# Patient Record
Sex: FEMALE | Race: WHITE | NOT HISPANIC OR LATINO | Employment: OTHER | URBAN - METROPOLITAN AREA
[De-identification: names, ages, dates, MRNs, and addresses within clinical notes are randomized per-mention and may not be internally consistent; named-entity substitution may affect disease eponyms.]

---

## 2017-01-04 ENCOUNTER — TELEPHONE (OUTPATIENT)
Dept: NEUROLOGY | Facility: CLINIC | Age: 52
End: 2017-01-04

## 2017-01-04 DIAGNOSIS — F41.9 ANXIETY: ICD-10-CM

## 2017-01-04 DIAGNOSIS — G35 MULTIPLE SCLEROSIS: Primary | ICD-10-CM

## 2017-01-04 NOTE — TELEPHONE ENCOUNTER
----- Message from May Berry LCSW sent at 1/4/2017  2:16 PM CST -----  Regarding: Counseling  Hien wanted counseling (months ago) and I've kept in touch and told her I'd call as soon as I can see patients.  Can you please put in an order?  Thanks!

## 2017-01-05 ENCOUNTER — CLINICAL SUPPORT (OUTPATIENT)
Dept: SPEECH THERAPY | Facility: HOSPITAL | Age: 52
End: 2017-01-05
Attending: PSYCHIATRY & NEUROLOGY
Payer: COMMERCIAL

## 2017-01-05 DIAGNOSIS — M25.512 LEFT SHOULDER PAIN, UNSPECIFIED CHRONICITY: ICD-10-CM

## 2017-01-05 DIAGNOSIS — M54.2 CERVICAL PAIN: ICD-10-CM

## 2017-01-05 PROCEDURE — 97110 THERAPEUTIC EXERCISES: CPT

## 2017-01-05 PROCEDURE — 97140 MANUAL THERAPY 1/> REGIONS: CPT

## 2017-01-05 NOTE — PROGRESS NOTES
TIME RECORD    Date:  01/06/2017    Start Time:  804  Stop Time:  900  PROCEDURES:    TIMED  Procedure Min.   Manual therapy 20   Therex 30   Supervised TE 6             UNTIMED  Procedure Min.             Total Timed Minutes:  50  Total Timed Units:  3  Total Untimed Units:  0  Charges Billed/# of units:  3 (MTx1, TEx2)    Progress/Current Status    Subjective:     Patient ID: Hien Jeter is a 51 y.o. female.  Diagnosis:   1. Cervical pain     2. Left shoulder pain, unspecified chronicity       SHe reported that she have have overuse pain and soreness from all the holiday activities but she feels good otherwise.    Objective:      Session initiated with supervised TE on nustep UEs only x 6'.  Manual therapy x 20 minutes consisting of STM/MFR to L UT and L supbscap area with static and dynamic cupping to both,  L shoulder STM/MFR scapular framing in R SL .   Pt performed TE per log and UE assessment 1:1 with PT  x 30  Minutes.     Date 1/6/2017 12/28/16 12/21/2016 12/19/2016 12/16/2016 12/12/2016 12/5/16 11/30/16 11/28/16 11/21/16 11/17/16 11/08/16   Visit 13 12 11 10 9 8 7 6 5 4 3    MHP --   -- -- -- -- -- -- -- 10 10   MT 15' 20' 20' 15' 20' 20' 15' 15' 15' 10' note See note   UT Str. -- - HEP HEP 3 x 30'' B -- -- -- -- -- -- --   LV Str. -- -  -- -- -- -- -- -- -- -- --   Pec Str. 3 x 30'' B -  -- -- -- -- -- -- -- -- painful   ER rot -- -  -- -- -- 2x15 3x10 3x10 NT 2x12 SL 2 x 10   SL abd -- -  -- -- -- 2x15 3x10 3x10 NT 2x12    Serratus punches 3x10 L 2 # 3x10 L 3x10 L 2 x 12 L 2 x 12 L 2 x 12 L 2x12 L 2x12 L 2x10 L      Y's 1# B prone  scap 2x15 1# B prone  scap 2x15 1# B prone   scap 2x15 Prone   1# scaption 2x15 Prone   1# scaption 2x12 Prone   1# scaption 2x12 Prone   scaption 2x10 Over BPB  2x12 Over BPB  2x12 Over BPB  2x10  Over BPB  2x10  2 x 10 B   W's 1# B prone  scap 2x15 1# B prone  Ext  2x15 1# B prone   Ext 2x15 Prone ext   2x15 1# Prone ext   2x12 1# Prone ext   2x10 1# Prone ext  L  2x10 Over BPB  2x12 Over BPB  2x12   Over BPB  2x10  Over BPB  2x10 2 x 10 B   T's 1# B prone  scap 2x15 1# B prone 2x15 1# 2x15  Over BTB Over BPB  2x15 1# Over BPB  2x12 1# Over BPB  2x12 1# Prone horiz abd L 2x10 Over BPB  2x12 Over BPB  2x12 Over BPB  2x10  Over BPB  2x10 2 x 10 B   Rows -- --  -- -- Prone  L 2x10 Prone  L 2x10 Over BPB  2x12 Over BPB  2x12 Over BPB  2x10  Over BPB  2x10 2 x 10 B   Horizontal Abd RTB 2x12 B RTB 2x12 B RTB 2x12 B RTB 2 x 10 B YTB 2 x 10 B -- --   painful - painful   Scaption 1# 2x15 1# 2x15 1# 2x15 2 x 15 1# 2 x 12 1# 2 x 12 1# 2x10 2x10 2x10 -- - --   Wall Slides -- 2x10 B 2x10 B X 15 B X 15 B X 15 B 2x10 2x10 2x10 NT 2x10 B --   No money 2x10 B 2x10             Ball on wall next 2x10 ea at 90*  Flex/ext  abd/add             ER RTB 2x10   (lawnmower) RTB 2x15  L RTB 2x12  RTB 2x10   (lawnmower) RTB 2x10   (lawnmower) RTB 2 x 10  (lawnmower) YTB 2x10 --       IR GTB 3x10  L GTB 3x10  L GTB 2x10 GTB 2x10  RTB 2x10  RTB 2x10 YTB 2x10 --       Lats Next  GTB 3x10 GTB 2x10 GTB 2x10  RTB 2x10  RTB YTB 2x10 ^next YTB 2x10 YTB 2x10 --     Rows next GTB 3x10 GTB 2x10 GTB 2x10  RTB 2x10  RTB 2x10  YTB 2x10  ^next YTB 2x10 YTB 2x10 --     Initials FS CS  2/6 DH 1/6 FS FS FS CS 3/6 CS  2/6 CS FS CS 1/6 FS       Upper Extremity Strength    KIRSTINE MARCOS   Shoulder Flexion: 5/5 5/5   Shoulder Abduction: 5/5 4+/5   Shoulder Extension: 5/5 4/5   Shoulder External  Rotation: 5/5 4/5   Shoulder Internal  Rotation: 5/5 4/5   Elbow Flexion:    5/5 5/5   Elbow Extension: 5/5 5/5   Wrist Flexion: 5/5 5/5   Wrist Extension: 5/5 5/5   : 5/5 5/5      Lower Extremity Strength    RLE LLE   Hip Flexion: 5/5 5/5   Hip Extension:  5/5 5/5   Hip Abduction: 5/5 5/5   Hip Adduction: 5/5 5/5   Knee Extension: 5/5 5/5   Knee Flexion: 5/5 5/5   Ankle Dorsiflexion: 5/5 5/5   Ankle Plantarflexion: 5/5 5/5         Assessment:     Increased reps as noted without difficulty.    Patient Education/Response:     Patient  educated to continue HEP.      Plans and Goals:   See updated POC

## 2017-01-06 NOTE — PLAN OF CARE
TIME RECORD    Date: 1/5/17    Start Time:  0805  Stop Time:  0900    PHYSICAL THERAPY UPDATED PLAN OF TREATMENT    Patient name: Hien Jeter  Onset Date:  2009 then fall 3 months ago  SOC Date:  10/28/16  Primary Diagnosis:    1. Cervical pain     2. Left shoulder pain, unspecified chronicity       Treatment Diagnosis:  L shoulder inpingement syndrome and MS and L shoulder pain and R ankle pain  Certification Period:  1/5/17 to 3/5/17  Precautions:  none  Visits from SOC:  13  Functional Level Prior to SOC:  L shoulder pain with decreased L UE ROM and L UE weakness.    Updated Assessment:  Pt is progressing in strength and reporting less pain and greater function.  SHe would benefit from more therapy to achieve LTG for UE function.  Pt stated that she does not feel that she is 100% all the time and the minimal pain is still bothering her and she stated that it is limiting her ability to lift objects and do some high level ADLs and house work.    Upper Extremity Strength     RUE LUE   Shoulder Flexion: 5/5 5/5   Shoulder Abduction: 5/5 4+/5   Shoulder Extension: 5/5 4/5   Shoulder External  Rotation: 5/5 4/5   Shoulder Internal  Rotation: 5/5 4/5   Elbow Flexion:     5/5 5/5   Elbow Extension: 5/5 5/5   Wrist Flexion: 5/5 5/5   Wrist Extension: 5/5 5/5   : 5/5 5/5       Lower Extremity Strength     RLE LLE   Hip Flexion: 5/5 5/5   Hip Extension:  5/5 5/5   Hip Abduction: 5/5 5/5   Hip Adduction: 5/5 5/5   Knee Extension: 5/5 5/5   Knee Flexion: 5/5 5/5   Ankle Dorsiflexion: 5/5 5/5   Ankle Plantarflexion: 5/5 5/5      Pt scored the following on the DASH assessment: 19/120    Short Term Goals (4 Weeks):   1. Pt will be indep with initial modified HEP with no increase in pain after exercise. MET  2. Pt will demonstrate improved L UEstrength of left lats, mid/ low traps, and rhomboids to 4/5 to allow pt to progress to resistive gym equipment for maintenance. MET       Long Term Goals (8 Weeks):   11. Independent  with updated HEP.   2. Increase L shoulder AROM to functional range with minimal pain. 3/10  3. Increase L shoulder strength to 4+/5 in most major ms groups in L shauna. MET  4. Patient will be able to achieve less than or equal to 24/120 on the DASH placing patient in 1-20 impaired, limited, or restricted category demonstrating overall improved functional ability with upper extremity. MET  5. Pt will report improved confidence with daily mobility by rating self at >95% on [place] [facility] Short Form.no longer applicable    6. Patient will be able to achieve greater than or equal to 65/80 on the LEFS placing patient in 10-20% impaired, limited, or restricted category demonstrating overall improved functional ability with lower extremity. MET  Long Term Goal Status:   continue per initial plan of care.  Reasons for Recertification of Therapy:    exercise prescription    Certification Period: 17 to 3/5/17  Recommended Treatment Plan: 2 times per week for 6-8 weeks: Gait Training, Locomotor Training, Manual Therapy, Moist Heat/ Ice, Neuromuscular Re-ed, Patient Education, Self Care, Therapeutic Activites and Therapeutic Exercise  Other Recommendations: none        Therapist's Name: Beulah Stout     Date: 2017    I CERTIFY THE NEED FOR THESE SERVICES FURNISHED UNDER THIS PLAN OF TREATMENT AND WHILE UNDER MY CARE    Physician's comments: ________________________________________________________________________________________________________________________________________________      Physician's Name: ___________________________________

## 2017-01-06 NOTE — TELEPHONE ENCOUNTER
Pt phoned today, just prior to me calling her, to inquire about counseling services.  I explained that I had just started scheduling patients.  Scheduled her for Tuesday 1/10 at 8:30am.  Spent some time discussing various stressors including work demands and caregiving for multiple family members.  We will discuss more, next week.

## 2017-01-10 ENCOUNTER — INITIAL CONSULT (OUTPATIENT)
Dept: NEUROLOGY | Facility: CLINIC | Age: 52
End: 2017-01-10
Payer: COMMERCIAL

## 2017-01-10 DIAGNOSIS — F43.23 ADJUSTMENT DISORDER WITH MIXED ANXIETY AND DEPRESSED MOOD: Primary | ICD-10-CM

## 2017-01-10 PROCEDURE — 90791 PSYCH DIAGNOSTIC EVALUATION: CPT | Mod: S$GLB,,, | Performed by: SOCIAL WORKER

## 2017-01-10 NOTE — PROGRESS NOTES
Psychiatry Initial Visit (PhD/LCSW)  Diagnostic Interview - CPT 68529    Date: 1/10/2017    Site: [facility]    Referral source: Page Salas MD, MPH    Clinical status of patient: Outpatient    Hien Jeter, a 51 y.o. female, for initial evaluation visit.  Met with patient.    Chief complaint/reason for encounter: anxiety, depression and interpersonal    History of present illness: Ms. Hien Jeter self-referred to counseling to address her anxiety related to family stressors.  She was previously evaluated by Rody Irby LCSW in December of 2015 for symptoms of anxiety including sleep disturbance, difficulty concentrating, fatigue, guilt, excessive worry, feelings of losing control and psychomotor agitation that had been present for approximately one year.  Much of her anxiety, at that time, related to concerns for her aging parents and in-laws and difficulties she was experiencing at work.  Ms. Jeter attended one follow-up session with Ms. Irby and was noted to have decreased symptoms after making the decision to take a sabbatical and utilize her short-term disability benefit.      A summary of Ms. Jeter's personal/family history is documented in Ms. Irby's note from 12/29/15 and there have not been significant changes other than the recent decline in health experienced by several family members including an aunt and uncle, her mother and her mother-in-law.  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  She shared that the teaching environment has changed significantly over the years and she finds it difficult, with her Multiple Sclerosis, to manage a classroom in the way she believes is expected of her, presently.  As an example, she shared that there is an expectation that students will be engaged in more group work/projects and she finds it too difficult to keep track of each group's progress while also  "attending to behavioral disruptions.      As mentioned, Ms. Jeter has several family members that are currently experiencing health issues.  Ms. Jeter has been engaged in caregiving for these family members, most notably spending several days each week caring for her mother-in-law, who was recently hospitalized.  She is challenged by dynamics within her 's family and finds herself in a mediating/policing role, at times.  She describes these experiences as "depressing" and stressful.  She reports crying, feeling worried and "overwhelmed" by some of these experiences.      Pain: Not assessed.      Symptoms:   · Mood: worthlessness/guilt, tearfulness and occasional depressed mood   · Anxiety: excessive anxiety/worry  · Substance abuse: denied  · Cognitive functioning: Occasional loss of focus  · Health behaviors: noncontributory    Psychiatric history: psychotropic management by PCP and has participated in counseling/psychotherapy on an outpatient basis in the past    Medical history: Multiple Sclerosis, reports healing from left shoulder injury    Family history of psychiatric illness: Possible history of anxiety.    Social history (marriage, employment, etc.): See previous history from 12/29/15 evaluation by Rody Irby LCSW.  Ms. Jeter remains , without children.  She has two dogs.  Lives in a neighborhood with several in-laws nearby.  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  She has close and open relationships with her own family and is close with her 's family, too.  She reports a close relationship with her sister-in-law Lynette.    Substance use:   Alcohol: social   Drugs: none   Tobacco: none   Caffeine: Not assessed    Current medications and drug reactions (include OTC, herbal): see medication list     Strengths and liabilities: Strength: Patient accepts guidance/feedback, Strength: Patient is " expressive/articulate., Strength: Patient is intelligent., Strength: Patient is motivated for change., Strength: Patient is physically healthy., Strength: Patient has positive support network., Strength: Patient has reasonable judgment., Strength: Patient is stable., Strength: Relationship with sister-in-law Lynette, support network through [employer] at [facility]    Current Evaluation:     Mental Status Exam:  General Appearance:  age appropriate, neatly groomed   Speech: normal tone, normal rate, normal pitch, normal volume      Level of Cooperation: cooperative      Thought Processes: normal and logical   Mood: steady, reports occasional depressed mood      Thought Content: normal, no suicidality, no homicidality, delusions, or paranoia   Affect: congruent and appropriate   Orientation: Oriented x3   Memory: Intact   Attention Span & Concentration: intact   Fund of General Knowledge: intact and appropriate to age and level of education   Abstract Reasoning: Intact   Judgment & Insight: good     Language  intact     Diagnostic Impression - Plan:       ICD-10-CM ICD-9-CM   1. Adjustment disorder with mixed anxiety and depressed mood F43.23 309.28       Plan:individual psychotherapy    Return to Clinic: 1 week, she will call with availability after consulting with family    Length of Service (minutes): 60

## 2017-01-12 ENCOUNTER — OFFICE VISIT (OUTPATIENT)
Dept: ORTHOPEDICS | Facility: CLINIC | Age: 52
End: 2017-01-12
Payer: COMMERCIAL

## 2017-01-12 ENCOUNTER — HOSPITAL ENCOUNTER (OUTPATIENT)
Dept: RADIOLOGY | Facility: HOSPITAL | Age: 52
Discharge: HOME | End: 2017-01-12
Attending: ORTHOPAEDIC SURGERY
Payer: COMMERCIAL

## 2017-01-12 VITALS — HEIGHT: 62 IN | BODY MASS INDEX: 20.61 KG/M2 | WEIGHT: 112 LBS

## 2017-01-12 DIAGNOSIS — M25.511 RIGHT SHOULDER PAIN, UNSPECIFIED CHRONICITY: Primary | ICD-10-CM

## 2017-01-12 DIAGNOSIS — M75.42 IMPINGEMENT SYNDROME OF LEFT SHOULDER: ICD-10-CM

## 2017-01-12 DIAGNOSIS — M25.511 RIGHT SHOULDER PAIN, UNSPECIFIED CHRONICITY: ICD-10-CM

## 2017-01-12 PROCEDURE — 1159F MED LIST DOCD IN RCRD: CPT | Mod: S$GLB,,, | Performed by: ORTHOPAEDIC SURGERY

## 2017-01-12 PROCEDURE — 73030 X-RAY EXAM OF SHOULDER: CPT | Mod: 26,RT,, | Performed by: RADIOLOGY

## 2017-01-12 PROCEDURE — 99999 PR PBB SHADOW E&M-EST. PATIENT-LVL II: CPT | Mod: PBBFAC,,, | Performed by: ORTHOPAEDIC SURGERY

## 2017-01-12 PROCEDURE — 73030 X-RAY EXAM OF SHOULDER: CPT | Mod: TC,RT

## 2017-01-12 PROCEDURE — 99213 OFFICE O/P EST LOW 20 MIN: CPT | Mod: S$GLB,,, | Performed by: ORTHOPAEDIC SURGERY

## 2017-01-12 NOTE — PROGRESS NOTES
INITIAL VISIT HISTORY:  Ms. Jeter in followup of left shoulder symptoms.  She is   doing much better with the left shoulder, occasionally having pain in the right   shoulder, but it really seems to be improving on both sides.  She is currently   in therapy.    PHYSICAL EXAMINATION:  Left shoulder demonstrates full range of motion.    Negative impingement sign.  Right shoulder demonstrates full range of motion,   just a slight pop with abduction and internal rotation of the shoulder.  No   instability.    X-RAYS:  AP and lateral of right shoulder demonstrate no bony abnormalities.    IMPRESSION:  Left shoulder impingement, improving.    PLAN:  I have recommended she finish up therapy, then switch to a home exercise   program.  She uses the anti-inflammatory cream, which seems to help with her   symptoms, so I have recommended she continue with that, increase activities as   tolerated.  Follow up slim HAMMOND  dd: 01/12/2017 14:06:36 (CST)  td: 01/13/2017 02:00:02 (CST)  Doc ID   #[id number]  Job ID #[id number]    CC:

## 2017-01-13 ENCOUNTER — TELEPHONE (OUTPATIENT)
Dept: NEUROLOGY | Facility: CLINIC | Age: 52
End: 2017-01-13

## 2017-01-13 NOTE — TELEPHONE ENCOUNTER
Phoned pt to check in as she was supposed to call to schedule her next therapy appointment, by now.  She shared that her mother-in-law was hospitalized and sent to the ICU.  I provided support and agreed to check in with her next week.

## 2017-01-18 ENCOUNTER — CLINICAL SUPPORT (OUTPATIENT)
Dept: SPEECH THERAPY | Facility: HOSPITAL | Age: 52
End: 2017-01-18
Attending: PSYCHIATRY & NEUROLOGY
Payer: COMMERCIAL

## 2017-01-18 DIAGNOSIS — R53.1 DECREASED STRENGTH: ICD-10-CM

## 2017-01-18 DIAGNOSIS — M25.512 LEFT SHOULDER PAIN, UNSPECIFIED CHRONICITY: ICD-10-CM

## 2017-01-18 DIAGNOSIS — M62.838 MUSCLE SPASM: ICD-10-CM

## 2017-01-18 DIAGNOSIS — M75.42 IMPINGEMENT SYNDROME OF LEFT SHOULDER: ICD-10-CM

## 2017-01-18 DIAGNOSIS — G35 MS (MULTIPLE SCLEROSIS): ICD-10-CM

## 2017-01-18 PROCEDURE — 97140 MANUAL THERAPY 1/> REGIONS: CPT

## 2017-01-18 PROCEDURE — 97110 THERAPEUTIC EXERCISES: CPT

## 2017-01-18 NOTE — PROGRESS NOTES
TIME RECORD    Date:  01/18/2017    Start Time:  0805  Stop Time:  0857    PROCEDURES:    TIMED  Procedure Min.   MT 19   TE 32                 UNTIMED  Procedure Min.             Total Timed Minutes:  51  Total Timed Units:  3  Total Untimed Units:  0  Charges Billed/# of units:  3 (1MT, 2TE)      Progress/Current Status    Subjective:     Patient ID: Hien Jeter is a 51 y.o. female.  Diagnosis:   1. Impingement syndrome of left shoulder     2. Decreased strength     3. Left shoulder pain, unspecified chronicity     4. MS (multiple sclerosis)     5. Muscle spasm       Pain: 2 /10  Pt stated that pain is better but she feels sensitive on L side after sleeping on that side.      Objective:      Session initiated with supervised TE on pulleys UEs only x 6'.  Manual therapy x 19 minutes consisting of STM/MFR to L UT and L supbscap area with static and dynamic cupping to both,  L shoulder STM/MFR scapular framing in R SL .   Pt performed TE per log and UE assessment 1:1 with PT  x  30 Minutes.     Date 1/18/2017 1/6/2017 12/28/16 12/21/2016 12/19/2016 12/16/2016 12/12/2016 12/5/16   Visit 14 13 12 11 10 9 8 7   MHP -- --   -- -- -- --   MT 19' 15' 20' 20' 15' 20' 20' 15'   UT Str. -- -- - HEP HEP 3 x 30'' B -- --   LV Str. -- -- -  -- -- -- --   Middle trunk str X 30''          Lat trunk lean str X 30''          Pec Str. 3 x 30'' 3 x 30'' B -  -- -- -- --   ER rot  -- -  -- -- -- 2x15   SL abd  -- -  -- -- -- 2x15   Serratus punches 3x10 L 2 # 3x10 L 2 # 3x10 L 3x10 L 2 x 12 L 2 x 12 L 2 x 12 L 2x12 L   Y's 1# B prone  scap 2x15 1# B prone  scap 2x15 1# B prone  scap 2x15 1# B prone   scap 2x15 Prone   1# scaption 2x15 Prone   1# scaption 2x12 Prone   1# scaption 2x12 Prone   scaption 2x10   W's 1# B prone  scap 2x15 1# B prone  scap 2x15 1# B prone  Ext  2x15 1# B prone   Ext 2x15 Prone ext   2x15 1# Prone ext   2x12 1# Prone ext   2x10 1# Prone ext L  2x10   T's 1# B prone  scap 2x15 1# B prone  scap  2x15 1# B prone 2x15 1# 2x15  Over BTB Over BPB  2x15 1# Over BPB  2x12 1# Over BPB  2x12 1# Prone horiz abd L 2x10   Rows -- -- --  -- -- Prone  L 2x10 Prone  L 2x10   Horizontal Abd RTB 2x15 B RTB 2x12 B RTB 2x12 B RTB 2x12 B RTB 2 x 10 B YTB 2 x 10 B -- --   Scaption 1.5# 2x10 1# 2x15 1# 2x15 1# 2x15 2 x 15 1# 2 x 12 1# 2 x 12 1# 2x10   Wall Slides -- -- 2x10 B 2x10 B X 15 B X 15 B X 15 B 2x10   No money 2x10 B 2x10 B 2x10        Ball on wall next next 2x10 ea at 90*  Flex/ext  abd/add        ER RTB 2x10   (lawnmower) RTB 2x10   (lawnmower) RTB 2x15  L RTB 2x12  RTB 2x10   (lawnmower) RTB 2x10   (lawnmower) RTB 2 x 10  (lawnmower) YTB 2x10   IR GTB 3x10  L GTB 3x10  L GTB 3x10  L GTB 2x10 GTB 2x10  RTB 2x10  RTB 2x10 YTB 2x10   Lats GTB 3x10 Next  GTB 3x10 GTB 2x10 GTB 2x10  RTB 2x10  RTB YTB 2x10 ^next   Rows GTB 3x10 next GTB 3x10 GTB 2x10 GTB 2x10  RTB 2x10  RTB 2x10  YTB 2x10  ^next   Initials FS FS CS  2/6 DH 1/6 FS FS FS CS 3/6       Assessment:     Pt still tolerated all TE well with no increase in pain.  The new stretches to help with rib pain and stiffness.    Patient Education/Response:     CONT HEP    Plans and Goals:     Short Term Goals (4 Weeks):   1. Pt will be indep with initial modified HEP with no increase in pain after exercise. MET  2. Pt will demonstrate improved L UEstrength of left lats, mid/ low traps, and rhomboids to 4/5 to allow pt to progress to resistive gym equipment for maintenance. MET       Long Term Goals (8 Weeks):   11. Independent with updated HEP.   2. Increase L shoulder AROM to functional range with minimal pain. 3/10  3. Increase L shoulder strength to 4+/5 in most major ms groups in L shauna. MET  4. Patient will be able to achieve less than or equal to 24/120 on the DASH placing patient in 1-20 impaired, limited, or restricted category demonstrating overall improved functional ability with upper extremity. MET  5. Pt will report improved confidence with daily mobility  by rating self at >95% on [place] [facility] Short Form.no longer applicable    6. Patient will be able to achieve greater than or equal to 65/80 on the LEFS placing patient in 10-20% impaired, limited, or restricted category demonstrating overall improved functional ability with lower extremity. MET  Long Term Goal Status:  continue per initial plan of care.  Reasons for Recertification of Therapy:  exercise prescription

## 2017-01-23 ENCOUNTER — TELEPHONE (OUTPATIENT)
Dept: NEUROLOGY | Facility: CLINIC | Age: 52
End: 2017-01-23

## 2017-01-23 NOTE — TELEPHONE ENCOUNTER
Followed up with pt.  Mother-in-law is now in acute hospital unit vs ICU.  She will call tomorrow to schedule a follow up appointment.

## 2017-01-26 ENCOUNTER — CLINICAL SUPPORT (OUTPATIENT)
Dept: SPEECH THERAPY | Facility: HOSPITAL | Age: 52
End: 2017-01-26
Attending: PSYCHIATRY & NEUROLOGY
Payer: COMMERCIAL

## 2017-01-26 DIAGNOSIS — M75.42 IMPINGEMENT SYNDROME OF LEFT SHOULDER: ICD-10-CM

## 2017-01-26 DIAGNOSIS — M62.838 MUSCLE SPASM: ICD-10-CM

## 2017-01-26 DIAGNOSIS — G35 MS (MULTIPLE SCLEROSIS): ICD-10-CM

## 2017-01-26 DIAGNOSIS — M25.512 LEFT SHOULDER PAIN, UNSPECIFIED CHRONICITY: ICD-10-CM

## 2017-01-26 DIAGNOSIS — R53.1 DECREASED STRENGTH: ICD-10-CM

## 2017-01-26 PROCEDURE — 97110 THERAPEUTIC EXERCISES: CPT

## 2017-01-26 PROCEDURE — 97140 MANUAL THERAPY 1/> REGIONS: CPT

## 2017-01-26 NOTE — PROGRESS NOTES
"TIME RECORD    Date:  01/26/2017    Start Time:  8:05  Stop Time:  9:03    PROCEDURES:    TIMED  Procedure Min.   UBE    MT 15   TE 40             UNTIMED  Procedure Min.             Total Timed Minutes:  55  Total Timed Units:  4  Total Untimed Units:  0  Charges Billed/# of units:  3TE, 1 MT      Progress/Current Status    Subjective:     Patient ID: Hien Jeter is a 51 y.o. female.  Diagnosis:   1. Impingement syndrome of left shoulder     2. Decreased strength     3. Left shoulder pain, unspecified chronicity     4. MS (multiple sclerosis)     5. Muscle spasm       Pain: pt reports soreness in Left UT and shoulder today but "barable". Pt agreeable to PT session.     Objective:     Session initiated with UBE for ROM to B UEs only x 6'.  Manual therapy x 15 minutes consisting of STM/MFR to L UT and L supbscap musculature ,  L shoulder STM/MFR scapular framing in R SL .   Pt performed TE per log 1:1 with PTA  x  40 Minutes.     Date 1/26/17 1/18/2017 1/6/2017 12/28/16 12/21/2016 12/19/2016 12/16/2016 12/12/2016 12/5/16   Visit 15 14 13 12 11 10 9 8 7   MHP  -- --   -- -- -- --   MT 15 19' 15' 20' 20' 15' 20' 20' 15'   UT Str. - -- -- - HEP HEP 3 x 30'' B -- --   LV Str. - -- -- -  -- -- -- --   Middle trunk str NT X 30''          Lat trunk lean str NT X 30''          Pec Str. 30"x3 3 x 30'' 3 x 30'' B -  -- -- -- --   ER rot   -- -  -- -- -- 2x15   SL abd   -- -  -- -- -- 2x15   Serratus punches 3x10 L 2 # 3x10 L 2 # 3x10 L 2 # 3x10 L 3x10 L 2 x 12 L 2 x 12 L 2 x 12 L 2x12 L   Y's 1# B prone  scap 2x15 1# B prone  scap 2x15 1# B prone  scap 2x15 1# B prone  scap 2x15 1# B prone   scap 2x15 Prone   1# scaption 2x15 Prone   1# scaption 2x12 Prone   1# scaption 2x12 Prone   scaption 2x10   W's 1# B prone  scap 2x15 1# B prone  scap 2x15 1# B prone  scap 2x15 1# B prone  Ext  2x15 1# B prone   Ext 2x15 Prone ext   2x15 1# Prone ext   2x12 1# Prone ext   2x10 1# Prone ext L  2x10   T's 1# B prone  scap " "2x15 1# B prone  scap 2x15 1# B prone  scap 2x15 1# B prone 2x15 1# 2x15  Over BTB Over BPB  2x15 1# Over BPB  2x12 1# Over BPB  2x12 1# Prone horiz abd L 2x10   Rows - -- -- --  -- -- Prone  L 2x10 Prone  L 2x10   Horizontal Abd RTB 2x15 B RTB 2x15 B RTB 2x12 B RTB 2x12 B RTB 2x12 B RTB 2 x 10 B YTB 2 x 10 B -- --   Scaption  1.5# 2x10 1# 2x15 1# 2x15 1# 2x15 2 x 15 1# 2 x 12 1# 2 x 12 1# 2x10   Wall Slides - -- -- 2x10 B 2x10 B X 15 B X 15 B X 15 B 2x10   No money 2x10 B w/ RTB 2x10 B 2x10 B 2x10        Ball on wall 2x10 ea at 90*  Flex/ext  abd/add next next 2x10 ea at 90*  Flex/ext  abd/add        ER RTB 2x15  (lawnmower) RTB 2x10   (lawnmower) RTB 2x10   (lawnmower) RTB 2x15  L RTB 2x12  RTB 2x10   (lawnmower) RTB 2x10   (lawnmower) RTB 2 x 10  (lawnmower) YTB 2x10   IR GTB 3x10 GTB 3x10  L GTB 3x10  L GTB 3x10  L GTB 2x10 GTB 2x10  RTB 2x10  RTB 2x10 YTB 2x10   Lats GTB 3x10 GTB 3x10 Next  GTB 3x10 GTB 2x10 GTB 2x10  RTB 2x10  RTB YTB 2x10 ^next   Rows GTB 3x10 GTB 3x10 next GTB 3x10 GTB 2x10 GTB 2x10  RTB 2x10  RTB 2x10  YTB 2x10  ^next   Initials JA 1/6 FS FS CS  2/6 DH 1/6 FS FS FS CS 3/6       Assessment:     Pt able to complete today's session stating that manual therapy to her left upper trapezius and shoulder felt "releiving". Pt responded well to PT session today.     Patient Education/Response:   Cont HEP    Plans and Goals:   Cont to advance PT as per POC.   Short Term Goals (4 Weeks):   1. Pt will be indep with initial modified HEP with no increase in pain after exercise. MET  2. Pt will demonstrate improved L UEstrength of left lats, mid/ low traps, and rhomboids to 4/5 to allow pt to progress to resistive gym equipment for maintenance. MET       Long Term Goals (8 Weeks):   11. Independent with updated HEP.   2. Increase L shoulder AROM to functional range with minimal pain. 3/10  3. Increase L shoulder strength to 4+/5 in most major ms groups in L jenler. MET  4. Patient will be able to achieve " less than or equal to 24/120 on the DASH placing patient in 1-20 impaired, limited, or restricted category demonstrating overall improved functional ability with upper extremity. MET  5. Pt will report improved confidence with daily mobility by rating self at >95% on [place] [facility] Short Form.no longer applicable    6. Patient will be able to achieve greater than or equal to 65/80 on the LEFS placing patient in 10-20% impaired, limited, or restricted category demonstrating overall improved functional ability with lower extremity. MET  Long Term Goal Status:  continue per initial plan of care.  Reasons for Recertification of Therapy:  exercise prescription

## 2017-01-30 ENCOUNTER — TELEPHONE (OUTPATIENT)
Dept: SPEECH THERAPY | Facility: HOSPITAL | Age: 52
End: 2017-01-30

## 2017-02-02 DIAGNOSIS — Z30.9 ENCOUNTER FOR CONTRACEPTIVE MANAGEMENT: ICD-10-CM

## 2017-02-03 ENCOUNTER — CLINICAL SUPPORT (OUTPATIENT)
Dept: SPEECH THERAPY | Facility: HOSPITAL | Age: 52
End: 2017-02-03
Attending: PSYCHIATRY & NEUROLOGY
Payer: COMMERCIAL

## 2017-02-03 DIAGNOSIS — M25.512 LEFT SHOULDER PAIN, UNSPECIFIED CHRONICITY: ICD-10-CM

## 2017-02-03 DIAGNOSIS — M54.2 CERVICAL PAIN: ICD-10-CM

## 2017-02-03 RX ORDER — NORGESTIMATE AND ETHINYL ESTRADIOL 7DAYSX3 28
KIT ORAL
Qty: 84 TABLET | Refills: 0 | Status: SHIPPED | OUTPATIENT
Start: 2017-02-03 | End: 2017-05-02

## 2017-02-03 NOTE — PROGRESS NOTES
TIME RECORD    Date:  02/08/2017    Start Time:  0805  Stop Time:  0830    PROCEDURES:    TIMED  Procedure Min.   supevised TE 6   education 19                 UNTIMED  Procedure Min.             Total Timed Minutes:  0  Total Timed Units:  0  Total Untimed Units:  0  Charges Billed/# of units:  0      Progress/Current Status    Subjective:     Patient ID: Hien Jeter is a 51 y.o. female.  Diagnosis:   1. Cervical pain     2. Left shoulder pain, unspecified chronicity       Pain: 1 /10  Pt stated that she feels like she has gotten a lot better since the start of therapy.    Objective:     Pt performed UBE at 30' resistance x 6 minutes.  PT performed L UE assessment grossly and was heavily educated on continuing HEP as tolerated to continue strengthening and to use heat on ribs with pain and seek massage therapist to help with scapular and trunk trigger points.      Assessment:     Pt is ready for d/c today due to goals met.      Patient Education/Response:     CONT HEP    Plans and Goals:     D/c pt due to goals met  Short Term Goals (4 Weeks):   1. Pt will be indep with initial modified HEP with no increase in pain after exercise. MET  2. Pt will demonstrate improved L UEstrength of left lats, mid/ low traps, and rhomboids to 4/5 to allow pt to progress to resistive gym equipment for maintenance. MET       Long Term Goals (8 Weeks):   11. Independent with updated HEP. MET  2. Increase L shoulder AROM to functional range with minimal pain. 1/10 MET  3. Increase L shoulder strength to 4+/5 in most major ms groups in L shoudler. MET  4. Patient will be able to achieve less than or equal to 24/120 on the DASH placing patient in 1-20 impaired, limited, or restricted category demonstrating overall improved functional ability with upper extremity. MET  5. Pt will report improved confidence with daily mobility by rating self at >95% on [facility] Short Form.no longer applicable    6. Patient will be able  to achieve greater than or equal to 65/80 on the LEFS placing patient in 10-20% impaired, limited, or restricted category demonstrating overall improved functional ability with lower extremity. MET  Long Term Goal Status:  continue per initial plan of care.    REHAB SERVICES OUTPATIENT DISCHARGE SUMMARY  Physical Therapy      Name:  Hien Jeter  Date:  2/3/2017  Date of Evaluation:  10/28/16  Physician:  Maritza  Total # Of Visits:  15  Cancelled:  1  No Shows:  1  Diagnosis:    1. Cervical pain     2. Left shoulder pain, unspecified chronicity         Physical/Functional Status:  At time of discharge, patient was able to Upper Extremity Strength     RUE LUE   Shoulder Flexion: 5/5 5/5   Shoulder Abduction: 5/5 4+/5   Shoulder Extension: 5/5 4/5   Shoulder External  Rotation: 5/5 4/5   Shoulder Internal  Rotation: 5/5 4/5   Elbow Flexion:     5/5 5/5   Elbow Extension: 5/5 5/5   Wrist Flexion: 5/5 5/5   Wrist Extension: 5/5 5/5   : 5/5 5/5       Lower Extremity Strength     RLE LLE   Hip Flexion: 5/5 5/5   Hip Extension:  5/5 5/5   Hip Abduction: 5/5 5/5   Hip Adduction: 5/5 5/5   Knee Extension: 5/5 5/5   Knee Flexion: 5/5 5/5   Ankle Dorsiflexion: 5/5 5/5   Ankle Plantarflexion: 5/5 5/5       Pt scored the following on the DASH assessment: 19/120    The patient is to be discharged from our Therapy service for the following reason(s):  Patient has met all of his/her goals    Short Term Goals (4 Weeks):   1. Pt will be indep with initial modified HEP with no increase in pain after exercise. MET  2. Pt will demonstrate improved L UEstrength of left lats, mid/ low traps, and rhomboids to 4/5 to allow pt to progress to resistive gym equipment for maintenance. MET       Long Term Goals (8 Weeks):   11. Independent with updated HEP. MET  2. Increase L shoulder AROM to functional range with minimal pain. 1/10 MET  3. Increase L shoulder strength to 4+/5 in most major ms groups in L shoudler. MET  4. Patient will be  able to achieve less than or equal to 24/120 on the DASH placing patient in 1-20 impaired, limited, or restricted category demonstrating overall improved functional ability with upper extremity. MET  5. Pt will report improved confidence with daily mobility by rating self at >95% on [facility] Short Form.no longer applicable    6. Patient will be able to achieve greater than or equal to 65/80 on the LEFS placing patient in 10-20% impaired, limited, or restricted category demonstrating overall improved functional ability with lower extremity. MET  Degree of Goal Achievement:  Patient has met all goals    Patient Education:  CONT HEP    Discharge Plan:  Home Program and community fitness.

## 2017-02-06 ENCOUNTER — TELEPHONE (OUTPATIENT)
Dept: NEUROLOGY | Facility: CLINIC | Age: 52
End: 2017-02-06

## 2017-02-06 ENCOUNTER — INITIAL CONSULT (OUTPATIENT)
Dept: NEUROLOGY | Facility: CLINIC | Age: 52
End: 2017-02-06
Payer: COMMERCIAL

## 2017-02-06 DIAGNOSIS — F41.1 GENERALIZED ANXIETY DISORDER: ICD-10-CM

## 2017-02-06 DIAGNOSIS — R41.9 COGNITIVE COMPLAINTS: ICD-10-CM

## 2017-02-06 DIAGNOSIS — G35 MS (MULTIPLE SCLEROSIS): ICD-10-CM

## 2017-02-06 PROCEDURE — 90791 PSYCH DIAGNOSTIC EVALUATION: CPT | Mod: S$GLB,,, | Performed by: CLINICAL NEUROPSYCHOLOGIST

## 2017-02-06 PROCEDURE — 96118 PR NEUROPSYCH TESTING BY PSYCH/PHYS: CPT | Mod: S$GLB,,, | Performed by: CLINICAL NEUROPSYCHOLOGIST

## 2017-02-06 PROCEDURE — 96119 PR NEUROPSYCH TESTING BY TECHNICIAN: CPT | Mod: 59,S$GLB,, | Performed by: CLINICAL NEUROPSYCHOLOGIST

## 2017-02-06 PROCEDURE — 99499 UNLISTED E&M SERVICE: CPT | Mod: S$GLB,,, | Performed by: CLINICAL NEUROPSYCHOLOGIST

## 2017-02-06 NOTE — PROGRESS NOTES
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 recall:    Look at the person as they are speaking to you.    Paraphrase as they are speaking   Write down important pieces of information    Ask them to repeat if you zone out.    Have them simplify and reduce information that you need to attend to during conversation.    Have visual cues to remind you if you need to do something later.    Processing Speed-based strategies to improve memory: Using multiple modalities (e.g., listening, writing notes, asking questions, recording, follow-up meetings with bosses) to learn new information also can be helpful and is likely to allow additional time for processing, thus improving memory for the material.     Memory Strategies: Absorbing Information   Simplify - Use easier words and shorter sentences. Break down things into steps.   Restate - Put information into your own words.  Does it make sense? This allows you and others to test for understanding.   Link - If possible, associate new information with something you already know.   Organize - Group items into meaningful categories.  You can organize by time, location, color, shape, size, function, and even age.  Be creative.    Break it up - " Dont try to take in too much at one time. Concentrate for a few minutes, then move on to something else. You may learn more in short sessions rather than one long one.    Memory Strategies: Storing Information   Rehearse - Immediately after seeing/hearing something, try to recall it.  Wait a few minutes, then check again.  Gradually lengthen the intervals between rehearsals.   Repetition of learned material is critical to ensure storage of information to be learned.     Practice good memory hygiene:   Engage in regular exercise, which increases alertness and arousal, which can improve attention and focus.    Get a good nights sleep, as sleep is necessary for memory consolidation. Caffeine intake in the afternoon and evening, as well as stuffing oneself at supper, can decrease the quality of restful sleep throughout the night. Additionally, bedtime and wake-up times should be consistent every night and morning so the body becomes used to a single sleep/awake routine.   Eat healthy foods and balanced meals. It is notable that research indicates certain nutrients may aid in brain function, such as B vitamins (especially B6, B12, and folic acid), antioxidants (such as vitamins C and E, and beta carotene), and Omega-3 fatty acids. Talk with your physician or nutritionist.

## 2017-02-07 ENCOUNTER — TELEPHONE (OUTPATIENT)
Dept: NEUROLOGY | Facility: CLINIC | Age: 52
End: 2017-02-07

## 2017-02-07 DIAGNOSIS — G35 MULTIPLE SCLEROSIS: Primary | ICD-10-CM

## 2017-02-08 ENCOUNTER — TELEPHONE (OUTPATIENT)
Dept: NEUROLOGY | Facility: CLINIC | Age: 52
End: 2017-02-08

## 2017-02-15 ENCOUNTER — LAB VISIT (OUTPATIENT)
Dept: LAB | Facility: HOSPITAL | Age: 52
End: 2017-02-15
Attending: PSYCHIATRY & NEUROLOGY
Payer: COMMERCIAL

## 2017-02-15 ENCOUNTER — OFFICE VISIT (OUTPATIENT)
Dept: NEUROLOGY | Facility: CLINIC | Age: 52
End: 2017-02-15
Payer: COMMERCIAL

## 2017-02-15 ENCOUNTER — DOCUMENTATION ONLY (OUTPATIENT)
Dept: NEUROLOGY | Facility: CLINIC | Age: 52
End: 2017-02-15

## 2017-02-15 VITALS
HEIGHT: 62 IN | BODY MASS INDEX: 21.65 KG/M2 | WEIGHT: 117.63 LBS | HEART RATE: 85 BPM | SYSTOLIC BLOOD PRESSURE: 108 MMHG | DIASTOLIC BLOOD PRESSURE: 76 MMHG

## 2017-02-15 DIAGNOSIS — G35 MS (MULTIPLE SCLEROSIS): Primary | ICD-10-CM

## 2017-02-15 DIAGNOSIS — E55.9 VITAMIN D INSUFFICIENCY: ICD-10-CM

## 2017-02-15 DIAGNOSIS — G35 MS (MULTIPLE SCLEROSIS): ICD-10-CM

## 2017-02-15 DIAGNOSIS — G35 MULTIPLE SCLEROSIS: ICD-10-CM

## 2017-02-15 DIAGNOSIS — Z71.89 COUNSELING REGARDING GOALS OF CARE: ICD-10-CM

## 2017-02-15 DIAGNOSIS — Z79.899 ENCOUNTER FOR LONG-TERM (CURRENT) USE OF HIGH-RISK MEDICATION: ICD-10-CM

## 2017-02-15 DIAGNOSIS — M62.838 MUSCLE SPASM: ICD-10-CM

## 2017-02-15 DIAGNOSIS — F41.1 GENERALIZED ANXIETY DISORDER: Primary | ICD-10-CM

## 2017-02-15 LAB
25(OH)D3+25(OH)D2 SERPL-MCNC: 72 NG/ML
BASOPHILS # BLD AUTO: 0.05 K/UL
BASOPHILS NFR BLD: 0.6 %
DIFFERENTIAL METHOD: ABNORMAL
EOSINOPHIL # BLD AUTO: 0.2 K/UL
EOSINOPHIL NFR BLD: 1.8 %
ERYTHROCYTE [DISTWIDTH] IN BLOOD BY AUTOMATED COUNT: 13.1 %
HCT VFR BLD AUTO: 43 %
HGB BLD-MCNC: 14.9 G/DL
LYMPHOCYTES # BLD AUTO: 1.2 K/UL
LYMPHOCYTES NFR BLD: 13.7 %
MCH RBC QN AUTO: 33.3 PG
MCHC RBC AUTO-ENTMCNC: 34.7 %
MCV RBC AUTO: 96 FL
MONOCYTES # BLD AUTO: 0.7 K/UL
MONOCYTES NFR BLD: 7.6 %
NEUTROPHILS # BLD AUTO: 6.7 K/UL
NEUTROPHILS NFR BLD: 76.1 %
PLATELET # BLD AUTO: 316 K/UL
PMV BLD AUTO: 9.9 FL
RBC # BLD AUTO: 4.47 M/UL
WBC # BLD AUTO: 8.81 K/UL

## 2017-02-15 PROCEDURE — 99999 PR PBB SHADOW E&M-EST. PATIENT-LVL III: CPT | Mod: PBBFAC,,, | Performed by: PHYSICIAN ASSISTANT

## 2017-02-15 PROCEDURE — 36415 COLL VENOUS BLD VENIPUNCTURE: CPT

## 2017-02-15 PROCEDURE — 82306 VITAMIN D 25 HYDROXY: CPT

## 2017-02-15 PROCEDURE — 86359 T CELLS TOTAL COUNT: CPT

## 2017-02-15 PROCEDURE — 99215 OFFICE O/P EST HI 40 MIN: CPT | Mod: S$GLB,,, | Performed by: PHYSICIAN ASSISTANT

## 2017-02-15 PROCEDURE — 85025 COMPLETE CBC W/AUTO DIFF WBC: CPT

## 2017-02-15 PROCEDURE — 90834 PSYTX W PT 45 MINUTES: CPT | Mod: S$GLB,,, | Performed by: SOCIAL WORKER

## 2017-02-15 PROCEDURE — 86360 T CELL ABSOLUTE COUNT/RATIO: CPT

## 2017-02-15 NOTE — MR AVS SNAPSHOT
[facility]  [address]  [facility] [address]  Phone: [phone]                  Hien Jeter   2/15/2017 7:45 AM   Office Visit    Description:  Female : 1965   Provider:  Shelbi Wisdom PA-C   Department:  [facility]           Reason for Visit     Neurologic Problem           Diagnoses this Visit        Comments    MS (multiple sclerosis)    -  Primary     Encounter for long-term (current) use of high-risk medication         Counseling regarding goals of care         Vitamin D insufficiency                To Do List           Future Appointments        Provider Department Dept Phone    2/15/2017 9:00 AM LAB, SAME DAY [facility] [phone]    2/15/2017 10:30 AM May Berry [facility] [phone]    3/27/2017 12:00 PM [facility] [phone]    3/27/2017 12:45 PM [facility] [phone]    3/27/2017 1:30 PM [facility] [phone]      Goals (5 Years of Data)     None      Follow-Up and Disposition     Return in about 6 months (around 8/15/2017).      [facility]     [facility] Nurse Care Line - 24/ Assistance  Registered nurses in the [facility] provide clinical advisement, health education, appointment booking, and other advisory services.  Call for this free service at [phone].             Medications           Message regarding Medications     Verify the changes and/or additions to your medication regime listed below are the same as discussed with your clinician today.  If any of these changes or additions are incorrect, please notify your healthcare provider.             Verify that the below list of medications is an accurate representation of the medications you are currently taking.  If none reported, the list may be blank. If incorrect, please contact your healthcare  "provider. Carry this list with you in case of emergency.           Current Medications     acetaminophen (TYLENOL) 325 MG tablet Take 1 tablet by mouth as needed.     baclofen (LIORESAL) 10 MG tablet Take 1/2 tablet 2-3 times a day    cholecalciferol, vitamin D3, 5,000 unit capsule Take 2,000 Units by mouth once daily.     diazepam (VALIUM) 5 MG tablet Take 1 tablet (5 mg total) by mouth every 12 (twelve) hours as needed for Anxiety.    dimethyl fumarate (TECFIDERA) 240 mg CpDR Take 240 mg by mouth 2 (two) times daily.    flaxseed oil 1,000 mg Cap Take 1 capsule by mouth once daily.     flurbiprofen (ANSAID) 100 MG tablet Take 1 tablet (100 mg total) by mouth 2 (two) times daily as needed (headache).    magnesium oxide (MAG-OX) 400 mg tablet TAKE 1 TABLET BY MOUTH TWICE DAILY    multivitamin-Ca-iron-minerals (ONE-A-DAY WOMENS FORMULA) 27-0.4 mg Tab Take 1 tablet by mouth once daily.     nortriptyline (PAMELOR) 10 MG capsule Take 2 capsules (20 mg total) by mouth every evening.    riboflavin, vitamin B2, (VITAMIN B-2) 100 mg Tab tablet TAKE 2 TABLETS BY MOUTH ONCE DAILY.    topiramate 25 mg capsule Take 25 mg by mouth 2 (two) times daily.     TRI-SPRINTEC, 28, 0.18/0.215/0.25 mg-35 mcg (28) tablet TAKE 1 TABLET BY MOUTH ONCE DAILY.    zolmitriptan (ZOMIG) 5 mg [place] SPRAY 1 SPRAY IN NOSTRIL ONCE AS NEEDED. Max 2 doses in 24 hrs           Clinical Reference Information           Your Vitals Were     BP Pulse Height Weight Last Period BMI    108/76 85 5' 2" (1.575 m) 53.3 kg (117 lb 9.6 oz) 02/25/2016 21.51 kg/m2      Blood Pressure          Most Recent Value    BP  108/76      Allergies as of 2/15/2017     Codeine    Diclofenac    Hydrocodone-acetaminophen    Ibuprofen    Tramadol      Immunizations Administered on Date of Encounter - 2/15/2017     None      Orders Placed During Today's Visit     Future Labs/Procedures Expected by Expires    MRI Brain W WO Contrast  3/6/2017 2/15/2018    MRI Cervical Spine W WO Cont  " 3/6/2017 2/15/2018    MRI Thoracic Spine W WO Cont  3/6/2017 2/15/2018    Vitamin D  As directed 2/15/2018      Language Assistance Services     ATTENTION: Language assistance services are available, free of charge. Please call [phone].      ATENCIÓN: Si habla ashli, tiene a cortez disposición servicios gratuitos de asistencia lingüística. Llame al [phone].     CHÚ Ý: N?u b?n nói Ti?ng Vi?t, có các d?ch v? h? tr? ngôn ng? mi?n phí dành cho b?n. G?i s? [phone].         [facility] complies with applicable Federal civil rights laws and does not discriminate on the basis of race, color, national origin, age, disability, or sex.

## 2017-02-15 NOTE — PROGRESS NOTES
Neuropsychology Feedback Note    Date of Session: 02/15/2017  Session Content: Results and recommendations were discussed at length with the patient. We reviewed implications of test findings. We discussed cognitive strategies and treatment for Generalized Anxiety Disorder (MT). We also discussed no current contraindications for her return to work. No further neuropsychology feedback needed.

## 2017-02-15 NOTE — PROGRESS NOTES
Individual Psychotherapy (PhD/LCSW)    2/15/2017    Site:  [facility]         Therapeutic Intervention: Met with patient.  Outpatient - Behavior modifying psychotherapy 45 min - CPT code 96184 and Outpatient - Supportive psychotherapy 45 min - CPT Code 31962    Chief complaint/reason for encounter: anxiety     Interval history and content of current session:   This was our first follow up since pt's initial assessment on 1/10 as her mother-in-law was hospitalized and in ICU for some time.  Pt also underwent neuropsych testing and is encouraged by the results and receptive to the recommendations.  Regarding family, pt was able to utilize recommendations made during our first session to address family tension related to her sick mother-in-law.  She reports that the tension has decreased, significantly, and family members are working well to provide care.  Regarding neuropsych testing, pt shared her results during session.  Dr. Love raised concerns about pt's anxiety and this was further assessed during session.  Pt completed the MT-7 and PROMIS Emotional Distress, Anxiety, Short Form in session and scores indicate moderate anxiety. Specifically, she reported not being able to control/stop worrying, worrying too much, having trouble relaxing and becoming easily annoyed or irritated over half the days in a week.  She also reports feeling anxious, feeling so restless that it's hard to sit still and feeling afraid as if something awful might happen several days each week.  She finds it hard to focus on anything other than her anxiety sometimes and also reports difficulty falling asleep because of worrying.  During session we explored the difference between worrying and problem solving, discussed the practice of planning worry time and practiced relaxation exercises (3 examples provided, and demonstrated).  HOMEWORK: Pt will practice relaxation exercises 3-5x/day and will begin scheduled worry time 2x/day.  Pt is  also open to psychiatric services for assessment and medication management.  I will discuss this with the MS team and look for providers with availability.      Treatment plan:  · Target symptoms: anxiety   · Why chosen therapy is appropriate versus another modality: relevant to diagnosis, patient responds to this modality, evidence based practice  · Outcome monitoring methods: self-report, checklist/rating scale  · Therapeutic intervention type: behavior modifying psychotherapy, supportive psychotherapy    Risk parameters:  Patient reports no suicidal ideation  Patient reports no homicidal ideation  Patient reports no self-injurious behavior  Patient reports no violent behavior    Verbal deficits: None    Patient's response to intervention:  The patient's response to intervention is accepting, motivated.    Progress toward goals and other mental status changes:  The patient's progress toward goals is good.    Diagnosis:   No diagnosis found.    Plan:  individual psychotherapy and consult psychiatrist for medication evaluation    Return to clinic: 1 week, pt will consult with family then call to schedule    Length of Service (minutes): 45

## 2017-02-15 NOTE — PROGRESS NOTES
"Subjective:       Patient ID: Hien Jeter is a 51 y.o. female who presents today for a routine clinic visit for MS.    MS HPI:  · DMT: Tecfidera  · Side effects from DMT? No  · Taking vitamin D3 as recommended? Yes -  Dose: 2,000 IU daily  "Tecfidera is good"  Her mother in law has been in the hospital for quite some time and this has been particularly stressful for patient over the last couple of months      SOCIAL HISTORY  Social History   Substance Use Topics    Smoking status: Never Smoker    Smokeless tobacco: Never Used    Alcohol use Yes      Comment: socially/ not weekly     Living arrangements - the patient lives with her .  Employment Disability    MS ROS:  · Fatigue: Yes - stressful events with mother in law recently  · Sleep Disturbance: Yes - difficult time with mother in law in hospital  · Bladder Dysfunction: No  · Bowel Dysfunction: No  · Spasticity: Yes - Baclofen --taking PRN  · Visual Symptoms: Yes - wears glasses--recently saw Dr. Madie Nieto for evaluation  · Cognitive: Yes - has recently had NP testing done and meeting with Dr. Love today to review  · Mood Disorder: Yes - has initiated counseling with MS   · Gait Disturbance: No  · Falls: No  · Hand Dysfunction: Yes - completed therapy for L shoulder recently  · Sexual Dysfunction: Not Assessed  · Skin Breakdown: No  · Tremors: No  · Dysphagia:  No  · Dysarthria:  No  · Heat sensitivity:  Yes   · Any un-met adaptive needs? No  · Copay Assist?  Yes   · Clinical Trial candidate? No        Objective:        1. 25 foot timed walk:previously 4.0s  Timed 25 Foot Walk: 10/26/2016 2/15/2017   Did patient wear an AFO? No No   Was assistive device used? No No   Time for 25 Foot Walk (seconds) 4 4.2   Time for 25 Foot Walk (seconds) 4.3 -     Neurologic Exam     Mental Status   Oriented to person, place, and time.   Follows 3 step commands.   Speech: speech is normal   Level of consciousness: alert  Normal comprehension. "     Cranial Nerves     CN II   Visual acuity: normal with correction (20/20 OD and OS correctedwith Snellen hand held chart at 6 ft)    CN III, IV, VI   Pupils are equal, round, and reactive to light.  Extraocular motions are normal.     CN V   Facial sensation intact.     CN VII   Facial expression full, symmetric.     CN VIII   Hearing: intact (finger rub)    CN IX, X   Palate: symmetric    CN XI   CN XI normal.     CN XII   Tongue deviation: none    Motor Exam   Muscle bulk: normal  Overall muscle tone: normal    Strength   Right deltoid: 5/5  Left deltoid: 5/5  Right triceps: 5/5  Left triceps: 5/5  Right wrist extension: 5/5  Left wrist extension: 5/5  Right interossei: 5/5  Left interossei: 5/5  Right iliopsoas: 5/5  Left iliopsoas: 5/5  Right hamstrin/5  Left hamstrin/5  Right anterior tibial: 5/5  Left anterior tibial: 5/5  Right peroneal: 5/5  Left peroneal: 5/5    Sensory Exam   Light touch normal.   Right arm vibration: decreased from fingers  Left arm vibration: decreased from fingers  Right leg vibration: decreased from toes  Left leg vibration: decreased from toes    Gait, Coordination, and Reflexes     Gait  Gait: normal    Coordination   Finger to nose coordination: normal  Tandem walking coordination: normal    Tremor   Resting tremor: absent  Action tremor: absent    Reflexes   Right brachioradialis: 2+  Left brachioradialis: 2+  Right biceps: 2+  Left biceps: 2+  Right triceps: 2+  Left triceps: 2+  Right patellar: 2+  Left patellar: 2+  Right achilles: 2+  Left achilles: 2+  Right plantar: normal  Left plantar: normal  Right ankle clonus: absent  Left ankle clonus: absent  Right pendular knee jerk: absent  Left pendular knee jerk: absent       Normal Heel/toe walk  Normal RSM         Imaging:     Ordered today: Brain, C and T spine    Labs:   Labs today: CBC, CD8, Vit D  Lab Results   Component Value Date    NWGVJEZX36KF 63 2016    BHBKEQGF36IM 75 2015    IFOPQXXM19UO >96 (A)  06/13/2014     No results found for: JCVINDEX, JCVANTIBODY  Lab Results   Component Value Date    FJ3LPYII 68.5 08/09/2016    ABSOLUTECD3 1048 08/09/2016    QS9GGDLO 11.7 08/09/2016    ABSOLUTECD8 179 (L) 08/09/2016    XN5YIWTC 57.3 (H) 08/09/2016    ABSOLUTECD4 877 08/09/2016    LABCD48 4.89 (H) 08/09/2016       Diagnosis/Assessment/Plan:    1. Multiple Sclerosis  · Assessment: Patient timed walk continues to be stable on Tecfidera  · Imaging: Will plan for annual imaging--Brain, C and T spine ordered today for March 2017  · Disease Modifying Therapies: Continue Tecfidera and high dose Vit D3.  Will assess safety labs today along with Vit D3 level. Patient is aware of risk of lymphopenia and need for labs    2. MS Symptom Assessment / Management  · Spasticity: continue Baclofen PRN  · Visual Symptoms: monitored regularly by Dr. Madie Nieto  · Cognitive: NP testing completed with Dr. Love(see Media)  · Mood Disorder: continue counseling with May Berry LCSW    Over 50% of this 40 minute visit was spent in direct face to face counseling of the patient regarding her current symptoms and management of same.  Follow up in 6 months  Patient agreed to POC today.    Attending, Dr. Salas, was available during today's encounter. Any change to plan along with cosign to appear in the EMR.     Shelbi Wisdom PA-C  [facility]        MS (multiple sclerosis)  -     Vitamin D; Future    Encounter for long-term (current) use of high-risk medication    Counseling regarding goals of care    Vitamin D insufficiency  -     Vitamin D; Future

## 2017-02-16 DIAGNOSIS — G35 MULTIPLE SCLEROSIS: ICD-10-CM

## 2017-02-16 LAB
ABSOLUTE CD3: 783 CELLS/UL (ref 700–2100)
ABSOLUTE CD8: 122 CELLS/UL (ref 200–900)
CD3%: 67.9 % (ref 55–83)
CD3+CD4+ CELLS # BLD: 665 CELLS/UL (ref 300–1400)
CD3+CD4+ CELLS NFR BLD: 57.7 % (ref 28–57)
CD4/CD8 RATIO: 5.47 (ref 0.9–3.6)
CD8 % SUPPRESSOR T CELL: 10.5 % (ref 10–39)

## 2017-02-17 RX ORDER — DIMETHYL FUMARATE 240 MG/1
240 CAPSULE ORAL 2 TIMES DAILY
Qty: 180 CAPSULE | Refills: 1 | Status: SHIPPED | OUTPATIENT
Start: 2017-02-17 | End: 2017-08-07 | Stop reason: SDUPTHER

## 2017-02-24 ENCOUNTER — TELEPHONE (OUTPATIENT)
Dept: NEUROLOGY | Facility: CLINIC | Age: 52
End: 2017-02-24

## 2017-02-24 NOTE — TELEPHONE ENCOUNTER
Phoned pt with updates on availability for new patient visit in [facility] and with Pattie Thorpe MD at [facility].  Pt will consider both options and then call to schedule an appointment.

## 2017-03-01 ENCOUNTER — TELEPHONE (OUTPATIENT)
Dept: NEUROLOGY | Facility: CLINIC | Age: 52
End: 2017-03-01

## 2017-03-01 DIAGNOSIS — F41.9 ANXIETY: Primary | ICD-10-CM

## 2017-03-01 NOTE — TELEPHONE ENCOUNTER
----- Message from DEB Jose, CNS sent at 2/27/2017  5:44 PM CST -----  Regarding: RE: Psychiatry referral  Shelbi, I saw this message on Monday evening, so just figured it made more sense for you to place referral on Wednesday when you return.  Just let me know that you see this. Thanks.     Elsi   ----- Message -----     From: May Berry LCSW     Sent: 2/23/2017  11:07 AM       To: DEB Jose, CNS, #  Subject: Psychiatry referral                              Cedric Calzada,  If you have time on Friday, could you place a referral to outpatient psychiatry for this pt, who is followed by Shelbi and Dr. AGUILAR?  She underwent NP testing and was recommended for psych eval for anxiety.  Thank you!

## 2017-03-01 NOTE — TELEPHONE ENCOUNTER
Pt late for session.  Phoned and she had just left her mother-in-laws.  Reports it was a challenging day and she ran late.  Rescheduled for Friday at 2pm.

## 2017-03-02 ENCOUNTER — DOCUMENTATION ONLY (OUTPATIENT)
Dept: NEUROLOGY | Facility: CLINIC | Age: 52
End: 2017-03-02

## 2017-03-02 NOTE — PROGRESS NOTES
Received signed Release of Medical Information letter in reference to Accident that occurred 03/05/2015 from [employer]  Placed in Shelbi Wisdom's folder for review

## 2017-03-03 ENCOUNTER — OFFICE VISIT (OUTPATIENT)
Dept: NEUROLOGY | Facility: CLINIC | Age: 52
End: 2017-03-03
Payer: COMMERCIAL

## 2017-03-03 DIAGNOSIS — F41.1 GENERALIZED ANXIETY DISORDER: Primary | ICD-10-CM

## 2017-03-03 PROCEDURE — 90834 PSYTX W PT 45 MINUTES: CPT | Mod: S$GLB,,, | Performed by: SOCIAL WORKER

## 2017-03-03 NOTE — PROGRESS NOTES
Individual Psychotherapy (PhD/LCSW)    3/3/2017    Site:  [facility]         Therapeutic Intervention: Met with patient.  Outpatient - Insight oriented psychotherapy 45 min - CPT code 26218 and Outpatient - Supportive psychotherapy 45 min - CPT Code 08531    Chief complaint/reason for encounter: anxiety, work stress     Interval history and content of current session:   Pt has been utilizing her scheduled worry time and breathing exercises.  She noted on one occasion that she became very anxious while writing worries in her journal and attempted to use the breathing exercise, but she'd already become too anxious.  She learned that utilizing the exercise is helpful when applied before she becomes too worried.  She will continue to practice.  Today, most of the session focused on anxiety about returning to work.  She has explored options, including taking short-term disability leave.  We explored the advantages and disadvantages of returning to her job, finding new work, retiring, taking leave, etc.  She will continue to think about her options and we will discuss more, next week.  Finally, I notified pt that the referral for psychiatric evaluation has been placed and encouraged her to call to schedule an appointment.    Treatment plan:  · Target symptoms: anxiety , work stress  · Why chosen therapy is appropriate versus another modality: relevant to diagnosis, patient responds to this modality   · Outcome monitoring methods: self-report, checklist/rating scale  · Therapeutic intervention type: insight oriented psychotherapy, supportive psychotherapy, Cognitive therapy    Risk parameters:  Patient reports no suicidal ideation  Patient reports no homicidal ideation  Patient reports no self-injurious behavior  Patient reports no violent behavior    Verbal deficits: None    Patient's response to intervention:  The patient's response to intervention is accepting, motivated.    Progress toward goals and other mental  status changes:  The patient's progress toward goals is good.    Diagnosis:   No diagnosis found.    Plan:  individual psychotherapy and consult psychiatrist for medication evaluation    Return to clinic: 1 week    Length of Service (minutes): 45

## 2017-03-07 ENCOUNTER — TELEPHONE (OUTPATIENT)
Dept: PAIN MEDICINE | Facility: CLINIC | Age: 52
End: 2017-03-07

## 2017-03-07 ENCOUNTER — OFFICE VISIT (OUTPATIENT)
Dept: NEUROLOGY | Facility: CLINIC | Age: 52
End: 2017-03-07
Payer: COMMERCIAL

## 2017-03-07 DIAGNOSIS — F41.1 GENERALIZED ANXIETY DISORDER: Primary | ICD-10-CM

## 2017-03-07 PROCEDURE — 90834 PSYTX W PT 45 MINUTES: CPT | Mod: S$GLB,,, | Performed by: SOCIAL WORKER

## 2017-03-07 NOTE — PROGRESS NOTES
"Individual Psychotherapy (PhD/LCSW)    3/7/2017    Site:  [facility]         Therapeutic Intervention: Met with patient.  Outpatient - Insight oriented psychotherapy 45 min - CPT code 05400 and Outpatient - Supportive psychotherapy 45 min - CPT Code 49483    Chief complaint/reason for encounter: anxiety, work stress     Interval history and content of current session:   Patient reports struggling with worry, since the last session, specifically related to her interpretation of comments made by this writer regarding pt's job and potential exploration of disability leave.  Patient approached the situation in a very balanced manner, sharing that she may have miscommunicated how stressed she feels by certain situations and was able to share that she heard this writer telling her to "just find a different way to cope with your stress".  We examined the communication from the previous session and clarified misunderstandings.  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      I will consult with Dr. Salas and pt's psychiatrist Pattie Thorpe MD (initial appointment 3/14/17) regarding employment concerns.    Treatment plan:  · Target symptoms: anxiety , work stress  · Why chosen therapy is appropriate versus another modality: relevant to diagnosis, patient responds to this modality   · Outcome monitoring methods: self-report, checklist/rating scale  · Therapeutic intervention type: insight oriented psychotherapy, supportive psychotherapy, Cognitive therapy    Risk parameters:  Patient reports no suicidal ideation  Patient reports no homicidal ideation  Patient reports no " self-injurious behavior  Patient reports no violent behavior    Verbal deficits: None    Patient's response to intervention:  The patient's response to intervention is accepting, motivated.    Progress toward goals and other mental status changes:  The patient's progress toward goals is good.    Diagnosis:   No diagnosis found.    Plan:  individual psychotherapy and consult psychiatrist for medication evaluation    Return to clinic: 1 week, will call to schedule    Length of Service (minutes): 45

## 2017-03-13 ENCOUNTER — PATIENT MESSAGE (OUTPATIENT)
Dept: PAIN MEDICINE | Facility: CLINIC | Age: 52
End: 2017-03-13

## 2017-03-14 ENCOUNTER — PATIENT MESSAGE (OUTPATIENT)
Dept: PAIN MEDICINE | Facility: CLINIC | Age: 52
End: 2017-03-14

## 2017-03-14 ENCOUNTER — OFFICE VISIT (OUTPATIENT)
Dept: PAIN MEDICINE | Facility: CLINIC | Age: 52
End: 2017-03-14
Payer: COMMERCIAL

## 2017-03-14 VITALS
HEART RATE: 70 BPM | DIASTOLIC BLOOD PRESSURE: 74 MMHG | WEIGHT: 115.5 LBS | BODY MASS INDEX: 21.25 KG/M2 | SYSTOLIC BLOOD PRESSURE: 112 MMHG | TEMPERATURE: 98 F | RESPIRATION RATE: 20 BRPM | HEIGHT: 62 IN

## 2017-03-14 DIAGNOSIS — F41.1 GAD (GENERALIZED ANXIETY DISORDER): Primary | ICD-10-CM

## 2017-03-14 PROCEDURE — 1160F RVW MEDS BY RX/DR IN RCRD: CPT | Mod: S$GLB,,, | Performed by: PSYCHIATRY & NEUROLOGY

## 2017-03-14 PROCEDURE — 99204 OFFICE O/P NEW MOD 45 MIN: CPT | Mod: S$GLB,,, | Performed by: PSYCHIATRY & NEUROLOGY

## 2017-03-14 PROCEDURE — 99999 PR PBB SHADOW E&M-EST. PATIENT-LVL III: CPT | Mod: PBBFAC,,, | Performed by: PSYCHIATRY & NEUROLOGY

## 2017-03-14 RX ORDER — ESCITALOPRAM OXALATE 10 MG/1
10 TABLET ORAL DAILY
Qty: 30 TABLET | Refills: 1 | Status: SHIPPED | OUTPATIENT
Start: 2017-03-14 | End: 2017-04-18 | Stop reason: SDUPTHER

## 2017-03-14 NOTE — LETTER
March 19, 2017      Page Salas MD  [address]  [facility] [address]           [facility]  [address]  [facility] [address]  Phone: [phone]  Fax: [phone]          Patient: Hien Jeter   MR Number: [id number]   YOB: 1965   Date of Visit: 3/14/2017       Dear Dr. Page Salas:    Thank you for referring Hien Jeter to me for evaluation. Attached you will find relevant portions of my assessment and plan of care.    If you have questions, please do not hesitate to call me. I look forward to following Hien Jeter along with you.    Sincerely,    Pattie Thorpe MD    Enclosure  CC:  No Recipients    If you would like to receive this communication electronically, please contact [email or web address] or [phone] to request more information on WEALTH at work Link access.    For providers and/or their staff who would like to refer a patient to [facility], please contact us through our one-stop-shop provider referral line, [facility] [place], at [phone].    If you feel you have received this communication in error or would no longer like to receive these types of communications, please e-mail [email or web address]

## 2017-03-14 NOTE — MR AVS SNAPSHOT
[facility]  [address]  [place] [address]  Phone: [phone]  Fax: [phone]                  Hien Jeter   3/14/2017 12:00 PM   Office Visit    Description:  Female : 1965   Provider:  Pattie Thorpe MD   Department:  [facility]           Reason for Visit     Anxiety                To Do List           Future Appointments        Provider Department Dept Phone    3/27/2017 12:00 PM [facility] [phone]    3/27/2017 12:45 PM [facility] [phone]    3/27/2017 1:30 PM [facility] [phone]    2017 11:20 AM Pattie Thorpe MD [facility] [phone]    2017 9:00 AM Eros Solorio IV, MD [facility] [phone]      Goals (5 Years of Data)     None       These Medications        Disp Refills Start End    escitalopram oxalate (LEXAPRO) 10 MG tablet 30 tablet 1 3/14/2017 2017    Take 1 tablet (10 mg total) by mouth once daily. - Oral    Pharmacy: [id number] - [place] - [address] Ph #: [phone]         [facility]     [facility] -  Assistance  Registered nurses in the [facility] provide clinical advisement, health education, appointment booking, and other advisory services.  Call for this free service at [phone].             Medications           Message regarding Medications     Verify the changes and/or additions to your medication regime listed below are the same as discussed with your clinician today.  If any of these changes or additions are incorrect, please notify your healthcare provider.        START taking these NEW medications        Refills    escitalopram oxalate (LEXAPRO) 10 MG tablet 1    Sig: Take 1 tablet (10 mg total) by mouth once daily.    Class: Normal    Route: Oral           Verify that the below list of  "medications is an accurate representation of the medications you are currently taking.  If none reported, the list may be blank. If incorrect, please contact your healthcare provider. Carry this list with you in case of emergency.           Current Medications     acetaminophen (TYLENOL) 325 MG tablet Take 1 tablet by mouth as needed.     baclofen (LIORESAL) 10 MG tablet Take 1/2 tablet 2-3 times a day    cholecalciferol, vitamin D3, 5,000 unit capsule Take 2,000 Units by mouth once daily.     dimethyl fumarate (TECFIDERA) 240 mg CpDR Take 240 mg by mouth 2 (two) times daily.    flaxseed oil 1,000 mg Cap Take 1 capsule by mouth once daily.     flurbiprofen (ANSAID) 100 MG tablet Take 1 tablet (100 mg total) by mouth 2 (two) times daily as needed (headache).    magnesium oxide (MAG-OX) 400 mg tablet TAKE 1 TABLET BY MOUTH TWICE DAILY    multivitamin-Ca-iron-minerals (ONE-A-DAY WOMENS FORMULA) 27-0.4 mg Tab Take 1 tablet by mouth once daily.     nortriptyline (PAMELOR) 10 MG capsule Take 2 capsules (20 mg total) by mouth every evening.    riboflavin, vitamin B2, (VITAMIN B-2) 100 mg Tab tablet TAKE 2 TABLETS BY MOUTH ONCE DAILY.    topiramate 25 mg capsule Take 25 mg by mouth 2 (two) times daily.     TRI-SPRINTEC, 28, 0.18/0.215/0.25 mg-35 mcg (28) tablet TAKE 1 TABLET BY MOUTH ONCE DAILY.    zolmitriptan (ZOMIG) 5 mg [place] SPRAY 1 SPRAY IN NOSTRIL ONCE AS NEEDED. Max 2 doses in 24 hrs    diazepam (VALIUM) 5 MG tablet Take 1 tablet (5 mg total) by mouth every 12 (twelve) hours as needed for Anxiety.    escitalopram oxalate (LEXAPRO) 10 MG tablet Take 1 tablet (10 mg total) by mouth once daily.           Clinical Reference Information           Your Vitals Were     BP Pulse Temp Resp Height Weight    112/74 70 98 °F (36.7 °C) (Oral) 20 5' 2" (1.575 m) 52.4 kg (115 lb 8.3 oz)    Last Period BMI             02/25/2016 21.13 kg/m2         Blood Pressure          Most Recent Value    BP  112/74      Allergies as of " 3/14/2017     Codeine    Diclofenac    Hydrocodone-acetaminophen    Ibuprofen    Tramadol      Immunizations Administered on Date of Encounter - 3/14/2017     None      Language Assistance Services     ATTENTION: Language assistance services are available, free of charge. Please call [phone].      ATENCIÓN: Si habla ashli, tiene a cortez disposición servicios gratuitos de asistencia lingüística. Llame al [phone].     CHÚ Ý: N?u b?n nói Ti?ng Vi?t, có các d?ch v? h? tr? ngôn ng? mi?n phí dành cho b?n. G?i s? [phone].         [facility] complies with applicable Federal civil rights laws and does not discriminate on the basis of race, color, national origin, age, disability, or sex.
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
## 2017-03-27 ENCOUNTER — HOSPITAL ENCOUNTER (OUTPATIENT)
Dept: RADIOLOGY | Facility: HOSPITAL | Age: 52
Discharge: HOME | End: 2017-03-27
Attending: PSYCHIATRY & NEUROLOGY
Payer: COMMERCIAL

## 2017-03-27 ENCOUNTER — PATIENT MESSAGE (OUTPATIENT)
Dept: NEUROLOGY | Facility: CLINIC | Age: 52
End: 2017-03-27

## 2017-03-27 DIAGNOSIS — G35 MS (MULTIPLE SCLEROSIS): ICD-10-CM

## 2017-03-27 PROCEDURE — 72156 MRI NECK SPINE W/O & W/DYE: CPT | Mod: TC

## 2017-03-27 PROCEDURE — 72157 MRI CHEST SPINE W/O & W/DYE: CPT | Mod: TC

## 2017-03-27 PROCEDURE — 72157 MRI CHEST SPINE W/O & W/DYE: CPT | Mod: 26,,, | Performed by: RADIOLOGY

## 2017-03-27 PROCEDURE — A9585 GADOBUTROL INJECTION: HCPCS | Performed by: PSYCHIATRY & NEUROLOGY

## 2017-03-27 PROCEDURE — 72156 MRI NECK SPINE W/O & W/DYE: CPT | Mod: 26,,, | Performed by: RADIOLOGY

## 2017-03-27 PROCEDURE — 70553 MRI BRAIN STEM W/O & W/DYE: CPT | Mod: TC

## 2017-03-27 PROCEDURE — 70553 MRI BRAIN STEM W/O & W/DYE: CPT | Mod: 26,,, | Performed by: RADIOLOGY

## 2017-03-27 PROCEDURE — 25500020 PHARM REV CODE 255: Performed by: PSYCHIATRY & NEUROLOGY

## 2017-03-27 RX ORDER — GADOBUTROL 604.72 MG/ML
5 INJECTION INTRAVENOUS
Status: COMPLETED | OUTPATIENT
Start: 2017-03-27 | End: 2017-03-27

## 2017-03-27 RX ADMIN — GADOBUTROL 5 ML: 604.72 INJECTION INTRAVENOUS at 02:03

## 2017-03-29 ENCOUNTER — PATIENT MESSAGE (OUTPATIENT)
Dept: PAIN MEDICINE | Facility: CLINIC | Age: 52
End: 2017-03-29

## 2017-03-30 ENCOUNTER — TELEPHONE (OUTPATIENT)
Dept: PAIN MEDICINE | Facility: CLINIC | Age: 52
End: 2017-03-30

## 2017-03-30 NOTE — TELEPHONE ENCOUNTER
----- Message from Pattie Thorpe MD sent at 3/30/2017  1:58 PM CDT -----  Regarding: RE: Disability  Dustin can I call her on MOnday  ----- Message -----     From: May Berry LCSW     Sent: 3/30/2017  10:22 AM       To: Pattie Thorpe MD  Subject: Disability                                       Good morning, Dr. Thorpe.  Hien has inquired about utilizing disability leave through her employer.  I would like to discuss this with you, when you have a moment.  Is there a good day/time to call?

## 2017-04-03 ENCOUNTER — TELEPHONE (OUTPATIENT)
Dept: NEUROLOGY | Facility: CLINIC | Age: 52
End: 2017-04-03

## 2017-04-03 NOTE — TELEPHONE ENCOUNTER
"----- Message from Pattie Thorpe MD sent at 3/31/2017  8:50 PM CDT -----  Regarding: RE: Disability  In the morning "[id number]  ----- Message -----     From: May Berry LCSW     Sent: 3/30/2017   2:33 PM       To: Pattie Thorpe MD  Subject: RE: Disability                                   Absolutely.  What's a good time for me to call you?  And what's the best number to call?    ----- Message -----     From: Pattie Thorpe MD     Sent: 3/30/2017   1:59 PM       To: May Berry LCSW  Subject: RE: Disability                                   Will Monday morning work?  ----- Message -----     From: May Berry LCSW     Sent: 3/30/2017  10:22 AM       To: Pattie Thorpe MD  Subject: Disability                                       Good morning, Dr. Thorpe.  Hien has inquired about utilizing disability leave through her employer.  I would like to discuss this with you, when you have a moment.  Is there a good day/time to call?        "

## 2017-04-04 ENCOUNTER — OFFICE VISIT (OUTPATIENT)
Dept: NEUROLOGY | Facility: CLINIC | Age: 52
End: 2017-04-04
Payer: COMMERCIAL

## 2017-04-04 DIAGNOSIS — F41.1 GENERALIZED ANXIETY DISORDER: Primary | ICD-10-CM

## 2017-04-04 PROCEDURE — 90837 PSYTX W PT 60 MINUTES: CPT | Mod: S$GLB,,, | Performed by: SOCIAL WORKER

## 2017-04-04 NOTE — PROGRESS NOTES
"Individual Psychotherapy (PhD/LCSW)    4/4/2017    Site:  [facility]         Therapeutic Intervention: Met with patient.  Outpatient - Insight oriented psychotherapy 60 min - CPT code 04410 and Outpatient - Supportive psychotherapy 60 min - CPT Code 87311    Chief complaint/reason for encounter: anxiety, work stress     Interval history and content of current session:   Patient met with Pattie Thorpe MD (psychiatry) since our last visit and has started Lexapro.  She reports the medication seems to be helping with anxiety as she reports being more able to stop automatic thoughts and interrupt the cycle of worrying.  She and family have also made other positive changes that have made her days less stressful.  Her father-in-law has hired additional caregivers for pt's mother-in-law, so patient spends less time caring for MIL and more quality time when she visits.  Patient is also limiting how frequently she answers the phone when certain relatives call as she has learned that 1) the call are often not urgent in nature and 2) the relative rehashes the same thoughts and patient finds this stressful.  She has also purchased the Mind Over Mood workbook and we reviewed her work to-date.  I provided some clarification about the difference between feelings and thoughts and education around "hot thoughts" or automatic thoughts.  She will begin using a full thought record/chart over the next week and we will review next week.    I will also follow up with other providers regarding work-related issues/disability benefits, and I will communicate positive changes and concern that pt is having around interrupted sleep and headaches to Dr. Thorpe.      Treatment plan:  · Target symptoms: anxiety , work stress  · Why chosen therapy is appropriate versus another modality: relevant to diagnosis, patient responds to this modality   · Outcome monitoring methods: self-report, checklist/rating scale, completion of " homework      · Therapeutic intervention type: insight oriented psychotherapy, supportive psychotherapy, Cognitive therapy    Risk parameters:  Patient reports no suicidal ideation  Patient reports no homicidal ideation  Patient reports no self-injurious behavior  Patient reports no violent behavior    Verbal deficits: None    Patient's response to intervention:  The patient's response to intervention is accepting, motivated.    Progress toward goals and other mental status changes:  The patient's progress toward goals is good.    Diagnosis:     ICD-10-CM ICD-9-CM   1. Generalized anxiety disorder F41.1 300.02       Plan:  individual psychotherapy and medication management by physician    Return to clinic: 1 week    Length of Service (minutes): 60

## 2017-04-04 NOTE — Clinical Note
Hien is doing better with her anxiety since starting Lexapro and through CBT.  I'd still like us to discuss her work-related issues and desire to utilize a long-term disability benefit.   Dr. Maurilio Stanford shared that she is now taking the full dose of Lexapro and no longer taking the Pamelor.  She has noticed a mild, constant headache, which the Pamelor helped with, previously.  She's also noticed that since stopping Pamelor she's having more interrupted sleep BUT shared that it's not necessarily due to anxiety or worries.  She's getting ~ 5 hours of uninterrupted sleep/night.  Shared that she slept the best when she was on the Pamelor and the Lexapro, together.  She also reports positive effects of Lexapro: less overall worry/anxiety, better able to stop worrying and focus on other things.  She also presents as more relaxed, physically.

## 2017-04-11 ENCOUNTER — OFFICE VISIT (OUTPATIENT)
Dept: NEUROLOGY | Facility: CLINIC | Age: 52
End: 2017-04-11
Payer: COMMERCIAL

## 2017-04-11 DIAGNOSIS — F41.1 GENERALIZED ANXIETY DISORDER: Primary | ICD-10-CM

## 2017-04-11 PROCEDURE — 90837 PSYTX W PT 60 MINUTES: CPT | Mod: S$GLB,,, | Performed by: SOCIAL WORKER

## 2017-04-11 NOTE — PROGRESS NOTES
Individual Psychotherapy (PhD/LCSW)    4/11/2017    Site:  [facility]         Therapeutic Intervention: Met with patient.  Outpatient - Insight oriented psychotherapy 60 min - CPT code 50226 and Outpatient - Supportive psychotherapy 60 min - CPT Code 83822    Chief complaint/reason for encounter: anxiety, work stress     Interval history and content of current session:   Pt reports changes in medication made to address her sleep concerns.  She is now taking 1/2 the Lexapro and some Pamelor and her regimen will be reassessed the next time she sees Dr. Thorpe.  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  She is continuing to use her Mind Over Mood workbook, though she forgot to bring it for review, today.  We discussed several situations involving her mother-in-law's caregivers and she reports using problem solving skills from the book to help in her decision making.  She, and her 's family, do tend to approach these situations in a catastrophizing manner, but her enacted solutions are much more logical and balanced.  Discussed any additional support that she needs from family, since she is the primary contact with the caregivers, and patient reports that she is content with the help she's already receiving.       At the end of session we discussed her disability leave and I explained that she could move forward with requesting the appropriate forms and that we (Dr. Salas, Dr. Thorpe and myself) will support the request.      Pt to contact this writer to schedule the next session after consulting her calendar.    Treatment plan:  · Target symptoms: anxiety , work stress  · Why chosen therapy is appropriate versus another modality: relevant to diagnosis, patient responds to this modality   · Outcome  monitoring methods: self-report, checklist/rating scale, completion of homework      · Therapeutic intervention type: insight oriented psychotherapy, supportive psychotherapy, Cognitive therapy    Risk parameters:  Patient reports no suicidal ideation  Patient reports no homicidal ideation  Patient reports no self-injurious behavior  Patient reports no violent behavior    Verbal deficits: None    Patient's response to intervention:  The patient's response to intervention is accepting, motivated.    Progress toward goals and other mental status changes:  The patient's progress toward goals is good.    Diagnosis:     ICD-10-CM ICD-9-CM   1. Generalized anxiety disorder F41.1 300.02       Plan:  individual psychotherapy and medication management by physician    Return to clinic: 1 week, pt will call to schedule  Length of Service (minutes): 60

## 2017-04-12 NOTE — TELEPHONE ENCOUNTER
Spoke with Dr. Thorpe by phone on 4/10/17 and further discussed with Dr. Salas on 4/11/17.  Will move forward with assisting pt with her disability application.

## 2017-04-13 RX ORDER — TOPIRAMATE 25 MG/1
TABLET ORAL
Qty: 120 TABLET | Refills: 5 | OUTPATIENT
Start: 2017-04-13

## 2017-04-17 ENCOUNTER — OFFICE VISIT (OUTPATIENT)
Dept: INTERNAL MEDICINE | Facility: CLINIC | Age: 52
End: 2017-04-17
Payer: COMMERCIAL

## 2017-04-17 VITALS
RESPIRATION RATE: 16 BRPM | TEMPERATURE: 98 F | HEIGHT: 62 IN | SYSTOLIC BLOOD PRESSURE: 110 MMHG | WEIGHT: 116.88 LBS | HEART RATE: 64 BPM | BODY MASS INDEX: 21.51 KG/M2 | DIASTOLIC BLOOD PRESSURE: 82 MMHG

## 2017-04-17 DIAGNOSIS — J40 BRONCHITIS: Primary | ICD-10-CM

## 2017-04-17 DIAGNOSIS — R07.82 INTERCOSTAL PAIN: ICD-10-CM

## 2017-04-17 PROCEDURE — 1160F RVW MEDS BY RX/DR IN RCRD: CPT | Mod: S$GLB,,, | Performed by: INTERNAL MEDICINE

## 2017-04-17 PROCEDURE — 99213 OFFICE O/P EST LOW 20 MIN: CPT | Mod: S$GLB,,, | Performed by: INTERNAL MEDICINE

## 2017-04-17 PROCEDURE — 99999 PR PBB SHADOW E&M-EST. PATIENT-LVL III: CPT | Mod: PBBFAC,,, | Performed by: INTERNAL MEDICINE

## 2017-04-17 RX ORDER — BENZONATATE 200 MG/1
200 CAPSULE ORAL 3 TIMES DAILY PRN
Qty: 25 CAPSULE | Refills: 1 | Status: SHIPPED | OUTPATIENT
Start: 2017-04-17 | End: 2017-04-27

## 2017-04-17 RX ORDER — AZITHROMYCIN 250 MG/1
TABLET, FILM COATED ORAL
Qty: 6 TABLET | Refills: 0 | Status: SHIPPED | OUTPATIENT
Start: 2017-04-17 | End: 2017-05-02

## 2017-04-17 NOTE — MR AVS SNAPSHOT
[place] [facility]   [facility]  [place] [address]  Phone: [phone]  Fax: [phone]                  Hien Jeter   2017 12:20 PM   Office Visit    Description:  Female : 1965   Provider:  Rolando Sawyer MD   Department:  [place] [facility]           Reason for Visit     Cough     Nasal Congestion           Diagnoses this Visit        Comments    Bronchitis    -  Primary     Intercostal pain                To Do List           Future Appointments        Provider Department Dept Phone    2017 11:20 AM Pattie Thorpe MD [facility] - Pain Management [phone]    2017 9:00 AM Eros Solorio IV, MD [facility] - OB/GYN [address] [phone]    2017 9:40 AM Eliel Klein MD [facility] - Neurology Epilepsy [phone]      Goals (5 Years of Data)     None      Follow-Up and Disposition     Return if symptoms worsen or fail to improve.       These Medications        Disp Refills Start End    azithromycin (Z-LINDA) 250 MG tablet 6 tablet 0 2017     Take 2 tablets by mouth on day 1; Take 1 tablet by mouth on days 2-5    Pharmacy: [facility]/pharmacy #[id number] - [place] - [address] Ph #: [phone]       benzonatate (TESSALON) 200 MG capsule 25 capsule 1 2017    Take 1 capsule (200 mg total) by mouth 3 (three) times daily as needed for Cough. - Oral    Pharmacy: [facility]/pharmacy #[id number] - [place] - [address] Ph #: [phone]         [facility]     [facility] Nurse Care Line -  Assistance  Unless otherwise directed by your provider, please contact [facility], our nurse care line that is available for  assistance.     Registered nurses in the [facility] Center provide: appointment scheduling, clinical advisement, health education, and other advisory services.  Call: [phone] (toll free)               Medications           Message regarding Medications     Verify the changes  and/or additions to your medication regime listed below are the same as discussed with your clinician today.  If any of these changes or additions are incorrect, please notify your healthcare provider.        START taking these NEW medications        Refills    azithromycin (Z-LINDA) 250 MG tablet 0    Sig: Take 2 tablets by mouth on day 1; Take 1 tablet by mouth on days 2-5    Class: Normal    benzonatate (TESSALON) 200 MG capsule 1    Sig: Take 1 capsule (200 mg total) by mouth 3 (three) times daily as needed for Cough.    Class: Normal    Route: Oral           Verify that the below list of medications is an accurate representation of the medications you are currently taking.  If none reported, the list may be blank. If incorrect, please contact your healthcare provider. Carry this list with you in case of emergency.           Current Medications     acetaminophen (TYLENOL) 325 MG tablet Take 1 tablet by mouth as needed.     baclofen (LIORESAL) 10 MG tablet Take 1/2 tablet 2-3 times a day    cholecalciferol, vitamin D3, 5,000 unit capsule Take 2,000 Units by mouth once daily.     diazepam (VALIUM) 5 MG tablet Take 1 tablet (5 mg total) by mouth every 12 (twelve) hours as needed for Anxiety.    dimethyl fumarate (TECFIDERA) 240 mg CpDR Take 240 mg by mouth 2 (two) times daily.    flaxseed oil 1,000 mg Cap Take 1 capsule by mouth once daily.     flurbiprofen (ANSAID) 100 MG tablet Take 1 tablet (100 mg total) by mouth 2 (two) times daily as needed (headache).    magnesium oxide (MAG-OX) 400 mg tablet TAKE 1 TABLET BY MOUTH TWICE DAILY    multivitamin-Ca-iron-minerals (ONE-A-DAY WOMENS FORMULA) 27-0.4 mg Tab Take 1 tablet by mouth once daily.     nortriptyline (PAMELOR) 10 MG capsule Take 2 capsules (20 mg total) by mouth every evening.    riboflavin, vitamin B2, (VITAMIN B-2) 100 mg Tab tablet TAKE 2 TABLETS BY MOUTH ONCE DAILY.    topiramate 25 mg capsule Take 25 mg by mouth 2 (two) times daily.     TRI-SPRINTEC, 28,  "0.18/0.215/0.25 mg-35 mcg (28) tablet TAKE 1 TABLET BY MOUTH ONCE DAILY.    zolmitriptan (ZOMIG) 5 mg [place] SPRAY 1 SPRAY IN NOSTRIL ONCE AS NEEDED. Max 2 doses in 24 hrs    azithromycin (Z-LINDA) 250 MG tablet Take 2 tablets by mouth on day 1; Take 1 tablet by mouth on days 2-5    benzonatate (TESSALON) 200 MG capsule Take 1 capsule (200 mg total) by mouth 3 (three) times daily as needed for Cough.    escitalopram oxalate (LEXAPRO) 10 MG tablet Take 1 tablet (10 mg total) by mouth once daily.           Clinical Reference Information           Your Vitals Were     BP Pulse Temp Resp Height Weight    110/82 (BP Location: Left arm, Patient Position: Sitting, BP Method: Manual) 64 98.4 °F (36.9 °C) (Oral) 16 5' 2" (1.575 m) 53 kg (116 lb 13.5 oz)    Last Period BMI             02/25/2016 21.37 kg/m2         Blood Pressure          Most Recent Value    BP  110/82      Allergies as of 4/17/2017     Codeine    Diclofenac    Hydrocodone-acetaminophen    Ibuprofen    Tramadol      Immunizations Administered on Date of Encounter - 4/17/2017     None      Instructions    Take over the counter Mucinex 1 pill twice daily  Over the counter Saline Nasal Spray (ex: Ocean brand)  Several times a day- 1 squirt in each nostril.  Push Fluids/ water           Language Assistance Services     ATTENTION: Language assistance services are available, free of charge. Please call [phone].      ATENCIÓN: Si habla español, tiene a cortez disposición servicios gratuitos de asistencia lingüística. Llame al [phone].     [facility] Ý: N?u b?n nói Ti?ng Vi?t, có các d?ch v? h? tr? ngôn ng? mi?n phí dành cho b?n. G?i s? [phone].         [place] [facility] complies with applicable Federal civil rights laws and does not discriminate on the basis of race, color, national origin, age, disability, or sex.        "

## 2017-04-17 NOTE — PROGRESS NOTES
Subjective:       Patient ID: Hien Jeter is a 51 y.o. female.    Chief Complaint: Cough (got worse last wednesday,  drip for 10 days. using dayquil & otc cough syrup and drops) and Nasal Congestion  HISTORY OF PRESENT ILLNESS:  A 51-year-old white female patient of mine coming   in for a cough and congestion that began approximately a week ago and she is now   having some pain in her ribs from coughing.  The patient does not have pain   when she breathes or moves.  Cough is slightly productive.  She has no shortness   of breath or wheezing.    PHYSICAL EXAMINATION:  VITAL SIGNS:  Normal.  GENERAL:  She looks in no distress.  SKIN:  Fair and healthy and no rash.  HEENT:  Clear.  NECK:  Shows no stiffness or adenopathy.  CHEST:  Clear.    IMPRESSION:  1.  Sinobronchial infection.  2.  Chest pain due to muscular strain with the coughing.  She did have some   discomfort in the costochondral margins, where she was complaining of pain.  She   was given benzoate and Z-Mendoza, told to drink plenty of fluids.      JDC/IN  dd: 04/23/2017 21:48:00 (CDT)  td: 04/24/2017 04:05:31 (CDT)  Doc ID   #[id number]  Job ID #[id number]    CC:     Dict [id number]  HPI  Review of Systems   Constitutional: Negative.    HENT: Positive for congestion and postnasal drip. Negative for hearing loss, sinus pressure, sneezing, sore throat and voice change.    Eyes: Negative for visual disturbance.   Respiratory: Positive for cough. Negative for chest tightness and shortness of breath.    Cardiovascular: Positive for chest pain. Negative for palpitations and leg swelling.   Gastrointestinal: Negative.    Genitourinary: Negative for difficulty urinating, dysuria, flank pain, frequency, hematuria, menstrual problem, pelvic pain, urgency, vaginal bleeding and vaginal discharge.   Musculoskeletal: Negative.  Negative for neck pain and neck stiffness.   Skin: Negative.    Neurological: Negative.  Negative for dizziness, seizures, light-headedness, numbness and  headaches.   Psychiatric/Behavioral: Negative for agitation, behavioral problems, confusion and sleep disturbance. The patient is not nervous/anxious.        Objective:      Physical Exam   Constitutional: She is oriented to person, place, and time. She appears well-developed and well-nourished.   Eyes: EOM are normal. Pupils are equal, round, and reactive to light.   Neck: Normal range of motion. Neck supple. No JVD present. No thyromegaly present.   Cardiovascular: Normal rate, regular rhythm, normal heart sounds and intact distal pulses.  Exam reveals no gallop.    No murmur heard.  Pulmonary/Chest: Breath sounds normal. She has no wheezes. She has no rales.   Abdominal: Soft. Bowel sounds are normal. She exhibits no mass. There is no tenderness.   Musculoskeletal: Normal range of motion.   Lymphadenopathy:     She has no cervical adenopathy.   Neurological: She is alert and oriented to person, place, and time. She has normal reflexes. No cranial nerve deficit.   Skin: No rash noted. No erythema.   Psychiatric: She has a normal mood and affect. Judgment normal.       Assessment:       1. Bronchitis    2. Intercostal pain        Plan:

## 2017-04-18 ENCOUNTER — OFFICE VISIT (OUTPATIENT)
Dept: PAIN MEDICINE | Facility: CLINIC | Age: 52
End: 2017-04-18
Payer: COMMERCIAL

## 2017-04-18 VITALS
HEART RATE: 68 BPM | WEIGHT: 117.5 LBS | RESPIRATION RATE: 18 BRPM | HEIGHT: 62 IN | BODY MASS INDEX: 21.62 KG/M2 | TEMPERATURE: 98 F | SYSTOLIC BLOOD PRESSURE: 110 MMHG | DIASTOLIC BLOOD PRESSURE: 70 MMHG

## 2017-04-18 DIAGNOSIS — F41.1 GAD (GENERALIZED ANXIETY DISORDER): Primary | ICD-10-CM

## 2017-04-18 PROCEDURE — 99214 OFFICE O/P EST MOD 30 MIN: CPT | Mod: S$GLB,,, | Performed by: PSYCHIATRY & NEUROLOGY

## 2017-04-18 PROCEDURE — 1160F RVW MEDS BY RX/DR IN RCRD: CPT | Mod: S$GLB,,, | Performed by: PSYCHIATRY & NEUROLOGY

## 2017-04-18 PROCEDURE — 99999 PR PBB SHADOW E&M-EST. PATIENT-LVL III: CPT | Mod: PBBFAC,,, | Performed by: PSYCHIATRY & NEUROLOGY

## 2017-04-18 RX ORDER — ESCITALOPRAM OXALATE 5 MG/1
5 TABLET ORAL DAILY
Qty: 30 TABLET | Refills: 2 | Status: SHIPPED | OUTPATIENT
Start: 2017-04-18 | End: 2017-05-30 | Stop reason: SDUPTHER

## 2017-04-18 NOTE — PROGRESS NOTES
"Outpatient Psychiatry Follow-Up Visit (MD/NP)    4/18/2017    Clinical Status of Patient:  Outpatient (Ambulatory)    Chief Complaint:  Hien Jeter is a 51 y.o. female who presents today for follow-up of depression and anxiety.  Met with patient.      Interval History and Content of Current Session:  Interim Events/Subjective Report/Content of Current Session: Pt rpeorts that after she started the lexapro 5 mg she felt a lot better and that her anxiety level was better controlled. That she has been able to use the help and is also asking for help more. That she had a talk with her sister in law as well. That she tolerated it well. That she did try to increase it to 10 mg but " I felt more anxious" so it was decreased back to 5 mg. That she is not sure still about what to do with going back to work and what will help her be successful. She will continue to discuss this with May Finley as well.     Psychotherapy:  · Target symptoms: depression, anxiety   · Why chosen therapy is appropriate versus another modality: relevant to diagnosis, patient responds to this modality  · Outcome monitoring methods: self-report, observation  · Therapeutic intervention type: behavior modifying psychotherapy, supportive psychotherapy  · Topics discussed/themes: relationships difficulties, work stress, illness/death of a loved one, stress related to medical comorbidities, difficulty managing affect in interpersonal relationships, building skills sets for symptom management, symptom recognition  · The patient's response to the intervention is accepting. The patient's progress toward treatment goals is fair.   · Duration of intervention: 30 minutes.    Review of Systems   · PSYCHIATRIC: Pertinant items are noted in the narrative.    Past Medical, Family and Social History: The patient's past medical, family and social history have been reviewed and updated as appropriate within the electronic medical record - see encounter " "notes.    Compliance: yes    Side effects: None    Risk Parameters:  Patient reports no suicidal ideation  Patient reports no homicidal ideation  Patient reports no self-injurious behavior  Patient reports no violent behavior    Exam (detailed: at least 9 elements; comprehensive: all 15 elements)   Constitutional  Vitals:  Most recent vital signs, dated less than 90 days prior to this appointment, were reviewed.   Vitals:    04/18/17 1125   BP: 110/70   Pulse: 68   Resp: 18   Temp: 98.1 °F (36.7 °C)   TempSrc: Oral   Weight: 53.3 kg (117 lb 8.1 oz)   Height: 5' 2" (1.575 m)        General:  age appropriate, casually dressed, neatly groomed     Musculoskeletal  Muscle Strength/Tone:  no tremor, no tic   Gait & Station:  non-ataxic     Psychiatric  Speech:  no latency; no press   Mood & Affect:  anxious  congruent and appropriate   Thought Process:  normal and logical, goal-directed   Associations:  intact   Thought Content:  normal, no suicidality, no homicidality, delusions, or paranoia   Insight:  intact   Judgement: behavior is adequate to circumstances   Orientation:  grossly intact   Memory: intact for content of interview   Language: grossly intact   Attention Span & Concentration:  able to focus   Fund of Knowledge:  intact and appropriate to age and level of education     Assessment and Diagnosis   Status/Progress: Based on the examination today, the patient's problem(s) is/are improved.  New problems have been presented today.   Co-morbidities are complicating management of the primary condition.  There are no active rule-out diagnoses for this patient at this time.       Impression: Pt is a 52 Y/O CW with PMHx sig for Multiple Sclerosis with      Generalized anxiety disorder           Strengths and Liabilities: Strength: Patient accepts guidance/feedback, Strength: Patient is expressive/articulate., Strength: Patient is intelligent., Strength: Patient is motivated for change., Strength: Patient has positive " support network., Strength: Patient has reasonable judgment.     Treatment Goals: Specify outcomes written in observable, behavioral terms:   Anxiety: acquiring relapse prevention skills, eliminating all anxiety symptoms (SCL-90-R scores in normal range), reducing negative automatic thoughts, reducing physical symptoms of anxiety and reducing time spent worrying (<30 minutes/day)     Treatment Plan/Recommendations:   · Medication Management: The risks and benefits of medication were discussed with the patient.  · Referral for further treatment to pt will cont to f/u with Ms Finley  · The treatment plan and follow up plan were reviewed with the patient.   · Will cont  lexapro 5 mg daily.   · Cont CBT work book and if required she will discuss it with her therapist.  · Discussed coping skills.  · Provided supportive psychotherapy.           Return to Clinic: 6 weeks

## 2017-05-02 ENCOUNTER — OFFICE VISIT (OUTPATIENT)
Dept: OBSTETRICS AND GYNECOLOGY | Facility: CLINIC | Age: 52
End: 2017-05-02
Payer: COMMERCIAL

## 2017-05-02 VITALS
BODY MASS INDEX: 21.67 KG/M2 | HEIGHT: 62 IN | DIASTOLIC BLOOD PRESSURE: 90 MMHG | SYSTOLIC BLOOD PRESSURE: 112 MMHG | WEIGHT: 117.75 LBS

## 2017-05-02 DIAGNOSIS — Z30.9 ENCOUNTER FOR CONTRACEPTIVE MANAGEMENT, UNSPECIFIED TYPE: ICD-10-CM

## 2017-05-02 DIAGNOSIS — Z01.419 WOMEN'S ANNUAL ROUTINE GYNECOLOGICAL EXAMINATION: Primary | ICD-10-CM

## 2017-05-02 DIAGNOSIS — Z12.31 VISIT FOR SCREENING MAMMOGRAM: ICD-10-CM

## 2017-05-02 DIAGNOSIS — N95.1 MENOPAUSAL SYMPTOMS: ICD-10-CM

## 2017-05-02 DIAGNOSIS — Z12.4 PAP SMEAR FOR CERVICAL CANCER SCREENING: ICD-10-CM

## 2017-05-02 PROCEDURE — 99396 PREV VISIT EST AGE 40-64: CPT | Mod: S$GLB,,, | Performed by: OBSTETRICS & GYNECOLOGY

## 2017-05-02 PROCEDURE — 88175 CYTOPATH C/V AUTO FLUID REDO: CPT

## 2017-05-02 PROCEDURE — 87624 HPV HI-RISK TYP POOLED RSLT: CPT

## 2017-05-02 PROCEDURE — 99999 PR PBB SHADOW E&M-EST. PATIENT-LVL III: CPT | Mod: PBBFAC,,, | Performed by: OBSTETRICS & GYNECOLOGY

## 2017-05-02 RX ORDER — ESTRADIOL AND NORETHINDRONE ACETATE 1; .5 MG/1; MG/1
1 TABLET ORAL DAILY
Qty: 30 TABLET | Refills: 11 | Status: SHIPPED | OUTPATIENT
Start: 2017-05-02 | End: 2017-07-18

## 2017-05-02 RX ORDER — NORGESTIMATE AND ETHINYL ESTRADIOL 7DAYSX3 28
1 KIT ORAL DAILY
Qty: 90 TABLET | Refills: 3 | Status: CANCELLED | OUTPATIENT
Start: 2017-05-02 | End: 2018-05-02

## 2017-05-02 NOTE — MR AVS SNAPSHOT
[facility] [address]  [address]  [place] [address]  Phone: [phone]  Fax: [phone]                  Hien Jeter   2017 9:00 AM   Office Visit    Description:  Female : 1965   Provider:  Eros Solorio IV, MD   Department:  [facility] [address]           Reason for Visit     Well Woman           Diagnoses this Visit        Comments    Encounter for contraceptive management, unspecified type                To Do List           Future Appointments        Provider Department Dept Phone    2017 11:20 AM Pattie Thorpe MD [facility] - Pain Management [phone]    2017 9:40 AM Eliel Klein MD [facility] - Neurology Epilepsy [phone]      Goals (5 Years of Data)     None      [facility]     [facility] On Call Nurse Care Line -  Assistance  Unless otherwise directed by your provider, please contact [facility], our nurse care line that is available for  assistance.     Registered nurses in the [facility] On Call Center provide: appointment scheduling, clinical advisement, health education, and other advisory services.  Call: [phone] (toll free)               Medications           Message regarding Medications     Verify the changes and/or additions to your medication regime listed below are the same as discussed with your clinician today.  If any of these changes or additions are incorrect, please notify your healthcare provider.        STOP taking these medications     azithromycin (Z-LINDA) 250 MG tablet Take 2 tablets by mouth on day 1; Take 1 tablet by mouth on days 2-5           Verify that the below list of medications is an accurate representation of the medications you are currently taking.  If none reported, the list may be blank. If incorrect, please contact your healthcare provider. Carry this list with you in case of emergency.           Current Medications     acetaminophen (TYLENOL) 325 MG tablet Take 1 tablet by  "mouth as needed.     baclofen (LIORESAL) 10 MG tablet Take 1/2 tablet 2-3 times a day    cholecalciferol, vitamin D3, 5,000 unit capsule Take 2,000 Units by mouth once daily.     diazepam (VALIUM) 5 MG tablet Take 1 tablet (5 mg total) by mouth every 12 (twelve) hours as needed for Anxiety.    dimethyl fumarate (TECFIDERA) 240 mg CpDR Take 240 mg by mouth 2 (two) times daily.    escitalopram oxalate (LEXAPRO) 5 MG Tab Take 1 tablet (5 mg total) by mouth once daily.    flaxseed oil 1,000 mg Cap Take 1 capsule by mouth once daily.     flurbiprofen (ANSAID) 100 MG tablet Take 1 tablet (100 mg total) by mouth 2 (two) times daily as needed (headache).    magnesium oxide (MAG-OX) 400 mg tablet TAKE 1 TABLET BY MOUTH TWICE DAILY    multivitamin-Ca-iron-minerals (ONE-A-DAY WOMENS FORMULA) 27-0.4 mg Tab Take 1 tablet by mouth once daily.     nortriptyline (PAMELOR) 10 MG capsule Take 2 capsules (20 mg total) by mouth every evening.    riboflavin, vitamin B2, (VITAMIN B-2) 100 mg Tab tablet TAKE 2 TABLETS BY MOUTH ONCE DAILY.    topiramate 25 mg capsule Take 25 mg by mouth 2 (two) times daily.     TRI-SPRINTEC, 28, 0.18/0.215/0.25 mg-35 mcg (28) tablet TAKE 1 TABLET BY MOUTH ONCE DAILY.    zolmitriptan (ZOMIG) 5 mg [place] SPRAY 1 SPRAY IN NOSTRIL ONCE AS NEEDED. Max 2 doses in 24 hrs           Clinical Reference Information           Your Vitals Were     BP Height Weight Last Period BMI    112/90 (BP Method: Manual) 5' 2" (1.575 m) 53.4 kg (117 lb 11.6 oz) 02/17/2016 (Exact Date) 21.53 kg/m2      Blood Pressure          Most Recent Value    BP  (!)  112/90      Allergies as of 5/2/2017     Codeine    Diclofenac    Hydrocodone-acetaminophen    Ibuprofen    Tramadol      Immunizations Administered on Date of Encounter - 5/2/2017     None      Language Assistance Services     ATTENTION: Language assistance services are available, free of charge. Please call [phone].      ATENCIÓN: Si mega sims a cortez disposición " servicios gratuitos de asistencia lingüística. Beth clemente [phone].     [facility] Ý: N?u b?n nói Ti?ng Vi?t, có các d?ch v? h? tr? ngôn ng? mi?n phí dành cho b?n. G?i s? [phone].         [facility] [address] complies with applicable Federal civil rights laws and does not discriminate on the basis of race, color, national origin, age, disability, or sex.

## 2017-05-02 NOTE — PROGRESS NOTES
"CC: Well woman exam    Hien Jeter is a 51 y.o. female  presents for well woman exam.  LMP: Patient's last menstrual period was 2016 (exact date)..  No GYN issues, problems, or complaints. One menses reports in past year.  No significant menopausal symptoms reported on ocps.       Past Medical History:   Diagnosis Date    Anxiety     Basal cell carcinoma 2000    left eyebrow     Depression     Environmental allergies     Headache     Multiple food allergies     Multiple sclerosis 2009     shoulder 3/2015    left     Past Surgical History:   Procedure Laterality Date    BASAL CELL CARCINOMA EXCISION  1998    LASIK      WISDOM TOOTH EXTRACTION       Social History     Social History    Marital status:      Spouse name: N/A    Number of children: N/A    Years of education: N/A     Occupational History     [employer]     Social History Main Topics    Smoking status: Never Smoker    Smokeless tobacco: Never Used    Alcohol use Yes      Comment: socially/ not weekly    Drug use: No    Sexual activity: Yes     Partners: Male     Birth control/ protection: OCP      Comment: , menarche 15     Other Topics Concern    Not on file     Social History Narrative     Family History   Problem Relation Age of Onset    Cancer Mother     Colon cancer Mother     Diabetes Mother     Osteoporosis Mother     Arrhythmia Father     Dementia Maternal Grandmother     Cancer Paternal Grandfather     Breast cancer Paternal Aunt     Breast cancer Sister     Multiple sclerosis Neg Hx     Ovarian cancer Neg Hx      OB History      Para Term  AB TAB SAB Ectopic Multiple Living    0                   BP (!) 112/90 (BP Method: Manual)  Ht 5' 2" (1.575 m)  Wt 53.4 kg (117 lb 11.6 oz)  LMP 2016 (Exact Date)  BMI 21.53 kg/m2      ROS:  GENERAL: Denies weight gain or weight loss. Feeling well overall.   SKIN: Denies rash or lesions.   HEAD: Denies " head injury or headache.   NODES: Denies enlarged lymph nodes.   CHEST: Denies chest pain or shortness of breath.   CARDIOVASCULAR: Denies palpitations or left sided chest pain.   ABDOMEN: No abdominal pain, constipation, diarrhea, nausea, vomiting or rectal bleeding.   URINARY: No frequency, dysuria, hematuria, or burning on urination.  REPRODUCTIVE: See HPI.   BREASTS: The patient performs breast self-examination and denies pain, lumps, or nipple discharge.   HEMATOLOGIC: No easy bruisability or excessive bleeding.   MUSCULOSKELETAL: Denies joint pain or swelling.   NEUROLOGIC: Denies syncope or weakness.   PSYCHIATRIC: Denies depression, anxiety or mood swings.    PHYSICAL EXAM:  APPEARANCE: Well nourished, well developed, in no acute distress.  AFFECT: WNL, alert and oriented x 3  SKIN: No acne or hirsutism  NECK: Neck symmetric without masses or thyromegaly  NODES: No inguinal, cervical, axillary, or femoral lymph node enlargement  CHEST: Good respiratory effect  ABDOMEN: Soft.  No tenderness or masses.  No hepatosplenomegaly.  No hernias.  BREASTS: Symmetrical, no skin changes or visible lesions.  No palpable masses, nipple discharge bilaterally.  Bilateral dense breasts noted on exam  PELVIC: Normal external genitalia without lesions.  Normal hair distribution.  Adequate perineal body, normal urethral meatus.  Vagina moist and well rugated without lesions or discharge.  Cervix pink, without lesions, discharge or tenderness.  No significant cystocele or rectocele.  Bimanual exam shows uterus to be normal size, regular, mobile and nontender.  Adnexa without masses or tenderness.    EXTREMITIES: No edema.    Women's annual routine gynecological examination    Encounter for contraceptive management, unspecified type    Visit for screening mammogram  -     Mammo Digital Screening Bilat with [facility]; Future; Expected date: 5/2/17    Pap smear for cervical cancer screening  -     Liquid-based pap smear, screening  -      HPV High Risk Genotypes, PCR    Menopausal symptoms  -     estradiol-norethindrone (ACTIVELLA) 1-0.5 mg per tablet; Take 1 tablet by mouth once daily.  Dispense: 30 tablet; Refill: 11    Other orders  -     Cancel: norgestimate-ethinyl estradiol (TRI-SPRINTEC, 28,) 0.18/0.215/0.25 mg-35 mcg (28) tablet; Take 1 tablet by mouth once daily.  Dispense: 90 tablet; Refill: 3          Age specific counseling    Papsmear/HPV done    Screening mammogram ordered    DC ocps and will attempt to switch to HRT.  Patient instructed to monitor for any bleeding.    Patient was counseled today on A.C.S. Pap guidelines and recommendations for yearly pelvic exams, mammograms and monthly self breast exams; to see her PCP for other health maintenance.     Return in about 1 year (around 5/2/2018) for Annual exam.    Eros Solorio IV, MD

## 2017-05-05 ENCOUNTER — OFFICE VISIT (OUTPATIENT)
Dept: INTERNAL MEDICINE | Facility: CLINIC | Age: 52
End: 2017-05-05
Payer: COMMERCIAL

## 2017-05-05 ENCOUNTER — TELEPHONE (OUTPATIENT)
Dept: ENDOSCOPY | Facility: HOSPITAL | Age: 52
End: 2017-05-05

## 2017-05-05 VITALS
HEART RATE: 84 BPM | DIASTOLIC BLOOD PRESSURE: 70 MMHG | TEMPERATURE: 99 F | WEIGHT: 117.06 LBS | HEIGHT: 62 IN | SYSTOLIC BLOOD PRESSURE: 106 MMHG | BODY MASS INDEX: 21.54 KG/M2 | RESPIRATION RATE: 16 BRPM

## 2017-05-05 DIAGNOSIS — Z80.0 FAMILY HISTORY OF COLON CANCER: Primary | ICD-10-CM

## 2017-05-05 DIAGNOSIS — J20.9 ACUTE BRONCHITIS, UNSPECIFIED ORGANISM: ICD-10-CM

## 2017-05-05 DIAGNOSIS — Z12.11 SPECIAL SCREENING FOR MALIGNANT NEOPLASMS, COLON: Primary | ICD-10-CM

## 2017-05-05 DIAGNOSIS — J30.9 ALLERGIC SINUSITIS: Primary | ICD-10-CM

## 2017-05-05 PROCEDURE — 99214 OFFICE O/P EST MOD 30 MIN: CPT | Mod: 25,S$GLB,, | Performed by: INTERNAL MEDICINE

## 2017-05-05 PROCEDURE — 99999 PR PBB SHADOW E&M-EST. PATIENT-LVL III: CPT | Mod: PBBFAC,,, | Performed by: INTERNAL MEDICINE

## 2017-05-05 PROCEDURE — 1160F RVW MEDS BY RX/DR IN RCRD: CPT | Mod: S$GLB,,, | Performed by: INTERNAL MEDICINE

## 2017-05-05 PROCEDURE — 96372 THER/PROPH/DIAG INJ SC/IM: CPT | Mod: S$GLB,,, | Performed by: INTERNAL MEDICINE

## 2017-05-05 RX ORDER — TRIAMCINOLONE ACETONIDE 40 MG/ML
40 INJECTION, SUSPENSION INTRA-ARTICULAR; INTRAMUSCULAR
Status: COMPLETED | OUTPATIENT
Start: 2017-05-05 | End: 2017-05-05

## 2017-05-05 RX ORDER — BENZONATATE 200 MG/1
200 CAPSULE ORAL 2 TIMES DAILY PRN
Qty: 30 CAPSULE | Refills: 0 | Status: SHIPPED | OUTPATIENT
Start: 2017-05-05 | End: 2017-05-12

## 2017-05-05 RX ORDER — FLUTICASONE PROPIONATE 50 MCG
2 SPRAY, SUSPENSION (ML) NASAL DAILY
Qty: 16 G | Refills: 2 | Status: SHIPPED | OUTPATIENT
Start: 2017-05-05 | End: 2017-06-04

## 2017-05-05 RX ORDER — CODEINE PHOSPHATE AND GUAIFENESIN 10; 100 MG/5ML; MG/5ML
5 SOLUTION ORAL 3 TIMES DAILY PRN
Qty: 180 ML | Refills: 0 | Status: SHIPPED | OUTPATIENT
Start: 2017-05-05 | End: 2017-05-15

## 2017-05-05 RX ORDER — POLYETHYLENE GLYCOL 3350, SODIUM SULFATE ANHYDROUS, SODIUM BICARBONATE, SODIUM CHLORIDE, POTASSIUM CHLORIDE 236; 22.74; 6.74; 5.86; 2.97 G/4L; G/4L; G/4L; G/4L; G/4L
4 POWDER, FOR SOLUTION ORAL ONCE
Qty: 4000 ML | Refills: 0 | Status: SHIPPED | OUTPATIENT
Start: 2017-05-05 | End: 2017-05-05

## 2017-05-05 RX ORDER — LEVOCETIRIZINE DIHYDROCHLORIDE 5 MG/1
5 TABLET, FILM COATED ORAL NIGHTLY
Qty: 30 TABLET | Refills: 3 | Status: SHIPPED | OUTPATIENT
Start: 2017-05-05 | End: 2017-05-30 | Stop reason: SDUPTHER

## 2017-05-05 RX ADMIN — TRIAMCINOLONE ACETONIDE 40 MG: 40 INJECTION, SUSPENSION INTRA-ARTICULAR; INTRAMUSCULAR at 02:05

## 2017-05-05 NOTE — PROGRESS NOTES
Subjective:       Patient ID: Hien Jeter is a 51 y.o. female.    Chief Complaint: Follow-up (04-17-17) and Cough (x 3 wks )    HPI   Pt here for evaluation of 6 weeks of persistent sinus/chest congestion, productive cough(clear phlem at times), post nasal drip, sore throat. No relief with Z-Pack and tessalon perles.   Review of Systems   Constitutional: Negative for activity change, appetite change, chills, diaphoresis, fatigue, fever and unexpected weight change.   HENT: Positive for congestion, postnasal drip, rhinorrhea, sinus pressure and sore throat. Negative for sneezing, trouble swallowing and voice change.    Respiratory: Positive for cough and wheezing. Negative for shortness of breath.    Cardiovascular: Negative for chest pain, palpitations and leg swelling.   Gastrointestinal: Negative for abdominal pain, blood in stool, constipation, diarrhea, nausea and vomiting.   Genitourinary: Negative for dysuria.   Musculoskeletal: Negative for arthralgias and myalgias.   Skin: Negative for rash and wound.   Allergic/Immunologic: Negative for environmental allergies and food allergies.   Hematological: Negative for adenopathy. Does not bruise/bleed easily.       Objective:      Physical Exam   Constitutional: She is oriented to person, place, and time. She appears well-developed and well-nourished. No distress.   HENT:   Head: Normocephalic and atraumatic.   Right Ear: External ear normal.   Left Ear: External ear normal.   Nose: Mucosal edema and rhinorrhea present.   Mouth/Throat: Oropharynx is clear and moist. No oropharyngeal exudate.   Eyes: Conjunctivae and EOM are normal. Pupils are equal, round, and reactive to light. Right eye exhibits no discharge. Left eye exhibits no discharge. No scleral icterus.   Neck: Neck supple. No JVD present.   Cardiovascular: Normal rate, regular rhythm, normal heart sounds and intact distal pulses.    Pulmonary/Chest: Effort normal and breath sounds normal. No respiratory  distress. She has no wheezes. She has no rales.   Abdominal: Soft. Bowel sounds are normal. There is no tenderness.   Musculoskeletal: She exhibits no edema.   Lymphadenopathy:     She has no cervical adenopathy.   Neurological: She is alert and oriented to person, place, and time.   Skin: Skin is warm and dry. No rash noted. She is not diaphoretic. No pallor.       Assessment:       1. Allergic sinusitis    2. Acute bronchitis, unspecified organism        Plan:    1. Rx Xyzal/Flonase, Kenalog 40 mg IM   2. Rx Cheratussin AC qHS and Tessalon Perles 200 mg BID PRN

## 2017-05-05 NOTE — TELEPHONE ENCOUNTER
Patient is scheduled for Colonoscopy 5/25/2017 with Dr. Dietz.  Instructions sent via e-mail.  Prep used: PEG.

## 2017-05-08 LAB
HPV16 DNA SPEC QL NAA+PROBE: NEGATIVE
HPV16+18+H RISK 12 DNA CVX-IMP: NEGATIVE
HPV18 DNA SPEC QL NAA+PROBE: NEGATIVE

## 2017-05-10 ENCOUNTER — OFFICE VISIT (OUTPATIENT)
Dept: NEUROLOGY | Facility: CLINIC | Age: 52
End: 2017-05-10
Payer: COMMERCIAL

## 2017-05-10 DIAGNOSIS — F41.1 GENERALIZED ANXIETY DISORDER: Primary | ICD-10-CM

## 2017-05-10 PROCEDURE — 90837 PSYTX W PT 60 MINUTES: CPT | Mod: S$GLB,,, | Performed by: SOCIAL WORKER

## 2017-05-10 NOTE — PROGRESS NOTES
Individual Psychotherapy (PhD/LCSW)    5/10/2017    Site:  [facility]         Therapeutic Intervention: Met with patient.  Outpatient - Insight oriented psychotherapy 60 min - CPT code 72950 and Outpatient - Supportive psychotherapy 60 min - CPT Code 64732    Chief complaint/reason for encounter: anxiety, work stress     Interval history and content of current session:   Pt reports improvement in anxiety and her coping skills with continued use of CBT skills and CBT workbook.  She is finding herself less stressed and worried about her mother-in-law and has set limits with the amount of time she commits to caregiving activities.  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  Similarly, she experienced some anxiety after a recent appointment with a physician.  When she returned home she felt very anxious and used the cognitive skill of listing alternatives to her problem.  She has found that journaling is not so helpful as it actually increases her anxiety and rumination, so she discontinued this activity.      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  We explored the pros and cons of each alternative and barriers to each that are beyond her control.  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  She did not experience the same level of anxiety that she experiences in the classroom, but did admit to some worry about the logistics of taking on the temp job (ie locating the schools, finding parking).  Her  and she took time prior to the assignment to locate the schools and explore parking options, so that she would feel less anxious on her start day.      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  She will continue exercises from her CBT workbook and will bring it in for review/discussion when needed.    Treatment plan:  · Target symptoms: anxiety , work stress  · Why chosen therapy is appropriate versus another modality: relevant to diagnosis, patient responds to this modality   · Outcome monitoring methods: self-report, checklist/rating scale, completion of homework      · Therapeutic intervention type: insight oriented psychotherapy, supportive psychotherapy, Cognitive therapy    Risk parameters:  Patient reports no suicidal ideation  Patient reports no homicidal ideation  Patient reports no self-injurious behavior  Patient reports no violent behavior    Verbal deficits: None    Patient's response to intervention:  The patient's response to intervention is accepting, motivated.    Progress toward goals and other mental status changes:  The patient's progress toward goals is good.    Diagnosis:     ICD-10-CM ICD-9-CM   1. Generalized anxiety disorder F41.1 300.02       Plan:  individual psychotherapy and medication management by physician    Return to clinic: 1 week  Length of Service (minutes): 60

## 2017-05-19 ENCOUNTER — OFFICE VISIT (OUTPATIENT)
Dept: NEUROLOGY | Facility: CLINIC | Age: 52
End: 2017-05-19
Payer: COMMERCIAL

## 2017-05-19 DIAGNOSIS — F41.1 GENERALIZED ANXIETY DISORDER: Primary | ICD-10-CM

## 2017-05-19 PROCEDURE — 90834 PSYTX W PT 45 MINUTES: CPT | Mod: S$GLB,,, | Performed by: SOCIAL WORKER

## 2017-05-19 NOTE — PROGRESS NOTES
Individual Psychotherapy (PhD/LCSW)    5/19/2017    Site:  [facility]         Therapeutic Intervention: Met with patient.  Outpatient - Insight oriented psychotherapy 45 min - CPT code 00225 and Outpatient - Supportive psychotherapy 45 min - CPT Code 58871    Chief complaint/reason for encounter: anxiety, work stress     Interval history and content of current session:   Hien continues to report overall improvement in anxiety and continues to utilize skills from the CBT workbook and those discussed in sessions.  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  She continues to maintain health boundaries with her in-laws regarding the extent to which she can provide care to her mother-in-law and has been able to confront family regarding her needs.  She's now enjoying more time with her own family.      Focused session on Hien's anxiety related to work.  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 She describes how this stressful environment and the anxiety created then worsens symptoms of headaches & fatigue and disrupts her sleep, further exacerbating her symptoms.  She awaits a decision from her medical providers, which is due by next week.     Hien will continue with her journaling, relaxation exercises/deep breathing and use of CBT workbook.    Treatment plan:  · Target symptoms: anxiety , work stress  · Why chosen therapy is appropriate versus another modality: relevant to diagnosis, patient responds to this modality   · Outcome monitoring methods: self-report, checklist/rating scale, completion of homework      · Therapeutic intervention type: insight oriented psychotherapy, supportive psychotherapy,  Cognitive therapy    Risk parameters:  Patient reports no suicidal ideation  Patient reports no homicidal ideation  Patient reports no self-injurious behavior  Patient reports no violent behavior    Verbal deficits: None    Patient's response to intervention:  The patient's response to intervention is accepting, motivated.    Progress toward goals and other mental status changes:  The patient's progress toward goals is good.    Diagnosis:     ICD-10-CM ICD-9-CM   1. Generalized anxiety disorder F41.1 300.02       Plan:  individual psychotherapy and medication management by physician    Return to clinic: 1 week  Length of Service (minutes): 45

## 2017-05-23 ENCOUNTER — OFFICE VISIT (OUTPATIENT)
Dept: NEUROLOGY | Facility: CLINIC | Age: 52
End: 2017-05-23
Payer: COMMERCIAL

## 2017-05-23 DIAGNOSIS — F41.1 GENERALIZED ANXIETY DISORDER: Primary | ICD-10-CM

## 2017-05-23 PROCEDURE — 90834 PSYTX W PT 45 MINUTES: CPT | Mod: S$GLB,,, | Performed by: SOCIAL WORKER

## 2017-05-23 NOTE — PROGRESS NOTES
Individual Psychotherapy (PhD/LCSW)    5/23/2017    Site:  [facility]         Therapeutic Intervention: Met with patient.  Outpatient - Insight oriented psychotherapy 45 min - CPT code 73757 and Outpatient - Supportive psychotherapy 45 min - CPT Code 77863    Chief complaint/reason for encounter: anxiety, work stress     Interval history and content of current session:   Hien presented to session reporting anxiety & stress levels remain stable, generally, with only 1 night/week of sleep difficulty.  However, she did wish to use this session to discuss a family situation that occurred yesterday and has left her feeling worried.  Essentially, Hien unintentionally learned about her sister possibly misusing money that was given to her by their parents and that was meant to go toward a gift card for their nephew, who's graduating from high school  Hien shared how her sister  a man, who has struggled with money management and other issues and how this has placed strain on the marriage and created some distance between Hien's sister and the rest of the family.  Hien is worried that this recent incident may strain their relationship and she's worried about losing the connection with her sister.  Hien was aware that some of the things she wanted to say to her sister might further shame her, and so she wanted to discuss alternative approaches.  Assisted Hien to imagine herself in her sister's situation in session and helped her identify with emotions that her sister has previously expressed and may be feeling, still (ie sadness, frustration, loneliness).  Hien was then able to consider a less chastising response to her sister, recognizing that the financial issue was really to be sorted out between her sister and parents.  Hien found this discuss helpful and will wait a few days, then contact her sister.    Following session we discussed a few case management issues, which I will further discuss with  her providers.  Informed Hien that I will be on vacation the first week of June.  We will meet again after my return.    Treatment plan:  · Target symptoms: anxiety , work stress  · Why chosen therapy is appropriate versus another modality: relevant to diagnosis, patient responds to this modality   · Outcome monitoring methods: self-report, checklist/rating scale, completion of homework      · Therapeutic intervention type: insight oriented psychotherapy, supportive psychotherapy, Cognitive therapy    Risk parameters:  Patient reports no suicidal ideation  Patient reports no homicidal ideation  Patient reports no self-injurious behavior  Patient reports no violent behavior    Verbal deficits: None    Patient's response to intervention:  The patient's response to intervention is accepting, motivated.    Progress toward goals and other mental status changes:  The patient's progress toward goals is good.    Diagnosis:     ICD-10-CM ICD-9-CM   1. Generalized anxiety disorder F41.1 300.02       Plan:  individual psychotherapy and medication management by physician    Return to clinic: 3 weeks  Length of Service (minutes): 45

## 2017-05-24 ENCOUNTER — TELEPHONE (OUTPATIENT)
Dept: NEUROLOGY | Facility: CLINIC | Age: 52
End: 2017-05-24

## 2017-05-24 NOTE — TELEPHONE ENCOUNTER
Called pt to advise that providers are in agreement with long-term disability leave and will begin working on forms.

## 2017-05-25 ENCOUNTER — ANESTHESIA (OUTPATIENT)
Dept: ENDOSCOPY | Facility: HOSPITAL | Age: 52
End: 2017-05-25
Payer: COMMERCIAL

## 2017-05-25 ENCOUNTER — HOSPITAL ENCOUNTER (OUTPATIENT)
Facility: HOSPITAL | Age: 52
Discharge: HOME | End: 2017-05-25
Attending: COLON & RECTAL SURGERY | Admitting: INTERNAL MEDICINE
Payer: COMMERCIAL

## 2017-05-25 ENCOUNTER — SURGERY (OUTPATIENT)
Age: 52
End: 2017-05-25

## 2017-05-25 ENCOUNTER — ANESTHESIA EVENT (OUTPATIENT)
Dept: ENDOSCOPY | Facility: HOSPITAL | Age: 52
End: 2017-05-25
Payer: COMMERCIAL

## 2017-05-25 VITALS
DIASTOLIC BLOOD PRESSURE: 81 MMHG | HEIGHT: 62 IN | WEIGHT: 116 LBS | RESPIRATION RATE: 18 BRPM | BODY MASS INDEX: 21.35 KG/M2 | HEART RATE: 78 BPM | SYSTOLIC BLOOD PRESSURE: 131 MMHG | TEMPERATURE: 98 F | OXYGEN SATURATION: 97 %

## 2017-05-25 DIAGNOSIS — Z80.0 FAMILY HISTORY OF COLON CANCER: Primary | ICD-10-CM

## 2017-05-25 LAB
B-HCG UR QL: NEGATIVE
CTP QC/QA: YES

## 2017-05-25 PROCEDURE — 25000003 PHARM REV CODE 250: Performed by: INTERNAL MEDICINE

## 2017-05-25 PROCEDURE — 81025 URINE PREGNANCY TEST: CPT | Performed by: INTERNAL MEDICINE

## 2017-05-25 PROCEDURE — 37000008 HC ANESTHESIA 1ST 15 MINUTES: Performed by: INTERNAL MEDICINE

## 2017-05-25 PROCEDURE — G0105 COLORECTAL SCRN; HI RISK IND: HCPCS | Performed by: INTERNAL MEDICINE

## 2017-05-25 PROCEDURE — 25000003 PHARM REV CODE 250: Performed by: NURSE ANESTHETIST, CERTIFIED REGISTERED

## 2017-05-25 PROCEDURE — 63600175 PHARM REV CODE 636 W HCPCS: Performed by: NURSE ANESTHETIST, CERTIFIED REGISTERED

## 2017-05-25 PROCEDURE — D9220A PRA ANESTHESIA: Mod: 33,CRNA,, | Performed by: NURSE ANESTHETIST, CERTIFIED REGISTERED

## 2017-05-25 PROCEDURE — G0105 COLORECTAL SCRN; HI RISK IND: HCPCS | Mod: ,,, | Performed by: INTERNAL MEDICINE

## 2017-05-25 PROCEDURE — D9220A PRA ANESTHESIA: Mod: 33,ANES,, | Performed by: ANESTHESIOLOGY

## 2017-05-25 PROCEDURE — 37000009 HC ANESTHESIA EA ADD 15 MINS: Performed by: INTERNAL MEDICINE

## 2017-05-25 RX ORDER — SODIUM CHLORIDE 9 MG/ML
INJECTION, SOLUTION INTRAVENOUS CONTINUOUS
Status: DISCONTINUED | OUTPATIENT
Start: 2017-05-25 | End: 2017-05-25 | Stop reason: HOSPADM

## 2017-05-25 RX ORDER — PROPOFOL 10 MG/ML
VIAL (ML) INTRAVENOUS
Status: DISCONTINUED | OUTPATIENT
Start: 2017-05-25 | End: 2017-05-25

## 2017-05-25 RX ORDER — LIDOCAINE HCL/PF 100 MG/5ML
SYRINGE (ML) INTRAVENOUS
Status: DISCONTINUED | OUTPATIENT
Start: 2017-05-25 | End: 2017-05-25

## 2017-05-25 RX ORDER — PROPOFOL 10 MG/ML
VIAL (ML) INTRAVENOUS CONTINUOUS PRN
Status: DISCONTINUED | OUTPATIENT
Start: 2017-05-25 | End: 2017-05-25

## 2017-05-25 RX ADMIN — PROPOFOL 150 MCG/KG/MIN: 10 INJECTION, EMULSION INTRAVENOUS at 03:05

## 2017-05-25 RX ADMIN — PROPOFOL 30 MG: 10 INJECTION, EMULSION INTRAVENOUS at 03:05

## 2017-05-25 RX ADMIN — SODIUM CHLORIDE: 0.9 INJECTION, SOLUTION INTRAVENOUS at 02:05

## 2017-05-25 RX ADMIN — LIDOCAINE HYDROCHLORIDE 40 MG: 20 INJECTION, SOLUTION INTRAVENOUS at 03:05

## 2017-05-25 RX ADMIN — PROPOFOL 80 MG: 10 INJECTION, EMULSION INTRAVENOUS at 03:05

## 2017-05-25 NOTE — PATIENT INSTRUCTIONS
Discharge Summary/Instructions for after Colonoscopy without   Biopsy/Polypectomy  Patient Name: Hien Rock  Patient MRN: [id number]  Patient YOB: 1965  Thursday, May 25, 2017  Salud Dietz MD  RESTRICTIONS ON ACTIVITY:  - Do not drive a car or operate machinery until the day after the procedure.      - The following day: return to full activity including work.  - For  3 days: No heavy lifting, straining or running.  - Diet: Eat and drink normally unless instructed otherwise.  TREATMENT FOR COMMON SIDE EFFECTS:  - Mild abdominal pain and bloating or excessive gas: rest, eat lightly and   use a heating pad.  SYMPTOMS TO WATCH FOR AND REPORT TO YOUR PHYSICIAN:  1. Severe abdominal pain.  2. Fever within 24 hours after a procedure.  3. A large amount of rectal bleeding. (A small amount of blood from the   rectum is not serious, especially if hemorrhoids are present).  4. Because air was put into your colon during the procedure, expelling large   amounts of air from your rectum is normal.  5. You may not have a bowel movement for 1-3 days because of the colonoscopy   prep.  This is normal..  6. Go directly to the emergency room if you notice any of the following:   Chills and/or fever over 101   Persistent vomiting   Severe abdominal pain, other than gas cramps   Severe chest pain   Black, tarry stools   Any bleeding - exceeding one tablespoon  Your doctor recommends these additional instructions:  If any biopsies were performed, my office will call you in 5 to 6 business   days with any results.  Your physician has recommended a repeat colonoscopy in five years for   screening purposes.   You are being discharged to home.   You are being discharged to home.   Resume your regular diet.   Continue your present medications.   Return to your referring physician as previously scheduled.   The findings and recommendations have been discussed with you.   Resume your previous diet.  If you have any questions or  problems, please call your physician.  EMERGENCY PHONE NUMBER: [phone]  LAB RESULTS: [phone]  Salud Dietz MD  5/25/2017 3:51:34 PM  This report has been verified and signed electronically.

## 2017-05-25 NOTE — ANESTHESIA PREPROCEDURE EVALUATION
05/25/2017  Hien Jeter is a 51 y.o., female.    Anesthesia Evaluation    I have reviewed the Patient Summary Reports.    I have reviewed the Nursing Notes.   I have reviewed the Medications.     Review of Systems  Anesthesia Hx:  No problems with previous Anesthesia    Hematology/Oncology:  Hematology Normal   Oncology Normal     EENT/Dental:EENT/Dental Normal   Renal/:  Renal/ Normal     Hepatic/GI:   Bowel Prep.    Neurological:   Neuromuscular Disease, Headaches    Endocrine:  Endocrine Normal    Psych:   Psychiatric History          Physical Exam  General:  Well nourished    Airway/Jaw/Neck:  Airway Findings: Mouth Opening: Normal Tongue: Normal  General Airway Assessment: Adult  Mallampati: I      Dental:  Dental Findings: In tact   Chest/Lungs:  Chest/Lungs Findings: Clear to auscultation, Normal Respiratory Rate     Heart/Vascular:  Heart Findings: Rate: Normal  Rhythm: Regular Rhythm        Mental Status:  Mental Status Findings:  Cooperative, Alert and Oriented         Anesthesia Plan  Type of Anesthesia, risks & benefits discussed:  Anesthesia Type:  general  Patient's Preference:   Intra-op Monitoring Plan:   Intra-op Monitoring Plan Comments:   Post Op Pain Control Plan:   Post Op Pain Control Plan Comments:   Induction:   IV  Beta Blocker:  Patient is not currently on a Beta-Blocker (No further documentation required).       Informed Consent: Patient understands risks and agrees with Anesthesia plan.  Questions answered. Anesthesia consent signed with patient.  ASA Score: 1     Day of Surgery Review of History & Physical:    H&P update referred to the surgeon.         Ready For Surgery From Anesthesia Perspective.

## 2017-05-25 NOTE — ANESTHESIA POSTPROCEDURE EVALUATION
"Anesthesia Post Evaluation    Patient: Hien Jeter    Procedure(s) Performed: Procedure(s) (LRB):  COLONOSCOPY (N/A)    Final Anesthesia Type: general  Patient location during evaluation: PACU  Patient participation: Yes- Able to Participate  Level of consciousness: awake and alert  Post-procedure vital signs: reviewed and stable  Pain management: adequate  Airway patency: patent  PONV status at discharge: No PONV  Anesthetic complications: no      Cardiovascular status: blood pressure returned to baseline  Respiratory status: unassisted, spontaneous ventilation and room air  Hydration status: euvolemic  Follow-up not needed.        Visit Vitals  BP (!) 143/86 (BP Location: Left arm, Patient Position: Lying, BP Method: Automatic)   Pulse 80   Temp 36.8 °C (98.2 °F) (Oral)   Resp 16   Ht 5' 2" (1.575 m)   Wt 52.6 kg (116 lb)   LMP 04/24/2017   SpO2 97%   Breastfeeding? No   BMI 21.22 kg/m²       Pain/Lennox Score: Pain Assessment Performed: Yes (5/25/2017  3:49 PM)  Presence of Pain: denies (5/25/2017  3:49 PM)  Lennox Score: 10 (5/25/2017  3:49 PM)      "

## 2017-05-25 NOTE — DISCHARGE INSTRUCTIONS
Colonoscopy     A camera attached to a flexible tube with a viewing lens is used to take video pictures.     Colonoscopy is a test to view the inside of your lower digestive tract (colon and rectum). Sometimes it can show the last part of the small intestine (ileum). During the test, small pieces of tissue may be removed for testing. This is called a biopsy. Small growths, such as polyps, may also be removed.   Why is colonoscopy done?  The test is done to help look for colon cancer. And it can help find the source of abdominal pain, bleeding, and changes in bowel habits. It may be needed once a year, depending on factors such as your:  · Age  · Health history  · Family health history  · Symptoms  · Results from any prior colonoscopy  Risks and possible complications  These include:  · Bleeding               · A puncture or tear in the colon   · Risks of anesthesia  · A cancer lesion not being seen  Getting ready   To prepare for the test:  · Talk with your healthcare provider about the risks of the test (see below). Also ask your healthcare provider about alternatives to the test.  · Tell your healthcare provider about any medicines you take. Also tell him or her about any health conditions you may have.  · Make sure your rectum and colon are empty for the test. Follow the diet and bowel prep instructions exactly. If you dont, the test may need to be rescheduled.  · Plan for a friend or family member to drive you home after the test.     Colonoscopy provides an inside view of the entire colon.     You may discuss the results with your doctor right away or at a future visit.  During the test   The test is usually done in the hospital on an outpatient basis. This means you go home the same day. The procedure takes about 30 minutes. During that time:  · You are given relaxing (sedating) medicine through an IV line. You may be drowsy, or fully asleep.  · The healthcare provider will first give you a physical exam to  check for anal and rectal problems.  · Then the anus is lubricated and the scope inserted.  · If you are awake, you may have a feeling similar to needing to have a bowel movement. You may also feel pressure as air is pumped into the colon. Its OK to pass gas during the procedure.  · Biopsy, polyp removal, or other treatments may be done during the test.  After the test   You may have gas right after the test. It can help to try to pass it to help prevent later bloating. Your healthcare provider may discuss the results with you right away. Or you may need to schedule a follow-up visit to talk about the results. After the test, you can go back to your normal eating and other activities. You may be tired from the sedation and need to rest for a few hours.  Date Last Reviewed: 11/1/2016  © 0634-3561 [employer]. [address] [place] [address], [place] [address]. All rights reserved. This information is not intended as a substitute for professional medical care. Always follow your healthcare professional's instructions.

## 2017-05-25 NOTE — H&P
Short Stay Endoscopy History and Physical    PCP - Rolando Sawyer MD    Procedure - Colonoscopy  ASA - per anesthesia  Mallampati - per anesthesia  History of Anesthesia problems - no  Family history Anesthesia problems - no   Plan of anesthesia - General    HPI:  This is a 51 y.o. female here for colonoscopy due to family history of colon cancer - mother at 55.     OS:  Constitutional: No fevers, chills, No weight loss  CV: No chest pain  Pulm: No cough, No shortness of breath  GI: see HPI  Derm: No rash    Medical History:  has a past medical history of Anxiety; Basal cell carcinoma (2000); Depression; Environmental allergies; Headache; Multiple food allergies; Multiple sclerosis (2009); and  shoulder (3/2015).    Surgical History:  has a past surgical history that includes LASIK (2000); Excision basal cell carcinoma (1998); and [place] tooth extraction.    Family History: family history includes Arrhythmia in her father; Breast cancer in her paternal aunt and sister; Cancer in her mother and paternal grandfather; Colon cancer in her mother; Dementia in her maternal grandmother; Diabetes in her mother; Osteoporosis in her mother.. Otherwise no colon cancer, inflammatory bowel disease, or GI malignancies.    Social History:  reports that she has never smoked. She has never used smokeless tobacco. She reports that she drinks alcohol. She reports that she does not use drugs.    Review of patient's allergies indicates:   Allergen Reactions    Codeine Nausea And Vomiting    Diclofenac Nausea Only    Hydrocodone-acetaminophen Nausea And Vomiting    Ibuprofen Other (See Comments)    Tramadol Other (See Comments)     Nausea        Medications:   Prescriptions Prior to Admission   Medication Sig Dispense Refill Last Dose    acetaminophen (TYLENOL) 325 MG tablet Take 1 tablet by mouth as needed.    Past Month at Unknown time    cholecalciferol, vitamin D3, 5,000 unit capsule Take 2,000 Units by mouth  once daily.    5/25/2017 at Unknown time    dimethyl fumarate (TECFIDERA) 240 mg CpDR Take 240 mg by mouth 2 (two) times daily. 180 capsule 1 5/25/2017 at Unknown time    escitalopram oxalate (LEXAPRO) 5 MG Tab Take 1 tablet (5 mg total) by mouth once daily. 30 tablet 2 5/24/2017 at Unknown time    flaxseed oil 1,000 mg Cap Take 1 capsule by mouth once daily.    5/24/2017 at Unknown time    fluticasone (FLONASE) 50 mcg/actuation nasal spray 2 sprays by Each Nare route once daily. 16 g 2 5/24/2017 at Unknown time    magnesium oxide (MAG-OX) 400 mg tablet TAKE 1 TABLET BY MOUTH TWICE DAILY 60 tablet 0 5/24/2017 at Unknown time    multivitamin-Ca-iron-minerals (ONE-A-DAY WOMENS FORMULA) 27-0.4 mg Tab Take 1 tablet by mouth once daily.    5/25/2017 at Unknown time    nortriptyline (PAMELOR) 10 MG capsule Take 2 capsules (20 mg total) by mouth every evening. 60 capsule 11 5/24/2017 at Unknown time    riboflavin, vitamin B2, (VITAMIN B-2) 100 mg Tab tablet TAKE 2 TABLETS BY MOUTH ONCE DAILY. 60 tablet 11 5/25/2017 at Unknown time    topiramate 25 mg capsule Take 25 mg by mouth 2 (two) times daily.    Past Month at Unknown time    zolmitriptan (ZOMIG) 5 mg [place] SPRAY 1 SPRAY IN NOSTRIL ONCE AS NEEDED. Max 2 doses in 24 hrs 12 each 12 5/25/2017 at Unknown time    baclofen (LIORESAL) 10 MG tablet Take 1/2 tablet 2-3 times a day 60 tablet 5 More than a month at Unknown time    diazepam (VALIUM) 5 MG tablet Take 1 tablet (5 mg total) by mouth every 12 (twelve) hours as needed for Anxiety. 10 tablet 0 More than a month at Unknown time    estradiol-norethindrone (ACTIVELLA) 1-0.5 mg per tablet Take 1 tablet by mouth once daily. 30 tablet 11 Unknown at Unknown time    flurbiprofen (ANSAID) 100 MG tablet Take 1 tablet (100 mg total) by mouth 2 (two) times daily as needed (headache). 20 tablet 6 More than a month at Unknown time    levocetirizine (XYZAL) 5 MG tablet Take 1 tablet (5 mg total) by mouth every evening.  30 tablet 3 Unknown at Unknown time         Physical Exam:    Vital Signs: There were no vitals filed for this visit.    General Appearance: Well appearing in no acute distress  Eyes:    No scleral icterus  Lungs: CTA bilaterally  Heart:  S1, S2 normal, no murmurs heard  Abdomen: Soft, non tender, non distended with positive bowel sounds. No hepatosplenomegaly, ascites, or mass.  Extremities: 2+ pulses, no clubbing, cyanosis or edema  Skin: No rash      Labs:  Lab Results   Component Value Date    WBC 8.81 02/15/2017    HGB 14.9 02/15/2017    HCT 43.0 02/15/2017     02/15/2017    CHOL 227 (H) 08/09/2016    TRIG 163 (H) 08/09/2016    HDL 82 (H) 08/09/2016    ALT 17 08/09/2016    AST 18 08/09/2016     08/09/2016    K 4.3 08/09/2016     08/09/2016    CREATININE 0.7 08/09/2016    BUN 16 08/09/2016    CO2 25 08/09/2016    TSH 2.773 08/09/2016    INR 0.9 12/22/2008       I have explained the risks and benefits of endoscopy procedures to the patient including but not limited to bleeding, perforation, infection, and death.      Marielle Dietz MD

## 2017-05-25 NOTE — TRANSFER OF CARE
"Anesthesia Transfer of Care Note    Patient: Hien Jeter    Procedure(s) Performed: Procedure(s) (LRB):  COLONOSCOPY (N/A)    Patient location: [facility]    Anesthesia Type: general    Transport from OR: Transported from OR on room air with adequate spontaneous ventilation    Post pain: adequate analgesia    Post assessment: no apparent anesthetic complications    Post vital signs: stable    Level of consciousness: awake and alert    Nausea/Vomiting: no nausea/vomiting    Complications: none    Transfer of care protocol was followed      Last vitals:   Visit Vitals  BP (!) 143/86 (BP Location: Left arm, Patient Position: Lying, BP Method: Automatic)   Pulse 80   Temp 36.8 °C (98.2 °F) (Oral)   Resp 16   Ht 5' 2" (1.575 m)   Wt 52.6 kg (116 lb)   LMP 04/24/2017   SpO2 97%   Breastfeeding? No   BMI 21.22 kg/m²     "

## 2017-05-30 ENCOUNTER — OFFICE VISIT (OUTPATIENT)
Dept: PAIN MEDICINE | Facility: CLINIC | Age: 52
End: 2017-05-30
Payer: COMMERCIAL

## 2017-05-30 VITALS
RESPIRATION RATE: 17 BRPM | DIASTOLIC BLOOD PRESSURE: 83 MMHG | BODY MASS INDEX: 21.53 KG/M2 | TEMPERATURE: 98 F | HEART RATE: 79 BPM | WEIGHT: 117 LBS | SYSTOLIC BLOOD PRESSURE: 113 MMHG | HEIGHT: 62 IN

## 2017-05-30 DIAGNOSIS — F41.1 GAD (GENERALIZED ANXIETY DISORDER): Primary | ICD-10-CM

## 2017-05-30 PROCEDURE — 99214 OFFICE O/P EST MOD 30 MIN: CPT | Mod: S$GLB,,, | Performed by: PSYCHIATRY & NEUROLOGY

## 2017-05-30 PROCEDURE — 99999 PR PBB SHADOW E&M-EST. PATIENT-LVL III: CPT | Mod: PBBFAC,,, | Performed by: PSYCHIATRY & NEUROLOGY

## 2017-05-30 RX ORDER — ESCITALOPRAM OXALATE 10 MG/1
10 TABLET ORAL DAILY
Qty: 30 TABLET | Refills: 2 | Status: SHIPPED | OUTPATIENT
Start: 2017-05-30 | End: 2017-06-29

## 2017-05-30 NOTE — PROGRESS NOTES
Outpatient Psychiatry Follow-Up Visit (MD/NP)    5/30/2017    Clinical Status of Patient:  Outpatient (Ambulatory)    Chief Complaint:  Hien Jeter is a 51 y.o. female who presents today for follow-up of depression and anxiety.  Met with patient.      Interval History and Content of Current Session:  Interim Events/Subjective Report/Content of Current Session: Pt reports that she had been doing better. She feels that her anxiety was better controlled when she was on lexapro 10 mg . That she would like to increase it back to 10 mg. That she has been discussing the disability with Dr Salas. She reports that she is still staying busy with things that she can do, has been using her coping skills.         Psychotherapy:  · Target symptoms: depression, anxiety   · Why chosen therapy is appropriate versus another modality: relevant to diagnosis, patient responds to this modality  · Outcome monitoring methods: self-report, observation  · Therapeutic intervention type: behavior modifying psychotherapy, supportive psychotherapy  · Topics discussed/themes: relationships difficulties, work stress, illness/death of a loved one, stress related to medical comorbidities, difficulty managing affect in interpersonal relationships, building skills sets for symptom management, symptom recognition  · The patient's response to the intervention is accepting. The patient's progress toward treatment goals is fair.   · Duration of intervention: 30 minutes.    Review of Systems   · PSYCHIATRIC: Pertinant items are noted in the narrative.    Past Medical, Family and Social History: The patient's past medical, family and social history have been reviewed and updated as appropriate within the electronic medical record - see encounter notes.    Compliance: yes    Side effects: None    Risk Parameters:  Patient reports no suicidal ideation  Patient reports no homicidal ideation  Patient reports no self-injurious behavior  Patient reports no  violent behavior    Exam (detailed: at least 9 elements; comprehensive: all 15 elements)   Constitutional  Vitals:  Most recent vital signs, dated less than 90 days prior to this appointment, were reviewed.   Vitals:    05/30/17 1134   BP: 113/83   Pulse: 79   Temp: 98.3 °F (36.8 °C)   TempSrc: Oral        General:  age appropriate, casually dressed, neatly groomed     Musculoskeletal  Muscle Strength/Tone:  no tremor, no tic   Gait & Station:  non-ataxic     Psychiatric  Speech:  no latency; no press   Mood & Affect:  anxious  congruent and appropriate   Thought Process:  normal and logical, goal-directed   Associations:  intact   Thought Content:  normal, no suicidality, no homicidality, delusions, or paranoia   Insight:  intact   Judgement: behavior is adequate to circumstances   Orientation:  grossly intact   Memory: intact for content of interview   Language: grossly intact   Attention Span & Concentration:  able to focus   Fund of Knowledge:  intact and appropriate to age and level of education     Assessment and Diagnosis   Status/Progress: Based on the examination today, the patient's problem(s) is/are improved.  New problems have been presented today.   Co-morbidities are complicating management of the primary condition.  There are no active rule-out diagnoses for this patient at this time.       Impression: Pt is a 52 Y/O CW with PMHx sig for Multiple Sclerosis with      Generalized anxiety disorder           Strengths and Liabilities: Strength: Patient accepts guidance/feedback, Strength: Patient is expressive/articulate., Strength: Patient is intelligent., Strength: Patient is motivated for change., Strength: Patient has positive support network., Strength: Patient has reasonable judgment.     Treatment Goals: Specify outcomes written in observable, behavioral terms:   Anxiety: acquiring relapse prevention skills, eliminating all anxiety symptoms (SCL-90-R scores in normal range), reducing negative  automatic thoughts, reducing physical symptoms of anxiety and reducing time spent worrying (<30 minutes/day)     Treatment Plan/Recommendations:   · Medication Management: The risks and benefits of medication were discussed with the patient.  · Referral for further treatment to pt will cont to f/u with Ms Finley  · The treatment plan and follow up plan were reviewed with the patient.   · Will increase  lexapro back to  10  mg daily.   · Cont CBT work book and if required she will discuss it with her therapist.  · Discussed coping skills.  · Provided supportive psychotherapy.           Return to Clinic: 3 months

## 2017-05-31 ENCOUNTER — TELEPHONE (OUTPATIENT)
Dept: NEUROLOGY | Facility: CLINIC | Age: 52
End: 2017-05-31

## 2017-05-31 NOTE — TELEPHONE ENCOUNTER
Left voicemail for pt advising that her disability paperwork is complete.  Awaiting her return call to discuss.

## 2017-05-31 NOTE — TELEPHONE ENCOUNTER
Met with pt, reviewed disability forms and assisted her to complete her portion of the application.  Provided her with originals.  Emailed Physician Statement forms to TRA Thorpe MD and CASTRO Klein Md.  Copies of pertinent forms kept for pt's chart.  I will fax medical records to [employer] once pt informs me that she has submitted her application.

## 2017-06-01 ENCOUNTER — TELEPHONE (OUTPATIENT)
Dept: ENDOSCOPY | Facility: HOSPITAL | Age: 52
End: 2017-06-01

## 2017-06-12 ENCOUNTER — TELEPHONE (OUTPATIENT)
Dept: NEUROLOGY | Facility: CLINIC | Age: 52
End: 2017-06-12

## 2017-06-12 NOTE — TELEPHONE ENCOUNTER
----- Message from Hien De Anda sent at 6/12/2017  9:33 AM CDT -----  Contact: pt [phone]  Pt is calling to speak with nurse regarding some paper work that she says was suppose to be krishna d and ready for pickup on Friday.pls call

## 2017-06-15 ENCOUNTER — TELEPHONE (OUTPATIENT)
Dept: NEUROLOGY | Facility: CLINIC | Age: 52
End: 2017-06-15

## 2017-06-15 NOTE — TELEPHONE ENCOUNTER
Papers are on Dr. Klein desk to fill out all highlighted areas       - Message from Tanesha Stout sent at 6/15/2017  9:42 AM CDT -----  Contact: PT  States disability forms are incomplete, call: [phone]     Papers need to be picked up, and not sent to disability.

## 2017-06-19 ENCOUNTER — TELEPHONE (OUTPATIENT)
Dept: PAIN MEDICINE | Facility: CLINIC | Age: 52
End: 2017-06-19

## 2017-06-19 NOTE — TELEPHONE ENCOUNTER
----- Message from Dustin Mccullough MA sent at 6/15/2017  4:26 PM CDT -----     Pt Advice   Message Contents   Corrine Blankenship Staff  Caller: Unspecified (Today,  9:39 AM)         x  1st Request   _  2nd Request   _  3rd Request         Who: Hien     Why: Patient wants to know when can she  her disability paper work. Please call to discuss.     What Number to Call Back: [phone]     When to Expect a call back: (Before the end of the day)   -- if the call is after 12:00, the call back will be tomorrow.

## 2017-06-22 ENCOUNTER — TELEPHONE (OUTPATIENT)
Dept: NEUROLOGY | Facility: CLINIC | Age: 52
End: 2017-06-22

## 2017-06-22 NOTE — TELEPHONE ENCOUNTER
Called and spoke to Patient.Informed her that Dr Klein is out of clinic.She states that she needs original signature on disability forms and that if there is room on form to state her headaches have gotten worse.Informed her that Dr Klein is out of town and that her message would be given to him when he returns next week.

## 2017-06-22 NOTE — TELEPHONE ENCOUNTER
----- Message from Ginger Fuentes sent at 6/22/2017  9:24 AM CDT -----  Contact: Pt  Pt states disability forms are incomplete, also she is requesting a additional letter stating that her margarines have gotten worst .    Pt states she left 2 messages last week & hasnt received a return call    Pt contact number [phone]  Thanks

## 2017-06-23 ENCOUNTER — TELEPHONE (OUTPATIENT)
Dept: NEUROLOGY | Facility: CLINIC | Age: 52
End: 2017-06-23

## 2017-06-23 NOTE — TELEPHONE ENCOUNTER
Additional disability benefit forms have been completed for pt.  Phoned with this update and she will come on Monday to pick them up.  Copies kept for chart.

## 2017-06-26 ENCOUNTER — DOCUMENTATION ONLY (OUTPATIENT)
Dept: NEUROLOGY | Facility: CLINIC | Age: 52
End: 2017-06-26

## 2017-06-26 ENCOUNTER — PATIENT MESSAGE (OUTPATIENT)
Dept: PAIN MEDICINE | Facility: CLINIC | Age: 52
End: 2017-06-26

## 2017-06-26 NOTE — PROGRESS NOTES
Met with Hien to complete disability forms.  Originals given to pt and copies kept for her chart.

## 2017-06-28 ENCOUNTER — TELEPHONE (OUTPATIENT)
Dept: NEUROLOGY | Facility: CLINIC | Age: 52
End: 2017-06-28

## 2017-06-28 NOTE — TELEPHONE ENCOUNTER
Mailed pt's [employer] Medical Certification Form to her home address.  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  Letter placed in Dr. Salas's folder for review upon her return to the office next week.

## 2017-06-29 ENCOUNTER — TELEPHONE (OUTPATIENT)
Dept: PAIN MEDICINE | Facility: CLINIC | Age: 52
End: 2017-06-29

## 2017-06-29 ENCOUNTER — TELEPHONE (OUTPATIENT)
Dept: NEUROLOGY | Facility: CLINIC | Age: 52
End: 2017-06-29

## 2017-06-29 NOTE — TELEPHONE ENCOUNTER
----- Message from Hector Moore sent at 6/29/2017  8:58 AM CDT -----  Contact: Self [phone]  Patient is requesting a return call regarding the incomplete disability paperwork she received, pt needs to get the original paperwork with Dr. Klein signature, pls call

## 2017-06-29 NOTE — TELEPHONE ENCOUNTER
Called and spoke to Patient,Informed her that I spoke to disability and that her paperwork was sent earlier this month with Dr Klein's signature on it.She also states that she is to bring skilled nursing paperwork to clinic to be completed.

## 2017-06-29 NOTE — TELEPHONE ENCOUNTER
Called and spoke with pt letting her know per Dr. Thorpe states the paper work she left for her need to go to Dr. Salas. The pt replied that she need all three doctors she's seen to fill out forms. Pt states Dr. Salas, Dr. Klein has done theirs and she's the third physician to fill out her forms, please.

## 2017-07-05 ENCOUNTER — PATIENT MESSAGE (OUTPATIENT)
Dept: NEUROLOGY | Facility: CLINIC | Age: 52
End: 2017-07-05

## 2017-07-05 ENCOUNTER — TELEPHONE (OUTPATIENT)
Dept: NEUROLOGY | Facility: CLINIC | Age: 52
End: 2017-07-05

## 2017-07-06 ENCOUNTER — PATIENT MESSAGE (OUTPATIENT)
Dept: NEUROLOGY | Facility: CLINIC | Age: 52
End: 2017-07-06

## 2017-07-06 DIAGNOSIS — G43.719 INTRACTABLE CHRONIC MIGRAINE WITHOUT AURA AND WITHOUT STATUS MIGRAINOSUS: ICD-10-CM

## 2017-07-06 RX ORDER — ZOLMITRIPTAN 5 MG/1
SPRAY NASAL
Qty: 12 EACH | Refills: 12 | Status: CANCELLED | OUTPATIENT
Start: 2017-07-06

## 2017-07-06 RX ORDER — TOPIRAMATE SPINKLE 25 MG/1
25 CAPSULE ORAL 2 TIMES DAILY
OUTPATIENT
Start: 2017-07-06

## 2017-07-06 RX ORDER — DIAZEPAM 5 MG/1
5 TABLET ORAL EVERY 12 HOURS PRN
Qty: 10 TABLET | Refills: 0 | Status: CANCELLED | OUTPATIENT
Start: 2017-07-06 | End: 2017-08-05

## 2017-07-06 NOTE — TELEPHONE ENCOUNTER
Patient requests refills on Valium,Topamax,Zomig.Last seen in clinic on 6/13/16.She has appointment scheduled  On 8/4/17.Please advise.

## 2017-07-07 ENCOUNTER — PATIENT MESSAGE (OUTPATIENT)
Dept: INTERNAL MEDICINE | Facility: CLINIC | Age: 52
End: 2017-07-07

## 2017-07-07 ENCOUNTER — PATIENT MESSAGE (OUTPATIENT)
Dept: NEUROLOGY | Facility: CLINIC | Age: 52
End: 2017-07-07

## 2017-07-17 ENCOUNTER — TELEPHONE (OUTPATIENT)
Dept: PAIN MEDICINE | Facility: CLINIC | Age: 52
End: 2017-07-17

## 2017-07-18 ENCOUNTER — HOSPITAL ENCOUNTER (OUTPATIENT)
Dept: RADIOLOGY | Facility: HOSPITAL | Age: 52
Discharge: HOME | End: 2017-07-18
Attending: INTERNAL MEDICINE
Payer: COMMERCIAL

## 2017-07-18 ENCOUNTER — TELEPHONE (OUTPATIENT)
Dept: NEUROLOGY | Facility: CLINIC | Age: 52
End: 2017-07-18

## 2017-07-18 ENCOUNTER — OFFICE VISIT (OUTPATIENT)
Dept: INTERNAL MEDICINE | Facility: CLINIC | Age: 52
End: 2017-07-18
Payer: COMMERCIAL

## 2017-07-18 VITALS
WEIGHT: 123.88 LBS | RESPIRATION RATE: 16 BRPM | SYSTOLIC BLOOD PRESSURE: 106 MMHG | DIASTOLIC BLOOD PRESSURE: 71 MMHG | BODY MASS INDEX: 22.8 KG/M2 | HEART RATE: 67 BPM | HEIGHT: 62 IN | TEMPERATURE: 99 F

## 2017-07-18 DIAGNOSIS — J30.9 ALLERGIC SINUSITIS: ICD-10-CM

## 2017-07-18 DIAGNOSIS — J32.9 BACTERIAL SINUSITIS: Primary | ICD-10-CM

## 2017-07-18 DIAGNOSIS — J32.9 BACTERIAL SINUSITIS: ICD-10-CM

## 2017-07-18 DIAGNOSIS — H65.93 SEROMUCINOUS OTITIS MEDIA, BILATERAL: ICD-10-CM

## 2017-07-18 DIAGNOSIS — B96.89 BACTERIAL SINUSITIS: ICD-10-CM

## 2017-07-18 DIAGNOSIS — R05.3 CHRONIC COUGH: ICD-10-CM

## 2017-07-18 DIAGNOSIS — B96.89 BACTERIAL SINUSITIS: Primary | ICD-10-CM

## 2017-07-18 PROCEDURE — 99214 OFFICE O/P EST MOD 30 MIN: CPT | Mod: S$GLB,,, | Performed by: INTERNAL MEDICINE

## 2017-07-18 PROCEDURE — 71020 XR CHEST PA AND LATERAL: CPT | Mod: 26,,, | Performed by: RADIOLOGY

## 2017-07-18 PROCEDURE — 71020 XR CHEST PA AND LATERAL: CPT | Mod: TC,PO

## 2017-07-18 PROCEDURE — 99999 PR PBB SHADOW E&M-EST. PATIENT-LVL III: CPT | Mod: PBBFAC,,, | Performed by: INTERNAL MEDICINE

## 2017-07-18 RX ORDER — PREDNISONE 10 MG/1
TABLET ORAL
Qty: 20 TABLET | Refills: 0 | Status: SHIPPED | OUTPATIENT
Start: 2017-07-18 | End: 2017-08-14 | Stop reason: ALTCHOICE

## 2017-07-18 RX ORDER — TOPIRAMATE 25 MG/1
25 TABLET ORAL 2 TIMES DAILY
Refills: 6 | COMMUNITY
Start: 2017-07-07 | End: 2018-02-05 | Stop reason: SDUPTHER

## 2017-07-18 RX ORDER — DOXYCYCLINE 100 MG/1
100 CAPSULE ORAL 2 TIMES DAILY
Qty: 40 CAPSULE | Refills: 1 | Status: SHIPPED | OUTPATIENT
Start: 2017-07-18 | End: 2017-08-14 | Stop reason: ALTCHOICE

## 2017-07-18 NOTE — PROGRESS NOTES
Subjective:       Patient ID: Hien Jeter is a 52 y.o. female.    Chief Complaint: Cough  HISTORY OF PRESENT ILLNESS:  A 52-year-old white female patient coming in for a   four-month history of cough, which has been insistent.  She was treated with   Z-LINDA without any real improvement.  She has got a history of chronic allergies,   has been on her allergy medicines.  She has some improvement with that, but it   did not last.  The cough had started out clear and is now green for the last   week.  She also feels like her ears blocked for the last week.  The patient has   had no fever, chills, shortness of breath, chest pain.    PHYSICAL EXAMINATION:  VITAL SIGNS:  Normal.  SKIN:  Fair and healthy.  HEENT:  Shows bilateral serous otitis.  Throat is clear.  I see no drainage.  CHEST:  Clear.  NECK:  Shows no stiffness or adenopathy.    IMPRESSION:  1.  Bacterial sinusitis.  2.  Allergic sinusitis.  3.  Chronic cough, probably due to a bacterial bronchitis.  4.  Serous otitis bilateral.    The patient was sent for some lab, which is back showing normal sed rate and   CBC.  She had a chest x-ray done that showed no pneumonia.  I will start her on   a tapering dose of prednisone, told to drink plenty of fluids, flush her nose   with saline and she was placed on a tapering dose of steroids along with   doxycycline 100 mg b.i.d. for three weeks.  She is going to be due for annual   visit sometime around then, so I will tickle her ears at that point and make   certain she is well.      MAGDA/STEVE  dd: 07/20/2017 13:25:26 (CDT)  td: 07/21/2017 00:16:12 (CDT)  Doc ID   #[id number]  Job ID #[id number]    CC:     Dict [id number]  HPI  Review of Systems   Constitutional: Negative.    HENT: Positive for congestion, ear pain, hearing loss, sinus pressure, sneezing and sore throat. Negative for voice change.    Eyes: Negative for visual disturbance.   Respiratory: Positive for cough. Negative for chest tightness and shortness of breath.     Cardiovascular: Negative.  Negative for chest pain, palpitations and leg swelling.   Gastrointestinal: Negative.    Genitourinary: Negative for difficulty urinating, dysuria, flank pain, frequency, hematuria, menstrual problem, pelvic pain, urgency, vaginal bleeding and vaginal discharge.   Musculoskeletal: Negative.  Negative for neck pain and neck stiffness.   Skin: Negative.    Neurological: Negative.  Negative for dizziness, seizures, light-headedness, numbness and headaches.   Psychiatric/Behavioral: Negative for agitation, behavioral problems, confusion and sleep disturbance. The patient is not nervous/anxious.        Objective:      Physical Exam   Constitutional: She is oriented to person, place, and time. She appears well-developed and well-nourished.   Eyes: EOM are normal. Pupils are equal, round, and reactive to light.   Neck: Normal range of motion. Neck supple. No JVD present. No thyromegaly present.   Cardiovascular: Normal rate, regular rhythm, normal heart sounds and intact distal pulses.  Exam reveals no gallop.    No murmur heard.  Pulmonary/Chest: Breath sounds normal. She has no wheezes. She has no rales.   Abdominal: Soft. Bowel sounds are normal. She exhibits no mass. There is no tenderness.   Musculoskeletal: Normal range of motion.   Lymphadenopathy:     She has no cervical adenopathy.   Neurological: She is alert and oriented to person, place, and time. She has normal reflexes. No cranial nerve deficit.   Skin: No rash noted. No erythema.   Psychiatric: She has a normal mood and affect. Judgment normal.       Assessment:       1. Bacterial sinusitis    2. Allergic sinusitis    3. Chronic cough    4. Seromucinous otitis media, bilateral        Plan:

## 2017-07-18 NOTE — TELEPHONE ENCOUNTER
Received phone call from pt regarding her employment disability claim.  She has turned in all forms to [employer] and asked that I fax records and the revised Physician Statement.  Sent three separate faxes to Rae at [employer] [phone].

## 2017-07-25 ENCOUNTER — TELEPHONE (OUTPATIENT)
Dept: NEUROLOGY | Facility: CLINIC | Age: 52
End: 2017-07-25

## 2017-07-25 NOTE — TELEPHONE ENCOUNTER
Pt emailed on 7/24/17 advising that her  at [employer] needed additional records faxed.  I phoned Rae and left a voicemail for her to call.  Rae's information:   Rae Ocampo  Disability   [employer]  [address] [place], [place] [address]  Phone [phone]      Fax [phone]  [email or web address]

## 2017-07-27 ENCOUNTER — PATIENT MESSAGE (OUTPATIENT)
Dept: NEUROLOGY | Facility: CLINIC | Age: 52
End: 2017-07-27

## 2017-07-27 DIAGNOSIS — G35 MULTIPLE SCLEROSIS: Primary | ICD-10-CM

## 2017-08-03 ENCOUNTER — TELEPHONE (OUTPATIENT)
Dept: NEUROLOGY | Facility: CLINIC | Age: 52
End: 2017-08-03

## 2017-08-03 NOTE — TELEPHONE ENCOUNTER
----- Message from Lili Arellano sent at 8/3/2017  1:19 PM CDT -----  Contact: ana maria    [facility]    [phone]  Calling to request a refill for dimethyl fumarate (TECFIDERA) 240 mg CpDR.

## 2017-08-04 ENCOUNTER — LAB VISIT (OUTPATIENT)
Dept: LAB | Facility: HOSPITAL | Age: 52
End: 2017-08-04
Attending: PSYCHIATRY & NEUROLOGY
Payer: COMMERCIAL

## 2017-08-04 ENCOUNTER — OFFICE VISIT (OUTPATIENT)
Dept: NEUROLOGY | Facility: CLINIC | Age: 52
End: 2017-08-04
Payer: COMMERCIAL

## 2017-08-04 VITALS
DIASTOLIC BLOOD PRESSURE: 70 MMHG | HEART RATE: 80 BPM | HEIGHT: 62 IN | BODY MASS INDEX: 22.68 KG/M2 | WEIGHT: 123.25 LBS | SYSTOLIC BLOOD PRESSURE: 102 MMHG

## 2017-08-04 DIAGNOSIS — G35 MULTIPLE SCLEROSIS: ICD-10-CM

## 2017-08-04 DIAGNOSIS — G43.829 MENSTRUAL MIGRAINE WITHOUT STATUS MIGRAINOSUS, NOT INTRACTABLE: ICD-10-CM

## 2017-08-04 DIAGNOSIS — G43.719 INTRACTABLE CHRONIC MIGRAINE WITHOUT AURA AND WITHOUT STATUS MIGRAINOSUS: Primary | ICD-10-CM

## 2017-08-04 LAB
BASOPHILS # BLD AUTO: 0.03 K/UL
BASOPHILS NFR BLD: 0.5 %
DIFFERENTIAL METHOD: ABNORMAL
EOSINOPHIL # BLD AUTO: 0.2 K/UL
EOSINOPHIL NFR BLD: 3 %
ERYTHROCYTE [DISTWIDTH] IN BLOOD BY AUTOMATED COUNT: 13.5 %
HCT VFR BLD AUTO: 41.6 %
HGB BLD-MCNC: 14.5 G/DL
LYMPHOCYTES # BLD AUTO: 1 K/UL
LYMPHOCYTES NFR BLD: 18 %
MCH RBC QN AUTO: 33.9 PG
MCHC RBC AUTO-ENTMCNC: 34.9 G/DL
MCV RBC AUTO: 97 FL
MONOCYTES # BLD AUTO: 1 K/UL
MONOCYTES NFR BLD: 18.2 %
NEUTROPHILS # BLD AUTO: 3.4 K/UL
NEUTROPHILS NFR BLD: 59.9 %
PLATELET # BLD AUTO: 273 K/UL
PMV BLD AUTO: 9.5 FL
RBC # BLD AUTO: 4.28 M/UL
WBC # BLD AUTO: 5.61 K/UL

## 2017-08-04 PROCEDURE — 85025 COMPLETE CBC W/AUTO DIFF WBC: CPT

## 2017-08-04 PROCEDURE — 3008F BODY MASS INDEX DOCD: CPT | Mod: S$GLB,,, | Performed by: PSYCHIATRY & NEUROLOGY

## 2017-08-04 PROCEDURE — 99999 PR PBB SHADOW E&M-EST. PATIENT-LVL IV: CPT | Mod: PBBFAC,,, | Performed by: PSYCHIATRY & NEUROLOGY

## 2017-08-04 PROCEDURE — 36415 COLL VENOUS BLD VENIPUNCTURE: CPT

## 2017-08-04 PROCEDURE — 99213 OFFICE O/P EST LOW 20 MIN: CPT | Mod: S$GLB,,, | Performed by: PSYCHIATRY & NEUROLOGY

## 2017-08-04 PROCEDURE — 86360 T CELL ABSOLUTE COUNT/RATIO: CPT

## 2017-08-04 PROCEDURE — 86359 T CELLS TOTAL COUNT: CPT

## 2017-08-04 RX ORDER — DIAZEPAM 5 MG/1
5 TABLET ORAL EVERY 12 HOURS PRN
Qty: 10 TABLET | Refills: 0 | Status: SHIPPED | OUTPATIENT
Start: 2017-08-04 | End: 2018-09-27

## 2017-08-04 RX ORDER — ESCITALOPRAM OXALATE 10 MG/1
TABLET ORAL
Refills: 2 | COMMUNITY
Start: 2017-07-26 | End: 2017-08-30 | Stop reason: SDUPTHER

## 2017-08-04 RX ORDER — DEXAMETHASONE 4 MG/1
4 TABLET ORAL EVERY 8 HOURS PRN
Qty: 10 TABLET | Refills: 0 | Status: SHIPPED | OUTPATIENT
Start: 2017-08-04 | End: 2017-09-03
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
## 2017-08-07 DIAGNOSIS — G35 MULTIPLE SCLEROSIS: ICD-10-CM

## 2017-08-07 LAB
ABSOLUTE CD3: 744 CELLS/UL (ref 700–2100)
ABSOLUTE CD8: 120 CELLS/UL (ref 200–900)
CD3%: 70.5 % (ref 55–83)
CD3+CD4+ CELLS # BLD: 623 CELLS/UL (ref 300–1400)
CD3+CD4+ CELLS NFR BLD: 59.1 % (ref 28–57)
CD4/CD8 RATIO: 5.21 (ref 0.9–3.6)
CD8 % SUPPRESSOR T CELL: 11.3 % (ref 10–39)

## 2017-08-07 RX ORDER — DIMETHYL FUMARATE 240 MG/1
240 CAPSULE ORAL 2 TIMES DAILY
Qty: 180 CAPSULE | Refills: 1 | Status: SHIPPED | OUTPATIENT
Start: 2017-08-07 | End: 2017-08-11 | Stop reason: SDUPTHER

## 2017-08-07 NOTE — TELEPHONE ENCOUNTER
----- Message from Lili Arellano sent at 8/4/2017  3:58 PM CDT -----  Contact: [email or web address]    [facility] pharmacy     [phone]  Calling to request a new prescription for techfidera 240 mg.

## 2017-08-11 ENCOUNTER — TELEPHONE (OUTPATIENT)
Dept: INTERNAL MEDICINE | Facility: CLINIC | Age: 52
End: 2017-08-11

## 2017-08-11 ENCOUNTER — PATIENT MESSAGE (OUTPATIENT)
Dept: INTERNAL MEDICINE | Facility: CLINIC | Age: 52
End: 2017-08-11

## 2017-08-11 NOTE — TELEPHONE ENCOUNTER
----- Message from Rosalie Wallace sent at 8/11/2017 11:05 AM CDT -----  Contact: [phone]  Pt states that she has finished her dosage of antibiotics on Monday and the symptoms have come back from her sinus infection and are worse than before. Pt would like to know if it is necessary for her to make an apt with Dr or can more antibiotics be called in to pharmacy. Please advise pt. Pharmacy : [facility] - [phone] (Phone) or [phone] (Fax).

## 2017-08-11 NOTE — TELEPHONE ENCOUNTER
Coughing and heavy post nasal drip. Finished doxy a few days ago.  She is on mucinex and saline already daily.    I told her to continue that and push fluids and we made appt for Monday to see dr camejo.

## 2017-08-14 ENCOUNTER — OFFICE VISIT (OUTPATIENT)
Dept: INTERNAL MEDICINE | Facility: CLINIC | Age: 52
End: 2017-08-14
Payer: COMMERCIAL

## 2017-08-14 ENCOUNTER — LAB VISIT (OUTPATIENT)
Dept: LAB | Facility: HOSPITAL | Age: 52
End: 2017-08-14
Attending: INTERNAL MEDICINE
Payer: COMMERCIAL

## 2017-08-14 VITALS
HEIGHT: 62 IN | BODY MASS INDEX: 22.71 KG/M2 | DIASTOLIC BLOOD PRESSURE: 78 MMHG | RESPIRATION RATE: 16 BRPM | HEART RATE: 64 BPM | TEMPERATURE: 98 F | SYSTOLIC BLOOD PRESSURE: 118 MMHG | WEIGHT: 123.44 LBS

## 2017-08-14 DIAGNOSIS — J32.9 BACTERIAL SINUSITIS: ICD-10-CM

## 2017-08-14 DIAGNOSIS — H65.93 SEROMUCINOUS OTITIS MEDIA, BILATERAL: ICD-10-CM

## 2017-08-14 DIAGNOSIS — R05.3 CHRONIC COUGH: ICD-10-CM

## 2017-08-14 DIAGNOSIS — J32.9 BACTERIAL SINUSITIS: Primary | ICD-10-CM

## 2017-08-14 DIAGNOSIS — B96.89 BACTERIAL SINUSITIS: Primary | ICD-10-CM

## 2017-08-14 DIAGNOSIS — G35 MS (MULTIPLE SCLEROSIS): ICD-10-CM

## 2017-08-14 DIAGNOSIS — B96.89 BACTERIAL SINUSITIS: ICD-10-CM

## 2017-08-14 PROCEDURE — 86713 LEGIONELLA ANTIBODY: CPT

## 2017-08-14 PROCEDURE — 99214 OFFICE O/P EST MOD 30 MIN: CPT | Mod: S$GLB,,, | Performed by: INTERNAL MEDICINE

## 2017-08-14 PROCEDURE — 99999 PR PBB SHADOW E&M-EST. PATIENT-LVL IV: CPT | Mod: PBBFAC,,, | Performed by: INTERNAL MEDICINE

## 2017-08-14 PROCEDURE — 3008F BODY MASS INDEX DOCD: CPT | Mod: S$GLB,,, | Performed by: INTERNAL MEDICINE

## 2017-08-14 PROCEDURE — 86738 MYCOPLASMA ANTIBODY: CPT

## 2017-08-14 RX ORDER — ESTRADIOL AND NORETHINDRONE ACETATE 1; .5 MG/1; MG/1
1 TABLET ORAL DAILY
COMMUNITY
End: 2018-06-19 | Stop reason: SDUPTHER

## 2017-08-14 RX ORDER — DOXYCYCLINE 100 MG/1
100 CAPSULE ORAL 2 TIMES DAILY
Qty: 50 CAPSULE | Refills: 1 | Status: SHIPPED | OUTPATIENT
Start: 2017-08-14 | End: 2017-09-11

## 2017-08-14 NOTE — PROGRESS NOTES
Subjective:       Patient ID: Hien Jeter is a 52 y.o. female.    Chief Complaint: Sinus Problem (finished 40 doxy on monday.  doing mucinex and saline spray for now)  HISTORY OF PRESENT ILLNESS:  A 52-year-old white female patient who is generally   well, but has been having problems with sinus headache, sinus congestion,   drainage, and cough for over 3 months.  She said the last antibiotics I had put   her on which was doxycycline for 3 weeks, nearly cleared it.  One day after   being off the antibiotics, she had the same symptoms again.  The patient has   been using Mucinex, saline mist, drinking lots of fluids in addition to the   above.    PHYSICAL EXAMINATION:  GENERAL:  She looks in no distress.  VITAL SIGNS:  Normal.  HEENT:  All looks clear including her nose.    IMPRESSION:  Chronic sinus problems for which I have sent her to ENT for   evaluation since this is persistent.  In addition, I have ordered serologic test   for Mycoplasma, Legionella and I placed her back on doxycycline.      JDC/IN  dd: 08/20/2017 22:25:05 (CDT)  td: 08/21/2017 04:04:17 (CDT)  Doc ID   #[id number]  Job ID #[id number]    CC:     Dict [id number]  HPI  Review of Systems   Constitutional: Negative.    HENT: Positive for congestion, ear pain, hearing loss, postnasal drip, sinus pressure and sore throat. Negative for sneezing and voice change.    Eyes: Negative for visual disturbance.   Respiratory: Positive for cough. Negative for chest tightness and shortness of breath.    Cardiovascular: Negative.  Negative for chest pain, palpitations and leg swelling.   Gastrointestinal: Negative.    Genitourinary: Negative for difficulty urinating, dysuria, flank pain, frequency, hematuria, menstrual problem, pelvic pain, urgency, vaginal bleeding and vaginal discharge.   Musculoskeletal: Negative.  Negative for neck pain and neck stiffness.   Skin: Negative.    Neurological: Positive for headaches. Negative for dizziness, seizures, light-headedness and  numbness.   Psychiatric/Behavioral: Negative for agitation, behavioral problems, confusion and sleep disturbance. The patient is not nervous/anxious.        Objective:      Physical Exam   Constitutional: She is oriented to person, place, and time. She appears well-developed and well-nourished.   Eyes: EOM are normal. Pupils are equal, round, and reactive to light.   Neck: Normal range of motion. Neck supple. No JVD present. No thyromegaly present.   Cardiovascular: Normal rate, regular rhythm, normal heart sounds and intact distal pulses.  Exam reveals no gallop.    No murmur heard.  Pulmonary/Chest: Breath sounds normal. She has no wheezes. She has no rales.   Abdominal: Soft. Bowel sounds are normal. She exhibits no mass. There is no tenderness.   Musculoskeletal: Normal range of motion.   Lymphadenopathy:     She has no cervical adenopathy.   Neurological: She is alert and oriented to person, place, and time. She has normal reflexes. No cranial nerve deficit.   Skin: No rash noted. No erythema.   Psychiatric: She has a normal mood and affect. Judgment normal.       Assessment:       1. Bacterial sinusitis    2. Chronic cough    3. Seromucinous otitis media, bilateral    4. MS (multiple sclerosis)        Plan:

## 2017-08-15 LAB — L PNEUMO AB SER QL: NEGATIVE

## 2017-08-18 ENCOUNTER — OFFICE VISIT (OUTPATIENT)
Dept: OTOLARYNGOLOGY | Facility: CLINIC | Age: 52
End: 2017-08-18
Payer: COMMERCIAL

## 2017-08-18 ENCOUNTER — TELEPHONE (OUTPATIENT)
Dept: OTOLARYNGOLOGY | Facility: CLINIC | Age: 52
End: 2017-08-18

## 2017-08-18 VITALS
BODY MASS INDEX: 22.63 KG/M2 | WEIGHT: 123 LBS | SYSTOLIC BLOOD PRESSURE: 105 MMHG | DIASTOLIC BLOOD PRESSURE: 76 MMHG | HEIGHT: 62 IN

## 2017-08-18 DIAGNOSIS — R09.82 PND (POST-NASAL DRIP): ICD-10-CM

## 2017-08-18 DIAGNOSIS — J30.2 SEASONAL AND PERENNIAL ALLERGIC RHINITIS: ICD-10-CM

## 2017-08-18 DIAGNOSIS — J30.89 SEASONAL AND PERENNIAL ALLERGIC RHINITIS: ICD-10-CM

## 2017-08-18 DIAGNOSIS — J32.9 CHRONIC RECURRENT SINUSITIS: Primary | ICD-10-CM

## 2017-08-18 LAB — M PNEUMO IGG SER IA-ACNC: 4.37 INDEX

## 2017-08-18 PROCEDURE — 99999 PR PBB SHADOW E&M-EST. PATIENT-LVL III: CPT | Mod: PBBFAC,,, | Performed by: NURSE PRACTITIONER

## 2017-08-18 PROCEDURE — 3008F BODY MASS INDEX DOCD: CPT | Mod: S$GLB,,, | Performed by: NURSE PRACTITIONER

## 2017-08-18 PROCEDURE — 99203 OFFICE O/P NEW LOW 30 MIN: CPT | Mod: S$GLB,,, | Performed by: NURSE PRACTITIONER

## 2017-08-18 PROCEDURE — 92504 EAR MICROSCOPY EXAMINATION: CPT | Mod: S$GLB,,, | Performed by: NURSE PRACTITIONER

## 2017-08-18 RX ORDER — AZELASTINE 1 MG/ML
1 SPRAY, METERED NASAL 2 TIMES DAILY
Qty: 30 ML | Refills: 2 | Status: SHIPPED | OUTPATIENT
Start: 2017-08-18 | End: 2017-09-27

## 2017-08-18 NOTE — LETTER
August 19, 2017      Rolando Sawyer MD  [address]  [place] [address]           [facility] - Otorhinolaryngology  [address]  [facility] [address]  Phone: [phone]  Fax: [phone]          Patient: Hien Jeter   MR Number: [id number]   YOB: 1965   Date of Visit: 8/18/2017       Dear Dr. Rolando Sawyer:    Thank you for referring Hien Jeter to me for evaluation. Attached you will find relevant portions of my assessment and plan of care.    If you have questions, please do not hesitate to call me. I look forward to following Hien Jeter along with you.    Sincerely,    Jax Dobson, NP    Enclosure  CC:  No Recipients    If you would like to receive this communication electronically, please contact [email or web address] or [phone] to request more information on [employer] access.    For providers and/or their staff who would like to refer a patient to [facility], please contact us through our one-stop-shop provider referral line, [facility] , at [phone].    If you feel you have received this communication in error or would no longer like to receive these types of communications, please e-mail [email or web address]

## 2017-08-18 NOTE — PROGRESS NOTES
Subjective:       Patient ID: Hien Jeter is a 52 y.o. female.    Chief Complaint: Sinus Problem (bacterial)    HPI   Tawnya Jeter is a 53 y/o CF who presents with a several month h/o worsening chronic recurrent sinusitis. Associated symptoms include sinus pressure and thick pnd. She denies nasal congestion, hyposmia, or cough. Nothing aggravates symptoms. Nothing alleviates symptoms. Treatments tried include Mucinex, Z-pack, and 2 courses of Doxycycline with resolution of symptoms for only a few weeks. No recent imaging. She has allergies.    Past Medical History: Patient has a past medical history of Anxiety; Basal cell carcinoma (2000); Depression; Environmental allergies; Headache(784.0); Multiple food allergies; Multiple sclerosis (2009); and  shoulder (3/2015).    Past Surgical History: Patient has a past surgical history that includes LASIK (2000); Excision basal cell carcinoma (1998); [place] tooth extraction; and Colonoscopy (N/A, 5/25/2017).    Social History: Patient reports that she has never smoked. She has never used smokeless tobacco. She reports that she drinks alcohol. She reports that she does not use drugs.    Family History: family history includes Arrhythmia in her father; Breast cancer in her paternal aunt and sister; Cancer in her mother and paternal grandfather; Colon cancer in her mother; Dementia in her maternal grandmother; Diabetes in her mother; Osteoporosis in her mother.    Medications:   Current Outpatient Prescriptions   Medication Sig    acetaminophen (TYLENOL) 325 MG tablet Take 1 tablet by mouth as needed.     azelastine (ASTELIN) 137 mcg (0.1 %) nasal spray 1 spray (137 mcg total) by Nasal route 2 (two) times daily.    baclofen (LIORESAL) 10 MG tablet Take 1/2 tablet 2-3 times a day    cholecalciferol, vitamin D3, 5,000 unit capsule Take 2,000 Units by mouth once daily.     dexamethasone (DECADRON) 4 MG Tab Take 1 tablet (4 mg total) by mouth every 8 (eight) hours  as needed.    diazePAM (VALIUM) 5 MG tablet Take 1 tablet (5 mg total) by mouth every 12 (twelve) hours as needed for Anxiety.    doxycycline (MONODOX) 100 MG capsule Take 1 capsule (100 mg total) by mouth 2 (two) times daily.    escitalopram oxalate (LEXAPRO) 10 MG tablet TAKE 1 TABLET (10 MG TOTAL) BY MOUTH ONCE DAILY.    estradiol-norethindrone (ACTIVELLA) 1-0.5 mg per tablet Take 1 tablet by mouth once daily.    flaxseed oil 1,000 mg Cap Take 1 capsule by mouth once daily.     flurbiprofen (ANSAID) 100 MG tablet Take 1 tablet (100 mg total) by mouth 2 (two) times daily as needed (headache).    magnesium oxide (MAG-OX) 400 mg tablet TAKE 1 TABLET BY MOUTH TWICE DAILY    multivitamin-Ca-iron-minerals (ONE-A-DAY WOMENS FORMULA) 27-0.4 mg Tab Take 1 tablet by mouth once daily.     riboflavin, vitamin B2, (VITAMIN B-2) 100 mg Tab tablet TAKE 2 TABLETS BY MOUTH ONCE DAILY.    topiramate (TOPAMAX) 25 MG tablet Take 25 mg by mouth 2 (two) times daily.    zolmitriptan (ZOMIG) 5 mg Gilberts SPRAY 1 SPRAY IN NOSTRIL ONCE AS NEEDED. Max 2 doses in 24 hrs     No current facility-administered medications for this visit.        Allergies: Patient is allergic to codeine; diclofenac; hydrocodone-acetaminophen; ibuprofen; and tramadol.    Review of Systems   Constitutional: Negative for activity change, appetite change, chills, diaphoresis, fatigue, fever and unexpected weight change.   HENT: Positive for postnasal drip and sinus pressure. Negative for congestion, dental problem, ear discharge, ear pain, facial swelling, hearing loss, nosebleeds, rhinorrhea, sneezing, sore throat, tinnitus, trouble swallowing and voice change.    Eyes: Negative for pain and visual disturbance.   Respiratory: Negative for cough, chest tightness, shortness of breath, wheezing and stridor.    Cardiovascular: Negative for chest pain.   Musculoskeletal: Negative for gait problem and neck pain.   Skin: Negative for color change and rash.  "  Allergic/Immunologic: Positive for environmental allergies.   Neurological: Negative for dizziness, seizures, syncope, facial asymmetry, speech difficulty, weakness, light-headedness, numbness and headaches.   Psychiatric/Behavioral: Negative for agitation and confusion. The patient is not nervous/anxious.        Objective:       /76 (BP Location: Right arm, Patient Position: Sitting, BP Method: Small (Automatic))   Ht 5' 2" (1.575 m)   Wt 55.8 kg (123 lb)   LMP 04/24/2017   BMI 22.50 kg/m²     Physical Exam   Constitutional: She is oriented to person, place, and time. She appears well-developed and well-nourished.   HENT:   Head: Normocephalic and atraumatic. Not macrocephalic and not microcephalic. Head is without raccoon's eyes, without Corey's sign, without abrasion, without contusion, without laceration, without right periorbital erythema and without left periorbital erythema. Hair is normal.   Right Ear: No lacerations. No drainage, swelling or tenderness. No foreign bodies. No mastoid tenderness. Tympanic membrane is not injected, not scarred, not perforated, not erythematous, not retracted and not bulging. Tympanic membrane mobility is normal. No middle ear effusion. No hemotympanum. No decreased hearing is noted.   Left Ear: No lacerations. No drainage, swelling or tenderness. No foreign bodies. No mastoid tenderness. Tympanic membrane is not injected, not scarred, not perforated, not erythematous, not retracted and not bulging. Tympanic membrane mobility is normal.  No middle ear effusion. No hemotympanum. No decreased hearing is noted.   Nose: Nose normal. No mucosal edema, rhinorrhea, nose lacerations, sinus tenderness, nasal deformity or nasal septal hematoma. No epistaxis.  No foreign bodies. Right sinus exhibits no maxillary sinus tenderness and no frontal sinus tenderness. Left sinus exhibits no maxillary sinus tenderness and no frontal sinus tenderness.   Mouth/Throat: Uvula is midline, " oropharynx is clear and moist and mucous membranes are normal.   Eyes: Conjunctivae, EOM and lids are normal. Pupils are equal, round, and reactive to light.   Neck: Trachea normal and normal range of motion. Neck supple. No spinous process tenderness and no muscular tenderness present. No neck rigidity. No edema, no erythema and normal range of motion present. No thyroid mass and no thyromegaly present.   Pulmonary/Chest: Effort normal.   Abdominal: Soft.   Musculoskeletal: Normal range of motion.   Lymphadenopathy:        Head (right side): No submental, no submandibular, no tonsillar, no preauricular and no posterior auricular adenopathy present.        Head (left side): No submental, no submandibular, no tonsillar, no preauricular, no posterior auricular and no occipital adenopathy present.     She has no cervical adenopathy.   Neurological: She is alert and oriented to person, place, and time. No cranial nerve deficit or sensory deficit.   Skin: Skin is warm and dry.   Psychiatric: Her behavior is normal. Judgment and thought content normal. Her mood appears anxious.   Nursing note and vitals reviewed.      Assessment:       1. Chronic recurrent sinusitis    2. PND (post-nasal drip)    3. Seasonal and perennial allergic rhinitis        Plan:       CT Medtronic of Sinuses.  Medrol dose pack.  Juan C Med Sinus Rinse daily; distilled water only.  OTC Flonase twice per day (spray laterally).  Astelin twice per day (spray laterally).  OTC Claritin/Zyrtec prn (allergy symptoms).  F/U with Dr. Sawant or Dr. Norman.  RTC in 2 weeks.

## 2017-08-18 NOTE — PATIENT INSTRUCTIONS
CT Medtronic of Sinuses.  Medrol dose pack.  Juan C Med Sinus Rinse daily; distilled water only.  OTC Flonase twice per day (spray laterally).  Astelin twice per day (spray laterally).  OTC Claritin/Zyrtec prn (allergy symptoms).  F/U with Dr. Sawant or Dr. Norman.  RTC in 2 weeks.

## 2017-08-24 ENCOUNTER — DOCUMENTATION ONLY (OUTPATIENT)
Dept: NEUROLOGY | Facility: CLINIC | Age: 52
End: 2017-08-24

## 2017-08-24 NOTE — PROGRESS NOTES
Notified via fax from [employer] that Tecfidera has been approved from 8/21/17-8/20-18. Auth # [id number].

## 2017-08-25 ENCOUNTER — HOSPITAL ENCOUNTER (OUTPATIENT)
Dept: RADIOLOGY | Facility: HOSPITAL | Age: 52
Discharge: HOME | End: 2017-08-25
Attending: NURSE PRACTITIONER
Payer: COMMERCIAL

## 2017-08-25 DIAGNOSIS — J32.9 CHRONIC RECURRENT SINUSITIS: ICD-10-CM

## 2017-08-25 PROCEDURE — 70486 CT MAXILLOFACIAL W/O DYE: CPT | Mod: 26,,, | Performed by: RADIOLOGY

## 2017-08-25 PROCEDURE — 70486 CT MAXILLOFACIAL W/O DYE: CPT | Mod: TC

## 2017-08-30 ENCOUNTER — OFFICE VISIT (OUTPATIENT)
Dept: PAIN MEDICINE | Facility: CLINIC | Age: 52
End: 2017-08-30
Payer: COMMERCIAL

## 2017-08-30 VITALS
RESPIRATION RATE: 20 BRPM | HEIGHT: 64 IN | HEART RATE: 70 BPM | SYSTOLIC BLOOD PRESSURE: 111 MMHG | DIASTOLIC BLOOD PRESSURE: 78 MMHG | TEMPERATURE: 98 F | WEIGHT: 125.88 LBS | BODY MASS INDEX: 21.49 KG/M2

## 2017-08-30 DIAGNOSIS — F41.1 GAD (GENERALIZED ANXIETY DISORDER): Primary | ICD-10-CM

## 2017-08-30 PROCEDURE — 99999 PR PBB SHADOW E&M-EST. PATIENT-LVL III: CPT | Mod: PBBFAC,,, | Performed by: PSYCHIATRY & NEUROLOGY

## 2017-08-30 PROCEDURE — 3008F BODY MASS INDEX DOCD: CPT | Mod: S$GLB,,, | Performed by: PSYCHIATRY & NEUROLOGY

## 2017-08-30 PROCEDURE — 99214 OFFICE O/P EST MOD 30 MIN: CPT | Mod: S$GLB,,, | Performed by: PSYCHIATRY & NEUROLOGY

## 2017-08-30 RX ORDER — ESCITALOPRAM OXALATE 10 MG/1
15 TABLET ORAL DAILY
Qty: 30 TABLET | Refills: 2 | Status: SHIPPED | OUTPATIENT
Start: 2017-08-30 | End: 2017-10-29 | Stop reason: SDUPTHER

## 2017-08-30 NOTE — PROGRESS NOTES
Outpatient Psychiatry Follow-Up Visit (MD/NP)    8/30/2017    Clinical Status of Patient:  Outpatient (Ambulatory)    Chief Complaint:  Hien Jeter is a 52 y.o. female who presents today for follow-up of depression and anxiety.  Met with patient.      Interval History and Content of Current Session:  Interim Events/Subjective Report/Content of Current Session: Pt reports that she had been doing better. She reports that she has been under a lot more stress lately because her mother also has not been well. That she is not willing to get the treatment that the MDs are recommending so pt is stressed out about this. That her mother in law is also back in the hospital. That she has been feeling ore anxious. Talked about the medications.       Psychotherapy:  · Target symptoms: depression, anxiety   · Why chosen therapy is appropriate versus another modality: relevant to diagnosis, patient responds to this modality  · Outcome monitoring methods: self-report, observation  · Therapeutic intervention type: behavior modifying psychotherapy, supportive psychotherapy  · Topics discussed/themes: relationships difficulties, work stress, illness/death of a loved one, stress related to medical comorbidities, difficulty managing affect in interpersonal relationships, building skills sets for symptom management, symptom recognition  · The patient's response to the intervention is accepting. The patient's progress toward treatment goals is fair.   · Duration of intervention: 30 minutes.    Review of Systems   · PSYCHIATRIC: Pertinant items are noted in the narrative.    Past Medical, Family and Social History: The patient's past medical, family and social history have been reviewed and updated as appropriate within the electronic medical record - see encounter notes.    Compliance: yes    Side effects: None    Risk Parameters:  Patient reports no suicidal ideation  Patient reports no homicidal ideation  Patient reports no  self-injurious behavior  Patient reports no violent behavior    Exam (detailed: at least 9 elements; comprehensive: all 15 elements)   Constitutional  Vitals:  Most recent vital signs, dated less than 90 days prior to this appointment, were reviewed.   There were no vitals filed for this visit.     General:  age appropriate, casually dressed, neatly groomed     Musculoskeletal  Muscle Strength/Tone:  no tremor, no tic   Gait & Station:  non-ataxic     Psychiatric  Speech:  no latency; no press   Mood & Affect:  anxious  congruent and appropriate   Thought Process:  normal and logical, goal-directed   Associations:  intact   Thought Content:  normal, no suicidality, no homicidality, delusions, or paranoia   Insight:  intact   Judgement: behavior is adequate to circumstances   Orientation:  grossly intact   Memory: intact for content of interview   Language: grossly intact   Attention Span & Concentration:  able to focus   Fund of Knowledge:  intact and appropriate to age and level of education     Assessment and Diagnosis   Status/Progress: Based on the examination today, the patient's problem(s) is/are improved.  New problems have been presented today.   Co-morbidities are complicating management of the primary condition.  There are no active rule-out diagnoses for this patient at this time.       Impression: Pt is a 52 Y/O CW with PMHx sig for Multiple Sclerosis with      Generalized anxiety disorder           Strengths and Liabilities: Strength: Patient accepts guidance/feedback, Strength: Patient is expressive/articulate., Strength: Patient is intelligent., Strength: Patient is motivated for change., Strength: Patient has positive support network., Strength: Patient has reasonable judgment.     Treatment Goals: Specify outcomes written in observable, behavioral terms:   Anxiety: acquiring relapse prevention skills, eliminating all anxiety symptoms (SCL-90-R scores in normal range), reducing negative automatic  thoughts, reducing physical symptoms of anxiety and reducing time spent worrying (<30 minutes/day)     Treatment Plan/Recommendations:   · Medication Management: The risks and benefits of medication were discussed with the patient.  · Referral for further treatment to pt will cont to f/u with Ms Finley  · The treatment plan and follow up plan were reviewed with the patient.   · Will increase  lexapro to  15  mg daily.   · Cont CBT work book and if required she will discuss it with her therapist.  · Discussed coping skills.  · Provided supportive psychotherapy.           Return to Clinic: 3 months.

## 2017-09-08 ENCOUNTER — OFFICE VISIT (OUTPATIENT)
Dept: OTOLARYNGOLOGY | Facility: CLINIC | Age: 52
End: 2017-09-08
Payer: COMMERCIAL

## 2017-09-08 VITALS
SYSTOLIC BLOOD PRESSURE: 101 MMHG | BODY MASS INDEX: 21.76 KG/M2 | HEART RATE: 75 BPM | DIASTOLIC BLOOD PRESSURE: 68 MMHG | WEIGHT: 126.75 LBS

## 2017-09-08 DIAGNOSIS — J34.2 NASAL SEPTAL DEVIATION: ICD-10-CM

## 2017-09-08 DIAGNOSIS — J34.3 NASAL TURBINATE HYPERTROPHY: ICD-10-CM

## 2017-09-08 DIAGNOSIS — R05.9 COUGH: Primary | ICD-10-CM

## 2017-09-08 PROCEDURE — 99215 OFFICE O/P EST HI 40 MIN: CPT | Mod: 25,S$GLB,, | Performed by: OTOLARYNGOLOGY

## 2017-09-08 PROCEDURE — 3008F BODY MASS INDEX DOCD: CPT | Mod: S$GLB,,, | Performed by: OTOLARYNGOLOGY

## 2017-09-08 PROCEDURE — 99999 PR PBB SHADOW E&M-EST. PATIENT-LVL III: CPT | Mod: PBBFAC,,, | Performed by: OTOLARYNGOLOGY

## 2017-09-08 PROCEDURE — 31575 DIAGNOSTIC LARYNGOSCOPY: CPT | Mod: S$GLB,,, | Performed by: OTOLARYNGOLOGY

## 2017-09-08 NOTE — LETTER
September 8, 2017      Jax Dobson NP  [address] [facility] [address]           [facility] - Otorhinolaryngology  [address]  [place] [address]  Phone: [phone]  Fax: [phone]          Patient: Hien Jeter   MR Number: [id number]   YOB: 1965   Date of Visit: 9/8/2017       Dear Jax Dobson:    Thank you for referring Hien Jeter to me for evaluation. Attached you will find relevant portions of my assessment and plan of care.    If you have questions, please do not hesitate to call me. I look forward to following Hien Jeter along with you.    Sincerely,    [place] HECTOR Norman III, MD    Enclosure  CC:  No Recipients    If you would like to receive this communication electronically, please contact [email or web address] or [phone] to request more information on [employer] access.    For providers and/or their staff who would like to refer a patient to [facility], please contact us through our one-stop-shop provider referral line, [facility], at [phone].    If you feel you have received this communication in error or would no longer like to receive these types of communications, please e-mail [email or web address]

## 2017-09-11 ENCOUNTER — OFFICE VISIT (OUTPATIENT)
Dept: INTERNAL MEDICINE | Facility: CLINIC | Age: 52
End: 2017-09-11
Payer: COMMERCIAL

## 2017-09-11 VITALS
SYSTOLIC BLOOD PRESSURE: 103 MMHG | TEMPERATURE: 98 F | BODY MASS INDEX: 23.4 KG/M2 | DIASTOLIC BLOOD PRESSURE: 67 MMHG | HEART RATE: 70 BPM | HEIGHT: 62 IN | WEIGHT: 127.19 LBS | RESPIRATION RATE: 16 BRPM

## 2017-09-11 DIAGNOSIS — J45.31 MILD PERSISTENT ASTHMA WITH ACUTE EXACERBATION: ICD-10-CM

## 2017-09-11 DIAGNOSIS — B96.89 BACTERIAL SINUSITIS: Primary | ICD-10-CM

## 2017-09-11 DIAGNOSIS — J32.9 BACTERIAL SINUSITIS: Primary | ICD-10-CM

## 2017-09-11 DIAGNOSIS — R05.3 CHRONIC COUGH: ICD-10-CM

## 2017-09-11 PROCEDURE — 99999 PR PBB SHADOW E&M-EST. PATIENT-LVL III: CPT | Mod: PBBFAC,,, | Performed by: INTERNAL MEDICINE

## 2017-09-11 PROCEDURE — 3008F BODY MASS INDEX DOCD: CPT | Mod: S$GLB,,, | Performed by: INTERNAL MEDICINE

## 2017-09-11 PROCEDURE — 99214 OFFICE O/P EST MOD 30 MIN: CPT | Mod: S$GLB,,, | Performed by: INTERNAL MEDICINE

## 2017-09-11 RX ORDER — ALBUTEROL SULFATE 90 UG/1
2 AEROSOL, METERED RESPIRATORY (INHALATION) EVERY 6 HOURS PRN
Qty: 18 G | Refills: 0 | Status: SHIPPED | OUTPATIENT
Start: 2017-09-11 | End: 2017-11-27 | Stop reason: SDUPTHER

## 2017-09-11 NOTE — CONSULTS
Ms. Jeter presents, referred by Braden Dobson, nurse practitioner, for   consultation.  Vital signs per nurses' notes.    CHIEF COMPLAINT:  Coughing and some nasal congestion and possible recurrent   sinusitis.    HISTORY OF PRESENT ILLNESS:  This is a 52-year-old white female who complains of   coughing up frothy type of thick mucus and she feels this is possibly nasal   drip.  She says this happens all during the day, but is a much worse at   nighttime.  She has a history of allergy testing and treatment as a child.  She   has some reflux symptoms when she eats spaghetti with red sauce, though she   takes a Tums and this resolves.  She has no globus sensation.  She had been   placed on Flonase and Astelin by Braden Dobson, but has recently had some   irritation and nosebleeds and has stopped this.    PAST MEDICAL HISTORY:  Additional past medical history is positive for anxiety,   some depression, history of basal cell carcinoma of the left eyebrow, multiple   food allergies and multiple sclerosis.    PAST SURGICAL HISTORY:  Includes LASIK surgery, basal cell carcinoma excision,   wisdom tooth extraction and colonoscopy.    FAMILY HISTORY:  Positive for arrhythmia, dementia and breast cancer.    SOCIAL HISTORY:  Nonsmoker.  Social drinker.    MEDICATIONS AND ALLERGIES:  Per Med Card.    EXAMINATION:  GENERAL APPEARANCE:  A well-developed and well-nourished 52-year-old white   female, in no apparent distress.  Communication ability is good.  HEENT:  Ears clear.  External nose shows narrow vestibule with some external   valve collapse.  Intranasally, she has septal spur to the right side creating   approximately 75% obstruction.  Tonsils are very small.  Palate normal with   normal elevation.  Examination of her nasopharynx and larynx with flexible scope   #[id number] shows no masses or lesions.  Vocal cords are normal with normal   mobility.  Aryepiglottic folds do not appear particularly erythematous or    irritated.  The remainder of exam is normal.  Inspection is normal.  She is   nontender to palpation.  Salivary glands are normal.  Ocular mobility normal.  NECK:  Supple.  Thyroid:  No masses.    LYMPHATICS:  No nodes.  RESPIRATORY:  Effort normal.    Cranial nerves 2-12 are grossly intact.    IMPRESSION:  A 52-year-old white female with chronic cough.  I have reviewed her   CT scans with her in detail.  These confirmed the right-sided septal spur as   well as a left stevie bullosa, small osteoma in the upper medial left ethmoid   region, some slight thickening in the posterior ethmoid regions bilaterally.    Remainder of the sinuses are clear.    RECOMMENDATIONS:  I have discussed my findings with her in detail as well as my   recommendations for treatment.  I have suggested an allergy consultation with   Dr. Kessler and I have given her a card.  We also discussed reflux precautions and   she will empirically do this as well as omeprazole trial.  We discussed sinus   rinses with the steroids and we will order this for her.  Lastly, if all these   fail, we did discuss surgical intervention consisting of septoplasty, submucosal   resection of turbinates, left stevie bullosa as well as bilateral ethmoid   excision.  If her conservative treatments do not work for her, she will contact   us via [facility].      ANICETO/HN  dd: 09/08/2017 09:32:43 (CDT)  td: 09/09/2017 00:00:35 (CDT)  Doc ID   #[id number]  Job ID #[id number]    CC:

## 2017-09-11 NOTE — PROGRESS NOTES
Subjective:       Patient ID: Hien Jeter is a 52 y.o. female.    Chief Complaint: Follow-up  HISTORY OF PRESENT ILLNESS:  A 52-year-old white female who has been having   chronic problems with her sinuses responding incompletely to treatment.  I have   made arrangements for her to see ENT and she had a CT done of her sinuses on   08/25/2017 which showed mild sinus disease.  She saw an ENT who said she has a   minimal infection, clear drainage.  The patient says that her father developed   asthma only two years ago.  She is having a cough, but no wheezing.  The patient   does have nasal and sinus congestion as well.  She also has reflux symptoms for   which she takes Tums and gets good relief with that.  The patient is now   primarily coughing.  With her family history of asthma, I am going to try her on   asthma inhaler and gave her albuterol.    PHYSICAL EXAMINATION:  HEENT:  Clear, vital signs normal.  Chest:  Clear.  She has no wheezing.    IMPRESSION:  1. Persisting upper and lower airway problems, presumably on the basis of   allergy I am trying her on an inhaler.  I have agreed with ENT that she might   benefit by seeing Justyn Kessler for allergy evaluation.  2. MS that does not appear to be a problem here.  3. GERD that also does not appear to be the problem here.  I will see her again   as needed.      MAGDA/STEVE  dd: 09/19/2017 13:14:25 (CDT)  td: 09/20/2017 04:51:00 (CDT)  Doc ID   #[id number]  Job ID #[id number]    CC:     [facility] [id number]  HPI  Review of Systems   Constitutional: Negative for chills and fever.   HENT: Positive for postnasal drip, rhinorrhea and sore throat. Negative for ear pain.    Respiratory: Positive for cough. Negative for shortness of breath and wheezing.    Cardiovascular: Negative for chest pain.   Musculoskeletal: Negative for myalgias.   Skin: Negative for rash.   Allergic/Immunologic: Positive for environmental allergies.   Neurological: Positive for headaches.       Objective:       Physical Exam   Constitutional: She is oriented to person, place, and time. She appears well-developed and well-nourished.   Eyes: EOM are normal. Pupils are equal, round, and reactive to light.   Neck: Normal range of motion. Neck supple. No JVD present. No thyromegaly present.   Cardiovascular: Normal rate, regular rhythm, normal heart sounds and intact distal pulses.  Exam reveals no gallop.    No murmur heard.  Pulmonary/Chest: Breath sounds normal. She has no wheezes. She has no rales.   Abdominal: Soft. Bowel sounds are normal. She exhibits no mass. There is no tenderness.   Musculoskeletal: Normal range of motion.   Lymphadenopathy:     She has no cervical adenopathy.   Neurological: She is alert and oriented to person, place, and time. She has normal reflexes. No cranial nerve deficit.   Skin: No rash noted. No erythema.   Psychiatric: She has a normal mood and affect. Judgment normal.       Assessment:       1. Bacterial sinusitis    2. Chronic cough    3. Mild persistent asthma with acute exacerbation        Plan:

## 2017-09-13 ENCOUNTER — PATIENT MESSAGE (OUTPATIENT)
Dept: NEUROLOGY | Facility: CLINIC | Age: 52
End: 2017-09-13

## 2017-09-14 ENCOUNTER — PATIENT MESSAGE (OUTPATIENT)
Dept: INTERNAL MEDICINE | Facility: CLINIC | Age: 52
End: 2017-09-14

## 2017-09-14 ENCOUNTER — TELEPHONE (OUTPATIENT)
Dept: INTERNAL MEDICINE | Facility: CLINIC | Age: 52
End: 2017-09-14

## 2017-09-14 NOTE — TELEPHONE ENCOUNTER
"Her 2 emails said:    " just wanted to let you know that my mother-in-law was just released from [facility], where (among other things) she had pneumonia. I did stay in the hospital room with her 4 to 6 hours at a time to allow my father in law to go home for a little while, as he was spending the night there.  "      " I forgot to say in my previous message that she was also in the hospital from Dec. 26, 2016 to Feb 10, 2017, and I also spent similar or longer hours with her then. I also helped take care of her when she was released, as she needed 24 hour care. She had been diagnosed with a drug resistant infection or something. I'm trying to find out exactly what she had.  "    Please advise if need anything further done    Thanks charlotte"

## 2017-09-18 ENCOUNTER — PATIENT MESSAGE (OUTPATIENT)
Dept: INTERNAL MEDICINE | Facility: CLINIC | Age: 52
End: 2017-09-18

## 2017-09-18 NOTE — TELEPHONE ENCOUNTER
She should find out nature of infection and then let me know, likely this would not be something she needs to deal with

## 2017-09-27 ENCOUNTER — LAB VISIT (OUTPATIENT)
Dept: LAB | Facility: HOSPITAL | Age: 52
End: 2017-09-27
Payer: COMMERCIAL

## 2017-09-27 ENCOUNTER — OFFICE VISIT (OUTPATIENT)
Dept: NEUROLOGY | Facility: CLINIC | Age: 52
End: 2017-09-27
Payer: COMMERCIAL

## 2017-09-27 VITALS
HEART RATE: 65 BPM | WEIGHT: 126.31 LBS | BODY MASS INDEX: 23.24 KG/M2 | SYSTOLIC BLOOD PRESSURE: 108 MMHG | HEIGHT: 62 IN | DIASTOLIC BLOOD PRESSURE: 76 MMHG

## 2017-09-27 DIAGNOSIS — Z71.89 COUNSELING REGARDING GOALS OF CARE: ICD-10-CM

## 2017-09-27 DIAGNOSIS — G35 MS (MULTIPLE SCLEROSIS): ICD-10-CM

## 2017-09-27 DIAGNOSIS — Z79.899 ENCOUNTER FOR LONG-TERM (CURRENT) USE OF HIGH-RISK MEDICATION: ICD-10-CM

## 2017-09-27 DIAGNOSIS — G35 MS (MULTIPLE SCLEROSIS): Primary | ICD-10-CM

## 2017-09-27 DIAGNOSIS — M62.838 MUSCLE SPASM: ICD-10-CM

## 2017-09-27 LAB
ABSOLUTE CD3: 688 CELLS/UL (ref 700–2100)
ABSOLUTE CD8: 118 CELLS/UL (ref 200–900)
BASOPHILS # BLD AUTO: 0.04 K/UL
BASOPHILS NFR BLD: 0.8 %
CD3%: 66.3 % (ref 55–83)
CD3+CD4+ CELLS # BLD: 577 CELLS/UL (ref 300–1400)
CD3+CD4+ CELLS NFR BLD: 55.6 % (ref 28–57)
CD4/CD8 RATIO: 4.88 (ref 0.9–3.6)
CD8 % SUPPRESSOR T CELL: 11.4 % (ref 10–39)
DIFFERENTIAL METHOD: ABNORMAL
EOSINOPHIL # BLD AUTO: 0.2 K/UL
EOSINOPHIL NFR BLD: 4 %
ERYTHROCYTE [DISTWIDTH] IN BLOOD BY AUTOMATED COUNT: 13.2 %
HCT VFR BLD AUTO: 42.1 %
HGB BLD-MCNC: 14.5 G/DL
LYMPHOCYTES # BLD AUTO: 1.1 K/UL
LYMPHOCYTES NFR BLD: 21.6 %
MCH RBC QN AUTO: 33.3 PG
MCHC RBC AUTO-ENTMCNC: 34.4 G/DL
MCV RBC AUTO: 97 FL
MONOCYTES # BLD AUTO: 0.7 K/UL
MONOCYTES NFR BLD: 14.3 %
NEUTROPHILS # BLD AUTO: 2.9 K/UL
NEUTROPHILS NFR BLD: 59.1 %
PLATELET # BLD AUTO: 277 K/UL
PMV BLD AUTO: 10.1 FL
RBC # BLD AUTO: 4.36 M/UL
WBC # BLD AUTO: 4.95 K/UL

## 2017-09-27 PROCEDURE — 99215 OFFICE O/P EST HI 40 MIN: CPT | Mod: S$GLB,,, | Performed by: PHYSICIAN ASSISTANT

## 2017-09-27 PROCEDURE — 86360 T CELL ABSOLUTE COUNT/RATIO: CPT

## 2017-09-27 PROCEDURE — 85025 COMPLETE CBC W/AUTO DIFF WBC: CPT

## 2017-09-27 PROCEDURE — 36415 COLL VENOUS BLD VENIPUNCTURE: CPT

## 2017-09-27 PROCEDURE — 86359 T CELLS TOTAL COUNT: CPT

## 2017-09-27 PROCEDURE — 99999 PR PBB SHADOW E&M-EST. PATIENT-LVL III: CPT | Mod: PBBFAC,,, | Performed by: PHYSICIAN ASSISTANT

## 2017-09-27 RX ORDER — OMEPRAZOLE 20 MG/1
20 CAPSULE, DELAYED RELEASE ORAL DAILY
COMMUNITY
End: 2017-12-20

## 2017-09-27 RX ORDER — BACLOFEN 10 MG/1
TABLET ORAL
Qty: 60 TABLET | Refills: 5 | Status: SHIPPED | OUTPATIENT
Start: 2017-09-27 | End: 2018-03-20 | Stop reason: SDUPTHER

## 2017-09-27 NOTE — PROGRESS NOTES
Subjective:       Patient ID: Hien Jeter is a 52 y.o. female who presents today for a routine clinic visit for MS. Last visit in February with me.     MS HPI:  · DMT: Tecfidera  · Side effects from DMT? Yes - flushing from head to mid chest  · Taking vitamin D3 as recommended? Yes -  Dose: 2,000 IU daily  · Requests refill on Baclofen   · Has had a great deal of stressful events related to parents and inlaws illnesses  · She has had recent recurrent URI's and has been seeing ENT and PCP.   · She feels that flushing has gotten worse over the past few months and recent bruising noted.     SOCIAL HISTORY  Social History   Substance Use Topics    Smoking status: Never Smoker    Smokeless tobacco: Never Used    Alcohol use Yes      Comment: socially/ not weekly     Living arrangements - the patient lives with .  Employment Disability    MS ROS:  · Fatigue: Yes - stressful events with mother in law recently  · Sleep Disturbance: Yes - difficult time with mother in law in hospital  · Bladder Dysfunction: No  · Bowel Dysfunction: No  · Spasticity: Yes - Baclofen --taking PRN  · Visual Symptoms: Yes - wears glasses, continues to see  Dr. Madie Nieto   · Cognitive: Yes NP testing done 2/2017 with some deficits noted in attention/processing speed along with MT  · Mood Disorder: Yes - has initiated counseling with MS   · Gait Disturbance: No  · Falls: No  · Hand Dysfunction: No  · Sexual Dysfunction: Not Assessed  · Skin Breakdown: No  · Tremors: No  · Dysphagia:  No  · Dysarthria:  No  · Heat sensitivity:  Yes   · Any un-met adaptive needs? No  · Copay Assist?  Yes   · Clinical Trial candidate? No        Objective:        1. 25 foot timed walk:4.3s today without assist  Timed 25 Foot Walk: 10/26/2016 2/15/2017   Did patient wear an AFO? No No   Was assistive device used? No No   Time for 25 Foot Walk (seconds) 4 4.2   Time for 25 Foot Walk (seconds) 4.3 -     Neurologic Exam      Mental Status    Oriented to person, place, and time.   Follows 3 step commands.   Speech: speech is normal   Level of consciousness: alert  Normal comprehension.      Cranial Nerves      CN II   Visual acuity: normal with correction (20/20 OD and OS corrected with Snellen hand held chart at 6 ft)     CN III, IV, VI   Pupils are equal, round, and reactive to light.  Extraocular motions are normal.      CN V   Facial sensation intact.      CN VII   Facial expression full, symmetric.      CN VIII   Hearing: intact (finger rub)     CN IX, X   Palate: symmetric     CN XI   CN XI normal.      CN XII   Tongue deviation: none     Motor Exam   Muscle bulk: normal  Overall muscle tone: normal     Strength   Right deltoid: 5/5  Left deltoid: 5/5  Right triceps: 5/5  Left triceps: 5/5  Right wrist extension: 5/5  Left wrist extension: 5/5  Right interossei: 5/5  Left interossei: 5/5  Right iliopsoas: 5/5  Left iliopsoas: 5/5  Right hamstrin/5  Left hamstrin/5  Right anterior tibial: 5/5  Left anterior tibial: 5/5  Right peroneal: 5/5  Left peroneal: 5/5     Sensory Exam   Light touch normal.   Right arm vibration: decreased from fingers  Left arm vibration: decreased from fingers  Right leg vibration: decreased from toes  Left leg vibration: decreased from toes     Gait, Coordination, and Reflexes      Gait  Gait: normal     Coordination   Finger to nose coordination: normal  Tandem walking coordination: normal     Tremor   Resting tremor: absent  Action tremor: absent     Reflexes   Right brachioradialis: 3+  Left brachioradialis: 3+  Right biceps: 3+  Left biceps: 3+  Right triceps:3+  Left triceps: 3+  Right patellar: 2+  Left patellar: 2+  Right achilles: 2+  Left achilles: 2+  Right plantar: normal  Left plantar: normal  Right ankle clonus: absent  Left ankle clonus: absent  Right pendular knee jerk: absent  Left pendular knee jerk: absent       Normal Heel/toe walk  Normal RSM        Imaging:     Results for orders placed  during the hospital encounter of 03/27/17   MRI Brain W WO Contrast    Impression No significant change from prior.    A few scattered subcentimeter sized T2 flair signal hyperintensities supratentorial parenchyma and right anna. While nonspecific in light history concerning for mild degree of prior demyelinating plaque burden.    No evidence for new lesions or enhancing lesions to suggest interval or active demyelination. Clinical correlation and followup advised       Electronically signed by: JOJO BROWN DO  Date:     03/27/17  Time:    15:18      Results for orders placed during the hospital encounter of 03/27/17   MRI Cervical Spine W WO Cont    Impression  Continued short segment foci of T2 STIR signal hyperintensity in the cervical and midthoracic cord suggestive for prior areas of demyelination in light of history.    No definite new lesions or enhancing lesions to suggest interval or active demyelination.      No significant disc bulge, central canal or neural foraminal stenosis.    See above for additional details.        Electronically signed by: JOJO BROWN DO  Date:     03/27/17  Time:    14:59      Results for orders placed during the hospital encounter of 03/27/17   MRI Thoracic Spine W WO Cont    Impression  Continued short segment foci of T2 STIR signal hyperintensity in the cervical and midthoracic cord suggestive for prior areas of demyelination in light of history.    No definite new lesions or enhancing lesions to suggest interval or active demyelination.      No significant disc bulge, central canal or neural foraminal stenosis.    See above for additional details.        Electronically signed by: JOJO BROWN DO  Date:     03/27/17  Time:    14:59          Labs:       Lab Results   Component Value Date    XDDMZKLU73WT 72 02/15/2017    EBOCGATY98EE 63 04/05/2016    EOZBSQOW86GV 75 07/20/2015     No results found for: JCVINDEX, JCVANTIBODY  Lab Results   Component Value Date    SG0TFLSO 66.3  09/27/2017    ABSOLUTECD3 688 (L) 09/27/2017    GD4QVYPH 11.4 09/27/2017    ABSOLUTECD8 118 (L) 09/27/2017    LM1OVRDY 55.6 09/27/2017    ABSOLUTECD4 577 09/27/2017    LABCD48 4.88 (H) 09/27/2017     Lab Results   Component Value Date    WBC 4.95 09/27/2017    RBC 4.36 09/27/2017    HGB 14.5 09/27/2017    HCT 42.1 09/27/2017    MCV 97 09/27/2017    MCH 33.3 (H) 09/27/2017    MCHC 34.4 09/27/2017    RDW 13.2 09/27/2017     09/27/2017    MPV 10.1 09/27/2017    GRAN 2.9 09/27/2017    GRAN 59.1 09/27/2017    LYMPH 1.1 09/27/2017    LYMPH 21.6 09/27/2017    MONO 0.7 09/27/2017    MONO 14.3 09/27/2017    EOS 0.2 09/27/2017    BASO 0.04 09/27/2017    EOSINOPHIL 4.0 09/27/2017    BASOPHIL 0.8 09/27/2017         Sodium   Date Value Ref Range Status   08/09/2016 140 136 - 145 mmol/L Final     Potassium   Date Value Ref Range Status   08/09/2016 4.3 3.5 - 5.1 mmol/L Final     Chloride   Date Value Ref Range Status   08/09/2016 105 95 - 110 mmol/L Final     CO2   Date Value Ref Range Status   08/09/2016 25 23 - 29 mmol/L Final     Glucose   Date Value Ref Range Status   08/09/2016 77 70 - 110 mg/dL Final     BUN, Bld   Date Value Ref Range Status   08/09/2016 16 6 - 20 mg/dL Final     Creatinine   Date Value Ref Range Status   08/09/2016 0.7 0.5 - 1.4 mg/dL Final     Calcium   Date Value Ref Range Status   08/09/2016 9.7 8.7 - 10.5 mg/dL Final     Total Protein   Date Value Ref Range Status   08/09/2016 7.3 6.0 - 8.4 g/dL Final     Albumin   Date Value Ref Range Status   08/09/2016 3.6 3.5 - 5.2 g/dL Final     Total Bilirubin   Date Value Ref Range Status   08/09/2016 0.4 0.1 - 1.0 mg/dL Final     Comment:     For infants and newborns, interpretation of results should be based  on gestational age, weight and in agreement with clinical  observations.  Premature Infant recommended reference ranges:  Up to 24 hours.............<8.0 mg/dL  Up to 48 hours............<12.0 mg/dL  3-5 days..................<15.0 mg/dL  6-29  days.................<15.0 mg/dL       Alkaline Phosphatase   Date Value Ref Range Status   08/09/2016 63 55 - 135 U/L Final     AST   Date Value Ref Range Status   08/09/2016 18 10 - 40 U/L Final     ALT   Date Value Ref Range Status   08/09/2016 17 10 - 44 U/L Final     Anion Gap   Date Value Ref Range Status   08/09/2016 10 8 - 16 mmol/L Final     eGFR if    Date Value Ref Range Status   08/09/2016 >60.0 >60 mL/min/1.73 m^2 Final     eGFR if non    Date Value Ref Range Status   08/09/2016 >60.0 >60 mL/min/1.73 m^2 Final     Comment:     Calculation used to obtain the estimated glomerular filtration  rate (eGFR) is the CKD-EPI equation. Since race is unknown   in our information system, the eGFR values for   -American and Non--American patients are given   for each creatinine result.         Diagnosis/Assessment/Plan:    1. Multiple Sclerosis  · Assessment: Patient is clinically stable; however, it appears that in the last 6 months to a year her CD8 count has begun to decrease along with her ALC. This appears to coincide with recent recurrent sinus infections, rash, easy bruising. She also states that her flushing has gotten worse in the last several months.   It seems reasonable to hold Tecfidera for 3 months and re-evaluate her condition again in 3 months. It may be wise to consider a change in DMT even though her MS is stable.   · Imaging: Planned for March of 2018  · Disease Modifying Therapies: Hold Tecfidera(initiated Sept of 2015) for the next 3 months with monthly labs(CBC,CD8). Will return to clinic in 3 months for visit and re-evaluate infections/rash/etc. She will continue with Vit D3.     2. MS Symptom Assessment / Management    · Spasticity: refilled Baclofen today--uses PRN  · No other changes made to POC today        Over 50% of this 40 minute visit was spent in direct face to face counseling of the patient regarding her current symptoms and management  of same.  Follow up in 3 months  Patient agreed to POC today.    Attending, Dr. Salas, was available during today's encounter. Any change to plan along with cosign to appear in the EMR.     Shelbi Wisdom PA-C  [facility]        MS (multiple sclerosis)  -     baclofen (LIORESAL) 10 MG tablet; Take 1/2 tablet 2-3 times a day  Dispense: 60 tablet; Refill: 5  -     CBC auto differential; Standing  -     HELPER-SUPPRESSOR RATIO; Standing    Muscle spasm  -     baclofen (LIORESAL) 10 MG tablet; Take 1/2 tablet 2-3 times a day  Dispense: 60 tablet; Refill: 5    Encounter for long-term (current) use of high-risk medication  -     CBC auto differential; Standing  -     HELPER-SUPPRESSOR RATIO; Standing

## 2017-10-02 ENCOUNTER — PATIENT MESSAGE (OUTPATIENT)
Dept: OTOLARYNGOLOGY | Facility: CLINIC | Age: 52
End: 2017-10-02

## 2017-10-02 ENCOUNTER — PATIENT MESSAGE (OUTPATIENT)
Dept: INTERNAL MEDICINE | Facility: CLINIC | Age: 52
End: 2017-10-02

## 2017-10-02 ENCOUNTER — TELEPHONE (OUTPATIENT)
Dept: INTERNAL MEDICINE | Facility: CLINIC | Age: 52
End: 2017-10-02

## 2017-10-02 NOTE — TELEPHONE ENCOUNTER
"Patient email:   Dr. Sawyer,   I have been using the albuterol inhaler for a couple of weeks now, and while it seems to help occasionally, it doesn't help most of the time.   Dr. Norman, the ENT, had suggested it might be reflux and he recommended that I take the generic of Prilosec. I am into my second 14-day pack of that, and while it seemed to help initially, not so in the long run.   He also suggested that I see an allergist, Dr. Kesselr.   In the meantime, I saw Shelbi Wisdom, my MS PA-C. After telling her of the ongoing trouble with the cough and such, she looked closely at my bloodwork, and my immune system is more suppressed that she is happy with, probably due to the Tecfidera I have been taking for the MS. She had me suspend taking it until December, with bloodwork each month, to see if that helps bring my immune system back to where it needs to be.   Thanks,   "  "

## 2017-10-22 ENCOUNTER — PATIENT MESSAGE (OUTPATIENT)
Dept: OBSTETRICS AND GYNECOLOGY | Facility: CLINIC | Age: 52
End: 2017-10-22

## 2017-10-23 DIAGNOSIS — B37.2 SKIN YEAST INFECTION: Primary | ICD-10-CM

## 2017-10-23 RX ORDER — NYSTATIN 100000 [USP'U]/G
POWDER TOPICAL 3 TIMES DAILY
Qty: 60 G | Refills: 1 | Status: SHIPPED | OUTPATIENT
Start: 2017-10-23 | End: 2017-12-20

## 2017-10-23 NOTE — TELEPHONE ENCOUNTER
Please ask Dr. Campoverde to call in a Rx for Nystatin Topical Powder ,000 units per gram. My rash is back, and I let the refill on the bottle I have . This bottle is almost empty, and it says it  in March of this year, which may explain why is doesn't seem as effective as it was in the past.   If Dr. Campoverde is not available, please ask one of the other doctors to approve it. It's making me crazy!   I use the [facility] pharmacy on [address].

## 2017-10-27 ENCOUNTER — LAB VISIT (OUTPATIENT)
Dept: LAB | Facility: HOSPITAL | Age: 52
End: 2017-10-27
Attending: PHYSICIAN ASSISTANT
Payer: COMMERCIAL

## 2017-10-27 DIAGNOSIS — Z79.899 ENCOUNTER FOR LONG-TERM (CURRENT) USE OF HIGH-RISK MEDICATION: ICD-10-CM

## 2017-10-27 DIAGNOSIS — G35 MS (MULTIPLE SCLEROSIS): ICD-10-CM

## 2017-10-27 LAB
BASOPHILS # BLD AUTO: 0.06 K/UL
BASOPHILS NFR BLD: 1 %
DIFFERENTIAL METHOD: ABNORMAL
EOSINOPHIL # BLD AUTO: 0.3 K/UL
EOSINOPHIL NFR BLD: 5.7 %
ERYTHROCYTE [DISTWIDTH] IN BLOOD BY AUTOMATED COUNT: 12.4 %
HCT VFR BLD AUTO: 42.8 %
HGB BLD-MCNC: 14.5 G/DL
IMM GRANULOCYTES # BLD AUTO: 0.03 K/UL
IMM GRANULOCYTES NFR BLD AUTO: 0.5 %
LYMPHOCYTES # BLD AUTO: 1.4 K/UL
LYMPHOCYTES NFR BLD: 23.7 %
MCH RBC QN AUTO: 32.4 PG
MCHC RBC AUTO-ENTMCNC: 33.9 G/DL
MCV RBC AUTO: 96 FL
MONOCYTES # BLD AUTO: 0.8 K/UL
MONOCYTES NFR BLD: 14.4 %
NEUTROPHILS # BLD AUTO: 3.2 K/UL
NEUTROPHILS NFR BLD: 54.7 %
NRBC BLD-RTO: 0 /100 WBC
PLATELET # BLD AUTO: 291 K/UL
PMV BLD AUTO: 10.3 FL
RBC # BLD AUTO: 4.47 M/UL
WBC # BLD AUTO: 5.82 K/UL

## 2017-10-27 PROCEDURE — 86359 T CELLS TOTAL COUNT: CPT

## 2017-10-27 PROCEDURE — 85025 COMPLETE CBC W/AUTO DIFF WBC: CPT

## 2017-10-27 PROCEDURE — 86360 T CELL ABSOLUTE COUNT/RATIO: CPT

## 2017-10-27 PROCEDURE — 36415 COLL VENOUS BLD VENIPUNCTURE: CPT | Mod: PO

## 2017-10-30 ENCOUNTER — TELEPHONE (OUTPATIENT)
Dept: PAIN MEDICINE | Facility: CLINIC | Age: 52
End: 2017-10-30

## 2017-10-30 LAB
ABSOLUTE CD3: 1039 CELLS/UL (ref 700–2100)
ABSOLUTE CD8: 177 CELLS/UL (ref 200–900)
CD3%: 69.8 % (ref 55–83)
CD3+CD4+ CELLS # BLD: 875 CELLS/UL (ref 300–1400)
CD3+CD4+ CELLS NFR BLD: 58.8 % (ref 28–57)
CD4/CD8 RATIO: 4.96 (ref 0.9–3.6)
CD8 % SUPPRESSOR T CELL: 11.9 % (ref 10–39)

## 2017-10-30 RX ORDER — ESCITALOPRAM OXALATE 10 MG/1
TABLET ORAL
Qty: 30 TABLET | Refills: 2 | Status: SHIPPED | OUTPATIENT
Start: 2017-10-30 | End: 2017-11-27 | Stop reason: SDUPTHER

## 2017-10-30 NOTE — TELEPHONE ENCOUNTER
Called and spoke with patient today about her escitalopram oxalate (LEXAPRO) 10 MG tablet, letting her know it's approve per Dr. Thorpe.

## 2017-11-01 ENCOUNTER — PATIENT MESSAGE (OUTPATIENT)
Dept: NEUROLOGY | Facility: CLINIC | Age: 52
End: 2017-11-01

## 2017-11-09 ENCOUNTER — PATIENT MESSAGE (OUTPATIENT)
Dept: NEUROLOGY | Facility: CLINIC | Age: 52
End: 2017-11-09

## 2017-11-20 ENCOUNTER — PATIENT MESSAGE (OUTPATIENT)
Dept: NEUROLOGY | Facility: CLINIC | Age: 52
End: 2017-11-20

## 2017-11-20 ENCOUNTER — LAB VISIT (OUTPATIENT)
Dept: LAB | Facility: HOSPITAL | Age: 52
End: 2017-11-20
Attending: PSYCHIATRY & NEUROLOGY
Payer: COMMERCIAL

## 2017-11-20 DIAGNOSIS — R39.9 UTI SYMPTOMS: ICD-10-CM

## 2017-11-20 DIAGNOSIS — R30.0 DYSURIA: ICD-10-CM

## 2017-11-20 DIAGNOSIS — G35 MULTIPLE SCLEROSIS: Primary | ICD-10-CM

## 2017-11-20 DIAGNOSIS — G35 MULTIPLE SCLEROSIS: ICD-10-CM

## 2017-11-20 LAB
ALBUMIN SERPL BCP-MCNC: 3.4 G/DL
ALP SERPL-CCNC: 79 U/L
ALT SERPL W/O P-5'-P-CCNC: 18 U/L
ANION GAP SERPL CALC-SCNC: 6 MMOL/L
AST SERPL-CCNC: 19 U/L
BASOPHILS # BLD AUTO: 0.05 K/UL
BASOPHILS NFR BLD: 0.8 %
BILIRUB SERPL-MCNC: 0.4 MG/DL
BUN SERPL-MCNC: 14 MG/DL
CALCIUM SERPL-MCNC: 9.5 MG/DL
CHLORIDE SERPL-SCNC: 108 MMOL/L
CO2 SERPL-SCNC: 26 MMOL/L
CREAT SERPL-MCNC: 0.9 MG/DL
DIFFERENTIAL METHOD: ABNORMAL
EOSINOPHIL # BLD AUTO: 0.2 K/UL
EOSINOPHIL NFR BLD: 3.6 %
ERYTHROCYTE [DISTWIDTH] IN BLOOD BY AUTOMATED COUNT: 12.5 %
EST. GFR  (AFRICAN AMERICAN): >60 ML/MIN/1.73 M^2
EST. GFR  (NON AFRICAN AMERICAN): >60 ML/MIN/1.73 M^2
GLUCOSE SERPL-MCNC: 81 MG/DL
HCT VFR BLD AUTO: 43.4 %
HGB BLD-MCNC: 14.8 G/DL
IMM GRANULOCYTES # BLD AUTO: 0.02 K/UL
IMM GRANULOCYTES NFR BLD AUTO: 0.3 %
LYMPHOCYTES # BLD AUTO: 1.6 K/UL
LYMPHOCYTES NFR BLD: 25.2 %
MCH RBC QN AUTO: 32.7 PG
MCHC RBC AUTO-ENTMCNC: 34.1 G/DL
MCV RBC AUTO: 96 FL
MONOCYTES # BLD AUTO: 0.8 K/UL
MONOCYTES NFR BLD: 12.9 %
NEUTROPHILS # BLD AUTO: 3.7 K/UL
NEUTROPHILS NFR BLD: 57.2 %
NRBC BLD-RTO: 0 /100 WBC
PLATELET # BLD AUTO: 296 K/UL
PMV BLD AUTO: 10.1 FL
POTASSIUM SERPL-SCNC: 4 MMOL/L
PROT SERPL-MCNC: 7.3 G/DL
RBC # BLD AUTO: 4.52 M/UL
SODIUM SERPL-SCNC: 140 MMOL/L
WBC # BLD AUTO: 6.43 K/UL

## 2017-11-20 PROCEDURE — 80053 COMPREHEN METABOLIC PANEL: CPT

## 2017-11-20 PROCEDURE — 85025 COMPLETE CBC W/AUTO DIFF WBC: CPT

## 2017-11-20 PROCEDURE — 36415 COLL VENOUS BLD VENIPUNCTURE: CPT | Mod: PO

## 2017-11-20 NOTE — TELEPHONE ENCOUNTER
CBC, CMP, UA and Urine Cx scheduled for today to r/o infection. Pt aware of appt date and time and lab appt date and time.

## 2017-11-21 ENCOUNTER — OFFICE VISIT (OUTPATIENT)
Dept: NEUROLOGY | Facility: CLINIC | Age: 52
End: 2017-11-21
Payer: COMMERCIAL

## 2017-11-21 VITALS
WEIGHT: 128.63 LBS | SYSTOLIC BLOOD PRESSURE: 107 MMHG | HEIGHT: 62 IN | BODY MASS INDEX: 23.67 KG/M2 | DIASTOLIC BLOOD PRESSURE: 79 MMHG | HEART RATE: 59 BPM

## 2017-11-21 DIAGNOSIS — R20.9 SENSORY DISTURBANCE: ICD-10-CM

## 2017-11-21 DIAGNOSIS — Z71.89 COUNSELING REGARDING GOALS OF CARE: ICD-10-CM

## 2017-11-21 DIAGNOSIS — G35 MULTIPLE SCLEROSIS: Primary | ICD-10-CM

## 2017-11-21 PROCEDURE — 99999 PR PBB SHADOW E&M-EST. PATIENT-LVL IV: CPT | Mod: PBBFAC,,, | Performed by: PHYSICIAN ASSISTANT

## 2017-11-21 PROCEDURE — 99214 OFFICE O/P EST MOD 30 MIN: CPT | Mod: S$GLB,,, | Performed by: PHYSICIAN ASSISTANT

## 2017-11-21 NOTE — PROGRESS NOTES
"Subjective:       Patient ID: Hien Jeter is a 52 y.o. female who presents today for a fit-in clinic visit for MS.      MS HPI:  · DMT: Tecfidera on hold  · Side effects from DMT? Yes - multiple symptoms--rashes/illness  · Taking vitamin D3 as recommended? Yes   · Patient having numbness (cold sensation)-inside of R Knee "feels like an ice cube under knee cap" on medial aspect that started this past Wednesday. The symptom gets better and worse, but does not go away completely.   · Tingling R shin started yesterday-this is new but intermittent; however, intensity appears to be increasing.   · Same leg as R thigh muscle spasm-which is fairly new and she is taking baclofen which is helpful  · Patient states that this has not changed her gait or balance--she does not feel as though she is any weaker  · Patient denies any recent trauma(falls, etc)    SOCIAL HISTORY  Social History   Substance Use Topics    Smoking status: Never Smoker    Smokeless tobacco: Never Used    Alcohol use Yes      Comment: socially/ not weekly     Living arrangements - the patient lives with their spouse.  Employment     MS ROS:  As Above        Objective:        1. 25 foot timed walk:3.8s today  Timed 25 Foot Walk: 10/26/2016 2/15/2017   Did patient wear an AFO? No No   Was assistive device used? No No   Time for 25 Foot Walk (seconds) 4 4.2   Time for 25 Foot Walk (seconds) 4.3 -       Neurologic Exam     Mental Status   Oriented to person, place, and time.   Attention: normal.   Speech: speech is normal   Level of consciousness: alert    Sensory Exam   Right arm light touch: normal  Left arm light touch: normal  Right leg light touch: diminished medial aspect of LE from knee distally to include medial aspect of foot.  Left leg light touch: normal  Right arm vibration: decreased from fingers  Left arm vibration: decreased from fingers  Right leg vibration: decreased from toes  Left leg vibration: decreased from toes  Right leg " proprioception: normal  Left leg proprioception: normal  Right leg pinprick: normal  Left leg pinprick: normal    Gait, Coordination, and Reflexes     Gait  Gait: normal    Tremor   Resting tremor: absent  Intention tremor: absent    Reflexes   Right patellar: 2+  Left patellar: 2+  Right achilles: 2+  Left achilles: 2+  Right plantar: normal  Left plantar: normal        Imaging:     Results for orders placed during the hospital encounter of 03/27/17   MRI Brain W WO Contrast    Impression No significant change from prior.    A few scattered subcentimeter sized T2 flair signal hyperintensities supratentorial parenchyma and right anna. While nonspecific in light history concerning for mild degree of prior demyelinating plaque burden.    No evidence for new lesions or enhancing lesions to suggest interval or active demyelination. Clinical correlation and followup advised       Electronically signed by: JOJO BROWN DO  Date:     03/27/17  Time:    15:18      Results for orders placed during the hospital encounter of 03/27/17   MRI Cervical Spine W WO Cont    Impression  Continued short segment foci of T2 STIR signal hyperintensity in the cervical and midthoracic cord suggestive for prior areas of demyelination in light of history.    No definite new lesions or enhancing lesions to suggest interval or active demyelination.      No significant disc bulge, central canal or neural foraminal stenosis.    See above for additional details.        Electronically signed by: JOJO BROWN DO  Date:     03/27/17  Time:    14:59      Results for orders placed during the hospital encounter of 03/27/17   MRI Thoracic Spine W WO Cont    Impression  Continued short segment foci of T2 STIR signal hyperintensity in the cervical and midthoracic cord suggestive for prior areas of demyelination in light of history.    No definite new lesions or enhancing lesions to suggest interval or active demyelination.      No significant disc bulge,  central canal or neural foraminal stenosis.    See above for additional details.        Electronically signed by: JOJO BROWN DO  Date:     03/27/17  Time:    14:59          Labs:     Lab Results   Component Value Date    ZARTPWXY17JI 72 02/15/2017    JQZONXKG68VN 63 04/05/2016    VUSJHTKF02YI 75 07/20/2015     No results found for: JCVINDEX, JCVANTIBODY  Lab Results   Component Value Date    ZD8VVXKG 69.8 10/27/2017    ABSOLUTECD3 1039 10/27/2017    SG2CTWAS 11.9 10/27/2017    ABSOLUTECD8 177 (L) 10/27/2017    RP6QHWQM 58.8 (H) 10/27/2017    ABSOLUTECD4 875 10/27/2017    LABCD48 4.96 (H) 10/27/2017     Lab Results   Component Value Date    WBC 6.43 11/20/2017    RBC 4.52 11/20/2017    HGB 14.8 11/20/2017    HCT 43.4 11/20/2017    MCV 96 11/20/2017    MCH 32.7 (H) 11/20/2017    MCHC 34.1 11/20/2017    RDW 12.5 11/20/2017     11/20/2017    MPV 10.1 11/20/2017    GRAN 3.7 11/20/2017    GRAN 57.2 11/20/2017    LYMPH 1.6 11/20/2017    LYMPH 25.2 11/20/2017    MONO 0.8 11/20/2017    MONO 12.9 11/20/2017    EOS 0.2 11/20/2017    BASO 0.05 11/20/2017    EOSINOPHIL 3.6 11/20/2017    BASOPHIL 0.8 11/20/2017     Sodium   Date Value Ref Range Status   11/20/2017 140 136 - 145 mmol/L Final     Potassium   Date Value Ref Range Status   11/20/2017 4.0 3.5 - 5.1 mmol/L Final     Chloride   Date Value Ref Range Status   11/20/2017 108 95 - 110 mmol/L Final     CO2   Date Value Ref Range Status   11/20/2017 26 23 - 29 mmol/L Final     Glucose   Date Value Ref Range Status   11/20/2017 81 70 - 110 mg/dL Final     BUN, Bld   Date Value Ref Range Status   11/20/2017 14 6 - 20 mg/dL Final     Creatinine   Date Value Ref Range Status   11/20/2017 0.9 0.5 - 1.4 mg/dL Final     Calcium   Date Value Ref Range Status   11/20/2017 9.5 8.7 - 10.5 mg/dL Final     Total Protein   Date Value Ref Range Status   11/20/2017 7.3 6.0 - 8.4 g/dL Final     Albumin   Date Value Ref Range Status   11/20/2017 3.4 (L) 3.5 - 5.2 g/dL Final     Total  Bilirubin   Date Value Ref Range Status   11/20/2017 0.4 0.1 - 1.0 mg/dL Final     Comment:     For infants and newborns, interpretation of results should be based  on gestational age, weight and in agreement with clinical  observations.  Premature Infant recommended reference ranges:  Up to 24 hours.............<8.0 mg/dL  Up to 48 hours............<12.0 mg/dL  3-5 days..................<15.0 mg/dL  6-29 days.................<15.0 mg/dL       Alkaline Phosphatase   Date Value Ref Range Status   11/20/2017 79 55 - 135 U/L Final     AST   Date Value Ref Range Status   11/20/2017 19 10 - 40 U/L Final     ALT   Date Value Ref Range Status   11/20/2017 18 10 - 44 U/L Final     Anion Gap   Date Value Ref Range Status   11/20/2017 6 (L) 8 - 16 mmol/L Final     eGFR if    Date Value Ref Range Status   11/20/2017 >60.0 >60 mL/min/1.73 m^2 Final     eGFR if non    Date Value Ref Range Status   11/20/2017 >60.0 >60 mL/min/1.73 m^2 Final     Comment:     Calculation used to obtain the estimated glomerular filtration  rate (eGFR) is the CKD-EPI equation.        Urinalysis   Order: [id number]   Status:  Final result   Visible to patient:  Yes (Patient Portal) Next appt:  11/27/2017 at 09:40 AM in Pain Medicine (Pattie Thorpe MD) Dx:  Multiple sclerosis; UTI symptoms     Ref Range & Units 1d ago 1yr ago 9yr ago 10yr ago    Specimen UA   Urine, ...   Urine, ...      Urine, Clean Catch    Color, UA Yellow, Straw, Elsi Yellow  Yellow  YELLOWR, CM  YELLOWR, CM     Appearance, UA Clear Hazy   Clear  CLEAR  CLEAR     pH, UA 5.0 - 8.0 5.0  7.0  5.0R  7.5R     Specific [place], UA 1.005 - 1.030 1.020  1.015  1.020  1.010R     Protein, UA Negative  Negative   NegativeCM   NegativeR  NEGR    Comments: Recommend a 24 hour urine protein or a urine   protein/creatinine ratio if globulin induced proteinuria is   clinically suspected.     Glucose, UA Negative  Negative   Negative   NegativeR  NEGR      "Ketones, UA Negative  Negative   Negative   NegativeR  NEGR     Bilirubin (UA) Negative  Negative   Negative   Negative  NEG     Occult Blood UA Negative  Negative   Negative   Negative  NEG     Nitrite, UA Negative  Negative   Negative   Negative  NEG     Urobilinogen, UA <2.0 EU/dL  Negative  <2.0 EU/dL" class="[id number]">   Negative  0.2R  0.2R     Leukocytes, UA Negative  Negative   Negative   Negative  Trace      RBC, UA    2  3     WBC, UA    <1  4     Bacteria, UA     FEW     Squam Epithel, UA    1  3    Resulting Agency  [facility]      Specimen Collected: 11/20/17 13:27 Last Resulted: 11/20/17 17:23                Diagnosis/Assessment/Plan:    1. Multiple Sclerosis  · Assessment: Patient presents for urgent appt due to sensory changes to R LE over the pat week.  She continues to have a change in vibratory sense which is not new; however, she does show changes in LT R Lower extremity knee-distally(medial aspect). She is off DMT at present(holding Tecfidera), it is important to assess for changes on MRI-I'd like to get C and T spine  · Imaging: C and T spine MRI ordered today  · Disease Modifying Therapies: Tecfidera on hold as she was experiencing increase in rashes/infections/etc. If she has new lesions it will be important to change DMT-consider Ocrevus/Aubagio. We discussed possible need for steroids, she would like if needed to have oral smoothie form of rescue therapy.         Over 50% of this 40 minute visit was spent in direct face to face counseling of the patient about MS, DMT considerations, and MS symptom management.     Return if symptoms worsen or fail to improve.  Patient agreed to POC today.    Attending, Dr. Salas, was available during today's encounter. Any change to plan along with cosign to appear in the EMR.     Shelbi Wisdom PA-C  [facility]      Multiple sclerosis  -     MRI Cervical Spine W WO Cont; Future; Expected date: 11/21/2017  -     MRI Thoracic Spine W WO Cont; " Future; Expected date: 11/21/2017

## 2017-11-22 ENCOUNTER — PATIENT MESSAGE (OUTPATIENT)
Dept: NEUROLOGY | Facility: CLINIC | Age: 52
End: 2017-11-22

## 2017-11-27 ENCOUNTER — LAB VISIT (OUTPATIENT)
Dept: LAB | Facility: OTHER | Age: 52
End: 2017-11-27
Attending: PHYSICIAN ASSISTANT
Payer: COMMERCIAL

## 2017-11-27 ENCOUNTER — OFFICE VISIT (OUTPATIENT)
Dept: PAIN MEDICINE | Facility: CLINIC | Age: 52
End: 2017-11-27
Payer: COMMERCIAL

## 2017-11-27 VITALS
BODY MASS INDEX: 23.65 KG/M2 | SYSTOLIC BLOOD PRESSURE: 100 MMHG | HEART RATE: 65 BPM | WEIGHT: 128.5 LBS | HEIGHT: 62 IN | RESPIRATION RATE: 20 BRPM | TEMPERATURE: 98 F | DIASTOLIC BLOOD PRESSURE: 73 MMHG

## 2017-11-27 DIAGNOSIS — Z79.899 ENCOUNTER FOR LONG-TERM (CURRENT) USE OF HIGH-RISK MEDICATION: ICD-10-CM

## 2017-11-27 DIAGNOSIS — G35 MS (MULTIPLE SCLEROSIS): ICD-10-CM

## 2017-11-27 DIAGNOSIS — F41.1 GAD (GENERALIZED ANXIETY DISORDER): Primary | ICD-10-CM

## 2017-11-27 LAB
BASOPHILS # BLD AUTO: 0.04 K/UL
BASOPHILS NFR BLD: 0.6 %
DIFFERENTIAL METHOD: ABNORMAL
EOSINOPHIL # BLD AUTO: 0.2 K/UL
EOSINOPHIL NFR BLD: 3.5 %
ERYTHROCYTE [DISTWIDTH] IN BLOOD BY AUTOMATED COUNT: 12.4 %
HCT VFR BLD AUTO: 45.7 %
HGB BLD-MCNC: 15.5 G/DL
LYMPHOCYTES # BLD AUTO: 1.6 K/UL
LYMPHOCYTES NFR BLD: 23.6 %
MCH RBC QN AUTO: 32.5 PG
MCHC RBC AUTO-ENTMCNC: 33.9 G/DL
MCV RBC AUTO: 96 FL
MONOCYTES # BLD AUTO: 0.7 K/UL
MONOCYTES NFR BLD: 10.7 %
NEUTROPHILS # BLD AUTO: 4.1 K/UL
NEUTROPHILS NFR BLD: 61.4 %
PLATELET # BLD AUTO: 308 K/UL
PMV BLD AUTO: 10.1 FL
RBC # BLD AUTO: 4.77 M/UL
WBC # BLD AUTO: 6.64 K/UL

## 2017-11-27 PROCEDURE — 99999 PR PBB SHADOW E&M-EST. PATIENT-LVL III: CPT | Mod: PBBFAC,,, | Performed by: PSYCHIATRY & NEUROLOGY

## 2017-11-27 PROCEDURE — 86359 T CELLS TOTAL COUNT: CPT

## 2017-11-27 PROCEDURE — 86360 T CELL ABSOLUTE COUNT/RATIO: CPT

## 2017-11-27 PROCEDURE — 36415 COLL VENOUS BLD VENIPUNCTURE: CPT

## 2017-11-27 PROCEDURE — 85025 COMPLETE CBC W/AUTO DIFF WBC: CPT

## 2017-11-27 PROCEDURE — 99214 OFFICE O/P EST MOD 30 MIN: CPT | Mod: S$GLB,,, | Performed by: PSYCHIATRY & NEUROLOGY

## 2017-11-27 RX ORDER — ESCITALOPRAM OXALATE 10 MG/1
15 TABLET ORAL NIGHTLY
Qty: 45 TABLET | Refills: 2 | Status: SHIPPED | OUTPATIENT
Start: 2017-11-27 | End: 2018-01-04 | Stop reason: SDUPTHER

## 2017-11-27 RX ORDER — AZELASTINE 1 MG/ML
1 SPRAY, METERED NASAL 2 TIMES DAILY
COMMUNITY
End: 2018-06-29 | Stop reason: SDUPTHER

## 2017-11-27 NOTE — PROGRESS NOTES
Outpatient Psychiatry Follow-Up Visit (MD/NP)    11/27/2017    Clinical Status of Patient:  Outpatient (Ambulatory)    Chief Complaint:  Hien Jeter is a 52 y.o. female who presents today for follow-up of depression and anxiety.  Met with patient.      Interval History and Content of Current Session:  Interim Events/Subjective Report/Content of Current Session: Pt reports that  She has been complaint with the lexapro  and that it is helping her. That she has been busy with family , her mother in law has been in the hospital after having a stroke. She talked about the stress that that has been for the family and so we discussed coping skills. That she is also helping her parents with some credit card issues and talked about this as well. That she is now able to delegate responsibilities and is not taking on more than she can handle. That she has been learning more about CBT and using the skills and that has been very helpful for her. I discussed with her that I am leaving [facility].       Psychotherapy:  · Target symptoms: depression, anxiety   · Why chosen therapy is appropriate versus another modality: relevant to diagnosis, patient responds to this modality  · Outcome monitoring methods: self-report, observation  · Therapeutic intervention type: behavior modifying psychotherapy, supportive psychotherapy  · Topics discussed/themes: relationships difficulties, work stress, illness/death of a loved one, stress related to medical comorbidities, difficulty managing affect in interpersonal relationships, building skills sets for symptom management, symptom recognition  · The patient's response to the intervention is accepting. The patient's progress toward treatment goals is fair.   · Duration of intervention: 30 minutes.    Review of Systems   · PSYCHIATRIC: Pertinant items are noted in the narrative.    Past Medical, Family and Social History: The patient's past medical, family and social history have been reviewed  "and updated as appropriate within the electronic medical record - see encounter notes.    Compliance: yes    Side effects: None    Risk Parameters:  Patient reports no suicidal ideation  Patient reports no homicidal ideation  Patient reports no self-injurious behavior  Patient reports no violent behavior    Exam (detailed: at least 9 elements; comprehensive: all 15 elements)   Constitutional  Vitals:  Most recent vital signs, dated less than 90 days prior to this appointment, were reviewed.   There were no vitals filed for this visit.     General:  age appropriate, casually dressed, neatly groomed     Musculoskeletal  Muscle Strength/Tone:  no tremor, no tic   Gait & Station:  non-ataxic     Psychiatric  Speech:  no latency; no press   Mood & Affect:  " a lot better"  congruent and appropriate   Thought Process:  normal and logical, goal-directed   Associations:  intact   Thought Content:  normal, no suicidality, no homicidality, delusions, or paranoia   Insight:  intact   Judgement: behavior is adequate to circumstances   Orientation:  grossly intact   Memory: intact for content of interview   Language: grossly intact   Attention Span & Concentration:  able to focus   Fund of Knowledge:  intact and appropriate to age and level of education     Assessment and Diagnosis   Status/Progress: Based on the examination today, the patient's problem(s) is/are improved.  New problems have been presented today.   Co-morbidities are complicating management of the primary condition.  There are no active rule-out diagnoses for this patient at this time.       Impression: Pt is a 50 Y/O CW with PMHx sig for Multiple Sclerosis with      Generalized anxiety disorder           Strengths and Liabilities: Strength: Patient accepts guidance/feedback, Strength: Patient is expressive/articulate., Strength: Patient is intelligent., Strength: Patient is motivated for change., Strength: Patient has positive support network., Strength: " Patient has reasonable judgment.     Treatment Goals: Specify outcomes written in observable, behavioral terms:   Anxiety: acquiring relapse prevention skills, eliminating all anxiety symptoms (SCL-90-R scores in normal range), reducing negative automatic thoughts, reducing physical symptoms of anxiety and reducing time spent worrying (<30 minutes/day)     Treatment Plan/Recommendations:   · Medication Management: The risks and benefits of medication were discussed with the patient.  · Referral for further treatment to pt will cont to f/u with Ms Finley  · The treatment plan and follow up plan were reviewed with the patient.   · Will increase  lexapro to  15  mg daily. Will give her refills for 3 months  · Cont CBT work book and if required she will discuss it with her therapist.  · Discussed coping skills.  · Provided supportive psychotherapy.  · Have discussed with her that I am leaving [facility]            Return to Clinic: Pt will follow up with her Primary Care MD

## 2017-11-28 LAB
ABSOLUTE CD3: 898 CELLS/UL (ref 700–2100)
ABSOLUTE CD8: 182 CELLS/UL (ref 200–900)
CD3%: 64.8 % (ref 55–83)
CD3+CD4+ CELLS # BLD: 724 CELLS/UL (ref 300–1400)
CD3+CD4+ CELLS NFR BLD: 52.2 % (ref 28–57)
CD4/CD8 RATIO: 3.98 (ref 0.9–3.6)
CD8 % SUPPRESSOR T CELL: 13.1 % (ref 10–39)

## 2017-11-29 ENCOUNTER — HOSPITAL ENCOUNTER (OUTPATIENT)
Dept: RADIOLOGY | Facility: HOSPITAL | Age: 52
Discharge: HOME | End: 2017-11-29
Attending: PHYSICIAN ASSISTANT
Payer: COMMERCIAL

## 2017-11-29 DIAGNOSIS — G35 MULTIPLE SCLEROSIS: ICD-10-CM

## 2017-11-29 PROCEDURE — 25500020 PHARM REV CODE 255: Performed by: PHYSICIAN ASSISTANT

## 2017-11-29 PROCEDURE — 72156 MRI NECK SPINE W/O & W/DYE: CPT | Mod: TC

## 2017-11-29 PROCEDURE — 72157 MRI CHEST SPINE W/O & W/DYE: CPT | Mod: 26,,, | Performed by: RADIOLOGY

## 2017-11-29 PROCEDURE — 72156 MRI NECK SPINE W/O & W/DYE: CPT | Mod: 26,,, | Performed by: RADIOLOGY

## 2017-11-29 PROCEDURE — A9585 GADOBUTROL INJECTION: HCPCS | Performed by: PHYSICIAN ASSISTANT

## 2017-11-29 PROCEDURE — 72157 MRI CHEST SPINE W/O & W/DYE: CPT | Mod: TC

## 2017-11-29 RX ORDER — GADOBUTROL 604.72 MG/ML
6.5 INJECTION INTRAVENOUS
Status: COMPLETED | OUTPATIENT
Start: 2017-11-29 | End: 2017-11-29

## 2017-11-29 RX ADMIN — GADOBUTROL 6.5 ML: 604.72 INJECTION INTRAVENOUS at 07:11

## 2017-12-02 ENCOUNTER — PATIENT MESSAGE (OUTPATIENT)
Dept: OBSTETRICS AND GYNECOLOGY | Facility: CLINIC | Age: 52
End: 2017-12-02

## 2017-12-06 ENCOUNTER — PATIENT MESSAGE (OUTPATIENT)
Dept: NEUROLOGY | Facility: CLINIC | Age: 52
End: 2017-12-06

## 2017-12-20 ENCOUNTER — OFFICE VISIT (OUTPATIENT)
Dept: NEUROLOGY | Facility: CLINIC | Age: 52
End: 2017-12-20
Payer: COMMERCIAL

## 2017-12-20 ENCOUNTER — TELEPHONE (OUTPATIENT)
Dept: NEUROLOGY | Facility: CLINIC | Age: 52
End: 2017-12-20

## 2017-12-20 VITALS
WEIGHT: 129.88 LBS | SYSTOLIC BLOOD PRESSURE: 95 MMHG | BODY MASS INDEX: 23.9 KG/M2 | HEIGHT: 62 IN | HEART RATE: 55 BPM | DIASTOLIC BLOOD PRESSURE: 76 MMHG

## 2017-12-20 DIAGNOSIS — Z79.899 ENCOUNTER FOR LONG-TERM (CURRENT) USE OF HIGH-RISK MEDICATION: ICD-10-CM

## 2017-12-20 DIAGNOSIS — G35 MULTIPLE SCLEROSIS: Primary | ICD-10-CM

## 2017-12-20 DIAGNOSIS — Z71.89 COUNSELING REGARDING GOALS OF CARE: ICD-10-CM

## 2017-12-20 PROCEDURE — 99999 PR PBB SHADOW E&M-EST. PATIENT-LVL III: CPT | Mod: PBBFAC,,, | Performed by: PHYSICIAN ASSISTANT

## 2017-12-20 PROCEDURE — 99214 OFFICE O/P EST MOD 30 MIN: CPT | Mod: S$GLB,,, | Performed by: PHYSICIAN ASSISTANT

## 2017-12-20 NOTE — TELEPHONE ENCOUNTER
----- Message from Shelbi Wisdom PA-C sent at 12/20/2017  8:44 AM CST -----  She is off Tecfidera and will transition onto Ocrevus. Labs today then can submit for approval. She would prefer to come to [facility](not [facility])

## 2017-12-20 NOTE — Clinical Note
She is off Tecfidera and will transition onto Espressi. Labs today then can submit for approval. She would prefer [facility]

## 2017-12-20 NOTE — PROGRESS NOTES
Subjective:       Patient ID: Hien Jeter is a 52 y.o. female who presents today for a routine clinic visit for MS.  History provided by patient today.    MS HPI:  · DMT: Tecfidera on hold-due to increased URI's and bruising  · Side effects from DMT? Yes, as above  · Taking vitamin D3 as recommended? Yes    · Feels like she is doing much better off Tecfidera(less illness and bruising noted), and wants to discuss new DMT    SOCIAL HISTORY  Social History   Substance Use Topics    Smoking status: Never Smoker    Smokeless tobacco: Never Used    Alcohol use Yes      Comment: socially/ not weekly     Living arrangements - the patient lives with their spouse.  Employment  retired    MS ROS:  ·   As above        Objective:        1. 25 foot timed walk:  Timed 25 Foot Walk: 10/26/2016 2/15/2017   Did patient wear an AFO? No No   Was assistive device used? No No   Time for 25 Foot Walk (seconds) 4 4.2   Time for 25 Foot Walk (seconds) 4.3 -       Neurologic Exam      deferred    Imaging:     Results for orders placed during the hospital encounter of 03/27/17   MRI Brain W WO Contrast    Impression No significant change from prior.    A few scattered subcentimeter sized T2 flair signal hyperintensities supratentorial parenchyma and right anna. While nonspecific in light history concerning for mild degree of prior demyelinating plaque burden.    No evidence for new lesions or enhancing lesions to suggest interval or active demyelination. Clinical correlation and followup advised       Electronically signed by: JOJO BROWN DO  Date:     03/27/17  Time:    15:18      Results for orders placed during the hospital encounter of 11/29/17   MRI Cervical Spine W WO Cont    Impression Few scattered short segment T2 hyperintense lesions in the cervical and thoracic cord, in keeping with clinical diagnosis of multiple sclerosis, are unchanged.  No new lesions.  No enhancing lesions to suggest active  demyelination.      Electronically signed by: CARMINA VELAZQUEZ  Date:     12/06/17  Time:    15:14      Results for orders placed during the hospital encounter of 11/29/17   MRI Thoracic Spine W WO Cont    Impression Few scattered short segment T2 hyperintense lesions in the cervical and thoracic cord, in keeping with clinical diagnosis of multiple sclerosis, are unchanged.  No new lesions.  No enhancing lesions to suggest active demyelination.      Electronically signed by: CARMINA VELAZQUEZ  Date:     12/06/17  Time:    15:14          Labs:     Lab Results   Component Value Date    RIBDQSOD43LC 72 02/15/2017    FQRTDTIN44UY 63 04/05/2016    DIATGOOK99KM 75 07/20/2015     No results found for: JCVINDEX, JCVANTIBODY  Lab Results   Component Value Date    IG5ZBVGP 64.8 11/27/2017    ABSOLUTECD3 898 11/27/2017    EF2BNVRT 13.1 11/27/2017    ABSOLUTECD8 182 (L) 11/27/2017    DR6JTHUP 52.2 11/27/2017    ABSOLUTECD4 724 11/27/2017    LABCD48 3.98 (H) 11/27/2017     Lab Results   Component Value Date    WBC 4.71 12/20/2017    RBC 4.62 12/20/2017    HGB 15.0 12/20/2017    HCT 44.3 12/20/2017    MCV 96 12/20/2017    MCH 32.5 (H) 12/20/2017    MCHC 33.9 12/20/2017    RDW 12.3 12/20/2017     12/20/2017    MPV 9.9 12/20/2017    GRAN 2.6 12/20/2017    GRAN 54.1 12/20/2017    LYMPH 1.2 12/20/2017    LYMPH 25.1 12/20/2017    MONO 0.7 12/20/2017    MONO 14.2 12/20/2017    EOS 0.2 12/20/2017    BASO 0.06 12/20/2017    EOSINOPHIL 5.1 12/20/2017    BASOPHIL 1.3 12/20/2017     Sodium   Date Value Ref Range Status   11/20/2017 140 136 - 145 mmol/L Final     Potassium   Date Value Ref Range Status   11/20/2017 4.0 3.5 - 5.1 mmol/L Final     Chloride   Date Value Ref Range Status   11/20/2017 108 95 - 110 mmol/L Final     CO2   Date Value Ref Range Status   11/20/2017 26 23 - 29 mmol/L Final     Glucose   Date Value Ref Range Status   11/20/2017 81 70 - 110 mg/dL Final     BUN, Bld   Date Value Ref Range Status   11/20/2017 14 6 - 20 mg/dL Final      Creatinine   Date Value Ref Range Status   11/20/2017 0.9 0.5 - 1.4 mg/dL Final     Calcium   Date Value Ref Range Status   11/20/2017 9.5 8.7 - 10.5 mg/dL Final     Total Protein   Date Value Ref Range Status   11/20/2017 7.3 6.0 - 8.4 g/dL Final     Albumin   Date Value Ref Range Status   11/20/2017 3.4 (L) 3.5 - 5.2 g/dL Final     Total Bilirubin   Date Value Ref Range Status   11/20/2017 0.4 0.1 - 1.0 mg/dL Final     Comment:     For infants and newborns, interpretation of results should be based  on gestational age, weight and in agreement with clinical  observations.  Premature Infant recommended reference ranges:  Up to 24 hours.............<8.0 mg/dL  Up to 48 hours............<12.0 mg/dL  3-5 days..................<15.0 mg/dL  6-29 days.................<15.0 mg/dL       Alkaline Phosphatase   Date Value Ref Range Status   11/20/2017 79 55 - 135 U/L Final     AST   Date Value Ref Range Status   11/20/2017 19 10 - 40 U/L Final     ALT   Date Value Ref Range Status   11/20/2017 18 10 - 44 U/L Final     Anion Gap   Date Value Ref Range Status   11/20/2017 6 (L) 8 - 16 mmol/L Final     eGFR if    Date Value Ref Range Status   11/20/2017 >60.0 >60 mL/min/1.73 m^2 Final     eGFR if non    Date Value Ref Range Status   11/20/2017 >60.0 >60 mL/min/1.73 m^2 Final     Comment:     Calculation used to obtain the estimated glomerular filtration  rate (eGFR) is the CKD-EPI equation.          Diagnosis/Assessment/Plan:    1. Multiple Sclerosis  · Assessment: Patient presents to discuss recent MRI's of spine which were stable and to discuss new DMT. It does seem reasonable to discontinue Tecfidera permanently as counts are improving along with side effects of URI's and bruising.   · Imaging: MRI of C and T spine stable as noted above and reviewed with patient in clinic.  · Disease Modifying Therapies: Inconsideration for changing DMT we discussed two possible options-Aubagio and Ocrevus.  I favor Ocrevus over Aubagio for her due to higher efficacy and her history of having spine lesions as well as brain lesions. She is slightly hesitant to favor Aubagio due to possible side effect profile of GI issues. We discussed each in depth(method of action/administration, possible side effect profile, immunosuppression risk, etc.) and ultimately we decided to move forward with Ocrevus. She signed all pertinent paperwork today(given to Aneta Gaston RN) and all preliminary labs ordered. Once labs reviewed will submit for approval and schedule at [facility] Booklet provided today on Ocrevus for patient review.      Over 50% of this 30 minute visit was spent in direct face to face counseling of the patient about MS, DMT considerations, and review of MRI today.  Return in about 3 months (around 3/20/2018) for follow up with Dr. Salas.  Patient agreed to POC today.    Attending, Dr. Salas, was available during today's encounter. Any change to plan along with cosign to appear in the EMR.     Shelbi Wisdom PA-C  [facility]        Multiple sclerosis  -     HEPATITIS C ANTIBODY; Future; Expected date: 12/20/2017  -     HEPATITIS B SURFACE ANTIGEN; Future; Expected date: 12/20/2017  -     HEPATITIS B SURFACE ANTIBODY; Future; Expected date: 12/20/2017  -     HEPATITIS B CORE ANTIBODY, TOTAL; Future; Expected date: 12/20/2017  -     HIV-1 and HIV-2 antibodies; Future; Expected date: 12/20/2017  -     HELPER-SUPPRESSOR RATIO; Future; Expected date: 12/20/2017  -     CBC auto differential; Future; Expected date: 12/20/2017    Counseling regarding goals of care

## 2017-12-21 ENCOUNTER — PATIENT MESSAGE (OUTPATIENT)
Dept: NEUROLOGY | Facility: CLINIC | Age: 52
End: 2017-12-21

## 2017-12-29 ENCOUNTER — PATIENT MESSAGE (OUTPATIENT)
Dept: NEUROLOGY | Facility: CLINIC | Age: 52
End: 2017-12-29

## 2018-01-03 ENCOUNTER — PATIENT MESSAGE (OUTPATIENT)
Dept: NEUROLOGY | Facility: CLINIC | Age: 53
End: 2018-01-03

## 2018-01-03 ENCOUNTER — LAB VISIT (OUTPATIENT)
Dept: LAB | Facility: HOSPITAL | Age: 53
End: 2018-01-03
Attending: PHYSICIAN ASSISTANT
Payer: COMMERCIAL

## 2018-01-03 DIAGNOSIS — G35 MULTIPLE SCLEROSIS: ICD-10-CM

## 2018-01-03 PROCEDURE — 86480 TB TEST CELL IMMUN MEASURE: CPT

## 2018-01-03 PROCEDURE — 36415 COLL VENOUS BLD VENIPUNCTURE: CPT | Mod: PO

## 2018-01-03 RX ORDER — RIBOFLAVIN (VITAMIN B2) 100 MG
TABLET ORAL
Qty: 180 TABLET | Refills: 3 | Status: CANCELLED | OUTPATIENT
Start: 2018-01-03

## 2018-01-04 DIAGNOSIS — F32.0 MILD SINGLE CURRENT EPISODE OF MAJOR DEPRESSIVE DISORDER: Primary | ICD-10-CM

## 2018-01-04 RX ORDER — ESCITALOPRAM OXALATE 10 MG/1
15 TABLET ORAL NIGHTLY
Qty: 45 TABLET | Refills: 2 | Status: SHIPPED | OUTPATIENT
Start: 2018-01-04 | End: 2018-04-01 | Stop reason: SDUPTHER

## 2018-01-05 LAB
MITOGEN NIL: 7.22 IU/ML
NIL: 0.03 IU/ML
TB ANTIGEN NIL: -0 IU/ML
TB ANTIGEN: 0.02 IU/ML
TB GOLD: NEGATIVE

## 2018-01-09 NOTE — TELEPHONE ENCOUNTER
Hien is scheduled for her initial Ocrevus infusions on 1/30/18 and 2/13/18 at [facility]. Pt was previously on Tecfidera, but this was discontinued. Auth approved:      Approved   Primary Insurance:  BLUE CROSS BLUE [facility] PLUS   Authorization #: [id number] - 01/08/2018 - 01/08/2019 - [facility]     OCREVUS 300 MG Q2WKS X 2 THEN 600 MG Q6M X 4 TOTAL VISITS APPROVED

## 2018-01-11 ENCOUNTER — PATIENT MESSAGE (OUTPATIENT)
Dept: NEUROLOGY | Facility: CLINIC | Age: 53
End: 2018-01-11

## 2018-01-17 RX ORDER — SODIUM CHLORIDE 0.9 % (FLUSH) 0.9 %
10 SYRINGE (ML) INJECTION
Status: CANCELLED | OUTPATIENT
Start: 2018-01-17

## 2018-01-17 RX ORDER — ACETAMINOPHEN 325 MG/1
1000 TABLET ORAL
Status: CANCELLED | OUTPATIENT
Start: 2018-01-17 | End: 2018-01-17

## 2018-01-17 RX ORDER — HEPARIN 100 UNIT/ML
100 SYRINGE INTRAVENOUS
Status: CANCELLED | OUTPATIENT
Start: 2018-01-17

## 2018-01-25 ENCOUNTER — DOCUMENTATION ONLY (OUTPATIENT)
Dept: NEUROLOGY | Facility: CLINIC | Age: 53
End: 2018-01-25

## 2018-01-26 ENCOUNTER — PATIENT MESSAGE (OUTPATIENT)
Dept: NEUROLOGY | Facility: CLINIC | Age: 53
End: 2018-01-26

## 2018-01-29 ENCOUNTER — PATIENT MESSAGE (OUTPATIENT)
Dept: NEUROLOGY | Facility: CLINIC | Age: 53
End: 2018-01-29

## 2018-01-30 ENCOUNTER — INFUSION (OUTPATIENT)
Dept: INFUSION THERAPY | Facility: HOSPITAL | Age: 53
End: 2018-01-30
Attending: PSYCHIATRY & NEUROLOGY
Payer: COMMERCIAL

## 2018-01-30 VITALS
BODY MASS INDEX: 24.72 KG/M2 | WEIGHT: 135.13 LBS | DIASTOLIC BLOOD PRESSURE: 58 MMHG | SYSTOLIC BLOOD PRESSURE: 89 MMHG | HEART RATE: 68 BPM | RESPIRATION RATE: 18 BRPM | TEMPERATURE: 98 F

## 2018-01-30 DIAGNOSIS — G35 MS (MULTIPLE SCLEROSIS): Primary | ICD-10-CM

## 2018-01-30 PROCEDURE — 96365 THER/PROPH/DIAG IV INF INIT: CPT

## 2018-01-30 PROCEDURE — 96367 TX/PROPH/DG ADDL SEQ IV INF: CPT

## 2018-01-30 PROCEDURE — 96375 TX/PRO/DX INJ NEW DRUG ADDON: CPT

## 2018-01-30 PROCEDURE — 25000003 PHARM REV CODE 250: Performed by: PSYCHIATRY & NEUROLOGY

## 2018-01-30 PROCEDURE — 63600175 PHARM REV CODE 636 W HCPCS: Performed by: PSYCHIATRY & NEUROLOGY

## 2018-01-30 PROCEDURE — S0028 INJECTION, FAMOTIDINE, 20 MG: HCPCS | Performed by: PSYCHIATRY & NEUROLOGY

## 2018-01-30 PROCEDURE — 96366 THER/PROPH/DIAG IV INF ADDON: CPT

## 2018-01-30 RX ORDER — ACETAMINOPHEN 500 MG
1000 TABLET ORAL
Status: CANCELLED | OUTPATIENT
Start: 2018-01-30 | End: 2018-01-30

## 2018-01-30 RX ORDER — HEPARIN 100 UNIT/ML
100 SYRINGE INTRAVENOUS
Status: DISCONTINUED | OUTPATIENT
Start: 2018-01-30 | End: 2018-01-30 | Stop reason: HOSPADM

## 2018-01-30 RX ORDER — ACETAMINOPHEN 500 MG
1000 TABLET ORAL
Status: COMPLETED | OUTPATIENT
Start: 2018-01-30 | End: 2018-01-30

## 2018-01-30 RX ORDER — SODIUM CHLORIDE 0.9 % (FLUSH) 0.9 %
10 SYRINGE (ML) INJECTION
Status: CANCELLED | OUTPATIENT
Start: 2018-01-30

## 2018-01-30 RX ORDER — HEPARIN 100 UNIT/ML
100 SYRINGE INTRAVENOUS
Status: CANCELLED | OUTPATIENT
Start: 2018-01-30

## 2018-01-30 RX ORDER — SODIUM CHLORIDE 0.9 % (FLUSH) 0.9 %
10 SYRINGE (ML) INJECTION
Status: DISCONTINUED | OUTPATIENT
Start: 2018-01-30 | End: 2018-01-30 | Stop reason: HOSPADM

## 2018-01-30 RX ORDER — FAMOTIDINE 10 MG/ML
20 INJECTION INTRAVENOUS
Status: COMPLETED | OUTPATIENT
Start: 2018-01-30 | End: 2018-01-30

## 2018-01-30 RX ADMIN — DEXTROSE: 50 INJECTION, SOLUTION INTRAVENOUS at 10:01

## 2018-01-30 RX ADMIN — DIPHENHYDRAMINE HYDROCHLORIDE 50 MG: 50 INJECTION, SOLUTION INTRAMUSCULAR; INTRAVENOUS at 09:01

## 2018-01-30 RX ADMIN — OCRELIZUMAB 300 MG: 300 INJECTION INTRAVENOUS at 10:01

## 2018-01-30 RX ADMIN — ACETAMINOPHEN 1000 MG: 500 TABLET ORAL at 09:01

## 2018-01-30 RX ADMIN — FAMOTIDINE 20 MG: 10 INJECTION INTRAVENOUS at 09:01

## 2018-01-30 RX ADMIN — SODIUM CHLORIDE: 0.9 INJECTION, SOLUTION INTRAVENOUS at 09:01

## 2018-01-30 NOTE — PLAN OF CARE
Problem: Oncology Care (Adult)  Goal: Signs and Symptoms of Listed Potential Problems Will be Absent, Minimized or Managed (Oncology Care)  Signs and symptoms of listed potential problems will be absent, minimized or managed by discharge/transition of care (reference Oncology Care (Adult) CPG).  Outcome: Ongoing (interventions implemented as appropriate)  Pt here for Ocrevus infusion, pt reports minor head cold w/ no fever or cough at present; reports thorough review of drug w/ MD prior to arrival, received printed info and has no add'l questions at this time; discussed drug regimen, premeds, possible side effects; pt reports no allergy to Tylenol oral, RN reported same to pharmacist; pt agrees to proceed with treatment

## 2018-01-30 NOTE — PLAN OF CARE
Problem: Patient Care Overview  Goal: Plan of Care Review  Outcome: Ongoing (interventions implemented as appropriate)  Infusion completed, pt tolerated well; pt observed one hour post infusion end, no complaints or concerns; pt instructed to remain well hydrated; instructed to contact MD for any needs or concerns; printed AVS given to pt, pt verbalized understanding of all discussed and when to report next

## 2018-01-31 RX ORDER — TOPIRAMATE 25 MG/1
TABLET ORAL
Qty: 60 TABLET | Refills: 6 | Status: CANCELLED | OUTPATIENT
Start: 2018-01-31

## 2018-02-05 ENCOUNTER — OFFICE VISIT (OUTPATIENT)
Dept: NEUROLOGY | Facility: CLINIC | Age: 53
End: 2018-02-05
Payer: COMMERCIAL

## 2018-02-05 VITALS
DIASTOLIC BLOOD PRESSURE: 77 MMHG | SYSTOLIC BLOOD PRESSURE: 110 MMHG | HEART RATE: 66 BPM | HEIGHT: 62 IN | BODY MASS INDEX: 24.51 KG/M2 | WEIGHT: 133.19 LBS

## 2018-02-05 DIAGNOSIS — M54.81 BILATERAL OCCIPITAL NEURALGIA: ICD-10-CM

## 2018-02-05 DIAGNOSIS — G43.719 INTRACTABLE CHRONIC MIGRAINE WITHOUT AURA AND WITHOUT STATUS MIGRAINOSUS: Primary | ICD-10-CM

## 2018-02-05 PROCEDURE — 99213 OFFICE O/P EST LOW 20 MIN: CPT | Mod: S$GLB,,, | Performed by: PSYCHIATRY & NEUROLOGY

## 2018-02-05 PROCEDURE — 3008F BODY MASS INDEX DOCD: CPT | Mod: S$GLB,,, | Performed by: PSYCHIATRY & NEUROLOGY

## 2018-02-05 PROCEDURE — 99999 PR PBB SHADOW E&M-EST. PATIENT-LVL III: CPT | Mod: PBBFAC,,, | Performed by: PSYCHIATRY & NEUROLOGY

## 2018-02-05 RX ORDER — RIBOFLAVIN (VITAMIN B2) 100 MG
TABLET ORAL
Qty: 60 TABLET | Refills: 11 | Status: SHIPPED | OUTPATIENT
Start: 2018-02-05 | End: 2019-03-20 | Stop reason: SDUPTHER

## 2018-02-05 RX ORDER — ZOLMITRIPTAN 5 MG/1
SPRAY NASAL
Qty: 12 EACH | Refills: 12 | Status: SHIPPED | OUTPATIENT
Start: 2018-02-05 | End: 2019-03-20 | Stop reason: SDUPTHER

## 2018-02-05 RX ORDER — LANOLIN ALCOHOL/MO/W.PET/CERES
1 CREAM (GRAM) TOPICAL 2 TIMES DAILY
Qty: 60 TABLET | Refills: 12 | Status: SHIPPED | OUTPATIENT
Start: 2018-02-05 | End: 2019-03-20 | Stop reason: SDUPTHER

## 2018-02-05 RX ORDER — TOPIRAMATE 25 MG/1
25 TABLET ORAL DAILY
Qty: 30 TABLET | Refills: 12 | Status: SHIPPED | OUTPATIENT
Start: 2018-02-05 | End: 2019-03-20 | Stop reason: SDUPTHER
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
## 2018-02-14 ENCOUNTER — INFUSION (OUTPATIENT)
Dept: INFUSION THERAPY | Facility: HOSPITAL | Age: 53
End: 2018-02-14
Attending: PSYCHIATRY & NEUROLOGY
Payer: COMMERCIAL

## 2018-02-14 ENCOUNTER — PATIENT MESSAGE (OUTPATIENT)
Dept: NEUROLOGY | Facility: CLINIC | Age: 53
End: 2018-02-14

## 2018-02-14 VITALS
HEART RATE: 59 BPM | HEIGHT: 62 IN | BODY MASS INDEX: 24.48 KG/M2 | WEIGHT: 133 LBS | RESPIRATION RATE: 17 BRPM | TEMPERATURE: 98 F | SYSTOLIC BLOOD PRESSURE: 112 MMHG | DIASTOLIC BLOOD PRESSURE: 70 MMHG

## 2018-02-14 DIAGNOSIS — G35 MS (MULTIPLE SCLEROSIS): Primary | ICD-10-CM

## 2018-02-14 PROCEDURE — 63600175 PHARM REV CODE 636 W HCPCS: Performed by: PSYCHIATRY & NEUROLOGY

## 2018-02-14 PROCEDURE — 96367 TX/PROPH/DG ADDL SEQ IV INF: CPT

## 2018-02-14 PROCEDURE — 96366 THER/PROPH/DIAG IV INF ADDON: CPT

## 2018-02-14 PROCEDURE — S0028 INJECTION, FAMOTIDINE, 20 MG: HCPCS | Performed by: PSYCHIATRY & NEUROLOGY

## 2018-02-14 PROCEDURE — 96365 THER/PROPH/DIAG IV INF INIT: CPT

## 2018-02-14 PROCEDURE — 96375 TX/PRO/DX INJ NEW DRUG ADDON: CPT

## 2018-02-14 PROCEDURE — 25000003 PHARM REV CODE 250: Performed by: PSYCHIATRY & NEUROLOGY

## 2018-02-14 RX ORDER — ACETAMINOPHEN 500 MG
1000 TABLET ORAL
Status: COMPLETED | OUTPATIENT
Start: 2018-02-14 | End: 2018-02-14

## 2018-02-14 RX ORDER — SODIUM CHLORIDE 0.9 % (FLUSH) 0.9 %
10 SYRINGE (ML) INJECTION
Status: CANCELLED | OUTPATIENT
Start: 2018-02-14

## 2018-02-14 RX ORDER — SODIUM CHLORIDE 0.9 % (FLUSH) 0.9 %
10 SYRINGE (ML) INJECTION
Status: DISCONTINUED | OUTPATIENT
Start: 2018-02-14 | End: 2018-02-14 | Stop reason: HOSPADM

## 2018-02-14 RX ORDER — HEPARIN 100 UNIT/ML
100 SYRINGE INTRAVENOUS
Status: DISCONTINUED | OUTPATIENT
Start: 2018-02-14 | End: 2018-02-14 | Stop reason: HOSPADM

## 2018-02-14 RX ORDER — FAMOTIDINE 10 MG/ML
20 INJECTION INTRAVENOUS
Status: COMPLETED | OUTPATIENT
Start: 2018-02-14 | End: 2018-02-14

## 2018-02-14 RX ORDER — HEPARIN 100 UNIT/ML
100 SYRINGE INTRAVENOUS
Status: CANCELLED | OUTPATIENT
Start: 2018-02-14

## 2018-02-14 RX ORDER — ACETAMINOPHEN 500 MG
1000 TABLET ORAL
Status: CANCELLED | OUTPATIENT
Start: 2018-02-14 | End: 2018-02-14

## 2018-02-14 RX ADMIN — ACETAMINOPHEN 1000 MG: 500 TABLET ORAL at 08:02

## 2018-02-14 RX ADMIN — DEXTROSE: 50 INJECTION, SOLUTION INTRAVENOUS at 09:02

## 2018-02-14 RX ADMIN — FAMOTIDINE 20 MG: 10 INJECTION INTRAVENOUS at 08:02

## 2018-02-14 RX ADMIN — OCRELIZUMAB 300 MG: 300 INJECTION INTRAVENOUS at 09:02

## 2018-02-14 RX ADMIN — DIPHENHYDRAMINE HYDROCHLORIDE 50 MG: 50 INJECTION, SOLUTION INTRAMUSCULAR; INTRAVENOUS at 08:02

## 2018-02-14 NOTE — PLAN OF CARE
Problem: Patient Care Overview  Goal: Plan of Care Review  Outcome: Ongoing (interventions implemented as appropriate)  Pt tolerated ocrevus #2 infusion without issue, pt to rtrc 6 mths for next infusion, no distress noted upon d/c to home

## 2018-02-14 NOTE — PLAN OF CARE
Problem: Oncology Care (Adult)  Goal: Signs and Symptoms of Listed Potential Problems Will be Absent, Minimized or Managed (Oncology Care)  Signs and symptoms of listed potential problems will be absent, minimized or managed by discharge/transition of care (reference Oncology Care (Adult) CPG).   Outcome: Ongoing (interventions implemented as appropriate)  Pt here for ocrevus infusion #2, labs, hx, meds, allergies reviewed, pt with no complaints or concerns at this time, reclined in chair, warm blanket provided, continue to monitor

## 2018-03-03 ENCOUNTER — PATIENT MESSAGE (OUTPATIENT)
Dept: NEUROLOGY | Facility: CLINIC | Age: 53
End: 2018-03-03

## 2018-03-07 DIAGNOSIS — G35 MULTIPLE SCLEROSIS: Primary | ICD-10-CM

## 2018-03-12 ENCOUNTER — OFFICE VISIT (OUTPATIENT)
Dept: INTERNAL MEDICINE | Facility: CLINIC | Age: 53
End: 2018-03-12
Payer: COMMERCIAL

## 2018-03-12 ENCOUNTER — LAB VISIT (OUTPATIENT)
Dept: LAB | Facility: HOSPITAL | Age: 53
End: 2018-03-12
Attending: INTERNAL MEDICINE
Payer: COMMERCIAL

## 2018-03-12 VITALS
SYSTOLIC BLOOD PRESSURE: 98 MMHG | RESPIRATION RATE: 16 BRPM | WEIGHT: 132.25 LBS | DIASTOLIC BLOOD PRESSURE: 60 MMHG | TEMPERATURE: 98 F | HEART RATE: 64 BPM | BODY MASS INDEX: 24.34 KG/M2 | HEIGHT: 62 IN

## 2018-03-12 DIAGNOSIS — Z00.00 ANNUAL PHYSICAL EXAM: ICD-10-CM

## 2018-03-12 DIAGNOSIS — G35 MULTIPLE SCLEROSIS: ICD-10-CM

## 2018-03-12 DIAGNOSIS — M25.512 CHRONIC LEFT SHOULDER PAIN: ICD-10-CM

## 2018-03-12 DIAGNOSIS — G89.29 CHRONIC LEFT SHOULDER PAIN: ICD-10-CM

## 2018-03-12 DIAGNOSIS — M75.42 IMPINGEMENT SYNDROME OF LEFT SHOULDER: ICD-10-CM

## 2018-03-12 DIAGNOSIS — G35 MS (MULTIPLE SCLEROSIS): Primary | ICD-10-CM

## 2018-03-12 LAB
BASOPHILS # BLD AUTO: 0.06 K/UL
BASOPHILS NFR BLD: 1.1 %
DIFFERENTIAL METHOD: ABNORMAL
EOSINOPHIL # BLD AUTO: 0.4 K/UL
EOSINOPHIL NFR BLD: 7 %
ERYTHROCYTE [DISTWIDTH] IN BLOOD BY AUTOMATED COUNT: 12.9 %
HCT VFR BLD AUTO: 42.3 %
HGB BLD-MCNC: 13.9 G/DL
IMM GRANULOCYTES # BLD AUTO: 0.01 K/UL
IMM GRANULOCYTES NFR BLD AUTO: 0.2 %
LYMPHOCYTES # BLD AUTO: 1.3 K/UL
LYMPHOCYTES NFR BLD: 23.9 %
MCH RBC QN AUTO: 31.6 PG
MCHC RBC AUTO-ENTMCNC: 32.9 G/DL
MCV RBC AUTO: 96 FL
MONOCYTES # BLD AUTO: 1 K/UL
MONOCYTES NFR BLD: 18.2 %
NEUTROPHILS # BLD AUTO: 2.7 K/UL
NEUTROPHILS NFR BLD: 49.6 %
NRBC BLD-RTO: 0 /100 WBC
PLATELET # BLD AUTO: 316 K/UL
PMV BLD AUTO: 10.3 FL
RBC # BLD AUTO: 4.4 M/UL
WBC # BLD AUTO: 5.44 K/UL

## 2018-03-12 PROCEDURE — 85025 COMPLETE CBC W/AUTO DIFF WBC: CPT

## 2018-03-12 PROCEDURE — 99396 PREV VISIT EST AGE 40-64: CPT | Mod: S$GLB,,, | Performed by: INTERNAL MEDICINE

## 2018-03-12 PROCEDURE — 36415 COLL VENOUS BLD VENIPUNCTURE: CPT | Mod: PO

## 2018-03-12 PROCEDURE — 99999 PR PBB SHADOW E&M-EST. PATIENT-LVL IV: CPT | Mod: PBBFAC,,, | Performed by: INTERNAL MEDICINE

## 2018-03-12 PROCEDURE — 84436 ASSAY OF TOTAL THYROXINE: CPT

## 2018-03-12 PROCEDURE — 80061 LIPID PANEL: CPT

## 2018-03-12 PROCEDURE — 84443 ASSAY THYROID STIM HORMONE: CPT

## 2018-03-13 LAB
CHOLEST SERPL-MCNC: 216 MG/DL
CHOLEST/HDLC SERPL: 3.2 {RATIO}
HDLC SERPL-MCNC: 67 MG/DL
HDLC SERPL: 31 %
LDLC SERPL CALC-MCNC: 123.8 MG/DL
NONHDLC SERPL-MCNC: 149 MG/DL
T4 SERPL-MCNC: 7.1 UG/DL
TRIGL SERPL-MCNC: 126 MG/DL
TSH SERPL DL<=0.005 MIU/L-ACNC: 2.33 UIU/ML
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
## 2018-03-20 ENCOUNTER — OFFICE VISIT (OUTPATIENT)
Dept: NEUROLOGY | Facility: CLINIC | Age: 53
End: 2018-03-20
Payer: COMMERCIAL

## 2018-03-20 VITALS
SYSTOLIC BLOOD PRESSURE: 95 MMHG | HEART RATE: 53 BPM | WEIGHT: 133.5 LBS | HEIGHT: 62 IN | BODY MASS INDEX: 24.56 KG/M2 | DIASTOLIC BLOOD PRESSURE: 76 MMHG

## 2018-03-20 DIAGNOSIS — M79.2 NEUROPATHIC PAIN: ICD-10-CM

## 2018-03-20 DIAGNOSIS — Z71.89 COUNSELING REGARDING GOALS OF CARE: ICD-10-CM

## 2018-03-20 DIAGNOSIS — G35 MS (MULTIPLE SCLEROSIS): Primary | ICD-10-CM

## 2018-03-20 DIAGNOSIS — Z79.899 ENCOUNTER FOR LONG-TERM (CURRENT) USE OF HIGH-RISK MEDICATION: ICD-10-CM

## 2018-03-20 DIAGNOSIS — M62.838 MUSCLE SPASM: ICD-10-CM

## 2018-03-20 DIAGNOSIS — Z29.89 PROPHYLACTIC IMMUNOTHERAPY: ICD-10-CM

## 2018-03-20 DIAGNOSIS — E55.9 VITAMIN D DEFICIENCY: ICD-10-CM

## 2018-03-20 PROCEDURE — 99215 OFFICE O/P EST HI 40 MIN: CPT | Mod: S$GLB,,, | Performed by: PSYCHIATRY & NEUROLOGY

## 2018-03-20 PROCEDURE — 99999 PR PBB SHADOW E&M-EST. PATIENT-LVL III: CPT | Mod: PBBFAC,,, | Performed by: PSYCHIATRY & NEUROLOGY

## 2018-03-20 RX ORDER — BACLOFEN 10 MG/1
TABLET ORAL
Qty: 60 TABLET | Refills: 5 | Status: SHIPPED | OUTPATIENT
Start: 2018-03-20 | End: 2018-05-24 | Stop reason: SDUPTHER

## 2018-03-20 RX ORDER — GABAPENTIN 100 MG/1
100 CAPSULE ORAL 3 TIMES DAILY PRN
Qty: 90 CAPSULE | Refills: 11 | Status: SHIPPED | OUTPATIENT
Start: 2018-03-20 | End: 2018-11-30
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
## 2018-03-22 ENCOUNTER — CLINICAL SUPPORT (OUTPATIENT)
Dept: REHABILITATION | Facility: HOSPITAL | Age: 53
End: 2018-03-22
Payer: COMMERCIAL

## 2018-03-22 DIAGNOSIS — M25.60 DECREASED RANGE OF MOTION: ICD-10-CM

## 2018-03-22 DIAGNOSIS — M62.81 MUSCLE WEAKNESS: ICD-10-CM

## 2018-03-22 PROCEDURE — 97140 MANUAL THERAPY 1/> REGIONS: CPT | Mod: PN

## 2018-03-22 PROCEDURE — 97161 PT EVAL LOW COMPLEX 20 MIN: CPT | Mod: PN

## 2018-03-22 NOTE — PLAN OF CARE
TIME RECORD    Date: 2018    Start Time:  1010  Stop Time:  1105    PROCEDURES:    TIMED  Procedure Min.   MT 8             UNTIMED  Procedure Min.   IE 47         Total Timed Minutes:  8  Total Timed Units:  1  Total Untimed Units:  1  Charges Billed/# of units:  2 (LCE-1, MT-1)      Visit:1   FOTO @ 5    PHYSICAL THERAPY INITIAL EVALUATION & PLAN OF TREATMENT    Patient Name: Hien Jeter  Physician Name:  Dr. Sawyer  Primary Diagnosis:  Impingement syndrome of left shoulder  Treatment Diagnosis:  L shoulder pain, decreased strength and ROM  Onset Date:  Chronic  Eval Date:  3/22/18  Certification Period:  3/22/18 to 18  Past Medical History:   Past Medical History:   Diagnosis Date    Anxiety     Basal cell carcinoma     left eyebrow     Depression     Environmental allergies     Headache(784.0)     Multiple food allergies     Multiple sclerosis      shoulder 3/2015    left     Precautions:  MS  Prior Therapy:  Yes  Signs of Abuse: no  Medications: Hien Jeter has a current medication list which includes the following prescription(s): acetaminophen, azelastine, baclofen, cholecalciferol (vitamin d3), diazepam, escitalopram oxalate, estradiol-norethindrone, flaxseed oil, flurbiprofen, gabapentin, magnesium oxide, multivitamin-ca-iron-minerals, riboflavin (vitamin b2), topiramate, and zolmitriptan.  Nutrition:  Normal  Prior Level of Function: Independent  Social History:  Lives with   Functional Deficits Leading to Referral/Nature of Injury:  Pain and difficulty reaching and lifting  Patient Therapy Goals:  Decrease pain, improve ROM  Hand dominance: Left   X-Rays/Tests: None recent    Subjective:  Pain:  During no work: 10  While workin/10  Sleepin/10  Location of pain:    Pt states that her L shoulder pain has returned and is worse than before. States she cannot recall an event that would have caused this pain. Has an appointment with Dr. Moore on 4/3/18  for L shoulder pain. Has had some hand numbness when sleeping in the past couple of days. Sleep with L shoulder in ABduction when sleeping on back. Unable to take shirt off with L shoulder.    Objective:  Sensation Test: Patient denies any numbness/tingling    Observation/Inspection:  neither rounded shoulders nor forward head nor decreased joint play/mobility nor protracted scapula    Range of Motion:   Shoulder Right   Left   Pain/Dysfunction with Movement    AROM PROM MMT AROM PROM MMT    flexion    100* 108*  Pain in L arm with greater pain with return to starting position, empty end-feel   extension    52* 53*  Pain in L arm with greater pain with return to starting position, empty end-feel   abduction    91* 100*  Pain in L arm with greater pain with return to starting position; empty end-feel   Internal rotation 90   90 NT     ER 80   65* 66*  Pain in L lower arm and upper forearm; empty end-feel     Functional IR/ER  R: T11/T3  L: L4*/C5*    ROM Comments:   neither Hard end feel nor Firm end feel nor Concentric  pain nor Eccentric pain nor Pain at mid range     Painful Arc:   Patient demonstrates no painful arc in shoulder flexion or abduction    Special Tests:  Positive: NT 2/2 to guarding    Palpation: (for pain)     Positive: Lateral Subacromial Space, Anterior Subacromial Space, Posterior Subacromial Space, AC joint, Bicipital Groove and Supraspinatus Region       Limitations of Functional Status:   Self Care: requires increase time to don clothes, inability or pain with donning bra and reaching overhead  Work: not working at this time  Leisure: difficulty lifting and reaching    Test: Patient scored 54% on FOTO shoulder survey demonstrating Pt's functional ability with upper extremity.     Treatment included: O\PT evaluation, the following exercises (HEP) were instructed and Hien was able to demonstrate them prior to the end of the session. HEP are as follows: see attached in media     Assessment  This  "52 y.o. female referred to Outpatient Physical Therapy with diagnosis of No diagnosis found. presents with limitations as described in problem list. Patient can benefit from Physical Therapy services for moist heat, PROM, AAROM, AROM, Theraputic exercises, joint mobs, home exercise program provied with written instructions, ice, Ice massage, strengthening, Theraband Ex, UBE and pulley ex in order to maximize painfree functional use of  left UE. . The following goals were discussed with the patient and she is in agreement with them as to be addressed in the treatment plan.     History Examination Decision Making Complexity Score   Comorbidities that may affect POC include: Neurological Disease, Previous accidents          High   Performance Deficits   Dominant/Nondominant UE affected    Physical  Decreased function of Left UE, Decreased ROM, Increased pain, Decreased strength, Scapular winging, Hypomobility, Inability to perform work/tasks, Difficulty sleeping, Inability to perform leisure activiites and Inability to perform self care tasks        High Foto score: 54%    Pt has several treatment options including ASTYM, IASTM, cupping, soft tissue mobs, joint mobs, therex, therapeutic activity, education, endurance training, modalities etc.      Discussed goals and Pt in agreement with goals.    Kinesiotapin-"I" strips applied to L biceps and lateral shoulder for inhibition and posture correction, respectively. Patient instructed on purpose, wear, care, precautions to monitor and removal of KT. Patient verbalized understanding of all instructions provided.       Low Low     Rehab Potential: good    Goals to be met in 4 weeks: (18)  1) Initiate Hep   2) Pt will increase L shoulder AROM by 10 degrees grossly for improved performance with overhead ADL's  3) Pt will report 4/10 pain in (L)shoulder at worst  4) Pt will demonstrate increased MMT to 4-/5 grossly L shoulder  5) Patient will be able to achieve less " than or equal to 48 on FOTO shoulder survey demonstrating overall improved functional ability with upper extremity.     Goals to be met by discharge:  1) Independent with HEP  2) Pt will demonstrate (L) shoulder AROM WNL grossly for [place] with ADL's  3) Pt will demonstrate (L) shoulder MMT WNL grossly for [place] with functional activities  4) Independent and pain free with ADL's and IADL's  5) Patient will be able to achieve less than or equal to 33 on FOTO shoulder survey demonstrating overall improved functional ability with upper extremity.     Plan  Recommended Treatment Plan (2 times per week for 8 weeks): Therapeutic Exercise, Functional Activities, Patient Education, Home Exercise Program, ADL Training, Electrical Stimulation/TENS/Interferential, Moist Heat/Ice, Sensory/Neuromuscular Reeducation, Manual Therapy and Group  Other Recommendations:  modalities prn, ASTYM prn, kinesiotape prn, Functional Dry Needling prn       Therapist's Name: Fani Breen, PT, DPT   Date: 03/22/2018    I CERTIFY THE NEED FOR THESE SERVICES FURNISHED UNDER THIS PLAN OF TREATMENT AND WHILE UNDER MY CARE    Physician's comments: ________________________________________________________________________________________________________________________________________________      Physician's Name: ___________________________________

## 2018-03-22 NOTE — PATIENT INSTRUCTIONS
Shoulder Blade Squeeze        Rotate shoulders back, then squeeze shoulder blades together.  Repeat __10_ times. Do _3___ sessions per day.     https://[employer] Counts/846     Copyright © [employer]. All rights reserved.         ROM: Flexion - Wand (Supine)        Lie on back holding wand. Raise arms over head.   Repeat 10 times per set. Do 2 sets per session. Do 1 sessions per day.     https://[employer]Everyone Counts/928     Copyright © [employer]. All rights reserved.       ROM: Horizontal Abduction / Adduction - Wand        Keeping both palms down, push right hand across body with other hand. Then pull back across body, keeping arms parallel to floor. Do not allow trunk to twist. Hold 5 seconds.  Repeat 10 times per set. Do 2 sets per session. Do 1 sessions per day.     https://[employer]/752     Copyright © [employer]. All rights reserved.   ROM: Extension - Wand (Standing)        Stand holding wand behind back. Raise arms as far as possible.  Repeat 10 times per set. Do 2 sets per session. Do 1 sessions per day.     https://[employer]Everyone Counts/930     Copyright © [employer]. All rights reserved.

## 2018-03-27 PROBLEM — M25.60 DECREASED RANGE OF MOTION: Status: ACTIVE | Noted: 2018-03-27

## 2018-03-27 PROBLEM — M62.81 MUSCLE WEAKNESS: Status: ACTIVE | Noted: 2018-03-27

## 2018-04-01 DIAGNOSIS — F32.0 MILD SINGLE CURRENT EPISODE OF MAJOR DEPRESSIVE DISORDER: ICD-10-CM

## 2018-04-02 ENCOUNTER — TELEPHONE (OUTPATIENT)
Dept: INTERNAL MEDICINE | Facility: CLINIC | Age: 53
End: 2018-04-02

## 2018-04-02 RX ORDER — ESCITALOPRAM OXALATE 10 MG/1
15 TABLET ORAL NIGHTLY
Qty: 45 TABLET | Refills: 2 | Status: SHIPPED | OUTPATIENT
Start: 2018-04-02 | End: 2018-07-02 | Stop reason: SDUPTHER

## 2018-04-02 NOTE — TELEPHONE ENCOUNTER
----- Message from Rolando Sawyer MD sent at 4/1/2018 12:08 PM CDT -----  Lab is good, cholesterol is just above normal, which is ok

## 2018-04-03 ENCOUNTER — OFFICE VISIT (OUTPATIENT)
Dept: ORTHOPEDICS | Facility: CLINIC | Age: 53
End: 2018-04-03
Payer: COMMERCIAL

## 2018-04-03 DIAGNOSIS — M75.42 IMPINGEMENT SYNDROME OF LEFT SHOULDER: ICD-10-CM

## 2018-04-03 DIAGNOSIS — M25.512 LEFT SHOULDER PAIN, UNSPECIFIED CHRONICITY: Primary | ICD-10-CM

## 2018-04-03 PROCEDURE — 20610 DRAIN/INJ JOINT/BURSA W/O US: CPT | Mod: LT,S$GLB,, | Performed by: ORTHOPAEDIC SURGERY

## 2018-04-03 PROCEDURE — 99999 PR PBB SHADOW E&M-EST. PATIENT-LVL II: CPT | Mod: PBBFAC,,, | Performed by: ORTHOPAEDIC SURGERY

## 2018-04-03 PROCEDURE — 99213 OFFICE O/P EST LOW 20 MIN: CPT | Mod: 25,S$GLB,, | Performed by: ORTHOPAEDIC SURGERY

## 2018-04-03 RX ORDER — TRIAMCINOLONE ACETONIDE 40 MG/ML
40 INJECTION, SUSPENSION INTRA-ARTICULAR; INTRAMUSCULAR
Status: COMPLETED | OUTPATIENT
Start: 2018-04-03 | End: 2018-04-03

## 2018-04-03 RX ADMIN — TRIAMCINOLONE ACETONIDE 40 MG: 40 INJECTION, SUSPENSION INTRA-ARTICULAR; INTRAMUSCULAR at 08:04

## 2018-04-03 NOTE — PROGRESS NOTES
HISTORY OF PRESENT ILLNESS:  Ms. Jeter seen about a year ago for left elbow   symptoms.  She has done well until recently.  She recently had a flare-up in the   shoulder and has developed increasing pain and stiffness.  She does have a   history of MS.  There is no history of trauma recently, but symptoms seem to be   getting worse.  She has not really had any treatment since last January when she   received an injection.  She had an MRI I think in 2014 for the same problem.    PHYSICAL EXAMINATION:  LEFT SHOULDER:  Demonstrates no swelling, no bruising.  Range of motion is   limited.  She really only has about 60 degrees of abduction, maybe 80 degrees of   elevation, limited external rotation and pain with movement.  Rotator cuff   strength is difficult to assess.  NEUROLOGIC:  Normal.  NECK:  Nontender.    No x-rays today.    IMPRESSION:  Left shoulder pain with developing adhesive capsulitis.    PLAN:  I explained the nature of problem to the patient.  I have ordered an MRI   scan of the left shoulder to check for any internal derangement of the shoulder   and also recommended an injection today.  After pause for timeout, the patient   identified the left shoulder injected with combination of Kenalog 40 mg, 2 mL   Xylocaine, sterile technique, tolerated the procedure well without complication.    I would also like her to start back on Aleve two or three times a day.  She is   taking gabapentin at night and it seems reasonable to continue with that.    Follow up after the MRI is complete.      MANISH  dd: 04/03/2018 08:56:55 (CDT)  td: 04/04/2018 05:23:27 (CDT)  Doc ID   #[id number]  Job ID #[id number]    CC:

## 2018-04-03 NOTE — LETTER
April 3, 2018        Rolando Sawyer MD  [address] [facility] [address]             [facility]  [address] [place] [address] [address]  [facility] [address]  Phone: [phone]   Patient: Hien Jeter   MR Number: [id number]   YOB: 1965   Date of Visit: 4/3/2018       Dear Dr. Sawyer:    Thank you for referring Hien Jeter to me for evaluation. Below are the relevant portions of my assessment and plan of care.            If you have questions, please do not hesitate to call me. I look forward to following Hien along with you.    Sincerely,      Dc Moore Jr., MD           CC  No Recipients

## 2018-04-06 ENCOUNTER — HOSPITAL ENCOUNTER (OUTPATIENT)
Dept: RADIOLOGY | Facility: HOSPITAL | Age: 53
Discharge: HOME | End: 2018-04-06
Attending: ORTHOPAEDIC SURGERY
Payer: COMMERCIAL

## 2018-04-06 DIAGNOSIS — M25.512 LEFT SHOULDER PAIN, UNSPECIFIED CHRONICITY: ICD-10-CM

## 2018-04-06 PROCEDURE — 73221 MRI JOINT UPR EXTREM W/O DYE: CPT | Mod: TC,LT

## 2018-04-06 PROCEDURE — 73221 MRI JOINT UPR EXTREM W/O DYE: CPT | Mod: 26,LT,, | Performed by: RADIOLOGY

## 2018-04-08 ENCOUNTER — PATIENT MESSAGE (OUTPATIENT)
Dept: ORTHOPEDICS | Facility: CLINIC | Age: 53
End: 2018-04-08

## 2018-04-09 ENCOUNTER — PATIENT MESSAGE (OUTPATIENT)
Dept: ORTHOPEDICS | Facility: CLINIC | Age: 53
End: 2018-04-09

## 2018-04-12 ENCOUNTER — OFFICE VISIT (OUTPATIENT)
Dept: ORTHOPEDICS | Facility: CLINIC | Age: 53
End: 2018-04-12
Payer: COMMERCIAL

## 2018-04-12 ENCOUNTER — PATIENT MESSAGE (OUTPATIENT)
Dept: ORTHOPEDICS | Facility: CLINIC | Age: 53
End: 2018-04-12

## 2018-04-12 DIAGNOSIS — M75.42 IMPINGEMENT SYNDROME OF LEFT SHOULDER: Primary | ICD-10-CM

## 2018-04-12 PROCEDURE — 99999 PR PBB SHADOW E&M-EST. PATIENT-LVL II: CPT | Mod: PBBFAC,,, | Performed by: ORTHOPAEDIC SURGERY

## 2018-04-12 PROCEDURE — 99213 OFFICE O/P EST LOW 20 MIN: CPT | Mod: S$GLB,,, | Performed by: ORTHOPAEDIC SURGERY

## 2018-04-12 NOTE — PROGRESS NOTES
HISTORY OF PRESENT ILLNESS:  Ms. Jeter in followup of left shoulder symptoms,   recently had an MRI scan of the left shoulder.  The results of MRI showed that   she does have some bursitis and tendinitis and maybe a small partial tear,   although it is very minimal of the rotator cuff.  I went over these findings   with her today.  Clinically, she seems to be doing worse and getting a little   bit stiffer in the shoulder.  We tried an injection last visit and ordered some   therapy.  I was concerned she was developing some adhesive capsulitis in the   shoulder.    PHYSICAL EXAMINATION:  LEFT SHOULDER:  No tenderness, no swelling.  Range of motion is very limited   secondary to pain.  She really cannot tolerate any external rotation and   abduction only to about 60 degrees with some pain.    IMPRESSION:  Developing adhesive capsulitis, left shoulder.    PLAN:  I explained to her that the problem here is the adhesive capsulitis, not   necessarily the small partial tear of the rotator cuff, which may or may not   even be related to her current symptoms, so we need to address the adhesive   capsulitis and since the injection did not work also, try her on a Medrol   Dosepak today.  She is having problems with the Neurontin, so we will   discontinue the Neurontin and maybe add some Ultram and Flexeril at night.  She   is currently scheduled for therapy.  I definitely want to start with some   therapy, try to get her stretch out a little bit in combination with the Medrol.    Follow up in 3-4 weeks.      MANISH  dd: 04/12/2018 08:52:01 (CDT)  td: 04/13/2018 06:08:20 (CDT)  Doc ID   #[id number]  Job ID #[id number]    CC:

## 2018-05-01 ENCOUNTER — TELEPHONE (OUTPATIENT)
Dept: REHABILITATION | Facility: HOSPITAL | Age: 53
End: 2018-05-01

## 2018-05-01 ENCOUNTER — CLINICAL SUPPORT (OUTPATIENT)
Dept: REHABILITATION | Facility: HOSPITAL | Age: 53
End: 2018-05-01
Payer: COMMERCIAL

## 2018-05-01 DIAGNOSIS — M62.81 MUSCLE WEAKNESS: ICD-10-CM

## 2018-05-01 DIAGNOSIS — M25.60 DECREASED RANGE OF MOTION: ICD-10-CM

## 2018-05-01 PROCEDURE — 97140 MANUAL THERAPY 1/> REGIONS: CPT | Mod: PN

## 2018-05-01 PROCEDURE — 97110 THERAPEUTIC EXERCISES: CPT | Mod: PN

## 2018-05-01 NOTE — PROGRESS NOTES
"DAILY TREATMENT NOTE    DATE: 5/1/2018    Start Time:  1606  Stop Time:  1706    PROCEDURES:    TIMED  Procedure Min.   TE Supervised    MT 10   TE 40             UNTIMED  Procedure Min.   MH 10   CP 10     Total Timed Minutes:  50  Total Timed Units:  4  Total Untimed Units:    Charges Billed/# of units:  3 (MT-1, TE-3)      Progress/Current Status    Subjective:     Patient ID: Hien Jeter is a 52 y.o. female.  Diagnosis:   1. Decreased range of motion     2. Muscle weakness       Pain: 4 /10 L shoulder  Pt states she has seen improvements in her shoulder. She has been performing her HEP and has been unable to attend therapy due to starting a new job.    Objective:     Pt initiated treatment with MH x 10' f/b supervised TE x 20' f/b objective measures taken x 20' f/b MT x 10' consisting of STM/MFR to L UT, lateral L bicep, PROM all directions.      Date 5/1/18   Visit 2   POC exp  5/22/18        MH 10'       Stretches    Pulley IR 2x30"   Corner pec 2x30"       MT        Supine    Dowel flex  2x10       Sidelying    Sh abd 2x10   Sh ER 2x10       Stand    Rows RTB 2x10   Lats RTB 2x10   Sh ER/IR RTB 2x10   Wall slide 2x10       pulleys 3'/3'               Seen by           Range of Motion:   Shoulder Left   Pain/Dysfunction with Movement     AROM PROM     flexion 112* 108* Pain in L arm with greater pain with return to starting position, empty end-feel   extension 52* 53* Pain in L arm with greater pain with return to starting position, empty end-feel   abduction 95* 105* Pain in L arm with greater pain with return to starting position; empty end-feel   Internal rotation 90 NT     ER 65* 66* Pain in L lower arm and upper forearm; empty end-feel      Functional IR/ER  R: T11/T3  L: L3*/C7*      Assessment:     Pt tolerated treatment well with no c/o of increased pain or discomfort. L shoulder ROM improved this date. Will cont to progress L UE strengthening and ROM per pt tolerance.    Patient Education/Response: "     Give updated HEP next visit.    Plans and Goals:     Cont POC. Progress as able.    Goals to be met in 4 weeks: (4/22/18)  1) Initiate Hep   2) Pt will increase L shoulder AROM by 10 degrees grossly for improved performance with overhead ADL's  3) Pt will report 4/10 pain in (L)shoulder at worst  4) Pt will demonstrate increased MMT to 4-/5 grossly L shoulder  5) Patient will be able to achieve less than or equal to 48 on FOTO shoulder survey demonstrating overall improved functional ability with upper extremity.      Goals to be met by discharge:  1) Independent with HEP  2) Pt will demonstrate (L) shoulder AROM WNL grossly for [place] with ADL's  3) Pt will demonstrate (L) shoulder MMT WNL grossly for [place] with functional activities  4) Independent and pain free with ADL's and IADL's  5) Patient will be able to achieve less than or equal to 33 on FOTO shoulder survey demonstrating overall improved functional ability with upper extremity.

## 2018-05-01 NOTE — TELEPHONE ENCOUNTER
Spoke with pt regarding moving appointment to 4pm with PT for reassessment. Pt verbalized agreement.

## 2018-05-03 ENCOUNTER — CLINICAL SUPPORT (OUTPATIENT)
Dept: REHABILITATION | Facility: HOSPITAL | Age: 53
End: 2018-05-03
Payer: COMMERCIAL

## 2018-05-03 DIAGNOSIS — M62.81 MUSCLE WEAKNESS: ICD-10-CM

## 2018-05-03 DIAGNOSIS — M25.60 DECREASED RANGE OF MOTION: ICD-10-CM

## 2018-05-03 PROCEDURE — 97140 MANUAL THERAPY 1/> REGIONS: CPT | Mod: PN

## 2018-05-03 PROCEDURE — 97110 THERAPEUTIC EXERCISES: CPT | Mod: PN

## 2018-05-03 NOTE — PROGRESS NOTES
"DAILY TREATMENT NOTE    DATE: 5/3/2018    Start Time:  1545  Stop Time:  1655    PROCEDURES:    TIMED  Procedure Min.   TE Supervised 25   MT 15   TE 15             UNTIMED  Procedure Min.   MH 10   CP 5     Total Timed Minutes:  55  Total Timed Units:  4  Total Untimed Units:    Charges Billed/# of units:  2 (MT-1, TE-1)      Progress/Current Status    Subjective:     Patient ID: Hien Jeter is a 52 y.o. female.  Diagnosis:   1. Decreased range of motion     2. Muscle weakness       Pain: 0 /10 L shoulder  Pt states she is HEP compliant. Feels like she is making progress.     Objective:     Pt initiated treatment with MH x 10' f/b supervised TE x 25 ' x 15' f/b MT x 15' consisting of STM/MFR to L UT, lateral L bicep, Scapular framing, PROM all directions. CP following 5'      Date 5/3//18 5/1/18   Visit 3 2   POC exp  5/22/18          MH 10' 10'        Stretches     Pulley IR 2x30" 2x30"   Corner pec 3x30"  2x30"        MT 15'         Supine     Dowel flex  2x10 2x10        Sidelying     Sh abd 2x10 2x10   Sh ER 2x10  2x10        Stand     Rows RTB 2x10 RTB 2x10   Lats RTB 2x10 RTB 2x10   Sh ER/IR RTB 2x10 RTB 2x10   Wall slide 2x10 w/towel 2x10        pulleys 3'/3' 3'/3'   Shoulder flex 2x10 w/dowel behind back    Shoulder Ext 2x10 w/dowel behind back    Shoulder IR 2x10 w/dowel behind back    Seen by  MB 1/6          Range of Motion:   Shoulder Left   Pain/Dysfunction with Movement     AROM PROM     flexion 112* 108* Pain in L arm with greater pain with return to starting position, empty end-feel   extension 52* 53* Pain in L arm with greater pain with return to starting position, empty end-feel   abduction 95* 105* Pain in L arm with greater pain with return to starting position; empty end-feel   Internal rotation 90 NT     ER 65* 66* Pain in L lower arm and upper forearm; empty end-feel      Functional IR/ER  R: T11/T3  L: L3*/C7*      Assessment:     Pt tolerated treatment well with Tolerated additional TE " well w/o increased pain excepting standing shoulder flex w/dowel produced pain last rep.of set.  Will cont to progress L UE strengthening and ROM per pt tolerance.    Patient Education/Response:     Give updated HEP next visit.    Plans and Goals:     Cont POC. Progress as able.    Goals to be met in 4 weeks: (4/22/18)  1) Initiate Hep   2) Pt will increase L shoulder AROM by 10 degrees grossly for improved performance with overhead ADL's  3) Pt will report 4/10 pain in (L)shoulder at worst  4) Pt will demonstrate increased MMT to 4-/5 grossly L shoulder  5) Patient will be able to achieve less than or equal to 48 on FOTO shoulder survey demonstrating overall improved functional ability with upper extremity.      Goals to be met by discharge:  1) Independent with HEP  2) Pt will demonstrate (L) shoulder AROM WNL grossly for [place] with ADL's  3) Pt will demonstrate (L) shoulder MMT WNL grossly for [place] with functional activities  4) Independent and pain free with ADL's and IADL's  5) Patient will be able to achieve less than or equal to 33 on FOTO shoulder survey demonstrating overall improved functional ability with upper extremity.

## 2018-05-07 ENCOUNTER — OFFICE VISIT (OUTPATIENT)
Dept: ORTHOPEDICS | Facility: CLINIC | Age: 53
End: 2018-05-07
Payer: COMMERCIAL

## 2018-05-07 VITALS — BODY MASS INDEX: 24.48 KG/M2 | WEIGHT: 133 LBS | HEIGHT: 62 IN

## 2018-05-07 DIAGNOSIS — M25.512 LEFT SHOULDER PAIN, UNSPECIFIED CHRONICITY: Primary | ICD-10-CM

## 2018-05-07 PROCEDURE — 99213 OFFICE O/P EST LOW 20 MIN: CPT | Mod: S$GLB,,, | Performed by: ORTHOPAEDIC SURGERY

## 2018-05-07 PROCEDURE — 99999 PR PBB SHADOW E&M-EST. PATIENT-LVL III: CPT | Mod: PBBFAC,,, | Performed by: ORTHOPAEDIC SURGERY

## 2018-05-07 PROCEDURE — 3008F BODY MASS INDEX DOCD: CPT | Mod: CPTII,S$GLB,, | Performed by: ORTHOPAEDIC SURGERY

## 2018-05-07 NOTE — PROGRESS NOTES
HISTORY OF PRESENT ILLNESS:  Ms. Jeter in followup adhesive capsulitis of the   left shoulder, is doing much better.  The Medrol Dosepak really helped out quite   a bit and the therapy is also making good progress.  She is reporting less   pain, better use left arm.    PHYSICAL EXAMINATION:  LEFT SHOULDER:  No tenderness, no swelling.  Range of motion improved.  She has   much better elevation and external rotation of the shoulder.  Strength still a   little bit weak.    PLAN:  She is pleased with the results as am I, so I would like her to continue   therapy.  Continue current medications.  Increase activities as tolerated.    Follow up in one month.      MANISH  dd: 05/07/2018 16:35:53 (CDT)  td: 05/08/2018 12:10:50 (CDT)  Doc ID   #[id number]  Job ID #[id number]    CC:        same name as above

## 2018-05-10 ENCOUNTER — CLINICAL SUPPORT (OUTPATIENT)
Dept: REHABILITATION | Facility: HOSPITAL | Age: 53
End: 2018-05-10
Payer: COMMERCIAL

## 2018-05-10 DIAGNOSIS — M62.81 MUSCLE WEAKNESS: ICD-10-CM

## 2018-05-10 DIAGNOSIS — M25.60 DECREASED RANGE OF MOTION: ICD-10-CM

## 2018-05-10 PROCEDURE — 97110 THERAPEUTIC EXERCISES: CPT | Mod: PN

## 2018-05-10 PROCEDURE — 97140 MANUAL THERAPY 1/> REGIONS: CPT | Mod: PN

## 2018-05-10 NOTE — PROGRESS NOTES
"DAILY TREATMENT NOTE    DATE: 5/10/2018    Start Time:  0900  Stop Time:  1015    PROCEDURES:    TIMED  Procedure Min.   TE Supervised 20 NC   MT 15   TE 30             UNTIMED  Procedure Min.   MH 10   CP      Total Timed Minutes:  45  Total Timed Units:  3  Total Untimed Units:    Charges Billed/# of units:  3 (MT-1, TE-2)      Progress/Current Status    Subjective:     Patient ID: Hien Jeter is a 52 y.o. female.  Diagnosis:   1. Decreased range of motion     2. Muscle weakness       Pain: 0 /10 L shoulder  Pt states she is HEP compliant. Feels like she is making progress.     Objective:     Pt initiated treatment with MH x 10' f/b supervised TE x 20 ' f/b TE with PT x 30' f/b MT x 15' consisting of STM/MFR to L UT, L pec major, L suscap and lat, and shoulder PROM in all directions.      Date 5/10/18 5/3//18 5/1/18   Visit 4 3 2   POC exp  5/22/18      FOTO NEXT           MH 10' 10' 10'         Stretches      Pulley IR 2x30" 2x30" 2x30"   Corner pec 3x30"  3x30"  2x30"         MT 15' 15'          Supine      Dowel flex  2x12 2x10 2x10   Dowel ER Next     Dowel ABd Next           Sidelying      Sh abd 2x12 2x10 2x10   Sh ER 2x12 2x10  2x10         Stand      Rows RTB 2x12 RTB 2x10 RTB 2x10   Lats RTB 2x12 RTB 2x10 RTB 2x10   Sh ER/IR RTB 2x12 RTB 2x10 RTB 2x10   Wall slide 2x10 w/towel 2x10 w/towel 2x10         pulleys 3'/3' 3'/3' 3'/3'   Shoulder flex  2x10 w/dowel behind back    Shoulder Ext  2x10 w/dowel behind back    Shoulder IR  2x10 w/dowel behind back    Seen by  HIRAM CURIEL 1/6        Assessment:     Pt tolerated progression of exercises well with no c/o increased pain. She continues with limitations in all shoulder ROM, especially ABd and ER. Will cont to progress L UE strengthening and ROM per pt tolerance.    Patient Education/Response:     Updated HEP. See patient instructions. Pt demo understanding by performing exercises.    Plans and Goals:     Cont POC. Progress as able.    Goals to be met in 4 weeks: " (4/22/18)  1) Initiate Hep   2) Pt will increase L shoulder AROM by 10 degrees grossly for improved performance with overhead ADL's  3) Pt will report 4/10 pain in (L)shoulder at worst  4) Pt will demonstrate increased MMT to 4-/5 grossly L shoulder  5) Patient will be able to achieve less than or equal to 48 on FOTO shoulder survey demonstrating overall improved functional ability with upper extremity.      Goals to be met by discharge:  1) Independent with HEP  2) Pt will demonstrate (L) shoulder AROM WNL grossly for [place] with ADL's  3) Pt will demonstrate (L) shoulder MMT WNL grossly for [place] with functional activities  4) Independent and pain free with ADL's and IADL's  5) Patient will be able to achieve less than or equal to 33 on FOTO shoulder survey demonstrating overall improved functional ability with upper extremity.

## 2018-05-10 NOTE — PATIENT INSTRUCTIONS
Side Bend, Sitting        Sit, hand over top of head. Gently pull head to Right ne side. Hold 30 seconds.  Repeat 3 times per session. Do 2 sessions per day.    Copyright © [employer]. All rights reserved.       ROM: Flexion - Wand (Supine)        Lie on back holding wand. Raise arms over head. Hold 5 seconds.  Repeat 10 times per set. Do 2 sets per session. Do 2 sessions per day.     [email or web address]://[employer]/928     Copyright © [employer]. All rights reserved.       ROM: External / Internal Rotation - Wand        Holding wand with left hand palm up, push out from body with other hand, palm down. Keep both elbows bent. When stretch is felt, hold 5 seconds. Repeat to other side, leading with same hand. Keep elbows bent.  Repeat 10 times per set. Do 2 sets per session. Do 2 sessions per day.     https://[employer]/748     Copyright © [employer]. All rights reserved.       ROM: Horizontal Abduction / Adduction - Wand        Keeping both palms down, push right hand across body with other hand. Then pull back across body, keeping arms parallel to floor. Do not allow trunk to twist. Hold 5 seconds.  Repeat 10 times per set. Do 2 sets per session. Do 2 sessions per day.     https://[employer]/752     Copyright © [employer]. All rights reserved.       Scapular Retraction (Standing)        With arms at sides, pinch shoulder blades together.  Repeat 15 times per set. Do 2 sets per session. Do 2 sessions per day.     https://[employer]/944     Copyright © [employer]. All rights reserved.       SIDELYING EXTERNAL ROTATION WITH TOWEL - ER        Lie on your side with your elbow bent to 90 degrees. Place a rolled up towel between your arm and the side your body as shown.     Squeeze your shoulder blade back and down toward your buttocks and hold that position.     Next, roll your arm upwards from your stomach area towards the ceiling while maintaining your arm against the towel and with your shoulder blade held down and back the entire time. Lower your arm and  repeat.     Hold 2 seconds.  Repeat 10 times per set. Do 2 sets per session. Do 2 sessions per day.    Copyright © 6486-9230 [employer]

## 2018-05-15 ENCOUNTER — CLINICAL SUPPORT (OUTPATIENT)
Dept: REHABILITATION | Facility: HOSPITAL | Age: 53
End: 2018-05-15
Payer: COMMERCIAL

## 2018-05-15 DIAGNOSIS — M62.81 MUSCLE WEAKNESS: ICD-10-CM

## 2018-05-15 DIAGNOSIS — M25.60 DECREASED RANGE OF MOTION: ICD-10-CM

## 2018-05-15 PROCEDURE — 97010 HOT OR COLD PACKS THERAPY: CPT | Mod: PN

## 2018-05-15 PROCEDURE — 97140 MANUAL THERAPY 1/> REGIONS: CPT | Mod: PN

## 2018-05-15 NOTE — PROGRESS NOTES
"TIME RECORD    Date:  05/15/2018    Start Time:  9:00 AM  Stop Time:  10:10 AM    PROCEDURES:    TIMED  Procedure Min.   Manual therapy 15   therex NC             UNTIMED  Procedure Min.   MHP 10         Total Timed Minutes:  15  Total Timed Units:  1  Total Untimed Units:  1  Charges Billed/# of units:  2 (MHP=1, MT=1)      Progress/Current Status    Subjective:     Patient ID: Hien Jeter is a 52 y.o. female.  Diagnosis:   1. Decreased range of motion     2. Muscle weakness       Pain: Patient reports that her shoulder is slowly getting better.    Objective:     Patient received moist heat to left shoulder for 10 minutes in order to increase tissue pliability and prepare tissues for manual therapy. Patient filled out the FOTO. Provided manual therapy for 15 minutes consisting of patient in supine for STM/MFR to L UT, STM/MFR to L pec major, STM/MFR to L deltoid and subscap, and PROM shoulder flex, abd, and ER for multiple bouts. Patient then performed therex per exercise log 1:1 with PT tech while being supervised by PT.      Date 5/15/18 5/10/18 5/3//18 5/1/18   Visit 5 4 3 2   POC exp  5/22/18       FOTO done NEXT            MH 10' 10' 10' 10'          Stretches       Pulley IR 2x30" 2x30" 2x30" 2x30"   Corner pec 2x30" 3x30"  3x30"  2x30"          MT 15' 15' 15'           Supine       Dowel flex  1# 2x12 2x12 2x10 2x10   Dowel ER 1# 2x10 Next     Dowel ABd 1# 2x10 Next            Sidelying       Sh abd 2x12 2x12 2x10 2x10   Sh ER 2x12 2x12 2x10  2x10          Stand       Rows RTB 2x15 RTB 2x12 RTB 2x10 RTB 2x10   Lats RTB 2x15 RTB 2x12 RTB 2x10 RTB 2x10   Sh ER/IR RTB 2x15 RTB 2x12 RTB 2x10 RTB 2x10   Wall slide 2x10 w/ towel 2x10 w/towel 2x10 w/towel 2x10          pulleys 3'/3' 3'/3' 3'/3' 3'/3'   Shoulder flex oot  2x10 w/dowel behind back    Shoulder Ext oot  2x10 w/dowel behind back    Shoulder IR oot  2x10 w/dowel behind back    Seen by  BELTRAN CURIEL 1/6        Assessment:     Patient tolerated treatment " well. Patient demonstrated improved L shoulder PROM flex and abd following manual therapy today. Patient required verbal and tactile cues in order to perform therex properly.    Patient Education/Response:     Continue current HEP.    Plans and Goals:     Continue PT POC. Progress manual therapy and therex as tolerated.    Goals to be met in 4 weeks: (4/22/18)  1) Initiate Hep   2) Pt will increase L shoulder AROM by 10 degrees grossly for improved performance with overhead ADL's  3) Pt will report 4/10 pain in (L)shoulder at worst  4) Pt will demonstrate increased MMT to 4-/5 grossly L shoulder  5) Patient will be able to achieve less than or equal to 48 on FOTO shoulder survey demonstrating overall improved functional ability with upper extremity.      Goals to be met by discharge:  1) Independent with HEP  2) Pt will demonstrate (L) shoulder AROM WNL grossly for [place] with ADL's  3) Pt will demonstrate (L) shoulder MMT WNL grossly for [place] with functional activities  4) Independent and pain free with ADL's and IADL's  5) Patient will be able to achieve less than or equal to 33 on FOTO shoulder survey demonstrating overall improved functional ability with upper extremity.

## 2018-05-17 ENCOUNTER — CLINICAL SUPPORT (OUTPATIENT)
Dept: REHABILITATION | Facility: HOSPITAL | Age: 53
End: 2018-05-17
Payer: COMMERCIAL

## 2018-05-17 DIAGNOSIS — M62.81 MUSCLE WEAKNESS: ICD-10-CM

## 2018-05-17 DIAGNOSIS — M25.60 DECREASED RANGE OF MOTION: ICD-10-CM

## 2018-05-17 PROCEDURE — 97140 MANUAL THERAPY 1/> REGIONS: CPT | Mod: PN

## 2018-05-17 PROCEDURE — 97110 THERAPEUTIC EXERCISES: CPT | Mod: PN

## 2018-05-17 NOTE — PROGRESS NOTES
"TIME RECORD    Date:  05/17/2018    Start Time:  0906  Stop Time:  1011    PROCEDURES:    TIMED  Procedure Min.   MT 20   TE 25             UNTIMED  Procedure Min.   MHP 10   CP 10     Total Timed Minutes:  45  Total Timed Units:  3  Total Untimed Units:    Charges Billed/# of units:  2 (MT-1, TE-1) PT supervising pt treating another pt      Progress/Current Status    Subjective:     Patient ID: Hien Jeter is a 52 y.o. female.  Diagnosis:   1. Decreased range of motion     2. Muscle weakness       Pain: 2/10 L shoulder  Pt states her shoulder is "slowly getting better"    Objective:     Patient received moist heat to left shoulder for 10' in order to increase tissue pliability and prepare tissues for manual therapy. Provided MT x 20' consisting of patient in supine for STM/MFR to L UT, STM/MFR to L pec major, STM/MFR to L subscap and lat, and PROM shoulder flex, abd, IR/ER for multiple bouts. Patient then performed TE x 25'      Date 5/17/18 5/15/18 5/10/18 5/3//18 5/1/18   Visit 6 5 4 3 2   POC exp  5/22/18        FOTO 6/10 done NEXT             UBE Next               MH 10' 10' 10' 10' 10'           Stretches        Pulley IR Next 2x30" 2x30" 2x30" 2x30"   Corner pec Next 2x30" 3x30"  3x30"  2x30"           MT 23' 15' 15' 15'            Supine        Dowel flex  1# 2x12 1# 2x12 2x12 2x10 2x10   Dowel ER 1# 2x12 1# 2x10 Next     Dowel ABd 1# 2x12 1# 2x10 Next             Sidelying        Sh abd 2x15 2x12 2x12 2x10 2x10   Sh ER 2x15 2x12 2x12 2x10  2x10           Stand        Rows Next RTB 2x15 RTB 2x12 RTB 2x10 RTB 2x10   Lats Next RTB 2x15 RTB 2x12 RTB 2x10 RTB 2x10   Sh ER/IR Next RTB 2x15 RTB 2x12 RTB 2x10 RTB 2x10   Wall slide Next 2x10 w/ towel 2x10 w/towel 2x10 w/towel 2x10           pulleys Next 3'/3' 3'/3' 3'/3' 3'/3'   Shoulder flex  oot  2x10 w/dowel behind back    Shoulder Ext  oot  2x10 w/dowel behind back    Shoulder IR  oot  2x10 w/dowel behind back    Seen by  KV JBF KV MB 1/6        Assessment: "     Pt with increased tissue tension in L UT, pec maria r, subscap, and lat, especially subacap and lat that improved slightly with MT. Pt guarded with L shoulder PROM and required Max verbal cuing to relax to allow PT to stretch shoulder. Cont to progress L shoulder/periscapular strengthening.    Patient Education/Response:     Continue current HEP.    Plans and Goals:     Continue PT POC. Progress manual therapy and therex as tolerated.    Goals to be met in 4 weeks: (4/22/18)  1) Initiate Hep   2) Pt will increase L shoulder AROM by 10 degrees grossly for improved performance with overhead ADL's  3) Pt will report 4/10 pain in (L)shoulder at worst  4) Pt will demonstrate increased MMT to 4-/5 grossly L shoulder  5) Patient will be able to achieve less than or equal to 48 on FOTO shoulder survey demonstrating overall improved functional ability with upper extremity.      Goals to be met by discharge:  1) Independent with HEP  2) Pt will demonstrate (L) shoulder AROM WNL grossly for [place] with ADL's  3) Pt will demonstrate (L) shoulder MMT WNL grossly for [place] with functional activities  4) Independent and pain free with ADL's and IADL's  5) Patient will be able to achieve less than or equal to 33 on FOTO shoulder survey demonstrating overall improved functional ability with upper extremity.

## 2018-05-22 ENCOUNTER — CLINICAL SUPPORT (OUTPATIENT)
Dept: REHABILITATION | Facility: HOSPITAL | Age: 53
End: 2018-05-22
Payer: COMMERCIAL

## 2018-05-22 DIAGNOSIS — M25.60 DECREASED RANGE OF MOTION: ICD-10-CM

## 2018-05-22 DIAGNOSIS — M62.81 MUSCLE WEAKNESS: ICD-10-CM

## 2018-05-22 PROCEDURE — 97110 THERAPEUTIC EXERCISES: CPT | Mod: PN

## 2018-05-22 NOTE — PLAN OF CARE
TIME RECORD    Date: 05/22/2018    PHYSICAL THERAPY UPDATED PLAN OF TREATMENT    Patient name: Hien Jeter  Onset Date:  Chronic  SOC Date:  3/22/18  Primary Diagnosis:    1. Decreased range of motion     2. Muscle weakness       Treatment Diagnosis:   L shoulder pain, decreased strength and ROM  Certification Period:  3/22/18 to 5/22/18  Precautions:  Standard  Visits from SOC:  7  Functional Level Prior to SOC:  Independent    Updated Assessment:  Since beginning PT, pt has been seen 7 times since initial evaluation on 3/22/18. Overall, she has made good, steady progress with her PT treatments and has worked hard towards all of her PT goals as evidenced by subjective and objective improvements. Despite these improvements, she remains with pain and deficits with L UE ROM and strength and will continue to benefit from skilled PT consisting of manual therapy including STM/MFR UT, pec major, subscap and lat; therapeutic exercise including UE/cervical strengthening/stretching, postural education, and modalities prn to address remaining limitations and increase functional mobility.      Range of Motion:   Shoulder Left     Pain/Dysfunction with Movement     AROM PROM MMT     flexion 131* 139*  4-/5 Pain in L arm with greater pain with return to starting position, empty end-feel   extension 52* 53*  4-/5 Pain in L arm with greater pain with return to starting position, empty end-feel   abduction 110* 121*  4-/5 Pain in L arm with greater pain with return to starting position; empty end-feel   Internal rotation 90 NT  4-/5     ER 65 NT  4-/5       Functional IR/ER  R: T11/T3  L: L3*/T3*    Previous Short Term Goals Status:       Goals to be met in 4 weeks: (4/22/18)  1) Initiate Hep - MET  2) Pt will increase L shoulder AROM by 10 degrees grossly for improved performance with overhead ADL's - Partially MET  3) Pt will report 4/10 pain in (L)shoulder at worst - Partially MET  4) Pt will demonstrate increased MMT to 4-/5  grossly L shoulder - MET  5) Patient will be able to achieve less than or equal to 48 on FOTO shoulder survey demonstrating overall improved functional ability with upper extremity. - MET       New Short Term Goals Status:   Cont with goals 2-3  Long Term Goal Status:   continue per initial plan of care.    Goals to be met by discharge:  1) Independent with HEP - Progressing towards  2) Pt will demonstrate (L) shoulder AROM WNL grossly for [place] with ADL's  - Progressing towards  3) Pt will demonstrate (L) shoulder MMT WNL grossly for [place] with functional activities  - Progressing towards  4) Independent and pain free with ADL's and IADL's - Progressing towards  5) Patient will be able to achieve less than or equal to 33 on FOTO shoulder survey demonstrating overall improved functional ability with upper extremity. - Progressing towards  Reasons for Recertification of Therapy:   Required updated POC    Certification Period: 5/22/18 to 7/22/18  Recommended Treatment Plan: 2 times per week for 8 weeks: Gait Training, Group Therapy, Locomotor Training, Manual Therapy, Moist Heat/ Ice, Neuromuscular Re-ed, Patient Education, Therapeutic Activites and Therapeutic Exercise  Other Recommendations: modalities prn, ASTYM prn, kinesiotape prn, Functional Dry Needling prn           Therapist's Name: Fani Breen, PT, DPT   Date: 05/22/2018    I CERTIFY THE NEED FOR THESE SERVICES FURNISHED UNDER THIS PLAN OF TREATMENT AND WHILE UNDER MY CARE    Physician's comments: ________________________________________________________________________________________________________________________________________________      Physician's Name: ___________________________________

## 2018-05-22 NOTE — PROGRESS NOTES
"TIME RECORD    Date:  05/22/2018    Start Time:  1005  Stop Time:  1110    PROCEDURES:    TIMED  Procedure Min.   MT    TE 50             UNTIMED  Procedure Min.   MHP 10   CP 10     Total Timed Minutes:  50  Total Timed Units:  3  Total Untimed Units:    Charges Billed/# of units:  TE-3      Progress/Current Status    Subjective:     Patient ID: Hien Jeter is a 52 y.o. female.  Diagnosis:   1. Decreased range of motion     2. Muscle weakness       Pain: 2/10 L shoulder  Pt states she feels "okay" today.    Objective:     Patient received moist heat to left shoulder for 10' in order to increase tissue pliability and prepare tissues for manual therapy. Pt then performed TE x 50' including objective measures taken. CP to L shoulder in sitting to conclude.      Date 5/22/18 5/17/18 5/15/18 5/10/18 5/3//18 5/1/18   Visit 7 6 5 4 3 2   POC exp  5/22/18         FOTO done 6/10 done NEXT               rpm 3/3 Next                MH 10' 10' 10' 10' 10' 10'            Stretches         Pulley IR  Next 2x30" 2x30" 2x30" 2x30"   Corner pec  Next 2x30" 3x30"  3x30"  2x30"            MT  23' 15' 15' 15'             Supine         Dowel flex  1# 2x15 1# 2x12 1# 2x12 2x12 2x10 2x10   Dowel ER 1# 2x15 1# 2x12 1# 2x10 Next     Dowel ABd 1# 2x15 1# 2x12 1# 2x10 Next              Sidelying         Sh abd 2x15 2x15 2x12 2x12 2x10 2x10   Sh ER 2x15 2x15 2x12 2x12 2x10  2x10            Stand         Rows Next Next RTB 2x15 RTB 2x12 RTB 2x10 RTB 2x10   Lats Next Next RTB 2x15 RTB 2x12 RTB 2x10 RTB 2x10   Sh ER/IR Next Next RTB 2x15 RTB 2x12 RTB 2x10 RTB 2x10   Wall slide Next Next 2x10 w/ towel 2x10 w/towel 2x10 w/towel 2x10            pulleys Next Next 3'/3' 3'/3' 3'/3' 3'/3'   Shoulder flex   oot  2x10 w/dowel behind back    Shoulder Ext   oot  2x10 w/dowel behind back    Shoulder IR   oot  2x10 w/dowel behind back    Seen by  KV KV JBF KV MB 1/6            Assessment:     See updated POC in treatment section.    Patient " Education/Response:     See updated POC in treatment section.    Plans and Goals:     See updated POC in treatment section.

## 2018-05-24 ENCOUNTER — CLINICAL SUPPORT (OUTPATIENT)
Dept: REHABILITATION | Facility: HOSPITAL | Age: 53
End: 2018-05-24
Payer: COMMERCIAL

## 2018-05-24 DIAGNOSIS — G35 MS (MULTIPLE SCLEROSIS): ICD-10-CM

## 2018-05-24 DIAGNOSIS — M25.60 DECREASED RANGE OF MOTION: ICD-10-CM

## 2018-05-24 DIAGNOSIS — M62.81 MUSCLE WEAKNESS: ICD-10-CM

## 2018-05-24 DIAGNOSIS — M62.838 MUSCLE SPASM: ICD-10-CM

## 2018-05-24 PROCEDURE — 97140 MANUAL THERAPY 1/> REGIONS: CPT | Mod: PN

## 2018-05-24 PROCEDURE — 97110 THERAPEUTIC EXERCISES: CPT | Mod: PN

## 2018-05-24 RX ORDER — BACLOFEN 10 MG/1
TABLET ORAL
Qty: 60 TABLET | Refills: 5 | Status: SHIPPED | OUTPATIENT
Start: 2018-05-24 | End: 2019-01-21 | Stop reason: SDUPTHER

## 2018-05-24 NOTE — PROGRESS NOTES
"MELCHOR RECORD    Date:  05/24/2018    Start Time:  1005  Stop Time:  1111    PROCEDURES:    TIMED  Procedure Min.   MT 8   TE 38             UNTIMED  Procedure Min.   MHP 10   CP 10     Total Timed Minutes:  46  Total Timed Units:  4  Total Untimed Units:    Charges Billed/# of units:  4 (MT-1, TE-3)      Progress/Current Status    Subjective:     Patient ID: Hien Jeter is a 52 y.o. female.  Diagnosis:   1. Decreased range of motion     2. Muscle weakness       Pain: 2/10 L shoulder  Pt states she feels "okay" today.    Objective:     Patient received moist heat to left shoulder for 10' in order to increase tissue pliability and prepare tissues for MT. MT x 8' consisting of STM/MFR to L UT, pec major, subscap, and lat, and Grades I-II inferior humeral glides into ABd  f/b TE x 38' including L shoulder PROM all directions. CP to L shoulder in sitting to conclude.      Date 5/24/18 5/22/18 5/17/18 5/15/18 5/10/18 5/3//18 5/1/18   Visit 8 7 6 5 4 3 2   POC exp  5/22/18          FOTO 8/10 done 6/10 done NEXT                rpm 3/3 120 rpm 3/3 Next                 MH 10' 10' 10' 10' 10' 10' 10'             Stretches          [facility]ulley IR   Next 2x30" 2x30" 2x30" 2x30"   Corner pec 3x30"   Next 2x30" 3x30"  3x30"  2x30"             MT   23' 15' 15' 15'              Supine          Dowel flex  1# 2x15 1# 2x15 1# 2x12 1# 2x12 2x12 2x10 2x10   Dowel ER 1# 2x15 1# 2x15 1# 2x12 1# 2x10 Next     Dowel ABd 1# 2x15 1# 2x15 1# 2x12 1# 2x10 Next               Sidelying          Sh abd 2x15 2x15 2x15 2x12 2x12 2x10 2x10   Sh ER 2x15 2x15 2x15 2x12 2x12 2x10  2x10   Sh Flex 2x10 L         Gator 2x10 L                   Stand          Rows  Next Next RTB 2x15 RTB 2x12 RTB 2x10 RTB 2x10   Lats  Next Next RTB 2x15 RTB 2x12 RTB 2x10 RTB 2x10   Sh ER/IR  Next Next RTB 2x15 RTB 2x12 RTB 2x10 RTB 2x10   Wall slide  Next Next 2x10 w/ towel 2x10 w/towel 2x10 w/towel 2x10   Wall walks Next         pulleys  Next Next 3'/3' 3'/3' 3'/3' 3'/3' " "  Shoulder flex    oot  2x10 w/dowel behind back    Shoulder Ext    oot  2x10 w/dowel behind back    Shoulder IR    oot  2x10 w/dowel behind back    Seen by  KV KV KV JBF KV MB 1/6            Assessment:     Pt tolerated progression of exercises well with no c/o pain or discomfort. She reported "catching" in L shoulder at 90 degrees ABd and at 90 degrees when returning to neutral.    Patient Education/Response:     Added 3-way pec stretch. Pt demo understanding by performing stretch.    Plans and Goals:     Cont POC. Progress TE next visit.    Goals to be met in 4 weeks: (4/22/18)  1) Initiate Hep - MET  2) Pt will increase L shoulder AROM by 10 degrees grossly for improved performance with overhead ADL's - Partially MET  3) Pt will report 4/10 pain in (L)shoulder at worst - Partially MET  4) Pt will demonstrate increased MMT to 4-/5 grossly L shoulder - MET  5) Patient will be able to achieve less than or equal to 48 on FOTO shoulder survey demonstrating overall improved functional ability with upper extremity. - MET        New Short Term Goals Status:   Cont with goals 2-3  Long Term Goal Status:   continue per initial plan of care.     Goals to be met by discharge:  1) Independent with HEP - Progressing towards  2) Pt will demonstrate (L) shoulder AROM WNL grossly for [facility] with ADL's  - Progressing towards  3) Pt will demonstrate (L) shoulder MMT WNL grossly for [facility] with functional activities  - Progressing towards  4) Independent and pain free with ADL's and IADL's - Progressing towards  5) Patient will be able to achieve less than or equal to 33 on FOTO shoulder survey demonstrating overall improved functional ability with upper extremity. - Progressing towards      "

## 2018-05-30 ENCOUNTER — CLINICAL SUPPORT (OUTPATIENT)
Dept: REHABILITATION | Facility: HOSPITAL | Age: 53
End: 2018-05-30
Payer: COMMERCIAL

## 2018-05-30 DIAGNOSIS — M25.60 DECREASED RANGE OF MOTION: ICD-10-CM

## 2018-05-30 DIAGNOSIS — M62.81 MUSCLE WEAKNESS: ICD-10-CM

## 2018-05-30 PROCEDURE — 97110 THERAPEUTIC EXERCISES: CPT | Mod: PN

## 2018-05-30 PROCEDURE — 97140 MANUAL THERAPY 1/> REGIONS: CPT | Mod: PN

## 2018-05-30 NOTE — PROGRESS NOTES
"MELCHOR RECORD    Date:  05/30/2018    Start Time:  1700  Stop Time:  1810    PROCEDURES:    TIMED  Procedure Min.   MT 10   TE 40             UNTIMED  Procedure Min.   MHP 10   CP 10     Total Timed Minutes:  50  Total Timed Units:  4  Total Untimed Units:    Charges Billed/# of units:  4 (MT-1, TE-3)      Progress/Current Status    Subjective:     Patient ID: Hien Jeter is a 52 y.o. female.  Diagnosis:   1. Decreased range of motion     2. Muscle weakness       Pain: 2/10 L shoulder  Pt states she feels like her shoulder is improving. She is performing her HEP daily.    Objective:     Patient received MH to L shoulder for 10' f/b UBE f/b TE x 40' f/b MT x 10' consisting of STM/MFR to L UT, pec major, subscap, and lat. CP to L shoulder in sitting to conclude x 10'      Date 5/30/18 5/24/18 5/22/18 5/17/18 5/15/18 5/10/18 5/3//18 5/1/18   Visit 9 8 7 6 5 4 3 2   POC exp  5/22/18           FOTO NEXT 8/10 done 6/10 done NEXT                 rpm 3/3 120 rpm 3/3 120 rpm 3/3 Next                  MH 10' 10' 10' 10' 10' 10' 10' 10'              Stretches           [facility] IR    Next 2x30" 2x30" 2x30" 2x30"   Corner pec  3x30"   Next 2x30" 3x30"  3x30"  2x30"              MT    23' 15' 15' 15'               Supine           Dowel flex  1# 2x15 1# 2x15 1# 2x15 1# 2x12 1# 2x12 2x12 2x10 2x10   Dowel ER 1# 2x15 1# 2x15 1# 2x15 1# 2x12 1# 2x10 Next     Dowel ABd 1# 2x15 1# 2x15 1# 2x15 1# 2x12 1# 2x10 Next                Sidelying           Sh abd 2x15 2x15 2x15 2x15 2x12 2x12 2x10 2x10   Sh ER 2x15 2x15 2x15 2x15 2x12 2x12 2x10  2x10   Sh Flex 2x10 L 2x10 L         Gator 2x10 L 2x10 L                    Stand           Rows RTB 2x15  Next Next RTB 2x15 RTB 2x12 RTB 2x10 RTB 2x10   Lats RTB 2x15  Next Next RTB 2x15 RTB 2x12 RTB 2x10 RTB 2x10   Sh ER/IR RTB 2x15  Next Next RTB 2x15 RTB 2x12 RTB 2x10 RTB 2x10   Wall slide   Next Next 2x10 w/ towel 2x10 w/towel 2x10 w/towel 2x10   Wall walks Next Next         pulleys   Next " "Next 3'/3' 3'/3' 3'/3' 3'/3'   Shoulder flex     oot  2x10 w/dowel behind back    Shoulder Ext     oot  2x10 w/dowel behind back    Shoulder IR     oot  2x10 w/dowel behind back    Seen by  KV KV KV KV JBF KV MB 1/6            Assessment:     Pt tolerated PROM well today with no c/o "catching" in either flexion or ABduction.     Patient Education/Response:     Cont HEP.     Plans and Goals:     Cont POC. Progress TE next visit.    Goals to be met in 4 weeks: (4/22/18)  1) Initiate Hep - MET  2) Pt will increase L shoulder AROM by 10 degrees grossly for improved performance with overhead ADL's - Partially MET  3) Pt will report 4/10 pain in (L)shoulder at worst - Partially MET  4) Pt will demonstrate increased MMT to 4-/5 grossly L shoulder - MET  5) Patient will be able to achieve less than or equal to 48 on FOTO shoulder survey demonstrating overall improved functional ability with upper extremity. - MET        New Short Term Goals Status:   Cont with goals 2-3  Long Term Goal Status:   continue per initial plan of care.     Goals to be met by discharge:  1) Independent with HEP - Progressing towards  2) Pt will demonstrate (L) shoulder AROM WNL grossly for [place] with ADL's  - Progressing towards  3) Pt will demonstrate (L) shoulder MMT WNL grossly for [place] with functional activities  - Progressing towards  4) Independent and pain free with ADL's and IADL's - Progressing towards  5) Patient will be able to achieve less than or equal to 33 on FOTO shoulder survey demonstrating overall improved functional ability with upper extremity. - Progressing towards      "

## 2018-06-04 ENCOUNTER — OFFICE VISIT (OUTPATIENT)
Dept: ORTHOPEDICS | Facility: CLINIC | Age: 53
End: 2018-06-04
Payer: COMMERCIAL

## 2018-06-04 VITALS — WEIGHT: 133 LBS | HEIGHT: 62 IN | BODY MASS INDEX: 24.48 KG/M2

## 2018-06-04 DIAGNOSIS — M25.60 DECREASED RANGE OF MOTION: Primary | ICD-10-CM

## 2018-06-04 PROCEDURE — 99213 OFFICE O/P EST LOW 20 MIN: CPT | Mod: S$GLB,,, | Performed by: ORTHOPAEDIC SURGERY

## 2018-06-04 PROCEDURE — 3008F BODY MASS INDEX DOCD: CPT | Mod: CPTII,S$GLB,, | Performed by: ORTHOPAEDIC SURGERY

## 2018-06-04 PROCEDURE — 99999 PR PBB SHADOW E&M-EST. PATIENT-LVL III: CPT | Mod: PBBFAC,,, | Performed by: ORTHOPAEDIC SURGERY

## 2018-06-04 NOTE — PROGRESS NOTES
INITIAL VISIT HISTORY:  Ms. Jeter in followup of adhesive capsulitis of the left   shoulder, continues to improve.  She is in therapy currently and is doing well.    Reporting less pain, just occasional soreness.  She takes Advil for this.    PHYSICAL EXAMINATION:  LEFT SHOULDER:  No tenderness, no swelling.  Range of motion improved.  She   still lacks full internal and external rotation, but has excellent elevation and   abduction of the shoulder.    Strength is good.    PLAN:  Continue therapy, continue exercises at home, and increase activities as   tolerated.  Advil or Motrin by mouth.  Follow up in 1-2 months.      MANISH  dd: 06/04/2018 11:07:16 (CDT)  td: 06/05/2018 09:06:36 (CDT)  Doc ID   #[id number]  Job ID #[id number]    CC:

## 2018-06-05 ENCOUNTER — CLINICAL SUPPORT (OUTPATIENT)
Dept: REHABILITATION | Facility: HOSPITAL | Age: 53
End: 2018-06-05
Payer: COMMERCIAL

## 2018-06-05 ENCOUNTER — PATIENT MESSAGE (OUTPATIENT)
Dept: NEUROLOGY | Facility: CLINIC | Age: 53
End: 2018-06-05

## 2018-06-05 DIAGNOSIS — M25.60 DECREASED RANGE OF MOTION: ICD-10-CM

## 2018-06-05 DIAGNOSIS — M62.81 MUSCLE WEAKNESS: ICD-10-CM

## 2018-06-05 PROCEDURE — 97140 MANUAL THERAPY 1/> REGIONS: CPT | Mod: PN

## 2018-06-05 PROCEDURE — 97110 THERAPEUTIC EXERCISES: CPT | Mod: PN

## 2018-06-05 NOTE — PROGRESS NOTES
"MELCHOR RECORD    Date:  06/05/2018    Start Time:  11:00  Stop Time:  12:15    PROCEDURES:    TIMED  Procedure Min.   MT 10   TE 45             UNTIMED  Procedure Min.   MHP 10   CP 10     Total Timed Minutes:  50  Total Timed Units:  4  Total Untimed Units:    Charges Billed/# of units:  4 (MT-1, TE-3)      Progress/Current Status    Subjective:     Patient ID: Hien Jeter is a 52 y.o. female.  Diagnosis:   1. Decreased range of motion     2. Muscle weakness       Pain: 3/10 L shoulder  Pt reports that her mother has been in the hospital all weekend, and that she has been very stressed and thinks that's why her L shoulder has been bothering her more than normal.     Objective:     Patient received MH to L shoulder for 10' f/b MT x 10 mins including L shoulder inferior and posterior mobs w/ GH distraction at Grade II-III and then STM/MFR to L deltoids, bicep and lateral tricep, supraspinatus tendon, and proximal pec major. Pt then performed TE x   40 mins per log below. Pt received CP to L shoulder after session x 10 mins.    Date 6/5/18 5/30/18 5/24/18 5/22/18 5/17/18 5/15/18 5/10/18 5/3//18 5/1/18   Visit 10 9 8 7 6 5 4 3 2   POC exp  5/22/18            FOTO 10/10  Done NEXT 8/10 done 6/10 done NEXT                 UBE  rpm 3/3 120 rpm 3/3 120 rpm 3/3 Next                   MH 10' 10' 10' 10' 10' 10' 10' 10' 10'               Stretches            [facility] IR     Next 2x30" 2x30" 2x30" 2x30"   Corner pec --  3x30"   Next 2x30" 3x30"  3x30"  2x30"               MT     23' 15' 15' 15'                Supine            Dowel flex  1# 2x15 1# 2x15 1# 2x15 1# 2x15 1# 2x12 1# 2x12 2x12 2x10 2x10   Dowel ER 1# 2x15 1# 2x15 1# 2x15 1# 2x15 1# 2x12 1# 2x10 Next     Dowel ABd 1# 2x10 P! 1# 2x15 1# 2x15 1# 2x15 1# 2x12 1# 2x10 Next                 Sidelying            Sh abd 2x15 L 2x15 2x15 2x15 2x15 2x12 2x12 2x10 2x10   Sh ER 2x15 L 2x15 2x15 2x15 2x15 2x12 2x12 2x10  2x10   Sh Flex 2x15 L 2x10 L 2x10 L         Sarah Beth" 2x15 L 2x10 L 2x10 L                     Stand            Rows RTB 2x15 RTB 2x15  Next Next RTB 2x15 RTB 2x12 RTB 2x10 RTB 2x10   Lats RTB 2x15 RTB 2x15  Next Next RTB 2x15 RTB 2x12 RTB 2x10 RTB 2x10   Sh ER/IR oot RTB 2x15  Next Next RTB 2x15 RTB 2x12 RTB 2x10 RTB 2x10   Wall slide Next    Next Next 2x10 w/ towel 2x10 w/towel 2x10 w/towel 2x10   Wall walks Next  Next Next         pulleys    Next Next 3'/3' 3'/3' 3'/3' 3'/3'   Shoulder flex      oot  2x10 w/dowel behind back    Shoulder Ext      oot  2x10 w/dowel behind back    Shoulder IR      oot  2x10 w/dowel behind back    Seen by  ELIZABETH KV KV KV KV JBF KV MB 1/6        Assessment:     Pt demo min increased guarding today in MT and catching L shoulder pain at 90 degrees L shoulder abduction (sets decreased with dowel). Pt demo tenderness along biceps tendon and supraspinatus during manual, and demo decreased pain with GH glides.    Patient Education/Response:     Cont HEP.     Plans and Goals:     Cont POC. Progress TE next visit.    Goals to be met in 4 weeks: (4/22/18)  1) Initiate Hep - MET  2) Pt will increase L shoulder AROM by 10 degrees grossly for improved performance with overhead ADL's - Partially MET  3) Pt will report 4/10 pain in (L)shoulder at worst - Partially MET  4) Pt will demonstrate increased MMT to 4-/5 grossly L shoulder - MET  5) Patient will be able to achieve less than or equal to 48 on FOTO shoulder survey demonstrating overall improved functional ability with upper extremity. - MET        New Short Term Goals Status:   Cont with goals 2-3  Long Term Goal Status:   continue per initial plan of care.     Goals to be met by discharge:  1) Independent with HEP - Progressing towards  2) Pt will demonstrate (L) shoulder AROM WNL grossly for [place] with ADL's  - Progressing towards  3) Pt will demonstrate (L) shoulder MMT WNL grossly for [place] with functional activities  - Progressing towards  4) Independent and pain free with  ADL's and IADL's - Progressing towards  5) Patient will be able to achieve less than or equal to 33 on FOTO shoulder survey demonstrating overall improved functional ability with upper extremity. - Progressing towards

## 2018-06-07 ENCOUNTER — CLINICAL SUPPORT (OUTPATIENT)
Dept: REHABILITATION | Facility: HOSPITAL | Age: 53
End: 2018-06-07
Payer: COMMERCIAL

## 2018-06-07 DIAGNOSIS — M62.81 MUSCLE WEAKNESS: ICD-10-CM

## 2018-06-07 DIAGNOSIS — M25.60 DECREASED RANGE OF MOTION: ICD-10-CM

## 2018-06-07 PROCEDURE — 97140 MANUAL THERAPY 1/> REGIONS: CPT | Mod: PN

## 2018-06-07 PROCEDURE — 97110 THERAPEUTIC EXERCISES: CPT | Mod: PN

## 2018-06-11 ENCOUNTER — HOSPITAL ENCOUNTER (OUTPATIENT)
Dept: RADIOLOGY | Facility: HOSPITAL | Age: 53
Discharge: HOME | End: 2018-06-11
Attending: PSYCHIATRY & NEUROLOGY
Payer: COMMERCIAL

## 2018-06-11 DIAGNOSIS — G35 MS (MULTIPLE SCLEROSIS): ICD-10-CM

## 2018-06-11 PROCEDURE — 70553 MRI BRAIN STEM W/O & W/DYE: CPT | Mod: 26,,, | Performed by: RADIOLOGY

## 2018-06-11 PROCEDURE — 25500020 PHARM REV CODE 255: Performed by: PSYCHIATRY & NEUROLOGY

## 2018-06-11 PROCEDURE — 70553 MRI BRAIN STEM W/O & W/DYE: CPT | Mod: TC

## 2018-06-11 PROCEDURE — A9585 GADOBUTROL INJECTION: HCPCS | Performed by: PSYCHIATRY & NEUROLOGY

## 2018-06-11 RX ORDER — GADOBUTROL 604.72 MG/ML
7 INJECTION INTRAVENOUS
Status: COMPLETED | OUTPATIENT
Start: 2018-06-11 | End: 2018-06-11

## 2018-06-11 RX ADMIN — GADOBUTROL 7 ML: 604.72 INJECTION INTRAVENOUS at 08:06

## 2018-06-12 ENCOUNTER — CLINICAL SUPPORT (OUTPATIENT)
Dept: REHABILITATION | Facility: HOSPITAL | Age: 53
End: 2018-06-12
Payer: COMMERCIAL

## 2018-06-12 ENCOUNTER — HOSPITAL ENCOUNTER (OUTPATIENT)
Dept: RADIOLOGY | Facility: HOSPITAL | Age: 53
Discharge: HOME | End: 2018-06-12
Attending: OBSTETRICS & GYNECOLOGY
Payer: COMMERCIAL

## 2018-06-12 DIAGNOSIS — M62.81 MUSCLE WEAKNESS: ICD-10-CM

## 2018-06-12 DIAGNOSIS — Z12.31 VISIT FOR SCREENING MAMMOGRAM: ICD-10-CM

## 2018-06-12 DIAGNOSIS — M25.60 DECREASED RANGE OF MOTION: ICD-10-CM

## 2018-06-12 PROCEDURE — 77067 SCR MAMMO BI INCL CAD: CPT | Mod: 26,,, | Performed by: RADIOLOGY

## 2018-06-12 PROCEDURE — 77063 BREAST TOMOSYNTHESIS BI: CPT | Mod: 26,,, | Performed by: RADIOLOGY

## 2018-06-12 PROCEDURE — 77067 SCR MAMMO BI INCL CAD: CPT | Mod: TC

## 2018-06-12 PROCEDURE — 97110 THERAPEUTIC EXERCISES: CPT | Mod: PN

## 2018-06-12 PROCEDURE — 97140 MANUAL THERAPY 1/> REGIONS: CPT | Mod: PN

## 2018-06-12 NOTE — PROGRESS NOTES
"TIME RECORD    Date:  06/12/2018    Start Time:  10:55 am   Stop Time:  12:05 pm    PROCEDURES:    TIMED  Procedure Time Min.   MT Start:11:05 am   Stop:11:20 am  15   TE Start:11:20 am   Stop:11:55 am 35         UNTIMED  Procedure Time Min.   MHP Start:10:55 am   Stop:11:05 am  10   Cold pack  Start:11:55 am  Stop:12:05 pm 10     Total Timed Minutes:  50  Total Timed Units:  3  Total Untimed Units:  1  Charges Billed/# of units:  1 MT, 2 TE     Progress/Current Status    Subjective:     Patient ID: Hien Jeter is a 52 y.o. female.  Diagnosis:   1. Decreased range of motion     2. Muscle weakness       Pain: 2 /10  Patient reports I was a little sore after last time but nothing outside of the normal amount. Patient states I did do some gardening this weekend so my shoulder is a little sore from that too.     Objective:     Patient received MHP x 10 minutes to left shoulder at beginning of session followed by trial ASTYM treatment to left shoulder multi directions and STM/MFR to left anterior/lateral/posterior deltoid, subscapularis, pectoralis and UT musculature, and gentle PROM to left shoulder to all directions. Then. patient completed therex per log as below 1:1 with PT x 35 minutes with patient able to tolerate progression of exercises however having reports of discomfort when attempting scaption in prone position therefore completed in standing without any complaints of increase in pain. Cold pack x 10 minutes to left shoulder at end of session.     Date 6/12/18 6/7/18 6/5/18 5/30/18 5/24/18 5/22/18 5/17/18 5/15/18 5/10/18 5/3//18 5/1/18   Visit 12 11 10 9 8 7 6 5 4 3 2   POC exp  5/22/18              FOTO At d/c At dc 10/10  Done NEXT 8/10 done 6/10 done NEXT                   UBE -- --  rpm 3/3 120 rpm 3/3 120 rpm 3/3 Next                     MH 10' 10' 10' 10' 10' 10' 10' 10' 10' 10' 10'                 Stretches              Pulley IR  --     Next 2x30" 2x30" 2x30" 2x30"   Corner pec  -- --  3x30"   " "Next 2x30" 3x30"  3x30"  2x30"                 MT       23' 15' 15' 15'                  Supine              Dowel flex  2# 2x10 2# 2x10 1# 2x15 1# 2x15 1# 2x15 1# 2x15 1# 2x12 1# 2x12 2x12 2x10 2x10   Dowel ER -- 2# 2x10  1# 2x15 1# 2x15 1# 2x15 1# 2x15 1# 2x12 1# 2x10 Next     Dowel ABd -- 2# 2x10  1# 2x10 P! 1# 2x15 1# 2x15 1# 2x15 1# 2x12 1# 2x10 Next     SA punches 2# 2x10 2# 2x10             Sidelying              Sh abd 1# 2x10 L 1# 2x10 L 2x15 L 2x15 2x15 2x15 2x15 2x12 2x12 2x10 2x10   Sh ER 1# 2x10 L 1# 2x10 L 2x15 L 2x15 2x15 2x15 2x15 2x12 2x12 2x10  2x10   Sh Flex 1# 2x10 L 1# 2x10 L 2x15 L 2x10 L 2x10 L         Gator 1# 2x10 L 1# 2x10 L 2x15 L 2x10 L 2x10 L         "T" "-" 1# 2x10 L 1# 2x10             Prone              Row 2x10              scaption Pain held             Stand              Rows GTB  2x10 GTB  2x10  RTB 2x15 RTB 2x15  Next Next RTB 2x15 RTB 2x12 RTB 2x10 RTB 2x10   Lats GTB  2x10 GTB  2x10  RTB 2x15 RTB 2x15  Next Next RTB 2x15 RTB 2x12 RTB 2x10 RTB 2x10   Sh ER/IR RTB  2x10 RTB 2x10 oot RTB 2x15  Next Next RTB 2x15 RTB 2x12 RTB 2x10 RTB 2x10   scaption RTB  2x10             Wall slide 5"x10 5"x10 Next    Next Next 2x10 w/ towel 2x10 w/towel 2x10 w/towel 2x10   Wall walks -- next Next  Next Next         pulleys --     Next Next 3'/3' 3'/3' 3'/3' 3'/3'   Shoulder flex -- --      oot  2x10 w/dowel behind back    Shoulder Ext -- --      oot  2x10 w/dowel behind back    Shoulder IR -- --      oot  2x10 w/dowel behind back    Seen by  KELSI KELSI ELIZABETH KV KV KV KV JBF KV MB 1/6      Assessment:     Patient with increased fibrosis noted at left biceps, and anterior/lateral deltoid musculature which improved with ASTYM and manual therapy. Patient with increased reports of discomfort when completing scaption with left shoulder in prone position therefore held and attempted in standing with theraband without any complaints of increase in pain.     Patient Education/Response:     Patient educated to " continue to complete HEP on days not attending therapy with patient demonstrating understanding. Patient educated of potential side effects of ASTYM treatment to left shoulder musculature.      Plans and Goals:     Continue as rekha, progress with left shoulder stabilization exercises and ASTYM as appropriate.     Goals to be met in 4 weeks: (4/22/18)  1) Initiate Hep - MET  2) Pt will increase L shoulder AROM by 10 degrees grossly for improved performance with overhead ADL's - Partially MET  3) Pt will report 4/10 pain in (L)shoulder at worst - Partially MET  4) Pt will demonstrate increased MMT to 4-/5 grossly L shoulder - MET  5) Patient will be able to achieve less than or equal to 48 on FOTO shoulder survey demonstrating overall improved functional ability with upper extremity. - MET        New Short Term Goals Status:   Cont with goals 2-3  Long Term Goal Status:   continue per initial plan of care.     Goals to be met by discharge:  1) Independent with HEP - Progressing towards  2) Pt will demonstrate (L) shoulder AROM WNL grossly for [place] with ADL's  - Progressing towards  3) Pt will demonstrate (L) shoulder MMT WNL grossly for [place] with functional activities  - Progressing towards  4) Independent and pain free with ADL's and IADL's - Progressing towards  5) Patient will be able to achieve less than or equal to 33 on FOTO shoulder survey demonstrating overall improved functional ability with upper extremity. - Progressing towards

## 2018-06-13 ENCOUNTER — TELEPHONE (OUTPATIENT)
Dept: NEUROLOGY | Facility: CLINIC | Age: 53
End: 2018-06-13

## 2018-06-13 DIAGNOSIS — G35 MULTIPLE SCLEROSIS: Primary | ICD-10-CM

## 2018-06-14 ENCOUNTER — CLINICAL SUPPORT (OUTPATIENT)
Dept: REHABILITATION | Facility: HOSPITAL | Age: 53
End: 2018-06-14
Payer: COMMERCIAL

## 2018-06-14 ENCOUNTER — DOCUMENTATION ONLY (OUTPATIENT)
Dept: NEUROLOGY | Facility: CLINIC | Age: 53
End: 2018-06-14

## 2018-06-14 DIAGNOSIS — M25.60 DECREASED RANGE OF MOTION: ICD-10-CM

## 2018-06-14 DIAGNOSIS — M62.81 MUSCLE WEAKNESS: ICD-10-CM

## 2018-06-14 PROCEDURE — 97140 MANUAL THERAPY 1/> REGIONS: CPT | Mod: PN

## 2018-06-14 PROCEDURE — 97110 THERAPEUTIC EXERCISES: CPT | Mod: PN

## 2018-06-14 NOTE — PROGRESS NOTES
"TIME RECORD    Date:  06/14/2018    Start Time:  11:00 am   Stop Time:  12:00 pm    PROCEDURES:    TIMED  Procedure Min.   TE 35   MT 15   NMR    GT           UNTIMED  Procedure Min.   Bike/supervised TE    MH 10         Total Timed Minutes:  50  Total Timed Units:  3  Total Untimed Units:  1  Charges Billed/# of units:  1 MT, 2 TE     Progress/Current Status    Subjective:     Patient ID: Hien Jeter is a 52 y.o. female.  Diagnosis:   1. Decreased range of motion     2. Muscle weakness       Pain: 2/10  Pt reports that she had to get a mammogram yesterday that required some very awkward and painful arm positions so it was very sore but better today.    Objective:     Patient received MHP x 10 minutes to left shoulder at beginning of session followed by TE x 10 mins per log below. Pt then received MT x 15 mins consisting of STM/MFR to left anterior/lateral/posterior deltoid, subscapularis, pectoralis and UT musculature, and gentle PROM to left shoulder to all directions. Then. patient completed therex per log as below 1:1 with PT x 25 minutes with patient able to tolerate progression of exercises    Date 6/14/18 6/12/18 6/7/18 6/5/18 5/30/18 5/24/18 5/22/18 5/17/18 5/15/18 5/10/18 5/3//18 5/1/18   Visit 13 12 11 10 9 8 7 6 5 4 3 2   POC exp  5/22/18               FOTO  At d/c At dc 10/10  Done NEXT 8/10 done 6/10 done NEXT                    UBE  -- --  rpm 3/3 120 rpm 3/3 120 rpm 3/3 Next                      MH 10' 10' 10' 10' 10' 10' 10' 10' 10' 10' 10' 10'                  Stretches               [facility]ley IR   --     Next 2x30" 2x30" 2x30" 2x30"   Corner pec   -- --  3x30"   Next 2x30" 3x30"  3x30"  2x30"                  MT        23' 15' 15' 15'                   Supine               Dowel flex  2# 2x10 2# 2x10 2# 2x10 1# 2x15 1# 2x15 1# 2x15 1# 2x15 1# 2x12 1# 2x12 2x12 2x10 2x10   Dowel ER -- -- 2# 2x10  1# 2x15 1# 2x15 1# 2x15 1# 2x15 1# 2x12 1# 2x10 Next     Dowel ABd -- -- 2# 2x10  1# 2x10 P! 1# " "2x15 1# 2x15 1# 2x15 1# 2x12 1# 2x10 Next     SA punches 2# 2x12 2# 2x10 2# 2x10             Sidelying               Sh abd 1# 2x12 L 1# 2x10 L 1# 2x10 L 2x15 L 2x15 2x15 2x15 2x15 2x12 2x12 2x10 2x10   Sh ER 1# 2x12 L 1# 2x10 L 1# 2x10 L 2x15 L 2x15 2x15 2x15 2x15 2x12 2x12 2x10  2x10   Sh Flex 1# 2x12 L 1# 2x10 L 1# 2x10 L 2x15 L 2x10 L 2x10 L         Gator 1# 2x12 L 1# 2x10 L 1# 2x10 L 2x15 L 2x10 L 2x10 L         "T" "-" NT 1# 2x10 L 1# 2x10             Prone               Row 2x10 1# L 2x10              scaption  Pain held             Extension  2x10 0# L              Stand               Rows oot GTB  2x10 GTB  2x10  RTB 2x15 RTB 2x15  Next Next RTB 2x15 RTB 2x12 RTB 2x10 RTB 2x10   Lats 3x10 GTC GTB  2x10 GTB  2x10  RTB 2x15 RTB 2x15  Next Next RTB 2x15 RTB 2x12 RTB 2x10 RTB 2x10   Sh ER/IR oot RTB  2x10 RTB 2x10 oot RTB 2x15  Next Next RTB 2x15 RTB 2x12 RTB 2x10 RTB 2x10   scaption oot RTB  2x10             Wall slide 15x flexion  10x abd L 5"x10 5"x10 Next    Next Next 2x10 w/ towel 2x10 w/towel 2x10 w/towel 2x10   Wall walks  -- next Next  Next Next         pulleys  --     Next Next 3'/3' 3'/3' 3'/3' 3'/3'   Shoulder flex  -- --      oot  2x10 w/dowel behind back    Shoulder Ext  -- --      oot  2x10 w/dowel behind back    Shoulder IR  -- --      oot  2x10 w/dowel behind back    Seen by  ELIZABETH JOHNSON KV KV KV KV JBF KV MB 1/6      Assessment:     No astym today, cont next visit. Pt tolerated all TE well including added prone exercises and wall activities. Cont to progress TE.    Patient Education/Response:     Patient educated to continue to complete HEP on days not attending therapy with patient demonstrating understanding. Patient educated of potential side effects of ASTYM treatment to left shoulder musculature.      Plans and Goals:     Continue as rekha, progress with left shoulder stabilization exercises and ASTYM as appropriate.     Goals to be met in 4 weeks: (4/22/18)  1) Initiate Hep - MET  2) Pt " will increase L shoulder AROM by 10 degrees grossly for improved performance with overhead ADL's - Partially MET  3) Pt will report 4/10 pain in (L)shoulder at worst - Partially MET  4) Pt will demonstrate increased MMT to 4-/5 grossly L shoulder - MET  5) Patient will be able to achieve less than or equal to 48 on FOTO shoulder survey demonstrating overall improved functional ability with upper extremity. - MET        New Short Term Goals Status:   Cont with goals 2-3  Long Term Goal Status:   continue per initial plan of care.     Goals to be met by discharge:  1) Independent with HEP - Progressing towards  2) Pt will demonstrate (L) shoulder AROM WNL grossly for [place] with ADL's  - Progressing towards  3) Pt will demonstrate (L) shoulder MMT WNL grossly for [place] with functional activities  - Progressing towards  4) Independent and pain free with ADL's and IADL's - Progressing towards  5) Patient will be able to achieve less than or equal to 33 on FOTO shoulder survey demonstrating overall improved functional ability with upper extremity. - Progressing towards

## 2018-06-14 NOTE — PROGRESS NOTES
"TIME RECORD    Date:  06/14/2018    Start Time:  ***  Stop Time:  ***    PROCEDURES:    TIMED  Procedure Time Min.    Start:  Stop:     Start:  Stop:     Start:  Stop:     Start:  Stop:          UNTIMED  Procedure Time Min.    Start:  Stop:     Start:  Stop:      Total Timed Minutes:  ***  Total Timed Units:  ***  Total Untimed Units:  ***  Charges Billed/# of units:  ***    Progress/Current Status    Subjective:     Patient ID: Hien Jeter is a 52 y.o. female.  Diagnosis:   1. Decreased range of motion     2. Muscle weakness       Pain: {PAIN 0-10:59938} /10  ***    Objective:     ***  Date 6/14/18 6/12/18 6/7/18 6/5/18 5/30/18 5/24/18 5/22/18 5/17/18 5/15/18   Visit 13 12 11 10 9 8 7 6 5   POC exp  5/22/18            FOTO At d/c At d/c At dc 10/10  Done NEXT 8/10 done 6/10 done               UBE  -- --  rpm 3/3 120 rpm 3/3 120 rpm 3/3 Next                MH 10' 10' 10' 10' 10' 10' 10' 10' 10'               Stretches            Pulley IR   --     Next 2x30"   Corner pec   -- --  3x30"   Next 2x30"               MT        23' 15'               Supine            Dowel flex  2# 2x10  2# 2x10 2# 2x10 1# 2x15 1# 2x15 1# 2x15 1# 2x15 1# 2x12 1# 2x12   Dowel ER -- -- 2# 2x10  1# 2x15 1# 2x15 1# 2x15 1# 2x15 1# 2x12 1# 2x10   Dowel ABd -- -- 2# 2x10  1# 2x10 P! 1# 2x15 1# 2x15 1# 2x15 1# 2x12 1# 2x10   SA punches 2# 2x10 2# 2x10 2# 2x10          Sidelying            Sh abd  1# 2x10 L 1# 2x10 L 2x15 L 2x15 2x15 2x15 2x15 2x12   Sh ER  1# 2x10 L 1# 2x10 L 2x15 L 2x15 2x15 2x15 2x15 2x12   Sh Flex  1# 2x10 L 1# 2x10 L 2x15 L 2x10 L 2x10 L      Gator  1# 2x10 L 1# 2x10 L 2x15 L 2x10 L 2x10 L      "T" "-"  1# 2x10 L 1# 2x10          Prone            Row  2x10           scaption  Pain held          Stand            Rows  GTB  2x10 GTB  2x10  RTB 2x15 RTB 2x15  Next Next RTB 2x15   Lats  GTB  2x10 GTB  2x10  RTB 2x15 RTB 2x15  Next Next RTB 2x15   Sh ER/IR  RTB  2x10 RTB 2x10 oot RTB 2x15  Next Next RTB 2x15   scaption  " "RTB  2x10          Wall slide  5"x10 5"x10 Next    Next Next 2x10 w/ towel   Wall walks  -- next Next  Next Next      pulleys  --     Next Next 3'/3'   Shoulder flex  -- --      oot   Shoulder Ext  -- --      oot   Shoulder IR  -- --      oot   Seen by  KELSI DOLAN ELIZABETH KV KV KV KV JBF       Assessment:     ***    Patient Education/Response:     ***    Plans and Goals:     ***    "

## 2018-06-18 ENCOUNTER — LAB VISIT (OUTPATIENT)
Dept: LAB | Facility: HOSPITAL | Age: 53
End: 2018-06-18
Attending: CLINICAL NURSE SPECIALIST
Payer: COMMERCIAL

## 2018-06-18 DIAGNOSIS — G35 MULTIPLE SCLEROSIS: ICD-10-CM

## 2018-06-18 PROCEDURE — 36415 COLL VENOUS BLD VENIPUNCTURE: CPT | Mod: PO

## 2018-06-18 PROCEDURE — 86356 MONONUCLEAR CELL ANTIGEN: CPT

## 2018-06-19 ENCOUNTER — CLINICAL SUPPORT (OUTPATIENT)
Dept: REHABILITATION | Facility: HOSPITAL | Age: 53
End: 2018-06-19
Payer: COMMERCIAL

## 2018-06-19 DIAGNOSIS — M25.60 DECREASED RANGE OF MOTION: ICD-10-CM

## 2018-06-19 DIAGNOSIS — M62.81 MUSCLE WEAKNESS: ICD-10-CM

## 2018-06-19 PROCEDURE — 97140 MANUAL THERAPY 1/> REGIONS: CPT | Mod: PN

## 2018-06-19 PROCEDURE — 97110 THERAPEUTIC EXERCISES: CPT | Mod: PN

## 2018-06-19 NOTE — PROGRESS NOTES
"TIME RECORD    Date:  06/19/2018    Start Time:  3:00 am   Stop Time:  4:05 pm    PROCEDURES:    TIMED  Procedure Min.   TE 30   MT 20   NMR    GT           UNTIMED  Procedure Min.   Bike/supervised TE    MH 10         Total Timed Minutes:  50  Total Timed Units:  3  Total Untimed Units:  1  Charges Billed/# of units:  1 MT, 2 TE     Progress/Current Status    Subjective:     Patient ID: Hien Jeter is a 52 y.o. female.  Diagnosis:   1. Decreased range of motion     2. Muscle weakness       Pain: 2.5/10' 0/10 following interventions.  Pt reports  She drove in for BR and shoulder is sore. Especially latissimus area.    Objective:     Patient received MHP x 10 minutes to left shoulder at beginning of session followed by TE x 10 mins per log below. Pt then received MT x 15 mins consisting of STM/MFR to left anterior/lateral/posterior deltoid, subscapularis, pectoralis and UT musculature, and gentle PROM to left shoulder to all directions. Then. patient completed therex per log as below 1:1 with PTA x 25 minutes     Date 6.19.18 6/14/18 6/12/18 6/7/18 6/5/18 5/30/18 5/24/18 5/22/18 5/17/18 5/15/18 5/10/18 5/3//18 5/1/18   Visit 14 13 12 11 10 9 8 7 6 5 4 3 2   POC exp  5/22/18                FOTO At dc  At d/c At dc 10/10  Done NEXT 8/10 done 6/10 done NEXT                     UBE   -- --  rpm 3/3 120 rpm 3/3 120 rpm 3/3 Next                       MH 10 10' 10' 10' 10' 10' 10' 10' 10' 10' 10' 10' 10'                   Stretches                Pulley IR    --     Next 2x30" 2x30" 2x30" 2x30"   Corner pec    -- --  3x30"   Next 2x30" 3x30"  3x30"  2x30"                   MT         23' 15' 15' 15'                    Supine                Dowel flex  3# 2x10 2# 2x10 2# 2x10 2# 2x10 1# 2x15 1# 2x15 1# 2x15 1# 2x15 1# 2x12 1# 2x12 2x12 2x10 2x10   Dowel ER  -- -- 2# 2x10  1# 2x15 1# 2x15 1# 2x15 1# 2x15 1# 2x12 1# 2x10 Next     Dowel ABd  -- -- 2# 2x10  1# 2x10 P! 1# 2x15 1# 2x15 1# 2x15 1# 2x12 1# 2x10 Next     SA" "punches 3# 2x10 2# 2x12 2# 2x10 2# 2x10             Sidelying                Sh abd 2# 2x12 L 1# 2x12 L 1# 2x10 L 1# 2x10 L 2x15 L 2x15 2x15 2x15 2x15 2x12 2x12 2x10 2x10   Sh ER 2# 2x12 L 1# 2x12 L 1# 2x10 L 1# 2x10 L 2x15 L 2x15 2x15 2x15 2x15 2x12 2x12 2x10  2x10   Sh Flex 2# 2x12 L 1# 2x12 L 1# 2x10 L 1# 2x10 L 2x15 L 2x10 L 2x10 L         Gator 2# 2x12 L 1# 2x12 L 1# 2x10 L 1# 2x10 L 2x15 L 2x10 L 2x10 L         "T" "-"  NT 1# 2x10 L 1# 2x10             Prone                Row 2x12 2# L 2x10 1# L 2x10              scaption   Pain held             Extension  2x10 1# L 2x10 0# L              Stand                Rows 2x10 GTB oot GTB  2x10 GTB  2x10  RTB 2x15 RTB 2x15  Next Next RTB 2x15 RTB 2x12 RTB 2x10 RTB 2x10   Lats 2x10 GTB 3x10 GTC GTB  2x10 GTB  2x10  RTB 2x15 RTB 2x15  Next Next RTB 2x15 RTB 2x12 RTB 2x10 RTB 2x10   Sh ER/IR 2x10 RTB oot RTB  2x10 RTB 2x10 oot RTB 2x15  Next Next RTB 2x15 RTB 2x12 RTB 2x10 RTB 2x10   scaption 2x10 RTB oot RTB  2x10             Wall slide 15xflex  10x abd 15x flexion  10x abd L 5"x10 5"x10 Next    Next Next 2x10 w/ towel 2x10 w/towel 2x10 w/towel 2x10   Wall walks   -- next Next  Next Next         pulleys   --     Next Next 3'/3' 3'/3' 3'/3' 3'/3'   Shoulder flex   -- --      oot  2x10 w/dowel behind back    Shoulder Ext   -- --      oot  2x10 w/dowel behind back    Shoulder IR   -- --      oot  2x10 w/dowel behind back    Seen by  MB 1/6 ELIZABETH MNB MNJEFF ELIZABETH KV KV KV KV JBF KV MB 1/6      Assessment:      Pt tolerated all TE well including added prone exercises and wall activities. Cont to progress TE.ASTYM next?    Patient Education/Response:     Patient educated to continue to complete HEP on days not attending therapy with patient demonstrating understanding. Patient educated of potential side effects of ASTYM treatment to left shoulder musculature.      Plans and Goals:     Continue as rekha, progress with left shoulder stabilization exercises and ASTYM as appropriate. "     Goals to be met in 4 weeks: (4/22/18)  1) Initiate Hep - MET  2) Pt will increase L shoulder AROM by 10 degrees grossly for improved performance with overhead ADL's - Partially MET  3) Pt will report 4/10 pain in (L)shoulder at worst - Partially MET  4) Pt will demonstrate increased MMT to 4-/5 grossly L shoulder - MET  5) Patient will be able to achieve less than or equal to 48 on FOTO shoulder survey demonstrating overall improved functional ability with upper extremity. - MET        New Short Term Goals Status:   Cont with goals 2-3  Long Term Goal Status:   continue per initial plan of care.     Goals to be met by discharge:  1) Independent with HEP - Progressing towards  2) Pt will demonstrate (L) shoulder AROM WNL grossly for [place] with ADL's  - Progressing towards  3) Pt will demonstrate (L) shoulder MMT WNL grossly for [place] with functional activities  - Progressing towards  4) Independent and pain free with ADL's and IADL's - Progressing towards  5) Patient will be able to achieve less than or equal to 33 on FOTO shoulder survey demonstrating overall improved functional ability with upper extremity. - Progressing towards

## 2018-06-20 RX ORDER — ESTRADIOL AND NORETHINDRONE ACETATE 1; .5 MG/1; MG/1
1 TABLET ORAL DAILY
Qty: 90 TABLET | Refills: 0 | Status: SHIPPED | OUTPATIENT
Start: 2018-06-20 | End: 2018-08-23 | Stop reason: SDUPTHER

## 2018-06-21 ENCOUNTER — CLINICAL SUPPORT (OUTPATIENT)
Dept: REHABILITATION | Facility: HOSPITAL | Age: 53
End: 2018-06-21
Payer: COMMERCIAL

## 2018-06-21 DIAGNOSIS — M62.81 MUSCLE WEAKNESS: ICD-10-CM

## 2018-06-21 DIAGNOSIS — M25.60 DECREASED RANGE OF MOTION: ICD-10-CM

## 2018-06-21 PROCEDURE — 97140 MANUAL THERAPY 1/> REGIONS: CPT | Mod: PN

## 2018-06-21 PROCEDURE — 97110 THERAPEUTIC EXERCISES: CPT | Mod: PN

## 2018-06-21 NOTE — PROGRESS NOTES
"TIME RECORD    Date:  06/21/2018    Start Time:  3:00 am   Stop Time:  4:05 pm    PROCEDURES:    TIMED  Procedure Min.   TE 30   MT 20   NMR    GT           UNTIMED  Procedure Min.   UBE/supervised TE 6   MH          Total Timed Minutes:  54  Total Timed Units:  4  Total Untimed Units:  0  Charges Billed/# of units:  1 MT, 1 TE     Progress/Current Status    Subjective:     Patient ID: Hien Jeter is a 52 y.o. female.  Diagnosis:   1. Decreased range of motion     2. Muscle weakness       Pain: 2/10 L shoulder  Pt reports that her shoulder is feeling pretty good today.    Objective:     Pt performed UBE for 3/3 at 120 rpm for warmup, and then MT x 14 mins consisting of L shoulder Grade II-III inferior and posterior glides with GH distraction, STM/MFR to left anterior/lateral/posterior deltoid, subscapularis, pectoralis, and gentle PROM to left shoulder to all directions. Patient then completed therex per log below x 40 mins.    Date 6/21/18 6.19.18 6/14/18 6/12/18 6/7/18 6/5/18 5/30/18 5/24/18 5/22/18 5/17/18 5/15/18 5/10/18 5/3//18 5/1/18   Visit 15 14 13 12 11 10 9 8 7 6 5 4 3 2   POC exp  5/22/18                 FOTO  At dc  At d/c At dc 10/10  Done NEXT 8/10 done 6/10 done NEXT                       rpm 3/3 120 rpm 3/3  -- --  rpm 3/3 120 rpm 3/3 120 rpm 3/3 Next                         -- -- 10' 10' 10' 10' 10' 10' 10' 10' 10' 10' 10' 10'                    Stretches                 Pulley IR     --     Next 2x30" 2x30" 2x30" 2x30"   Corner pec     -- --  3x30"   Next 2x30" 3x30"  3x30"  2x30"                    MT          23' 15' 15' 15'                     Supine                 Dowel flex  3# 2x10 5'' holds 3# 2x10 2# 2x10 2# 2x10 2# 2x10 1# 2x15 1# 2x15 1# 2x15 1# 2x15 1# 2x12 1# 2x12 2x12 2x10 2x10   Dowel ER 0# 10x5'' L  -- -- 2# 2x10  1# 2x15 1# 2x15 1# 2x15 1# 2x15 1# 2x12 1# 2x10 Next     Dowel ABd 0# 15x5'' L  -- -- 2# 2x10  1# 2x10 P! 1# 2x15 1# 2x15 1# 2x15 1# 2x12 1# 2x10 Next   " "  SA punches  3# 2x10 2# 2x12 2# 2x10 2# 2x10             Sidelying                 Sh abd 2# 3x10 L 2# 2x12 L 1# 2x12 L 1# 2x10 L 1# 2x10 L 2x15 L 2x15 2x15 2x15 2x15 2x12 2x12 2x10 2x10   Sh ER 2# 2x15 L 2# 2x12 L 1# 2x12 L 1# 2x10 L 1# 2x10 L 2x15 L 2x15 2x15 2x15 2x15 2x12 2x12 2x10  2x10   Sh Flex NT 2# 2x12 L 1# 2x12 L 1# 2x10 L 1# 2x10 L 2x15 L 2x10 L 2x10 L         Gator NT 2# 2x12 L 1# 2x12 L 1# 2x10 L 1# 2x10 L 2x15 L 2x10 L 2x10 L         "T" "-"   NT 1# 2x10 L 1# 2x10             Prone                 Row 2x15 2# L 2x12 2# L 2x10 1# L 2x10              scaption    Pain held             Extension  3x10 1# L 2x10 1# L 2x10 0# L              Stand                 Rows NT 2x10 GTB oot GTB  2x10 GTB  2x10  RTB 2x15 RTB 2x15  Next Next RTB 2x15 RTB 2x12 RTB 2x10 RTB 2x10   Lats NT 2x10 GTB 3x10 GTC GTB  2x10 GTB  2x10  RTB 2x15 RTB 2x15  Next Next RTB 2x15 RTB 2x12 RTB 2x10 RTB 2x10   Sh ER/IR 2x15 RTB IR  oot for ER 2x10 RTB oot RTB  2x10 RTB 2x10 oot RTB 2x15  Next Next RTB 2x15 RTB 2x12 RTB 2x10 RTB 2x10   scaption 1# 2x10 L standing 2x10 RTB oot RTB  2x10             Wall slide NT 15xflex  10x abd 15x flexion  10x abd L 5"x10 5"x10 Next    Next Next 2x10 w/ towel 2x10 w/towel 2x10 w/towel 2x10   Wall walks    -- next Next  Next Next         pulleys    --     Next Next 3'/3' 3'/3' 3'/3' 3'/3'   Shoulder flex    -- --      oot  2x10 w/dowel behind back    Shoulder Ext    -- --      oot  2x10 w/dowel behind back    Shoulder IR    -- --      oot  2x10 w/dowel behind back    Seen by  ELIZABETH CURIEL 1/6 ELIZABETH MNB MNB ELIZABETH KV KV KV KV JBF KV MB 1/6      Assessment:     Pt tolerating TE well and demo increased AROM during TE with minimal pain. Cont to progress AROM/PROM for all TE and during MT.    Patient Education/Response:     Patient educated to continue to complete HEP on days not attending therapy with patient demonstrating understanding. Patient educated of potential side effects of ASTYM treatment to left shoulder " musculature.      Plans and Goals:     Continue as rekha, progress with left shoulder stabilization exercises and ASTYM as appropriate.     Goals to be met in 4 weeks: (4/22/18)  1) Initiate Hep - MET  2) Pt will increase L shoulder AROM by 10 degrees grossly for improved performance with overhead ADL's - Partially MET  3) Pt will report 4/10 pain in (L)shoulder at worst - Partially MET  4) Pt will demonstrate increased MMT to 4-/5 grossly L shoulder - MET  5) Patient will be able to achieve less than or equal to 48 on FOTO shoulder survey demonstrating overall improved functional ability with upper extremity. - MET        New Short Term Goals Status:   Cont with goals 2-3  Long Term Goal Status:   continue per initial plan of care.     Goals to be met by discharge:  1) Independent with HEP - Progressing towards  2) Pt will demonstrate (L) shoulder AROM WNL grossly for [place] with ADL's  - Progressing towards  3) Pt will demonstrate (L) shoulder MMT WNL grossly for [place] with functional activities  - Progressing towards  4) Independent and pain free with ADL's and IADL's - Progressing towards  5) Patient will be able to achieve less than or equal to 33 on FOTO shoulder survey demonstrating overall improved functional ability with upper extremity. - Progressing towards

## 2018-06-25 ENCOUNTER — PATIENT MESSAGE (OUTPATIENT)
Dept: NEUROLOGY | Facility: CLINIC | Age: 53
End: 2018-06-25

## 2018-06-25 ENCOUNTER — CLINICAL SUPPORT (OUTPATIENT)
Dept: REHABILITATION | Facility: HOSPITAL | Age: 53
End: 2018-06-25
Payer: COMMERCIAL

## 2018-06-25 DIAGNOSIS — M25.60 DECREASED RANGE OF MOTION: ICD-10-CM

## 2018-06-25 DIAGNOSIS — M62.81 MUSCLE WEAKNESS: ICD-10-CM

## 2018-06-25 LAB — CD20 CELLS NFR SPEC: NORMAL %

## 2018-06-25 PROCEDURE — 97110 THERAPEUTIC EXERCISES: CPT | Mod: PN

## 2018-06-25 PROCEDURE — 97140 MANUAL THERAPY 1/> REGIONS: CPT | Mod: PN

## 2018-06-25 NOTE — PROGRESS NOTES
"TIME RECORD    Date:  06/25/2018    Start Time:  1415  Stop Time:  1505    PROCEDURES:    TIMED  Procedure Min.   TE 25   MT 15   NMR    GT           UNTIMED  Procedure Min.   UBE/supervised TE    Cold pack 10'         Total Timed Minutes:  45  Total Timed Units:  2  Total Untimed Units:  0  Charges Billed/# of units:  1 MT, 1 TE     Progress/Current Status    Subjective:     Patient ID: Hien Jeter is a 52 y.o. female.  Diagnosis:   1. Decreased range of motion     2. Muscle weakness       Mrs. Jeter states that she was very sore following her last PT session.  Reports that her L shoulder has been more painful over the last few weeks.  She states like her shoulder is getting worse. 15 minutes late for her appt.   Pain: 5/10 L shoulder    Objective:     L shoulder flexion AROM = PROM.  Pain with ER posterolateral subacromial area radiating to deltoid tuberosity.  Faulty scapulohumeral rhythm with full scapular UR at 120 degrees of shoulder flexion/scaption.     PT provided manual therapy to L shoulder prior to therex including grade III/IV shoulder joint mobilizations with passive physiological shoulder flexion and ER;  5-7 bouts for 15-30 seconds each.  Total time 15'.  Pt performed therex 1:1 with PT per log sheet x 25'.  Ended session with CP to L shoulder x 10 for pain and inflammation control.     Date 6/25/2018 6/21/18 6.19.18 6/14/18 6/12/18 6/7/18 6/5/18 5/30/18 5/24/18 5/22/18 5/17/18 5/15/18 5/10/18 5/3//18 5/1/18   Visit 16  15 14 13 12 11 10 9 8 7 6 5 4 3 2   POC exp  5/22/18                  FOTO At dc  At dc  At d/c At dc 10/10  Done NEXT 8/10 done 6/10 done NEXT                       UBE  120 rpm 3/3 120 rpm 3/3  -- --  rpm 3/3 120 rpm 3/3 120 rpm 3/3 Next                           -- -- 10' 10' 10' 10' 10' 10' 10' 10' 10' 10' 10' 10'                     Stretches                  Pulley IR      --     Next 2x30" 2x30" 2x30" 2x30"   Corner pec      -- --  3x30"   Next 2x30" 3x30"  3x30"  " "2x30"                     MT           23' 15' 15' 15'                      Supine                  Dowel flex  3x10 with 3# wand BUE and then single UE 3# 2x10 5'' holds 3# 2x10 2# 2x10 2# 2x10 2# 2x10 1# 2x15 1# 2x15 1# 2x15 1# 2x15 1# 2x12 1# 2x12 2x12 2x10 2x10   Dowel ER  0# 10x5'' L  -- -- 2# 2x10  1# 2x15 1# 2x15 1# 2x15 1# 2x15 1# 2x12 1# 2x10 Next     Dowel ABd  0# 15x5'' L  -- -- 2# 2x10  1# 2x10 P! 1# 2x15 1# 2x15 1# 2x15 1# 2x12 1# 2x10 Next     SA punches   3# 2x10 2# 2x12 2# 2x10 2# 2x10             Sidelying                  Sh abd 3x10 2# 3x10 L 2# 2x12 L 1# 2x12 L 1# 2x10 L 1# 2x10 L 2x15 L 2x15 2x15 2x15 2x15 2x12 2x12 2x10 2x10   Sh ER  2# 2x15 L 2# 2x12 L 1# 2x12 L 1# 2x10 L 1# 2x10 L 2x15 L 2x15 2x15 2x15 2x15 2x12 2x12 2x10  2x10   Sh Flex  NT 2# 2x12 L 1# 2x12 L 1# 2x10 L 1# 2x10 L 2x15 L 2x10 L 2x10 L         Gator  NT 2# 2x12 L 1# 2x12 L 1# 2x10 L 1# 2x10 L 2x15 L 2x10 L 2x10 L         "T" "-"    NT 1# 2x10 L 1# 2x10             Prone                  Row  2x15 2# L 2x12 2# L 2x10 1# L 2x10              scaption     Pain held             Extension   3x10 1# L 2x10 1# L 2x10 0# L              Stand                  Rows  NT 2x10 GTB oot GTB  2x10 GTB  2x10  RTB 2x15 RTB 2x15  Next Next RTB 2x15 RTB 2x12 RTB 2x10 RTB 2x10   Lats 3x15 GTB NT 2x10 GTB 3x10 GTC GTB  2x10 GTB  2x10  RTB 2x15 RTB 2x15  Next Next RTB 2x15 RTB 2x12 RTB 2x10 RTB 2x10   Sh ER/IR 2x20 RTB 2x15 RTB IR  oot for ER 2x10 RTB oot RTB  2x10 RTB 2x10 oot RTB 2x15  Next Next RTB 2x15 RTB 2x12 RTB 2x10 RTB 2x10   scaption  1# 2x10 L standing 2x10 RTB oot RTB  2x10             Wall slide  NT 15xflex  10x abd 15x flexion  10x abd L 5"x10 5"x10 Next    Next Next 2x10 w/ towel 2x10 w/towel 2x10 w/towel 2x10   Wall walks     -- next Next  Next Next         pulleys     --     Next Next 3'/3' 3'/3' 3'/3' 3'/3'   Shoulder flex     -- --      oot  2x10 w/dowel behind back    Shoulder Ext     -- --      oot  2x10 w/dowel behind back  "   Shoulder IR     -- --      oot  2x10 w/dowel behind back    Seen by  MADI CURIEL 1/6 ELIZABETH MNB MNB ELIZABETH KV KV KV KV JBF KV MB 1/6      Assessment:     Cannot r/o adhesive capsulitis. Subacromial impingement due to superior humeral glide MIS L shoulder.     Patient Education/Response:     Patient educated to continue to complete HEP on days not attending therapy with patient demonstrating understanding. Patient educated of potential side effects of ASTYM treatment to left shoulder musculature.      Plans and Goals:     Continue as rekha, progress with left shoulder stabilization exercises and ASTYM as appropriate.     Goals to be met in 4 weeks: (4/22/18)  1) Initiate Hep - MET  2) Pt will increase L shoulder AROM by 10 degrees grossly for improved performance with overhead ADL's - Partially MET  3) Pt will report 4/10 pain in (L)shoulder at worst - Partially MET  4) Pt will demonstrate increased MMT to 4-/5 grossly L shoulder - MET  5) Patient will be able to achieve less than or equal to 48 on FOTO shoulder survey demonstrating overall improved functional ability with upper extremity. - MET        New Short Term Goals Status:   Cont with goals 2-3  Long Term Goal Status:   continue per initial plan of care.     Goals to be met by discharge:  1) Independent with HEP - Progressing towards  2) Pt will demonstrate (L) shoulder AROM WNL grossly for [place] with ADL's  - Progressing towards  3) Pt will demonstrate (L) shoulder MMT WNL grossly for [place] with functional activities  - Progressing towards  4) Independent and pain free with ADL's and IADL's - Progressing towards  5) Patient will be able to achieve less than or equal to 33 on FOTO shoulder survey demonstrating overall improved functional ability with upper extremity. - Progressing towards

## 2018-06-27 ENCOUNTER — CLINICAL SUPPORT (OUTPATIENT)
Dept: REHABILITATION | Facility: HOSPITAL | Age: 53
End: 2018-06-27
Payer: COMMERCIAL

## 2018-06-27 DIAGNOSIS — M62.81 MUSCLE WEAKNESS: ICD-10-CM

## 2018-06-27 DIAGNOSIS — M25.60 DECREASED RANGE OF MOTION: ICD-10-CM

## 2018-06-27 PROCEDURE — 97110 THERAPEUTIC EXERCISES: CPT | Mod: PN

## 2018-06-27 PROCEDURE — 97140 MANUAL THERAPY 1/> REGIONS: CPT | Mod: PN

## 2018-06-27 RX ORDER — ACETAMINOPHEN 325 MG/1
1000 TABLET ORAL
Status: CANCELLED | OUTPATIENT
Start: 2018-06-27 | End: 2018-06-27

## 2018-06-27 RX ORDER — HEPARIN 100 UNIT/ML
100 SYRINGE INTRAVENOUS
Status: CANCELLED | OUTPATIENT
Start: 2018-06-27

## 2018-06-27 RX ORDER — SODIUM CHLORIDE 0.9 % (FLUSH) 0.9 %
10 SYRINGE (ML) INJECTION
Status: CANCELLED | OUTPATIENT
Start: 2018-06-27

## 2018-06-27 NOTE — PROGRESS NOTES
"TIME RECORD    Date:  06/27/2018    Start Time:  1415  Stop Time:  1505    PROCEDURES:    TIMED  Procedure Min.   TE 35   MT 15   NMR    GT           UNTIMED  Procedure Min.   UBE/supervised TE    Cold pack          Total Timed Minutes:  50  Total Timed Units:  3  Total Untimed Units:  0  Charges Billed/# of units:  3 (MT-1, TE-2)     Progress/Current Status    Subjective:     Patient ID: Hien Jeter is a 53 y.o. female.  Diagnosis:   1. Decreased range of motion     2. Muscle weakness       Mrs. Jeter states that she was very sore following her last PT session.  Reports that her L shoulder has been more painful over the last few weeks.  She states like her shoulder is getting worse. 15 minutes late for her appt.   Pain: 5/10 L shoulder    Objective:        PT provided manual therapy to L shoulder prior to therex including grade III/IV shoulder joint mobilizations with passive physiological shoulder flexion and ER x 15' f/b TE x 35'     Date 6/27/18 6/25/2018 6/21/18   Visit 17 16  15   POC exp  7/22/18      FOTO dc At dc          UBE NT  120 rpm 3/3         MH NT  --         Stretches      Carmen IR NT     Corner pec NT           MT            Supine      Dowel flex  3x10 with 3# wand BUE and then single UE 3x10 with 3# wand BUE and then single UE 3# 2x10 5'' holds   Dowel ER   0# 10x5'' L   Dowel ABd   0# 15x5'' L   SA punches      Sidelying      Sh abd 3x10 L 3x10 2# 3x10 L   Sh ER NT  2# 2x15 L   Sh Flex NT  NT   Gator NT  NT   "T" "-" NT     Prone      Row NT  2x15 2# L   scaption NT     Extension  NT  3x10 1# L   Stand      Rows   NT   Lats 3x15 GTB 3x15 GTB NT   Sh ER/IR 2x20 RTB 2x20 RTB 2x15 RTB IR  oot for ER   scaption NT  1# 2x10 L standing   Wall slide NT  NT   Wall walks NT     pulleys NT     Shoulder flex NT     Shoulder Ext NT     Shoulder IR NT     Seen by  HIRAM BARRAZA ELIZABETH     Assessment:     Pt with increased difficulty performing TE as she states her shoulder is more painful. Pt exhibiting s/s " consistent with Frozen Shoulder. Possibly trial FDN, ASTYM next visit.    Patient Education/Response:     Cont HEP.      Plans and Goals:     Continue as rekha, progress with left shoulder stabilization exercises and ASTYM as appropriate.     Goals to be met in 4 weeks: (4/22/18)  1) Initiate Hep - MET  2) Pt will increase L shoulder AROM by 10 degrees grossly for improved performance with overhead ADL's - Partially MET  3) Pt will report 4/10 pain in (L)shoulder at worst - Partially MET  4) Pt will demonstrate increased MMT to 4-/5 grossly L shoulder - MET  5) Patient will be able to achieve less than or equal to 48 on FOTO shoulder survey demonstrating overall improved functional ability with upper extremity. - MET        New Short Term Goals Status:   Cont with goals 2-3  Long Term Goal Status:   continue per initial plan of care.     Goals to be met by discharge:  1) Independent with HEP - Progressing towards  2) Pt will demonstrate (L) shoulder AROM WNL grossly for [place] with ADL's  - Progressing towards  3) Pt will demonstrate (L) shoulder MMT WNL grossly for [place] with functional activities  - Progressing towards  4) Independent and pain free with ADL's and IADL's - Progressing towards  5) Patient will be able to achieve less than or equal to 33 on FOTO shoulder survey demonstrating overall improved functional ability with upper extremity. - Progressing towards

## 2018-06-27 NOTE — TELEPHONE ENCOUNTER
Pt is scheduled for her maintenance Ocrevus on 7/30/18. Her initial infusions were on 1/30/18 and 2/14/18. Labs completed on 6/11/18--WBC 4.58, , CD19, 20=0. Auth approved:      Approved   Primary Insurance:  BLUE CROSS BLUE [facility]   Authorization #: [id number] - 01/08/2018 - 01/08/2019 - [facility]     OCREVUS 300 MG Q2WKS X 2 THEN 600 MG Q6M X 4 TOTAL VISITS APPROVED

## 2018-06-29 ENCOUNTER — PATIENT MESSAGE (OUTPATIENT)
Dept: INTERNAL MEDICINE | Facility: CLINIC | Age: 53
End: 2018-06-29

## 2018-06-29 ENCOUNTER — PATIENT MESSAGE (OUTPATIENT)
Dept: ORTHOPEDICS | Facility: CLINIC | Age: 53
End: 2018-06-29

## 2018-06-29 RX ORDER — AZELASTINE 1 MG/ML
1 SPRAY, METERED NASAL 2 TIMES DAILY
Qty: 30 ML | Refills: 11 | Status: SHIPPED | OUTPATIENT
Start: 2018-06-29 | End: 2019-07-10 | Stop reason: SDUPTHER

## 2018-07-02 ENCOUNTER — OFFICE VISIT (OUTPATIENT)
Dept: ORTHOPEDICS | Facility: CLINIC | Age: 53
End: 2018-07-02
Payer: COMMERCIAL

## 2018-07-02 VITALS — HEIGHT: 62 IN | WEIGHT: 133 LBS | BODY MASS INDEX: 24.48 KG/M2

## 2018-07-02 DIAGNOSIS — F32.0 MILD SINGLE CURRENT EPISODE OF MAJOR DEPRESSIVE DISORDER: ICD-10-CM

## 2018-07-02 DIAGNOSIS — M75.02 ADHESIVE CAPSULITIS OF LEFT SHOULDER: ICD-10-CM

## 2018-07-02 PROCEDURE — 20610 DRAIN/INJ JOINT/BURSA W/O US: CPT | Mod: LT,S$GLB,, | Performed by: ORTHOPAEDIC SURGERY

## 2018-07-02 PROCEDURE — 3008F BODY MASS INDEX DOCD: CPT | Mod: CPTII,S$GLB,, | Performed by: ORTHOPAEDIC SURGERY

## 2018-07-02 PROCEDURE — 99999 PR PBB SHADOW E&M-EST. PATIENT-LVL II: CPT | Mod: PBBFAC,,, | Performed by: ORTHOPAEDIC SURGERY

## 2018-07-02 PROCEDURE — 99213 OFFICE O/P EST LOW 20 MIN: CPT | Mod: 25,S$GLB,, | Performed by: ORTHOPAEDIC SURGERY

## 2018-07-02 RX ORDER — TRIAMCINOLONE ACETONIDE 40 MG/ML
40 INJECTION, SUSPENSION INTRA-ARTICULAR; INTRAMUSCULAR
Status: COMPLETED | OUTPATIENT
Start: 2018-07-02 | End: 2018-07-02

## 2018-07-02 RX ADMIN — TRIAMCINOLONE ACETONIDE 40 MG: 40 INJECTION, SUSPENSION INTRA-ARTICULAR; INTRAMUSCULAR at 03:07

## 2018-07-02 NOTE — PROGRESS NOTES
HISTORY OF PRESENT ILLNESS:  Ms. Jeter in followup adhesive capsulitis of the   left shoulder, had been doing quite well then had a little bit of a setback last   week, now she is having increased pain and less motion in the shoulder.  She   did well with the injection in the past.  She does have multiple sclerosis.    PHYSICAL EXAMINATION:  LEFT SHOULDER:  There is mild diffuse tenderness mostly posteriorly.  Range of   motion of left shoulder is limited secondary to pain.  Rotator cuff strength   intact.    IMPRESSION:  Adhesive capsulitis, left shoulder with flare-up.    PLAN:  I have recommended we try another injection.  The patient is in   agreement.  After pause for timeout, she identified the left shoulder injected   with combination of Kenalog 40 mg, 2 mL Xylocaine, sterile technique, which she   tolerated well without complication.    I recommend she continue anti-inflammatory medication by mouth, continue therapy   and follow up in one month for recheck.  If symptoms persist, then we may need   to consider surgical treatment.      MANISH  dd: 07/02/2018 15:22:47 (CDT)  td: 07/03/2018 12:45:32 (LAURELT)  Doc ID   #[id number]  Job ID #[id number]    CC:

## 2018-07-03 RX ORDER — ESCITALOPRAM OXALATE 10 MG/1
15 TABLET ORAL NIGHTLY
Qty: 45 TABLET | Refills: 2 | Status: SHIPPED | OUTPATIENT
Start: 2018-07-03 | End: 2018-09-26 | Stop reason: SDUPTHER

## 2018-07-05 ENCOUNTER — CLINICAL SUPPORT (OUTPATIENT)
Dept: REHABILITATION | Facility: HOSPITAL | Age: 53
End: 2018-07-05
Payer: COMMERCIAL

## 2018-07-05 DIAGNOSIS — M62.81 MUSCLE WEAKNESS: ICD-10-CM

## 2018-07-05 DIAGNOSIS — M25.60 DECREASED RANGE OF MOTION: ICD-10-CM

## 2018-07-05 PROCEDURE — 97110 THERAPEUTIC EXERCISES: CPT | Mod: PN

## 2018-07-05 PROCEDURE — 97140 MANUAL THERAPY 1/> REGIONS: CPT | Mod: PN

## 2018-07-05 NOTE — PROGRESS NOTES
"DAILY TREATMENT NOTE    DATE: 7/5/2018    Start Time:  100  Stop Time:  145    PROCEDURES:    TIMED  Procedure Min.   Therex supervised 15   Manual therapy 15   Therex 15     Total Timed Minutes:  30  Total Timed Units:  2  Total Untimed Units:  0  Charges Billed/# of units:  2  MTx1, TEx1      Progress/Current Status    Subjective:     Patient ID: Hien Jeter is a 53 y.o. female.  Diagnosis:   1. Decreased range of motion     2. Muscle weakness       Pain: 2 /10  "Goes to 3-4/10 when I'm doing something"    Objective:     Manual therapy [id number] minutes to L shoulder including lateral telescoping to left shoulder, GHJ anterior/posterior mobs to promote increased flexion and ER, PROM with end range stretching with gentle oscillations.  Patient then performed therex x 15 minutes with supervision and additional 15 minutes with 1:1 by PTA.  Resumed sidelying and prone therex as noted.      Date 7/5/18 6/27/18 6/25/2018 6/21/18   Visit 18 17 16  15   POC exp  7/22/18       FOTO dc dc At dc           UBE NT NT  120 rpm 3/3          MH NT NT  --          Stretches       Carmen IR  NT     Corner pec 30"x3  NT            MT              Supine       Dowel flex  3x10 with 3# wand BUE and then single UE 3x10 with 3# wand BUE and then single UE 3x10 with 3# wand BUE and then single UE 3# 2x10 5'' holds   Dowel ER 0# 5"x 10 L   0# 10x5'' L   Dowel ABd    0# 15x5'' L   SA punches       Sidelying       Sh abd 2x10 L 3x10 L 3x10 2# 3x10 L   Sh ER 2x10 L NT  2# 2x15 L   Sh Flex 2x10 L NT  NT   Gator 2x10 L NT  NT   "T" "-" NT NT     Prone       Row 2x15 L NT  2x15 2# L   scaption NT NT     Extension  2x15 L NT  3x10 1# L   Stand       Rows    NT   Lats NT 3x15 GTB 3x15 GTB NT   Sh ER/IR NT 2x20 RTB 2x20 RTB 2x15 RTB IR  oot for ER   scaption -- NT  1# 2x10 L standing   Wall slide -- NT  NT   Wall walks -- NT     pulleys -- NT     Shoulder flex -- NT     Shoulder Ext -- NT     Shoulder IR  NT     Seen by  DH 1/6 KV MADI JOHNSON "       Assessment:     Patient able to resume activity today without complaint of increased pain or difficulty. Limited increase so as not to cause exacerbation of symptoms.  No complaints of increased pain after treatment.    Patient Education/Response:     Patient educated to continue HEP.  Verbalized understanding.      Plans and Goals:     Continue as rekha, progress with left shoulder stabilization exercises and ASTYM as appropriate.  Monitor response to today's treatment.    Goals to be met in 4 weeks: (4/22/18)  1) Initiate Hep - MET  2) Pt will increase L shoulder AROM by 10 degrees grossly for improved performance with overhead ADL's - Partially MET  3) Pt will report 4/10 pain in (L)shoulder at worst - Partially MET  4) Pt will demonstrate increased MMT to 4-/5 grossly L shoulder - MET  5) Patient will be able to achieve less than or equal to 48 on FOTO shoulder survey demonstrating overall improved functional ability with upper extremity. - MET        New Short Term Goals Status:   Cont with goals 2-3  Long Term Goal Status:   continue per initial plan of care.     Goals to be met by discharge:  1) Independent with HEP - Progressing towards  2) Pt will demonstrate (L) shoulder AROM WNL grossly for [place] with ADL's  - Progressing towards  3) Pt will demonstrate (L) shoulder MMT WNL grossly for [place] with functional activities  - Progressing towards  4) Independent and pain free with ADL's and IADL's - Progressing towards  5) Patient will be able to achieve less than or equal to 33 on FOTO shoulder survey demonstrating overall improved functional ability with upper extremity. - Progressing towards

## 2018-07-05 NOTE — PROGRESS NOTES
"  Physical Therapy Daily Treatment Note     Name: Hien Jeter  Clinic Number: [id number]    Therapy Diagnosis:   Encounter Diagnoses   Name Primary?    Decreased range of motion     Muscle weakness      Physician: Rolando Sawyer MD    Visit Date: 7/5/2018  Physician Orders: PT Eval and Treat  Medical Diagnosis: Impingement syndrome of left shoulder  Evaluation Date: ***  Authorization Period Expiration: ***  Plan of Care Certification Period: ***  Visit #/Visits authorized: ***/ ***    Time In: ***  Time Out: ***      Total Billable Time: *** minutes    Precautions: {IP WOUND PRECAUTIONS OHS:60742}    Subjective      Pt reports: she {Actions; was/was not:95915} compliant with home exercise program given last session.   Response to previous treatment:***  Functional change: ***    Pain: {0-10:61796::"0"}/10  Location: {RIGHT/LEFT/BILATERAL:96575} {LOCATION ON BODY:06816}     Objective     Hien received therapeutic exercises to develop {AMB PT PROGRESS OBJECTIVE:04798} for *** minutes including:  ***    Hien received the following manual therapy techniques: {AMB PT PROGRESS MANUAL THERAPY:17540} were applied to the: *** for *** minutes, including:  ***    Hien participated in neuromuscular re-education activities to improve: {AMB PT PROGRESS NEURO RE-ED:17762} for *** minutes. The following activities were included:  ***    Hien participated in dynamic functional therapeutic activities to improve functional performance for ***  minutes, including:  ***    Hien participated in gait training to improve functional mobility and safety for ***  minutes, including:  ***    Hien received {Blank single:78089::"hot","cold"} pack for *** minutes to {Blank single:43358::"increase circulation and promote tissue healing","to decrease circulation, pain, and swelling"}.    Hien received the following direct contact modalities after being cleared for contraindications: {AMB PT PROGRESS DIRECT CONTACT MODES:33272}    Hien " "received the following supervised modalities after being cleared for contradictions: {AMB PT SUPERVISED MODES:66273}       Home Exercises Provided and Patient Education Provided     Education provided:   - ***    Written Home Exercises Provided: {Blank single:33624::"yes","Patient instructed to cont prior HEP"}.  Exercises were reviewed and Hien was able to demonstrate them prior to the end of the session.  Hien demonstrated {Desc; good/fair/poor:35334} understanding of the education provided.     Pt received a written copy of exercises to perform at home.   See EMR under {Blank single:38317::"Media","Patient Instructions"} for exercises given.    Hien demonstrated {Desc; good/fair/poor:41888} understanding of the education provided.     Assessment     ***    Hien is not progressing well towards her goals.   Pt prognosis is Fair.     Pt will continue to benefit from skilled outpatient physical therapy to address the deficits listed in the problem list box on initial evaluation, provide pt/family education and to maximize pt's level of independence in the home and community environment.     Pt's spiritual, cultural and educational needs considered and pt agreeable to plan of care and goals.    Anticipated barriers to physical therapy: none    Goals:     Goals to be met in 4 weeks: (4/22/18)  1) Initiate Hep - MET  2) Pt will increase L shoulder AROM by 10 degrees grossly for improved performance with overhead ADL's - Partially MET  3) Pt will report 4/10 pain in (L)shoulder at worst - Partially MET  4) Pt will demonstrate increased MMT to 4-/5 grossly L shoulder - MET  5) Patient will be able to achieve less than or equal to 48 on FOTO shoulder survey demonstrating overall improved functional ability with upper extremity. - MET        New Short Term Goals Status:   Cont with goals 2-3  Long Term Goal Status:   continue per initial plan of care.     Goals to be met by discharge:  1) Independent with HEP - " Progressing towards  2) Pt will demonstrate (L) shoulder AROM WNL grossly for [place] with ADL's  - Progressing towards  3) Pt will demonstrate (L) shoulder MMT WNL grossly for [place] with functional activities  - Progressing towards  4) Independent and pain free with ADL's and IADL's - Progressing towards  5) Patient will be able to achieve less than or equal to 33 on FOTO shoulder survey demonstrating overall improved functional ability with upper extremity. - Progressing towards        Plan     ***    Fani Breen, PT

## 2018-07-10 ENCOUNTER — CLINICAL SUPPORT (OUTPATIENT)
Dept: REHABILITATION | Facility: HOSPITAL | Age: 53
End: 2018-07-10
Payer: COMMERCIAL

## 2018-07-10 DIAGNOSIS — M25.60 DECREASED RANGE OF MOTION: ICD-10-CM

## 2018-07-10 DIAGNOSIS — M62.81 MUSCLE WEAKNESS: ICD-10-CM

## 2018-07-10 PROCEDURE — 97140 MANUAL THERAPY 1/> REGIONS: CPT | Mod: PN

## 2018-07-10 PROCEDURE — 97110 THERAPEUTIC EXERCISES: CPT | Mod: PN

## 2018-07-10 NOTE — PROGRESS NOTES
"  Physical Therapy Daily Treatment Note     Name: Hien Jeter  Clinic Number: [id number]    Therapy Diagnosis:   Encounter Diagnoses   Name Primary?    Decreased range of motion     Muscle weakness      Physician: Rolando Sawyer MD    Visit Date: 7/10/2018  Physician Orders: PT Eval and Treat  Medical Diagnosis: Impingement syndrome of left shoulder  Evaluation Date: 3/22/18  Authorization Period Expiration: 12/31/2018  Plan of Care Certification Period: 5/22/18 to 7/22/18  Visit #/Visits authorized: 19/ 50    Time In: 1413  Time Out: 1520    PROCEDURES:    TIMED  Procedure Min.   MT 15   TE 42     UNTIMED  Procedure Min.   CP 10       Total Timed Minutes:  57  Total Timed Units:  4  Total Untimed Units:  0  Total Billable Time: 57 minutes  Charges Billed/# of units:  4 (MT-1, TE-3)      Precautions: Standard and Multiple Sclerosis    Subjective      Pt reports: she her L shoulder is feeling "pretty good" today  Response to previous treatment: no adverse reactions  Functional change: none    Pain: 2/10  Location: left shoulder      Objective     Hien received therapeutic exercises to develop strength, endurance, ROM and posture for 42 minutes including:  Log below    Date 7/10/18 7/5/18 6/27/18   Visit 19 18 17   POC exp  7/22/18      FOTO dc dc dc         Stretches      Corner pec NT 30"x3  NT         MT 15'           Supine      Dowel flex  3x10 with 3# wand BUE and then single UE 3x10 with 3# wand BUE and then single UE 3x10 with 3# wand BUE and then single UE   Dowel ER 0# 15x5" L 0# 5"x 10 L    Dowel ABd NT     SA punches      Sidelying      Sh abd 2x10 L 2x10 L 3x10 L   Sh ER 2x10 L 2x10 L NT   Sh Flex 2x10 L 2x10 L NT   Gator 2x10 L 2x10 L NT   "T" "-" NT NT NT   Prone      Row 3# wand 2x10 L 2x15 L NT   scaption 2x10 L NT NT   Extension  3# 2x10 L 2x15 L NT   Stand      Rows BTC 3x10     Lats BTC 3x10 NT 3x15 GTB   Sh ER/IR GTB 2x10 NT 2x20 RTB   TB  ER  Hor ABd  OH ABd   Next  Next  Next           Seen " by  HIRAM  1/6 KV       Hien received the following manual therapy techniques: Joint mobilizations, Myofacial release and Soft tissue Mobilization were applied to the: L shoulder for 15 minutes, including:  STM/MFR to L UT and subscap, Grade I-III inferior joint mobs into flexion and ABduction    Hien received cold pack for 10 minutes to to decrease circulation, pain, and swelling.        Home Exercises Provided and Patient Education Provided     Education provided:   - pt may experience soreness after today's treatment session and for a couple of days following treatment due to progression of exercises    Written Home Exercises Provided: Patient instructed to cont prior HEP.  Exercises were reviewed and Hien was able to demonstrate them prior to the end of the session.  Hien demonstrated good  understanding of the education provided.     Pt received a written copy of exercises to perform at home. - No  See EMR under Patient Instructions for exercises given. - No    Hien demonstrated good  understanding of the education provided.     Assessment     Pt tolerated progression of exercises well. Pt   Hien is progressing well towards her goals.   Pt prognosis is Good.     Pt will continue to benefit from skilled outpatient physical therapy to address the deficits listed in the problem list box on initial evaluation, provide pt/family education and to maximize pt's level of independence in the home and community environment.     Pt's spiritual, cultural and educational needs considered and pt agreeable to plan of care and goals.    Anticipated barriers to physical therapy: none    Goals:     Goals to be met in 4 weeks: (4/22/18)  1) Initiate Hep - MET  2) Pt will increase L shoulder AROM by 10 degrees grossly for improved performance with overhead ADL's - Partially MET  3) Pt will report 4/10 pain in (L)shoulder at worst - Partially MET  4) Pt will demonstrate increased MMT to 4-/5 grossly L shoulder - MET  5)  Patient will be able to achieve less than or equal to 48 on FOTO shoulder survey demonstrating overall improved functional ability with upper extremity. - MET        New Short Term Goals Status:   Cont with goals 2-3  Long Term Goal Status:   continue per initial plan of care.     Goals to be met by discharge:  1) Independent with HEP - Progressing towards  2) Pt will demonstrate (L) shoulder AROM WNL grossly for [place] with ADL's  - Progressing towards  3) Pt will demonstrate (L) shoulder MMT WNL grossly for [place] with functional activities  - Progressing towards  4) Independent and pain free with ADL's and IADL's - Progressing towards  5) Patient will be able to achieve less than or equal to 33 on FOTO shoulder survey demonstrating overall improved functional ability with upper extremity. - Progressing towards      Plan     Cont POC. Progress UE TE next visit.    Fani Breen, PT

## 2018-07-12 ENCOUNTER — CLINICAL SUPPORT (OUTPATIENT)
Dept: REHABILITATION | Facility: HOSPITAL | Age: 53
End: 2018-07-12
Payer: COMMERCIAL

## 2018-07-12 DIAGNOSIS — M25.60 DECREASED RANGE OF MOTION: ICD-10-CM

## 2018-07-12 DIAGNOSIS — M62.81 MUSCLE WEAKNESS: ICD-10-CM

## 2018-07-12 PROCEDURE — 97140 MANUAL THERAPY 1/> REGIONS: CPT | Mod: PN

## 2018-07-12 PROCEDURE — 97110 THERAPEUTIC EXERCISES: CPT | Mod: PN

## 2018-07-12 NOTE — PROGRESS NOTES
"  Physical Therapy Daily Treatment Note     Name: Hien Jeter  Clinic Number: [id number]    Therapy Diagnosis:   Encounter Diagnoses   Name Primary?    Decreased range of motion     Muscle weakness      Physician: Rolando Sawyer MD    Visit Date: 7/12/2018  Physician Orders: PT Eval and Treat  Medical Diagnosis: Impingement syndrome of left shoulder  Evaluation Date: 3/22/18  Authorization Period Expiration: 12/31/2018  Plan of Care Certification Period: 5/22/18 to 7/22/18  Visit #/Visits authorized: 20/ 50    Time In: 1402  Time Out: 1505    PROCEDURES:    TIMED  Procedure Min.   MT 15   TE 38     UNTIMED  Procedure Min.   CP 10       Total Timed Minutes:  53  Total Timed Units:  4  Total Untimed Units:  0  Total Billable Time: 53 minutes  Charges Billed/# of units:  4 (MT-1, TE-3)      Precautions: Standard and Multiple Sclerosis    Subjective     Pt reports: no new complaints  She was compliant with home exercise program.  Response to previous treatment: no adverse reactions  Functional change: none    Pain: 2/10  Location: left shoulder      Objective     Hien received therapeutic exercises to develop strength, endurance, ROM, flexibility and posture for 38 minutes including:  Log below    Date 7/12/18 7/10/18 7/5/18 6/27/18   Visit 20 19 18 17   POC exp  7/22/18       FOTO dc dc dc dc          UBE 90rpm 3'/3'             Stretches       Corner pec NT NT 30"x3  NT          MT 10' 15'            Supine       Dowel flex  3x10 with 4# wand BUE and then single UE 3x10 with 3# wand BUE and then single UE 3x10 with 3# wand BUE and then single UE 3x10 with 3# wand BUE and then single UE   Dowel ER NT 0# 15x5" L 0# 5"x 10 L    Sidelying       Sh abd 2# 2x10 L 2x10 L 2x10 L 3x10 L   Sh ER 2# 2x10 L 2x10 L 2x10 L NT   Sh Flex 2# 2x10 L 2x10 L 2x10 L NT   Gator 2# 2x10 L 2x10 L 2x10 L NT   Prone       Row NT 3# wand 2x10 L 2x15 L NT   scaption NT 2x10 L NT NT   Extension  NT 3# 2x10 L 2x15 L NT   Stand       Rows NT " "BTC 3x10     Lats NT BTC 3x10 NT 3x15 GTB   Sh ER/IR NT GTB 2x10 NT 2x20 RTB   TB  ER  Hor ABd  OH ABd YTB  2x10  2x10  2x10   Next  Next  Next     Wall push up plus 2x10             Seen by  KV HIRAM GOLDSTEIN 1/6 KV       Hien received the following manual therapy techniques: Joint mobilizations, Myofacial release and Soft tissue Mobilization were applied to the: L shoulder for 15 minutes, including:  STM/MFR to L UT, ASTYM to L shoulder per protocol, and shoulder IR Grade III joint mob with superior glide of humerus into IR    Hien received cold pack for 10 minutes to to decrease circulation, pain, and swelling.      Home Exercises Provided and Patient Education Provided     Education provided:   - be aware of posture at all times    Written Home Exercises Provided: Patient instructed to cont prior HEP.  Exercises were reviewed and Hien was able to demonstrate them prior to the end of the session.  Hien demonstrated good  understanding of the education provided.     See EMR under Patient Instructions for exercises provided prior visit.    Assessment     Pt tolerated treatment well with reports of IR stretch feeling "easier" after mobilization. She tolerated progression of exercises well with no c/o increased pain or discomfort.     Hien is progressing well towards her goals.   Pt prognosis is Fair.     Pt will continue to benefit from skilled outpatient physical therapy to address the deficits listed in the problem list box on initial evaluation, provide pt/family education and to maximize pt's level of independence in the home and community environment.     Pt's spiritual, cultural and educational needs considered and pt agreeable to plan of care and goals.    Anticipated barriers to physical therapy: none    Goals:     Goals to be met in 4 weeks: (4/22/18)  1) Initiate Hep - MET  2) Pt will increase L shoulder AROM by 10 degrees grossly for improved performance with overhead ADL's - Partially MET  3) Pt will report " 4/10 pain in (L)shoulder at worst - Partially MET  4) Pt will demonstrate increased MMT to 4-/5 grossly L shoulder - MET  5) Patient will be able to achieve less than or equal to 48 on FOTO shoulder survey demonstrating overall improved functional ability with upper extremity. - MET        New Short Term Goals Status:   Cont with goals 2-3  Long Term Goal Status:   continue per initial plan of care.     Goals to be met by discharge:  1) Independent with HEP - Progressing towards  2) Pt will demonstrate (L) shoulder AROM WNL grossly for [place] with ADL's  - Progressing towards  3) Pt will demonstrate (L) shoulder MMT WNL grossly for [place] with functional activities  - Progressing towards  4) Independent and pain free with ADL's and IADL's - Progressing towards  5) Patient will be able to achieve less than or equal to 33 on FOTO shoulder survey demonstrating overall improved functional ability with upper extremity. - Progressing towards    Plan     Cont POC.    Fani Breen, PT

## 2018-07-17 ENCOUNTER — CLINICAL SUPPORT (OUTPATIENT)
Dept: REHABILITATION | Facility: HOSPITAL | Age: 53
End: 2018-07-17
Payer: COMMERCIAL

## 2018-07-17 DIAGNOSIS — M62.81 MUSCLE WEAKNESS: ICD-10-CM

## 2018-07-17 DIAGNOSIS — M25.60 DECREASED RANGE OF MOTION: ICD-10-CM

## 2018-07-17 PROCEDURE — 97110 THERAPEUTIC EXERCISES: CPT | Mod: PN

## 2018-07-17 PROCEDURE — 97140 MANUAL THERAPY 1/> REGIONS: CPT | Mod: PN

## 2018-07-17 NOTE — PROGRESS NOTES
"  Physical Therapy Daily Treatment Note     Name: Hien Jeter  Clinic Number: [id number]    Therapy Diagnosis:   Encounter Diagnoses   Name Primary?    Decreased range of motion     Muscle weakness      Physician: Rolando Sawyer MD    Visit Date: 7/17/2018  Physician Orders: PT Eval and Treat  Medical Diagnosis: Impingement syndrome of left shoulder  Evaluation Date: 3/22/18  Authorization Period Expiration: 12/31/2018  Plan of Care Certification Period: 5/22/18 to 7/22/18  Visit #/Visits authorized: 20/ 50    Time In: 1:00  Time Out: 2:05    PROCEDURES:    TIMED  Procedure Min.   MT 15   TE 15   TE Sup 20     UNTIMED  Procedure Min.   CP 10       Total Timed Minutes:  30  Total Timed Units:  2  Total Untimed Units:  0  Total Billable Time: 30 minutes  Charges Billed/# of units:  4 (MT-1, TE-1)      Precautions: Standard and Multiple Sclerosis    Subjective     Pt reports: no new complaints  She was compliant with home exercise program.  Response to previous treatment: no adverse reactions  Functional change: nonecurrently    Pain: 0/10  Location: No left shoulder  Pain     Objective     Hien received therapeutic exercises to develop strength, endurance, ROM, flexibility and posture for 38 minutes including:  Log below    Date 7/17/18 7/12/18 7/10/18 7/5/18 6/27/18   Visit 21 20 19 18 17   POC exp  7/22/18        FOTO  dc dc dc dc           UBE 90 prm 3'/3' 90rpm 3'/3'              Stretches        Corner pec  NT NT 30"x3  NT           MT 15' 10' 15'             Supine        Dowel flex  3x10 w/4# dowel  Wand B UE L  Then single UE 4# 3x10 with 4# wand BUE and then single UE 3x10 with 3# wand BUE and then single UE 3x10 with 3# wand BUE and then single UE 3x10 with 3# wand BUE and then single UE   Dowel ER  NT 0# 15x5" L 0# 5"x 10 L    Sidelying        Sh abd 2# 2x10 L 2# 2x10 L 2x10 L 2x10 L 3x10 L   Sh ER 2# 2x10 L 2# 2x10 L 2x10 L 2x10 L NT   Sh Flex 2# 2x10 L 2# 2x10 L 2x10 L 2x10 L NT   Gator 2# 2x10 L 2# " "2x10 L 2x10 L 2x10 L NT   Prone        Row  NT 3# wand 2x10 L 2x15 L NT   scaption  NT 2x10 L NT NT   Extension   NT 3# 2x10 L 2x15 L NT   Stand        Rows  NT BTC 3x10     Lats  NT BTC 3x10 NT 3x15 GTB   Sh ER/IR  NT GTB 2x10 NT 2x20 RTB   TB  ER  Hor ABd  OH ABd YTB  2x10 B  2x10 B  2x10 B YTB  2x10  2x10  2x10   Next  Next  Next     Wall push up plus 2x10  2x10              Seen by  MB  KV KV DH 1/6 KV       Hien received the following manual therapy techniques: Joint mobilizations, Myofacial release and Soft tissue Mobilization were applied to the: L shoulder for 15 minutes, including:  STM/MFR to L UT, (ASTYM Not performed today) to L shoulder per protocol, and shoulder IR Grade III joint mob with superior glide of humerus into IR    Hien received cold pack for 10 minutes to to decrease circulation, pain, and swelling.      Home Exercises Provided and Patient Education Provided     Education provided:   - be aware of posture at all times    Written Home Exercises Provided: Patient instructed to cont prior HEP.  Exercises were reviewed and Hien was able to demonstrate them prior to the end of the session.  Hien demonstrated good  understanding of the education provided.     See EMR under Patient Instructions for exercises provided prior visit.    Assessment     Pt tolerated treatment well with reports of IR stretch feeling "easier" after mobilization. She tolerated progression of exercises well with no c/o increased pain or discomfort.     Hien is progressing well towards her goals.   Pt prognosis is Fair.     Pt will continue to benefit from skilled outpatient physical therapy to address the deficits listed in the problem list box on initial evaluation, provide pt/family education and to maximize pt's level of independence in the home and community environment.     Pt's spiritual, cultural and educational needs considered and pt agreeable to plan of care and goals.    Anticipated barriers to physical " therapy: none    Goals:     Goals to be met in 4 weeks: (4/22/18)  1) Initiate Hep - MET  2) Pt will increase L shoulder AROM by 10 degrees grossly for improved performance with overhead ADL's - Partially MET  3) Pt will report 4/10 pain in (L)shoulder at worst - Partially MET  4) Pt will demonstrate increased MMT to 4-/5 grossly L shoulder - MET  5) Patient will be able to achieve less than or equal to 48 on FOTO shoulder survey demonstrating overall improved functional ability with upper extremity. - MET        New Short Term Goals Status:   Cont with goals 2-3  Long Term Goal Status:   continue per initial plan of care.     Goals to be met by discharge:  1) Independent with HEP - Progressing towards  2) Pt will demonstrate (L) shoulder AROM WNL grossly for [place] with ADL's  - Progressing towards  3) Pt will demonstrate (L) shoulder MMT WNL grossly for [place] with functional activities  - Progressing towards  4) Independent and pain free with ADL's and IADL's - Progressing towards  5) Patient will be able to achieve less than or equal to 33 on FOTO shoulder survey demonstrating overall improved functional ability with upper extremity. - Progressing towards    Plan     Cont POC.    Ignacio Osei, PTA

## 2018-07-19 ENCOUNTER — CLINICAL SUPPORT (OUTPATIENT)
Dept: REHABILITATION | Facility: HOSPITAL | Age: 53
End: 2018-07-19
Payer: COMMERCIAL

## 2018-07-19 DIAGNOSIS — M25.60 DECREASED RANGE OF MOTION: ICD-10-CM

## 2018-07-19 DIAGNOSIS — M62.81 MUSCLE WEAKNESS: ICD-10-CM

## 2018-07-19 PROCEDURE — 97110 THERAPEUTIC EXERCISES: CPT | Mod: PN

## 2018-07-19 PROCEDURE — 97140 MANUAL THERAPY 1/> REGIONS: CPT | Mod: PN

## 2018-07-19 NOTE — PROGRESS NOTES
"  Physical Therapy Daily Treatment Note     Name: Hien Jeter  Clinic Number: [id number]    Therapy Diagnosis:   Encounter Diagnoses   Name Primary?    Decreased range of motion     Muscle weakness      Physician: Rolando Sawyer MD    Visit Date: 7/19/2018  Physician Orders: PT Eval and Treat  Medical Diagnosis: Impingement syndrome of left shoulder  Evaluation Date: 3/22/18  Authorization Period Expiration: 12/31/2018  Plan of Care Certification Period: 8/16/18  Visit #/Visits authorized: 22/ 50    Time In: 1402  Time Out: 1457      Total Billable Time: 55 minutes (MT-1, TE-3)    Precautions: Standard and Multiple Sclerosis    Subjective     Pt reports: she feels like she has improved since her "set back" and would like to continue with PT to improve her ROM and strength  She was compliant with home exercise program.  Response to previous treatment: decreased pain, increased mobility  Functional change: able to reach arm above shoulder height    Pain: 3/10  Location: left shoulder      Objective     Hien received therapeutic exercises to develop strength, endurance, ROM, flexibility and posture for 45 minutes including:  Log below and objective measures taken    Date 7/19/18   Visit 22   POC exp  7/22/18    FOTO        UBE 90 prm 3'/3'       Stretches    Corner pec 3x30"       MT ASTYM (2)       Supine    Dowel flex  3x10 w/4# dowel  Wand B UE L  Then single UE 4#   Sidelying    Sh abd 2# 3x10 L   Sh ER 2# 3x10 L   Sh Flex 2# 3x10 L   Gator 2# 3x10 L   Prone    Row NT   scaption NT   Ext w/ ER  NT   Stand    Rows NT   Lats NT   Sh ER/IR NT   TB  ER  Hor ABd  OH ABd YTB  3x10  3x10  3x10   Wall push up plus 2x10   Wall slide 3x10       Seen by  KV     Hien received MT x 10' to L shoulder including:  STM/MFR to L UT, subscap and lat    Hien received cold pack for 10 minutes to L shoulder.      Assessment     See updated POC in treatment section.    Plan     See updated POC in treatment section.    Fani ALONSO" Jamshid, PT

## 2018-07-23 ENCOUNTER — CLINICAL SUPPORT (OUTPATIENT)
Dept: REHABILITATION | Facility: HOSPITAL | Age: 53
End: 2018-07-23
Payer: COMMERCIAL

## 2018-07-23 DIAGNOSIS — M25.60 DECREASED RANGE OF MOTION: ICD-10-CM

## 2018-07-23 DIAGNOSIS — M62.81 MUSCLE WEAKNESS: ICD-10-CM

## 2018-07-23 PROCEDURE — 97140 MANUAL THERAPY 1/> REGIONS: CPT | Mod: PN

## 2018-07-23 PROCEDURE — 97110 THERAPEUTIC EXERCISES: CPT | Mod: PN

## 2018-07-23 NOTE — PLAN OF CARE
"  Outpatient Therapy Updated Plan of Care     Visit Date: 7/19/2018  Name: Hien Jeter  Clinic Number: [id number]    Therapy Diagnosis:   Encounter Diagnoses   Name Primary?    Decreased range of motion     Muscle weakness      Physician: Rolando Sawyer MD    Physician Orders: PT Eval and Treat  Medical Diagnosis: Impingement syndrome of left shoulder  Evaluation Date: 3/22/18    Total Visits Received: 22    Current Certification Period:  5/22/18 to 7/22/18  Precautions:  Standard and MS  Visits from Evaluation Date:  22  Functional Level Prior to Evaluation:  Independent    Subjective     Update:  she feels like she has improved since her "set back" and would like to continue with PT to improve her ROM and strength    Objective     Update:     Range of Motion:   Shoulder Left     Pain/Dysfunction with Movement     AROM PROM MMT     flexion 131 145*  4+/5 Pain at end-range   extension 55 NT  4+/5 Pain at end-range   abduction 130 135*  4+/5 Pain at end-range   Internal rotation 90 NT  4+/5     ER 65 NT  4+/5        Functional IR/ER  R: T11/T3  L: L2/T3      Assessment     Update: Since beginning PT, pt has been seen 22 times since initial evaluation on 3/22/18. Overall, she has made progress with her PT treatments and has worked hard towards all of her PT goals as evidenced by subjective and objective improvements. Despite these improvements, she remains with pain and deficits with L shoulder ROM and UE strength and will continue to benefit from skilled PT consisting of manual therapy to L UE; therapeutic exercise, and postural education to address remaining limitations and increase functional mobility.      Previous Short Term Goals Status:       Goals to be met in 4 weeks:   1) Initiate Hep - MET  2) Pt will increase L shoulder AROM by 10 degrees grossly for improved performance with overhead ADL's - Partially MET  3) Pt will report 4/10 pain in (L)shoulder at worst - MET  4) Pt will demonstrate increased MMT " to 4-/5 grossly L shoulder - MET  5) Patient will be able to achieve less than or equal to 48 on FOTO shoulder survey demonstrating overall improved functional ability with upper extremity. - MET       Long Term Goal Status:   continue per initial plan of care.     Goals to be met by discharge:  1) Independent with HEP - MET  2) Pt will demonstrate (L) shoulder AROM WNL grossly for [place] with ADL's  - Partially MET  3) Pt will demonstrate (L) shoulder MMT WNL grossly for [place] with functional activities  - Partially MET  4) Independent and pain free with ADL's and IADL's - Partially MET  5) Patient will be able to achieve less than or equal to 33 on FOTO shoulder survey demonstrating overall improved functional ability with upper extremity. - Partially MET (40% limited)    Reasons for Recertification of Therapy:   Updated POC.    Plan     Updated Certification Period: 7/19/2018 to 8/16/18  Recommended Treatment Plan: 1-2 times per week for 4 weeks: Manual Therapy, Neuromuscular Re-ed, Patient Education, Therapeutic Activites and Therapeutic Exercise  Other Recommendations: modalities prn, ASTYM prn, kinesiotape prn, Functional Dry Needling prn       Fani Breen, PT  7/19/2018      I CERTIFY THE NEED FOR THESE SERVICES FURNISHED UNDER THIS PLAN OF TREATMENT AND WHILE UNDER MY CARE    Physician's comments:        Physician's Signature: ___________________________________________________

## 2018-07-23 NOTE — PROGRESS NOTES
Physical Therapy Daily Treatment Note     Name: Hien Jeter  Clinic Number: [id number]    Therapy Diagnosis:   Encounter Diagnoses   Name Primary?    Decreased range of motion     Muscle weakness      Physician: Rolando Sawyer MD    Visit Date: 7/23/2018  Physician Orders: PT Eval and Treat  Medical Diagnosis: Impingement syndrome of left shoulder  Evaluation Date: 3/22/18  Authorization Period Expiration: 12/31/2018  Plan of Care Certification Period: 8/16/18  Visit #/Visits authorized: 23/ 50    Time In: 1300  Time Out: 1355  Total Billable Time: 55 minutes (MT-1, TE-3)    Precautions: Standard and Multiple Sclerosis    Subjective     Pt reports: no new complaints.  She was compliant with home exercise program.  Response to previous treatment: no adverse reactions    Pain: 0/10  Location: left shoulder      Objective     Hien received therapeutic exercises to develop strength, endurance, flexibility and posture for 45 minutes including:  Log below    Date 7/23/18   Visit 23   POC exp  8/16/18    FOTO dc       UBE 90 prm 3'/3'       Stretches    Corner pec Next       MT 10'       Supine    Dowel flex  3x10 w/4# dowel  Wand B UE L  Then single UE 4#   Sidelying    Sh abd 2# 3x10 L   Sh ER 2# 3x10 L   Sh Flex 2# 3x10 L   Gator 2# 3x10 L       Prone    Row 2# 3x10 L   scaption 3x10 L   Ext w/ ER  2# 3x10 L   Stand    Rows BTB 3x10   Lats BTB 3x10   Sh ER/IR BTB 3x10   TB  ER  Hor ABd  OH ABd YTB  3x10 B  3x10 B  3x10 B   Wall push up plus 2x10   Wall slide 3x10       Seen by  HIRAM       Hien received the following manual therapy techniques: Joint mobilizations, Myofacial release and Soft tissue Mobilization were applied to the: L shoulder for 10 minutes, including:  STM/MFR to L UT, ASTYM to L shoulder per protocol, and shoulder IR Grade III joint mob with superior glide of humerus into IR      Home Exercises Provided and Patient Education Provided       Written Home Exercises Provided: Patient instructed to  cont prior HEP.  Exercises were reviewed and Hien was able to demonstrate them prior to the end of the session.  Hien demonstrated good  understanding of the education provided.     See EMR under Patient Instructions for exercises provided prior visit.    Assessment     Pt tolerated treatment well with no c/o pain or discomfort.    Hien is progressing well towards her goals.   Pt prognosis is Good.     Pt will continue to benefit from skilled outpatient physical therapy to address the deficits listed in the problem list box on initial evaluation, provide pt/family education and to maximize pt's level of independence in the home and community environment.     Pt's spiritual, cultural and educational needs considered and pt agreeable to plan of care and goals.    Anticipated barriers to physical therapy: none    Goals:     Goals to be met in 4 weeks: (4/22/18)  1) Initiate Hep - MET  2) Pt will increase L shoulder AROM by 10 degrees grossly for improved performance with overhead ADL's - Partially MET  3) Pt will report 4/10 pain in (L)shoulder at worst - Partially MET  4) Pt will demonstrate increased MMT to 4-/5 grossly L shoulder - MET  5) Patient will be able to achieve less than or equal to 48 on FOTO shoulder survey demonstrating overall improved functional ability with upper extremity. - MET        New Short Term Goals Status:   Cont with goals 2-3  Long Term Goal Status:   continue per initial plan of care.     Goals to be met by discharge:  1) Independent with HEP - Progressing towards  2) Pt will demonstrate (L) shoulder AROM WNL grossly for [place] with ADL's  - Progressing towards  3) Pt will demonstrate (L) shoulder MMT WNL grossly for [place] with functional activities  - Progressing towards  4) Independent and pain free with ADL's and IADL's - Progressing towards  5) Patient will be able to achieve less than or equal to 33 on FOTO shoulder survey demonstrating overall improved functional  ability with upper extremity. - Progressing towards      Plan     Cont POC.    Fani Breen, PT

## 2018-07-24 ENCOUNTER — CLINICAL SUPPORT (OUTPATIENT)
Dept: REHABILITATION | Facility: HOSPITAL | Age: 53
End: 2018-07-24
Payer: COMMERCIAL

## 2018-07-24 DIAGNOSIS — M25.60 DECREASED RANGE OF MOTION: ICD-10-CM

## 2018-07-24 DIAGNOSIS — M62.81 MUSCLE WEAKNESS: ICD-10-CM

## 2018-07-24 PROCEDURE — 97110 THERAPEUTIC EXERCISES: CPT | Mod: PN

## 2018-07-24 NOTE — PROGRESS NOTES
Physical Therapy Daily Treatment Note     Name: Hien Jeter  Clinic Number: [id number]    Therapy Diagnosis:   Encounter Diagnoses   Name Primary?    Decreased range of motion     Muscle weakness      Physician: Rolando Sawyer MD    Visit Date: 7/24/2018  Physician Orders: PT Eval and Treat  Medical Diagnosis: Impingement syndrome of left shoulder  Evaluation Date: 3/22/18  Authorization Period Expiration: 12/31/2018  Plan of Care Certification Period: 8/16/18  Visit #/Visits authorized: 24/ 50    Time In: 1504  Time Out: 1559    PROCEDURES:    TIMED  Procedure Min.   MT    TE 45   TE Supervised      UNTIMED  Procedure Min.   CP 10       Total Timed Minutes:  45  Total Timed Units:  3  Total Untimed Units:    Total Billable Time: 45 minutes  Charges Billed/# of units:  TE-3    Precautions: Standard and Multiple Sclerosis      Subjective     Pt reports: no new complaints.  She was compliant with home exercise program.  Response to previous treatment: sore  Functional change: donning/doffing shirt independently    Pain: 0/10  Location: left shoulder      Objective     Hien received therapeutic exercises to develop strength, endurance, ROM, flexibility and posture for 45 minutes including:  Log below    Date 7/24/18   Visit 24   POC exp  8/16/18    FOTO dc       UBE 60        Stretches    Corner pec    Foam roll 5'       MT ASTYM next       Supine    Dowel flex  NT   Foam roll thoracic mobility 15'       Sidelying    Sh abd NT   Sh ER NT   Sh Flex NT   Gator NT       Prone    Row NT   scaption NT   Ext w/ ER  NT       Stand    Rows NT   Lats NT   Sh ER/IR NT   TB  ER  Hor ABd  OH ABd NT  NT  NT  NT   Wall push up plus NT   Wall slide NT   Mat pushup 3x10       Seen by  HIRAM       Hien received cold pack for 10 minutes to L shoulder.      Home Exercises Provided and Patient Education Provided     Education provided:     Written Home Exercises Provided: Patient instructed to cont prior HEP.  Exercises were reviewed  and Hien was able to demonstrate them prior to the end of the session.  Hien demonstrated good  understanding of the education provided.     See EMR under Patient Instructions for exercises provided prior visit.    Assessment     Pt tolerated treatment well with no c/o pain or discomfort.    Hien is progressing well towards her goals.   Pt prognosis is Good.     Pt will continue to benefit from skilled outpatient physical therapy to address the deficits listed in the problem list box on initial evaluation, provide pt/family education and to maximize pt's level of independence in the home and community environment.     Pt's spiritual, cultural and educational needs considered and pt agreeable to plan of care and goals.    Anticipated barriers to physical therapy: none    Goals:     Goals to be met in 4 weeks: (4/22/18)  1) Initiate Hep - MET  2) Pt will increase L shoulder AROM by 10 degrees grossly for improved performance with overhead ADL's - Partially MET  3) Pt will report 4/10 pain in (L)shoulder at worst - Partially MET  4) Pt will demonstrate increased MMT to 4-/5 grossly L shoulder - MET  5) Patient will be able to achieve less than or equal to 48 on FOTO shoulder survey demonstrating overall improved functional ability with upper extremity. - MET        New Short Term Goals Status:   Cont with goals 2-3  Long Term Goal Status:   continue per initial plan of care.     Goals to be met by discharge:  1) Independent with HEP - Progressing towards  2) Pt will demonstrate (L) shoulder AROM WNL grossly for [place] with ADL's  - Progressing towards  3) Pt will demonstrate (L) shoulder MMT WNL grossly for [place] with functional activities  - Progressing towards  4) Independent and pain free with ADL's and IADL's - Progressing towards  5) Patient will be able to achieve less than or equal to 33 on FOTO shoulder survey demonstrating overall improved functional ability with upper extremity. - Progressing  towards      Plan     Cont POC.    Fani Breen, PT

## 2018-07-26 ENCOUNTER — PATIENT MESSAGE (OUTPATIENT)
Dept: NEUROLOGY | Facility: CLINIC | Age: 53
End: 2018-07-26

## 2018-07-26 ENCOUNTER — CLINICAL SUPPORT (OUTPATIENT)
Dept: REHABILITATION | Facility: HOSPITAL | Age: 53
End: 2018-07-26
Payer: COMMERCIAL

## 2018-07-26 DIAGNOSIS — M25.60 DECREASED RANGE OF MOTION: ICD-10-CM

## 2018-07-26 DIAGNOSIS — M62.81 MUSCLE WEAKNESS: ICD-10-CM

## 2018-07-26 PROCEDURE — 97110 THERAPEUTIC EXERCISES: CPT | Mod: PN

## 2018-07-26 NOTE — PROGRESS NOTES
Physical Therapy Daily Treatment Note     Name: Hien Jeter  Clinic Number: [id number]    Therapy Diagnosis:   Encounter Diagnoses   Name Primary?    Decreased range of motion     Muscle weakness      Physician: Rolando Sawyer MD    Visit Date: 7/26/2018  Physician Orders: PT Eval and Treat  Medical Diagnosis: Impingement syndrome of left shoulder  Evaluation Date: 3/22/18  Authorization Period Expiration: 12/31/2018  Plan of Care Certification Period: 8/16/18  Visit #/Visits authorized: 24/ 50    Time In: 1504  Time Out: 1559    PROCEDURES:    TIMED  Procedure Min.   MT    TE 30   TE Supervised 20     UNTIMED  Procedure Min.   CP 10       Total Timed Minutes:  45  Total Timed Units:  2  Total Untimed Units:    Total Billable Time: 30 minutes  Charges Billed/# of units:  TE-2    Precautions: Standard and Multiple Sclerosis      Subjective     Pt reports: no new complaints.  She was compliant with home exercise program.  Response to previous treatment: sore  Functional change: donning/doffing shirt independently    Pain: 0/10  Location: left shoulder      Objective     Hien received therapeutic exercises to develop strength, endurance, ROM, flexibility and posture for 45 minutes including:  Log below    Date 7/26/18 7/24/18   Visit 25 24   POC exp  8/16/18     FOTO dc dc        UBE 0 60         Stretches     Corner pec     Foam roll 5' 5'        MT Pain free  ASTYM next        Supine     Dowel flex   NT   Foam roll thoracic mobility 15' 15'        Sidelying     Sh abd 2x10 3# NT   Sh ER 2x10 3# NT   Sh Flex 2x10 3# NT   Gator 2x10 3# NT        Prone     Row 2x10 3# NT   scaption 2x10 3# NT   Ext w/ ER  2x10 3# NT        Stand     Rows  NT   Lats  NT   Sh ER/IR  NT   TB  ER  Hor ABd  OH ABd   3x10 GTB  3x10 GTB  3x10 GTB NT  NT  NT  NT   Wall push up plus  NT   Wall slide  NT   Mat pushup  3x10        Seen by  MB 1/6 KV       Hien received cold pack for 10 minutes to L shoulder.      Home Exercises Provided  and Patient Education Provided     Education provided:     Written Home Exercises Provided: Patient instructed to cont prior HEP.  Exercises were reviewed and Hien was able to demonstrate them prior to the end of the session.  Hien demonstrated good  understanding of the education provided.     See EMR under Patient Instructions for exercises provided prior visit.    Assessment     Pt tolerated treatment well with no c/o pain or discomfort.    Hien is progressing well towards her goals.   Pt prognosis is Good.     Pt will continue to benefit from skilled outpatient physical therapy to address the deficits listed in the problem list box on initial evaluation, provide pt/family education and to maximize pt's level of independence in the home and community environment.     Pt's spiritual, cultural and educational needs considered and pt agreeable to plan of care and goals.    Anticipated barriers to physical therapy: none    Goals:     Goals to be met in 4 weeks: (4/22/18)  1) Initiate Hep - MET  2) Pt will increase L shoulder AROM by 10 degrees grossly for improved performance with overhead ADL's - Partially MET  3) Pt will report 4/10 pain in (L)shoulder at worst - Partially MET  4) Pt will demonstrate increased MMT to 4-/5 grossly L shoulder - MET  5) Patient will be able to achieve less than or equal to 48 on FOTO shoulder survey demonstrating overall improved functional ability with upper extremity. - MET        New Short Term Goals Status:   Cont with goals 2-3  Long Term Goal Status:   continue per initial plan of care.     Goals to be met by discharge:  1) Independent with HEP - Progressing towards  2) Pt will demonstrate (L) shoulder AROM WNL grossly for [place] with ADL's  - Progressing towards  3) Pt will demonstrate (L) shoulder MMT WNL grossly for [place] with functional activities  - Progressing towards  4) Independent and pain free with ADL's and IADL's - Progressing towards  5) Patient will  be able to achieve less than or equal to 33 on FOTO shoulder survey demonstrating overall improved functional ability with upper extremity. - Progressing towards      Plan     Cont POC.    Ignacio Osei, PTA

## 2018-07-30 ENCOUNTER — INFUSION (OUTPATIENT)
Dept: INFUSION THERAPY | Facility: HOSPITAL | Age: 53
End: 2018-07-30
Attending: PSYCHIATRY & NEUROLOGY
Payer: COMMERCIAL

## 2018-07-30 ENCOUNTER — PATIENT MESSAGE (OUTPATIENT)
Dept: INTERNAL MEDICINE | Facility: CLINIC | Age: 53
End: 2018-07-30

## 2018-07-30 VITALS
SYSTOLIC BLOOD PRESSURE: 100 MMHG | WEIGHT: 133 LBS | RESPIRATION RATE: 18 BRPM | HEART RATE: 73 BPM | HEIGHT: 62 IN | BODY MASS INDEX: 24.48 KG/M2 | TEMPERATURE: 98 F | DIASTOLIC BLOOD PRESSURE: 67 MMHG

## 2018-07-30 DIAGNOSIS — G35 MS (MULTIPLE SCLEROSIS): Primary | ICD-10-CM

## 2018-07-30 PROCEDURE — 25000003 PHARM REV CODE 250: Performed by: PSYCHIATRY & NEUROLOGY

## 2018-07-30 PROCEDURE — 96375 TX/PRO/DX INJ NEW DRUG ADDON: CPT

## 2018-07-30 PROCEDURE — S0028 INJECTION, FAMOTIDINE, 20 MG: HCPCS | Performed by: PSYCHIATRY & NEUROLOGY

## 2018-07-30 PROCEDURE — 96365 THER/PROPH/DIAG IV INF INIT: CPT

## 2018-07-30 PROCEDURE — 96366 THER/PROPH/DIAG IV INF ADDON: CPT

## 2018-07-30 PROCEDURE — 63600175 PHARM REV CODE 636 W HCPCS: Performed by: PSYCHIATRY & NEUROLOGY

## 2018-07-30 PROCEDURE — 96367 TX/PROPH/DG ADDL SEQ IV INF: CPT

## 2018-07-30 RX ORDER — ACETAMINOPHEN 500 MG
1000 TABLET ORAL
Status: COMPLETED | OUTPATIENT
Start: 2018-07-30 | End: 2018-07-30

## 2018-07-30 RX ORDER — HEPARIN 100 UNIT/ML
100 SYRINGE INTRAVENOUS
Status: CANCELLED | OUTPATIENT
Start: 2018-07-30

## 2018-07-30 RX ORDER — ACETAMINOPHEN 500 MG
1000 TABLET ORAL
Status: CANCELLED | OUTPATIENT
Start: 2018-07-30 | End: 2018-07-30

## 2018-07-30 RX ORDER — FAMOTIDINE 10 MG/ML
20 INJECTION INTRAVENOUS
Status: COMPLETED | OUTPATIENT
Start: 2018-07-30 | End: 2018-07-30

## 2018-07-30 RX ORDER — SODIUM CHLORIDE 0.9 % (FLUSH) 0.9 %
10 SYRINGE (ML) INJECTION
Status: CANCELLED | OUTPATIENT
Start: 2018-07-30

## 2018-07-30 RX ADMIN — ACETAMINOPHEN 1000 MG: 500 TABLET, FILM COATED ORAL at 08:07

## 2018-07-30 RX ADMIN — OCRELIZUMAB 600 MG: 300 INJECTION INTRAVENOUS at 09:07

## 2018-07-30 RX ADMIN — DEXTROSE: 50 INJECTION, SOLUTION INTRAVENOUS at 08:07

## 2018-07-30 RX ADMIN — DIPHENHYDRAMINE HYDROCHLORIDE 50 MG: 50 INJECTION, SOLUTION INTRAMUSCULAR; INTRAVENOUS at 08:07

## 2018-07-30 RX ADMIN — FAMOTIDINE 20 MG: 10 INJECTION, SOLUTION INTRAVENOUS at 09:07

## 2018-07-30 NOTE — PLAN OF CARE
Problem: Chemotherapy Effects (Adult)  Goal: Signs and Symptoms of Listed Potential Problems Will be Absent, Minimized or Managed (Chemotherapy Effects)  Signs and symptoms of listed potential problems will be absent, minimized or managed by discharge/transition of care (reference Chemotherapy Effects (Adult) CPG).  Outcome: Ongoing (interventions implemented as appropriate)  Patient here for Ocrevus.  Assessment completed.  VSS.  No needs at this time.  Chair reclined and blanket offered.   Will continue to monitor.

## 2018-07-30 NOTE — PLAN OF CARE
Problem: Patient Care Overview (Adult)  Goal: Plan of Care Review  Outcome: Ongoing (interventions implemented as appropriate)  Tolerated infusion well.  Observed patient 1 hour post infusion, no reactions noted.  AVS given.  VSS.  No questions or concerns at this time.  Ambulated off unit unassisted.

## 2018-08-02 ENCOUNTER — CLINICAL SUPPORT (OUTPATIENT)
Dept: REHABILITATION | Facility: HOSPITAL | Age: 53
End: 2018-08-02
Payer: COMMERCIAL

## 2018-08-02 DIAGNOSIS — M25.60 DECREASED RANGE OF MOTION: ICD-10-CM

## 2018-08-02 DIAGNOSIS — M62.81 MUSCLE WEAKNESS: ICD-10-CM

## 2018-08-02 PROCEDURE — 97110 THERAPEUTIC EXERCISES: CPT | Mod: PN

## 2018-08-02 PROCEDURE — 97140 MANUAL THERAPY 1/> REGIONS: CPT | Mod: PN

## 2018-08-02 NOTE — PROGRESS NOTES
Physical Therapy Daily Treatment Note     Name: Hien Jeter  Clinic Number: [id number]    Therapy Diagnosis:   Encounter Diagnoses   Name Primary?    Decreased range of motion     Muscle weakness      Physician: Rolando Sawyer MD    Visit Date: 8/2/2018  Physician Orders: PT Eval and Treat  Medical Diagnosis: Impingement syndrome of left shoulder  Evaluation Date: 3/22/18  Authorization Period Expiration: 12/31/2018  Plan of Care Certification Period: 8/16/18  Visit #/Visits authorized: 24/ 50    Time In: 1400  Time Out: 1500    PROCEDURES:    TIMED  Procedure Min.   MT 10   TE 45   TE Supervised      UNTIMED  Procedure Min.   CP 10       Total Timed Minutes:  55  Total Timed Units:  3  Total Untimed Units:    Total Billable Time: 55 minutes  Charges Billed/# of units: 4 (TE-3, MT-1)    Precautions: Standard and Multiple Sclerosis      Subjective     Pt reports: no new complaints.  She was compliant with home exercise program.  Response to previous treatment: sore  Functional change: donning/doffing shirt independently    Pain: 6/10  Location: denied left shoulder pain but c/o left anterior quadrant thoracic pain     Objective     Hien received therapeutic exercises to develop strength, endurance, ROM, flexibility and posture for 45 minutes including: MT to B UT and levator   Log below    Date 8/2/18 7/26/18 7/24/18   Visit 26 25 24   POC exp  8/16/18      FOTO dc dc dc         UBE 3/fwd/3bkwd 0 60          Stretches      Corner pec      Foam roll 5' 5' 5'         MT 10' Pain free  ASTYM next         Supine      Dowel flex    NT   Foam roll thoracic mobility:D&S serratus  CirclesCW/CC  Horizon abd  Diagonals  Angle wing 15' 2 x 10 ea. 15' 15'         Sidelying      Sh abd 2x10 3# 2x10 3# NT   Sh ER 2x10 3# 2x10 3# NT   Sh Flex 2x10 3# 2x10 3# NT   Gator 2x10 3# 2x10 3# NT         Prone      Row 2x10 3# 2x10 3# NT   scaption 2x10 3# 2x10 3# NT   Ext w/ ER  2x10 3# 2x10 3# NT         Stand      Rows   NT   Lats    NT   Sh ER/IR   NT   TB  ER  Hor ABd  OH ABd   3x10 GTB  3x10 GTB  3x10 GTB   3x10 GTB  3x10 GTB  3x10 GTB NT  NT  NT  NT   Wall push up plus   NT   Wall slide   NT   Mat pushup 3x10  3x10         Seen by  MB 2/6 MB 1/6 KV       Hien received cold pack for 10 minutes to L shoulder.      Home Exercises Provided and Patient Education Provided     Education provided:     Written Home Exercises Provided: Patient instructed to cont prior HEP.  Exercises were reviewed and Hien was able to demonstrate them prior to the end of the session.  Hien demonstrated good  understanding of the education provided.     See EMR under Patient Instructions for exercises provided prior visit.    Assessment     Pt tolerated treatment well with no c/o pain or discomfort.    Hien is progressing well towards her goals.   Pt prognosis is Good.     Pt will continue to benefit from skilled outpatient physical therapy to address the deficits listed in the problem list box on initial evaluation, provide pt/family education and to maximize pt's level of independence in the home and community environment.     Pt's spiritual, cultural and educational needs considered and pt agreeable to plan of care and goals.    Anticipated barriers to physical therapy: none    Goals:     Goals to be met in 4 weeks: (4/22/18)  1) Initiate Hep - MET  2) Pt will increase L shoulder AROM by 10 degrees grossly for improved performance with overhead ADL's - Partially MET  3) Pt will report 4/10 pain in (L)shoulder at worst - Partially MET  4) Pt will demonstrate increased MMT to 4-/5 grossly L shoulder - MET  5) Patient will be able to achieve less than or equal to 48 on FOTO shoulder survey demonstrating overall improved functional ability with upper extremity. - MET        New Short Term Goals Status:   Cont with goals 2-3  Long Term Goal Status:   continue per initial plan of care.     Goals to be met by discharge:  1) Independent with HEP - Progressing  towards  2) Pt will demonstrate (L) shoulder AROM WNL grossly for [place] with ADL's  - Progressing towards  3) Pt will demonstrate (L) shoulder MMT WNL grossly for [place] with functional activities  - Progressing towards  4) Independent and pain free with ADL's and IADL's - Progressing towards  5) Patient will be able to achieve less than or equal to 33 on FOTO shoulder survey demonstrating overall improved functional ability with upper extremity. - Progressing towards      Plan     Cont POC.    Ignacio Osei, PTA

## 2018-08-06 ENCOUNTER — OFFICE VISIT (OUTPATIENT)
Dept: ORTHOPEDICS | Facility: CLINIC | Age: 53
End: 2018-08-06
Payer: COMMERCIAL

## 2018-08-06 VITALS — BODY MASS INDEX: 24.48 KG/M2 | HEIGHT: 62 IN | WEIGHT: 133 LBS

## 2018-08-06 DIAGNOSIS — M75.42 IMPINGEMENT SYNDROME OF LEFT SHOULDER: ICD-10-CM

## 2018-08-06 DIAGNOSIS — M75.02 ADHESIVE CAPSULITIS OF LEFT SHOULDER: Primary | ICD-10-CM

## 2018-08-06 PROCEDURE — 3008F BODY MASS INDEX DOCD: CPT | Mod: CPTII,S$GLB,, | Performed by: ORTHOPAEDIC SURGERY

## 2018-08-06 PROCEDURE — 99213 OFFICE O/P EST LOW 20 MIN: CPT | Mod: S$GLB,,, | Performed by: ORTHOPAEDIC SURGERY

## 2018-08-06 PROCEDURE — 99999 PR PBB SHADOW E&M-EST. PATIENT-LVL III: CPT | Mod: PBBFAC,,, | Performed by: ORTHOPAEDIC SURGERY

## 2018-08-06 NOTE — PROGRESS NOTES
HISTORY OF PRESENT ILLNESS:  Ms. Jeter in followup adhesive capsulitis, left   shoulder, is doing much better since the injection last visit.  She would like   to reorder therapy, especially since she is making good progress now.    PHYSICAL EXAMINATION:  LEFT SHOULDER:  Range of motion still limited, but much better than last visit.    PLAN:  Recommended that we continue therapy for the left shoulder, continue   exercises at home, increase activities as tolerated, Advil or Motrin by mouth.    Follow up in 6-8 weeks.      MANISH  dd: 08/06/2018 10:05:24 (CDT)  td: 08/06/2018 15:23:33 (CDT)  Doc ID   #[id number]  Job ID #[id number]    CC:

## 2018-08-09 ENCOUNTER — CLINICAL SUPPORT (OUTPATIENT)
Dept: REHABILITATION | Facility: HOSPITAL | Age: 53
End: 2018-08-09
Payer: COMMERCIAL

## 2018-08-09 DIAGNOSIS — M62.81 MUSCLE WEAKNESS: ICD-10-CM

## 2018-08-09 DIAGNOSIS — M25.60 DECREASED RANGE OF MOTION: ICD-10-CM

## 2018-08-09 PROCEDURE — 97140 MANUAL THERAPY 1/> REGIONS: CPT | Mod: PN

## 2018-08-09 PROCEDURE — 97110 THERAPEUTIC EXERCISES: CPT | Mod: PN

## 2018-08-10 ENCOUNTER — PATIENT MESSAGE (OUTPATIENT)
Dept: NEUROLOGY | Facility: CLINIC | Age: 53
End: 2018-08-10

## 2018-08-10 DIAGNOSIS — G35 MULTIPLE SCLEROSIS: Primary | ICD-10-CM

## 2018-08-13 ENCOUNTER — DOCUMENTATION ONLY (OUTPATIENT)
Dept: NEUROLOGY | Facility: CLINIC | Age: 53
End: 2018-08-13

## 2018-08-13 NOTE — PROGRESS NOTES
Fax received from [employer]. [employer] PA approved from 8/21/18-8/20/19 with reference number [id number].

## 2018-08-16 ENCOUNTER — CLINICAL SUPPORT (OUTPATIENT)
Dept: REHABILITATION | Facility: HOSPITAL | Age: 53
End: 2018-08-16
Payer: COMMERCIAL

## 2018-08-16 DIAGNOSIS — M25.60 DECREASED RANGE OF MOTION: ICD-10-CM

## 2018-08-16 DIAGNOSIS — M62.81 MUSCLE WEAKNESS: ICD-10-CM

## 2018-08-16 PROCEDURE — 97110 THERAPEUTIC EXERCISES: CPT | Mod: PN

## 2018-08-16 NOTE — PROGRESS NOTES
Physical Therapy Daily Treatment Note     Name: Hien Jeter  Clinic Number: [id number]    Therapy Diagnosis:   Encounter Diagnoses   Name Primary?    Decreased range of motion     Muscle weakness      Physician: Rolando Sawyer MD    Visit Date: 8/16/2018  Physician Orders: PT Eval and Treat  Medical Diagnosis: Impingement syndrome of left shoulder  Evaluation Date: 3/22/18  Authorization Period Expiration: 12/31/2018  Plan of Care Certification Period: 8/16/18  Visit #/Visits authorized: 29/ 50    Time In: 1403  Time Out: 1426  Total Billable Time: 23 minutes (TE-2)    Precautions: Standard and Multiple Sclerosis      Subjective     Pt reports: she has been doing well and has noticed significant improvement in her L shoulder ROM  She was compliant with home exercise program.  Response to previous treatment: no adverse reaction  Functional change: improved lifting and reaching with L UE    Pain: 0/10  Location: left shoulder      Objective       Home Exercises Provided and Patient Education Provided     Education provided:   - contact PT with any further questions or concerns    Written Home Exercises Provided: yes.  Exercises were reviewed and Hien was able to demonstrate them prior to the end of the session.  Hien demonstrated good  understanding of the education provided.     See EMR under Patient Instructions for exercises provided 8/16/2018.      Outpatient Therapy Discharge Summary     Name: Hien Jeter  Clinic Number: [id number]    Therapy Diagnosis:   Encounter Diagnoses   Name Primary?    Decreased range of motion     Muscle weakness      Physician: Rolando Sawyer MD    Physician Orders: PT Eval and Treat  Medical Diagnosis: Impingement syndrome of left shoulder  Evaluation Date: 3/22/18      Date of Last visit: 8/16/18  Total Visits Received: 29      Assessment        Update:      Range of Motion:   Shoulder Left   Pain/Dysfunction with Movement     AROM MMT     flexion 131  5/5 Pain at  end-range   extension 60  5/5 Pain at end-range   abduction 130  5/5 Pain at end-range   Internal rotation 90  5/5     ER 80  5/5        Functional IR/ER  R: T11/T3  L: T8/T3        Goals:     Goals to be met in 4 weeks: (4/22/18)  1) Initiate Hep - MET  2) Pt will increase L shoulder AROM by 10 degrees grossly for improved performance with overhead ADL's - MET  3) Pt will report 4/10 pain in (L)shoulder at worst - MET  4) Pt will demonstrate increased MMT to 4-/5 grossly L shoulder - MET  5) Patient will be able to achieve less than or equal to 48 on FOTO shoulder survey demonstrating overall improved functional ability with upper extremity. - MET        New Short Term Goals Status:   Cont with goals 2-3  Long Term Goal Status:   continue per initial plan of care.     Goals to be met by discharge:  1) Independent with HEP - MET  2) Pt will demonstrate (L) shoulder AROM WNL grossly for [place] with ADL's  - MET  3) Pt will demonstrate (L) shoulder MMT WNL grossly for [place] with functional activities  - MET  4) Independent and pain free with ADL's and IADL's - MET  5) Patient will be able to achieve less than or equal to 33 on FOTO shoulder survey demonstrating overall improved functional ability with upper extremity. - Partially MET (34% limitation)      Discharge reason: Patient has reached the maximum rehab potential for the present time    Plan   This patient is discharged from Physical Therapy        Fani Breen, PT

## 2018-08-23 ENCOUNTER — OFFICE VISIT (OUTPATIENT)
Dept: OBSTETRICS AND GYNECOLOGY | Facility: CLINIC | Age: 53
End: 2018-08-23
Payer: COMMERCIAL

## 2018-08-23 ENCOUNTER — LAB VISIT (OUTPATIENT)
Dept: LAB | Facility: OTHER | Age: 53
End: 2018-08-23
Payer: COMMERCIAL

## 2018-08-23 VITALS
WEIGHT: 134.5 LBS | BODY MASS INDEX: 24.75 KG/M2 | SYSTOLIC BLOOD PRESSURE: 118 MMHG | DIASTOLIC BLOOD PRESSURE: 78 MMHG | HEIGHT: 62 IN

## 2018-08-23 DIAGNOSIS — Z01.419 ENCOUNTER FOR GYNECOLOGICAL EXAMINATION WITHOUT ABNORMAL FINDING: Primary | ICD-10-CM

## 2018-08-23 DIAGNOSIS — N95.1 MENOPAUSAL SYMPTOMS: ICD-10-CM

## 2018-08-23 DIAGNOSIS — Z12.39 BREAST CANCER SCREENING: ICD-10-CM

## 2018-08-23 DIAGNOSIS — G35 MULTIPLE SCLEROSIS: ICD-10-CM

## 2018-08-23 LAB
BASOPHILS # BLD AUTO: 0.04 K/UL
BASOPHILS NFR BLD: 0.6 %
DIFFERENTIAL METHOD: ABNORMAL
EOSINOPHIL # BLD AUTO: 0.3 K/UL
EOSINOPHIL NFR BLD: 3.7 %
ERYTHROCYTE [DISTWIDTH] IN BLOOD BY AUTOMATED COUNT: 13 %
HCT VFR BLD AUTO: 42 %
HGB BLD-MCNC: 14 G/DL
LYMPHOCYTES # BLD AUTO: 1.3 K/UL
LYMPHOCYTES NFR BLD: 19 %
MCH RBC QN AUTO: 32.9 PG
MCHC RBC AUTO-ENTMCNC: 33.3 G/DL
MCV RBC AUTO: 99 FL
MONOCYTES # BLD AUTO: 0.8 K/UL
MONOCYTES NFR BLD: 11.8 %
NEUTROPHILS # BLD AUTO: 4.4 K/UL
NEUTROPHILS NFR BLD: 64.8 %
PLATELET # BLD AUTO: 314 K/UL
PMV BLD AUTO: 10.5 FL
RBC # BLD AUTO: 4.26 M/UL
WBC # BLD AUTO: 6.72 K/UL

## 2018-08-23 PROCEDURE — 99999 PR PBB SHADOW E&M-EST. PATIENT-LVL IV: CPT | Mod: PBBFAC,,, | Performed by: OBSTETRICS & GYNECOLOGY

## 2018-08-23 PROCEDURE — 99396 PREV VISIT EST AGE 40-64: CPT | Mod: S$GLB,,, | Performed by: OBSTETRICS & GYNECOLOGY

## 2018-08-23 PROCEDURE — 36415 COLL VENOUS BLD VENIPUNCTURE: CPT

## 2018-08-23 PROCEDURE — 85025 COMPLETE CBC W/AUTO DIFF WBC: CPT

## 2018-08-23 RX ORDER — ESTRADIOL AND NORETHINDRONE ACETATE 1; .5 MG/1; MG/1
1 TABLET ORAL DAILY
Qty: 90 TABLET | Refills: 3 | Status: SHIPPED | OUTPATIENT
Start: 2018-08-23 | End: 2019-08-01 | Stop reason: SDUPTHER

## 2018-08-23 NOTE — PROGRESS NOTES
CC: Well woman exam    Hien Jeter is a 53 y.o. female  presents for well woman exam.  LMP: Patient's last menstrual period was 2017.  No GYN issues, problems, or complaints.  Patient doing well (asymptomatic) on HRT    Past Medical History:   Diagnosis Date    Anxiety     Basal cell carcinoma 2000    left eyebrow     Depression     Environmental allergies     Headache(784.0)     Multiple food allergies     Multiple sclerosis 2009     shoulder 3/2015    left     Past Surgical History:   Procedure Laterality Date    BASAL CELL CARCINOMA EXCISION      LASIK  2000    WISDOM TOOTH EXTRACTION       Social History     Socioeconomic History    Marital status:      Spouse name: Not on file    Number of children: Not on file    Years of education: Not on file    Highest education level: Not on file   Social Needs    Financial resource strain: Not on file    Food insecurity - worry: Not on file    Food insecurity - inability: Not on file    Transportation needs - medical: Not on file    Transportation needs - non-medical: Not on file   Occupational History     Employer: [employer]   Tobacco Use    Smoking status: Never Smoker    Smokeless tobacco: Never Used   Substance and Sexual Activity    Alcohol use: Yes     Comment: socially/ not weekly    Drug use: No    Sexual activity: Yes     Partners: Male     Birth control/protection: OCP     Comment: , menarche 15   Other Topics Concern    Are you pregnant or think you may be? Not Asked    Breast-feeding Not Asked   Social History Narrative    Not on file     Family History   Problem Relation Age of Onset    Colon cancer Mother     Diabetes Mother     Osteoporosis Mother     Cancer Mother     Arrhythmia Father     Dementia Maternal Grandmother     Cancer Paternal Grandfather     Breast cancer Paternal Aunt     Breast cancer Sister     Multiple sclerosis Neg Hx     Ovarian cancer Neg Hx  "     OB History      Para Term  AB Living    0   0          SAB TAB Ectopic Multiple Live Births                       /78   Ht 5' 2" (1.575 m)   Wt 61 kg (134 lb 7.7 oz)   LMP 2017   BMI 24.60 kg/m²       ROS:  GENERAL: Denies weight gain or weight loss. Feeling well overall.   SKIN: Denies rash or lesions.   HEAD: Denies head injury or headache.   NODES: Denies enlarged lymph nodes.   CHEST: Denies chest pain or shortness of breath.   CARDIOVASCULAR: Denies palpitations or left sided chest pain.   ABDOMEN: No abdominal pain, constipation, diarrhea, nausea, vomiting or rectal bleeding.   URINARY: No frequency, dysuria, hematuria, or burning on urination.  REPRODUCTIVE: See HPI.   BREASTS: The patient performs breast self-examination and denies pain, lumps, or nipple discharge.   HEMATOLOGIC: No easy bruisability or excessive bleeding.   MUSCULOSKELETAL: Denies joint pain or swelling.   NEUROLOGIC: Denies syncope or weakness.   PSYCHIATRIC: Denies depression, anxiety or mood swings.    PHYSICAL EXAM:  APPEARANCE: Well nourished, well developed, in no acute distress.  AFFECT: WNL, alert and oriented x 3  SKIN: No acne or hirsutism  NECK: Neck symmetric without masses or thyromegaly  NODES: No inguinal, cervical, axillary, or femoral lymph node enlargement  CHEST: Good respiratory effect  ABDOMEN: Soft.  No tenderness or masses.  No hepatosplenomegaly.  No hernias.  BREASTS: Symmetrical, no skin changes or visible lesions.  No palpable masses, nipple discharge bilaterally.  PELVIC: Normal external genitalia without lesions.  Normal hair distribution.  Adequate perineal body, normal urethral meatus.  Vagina moist and well rugated without lesions or discharge.  Cervix pink, without lesions, discharge or tenderness.  No significant cystocele or rectocele.  Bimanual exam shows uterus to be normal size, regular, mobile and nontender.  Adnexa without masses or tenderness.    EXTREMITIES: No " edema.    Encounter for gynecological examination without abnormal finding    Menopausal symptoms  -     estradiol-norethindrone (ACTIVELLA) 1-0.5 mg per tablet; Take 1 tablet by mouth once daily.  Dispense: 90 tablet; Refill: 3    Breast cancer screening  -     Mammo Digital Screening Bilat with Tomosynthesis_CAD; Future; Expected date: 08/23/2018      Age specific counseling     Papsmear not done/not indicated    R/B/A to HRT reviewed.   Erx done     Screening mammogram ordered    Patient was counseled today on A.C.S. Pap guidelines and recommendations for yearly pelvic exams, mammograms and monthly self breast exams; to see her PCP for other health maintenance.     Annual exam - 1 year    Eros Solorio IV, MD

## 2018-09-09 ENCOUNTER — PATIENT MESSAGE (OUTPATIENT)
Dept: INTERNAL MEDICINE | Facility: CLINIC | Age: 53
End: 2018-09-09

## 2018-09-10 ENCOUNTER — OFFICE VISIT (OUTPATIENT)
Dept: ORTHOPEDICS | Facility: CLINIC | Age: 53
End: 2018-09-10
Payer: COMMERCIAL

## 2018-09-10 VITALS — WEIGHT: 134 LBS | HEIGHT: 62 IN | BODY MASS INDEX: 24.66 KG/M2

## 2018-09-10 DIAGNOSIS — M75.02 ADHESIVE CAPSULITIS OF LEFT SHOULDER: Primary | ICD-10-CM

## 2018-09-10 PROCEDURE — 3008F BODY MASS INDEX DOCD: CPT | Mod: CPTII,S$GLB,, | Performed by: ORTHOPAEDIC SURGERY

## 2018-09-10 PROCEDURE — 99213 OFFICE O/P EST LOW 20 MIN: CPT | Mod: S$GLB,,, | Performed by: ORTHOPAEDIC SURGERY

## 2018-09-10 PROCEDURE — 99999 PR PBB SHADOW E&M-EST. PATIENT-LVL III: CPT | Mod: PBBFAC,,, | Performed by: ORTHOPAEDIC SURGERY

## 2018-09-10 NOTE — PROGRESS NOTES
HISTORY OF PRESENT ILLNESS:  Ms. Jeter in followup of adhesive capsulitis, left   shoulder, had been doing quite well, but then had an injury a few weeks ago when   she was lifting her father, had increasing pain in the shoulder.  It is   actually getting better now, but she was concerned about that.    PHYSICAL EXAMINATION:  LEFT SHOULDER:  No tenderness.  No bruising.  Range of motion basically full,   little bit of pain with elevation.  Rotator cuff strength intact.  No   instability.    PLAN:  Since she has good motion and the pain is getting better, I do not think   we need to do any imaging currently, but I do want to keep an eye on symptoms,   continue with stretching and strengthening exercises as she has been doing.  No   heavy lifting.  Follow up in 6-8 weeks, although she can cancel if she is doing   well and having no problems.      MANISH  dd: 09/10/2018 09:14:32 (CDT)  td: 09/10/2018 17:18:58 (CDT)  Doc ID   #[id number]  Job ID #[id number]    CC:

## 2018-09-17 ENCOUNTER — PATIENT MESSAGE (OUTPATIENT)
Dept: NEUROLOGY | Facility: CLINIC | Age: 53
End: 2018-09-17

## 2018-09-26 DIAGNOSIS — F32.0 MILD SINGLE CURRENT EPISODE OF MAJOR DEPRESSIVE DISORDER: ICD-10-CM

## 2018-09-26 RX ORDER — ESCITALOPRAM OXALATE 10 MG/1
15 TABLET ORAL NIGHTLY
Qty: 45 TABLET | Refills: 2 | Status: SHIPPED | OUTPATIENT
Start: 2018-09-26 | End: 2018-12-23 | Stop reason: SDUPTHER

## 2018-09-27 ENCOUNTER — OFFICE VISIT (OUTPATIENT)
Dept: NEUROLOGY | Facility: CLINIC | Age: 53
End: 2018-09-27
Payer: COMMERCIAL

## 2018-09-27 VITALS
DIASTOLIC BLOOD PRESSURE: 90 MMHG | BODY MASS INDEX: 24.84 KG/M2 | WEIGHT: 135 LBS | HEIGHT: 62 IN | HEART RATE: 59 BPM | SYSTOLIC BLOOD PRESSURE: 124 MMHG

## 2018-09-27 DIAGNOSIS — G35 MULTIPLE SCLEROSIS: Primary | ICD-10-CM

## 2018-09-27 DIAGNOSIS — Z71.89 COUNSELING REGARDING GOALS OF CARE: ICD-10-CM

## 2018-09-27 DIAGNOSIS — F41.1 GENERALIZED ANXIETY DISORDER: ICD-10-CM

## 2018-09-27 DIAGNOSIS — E55.9 VITAMIN D INSUFFICIENCY: ICD-10-CM

## 2018-09-27 DIAGNOSIS — M25.562 LEFT KNEE PAIN, UNSPECIFIED CHRONICITY: ICD-10-CM

## 2018-09-27 DIAGNOSIS — Z79.899 ENCOUNTER FOR LONG-TERM (CURRENT) USE OF HIGH-RISK MEDICATION: ICD-10-CM

## 2018-09-27 DIAGNOSIS — D84.9 IMMUNOSUPPRESSED STATUS: ICD-10-CM

## 2018-09-27 DIAGNOSIS — R25.2 SPASTICITY: ICD-10-CM

## 2018-09-27 PROCEDURE — 99214 OFFICE O/P EST MOD 30 MIN: CPT | Mod: S$GLB,,, | Performed by: PHYSICIAN ASSISTANT

## 2018-09-27 PROCEDURE — 3008F BODY MASS INDEX DOCD: CPT | Mod: CPTII,S$GLB,, | Performed by: PHYSICIAN ASSISTANT

## 2018-09-27 PROCEDURE — 99999 PR PBB SHADOW E&M-EST. PATIENT-LVL III: CPT | Mod: PBBFAC,,, | Performed by: PHYSICIAN ASSISTANT

## 2018-09-27 NOTE — PROGRESS NOTES
"Subjective:       Patient ID: Hien Jeter is a 53 y.o. female who presents today for a routine clinic visit for MS.  Last seen by Dr. Salas in march 2018     MS HPI:  · DMT: Ocrevus-last dosed in July--due in Jan  · Side effects from DMT? No  · Taking vitamin D3 as recommended? Yes 3,000IU/d   · Patient states that her mother has been ill and recently diagnosed with liver CA and was placed on hospice--she has been traveling to [place] frequently to be with her mother.  · During this same time her mother in law continues to be very ill as well and in/out of the hospital  Having discomfort in L leg-pain --no swelling. Feels she may have overworked her posterior thigh muscle  No recent infections  Feeling well in regards to her MS      SOCIAL HISTORY  Social History     Tobacco Use    Smoking status: Never Smoker    Smokeless tobacco: Never Used   Substance Use Topics    Alcohol use: Yes     Comment: socially/ not weekly    Drug use: No     Living arrangements - the patient lives with their spouse.  Employment  disability    MS ROS:  · Fatigue: No  · Sleep Disturbance: No  · Bladder Dysfunction: No  · Bowel Dysfunction: No  · Spasticity: Yes - on Baclofen 20mg hs; stable;   · Visual Symptoms: No  · Cognitive: No  · Mood Disorder: No, stable on Lexapro  · Gait Disturbance: No  · Falls: No  · Pain: Yes - migraines much better under care of Dr. Klein; also describes MS leticia that is present "all the time" -and L knee pain as above  · Sexual Dysfunction: Not Assessed  · Skin Breakdown: No  · Tremors: No  · Dysphagia:  No  · Dysarthria:  No  · Heat sensitivity:  No  · Any un-met adaptive needs? No  · Copay Assist?  Yes -   · Clinical Trial candidate? No        Objective:        1. 25 foot timed walk:4.3s today; 3.85 last visit  Timed 25 Foot Walk: 10/26/2016 2/15/2017   Did patient wear an AFO? No No   Was assistive device used? No No   Time for 25 Foot Walk (seconds) 4 4.2   Time for 25 Foot Walk (seconds) 4.3 - "       Neurologic Exam     Mental Status   Oriented to person, place, and time.   Attention: normal.   Speech: speech is normal   Level of consciousness: alert    Motor Exam     Strength   Right deltoid: 5/5  Left deltoid: 5/5  Right triceps: 5/5  Left triceps: 5/5  Right wrist extension: 5/5  Left wrist extension: 5/5  Right interossei: 5/5  Left interossei: 5/5  Right iliopsoas: 5/5  Left iliopsoas: 5/5  Right hamstrin/5  Left hamstrin/5  Right anterior tibial: 5/5  Left anterior tibial: 5/5TTP medial aspect L knee     Sensory Exam   Right arm light touch: normal  Left arm light touch: normal  Right leg light touch: normal  Left leg light touch: normal  Right arm vibration: decreased from fingers  Left arm vibration: decreased from fingers  Right leg vibration: decreased from toes  Left leg vibration: decreased from toes    Gait, Coordination, and Reflexes     Gait  Gait: normal    Coordination   Finger to nose coordination: normal  Tandem walking coordination: normal    Tremor   Resting tremor: absent  Intention tremor: absent    Reflexes   Right brachioradialis: 2+  Left brachioradialis: 2+  Right biceps: 2+  Left biceps: 2+  Right triceps: 2+  Left triceps: 2+  Right patellar: 2+  Left patellar: 2+  Right achilles: 2+  Left achilles: 2+  Right plantar: normal  Left plantar: normal  Able to heel/toe walk independently  Intact [facility] UE's             Imaging:     Results for orders placed during the hospital encounter of 18   MRI Brain W WO Contrast    Impression No significant change from prior.  Few scattered punctate foci of T2 FLAIR signal abnormality again identified while nonspecific remains concerning for mild degree of prior demyelinating plaque.    No definite new lesion or enhancing lesion to suggest significant interval or active demyelination.    Bifrontal small developmental venous anomalies unchanged.      Electronically signed by: Trace Novak DO  Date:    2018  Time:    08:51      Results for orders placed during the hospital encounter of 11/29/17   MRI Cervical Spine W WO Cont    Impression Few scattered short segment T2 hyperintense lesions in the cervical and thoracic cord, in keeping with clinical diagnosis of multiple sclerosis, are unchanged.  No new lesions.  No enhancing lesions to suggest active demyelination.      Electronically signed by: CARMINA VELAZQUEZ  Date:     12/06/17  Time:    15:14      Results for orders placed during the hospital encounter of 11/29/17   MRI Thoracic Spine W WO Cont    Impression Few scattered short segment T2 hyperintense lesions in the cervical and thoracic cord, in keeping with clinical diagnosis of multiple sclerosis, are unchanged.  No new lesions.  No enhancing lesions to suggest active demyelination.      Electronically signed by: CARMINA VELAZQUEZ  Date:     12/06/17  Time:    15:14            Labs:     Lab Results   Component Value Date    KPWYLCXD51UH 45 06/11/2018    YAQBGUXK26EG 72 02/15/2017    IKUQFMNM00UV 63 04/05/2016     No results found for: JCVINDEX, JCVANTIBODY  Lab Results   Component Value Date    ME4RRHVW 64.7 12/20/2017    ABSOLUTECD3 837 12/20/2017    NJ0RFHEQ 12.5 12/20/2017    ABSOLUTECD8 161 (L) 12/20/2017    EC9RDIKO 53.0 12/20/2017    ABSOLUTECD4 685 12/20/2017    LABCD48 4.25 (H) 12/20/2017     Lab Results   Component Value Date    WBC 7.46 09/27/2018    RBC 4.58 09/27/2018    HGB 15.1 09/27/2018    HCT 45.1 09/27/2018    MCV 99 (H) 09/27/2018    MCH 33.0 (H) 09/27/2018    MCHC 33.5 09/27/2018    RDW 12.0 09/27/2018     09/27/2018    MPV 9.8 09/27/2018    GRAN 4.6 09/27/2018    GRAN 61.2 09/27/2018    LYMPH 1.5 09/27/2018    LYMPH 19.4 09/27/2018    MONO 1.1 (H) 09/27/2018    MONO 14.2 09/27/2018    EOS 0.3 09/27/2018    BASO 0.06 09/27/2018    EOSINOPHIL 4.0 09/27/2018    BASOPHIL 0.8 09/27/2018     Sodium   Date Value Ref Range Status   11/20/2017 140 136 - 145 mmol/L Final     Potassium   Date Value Ref Range Status    11/20/2017 4.0 3.5 - 5.1 mmol/L Final     Chloride   Date Value Ref Range Status   11/20/2017 108 95 - 110 mmol/L Final     CO2   Date Value Ref Range Status   11/20/2017 26 23 - 29 mmol/L Final     Glucose   Date Value Ref Range Status   11/20/2017 81 70 - 110 mg/dL Final     BUN, Bld   Date Value Ref Range Status   11/20/2017 14 6 - 20 mg/dL Final     Creatinine   Date Value Ref Range Status   11/20/2017 0.9 0.5 - 1.4 mg/dL Final     Calcium   Date Value Ref Range Status   11/20/2017 9.5 8.7 - 10.5 mg/dL Final     Total Protein   Date Value Ref Range Status   11/20/2017 7.3 6.0 - 8.4 g/dL Final     Albumin   Date Value Ref Range Status   11/20/2017 3.4 (L) 3.5 - 5.2 g/dL Final     Total Bilirubin   Date Value Ref Range Status   11/20/2017 0.4 0.1 - 1.0 mg/dL Final     Comment:     For infants and newborns, interpretation of results should be based  on gestational age, weight and in agreement with clinical  observations.  Premature Infant recommended reference ranges:  Up to 24 hours.............<8.0 mg/dL  Up to 48 hours............<12.0 mg/dL  3-5 days..................<15.0 mg/dL  6-29 days.................<15.0 mg/dL       Alkaline Phosphatase   Date Value Ref Range Status   11/20/2017 79 55 - 135 U/L Final     AST   Date Value Ref Range Status   11/20/2017 19 10 - 40 U/L Final     ALT   Date Value Ref Range Status   11/20/2017 18 10 - 44 U/L Final     Anion Gap   Date Value Ref Range Status   11/20/2017 6 (L) 8 - 16 mmol/L Final     eGFR if    Date Value Ref Range Status   11/20/2017 >60.0 >60 mL/min/1.73 m^2 Final     eGFR if non    Date Value Ref Range Status   11/20/2017 >60.0 >60 mL/min/1.73 m^2 Final     Comment:     Calculation used to obtain the estimated glomerular filtration  rate (eGFR) is the CKD-EPI equation.          Diagnosis/Assessment/Plan:    1. Multiple Sclerosis  · Assessment: Patient is clinically stable on Ocrevus-due in January. Her L knee pain appears  orthopedic in nature-possibly pulled hamstring-she will try conservative treatment and see PCP/ortho is needed.  · Imaging: will plan for MRI in June 2018  · Disease Modifying Therapies: Ocrevus and high dose Vit D3. We discussed visit with ID; however, with multiple seriously ill family members patient would like to hold for now with any additional visit.     2. MS Symptom Assessment / Management   No changes to management of care today        Over 50% of this 30 minute visit was spent in direct face to face counseling of the patient about MS, DMT considerations, and MS symptom management.   Follow-up in about 4 months (around 1/27/2019) for follow up with Dr. Salas.  Patient agreed to POC today.    Attending, Dr. Salas, was available during today's encounter. Any change to plan along with cosign to appear in the EMR.     Shelbi Wisdom PA-C  [facility]      Problem List Items Addressed This Visit        Other    Encounter for long-term (current) use of high-risk medication    Relevant Orders    Hepatitis B surface antibody (Completed)    Hepatitis B surface antigen (Completed)    Hepatitis B core antibody, total (Completed)      Other Visit Diagnoses     Multiple sclerosis    -  Primary    Relevant Orders    Hepatitis B surface antibody (Completed)    Hepatitis B surface antigen (Completed)    Hepatitis B core antibody, total (Completed)    Spasticity        Vitamin D insufficiency        Generalized anxiety disorder        Immunosuppressed status        Relevant Orders    Hepatitis B surface antibody (Completed)    Hepatitis B surface antigen (Completed)    Hepatitis B core antibody, total (Completed)

## 2018-11-26 ENCOUNTER — OFFICE VISIT (OUTPATIENT)
Dept: INTERNAL MEDICINE | Facility: CLINIC | Age: 53
End: 2018-11-26
Payer: COMMERCIAL

## 2018-11-26 VITALS
SYSTOLIC BLOOD PRESSURE: 104 MMHG | BODY MASS INDEX: 24.75 KG/M2 | WEIGHT: 134.5 LBS | HEART RATE: 64 BPM | DIASTOLIC BLOOD PRESSURE: 78 MMHG | TEMPERATURE: 98 F | RESPIRATION RATE: 16 BRPM | HEIGHT: 62 IN

## 2018-11-26 DIAGNOSIS — M67.471 DIGITAL MUCINOUS CYST OF TOE OF RIGHT FOOT: Primary | ICD-10-CM

## 2018-11-26 PROCEDURE — 90686 IIV4 VACC NO PRSV 0.5 ML IM: CPT | Mod: S$GLB,,, | Performed by: INTERNAL MEDICINE

## 2018-11-26 PROCEDURE — 3008F BODY MASS INDEX DOCD: CPT | Mod: CPTII,S$GLB,, | Performed by: INTERNAL MEDICINE

## 2018-11-26 PROCEDURE — 99999 PR PBB SHADOW E&M-EST. PATIENT-LVL IV: CPT | Mod: PBBFAC,,, | Performed by: INTERNAL MEDICINE

## 2018-11-26 PROCEDURE — 90471 IMMUNIZATION ADMIN: CPT | Mod: S$GLB,,, | Performed by: INTERNAL MEDICINE

## 2018-11-26 PROCEDURE — 99214 OFFICE O/P EST MOD 30 MIN: CPT | Mod: 25,S$GLB,, | Performed by: INTERNAL MEDICINE

## 2018-11-26 NOTE — PROGRESS NOTES
Subjective:       Patient ID: Hien Jeter is a 53 y.o. female.    Chief Complaint: Foot Problem (right foot pain.  also wants flu shot-  annual not due till march. wait to do.)  A 53-year-old white female is coming in with painful swelling on the plantar   surface of her right foot at the base of her big toe.  She had had a callus   there for some time that felt quite big that she filed down over time, had no   injury other than that and since that time she has had some swelling that has   been painful when she stands up, she has had no trauma before the file of the   callus and none since.  She has had no bruising in that area that she is aware   of.  The patient has fairly stable multiple sclerosis and is in for this   problem.  She also got a flu shot at the time of the visit.    PHYSICAL EXAMINATION:  EXTREMITIES:  Her foot looks normal except for the plantar surface at the base   of the first toe, right foot.  She has a large cystic structure that is not   inflamed.  It is perhaps 4 x 6 cm size, soft.  Nontender to exam, not inflamed.    IMPRESSION:  Large cystic structure on the plantar surface at the base of the   right first toe, likely this is either hematoma that has liquified but not   drained or some bursitis.  I have spoken with Dr. Burgos about her problem.    She is going to see her in a few days and likely she is going to need aspiration   for diagnosis.  I did not order any studies based that conversation.      MAGDA/STEVE  dd: 11/26/2018 11:01:53 (CST)  td: 11/26/2018 23:58:48 (CST)  Doc ID   #[id number]  Job ID #[id number]    CC:     Dict [id number]  HPI  Review of Systems   Constitutional: Negative.    HENT: Negative for congestion, hearing loss, sinus pressure, sneezing, sore throat and voice change.    Eyes: Negative for visual disturbance.   Respiratory: Negative for cough, chest tightness and shortness of breath.    Cardiovascular: Negative.  Negative for chest pain, palpitations and leg swelling.    Gastrointestinal: Negative.    Genitourinary: Negative for difficulty urinating, dysuria, flank pain, frequency, hematuria, menstrual problem, pelvic pain, urgency, vaginal bleeding and vaginal discharge.   Musculoskeletal: Positive for joint swelling. Negative for neck pain and neck stiffness.   Skin: Negative.    Neurological: Negative.  Negative for dizziness, seizures, light-headedness, numbness and headaches.   Psychiatric/Behavioral: Negative for agitation, behavioral problems, confusion and sleep disturbance. The patient is not nervous/anxious.        Objective:      Physical Exam   Constitutional: She is oriented to person, place, and time. She appears well-developed and well-nourished.   Eyes: EOM are normal. Pupils are equal, round, and reactive to light.   Neck: Normal range of motion. Neck supple. No JVD present. No thyromegaly present.   Cardiovascular: Normal rate, regular rhythm, normal heart sounds and intact distal pulses. Exam reveals no gallop.   No murmur heard.  Pulmonary/Chest: Breath sounds normal. She has no wheezes. She has no rales.   Abdominal: Soft. Bowel sounds are normal. She exhibits no mass. There is no tenderness.   Musculoskeletal: Normal range of motion.   Lymphadenopathy:     She has no cervical adenopathy.   Neurological: She is alert and oriented to person, place, and time. She has normal reflexes. No cranial nerve deficit.   Skin: No rash noted. No erythema.   Psychiatric: She has a normal mood and affect. Judgment normal.       Assessment:       1. Digital mucinous cyst of toe of right foot        Plan:

## 2018-11-29 ENCOUNTER — PATIENT MESSAGE (OUTPATIENT)
Dept: OBSTETRICS AND GYNECOLOGY | Facility: CLINIC | Age: 53
End: 2018-11-29

## 2018-11-29 ENCOUNTER — TELEPHONE (OUTPATIENT)
Dept: OBSTETRICS AND GYNECOLOGY | Facility: CLINIC | Age: 53
End: 2018-11-29

## 2018-11-29 DIAGNOSIS — R68.89 ABNORMALITY ON SCREENING TEST: Primary | ICD-10-CM

## 2018-11-30 ENCOUNTER — OFFICE VISIT (OUTPATIENT)
Dept: ORTHOPEDICS | Facility: CLINIC | Age: 53
End: 2018-11-30
Payer: COMMERCIAL

## 2018-11-30 ENCOUNTER — HOSPITAL ENCOUNTER (OUTPATIENT)
Dept: RADIOLOGY | Facility: HOSPITAL | Age: 53
Discharge: HOME | End: 2018-11-30
Attending: ORTHOPAEDIC SURGERY
Payer: COMMERCIAL

## 2018-11-30 VITALS
SYSTOLIC BLOOD PRESSURE: 118 MMHG | HEART RATE: 63 BPM | DIASTOLIC BLOOD PRESSURE: 80 MMHG | HEIGHT: 62 IN | BODY MASS INDEX: 24.66 KG/M2 | WEIGHT: 134 LBS

## 2018-11-30 DIAGNOSIS — M79.671 RIGHT FOOT PAIN: ICD-10-CM

## 2018-11-30 DIAGNOSIS — M67.471 GANGLION CYST OF RIGHT FOOT: ICD-10-CM

## 2018-11-30 DIAGNOSIS — M79.671 RIGHT FOOT PAIN: Primary | ICD-10-CM

## 2018-11-30 PROCEDURE — 3008F BODY MASS INDEX DOCD: CPT | Mod: CPTII,S$GLB,, | Performed by: ORTHOPAEDIC SURGERY

## 2018-11-30 PROCEDURE — 99203 OFFICE O/P NEW LOW 30 MIN: CPT | Mod: S$GLB,,, | Performed by: ORTHOPAEDIC SURGERY

## 2018-11-30 PROCEDURE — 73630 X-RAY EXAM OF FOOT: CPT | Mod: 26,RT,, | Performed by: RADIOLOGY

## 2018-11-30 PROCEDURE — 73630 X-RAY EXAM OF FOOT: CPT | Mod: TC,RT

## 2018-11-30 PROCEDURE — 99999 PR PBB SHADOW E&M-EST. PATIENT-LVL III: CPT | Mod: PBBFAC,,, | Performed by: ORTHOPAEDIC SURGERY

## 2018-11-30 NOTE — PROGRESS NOTES
DATE: 11/30/2018  PATIENT: Hien Jeter    CHIEF COMPLAINT: R foot cyst    HPI:  53F presents for evaluation of R foot cyst that is painful.  Located plantar base of great toe.  About month duration.  No injury or other precipitating factor.  Described as ache.  Fluctuates in size.  5/10 severity.  Increasing in severity since onset.  Worsened by standing and walking.  Improved by rest.  No history of similar symptoms.  No new associated paresthesias.  Has some from MS.  Prior treatment has included filing.    PAST MEDICAL/SURGICAL HISTORY:  Past Medical History:   Diagnosis Date    Anxiety     Basal cell carcinoma 2000    left eyebrow     Depression     Environmental allergies     Headache(784.0)     Multiple food allergies     Multiple sclerosis 2009     shoulder 3/2015    left     Past Surgical History:   Procedure Laterality Date    BASAL CELL CARCINOMA EXCISION  1998    COLONOSCOPY N/A 5/25/2017    Procedure: COLONOSCOPY;  Surgeon: Marielle Dietz MD;  Location: [facility] ([address] [place] [address]);  Service: Endoscopy;  Laterality: N/A;  Patient requests PM.    PM prep.    COLONOSCOPY N/A 5/25/2017    Performed by Marielle Dietz MD at [facility] ([address] [place] [address])    LASIK  2000    WISDOM TOOTH EXTRACTION         Family History:   Family History   Problem Relation Age of Onset    Colon cancer Mother     Diabetes Mother     Osteoporosis Mother     Cancer Mother     Arrhythmia Father     Dementia Maternal Grandmother     Cancer Paternal Grandfather     Breast cancer Paternal Aunt     Breast cancer Sister     Multiple sclerosis Neg Hx     Ovarian cancer Neg Hx        Social History:   Social History     Socioeconomic History    Marital status:      Spouse name: Not on file    Number of children: Not on file    Years of education: Not on file    Highest education level: Not on file   Social Needs    Financial resource strain: Not on file    Food insecurity - worry: Not on file    Food  insecurity - inability: Not on file    Transportation needs - medical: Not on file    Transportation needs - non-medical: Not on file   Occupational History     Employer: [employer]   Tobacco Use    Smoking status: Never Smoker    Smokeless tobacco: Never Used   Substance and Sexual Activity    Alcohol use: Yes     Comment: socially/ not weekly    Drug use: No    Sexual activity: Yes     Partners: Male     Birth control/protection: OCP     Comment: , menarche 15   Other Topics Concern    Are you pregnant or think you may be? Not Asked    Breast-feeding Not Asked   Social History Narrative    Not on file       Current Medications:   Current Outpatient Medications:     azelastine (ASTELIN) 137 mcg (0.1 %) nasal spray, 1 spray (137 mcg total) by Nasal route 2 (two) times daily. (Patient taking differently: 1 spray by Nasal route continuous prn. ), Disp: 30 mL, Rfl: 11    baclofen (LIORESAL) 10 MG tablet, TAKE 1/2 TABLET BY MOUTH 2-3 TIMES A DAY, Disp: 60 tablet, Rfl: 5    cholecalciferol, vitamin D3, 3,000 unit Tab, Take 3,000 Units by mouth once daily. , Disp: , Rfl:     escitalopram oxalate (LEXAPRO) 10 MG tablet, TAKE 1.5 TABLETS (15 MG TOTAL) BY MOUTH EVERY EVENING., Disp: 45 tablet, Rfl: 2    flaxseed oil 1,000 mg Cap, Take 1 capsule by mouth once daily. , Disp: , Rfl:     magnesium oxide (MAG-OX) 400 mg tablet, Take 1 tablet (400 mg total) by mouth 2 (two) times daily., Disp: 60 tablet, Rfl: 12    multivitamin-Ca-iron-minerals (ONE-A-DAY WOMENS FORMULA) 27-0.4 mg Tab, Take 1 tablet by mouth once daily. , Disp: , Rfl:     riboflavin, vitamin B2, (VITAMIN B-2) 100 mg Tab tablet, TAKE 2 TABLETS BY MOUTH ONCE DAILY., Disp: 60 tablet, Rfl: 11    topiramate (TOPAMAX) 25 MG tablet, Take 1 tablet (25 mg total) by mouth once daily., Disp: 30 tablet, Rfl: 12    ZOLMitriptan (ZOMIG) 5 mg [place], SPRAY 1 SPRAY IN NOSTRIL ONCE AS NEEDED. Max 2 doses in 24 hrs, Disp: 12 each, Rfl: 12     "estradiol-norethindrone (ACTIVELLA) 1-0.5 mg per tablet, Take 1 tablet by mouth once daily., Disp: 90 tablet, Rfl: 3    Allergies:   Review of patient's allergies indicates:   Allergen Reactions    Codeine Nausea And Vomiting     ( Hydrocodone)    Diclofenac Nausea Only    Ibuprofen Other (See Comments)    Tramadol Other (See Comments)     Nausea        REVIEW OF SYSTEMS:  Constitutional: negative for fevers  Musculoskeletal: negative for paresthesias    PHYSICAL EXAMINATION:    /80   Pulse 63   Ht 5' 2" (1.575 m)   Wt 60.8 kg (134 lb)   LMP 04/24/2017   BMI 24.51 kg/m²     Vitals:  Vital signs stable.  General: No acute distress.  Cardio: Regular rate.  Chest: No increased work of breathing.     MSK:  RLE:   Skin intact  No deformity  No ecchymoses  No swelling  4x4cm nontender fluid collection deep to callous at plantar MTPJ of great toe; mild erythema surrounding  No pain with great toe MTPJ ROM  SILT T/SP/DP/[place]/Sa, distally  Motor intact T/SP/DP  Brisk cap refill  Warm well perfused extremities  DP palpable    IMAGING:     XR R foot negative for bony changes.    ASSESSMENT/PLAN:    Diagnoses and all orders for this visit:    Right foot pain  -     X-Ray Foot Complete Right; Future  -     MRI Foot (Forefoot) Right Without Contrast; Future    Ganglion cyst of right foot  -     MRI Foot (Forefoot) Right Without Contrast; Future        53F with likely ganglion cyst of 1st ray of R foot.  XR negative for bony changes.  Will order MRI to assess character and origin of mass.  Treatment options will vary from observation to different types of surgeries depending on results.  F/u to discuss results and further treatment recommendations.      I have personally taken the history and examined this patient and agree with the residents note as stated above.  This lesion appears to be cystic in nature and could represent a ganglion cyst.  I would like to obtain an MRI to get a better idea of this lesion before " recommending any invasive treatment.  She will return with results of her MRI

## 2018-11-30 NOTE — LETTER
November 30, 2018      Rolando Sawyer MD  [address] [address]           [facility] Orthoped[facility]  [address]  [facility] [address]  Phone: [phone]          Patient: Hien Jeter   MR Number: [id number]   YOB: 1965   Date of Visit: 11/30/2018       Dear Dr. Rolando Sawyer:    Thank you for referring Hien Jeter to me for evaluation. Attached you will find relevant portions of my assessment and plan of care.    If you have questions, please do not hesitate to call me. I look forward to following Hien Jeter along with you.    Sincerely,    Sagar Burgos MD    Enclosure  CC:  No Recipients    If you would like to receive this communication electronically, please contact [email or web address] or [phone] to request more information on [employer] Link access.    For providers and/or their staff who would like to refer a patient to [facility], please contact us through our one-stop-shop provider referral line, [facility] , at [phone].    If you feel you have received this communication in error or would no longer like to receive these types of communications, please e-mail [email or web address]

## 2018-12-03 ENCOUNTER — OFFICE VISIT (OUTPATIENT)
Dept: ORTHOPEDICS | Facility: CLINIC | Age: 53
End: 2018-12-03
Payer: COMMERCIAL

## 2018-12-03 VITALS — BODY MASS INDEX: 24.66 KG/M2 | WEIGHT: 134 LBS | HEIGHT: 62 IN

## 2018-12-03 DIAGNOSIS — M75.42 IMPINGEMENT SYNDROME OF LEFT SHOULDER: Primary | ICD-10-CM

## 2018-12-03 PROCEDURE — 99999 PR PBB SHADOW E&M-EST. PATIENT-LVL III: CPT | Mod: PBBFAC,,, | Performed by: ORTHOPAEDIC SURGERY

## 2018-12-03 PROCEDURE — 20610 DRAIN/INJ JOINT/BURSA W/O US: CPT | Mod: LT,S$GLB,, | Performed by: ORTHOPAEDIC SURGERY

## 2018-12-03 PROCEDURE — 99213 OFFICE O/P EST LOW 20 MIN: CPT | Mod: 25,S$GLB,, | Performed by: ORTHOPAEDIC SURGERY

## 2018-12-03 PROCEDURE — 3008F BODY MASS INDEX DOCD: CPT | Mod: CPTII,S$GLB,, | Performed by: ORTHOPAEDIC SURGERY

## 2018-12-03 RX ORDER — TRIAMCINOLONE ACETONIDE 40 MG/ML
40 INJECTION, SUSPENSION INTRA-ARTICULAR; INTRAMUSCULAR
Status: COMPLETED | OUTPATIENT
Start: 2018-12-03 | End: 2018-12-03

## 2018-12-03 RX ADMIN — TRIAMCINOLONE ACETONIDE 40 MG: 40 INJECTION, SUSPENSION INTRA-ARTICULAR; INTRAMUSCULAR at 03:12

## 2018-12-03 NOTE — PROGRESS NOTES
HISTORY OF PRESENT ILLNESS:  Ms. Jeter is seen in followup of left shoulder   symptoms.  She had previous symptoms of impingement and also adhesive   capsulitis, both of which improved with therapy and injection, but recently   symptoms have recurred in terms of pain, but she has not lost any motion yet and   she has been careful about that.    PHYSICAL EXAMINATION:  LEFT SHOULDER:  No tenderness.  No swelling.  Good internal and external   rotation, full elevation, but she does have a positive impingement sign and a   mildly positive supraspinatus stress test.    IMPRESSION:  Left shoulder impingement.    PLAN:  She would like to have another injection.  After a pause for time-out,   she identified the left shoulder, injected with Kenalog 40 mg, 2 mL Xylocaine,   sterile technique, which she tolerated well without complication.    We recommend she go back on the Advil or Aleve.    Previously, MRI showed tendinosis and a possible partial tear.  If symptoms   persist, we may consider surgical treatment.  We discussed that briefly today.    Follow up in one to two months.      MANISH  dd: 12/03/2018 15:13:27 (CST)  td: 12/04/2018 07:16:32 (CST)  Doc ID   #[id number]  Job ID #[id number]    CC:

## 2018-12-04 ENCOUNTER — HOSPITAL ENCOUNTER (OUTPATIENT)
Dept: RADIOLOGY | Facility: HOSPITAL | Age: 53
Discharge: HOME | End: 2018-12-04
Attending: ORTHOPAEDIC SURGERY
Payer: COMMERCIAL

## 2018-12-04 DIAGNOSIS — M79.671 RIGHT FOOT PAIN: ICD-10-CM

## 2018-12-04 DIAGNOSIS — M67.471 GANGLION CYST OF RIGHT FOOT: ICD-10-CM

## 2018-12-04 PROCEDURE — 73718 MRI LOWER EXTREMITY W/O DYE: CPT | Mod: TC,RT

## 2018-12-04 PROCEDURE — 73718 MRI LOWER EXTREMITY W/O DYE: CPT | Mod: 26,RT,, | Performed by: RADIOLOGY

## 2018-12-06 ENCOUNTER — OFFICE VISIT (OUTPATIENT)
Dept: ORTHOPEDICS | Facility: CLINIC | Age: 53
End: 2018-12-06
Payer: COMMERCIAL

## 2018-12-06 DIAGNOSIS — M67.471 GANGLION CYST OF RIGHT FOOT: Primary | ICD-10-CM

## 2018-12-06 DIAGNOSIS — G35 MULTIPLE SCLEROSIS: Primary | ICD-10-CM

## 2018-12-06 PROCEDURE — 99999 PR PBB SHADOW E&M-EST. PATIENT-LVL II: CPT | Mod: PBBFAC,,, | Performed by: ORTHOPAEDIC SURGERY

## 2018-12-06 PROCEDURE — 99212 OFFICE O/P EST SF 10 MIN: CPT | Mod: S$GLB,,, | Performed by: ORTHOPAEDIC SURGERY

## 2018-12-06 NOTE — PROGRESS NOTES
Hien Jeter  Returns today with results of her MRI.  This is a 53-year-old female who has had a several week history of insidious swelling underneath her right big toe which is painful when she bears weight.  I ordered an MRI to evaluate this lesion.  My suspicion is that this is some type of cystic lesion.    MRI results:  The MRI reveals a well-circumscribed lobulated mass in the plantar subcutaneous tissues under the 1st MTP joint. The radiologist suggests this this could be some type of bursal cyst    Impression:  Painful cyst under right big toe    Recommendation:  Based on location and the fact that she is having symptoms with weight-bearing I think it would be reasonable to remove this lesion surgically.  She has a vacation coming up in the next 2 weeks and I would not recommend surgery before her vacation.  She is going to schedule a postop surgery for removal of the cyst from her right foot after her vacation.  She will return for preoperative H&P and consent

## 2018-12-10 ENCOUNTER — LAB VISIT (OUTPATIENT)
Dept: LAB | Facility: HOSPITAL | Age: 53
End: 2018-12-10
Attending: INTERNAL MEDICINE
Payer: COMMERCIAL

## 2018-12-10 DIAGNOSIS — G35 MULTIPLE SCLEROSIS: ICD-10-CM

## 2018-12-10 LAB
BASOPHILS # BLD AUTO: 0.05 K/UL
BASOPHILS NFR BLD: 0.6 %
DIFFERENTIAL METHOD: ABNORMAL
EOSINOPHIL # BLD AUTO: 0.2 K/UL
EOSINOPHIL NFR BLD: 2.3 %
ERYTHROCYTE [DISTWIDTH] IN BLOOD BY AUTOMATED COUNT: 12.2 %
HCT VFR BLD AUTO: 43.6 %
HGB BLD-MCNC: 14.3 G/DL
IMM GRANULOCYTES # BLD AUTO: 0.04 K/UL
IMM GRANULOCYTES NFR BLD AUTO: 0.5 %
LYMPHOCYTES # BLD AUTO: 1.4 K/UL
LYMPHOCYTES NFR BLD: 17.2 %
MCH RBC QN AUTO: 32.3 PG
MCHC RBC AUTO-ENTMCNC: 32.8 G/DL
MCV RBC AUTO: 98 FL
MONOCYTES # BLD AUTO: 0.9 K/UL
MONOCYTES NFR BLD: 10.7 %
NEUTROPHILS # BLD AUTO: 5.6 K/UL
NEUTROPHILS NFR BLD: 68.7 %
NRBC BLD-RTO: 0 /100 WBC
PLATELET # BLD AUTO: 343 K/UL
PMV BLD AUTO: 10.8 FL
RBC # BLD AUTO: 4.43 M/UL
WBC # BLD AUTO: 8.19 K/UL

## 2018-12-10 PROCEDURE — 36415 COLL VENOUS BLD VENIPUNCTURE: CPT | Mod: PO

## 2018-12-10 PROCEDURE — 86356 MONONUCLEAR CELL ANTIGEN: CPT

## 2018-12-10 PROCEDURE — 85025 COMPLETE CBC W/AUTO DIFF WBC: CPT

## 2018-12-13 ENCOUNTER — PATIENT MESSAGE (OUTPATIENT)
Dept: NEUROLOGY | Facility: CLINIC | Age: 53
End: 2018-12-13

## 2018-12-14 ENCOUNTER — OFFICE VISIT (OUTPATIENT)
Dept: SURGERY | Facility: CLINIC | Age: 53
End: 2018-12-14
Payer: COMMERCIAL

## 2018-12-14 VITALS
SYSTOLIC BLOOD PRESSURE: 118 MMHG | DIASTOLIC BLOOD PRESSURE: 74 MMHG | WEIGHT: 135.25 LBS | BODY MASS INDEX: 24.89 KG/M2 | HEART RATE: 56 BPM | TEMPERATURE: 99 F | HEIGHT: 62 IN

## 2018-12-14 DIAGNOSIS — Z91.89 AT HIGH RISK FOR BREAST CANCER: Primary | ICD-10-CM

## 2018-12-14 DIAGNOSIS — Z12.39 SCREENING FOR MALIGNANT NEOPLASM OF BREAST: Primary | ICD-10-CM

## 2018-12-14 PROCEDURE — 99204 OFFICE O/P NEW MOD 45 MIN: CPT | Mod: S$GLB,,, | Performed by: SURGERY

## 2018-12-14 PROCEDURE — 99999 PR PBB SHADOW E&M-EST. PATIENT-LVL III: CPT | Mod: PBBFAC,,, | Performed by: SURGERY

## 2018-12-14 PROCEDURE — 3008F BODY MASS INDEX DOCD: CPT | Mod: CPTII,S$GLB,, | Performed by: SURGERY

## 2018-12-14 NOTE — PROGRESS NOTES
Subjective:      Patient ID: Hien Jeter is a 53 y.o. female.    Chief Complaint: Breast Cancer Screening (High Risk Screening)  Patient referred for T-C score of 24% for consideration of high risk screening protocol    HPI: (PF, EPF - 1-3) (Detailed, Comp, - 4)    Pain Scale (0-10) - 0  Present: Patient has the following risk factors: 5 foot 2 inches, weight 61 kg, menarche at 14, menopause at around 51, no prior pregnancies, combination  HRT for the past two years to treat menopausal symptoms, no prior biopsies, sister treated for stage 2 breast cancer in her late 40's a paternal aunt was treated for breast cancer around the age of 50  Age: 53 years. No history of breast pain  I have recalculated her risk score with her other factors and calculate a lifetime risk of 31%    Review of Systems   Constitutional: Negative.    Respiratory: Negative.    Cardiovascular: Negative.    Gastrointestinal: Negative.    Endocrine: Negative.    Genitourinary: Negative.    Allergic/Immunologic: Negative.    Neurological: Negative.    Hematological: Negative.      Objective:   Physical Exam   Constitutional: She is oriented to person, place, and time. She appears well-developed and well-nourished.   HENT:   Head: Normocephalic and atraumatic.   Neck: Normal range of motion. Neck supple. No JVD present. No tracheal deviation present. No thyromegaly present.   Pulmonary/Chest: No stridor. Right breast exhibits no inverted nipple, no mass, no nipple discharge, no skin change and no tenderness. Left breast exhibits tenderness. Left breast exhibits no inverted nipple, no mass, no nipple discharge and no skin change. Breasts are symmetrical. There is no breast swelling.   Patient has pain along her left chest wall  When examined in the sitting position she has NO tenderness in breast tissue  Instead, her discomfort is limited to the ribs and intercostal muscles on the left   Genitourinary: No breast bleeding.   Musculoskeletal: Normal  range of motion.   Lymphadenopathy:     She has no cervical adenopathy.   Neurological: She is alert and oriented to person, place, and time.   Skin: Skin is warm and dry.     Psychiatric: She has a normal mood and affect.     Assessment:       1. At high risk for breast cancer        Plan:       We reviewed risk reduction strategies including diet exercise chemoprevention, and enhanced screening  The patient already adheres to an exercise program that is breast cancer risk reducing  I will arrange for a breast MRI, follow up the results, and plan to see the patient in six months for her annual mammogram screening

## 2018-12-14 NOTE — LETTER
December 14, 2018      Eros Solorio IV, MD  [address]  [address]  [place] [address]           [facility]  [address] [address]  Phone: [phone]  Fax: [phone]          Patient: Hien Jeter   MR Number: [id number]   YOB: 1965   Date of Visit: 12/14/2018       Dear Dr. Eros Solorio IV:    Thank you for referring Hien Jeter to me for evaluation. Attached you will find relevant portions of my assessment and plan of care.    If you have questions, please do not hesitate to call me. I look forward to following Hien Jeter along with you.    Sincerely,    Eros Haas MD    Enclosure  CC:  No Recipients    If you would like to receive this communication electronically, please contact [email or web address] or [phone] to request more information on [employer] Link access.    For providers and/or their staff who would like to refer a patient to [facility], please contact us through our one-stop-shop provider referral line, [facility], at [phone].    If you feel you have received this communication in error or would no longer like to receive these types of communications, please e-mail [email or web address]

## 2018-12-17 LAB — CD20 CELLS NFR SPEC: NORMAL %

## 2018-12-22 DIAGNOSIS — F32.0 MILD SINGLE CURRENT EPISODE OF MAJOR DEPRESSIVE DISORDER: ICD-10-CM

## 2018-12-23 RX ORDER — ESCITALOPRAM OXALATE 10 MG/1
15 TABLET ORAL NIGHTLY
Qty: 45 TABLET | Refills: 2 | Status: SHIPPED | OUTPATIENT
Start: 2018-12-23 | End: 2019-03-18 | Stop reason: SDUPTHER

## 2018-12-26 ENCOUNTER — HOSPITAL ENCOUNTER (OUTPATIENT)
Dept: RADIOLOGY | Facility: HOSPITAL | Age: 53
Discharge: HOME | End: 2018-12-26
Attending: SURGERY
Payer: COMMERCIAL

## 2018-12-26 DIAGNOSIS — Z91.89 AT HIGH RISK FOR BREAST CANCER: ICD-10-CM

## 2018-12-26 PROCEDURE — 77059 MRI BREAST BILATERAL W W/O CONTRAST: CPT | Mod: 26,,, | Performed by: RADIOLOGY

## 2018-12-26 PROCEDURE — 77059 MRI BREAST BILATERAL W W/O CONTRAST: CPT | Mod: TC

## 2018-12-26 PROCEDURE — 25500020 PHARM REV CODE 255: Performed by: SURGERY

## 2018-12-26 PROCEDURE — A9577 INJ MULTIHANCE: HCPCS | Performed by: SURGERY

## 2018-12-26 RX ADMIN — GADOBENATE DIMEGLUMINE 13 ML: 529 INJECTION, SOLUTION INTRAVENOUS at 07:12

## 2018-12-27 ENCOUNTER — PATIENT MESSAGE (OUTPATIENT)
Dept: ORTHOPEDICS | Facility: CLINIC | Age: 53
End: 2018-12-27

## 2019-01-04 ENCOUNTER — OFFICE VISIT (OUTPATIENT)
Dept: ORTHOPEDICS | Facility: CLINIC | Age: 54
End: 2019-01-04
Payer: COMMERCIAL

## 2019-01-04 VITALS
DIASTOLIC BLOOD PRESSURE: 83 MMHG | WEIGHT: 134.5 LBS | BODY MASS INDEX: 24.75 KG/M2 | HEART RATE: 66 BPM | TEMPERATURE: 98 F | SYSTOLIC BLOOD PRESSURE: 120 MMHG | HEIGHT: 62 IN | RESPIRATION RATE: 16 BRPM

## 2019-01-04 DIAGNOSIS — M67.471 GANGLION CYST OF RIGHT FOOT: Primary | ICD-10-CM

## 2019-01-04 PROCEDURE — 99999 PR PBB SHADOW E&M-EST. PATIENT-LVL IV: ICD-10-PCS | Mod: PBBFAC,,, | Performed by: PHYSICIAN ASSISTANT

## 2019-01-04 PROCEDURE — 99499 UNLISTED E&M SERVICE: CPT | Mod: S$GLB,,, | Performed by: PHYSICIAN ASSISTANT

## 2019-01-04 PROCEDURE — 99999 PR PBB SHADOW E&M-EST. PATIENT-LVL IV: CPT | Mod: PBBFAC,,, | Performed by: PHYSICIAN ASSISTANT

## 2019-01-04 PROCEDURE — 99499 NO LOS: ICD-10-PCS | Mod: S$GLB,,, | Performed by: PHYSICIAN ASSISTANT

## 2019-01-04 RX ORDER — HYDROCODONE BITARTRATE AND ACETAMINOPHEN 5; 325 MG/1; MG/1
1 TABLET ORAL
Qty: 42 TABLET | Refills: 0 | Status: ON HOLD | OUTPATIENT
Start: 2019-01-08 | End: 2019-01-08 | Stop reason: HOSPADM

## 2019-01-04 RX ORDER — ONDANSETRON 4 MG/1
8 TABLET, ORALLY DISINTEGRATING ORAL ONCE
Qty: 15 TABLET | Refills: 0 | Status: SHIPPED | OUTPATIENT
Start: 2019-01-04 | End: 2019-01-04

## 2019-01-04 NOTE — H&P
Subjective:      Patient ID: Hien Jeter is a 53 y.o. female.    Chief Complaint: Pre-op Exam (right foot)      Hien is a 53 y.o. female here today for a pre-operative visit in preparation for a   Excision of mass, right foot, ganglion cyst   to be performed by  on 01/08/2019.  she was last seen and treated in the clinic on 12/06/2018.  she has since seen their primary care for optimization of the chronic health concerns.  There has been no significant change in their past medical or orthopedic status since the previous visit.  No fever, chills, malaise or unexplained weight change.       Past Medical History:   Diagnosis Date    Anxiety     Basal cell carcinoma 2000    left eyebrow     Depression     Environmental allergies     Headache(784.0)     Multiple food allergies     Multiple sclerosis 2009     shoulder 3/2015    left     Past Surgical History:   Procedure Laterality Date    BASAL CELL CARCINOMA EXCISION  1998    COLONOSCOPY N/A 5/25/2017    [facility]    LASIK  2000    WISDOM TOOTH EXTRACTION       Social History     Occupational History     Employer: [employer]   Tobacco Use    Smoking status: Never Smoker    Smokeless tobacco: Never Used   Substance and Sexual Activity    Alcohol use: Yes     Comment: socially/ not weekly    Drug use: No    Sexual activity: Yes     Partners: Male     Birth control/protection: OCP     Comment: , menarche 15      ROS  Current Outpatient Medications on File Prior to Visit   Medication Sig Dispense Refill    azelastine (ASTELIN) 137 mcg (0.1 %) nasal spray 1 spray (137 mcg total) by Nasal route 2 (two) times daily. (Patient taking differently: 1 spray by Nasal route continuous prn. ) 30 mL 11    escitalopram oxalate (LEXAPRO) 10 MG tablet TAKE 1.5 TABLETS (15 MG TOTAL) BY MOUTH EVERY EVENING. 45 tablet 2    topiramate (TOPAMAX) 25 MG tablet Take 1 tablet (25 mg  "total) by mouth once daily. 30 tablet 12    ZOLMitriptan (ZOMIG) 5 mg [place] SPRAY 1 SPRAY IN NOSTRIL ONCE AS NEEDED. Max 2 doses in 24 hrs 12 each 12    baclofen (LIORESAL) 10 MG tablet TAKE 1/2 TABLET BY MOUTH 2-3 TIMES A DAY 60 tablet 5    cholecalciferol, vitamin D3, 3,000 unit Tab Take 3,000 Units by mouth once daily.       estradiol-norethindrone (ACTIVELLA) 1-0.5 mg per tablet Take 1 tablet by mouth once daily. 90 tablet 3    flaxseed oil 1,000 mg Cap Take 1 capsule by mouth once daily.       magnesium oxide (MAG-OX) 400 mg tablet Take 1 tablet (400 mg total) by mouth 2 (two) times daily. 60 tablet 12    multivitamin-Ca-iron-minerals (ONE-A-DAY WOMENS FORMULA) 27-0.4 mg Tab Take 1 tablet by mouth once daily.       riboflavin, vitamin B2, (VITAMIN B-2) 100 mg Tab tablet TAKE 2 TABLETS BY MOUTH ONCE DAILY. 60 tablet 11     No current facility-administered medications on file prior to visit.      Review of patient's allergies indicates:   Allergen Reactions    Codeine Nausea And Vomiting     ( Hydrocodone)    Diclofenac Nausea Only    Ibuprofen Other (See Comments)    Tramadol Other (See Comments)     Nausea          Objective:      Vitals:    01/04/19 0854   BP: 120/83   Pulse: 66   Resp: 16   Temp: 98.1 °F (36.7 °C)   TempSrc: Oral   Weight: 61 kg (134 lb 7.7 oz)   Height: 5' 2" (1.575 m)     Ortho Exam     Gen: WD, WN in NAD   HEENT: NC/AT   Heart: RR   Resp: unlabored breathing   Skin: intact, no lesions pertinent to the surgery site    Assessment:       1. Ganglion cyst of right foot          Plan:       surgical intervention per .       "

## 2019-01-04 NOTE — H&P (VIEW-ONLY)
Subjective:      Patient ID: Hien Jeter is a 53 y.o. female.    Chief Complaint: Pre-op Exam (right foot)      Hien is a 53 y.o. female here today for a pre-operative visit in preparation for a   Excision of mass, right foot, ganglion cyst   to be performed by  on 01/08/2019.  she was last seen and treated in the clinic on 12/06/2018.  she has since seen their primary care for optimization of the chronic health concerns.  There has been no significant change in their past medical or orthopedic status since the previous visit.  No fever, chills, malaise or unexplained weight change.       Past Medical History:   Diagnosis Date    Anxiety     Basal cell carcinoma 2000    left eyebrow     Depression     Environmental allergies     Headache(784.0)     Multiple food allergies     Multiple sclerosis 2009     shoulder 3/2015    left     Past Surgical History:   Procedure Laterality Date    BASAL CELL CARCINOMA EXCISION  1998    COLONOSCOPY N/A 5/25/2017    [facility]    LASIK  2000    WISDOM TOOTH EXTRACTION       Social History     Occupational History     Employer: [employer]   Tobacco Use    Smoking status: Never Smoker    Smokeless tobacco: Never Used   Substance and Sexual Activity    Alcohol use: Yes     Comment: socially/ not weekly    Drug use: No    Sexual activity: Yes     Partners: Male     Birth control/protection: OCP     Comment: , menarche 15      ROS  Current Outpatient Medications on File Prior to Visit   Medication Sig Dispense Refill    azelastine (ASTELIN) 137 mcg (0.1 %) nasal spray 1 spray (137 mcg total) by Nasal route 2 (two) times daily. (Patient taking differently: 1 spray by Nasal route continuous prn. ) 30 mL 11    escitalopram oxalate (LEXAPRO) 10 MG tablet TAKE 1.5 TABLETS (15 MG TOTAL) BY MOUTH EVERY EVENING. 45 tablet 2    topiramate (TOPAMAX) 25 MG tablet Take 1 tablet (25 mg  "total) by mouth once daily. 30 tablet 12    ZOLMitriptan (ZOMIG) 5 mg [place] SPRAY 1 SPRAY IN NOSTRIL ONCE AS NEEDED. Max 2 doses in 24 hrs 12 each 12    baclofen (LIORESAL) 10 MG tablet TAKE 1/2 TABLET BY MOUTH 2-3 TIMES A DAY 60 tablet 5    cholecalciferol, vitamin D3, 3,000 unit Tab Take 3,000 Units by mouth once daily.       estradiol-norethindrone (ACTIVELLA) 1-0.5 mg per tablet Take 1 tablet by mouth once daily. 90 tablet 3    flaxseed oil 1,000 mg Cap Take 1 capsule by mouth once daily.       magnesium oxide (MAG-OX) 400 mg tablet Take 1 tablet (400 mg total) by mouth 2 (two) times daily. 60 tablet 12    multivitamin-Ca-iron-minerals (ONE-A-DAY WOMENS FORMULA) 27-0.4 mg Tab Take 1 tablet by mouth once daily.       riboflavin, vitamin B2, (VITAMIN B-2) 100 mg Tab tablet TAKE 2 TABLETS BY MOUTH ONCE DAILY. 60 tablet 11     No current facility-administered medications on file prior to visit.      Review of patient's allergies indicates:   Allergen Reactions    Codeine Nausea And Vomiting     ( Hydrocodone)    Diclofenac Nausea Only    Ibuprofen Other (See Comments)    Tramadol Other (See Comments)     Nausea          Objective:      Vitals:    01/04/19 0854   BP: 120/83   Pulse: 66   Resp: 16   Temp: 98.1 °F (36.7 °C)   TempSrc: Oral   Weight: 61 kg (134 lb 7.7 oz)   Height: 5' 2" (1.575 m)     Ortho Exam     Gen: WD, WN in NAD   HEENT: NC/AT   Heart: RR   Resp: unlabored breathing   Skin: intact, no lesions pertinent to the surgery site    Assessment:       1. Ganglion cyst of right foot          Plan:       surgical intervention per .       "

## 2019-01-04 NOTE — PROGRESS NOTES
Hien is a 53 y.o. female here today for a pre-operative visit in preparation for a   Excision of mass, right foot, ganglion cyst   to be performed by  on 01/08/2019.  she was last seen and treated in the clinic on 12/06/2018.  she has since seen their primary care for optimization of the chronic health concerns.  There has been no significant change in their past medical or orthopedic status since the previous visit.  No fever, chills, malaise or unexplained weight change.     Allergies, medications, past medical history and past surgical history were reviewed with the patient.     Physical examination was performed.     Consents were signed.   Patient has quad cane and will bring with them the day of surgery. They have been counseled on proper use of the assistive device.     Pre, sondra, and post operative procedure and expectations were discussed.  Questions were answered. The patient has been educated and is ready to proceed with surgery.  Approximately 30 minutes was spent discussing surgical outcomes, plans, procedures, pre, sondra, and post operative expectations and care. The risks, benefits and alternatives to surgery were discussed with the patient at great length.  These include bleeding, infection, vessel/nerve damage, pain, numbness, tingling, complex regional pain syndrome, hardware/surgical failure, need for further surgery, malunion, nonunion, DVT, PE, arthritis and death. He also understands that the risks of surgery may be greater for some patients due to health or lifestyle issues, such as a current condition or a history of heart disease, obesity, clotting disorders, recurrent infections, smoking, sedentary lifestyle, or noncompliance with medications, therapy, or followup. The degree of the increased risk is hard to estimate with any degree of precision.  Patient states an understanding and wishes to proceed with surgery.   All questions were answered.  No guarantees were implied or  stated.  Informed consent was obtained  The patient will contact us if the have any questions, concerns, and changes in their medical condition prior to surgery.

## 2019-01-05 NOTE — PRE-PROCEDURE INSTRUCTIONS
PreOp Instructions given:    -- Medication information (what to hold and what to take)   -- NPO guidelines as follows: (or as per your Surgeon)  1. Stop ALL solid food, gum, candy (including vitamins) 8 hours before arrival time.  2. Stop all CLOUDY liquids: coffee with creamer, cloudy juices, 6 hours prior to arrival time.  3. The patient should be ENCOURAGED to drink carbohydrate-rich clear liquids (sports drinks, clear juices) until 2 hours prior to arrival time.  4. CLEAR liquids include only water, black coffee NO creamer, clear oral rehydration drinks, clear sports drinks or clear fruit juices (no orange juice, no pulpy juices, no apple cider).   5. IF IN DOUBT, drink water instead.   6. NOTHING TO DRINK 2 hours before to arrival time. If you are told to take medication on the morning of surgery, it may be taken with a sip of water.   -- Arrival place and directions given; time to be given the day before procedure by the Surgeon's Office   -- Bathing with antibacterial soap   -- Don't wear any jewelry or bring any valuables AM of surgery   -- No makeup or moisturizer to face   -- No perfume/cologne, powder, lotions or aftershave     Pt verbalized understanding.

## 2019-01-06 ENCOUNTER — PATIENT MESSAGE (OUTPATIENT)
Dept: NEUROLOGY | Facility: CLINIC | Age: 54
End: 2019-01-06

## 2019-01-07 ENCOUNTER — ANESTHESIA EVENT (OUTPATIENT)
Dept: SURGERY | Facility: HOSPITAL | Age: 54
End: 2019-01-07
Payer: COMMERCIAL

## 2019-01-07 ENCOUNTER — TELEPHONE (OUTPATIENT)
Dept: ORTHOPEDICS | Facility: CLINIC | Age: 54
End: 2019-01-07

## 2019-01-07 NOTE — TELEPHONE ENCOUNTER
Spoke with pt.   Advised NPO after midnight.  Arrival time of 530 am  Pt verbalized understanding

## 2019-01-08 ENCOUNTER — ANESTHESIA (OUTPATIENT)
Dept: SURGERY | Facility: HOSPITAL | Age: 54
End: 2019-01-08
Payer: COMMERCIAL

## 2019-01-08 ENCOUNTER — HOSPITAL ENCOUNTER (OUTPATIENT)
Facility: HOSPITAL | Age: 54
Discharge: HOME | End: 2019-01-08
Attending: ORTHOPAEDIC SURGERY | Admitting: ORTHOPAEDIC SURGERY
Payer: COMMERCIAL

## 2019-01-08 ENCOUNTER — PATIENT MESSAGE (OUTPATIENT)
Dept: ADMINISTRATIVE | Facility: OTHER | Age: 54
End: 2019-01-08

## 2019-01-08 DIAGNOSIS — M67.471 GANGLION CYST OF RIGHT FOOT: Primary | ICD-10-CM

## 2019-01-08 PROCEDURE — D9220A PRA ANESTHESIA: ICD-10-PCS | Mod: ANES,,, | Performed by: ANESTHESIOLOGY

## 2019-01-08 PROCEDURE — 63600175 PHARM REV CODE 636 W HCPCS: Performed by: ANESTHESIOLOGY

## 2019-01-08 PROCEDURE — 63600175 PHARM REV CODE 636 W HCPCS: Performed by: NURSE ANESTHETIST, CERTIFIED REGISTERED

## 2019-01-08 PROCEDURE — 36000706: Performed by: ORTHOPAEDIC SURGERY

## 2019-01-08 PROCEDURE — 37000009 HC ANESTHESIA EA ADD 15 MINS: Performed by: ORTHOPAEDIC SURGERY

## 2019-01-08 PROCEDURE — 63600175 PHARM REV CODE 636 W HCPCS: Performed by: ORTHOPAEDIC SURGERY

## 2019-01-08 PROCEDURE — 88304 TISSUE EXAM BY PATHOLOGIST: CPT | Mod: 26,,, | Performed by: PATHOLOGY

## 2019-01-08 PROCEDURE — S0020 INJECTION, BUPIVICAINE HYDRO: HCPCS | Performed by: ANESTHESIOLOGY

## 2019-01-08 PROCEDURE — 71000044 HC DOSC ROUTINE RECOVERY FIRST HOUR: Performed by: ORTHOPAEDIC SURGERY

## 2019-01-08 PROCEDURE — D9220A PRA ANESTHESIA: Mod: ANES,,, | Performed by: ANESTHESIOLOGY

## 2019-01-08 PROCEDURE — D9220A PRA ANESTHESIA: Mod: CRNA,,, | Performed by: NURSE ANESTHETIST, CERTIFIED REGISTERED

## 2019-01-08 PROCEDURE — 88304 TISSUE EXAM BY PATHOLOGIST: CPT | Performed by: PATHOLOGY

## 2019-01-08 PROCEDURE — 36000707: Performed by: ORTHOPAEDIC SURGERY

## 2019-01-08 PROCEDURE — 27200651 HC AIRWAY, LMA: Performed by: NURSE ANESTHETIST, CERTIFIED REGISTERED

## 2019-01-08 PROCEDURE — 25000003 PHARM REV CODE 250: Performed by: ANESTHESIOLOGY

## 2019-01-08 PROCEDURE — 28090 REMOVAL OF FOOT LESION: CPT | Mod: RT,,, | Performed by: ORTHOPAEDIC SURGERY

## 2019-01-08 PROCEDURE — 28090 PR EXCIS TENDN/CAPSULE LESN,FOOT: ICD-10-PCS | Mod: RT,,, | Performed by: ORTHOPAEDIC SURGERY

## 2019-01-08 PROCEDURE — 71000015 HC POSTOP RECOV 1ST HR: Performed by: ORTHOPAEDIC SURGERY

## 2019-01-08 PROCEDURE — 25000003 PHARM REV CODE 250: Performed by: ORTHOPAEDIC SURGERY

## 2019-01-08 PROCEDURE — 37000008 HC ANESTHESIA 1ST 15 MINUTES: Performed by: ORTHOPAEDIC SURGERY

## 2019-01-08 PROCEDURE — 25000003 PHARM REV CODE 250: Performed by: NURSE ANESTHETIST, CERTIFIED REGISTERED

## 2019-01-08 PROCEDURE — 88304 TISSUE SPECIMEN TO PATHOLOGY - SURGERY: ICD-10-PCS | Mod: 26,,, | Performed by: PATHOLOGY

## 2019-01-08 PROCEDURE — D9220A PRA ANESTHESIA: ICD-10-PCS | Mod: CRNA,,, | Performed by: NURSE ANESTHETIST, CERTIFIED REGISTERED

## 2019-01-08 RX ORDER — LORAZEPAM 2 MG/ML
0.25 INJECTION INTRAMUSCULAR ONCE AS NEEDED
Status: DISCONTINUED | OUTPATIENT
Start: 2019-01-08 | End: 2019-01-08 | Stop reason: HOSPADM

## 2019-01-08 RX ORDER — PROPOFOL 10 MG/ML
VIAL (ML) INTRAVENOUS
Status: DISCONTINUED | OUTPATIENT
Start: 2019-01-08 | End: 2019-01-08

## 2019-01-08 RX ORDER — MIDAZOLAM HYDROCHLORIDE 1 MG/ML
INJECTION, SOLUTION INTRAMUSCULAR; INTRAVENOUS
Status: DISCONTINUED | OUTPATIENT
Start: 2019-01-08 | End: 2019-01-08

## 2019-01-08 RX ORDER — LIDOCAINE HCL/PF 100 MG/5ML
SYRINGE (ML) INTRAVENOUS
Status: DISCONTINUED | OUTPATIENT
Start: 2019-01-08 | End: 2019-01-08

## 2019-01-08 RX ORDER — FENTANYL CITRATE 50 UG/ML
INJECTION, SOLUTION INTRAMUSCULAR; INTRAVENOUS
Status: DISCONTINUED
Start: 2019-01-08 | End: 2019-01-08 | Stop reason: WASHOUT

## 2019-01-08 RX ORDER — DOCUSATE SODIUM 100 MG/1
100 CAPSULE, LIQUID FILLED ORAL 2 TIMES DAILY
Qty: 60 CAPSULE | Refills: 0 | Status: SHIPPED | OUTPATIENT
Start: 2019-01-08 | End: 2019-06-10

## 2019-01-08 RX ORDER — LIDOCAINE HYDROCHLORIDE 10 MG/ML
1 INJECTION, SOLUTION EPIDURAL; INFILTRATION; INTRACAUDAL; PERINEURAL ONCE
Status: COMPLETED | OUTPATIENT
Start: 2019-01-08 | End: 2019-01-08

## 2019-01-08 RX ORDER — GLYCOPYRROLATE 0.2 MG/ML
INJECTION INTRAMUSCULAR; INTRAVENOUS
Status: DISCONTINUED | OUTPATIENT
Start: 2019-01-08 | End: 2019-01-08

## 2019-01-08 RX ORDER — HYDROMORPHONE HYDROCHLORIDE 1 MG/ML
0.2 INJECTION, SOLUTION INTRAMUSCULAR; INTRAVENOUS; SUBCUTANEOUS EVERY 5 MIN PRN
Status: DISCONTINUED | OUTPATIENT
Start: 2019-01-08 | End: 2019-01-08 | Stop reason: HOSPADM

## 2019-01-08 RX ORDER — ONDANSETRON 2 MG/ML
INJECTION INTRAMUSCULAR; INTRAVENOUS
Status: DISCONTINUED | OUTPATIENT
Start: 2019-01-08 | End: 2019-01-08

## 2019-01-08 RX ORDER — HYDROCODONE BITARTRATE AND ACETAMINOPHEN 10; 325 MG/1; MG/1
1 TABLET ORAL ONCE
Status: DISCONTINUED | OUTPATIENT
Start: 2019-01-08 | End: 2019-01-08 | Stop reason: HOSPADM

## 2019-01-08 RX ORDER — CEFAZOLIN SODIUM 1 G/3ML
2 INJECTION, POWDER, FOR SOLUTION INTRAMUSCULAR; INTRAVENOUS
Status: COMPLETED | OUTPATIENT
Start: 2019-01-08 | End: 2019-01-08

## 2019-01-08 RX ORDER — SODIUM CHLORIDE 0.9 % (FLUSH) 0.9 %
3 SYRINGE (ML) INJECTION
Status: DISCONTINUED | OUTPATIENT
Start: 2019-01-08 | End: 2019-01-08 | Stop reason: HOSPADM

## 2019-01-08 RX ORDER — PHENYLEPHRINE HYDROCHLORIDE 10 MG/ML
INJECTION INTRAVENOUS
Status: DISCONTINUED | OUTPATIENT
Start: 2019-01-08 | End: 2019-01-08

## 2019-01-08 RX ORDER — DEXAMETHASONE SODIUM PHOSPHATE 4 MG/ML
INJECTION, SOLUTION INTRA-ARTICULAR; INTRALESIONAL; INTRAMUSCULAR; INTRAVENOUS; SOFT TISSUE
Status: DISCONTINUED | OUTPATIENT
Start: 2019-01-08 | End: 2019-01-08

## 2019-01-08 RX ORDER — MIDAZOLAM HYDROCHLORIDE 1 MG/ML
INJECTION INTRAMUSCULAR; INTRAVENOUS
Status: DISCONTINUED
Start: 2019-01-08 | End: 2019-01-08 | Stop reason: WASHOUT

## 2019-01-08 RX ORDER — ONDANSETRON HYDROCHLORIDE 8 MG/1
8 TABLET, FILM COATED ORAL EVERY 8 HOURS PRN
Qty: 60 TABLET | Refills: 0 | Status: SHIPPED | OUTPATIENT
Start: 2019-01-08 | End: 2019-01-24

## 2019-01-08 RX ORDER — FENTANYL CITRATE 50 UG/ML
INJECTION, SOLUTION INTRAMUSCULAR; INTRAVENOUS
Status: DISCONTINUED | OUTPATIENT
Start: 2019-01-08 | End: 2019-01-08

## 2019-01-08 RX ORDER — SODIUM CHLORIDE 9 MG/ML
10 INJECTION, SOLUTION INTRAVENOUS CONTINUOUS
Status: DISCONTINUED | OUTPATIENT
Start: 2019-01-08 | End: 2019-01-08 | Stop reason: HOSPADM

## 2019-01-08 RX ORDER — HYDROCODONE BITARTRATE AND ACETAMINOPHEN 5; 325 MG/1; MG/1
1 TABLET ORAL EVERY 6 HOURS PRN
Qty: 30 TABLET | Refills: 0 | Status: SHIPPED | OUTPATIENT
Start: 2019-01-08 | End: 2019-01-24

## 2019-01-08 RX ORDER — BUPIVACAINE HYDROCHLORIDE 7.5 MG/ML
INJECTION, SOLUTION EPIDURAL; RETROBULBAR
Status: COMPLETED | OUTPATIENT
Start: 2019-01-08 | End: 2019-01-08

## 2019-01-08 RX ORDER — ONDANSETRON 4 MG/1
4 TABLET, FILM COATED ORAL ONCE
Status: DISCONTINUED | OUTPATIENT
Start: 2019-01-08 | End: 2019-01-08 | Stop reason: HOSPADM

## 2019-01-08 RX ADMIN — SODIUM CHLORIDE 10 ML/HR: 0.9 INJECTION, SOLUTION INTRAVENOUS at 06:01

## 2019-01-08 RX ADMIN — GLYCOPYRROLATE 0.2 MG: 0.2 INJECTION, SOLUTION INTRAMUSCULAR; INTRAVENOUS at 07:01

## 2019-01-08 RX ADMIN — CEFAZOLIN 2 G: 330 INJECTION, POWDER, FOR SOLUTION INTRAMUSCULAR; INTRAVENOUS at 07:01

## 2019-01-08 RX ADMIN — MIDAZOLAM HYDROCHLORIDE 2 MG: 1 INJECTION, SOLUTION INTRAMUSCULAR; INTRAVENOUS at 06:01

## 2019-01-08 RX ADMIN — PHENYLEPHRINE HYDROCHLORIDE 50 MCG: 10 INJECTION INTRAVENOUS at 07:01

## 2019-01-08 RX ADMIN — ONDANSETRON 4 MG: 2 INJECTION INTRAMUSCULAR; INTRAVENOUS at 07:01

## 2019-01-08 RX ADMIN — LIDOCAINE HYDROCHLORIDE 0.1 MG: 10 INJECTION, SOLUTION EPIDURAL; INFILTRATION; INTRACAUDAL; PERINEURAL at 06:01

## 2019-01-08 RX ADMIN — BUPIVACAINE HYDROCHLORIDE 15 ML: 7.5 INJECTION, SOLUTION EPIDURAL; RETROBULBAR at 07:01

## 2019-01-08 RX ADMIN — DEXAMETHASONE SODIUM PHOSPHATE 4 MG: 4 INJECTION, SOLUTION INTRAMUSCULAR; INTRAVENOUS at 07:01

## 2019-01-08 RX ADMIN — PROPOFOL 150 MG: 10 INJECTION, EMULSION INTRAVENOUS at 07:01

## 2019-01-08 RX ADMIN — MEPIVACAINE HYDROCHLORIDE 7 ML: 15 INJECTION, SOLUTION EPIDURAL; INFILTRATION at 07:01

## 2019-01-08 RX ADMIN — FENTANYL CITRATE 25 MCG: 50 INJECTION, SOLUTION INTRAMUSCULAR; INTRAVENOUS at 07:01

## 2019-01-08 RX ADMIN — SODIUM CHLORIDE, SODIUM GLUCONATE, SODIUM ACETATE, POTASSIUM CHLORIDE, MAGNESIUM CHLORIDE, SODIUM PHOSPHATE, DIBASIC, AND POTASSIUM PHOSPHATE: .53; .5; .37; .037; .03; .012; .00082 INJECTION, SOLUTION INTRAVENOUS at 07:01

## 2019-01-08 RX ADMIN — LIDOCAINE HYDROCHLORIDE 75 MG: 20 INJECTION, SOLUTION INTRAVENOUS at 07:01

## 2019-01-08 NOTE — INTERVAL H&P NOTE
The patient has been examined and the H&P has been reviewed:    I concur with the findings and no changes have occurred since H&P was written.    Anesthesia/Surgery risks, benefits and alternative options discussed and understood by patient/family.          Active Hospital Problems    Diagnosis  POA    Ganglion cyst of right foot [M67.471]  Yes      Resolved Hospital Problems   No resolved problems to display.

## 2019-01-08 NOTE — TRANSFER OF CARE
"Anesthesia Transfer of Care Note    Patient: Hien Jeter    Procedure(s) Performed: Procedure(s) (LRB):  EXCISION, MASS Right foot (Right)    Patient location: PACU    Anesthesia Type: general    Transport from OR: Transported from OR on 6-10 L/min O2 by face mask with adequate spontaneous ventilation    Post pain: adequate analgesia    Post assessment: no apparent anesthetic complications and tolerated procedure well    Post vital signs: stable    Level of consciousness: awake    Nausea/Vomiting: no nausea/vomiting    Complications: none    Transfer of care protocol was followed      Last vitals:   Visit Vitals  /89 (BP Location: Left arm, Patient Position: Lying)   Pulse 91   Temp 36.5 °C (97.7 °F) (Temporal)   Resp 16   Ht 5' 2" (1.575 m)   Wt 60.8 kg (134 lb)   LMP 04/24/2017   SpO2 100%   Breastfeeding? No   BMI 24.51 kg/m²     "

## 2019-01-08 NOTE — OP NOTE
DATE OF PROCEDURE:  01/08/2019.    PREOPERATIVE DIAGNOSIS:  Mass, right foot.    POSTOPERATIVE DIAGNOSIS:  Mass, right foot, probable cyst.    PROCEDURE:  Excision of mass, right foot, probable cyst.    ANESTHESIA:  General.    SURGEON:  Sagar Burgos M.D.    ASSISTANT:  Dr. Lowry.    COMPLICATIONS:  There were no operative or anesthetic complications.    ESTIMATED BLOOD LOSS:  Zero.    SPECIMEN:  Mass, cystic lesion was sent to Pathology for evaluation and   identification.    PROCEDURE IN DETAIL:  After anesthesia was administered, the patient's right leg   was prepped and draped in a sterile fashion.  A sterile tourniquet was placed   around the right calf.  The right foot was exsanguinated with an Esmarch bandage   and tourniquet was elevated to 250 mmHg.  An incision was made on the plantar   medial aspect of the right foot adjacent to the first MTP joint of the big toe.    Incision was carried through the skin and subcutaneous tissue down to the   lesion in question.  This was a probable bursal sac or synovial cyst related to the first MTP   joint.  It had a fibrofatty appearance externally.  I dissected out the lesion,   which extended all the way underneath the first MTP joint.  Care was taken to   avoid any obvious neurovascular structures.  The lesion was excised and then was   sent to Pathology for evaluation.  There were noted to be no other obvious   abnormalities in the operative field.  The wound was well irrigated.  The skin   incision was closed with 3-0 nylon suture.  A sterile compressive dressing was   placed.  The patient was returned to the Recovery Room in stable condition.      SHANICE/STEVE  dd: 01/08/2019 07:53:05 (CST)  td: 01/08/2019 08:56:23 (CST)  Doc ID   #[id number]  Job ID #[id number]    CC:

## 2019-01-08 NOTE — BRIEF OP NOTE
[facility]  Brief Operative Note     SUMMARY     Surgery Date: 1/8/2019     Surgeon(s) and Role:     * Sagar Burgos MD - Primary    Assisting Surgeon: None    Pre-op Diagnosis:  Ganglion cyst of right foot [M67.471]    Post-op Diagnosis:  Post-Op Diagnosis Codes:     * Ganglion cyst of right foot [M67.471]    Procedure(s) (LRB):  EXCISION, MASS Right foot (Right)    Anesthesia: Choice    Description of the findings of the procedure: As above    Findings/Key Components: As above     Estimated Blood Loss: * No values recorded between 1/8/2019  7:18 AM and 1/8/2019  7:58 AM *         Specimens:   Specimen (12h ago, onward)    Start     Ordered    01/08/19 0741  Specimen to Pathology - Surgery  Once     Comments:  1. Right foot mass- perm     Start Status   01/08/19 0741 Collected (01/08/19 0741)       01/08/19 0741          Discharge Note    SUMMARY     Admit Date: 1/8/2019    Discharge Date and Time:  01/08/2019 7:58 AM    Hospital Course (synopsis of major diagnoses, care, treatment, and services provided during the course of the hospital stay): Patient presented for above procedure.  Tolerated it well and was discharged home POD0 after voiding, tolerating diet, ambulating, pain controlled.  Patient was informed about signs and symptoms of compartment syndrome and if any of these should present then he should immediately call us back and come to the ED.  Discharge instructions, follow-up appointment, and med rec are below.       Final Diagnosis: Post-Op Diagnosis Codes:     * Ganglion cyst of right foot [M67.471]    Disposition: Home or Self Care    Follow Up/Patient Instructions:     Medications:  Reconciled Home Medications:      Medication List      START taking these medications    docusate sodium 100 mg capsule  Take 100 mg by mouth 2 (two) times daily.     ondansetron 8 MG tablet  Commonly known as:  ZOFRAN  Take 1 tablet (8 mg total) by mouth every 8 (eight) hours as needed for  "Nausea.        CHANGE how you take these medications    azelastine 137 mcg (0.1 %) nasal spray  Commonly known as:  ASTELIN  1 spray (137 mcg total) by Nasal route 2 (two) times daily.  What changed:    · when to take this  · reasons to take this     baclofen 10 MG tablet  Commonly known as:  LIORESAL  TAKE 1/2 TABLET BY MOUTH 2-3 TIMES A DAY  What changed:  See the new instructions.     HYDROcodone-acetaminophen 5-325 mg per tablet  Commonly known as:  NORCO  Take 1 tablet by mouth every 6 (six) hours as needed for Pain.  What changed:  when to take this        CONTINUE taking these medications    cholecalciferol (vitamin D3) 3,000 unit Tab  Take 3,000 Units by mouth once daily.     escitalopram oxalate 10 MG tablet  Commonly known as:  LEXAPRO  TAKE 1.5 TABLETS (15 MG TOTAL) BY MOUTH EVERY EVENING.     estradiol-norethindrone 1-0.5 mg per tablet  Commonly known as:  ACTIVELLA  Take 1 tablet by mouth once daily.     flaxseed oil 1,000 mg Cap  Take 1 capsule by mouth once daily.     magnesium oxide 400 mg (241.3 mg magnesium) tablet  Commonly known as:  MAG-OX  Take 1 tablet (400 mg total) by mouth 2 (two) times daily.     ondansetron 4 MG Tbdl  Commonly known as:  ZOFRAN-ODT  Dissolve 2 tablets (8 mg total) by mouth once. for 1 dose     ONE-A-DAY WOMENS FORMULA 27-0.4 mg Tab  Generic drug:  multivitamin-Ca-iron-minerals  Take 1 tablet by mouth once daily.     riboflavin (vitamin B2) 100 mg Tab tablet  Commonly known as:  VITAMIN B-2  TAKE 2 TABLETS BY MOUTH ONCE DAILY.     topiramate 25 MG tablet  Commonly known as:  TOPAMAX  Take 1 tablet (25 mg total) by mouth once daily.     ZOLMitriptan 5 mg Spry  Commonly known as:  ZOMIG  SPRAY 1 SPRAY IN NOSTRIL ONCE AS NEEDED. Max 2 doses in 24 hrs          Discharge Procedure Orders   CRUTCHES FOR HOME USE     Order Specific Question Answer Comments   Type: Axillary    Height: 5' 2" (1.575 m)    Weight: 60.8 kg (134 lb)    Length of need (1-99 months): 99      Call MD for: "  temperature >100.4     Call MD for:  persistent nausea and vomiting or diarrhea     Call MD for:  severe uncontrolled pain     Call MD for:  redness, tenderness, or signs of infection (pain, swelling, redness, odor or green/yellow discharge around incision site)     Call MD for:  difficulty breathing or increased cough     Call MD for:  severe persistent headache     Call MD for:  worsening rash     Call MD for:  persistent dizziness, light-headedness, or visual disturbances     Call MD for:  increased confusion or weakness     Leave dressing on - Keep it clean, dry, and intact until clinic visit     Weight bearing restrictions (specify):   Order Comments: WBAT.  Should try and walk on her heel as much as possible.

## 2019-01-08 NOTE — ANESTHESIA PREPROCEDURE EVALUATION
01/08/2019  Hien Jeter is a 53 y.o., female.    Anesthesia Evaluation    I have reviewed the Patient Summary Reports.    I have reviewed the Nursing Notes.   I have reviewed the Medications.     Review of Systems  Anesthesia Hx:  No problems with previous Anesthesia  History of prior surgery of interest to airway management or planning: Previous anesthesia: General Denies Family Hx of Anesthesia complications.   Denies Personal Hx of Anesthesia complications.   Social:  Non-Smoker    Hematology/Oncology:  Hematology Normal   Oncology Normal     EENT/Dental:EENT/Dental Normal   Cardiovascular:  Cardiovascular Normal Exercise tolerance: good     Pulmonary:  Pulmonary Normal    Renal/:  Renal/ Normal     Hepatic/GI:  Hepatic/GI Normal Bowel Prep.    Musculoskeletal:  Musculoskeletal Normal    Neurological:  Neurology Normal Neuromuscular Disease,  Headaches Multiple sclerosis, no issues in past year   Endocrine:  Endocrine Normal    Dermatological:   Hx basal cell carcinoma   Psych:   Psychiatric History depression          Physical Exam  General:  Well nourished    Airway/Jaw/Neck:  Airway Findings: Mouth Opening: Normal Tongue: Normal  General Airway Assessment: Adult, Average  Mallampati: II  Improves to II with phonation.  TM Distance: 4 - 6 cm        Eyes/Ears/Nose:  EYES/EARS/NOSE FINDINGS: Normal   Dental:  Dental Findings: In tact, molar caps   Chest/Lungs:  Chest/Lungs Findings: Clear to auscultation, Normal Respiratory Rate     Heart/Vascular:  Heart Findings: Rate: Normal  Rhythm: Regular Rhythm  Sounds: Normal  Heart murmur: negative Vascular Findings: Normal    Abdomen:  Abdomen Findings: Normal    Musculoskeletal:  Musculoskeletal Findings: Normal   Skin:  Skin Findings: Normal    Mental Status:  Mental Status Findings:  Cooperative, Alert and Oriented         Anesthesia Plan  Type of  Anesthesia, risks & benefits discussed:  Anesthesia Type:  general  Patient's Preference:   Intra-op Monitoring Plan: standard ASA monitors  Intra-op Monitoring Plan Comments:   Post Op Pain Control Plan: peripheral nerve block  Post Op Pain Control Plan Comments:   Induction:   IV  Beta Blocker:  Patient is not currently on a Beta-Blocker (No further documentation required).       Informed Consent: Patient understands risks and agrees with Anesthesia plan.  Questions answered. Anesthesia consent signed with patient.  ASA Score: 3     Day of Surgery Review of History & Physical:            Ready For Surgery From Anesthesia Perspective.

## 2019-01-08 NOTE — ANESTHESIA PROCEDURE NOTES
Right Ankle Block ss    Patient location during procedure: OR   Block not for primary anesthetic.  Reason for block: at surgeon's request and post-op pain management   Post-op Pain Location: right foot pain   Start time: 1/8/2019 7:05 AM  Timeout: 1/8/2019 7:05 AM   End time: 1/8/2019 7:15 AM  Staffing  Anesthesiologist: Marito Peters MD  Resident/CRNA: Esperanza Moreland DO  Performed: resident/CRNA   Preanesthetic Checklist  Completed: patient identified, site marked, surgical consent, pre-op evaluation, timeout performed, IV checked, risks and benefits discussed and monitors and equipment checked  Peripheral Block  Patient position: supine  Prep: ChloraPrep  Patient monitoring: heart rate, cardiac monitor, continuous pulse ox, continuous capnometry and frequent blood pressure checks  Block type: ankle - saphenous, ankle - superficial peroneal and ankle - tibial (Ankle - deep peroneal)  Laterality: right  Injection technique: single shot  Needle  Needle type: Stimuplex   Needle gauge: 22 G  Needle length: 2 in  Needle localization: anatomical landmarks     Assessment  Injection assessment: negative aspiration and negative parasthesia  Paresthesia pain: none  Heart rate change: no  Slow fractionated injection: yes  Additional Notes  See anesthesia record for vitals.  Block performed under sedation.  Patient tolerated well.

## 2019-01-08 NOTE — PLAN OF CARE
Pt resting comfortably.    Call light in reach.    No questions or concerns at this time.     in meeting, will come later

## 2019-01-08 NOTE — DISCHARGE INSTRUCTIONS
Discharge Instructions: Caring for Your Incision  You are going home with stitches (sutures), surgical staples, special strips of surgical tape called Steri-Strips, or surgical skin glue. One of these items was used to close your incision, help stop bleeding, and speed healing. Follow the tips on this sheet to help your incision heal.  Home care  · Always wash your hands before touching your incision.  · Keep your incision clean and dry.  · Avoid doing things that could cause dirt or sweat to get on your incision.  · Dont pick at scabs. They help protect the wound.  · Keep your incision out of water.  · Take a sponge bath to avoid getting your incision wet, unless your healthcare provider tells you otherwise.  · Ask your provider when can you take a shower or bathe.  · Ask your provider about the best way to keep your incision dry when bathing or showering.  · Pat sutures dry if they get wet. Dont rub.  · Leave the dressing (bandage) in place until you are told to remove it or change it. Change it only as directed, using clean hands.  · After the first 12 hours, change your dressing every 24 hours, or as directed by your healthcare provider.  · Change your dressing if it gets wet or soiled.  Care for specific closures  Follow these guidelines unless your child's healthcare provider tells you otherwise:  · Sutures or staples. Once you no longer need to keep these dry, clean the incision or wound daily. First remove the bandage using clean hands. Then wash the area gently with soap and warm water. Use a wet cotton swab to loosen and remove any blood or crust that forms. After cleaning, put a thin layer of antibiotic ointment on. Then put on a new bandage.  · Skin glue. Dont put liquid, ointment, or cream on your incision or wound while the glue is in place. Avoid activities that cause heavy sweating. Protect the incision or wound from sunlight. Do not scratch, rub, or pick at the glue. Do not put tape directly  over the glue. The glue should peel off within 5 to 10 days.  · Surgical tape. Keep your incision or wound dry. If it gets wet, blot the area dry with a clean towel. Surgical tape usually falls off within 7 to 10 days. If it has not fallen off after 10 days, contact your healthcare provider before taking it off yourself. If you are told to remove the tape, put mineral oil or petroleum jelly on a cotton ball. Gently rub the tape until it is removed.  Follow-up care  Follow up with your healthcare provider to ask how long sutures or staples should be left in place. Be sure to return for suture or staple removal as directed. If dissolving stitches were used in your mouth, these will not need to be removed. They should fall out or dissolve on their own.  If tape closures were used, remove them yourself when your provider recommends if they have not fallen off on their own. If skin glue was used, the glue will wear off by itself.      When to seek medical care  Call your healthcare provider right away if you have any of the following:  · More pain, redness, swelling, bleeding, or foul-smelling discharge around the incision area  · Fever of 100.4°F (38°C) or higher, or as directed by your healthcare provider  · Shaking chills  · Vomiting or nausea that doesnt go away  · Numbness, coldness, or tingling around the incision area, or changes in skin color  · Opening of the sutures or wound  · Stitches or staples come apart or fall out or surgical tape falls off before 7 days, or as directed by your provider   Date Last Reviewed: 10/22/2014  © 5102-9566 [employer]. [address] [place] [address]. All rights reserved. This information is not intended as a substitute for professional medical care. Always follow your healthcare professional's instructions.        Discharge Instructions: After Your Surgery  Youve just had surgery. During surgery, you were given medicine called anesthesia to keep you  relaxed and free of pain. After surgery, you may have some pain or nausea. This is common. Here are some tips for feeling better and getting well after surgery.     Stay on schedule with your medicine.   Going home  Your healthcare provider will show you how to take care of yourself when you go home. He or she will also answer your questions. Have an adult family member or friend drive you home. For the first 24 hours after your surgery:  · Do not drive or use heavy equipment.  · Do not make important decisions or sign legal papers.  · Do not drink alcohol.  · Have someone stay with you, if needed. He or she can watch for problems and help keep you safe.  Be sure to go to all follow-up visits with your healthcare provider. And rest after your surgery for as long as your healthcare provider tells you to.  Coping with pain  If you have pain after surgery, pain medicine will help you feel better. Take it as told, before pain becomes severe. Also, ask your healthcare provider or pharmacist about other ways to control pain. This might be with heat, ice, or relaxation. And follow any other instructions your surgeon or nurse gives you.  Tips for taking pain medicine  To get the best relief possible, remember these points:  · Pain medicines can upset your stomach. Taking them with a little food may help.  · Most pain relievers taken by mouth need at least 20 to 30 minutes to start to work.  · Taking medicine on a schedule can help you remember to take it. Try to time your medicine so that you can take it before starting an activity. This might be before you get dressed, go for a walk, or sit down for dinner.  · Constipation is a common side effect of pain medicines. Call your healthcare provider before taking any medicines such as laxatives or stool softeners to help ease constipation. Also ask if you should skip any foods. Drinking lots of fluids and eating foods such as fruits and vegetables that are high in fiber can  also help. Remember, do not take laxatives unless your surgeon has prescribed them.  · Drinking alcohol and taking pain medicine can cause dizziness and slow your breathing. It can even be deadly. Do not drink alcohol while taking pain medicine.  · Pain medicine can make you react more slowly to things. Do not drive or run machinery while taking pain medicine.  Your healthcare provider may tell you to take acetaminophen to help ease your pain. Ask him or her how much you are supposed to take each day. Acetaminophen or other pain relievers may interact with your prescription medicines or other over-the-counter (OTC) medicines. Some prescription medicines have acetaminophen and other ingredients. Using both prescription and OTC acetaminophen for pain can cause you to overdose. Read the labels on your OTC medicines with care. This will help you to clearly know the list of ingredients, how much to take, and any warnings. It may also help you not take too much acetaminophen. If you have questions or do not understand the information, ask your pharmacist or healthcare provider to explain it to you before you take the OTC medicine.  Managing nausea  Some people have an upset stomach after surgery. This is often because of anesthesia, pain, or pain medicine, or the stress of surgery. These tips will help you handle nausea and eat healthy foods as you get better. If you were on a special food plan before surgery, ask your healthcare provider if you should follow it while you get better. These tips may help:  · Do not push yourself to eat. Your body will tell you when to eat and how much.  · Start off with clear liquids and soup. They are easier to digest.  · Next try semi-solid foods, such as mashed potatoes, applesauce, and gelatin, as you feel ready.  · Slowly move to solid foods. Dont eat fatty, rich, or spicy foods at first.  · Do not force yourself to have 3 large meals a day. Instead eat smaller amounts more  often.  · Take pain medicines with a small amount of solid food, such as crackers or toast, to avoid nausea.     Call your surgeon if  · You still have pain an hour after taking medicine. The medicine may not be strong enough.  · You feel too sleepy, dizzy, or groggy. The medicine may be too strong.  · You have side effects like nausea, vomiting, or skin changes, such as rash, itching, or hives.       If you have obstructive sleep apnea  You were given anesthesia medicine during surgery to keep you comfortable and free of pain. After surgery, you may have more apnea spells because of this medicine and other medicines you were given. The spells may last longer than usual.   At home:  · Keep using the continuous positive airway pressure (CPAP) device when you sleep. Unless your healthcare provider tells you not to, use it when you sleep, day or night. CPAP is a common device used to treat obstructive sleep apnea.  · Talk with your provider before taking any pain medicine, muscle relaxants, or sedatives. Your provider will tell you about the possible dangers of taking these medicines.  Date Last Reviewed: 12/1/2016  © 1130-9203 [employer]. [address] [place] [address], [place] [address]. All rights reserved. This information is not intended as a substitute for professional medical care. Always follow your healthcare professional's instructions.

## 2019-01-09 ENCOUNTER — PATIENT MESSAGE (OUTPATIENT)
Dept: ADMINISTRATIVE | Facility: OTHER | Age: 54
End: 2019-01-09

## 2019-01-09 VITALS
WEIGHT: 134 LBS | HEART RATE: 91 BPM | TEMPERATURE: 98 F | OXYGEN SATURATION: 100 % | RESPIRATION RATE: 16 BRPM | DIASTOLIC BLOOD PRESSURE: 81 MMHG | HEIGHT: 62 IN | BODY MASS INDEX: 24.66 KG/M2 | SYSTOLIC BLOOD PRESSURE: 122 MMHG

## 2019-01-09 NOTE — ANESTHESIA POSTPROCEDURE EVALUATION
"Anesthesia Post Evaluation    Patient: Hien Jeter    Procedure(s) Performed: Procedure(s) (LRB):  EXCISION, MASS Right foot (Right)    Final Anesthesia Type: general  Patient location during evaluation: PACU  Patient participation: Yes- Able to Participate  Level of consciousness: awake and alert and oriented  Post-procedure vital signs: reviewed and stable  Pain management: adequate  Airway patency: patent  PONV status at discharge: No PONV  Anesthetic complications: no      Cardiovascular status: hemodynamically stable  Respiratory status: unassisted, spontaneous ventilation and room air  Hydration status: euvolemic  Follow-up not needed.        Visit Vitals  /81 (BP Location: Left arm, Patient Position: Sitting)   Pulse 91   Temp 36.7 °C (98 °F) (Temporal)   Resp 16   Ht 5' 2" (1.575 m)   Wt 60.8 kg (134 lb)   LMP 04/24/2017   SpO2 100%   Breastfeeding? No   BMI 24.51 kg/m²       Pain/Lennox Score: Lennox Score: 10 (1/8/2019  8:14 AM)        "

## 2019-01-10 ENCOUNTER — TELEPHONE (OUTPATIENT)
Dept: ORTHOPEDICS | Facility: CLINIC | Age: 54
End: 2019-01-10

## 2019-01-10 NOTE — TELEPHONE ENCOUNTER
----- Message from Caitlin Villalobos sent at 1/10/2019 10:51 AM CST -----  Contact: Self/ [phone]  Patient would like a call back to ask questions about her wound care.

## 2019-01-11 ENCOUNTER — PATIENT MESSAGE (OUTPATIENT)
Dept: ADMINISTRATIVE | Facility: OTHER | Age: 54
End: 2019-01-11

## 2019-01-14 NOTE — ADDENDUM NOTE
Addendum  created 01/14/19 0942 by Esperanza Moreland DO    Diagnosis association updated, Intraprocedure Blocks edited, Sign clinical note

## 2019-01-15 ENCOUNTER — PATIENT MESSAGE (OUTPATIENT)
Dept: ADMINISTRATIVE | Facility: OTHER | Age: 54
End: 2019-01-15

## 2019-01-18 DIAGNOSIS — G43.719 INTRACTABLE CHRONIC MIGRAINE WITHOUT AURA AND WITHOUT STATUS MIGRAINOSUS: ICD-10-CM

## 2019-01-18 RX ORDER — RIBOFLAVIN (VITAMIN B2) 100 MG
TABLET ORAL
Qty: 60 TABLET | Refills: 11 | Status: CANCELLED | OUTPATIENT
Start: 2019-01-18

## 2019-01-21 DIAGNOSIS — M62.838 MUSCLE SPASM: ICD-10-CM

## 2019-01-21 DIAGNOSIS — G35 MS (MULTIPLE SCLEROSIS): ICD-10-CM

## 2019-01-21 RX ORDER — BACLOFEN 10 MG/1
TABLET ORAL
Qty: 60 TABLET | Refills: 5 | Status: SHIPPED | OUTPATIENT
Start: 2019-01-21 | End: 2020-12-07

## 2019-01-24 ENCOUNTER — OFFICE VISIT (OUTPATIENT)
Dept: ORTHOPEDICS | Facility: CLINIC | Age: 54
End: 2019-01-24
Payer: COMMERCIAL

## 2019-01-24 VITALS
BODY MASS INDEX: 24.67 KG/M2 | HEART RATE: 70 BPM | WEIGHT: 134.06 LBS | HEIGHT: 62 IN | TEMPERATURE: 98 F | SYSTOLIC BLOOD PRESSURE: 110 MMHG | DIASTOLIC BLOOD PRESSURE: 76 MMHG | RESPIRATION RATE: 16 BRPM

## 2019-01-24 DIAGNOSIS — M67.471 GANGLION CYST OF RIGHT FOOT: ICD-10-CM

## 2019-01-24 DIAGNOSIS — Z09 S/P ORTHOPEDIC SURGERY, FOLLOW-UP EXAM: Primary | ICD-10-CM

## 2019-01-24 PROCEDURE — 99999 PR PBB SHADOW E&M-EST. PATIENT-LVL IV: ICD-10-PCS | Mod: PBBFAC,,, | Performed by: PHYSICIAN ASSISTANT

## 2019-01-24 PROCEDURE — 99024 PR POST-OP FOLLOW-UP VISIT: ICD-10-PCS | Mod: S$GLB,,, | Performed by: PHYSICIAN ASSISTANT

## 2019-01-24 PROCEDURE — 99999 PR PBB SHADOW E&M-EST. PATIENT-LVL IV: CPT | Mod: PBBFAC,,, | Performed by: PHYSICIAN ASSISTANT

## 2019-01-24 PROCEDURE — 99024 POSTOP FOLLOW-UP VISIT: CPT | Mod: S$GLB,,, | Performed by: PHYSICIAN ASSISTANT

## 2019-01-24 NOTE — PROGRESS NOTES
Ms. Jeter is here today for a post-operative visit after a   Excision of mass, right foot, probable cyst  by Dr. Burgos on 01/08/2019.    Interval History:    she reports that she is doing ok.  Pain is controlled.  she is not taking pain medication.    she denies fever, chills, and sweats since the time of the surgery.     Physical exam:  Dressing taken down.  Incision is clean, dry and intact.  Sutures removed without difficulty.      RADS: none done today    Assessment:  Post-op visit ( 2 weeks)    Plan:  Current care, treatment plan, precautions, activity level/ modifications, limitations, rehabilitation exercises and proposed future treatment were discussed with the patient. We discussed the need to monitor for changes in symptoms and condition and report them to the physician.  Discussed importance of compliance with all appointments and follow up examinations.   - The patient was advised to keep the incision clean and dry for the next 24 hours after which she may wash the area with antibacterial soap in the shower. Will not submerge until the incision is completely healed  -Patient was advised to monitor wound closely and multiple times daily for any problems. Call clinic immediately or report to ED for immediate medical attention for any complications including reopening of wound, drainage, purulence, redness, streaking, odor, pain out of proportion, fever, chills, etc.   - weight bearing as tolerated, range of motion as tolerated  - may advance shoe wear as tolerated  - pain medication: no refill needed,    Pain medication refill policy provided to patient for review.   - Patient is to return to clinic in 4 weeks with .      If there are any questions prior to scheduled follow up, the patient was instructed to contact the office

## 2019-01-28 ENCOUNTER — INFUSION (OUTPATIENT)
Dept: INFUSION THERAPY | Facility: HOSPITAL | Age: 54
End: 2019-01-28
Attending: PSYCHIATRY & NEUROLOGY
Payer: COMMERCIAL

## 2019-01-28 VITALS
RESPIRATION RATE: 18 BRPM | HEART RATE: 99 BPM | HEIGHT: 62 IN | WEIGHT: 134.06 LBS | DIASTOLIC BLOOD PRESSURE: 65 MMHG | TEMPERATURE: 98 F | BODY MASS INDEX: 24.67 KG/M2 | SYSTOLIC BLOOD PRESSURE: 103 MMHG

## 2019-01-28 DIAGNOSIS — G35 MS (MULTIPLE SCLEROSIS): Primary | ICD-10-CM

## 2019-01-28 PROCEDURE — 96415 CHEMO IV INFUSION ADDL HR: CPT

## 2019-01-28 PROCEDURE — 96413 CHEMO IV INFUSION 1 HR: CPT

## 2019-01-28 PROCEDURE — 96375 TX/PRO/DX INJ NEW DRUG ADDON: CPT

## 2019-01-28 PROCEDURE — 63600175 PHARM REV CODE 636 W HCPCS: Performed by: PSYCHIATRY & NEUROLOGY

## 2019-01-28 PROCEDURE — 96367 TX/PROPH/DG ADDL SEQ IV INF: CPT

## 2019-01-28 PROCEDURE — 25000003 PHARM REV CODE 250: Performed by: PSYCHIATRY & NEUROLOGY

## 2019-01-28 PROCEDURE — S0028 INJECTION, FAMOTIDINE, 20 MG: HCPCS | Performed by: PSYCHIATRY & NEUROLOGY

## 2019-01-28 RX ORDER — SODIUM CHLORIDE 0.9 % (FLUSH) 0.9 %
10 SYRINGE (ML) INJECTION
Status: DISCONTINUED | OUTPATIENT
Start: 2019-01-28 | End: 2019-01-28 | Stop reason: HOSPADM

## 2019-01-28 RX ORDER — ACETAMINOPHEN 500 MG
1000 TABLET ORAL
Status: COMPLETED | OUTPATIENT
Start: 2019-01-28 | End: 2019-01-28

## 2019-01-28 RX ORDER — ACETAMINOPHEN 500 MG
1000 TABLET ORAL
Status: CANCELLED | OUTPATIENT
Start: 2019-01-28 | End: 2019-01-28

## 2019-01-28 RX ORDER — SODIUM CHLORIDE 0.9 % (FLUSH) 0.9 %
10 SYRINGE (ML) INJECTION
Status: CANCELLED | OUTPATIENT
Start: 2019-01-28

## 2019-01-28 RX ORDER — FAMOTIDINE 10 MG/ML
20 INJECTION INTRAVENOUS
Status: COMPLETED | OUTPATIENT
Start: 2019-01-28 | End: 2019-01-28

## 2019-01-28 RX ORDER — HEPARIN 100 UNIT/ML
100 SYRINGE INTRAVENOUS
Status: DISCONTINUED | OUTPATIENT
Start: 2019-01-28 | End: 2019-01-28 | Stop reason: HOSPADM

## 2019-01-28 RX ORDER — HEPARIN 100 UNIT/ML
100 SYRINGE INTRAVENOUS
Status: CANCELLED | OUTPATIENT
Start: 2019-01-28

## 2019-01-28 RX ADMIN — FAMOTIDINE 20 MG: 10 INJECTION, SOLUTION INTRAVENOUS at 07:01

## 2019-01-28 RX ADMIN — DIPHENHYDRAMINE HYDROCHLORIDE 50 MG: 50 INJECTION, SOLUTION INTRAMUSCULAR; INTRAVENOUS at 07:01

## 2019-01-28 RX ADMIN — DEXTROSE: 50 INJECTION, SOLUTION INTRAVENOUS at 08:01

## 2019-01-28 RX ADMIN — ACETAMINOPHEN 1000 MG: 500 TABLET, FILM COATED ORAL at 07:01

## 2019-01-28 RX ADMIN — OCRELIZUMAB 600 MG: 300 INJECTION INTRAVENOUS at 08:01

## 2019-01-28 NOTE — PLAN OF CARE
Problem: Adult Inpatient Plan of Care  Goal: Plan of Care Review  Outcome: Ongoing (interventions implemented as appropriate)  Patient did well today with infusion. Is able to drive home.will return in 6 months Will see dr lorna montalvo.

## 2019-01-29 ENCOUNTER — OFFICE VISIT (OUTPATIENT)
Dept: NEUROLOGY | Facility: CLINIC | Age: 54
End: 2019-01-29
Payer: COMMERCIAL

## 2019-01-29 VITALS
BODY MASS INDEX: 25.26 KG/M2 | SYSTOLIC BLOOD PRESSURE: 111 MMHG | DIASTOLIC BLOOD PRESSURE: 79 MMHG | HEIGHT: 62 IN | WEIGHT: 137.25 LBS | HEART RATE: 68 BPM

## 2019-01-29 DIAGNOSIS — R25.2 SPASTICITY: ICD-10-CM

## 2019-01-29 DIAGNOSIS — Z29.89 PROPHYLACTIC IMMUNOTHERAPY: ICD-10-CM

## 2019-01-29 DIAGNOSIS — Z71.89 COUNSELING REGARDING GOALS OF CARE: ICD-10-CM

## 2019-01-29 DIAGNOSIS — D84.9 IMMUNOSUPPRESSION: ICD-10-CM

## 2019-01-29 DIAGNOSIS — G35 MS (MULTIPLE SCLEROSIS): Primary | ICD-10-CM

## 2019-01-29 PROCEDURE — 3008F PR BODY MASS INDEX (BMI) DOCUMENTED: ICD-10-PCS | Mod: CPTII,S$GLB,, | Performed by: PSYCHIATRY & NEUROLOGY

## 2019-01-29 PROCEDURE — 99999 PR PBB SHADOW E&M-EST. PATIENT-LVL IV: CPT | Mod: PBBFAC,,, | Performed by: PSYCHIATRY & NEUROLOGY

## 2019-01-29 PROCEDURE — 99215 PR OFFICE/OUTPT VISIT, EST, LEVL V, 40-54 MIN: ICD-10-PCS | Mod: S$GLB,,, | Performed by: PSYCHIATRY & NEUROLOGY

## 2019-01-29 PROCEDURE — 99999 PR PBB SHADOW E&M-EST. PATIENT-LVL IV: ICD-10-PCS | Mod: PBBFAC,,, | Performed by: PSYCHIATRY & NEUROLOGY

## 2019-01-29 PROCEDURE — 3008F BODY MASS INDEX DOCD: CPT | Mod: CPTII,S$GLB,, | Performed by: PSYCHIATRY & NEUROLOGY

## 2019-01-29 PROCEDURE — 99215 OFFICE O/P EST HI 40 MIN: CPT | Mod: S$GLB,,, | Performed by: PSYCHIATRY & NEUROLOGY

## 2019-01-29 NOTE — PROGRESS NOTES
Subjective:       Patient ID: Hien Jeter is a 53 y.o. female who presents today for a routine clinic visit for MS.      MS HPI:  · DMT: Ocrevus-last dosed yesterday;  · Side effects from DMT? No  · Taking vitamin D3 as recommended? Yes 3,000IU/d   Patient's mother passed away in October, and her mother-in-law passed away in November;  She's been having a hard time b/c of this;  Plans to restart counseling with May;   Had her January infusion yesterday    Overall neurologically stable     SOCIAL HISTORY  Social History     Tobacco Use    Smoking status: Never Smoker    Smokeless tobacco: Never Used   Substance Use Topics    Alcohol use: Yes     Comment: socially/ not weekly    Drug use: No     Living arrangements - the patient lives with their spouse.  Employment:   disability    MS ROS:  · Fatigue: No  · Sleep Disturbance: Yes--not sleeping well since her mom's death;   · Bladder Dysfunction: No  · Bowel Dysfunction: No  · Spasticity: Yes - on Baclofen 20mg hs; stable;   · Visual Symptoms: No  · Cognitive: No  · Mood Disorder: on Lexapro 15mg/day;  Endorses more of a depressed mood since the death of her mother and her mother-in-law  · Gait Disturbance: No; recently had cyst removed from foot;   · Falls: No  · Pain: Migraines--under care of Dr. Klein  · Sexual Dysfunction: Not Assessed  · Skin Breakdown: No  · Tremors: No  · Dysphagia:  No  · Dysarthria:  No  · Heat sensitivity:  No  · Any un-met adaptive needs? No  · Copay Assist?  Yes -    · Clinical Trial candidate? No        Objective:        25 foot timed walk:5.0 s without assist; was 4.3 last visit;   Mental status: Normal language, fund of knowledge is normal, attention concentration normal    Cranial nerves, no ELKIN, no dysarthria,   Motor: Normal bulk, tone, full strength graded at 5/5 in all groups    Sensory: Intact to vibration sense throughout; reports slight numbness to LT sole of right foot, and distorted sensation on dorsum of right foot;     Gait: Narrow based and stable  Reflexes: 2+ and symmetric t/o       Imaging:     Results for orders placed during the hospital encounter of 06/11/18   MRI Brain W WO Contrast    Impression No significant change from prior.  Few scattered punctate foci of T2 FLAIR signal abnormality again identified while nonspecific remains concerning for mild degree of prior demyelinating plaque.    No definite new lesion or enhancing lesion to suggest significant interval or active demyelination.    Bifrontal small developmental venous anomalies unchanged.      Electronically signed by: Trace Novak DO  Date:    06/11/2018  Time:    08:51       Labs:     Lab Results   Component Value Date    IQGIQNFP74MV 45 06/11/2018    JUBDJDNT75YL 72 02/15/2017    XLYXDWVJ80AN 63 04/05/2016     No results found for: JCVINDEX, JCVANTIBODY  Lab Results   Component Value Date    NF7JWAJW 64.7 12/20/2017    ABSOLUTECD3 837 12/20/2017    QB2WVJMP 12.5 12/20/2017    ABSOLUTECD8 161 (L) 12/20/2017    SY9XPEXG 53.0 12/20/2017    ABSOLUTECD4 685 12/20/2017    LABCD48 4.25 (H) 12/20/2017     Lab Results   Component Value Date    WBC 8.19 12/10/2018    RBC 4.43 12/10/2018    HGB 14.3 12/10/2018    HCT 43.6 12/10/2018    MCV 98 12/10/2018    MCH 32.3 (H) 12/10/2018    MCHC 32.8 12/10/2018    RDW 12.2 12/10/2018     12/10/2018    MPV 10.8 12/10/2018    GRAN 5.6 12/10/2018    GRAN 68.7 12/10/2018    LYMPH 1.4 12/10/2018    LYMPH 17.2 (L) 12/10/2018    MONO 0.9 12/10/2018    MONO 10.7 12/10/2018    EOS 0.2 12/10/2018    BASO 0.05 12/10/2018    EOSINOPHIL 2.3 12/10/2018    BASOPHIL 0.6 12/10/2018     Sodium   Date Value Ref Range Status   11/20/2017 140 136 - 145 mmol/L Final     Potassium   Date Value Ref Range Status   11/20/2017 4.0 3.5 - 5.1 mmol/L Final     Chloride   Date Value Ref Range Status   11/20/2017 108 95 - 110 mmol/L Final     CO2   Date Value Ref Range Status   11/20/2017 26 23 - 29 mmol/L Final     Glucose   Date Value Ref Range  Status   11/20/2017 81 70 - 110 mg/dL Final     BUN, Bld   Date Value Ref Range Status   11/20/2017 14 6 - 20 mg/dL Final     Creatinine   Date Value Ref Range Status   11/20/2017 0.9 0.5 - 1.4 mg/dL Final     Calcium   Date Value Ref Range Status   11/20/2017 9.5 8.7 - 10.5 mg/dL Final     Total Protein   Date Value Ref Range Status   11/20/2017 7.3 6.0 - 8.4 g/dL Final     Albumin   Date Value Ref Range Status   11/20/2017 3.4 (L) 3.5 - 5.2 g/dL Final     Total Bilirubin   Date Value Ref Range Status   11/20/2017 0.4 0.1 - 1.0 mg/dL Final     Comment:     For infants and newborns, interpretation of results should be based  on gestational age, weight and in agreement with clinical  observations.  Premature Infant recommended reference ranges:  Up to 24 hours.............<8.0 mg/dL  Up to 48 hours............<12.0 mg/dL  3-5 days..................<15.0 mg/dL  6-29 days.................<15.0 mg/dL       Alkaline Phosphatase   Date Value Ref Range Status   11/20/2017 79 55 - 135 U/L Final     AST   Date Value Ref Range Status   11/20/2017 19 10 - 40 U/L Final     ALT   Date Value Ref Range Status   11/20/2017 18 10 - 44 U/L Final     Anion Gap   Date Value Ref Range Status   11/20/2017 6 (L) 8 - 16 mmol/L Final     eGFR if    Date Value Ref Range Status   11/20/2017 >60.0 >60 mL/min/1.73 m^2 Final     eGFR if non    Date Value Ref Range Status   11/20/2017 >60.0 >60 mL/min/1.73 m^2 Final     Comment:     Calculation used to obtain the estimated glomerular filtration  rate (eGFR) is the CKD-EPI equation.          Diagnosis/Assessment/Plan:    1. Multiple Sclerosis  · Assessment: Patient is clinically stable on Ocrevus;   · Imaging: will plan for MRI in June 2019  · Disease Modifying Therapies: Ocrevus and high dose Vit D3. The patient was counseled about the risks associated with immune suppressive therapy, including a higher risk of serious infections and malignancy, as well as the  importance of avoiding all live virus vaccines while on immune suppressive medication. Refer to ID for vaccinations in consideration of chronically immunosuppressed state;     2. MS Symptom Assessment / Management   No changes to management of care today    F/u with Shelbi Wisdom PA-C after MRI    Over 50% of this 40 minute visit was spent in direct face to face counseling of the patient about MS, DMT considerations, and MS symptom management.         Problem List Items Addressed This Visit        Unprioritized    MS (multiple sclerosis) - Primary    Relevant Orders    Hepatitis A antibody, IgG    Hepatitis B core antibody, total    Hepatitis B surface antibody    Hepatitis C antibody    Hepatitis B surface antigen    Quantiferon Gold TB    HIV 1/2 Ag/Ab (4th Gen)    RPR    Varicella zoster antibody, IgG    STRONGYLOIDES IGG ANTIBODIES    Ambulatory Referral to Infectious Disease    CBC auto differential    MRI Brain Demyelinating Without Contrast      Other Visit Diagnoses     Immunosuppression        Relevant Orders    Hepatitis A antibody, IgG    Hepatitis B core antibody, total    Hepatitis B surface antibody    Hepatitis C antibody    Hepatitis B surface antigen    Quantiferon Gold TB    HIV 1/2 Ag/Ab (4th Gen)    RPR    Varicella zoster antibody, IgG    STRONGYLOIDES IGG ANTIBODIES    Ambulatory Referral to Infectious Disease

## 2019-01-30 DIAGNOSIS — G43.719 INTRACTABLE CHRONIC MIGRAINE WITHOUT AURA AND WITHOUT STATUS MIGRAINOSUS: ICD-10-CM

## 2019-01-30 RX ORDER — RIBOFLAVIN (VITAMIN B2) 100 MG
TABLET ORAL
Qty: 60 TABLET | Refills: 11 | Status: CANCELLED | OUTPATIENT
Start: 2019-01-30

## 2019-01-30 NOTE — TELEPHONE ENCOUNTER
Patient requesting a refill on her Vitamin B2.She has not been seen by Neurology since 2/5/18.Please advise.

## 2019-02-01 ENCOUNTER — TELEPHONE (OUTPATIENT)
Dept: PSYCHIATRY | Facility: CLINIC | Age: 54
End: 2019-02-01

## 2019-02-04 ENCOUNTER — LAB VISIT (OUTPATIENT)
Dept: LAB | Facility: HOSPITAL | Age: 54
End: 2019-02-04
Attending: PSYCHIATRY & NEUROLOGY
Payer: COMMERCIAL

## 2019-02-04 DIAGNOSIS — G35 MS (MULTIPLE SCLEROSIS): ICD-10-CM

## 2019-02-04 DIAGNOSIS — D84.9 IMMUNOSUPPRESSION: ICD-10-CM

## 2019-02-04 LAB
BASOPHILS # BLD AUTO: 0.06 K/UL
BASOPHILS NFR BLD: 0.9 %
DIFFERENTIAL METHOD: ABNORMAL
EOSINOPHIL # BLD AUTO: 0.4 K/UL
EOSINOPHIL NFR BLD: 6.1 %
ERYTHROCYTE [DISTWIDTH] IN BLOOD BY AUTOMATED COUNT: 12.8 %
HBV CORE AB SERPL QL IA: NEGATIVE
HBV SURFACE AB SER-ACNC: POSITIVE M[IU]/ML
HBV SURFACE AG SERPL QL IA: NEGATIVE
HCT VFR BLD AUTO: 43.5 %
HCV AB SERPL QL IA: NEGATIVE
HEPATITIS A ANTIBODY, IGG: POSITIVE
HGB BLD-MCNC: 14.1 G/DL
HIV 1+2 AB+HIV1 P24 AG SERPL QL IA: NEGATIVE
IMM GRANULOCYTES # BLD AUTO: 0.04 K/UL
IMM GRANULOCYTES NFR BLD AUTO: 0.6 %
LYMPHOCYTES # BLD AUTO: 1.5 K/UL
LYMPHOCYTES NFR BLD: 22.6 %
MCH RBC QN AUTO: 32.1 PG
MCHC RBC AUTO-ENTMCNC: 32.4 G/DL
MCV RBC AUTO: 99 FL
MONOCYTES # BLD AUTO: 0.8 K/UL
MONOCYTES NFR BLD: 11.5 %
NEUTROPHILS # BLD AUTO: 3.9 K/UL
NEUTROPHILS NFR BLD: 58.3 %
NRBC BLD-RTO: 0 /100 WBC
PLATELET # BLD AUTO: 313 K/UL
PMV BLD AUTO: 10.1 FL
RBC # BLD AUTO: 4.39 M/UL
WBC # BLD AUTO: 6.68 K/UL

## 2019-02-04 PROCEDURE — 86592 SYPHILIS TEST NON-TREP QUAL: CPT

## 2019-02-04 PROCEDURE — 85025 COMPLETE CBC W/AUTO DIFF WBC: CPT

## 2019-02-04 PROCEDURE — 86703 HIV-1/HIV-2 1 RESULT ANTBDY: CPT

## 2019-02-04 PROCEDURE — 86787 VARICELLA-ZOSTER ANTIBODY: CPT

## 2019-02-04 PROCEDURE — 86706 HEP B SURFACE ANTIBODY: CPT

## 2019-02-04 PROCEDURE — 86682 HELMINTH ANTIBODY: CPT

## 2019-02-04 PROCEDURE — 86803 HEPATITIS C AB TEST: CPT

## 2019-02-04 PROCEDURE — 87340 HEPATITIS B SURFACE AG IA: CPT

## 2019-02-04 PROCEDURE — 86704 HEP B CORE ANTIBODY TOTAL: CPT

## 2019-02-04 PROCEDURE — 86790 VIRUS ANTIBODY NOS: CPT

## 2019-02-05 PROBLEM — D84.9 IMMUNOSUPPRESSION: Status: ACTIVE | Noted: 2019-02-05

## 2019-02-05 LAB — RPR SER QL: NORMAL

## 2019-02-06 LAB
STRONGYLOIDES ANTIBODY IGG: NEGATIVE
VARICELLA INTERPRETATION: POSITIVE
VARICELLA ZOSTER IGG: 3.08 ISR

## 2019-02-11 ENCOUNTER — OFFICE VISIT (OUTPATIENT)
Dept: INFECTIOUS DISEASES | Facility: CLINIC | Age: 54
End: 2019-02-11
Payer: COMMERCIAL

## 2019-02-11 ENCOUNTER — PATIENT MESSAGE (OUTPATIENT)
Dept: ADMINISTRATIVE | Facility: OTHER | Age: 54
End: 2019-02-11

## 2019-02-11 ENCOUNTER — CLINICAL SUPPORT (OUTPATIENT)
Dept: INFECTIOUS DISEASES | Facility: CLINIC | Age: 54
End: 2019-02-11
Payer: COMMERCIAL

## 2019-02-11 VITALS
BODY MASS INDEX: 25.4 KG/M2 | SYSTOLIC BLOOD PRESSURE: 102 MMHG | HEART RATE: 69 BPM | DIASTOLIC BLOOD PRESSURE: 78 MMHG | HEIGHT: 62 IN | WEIGHT: 138 LBS | TEMPERATURE: 98 F

## 2019-02-11 DIAGNOSIS — D84.9 IMMUNOSUPPRESSION: Primary | ICD-10-CM

## 2019-02-11 DIAGNOSIS — G43.719 INTRACTABLE CHRONIC MIGRAINE WITHOUT AURA AND WITHOUT STATUS MIGRAINOSUS: ICD-10-CM

## 2019-02-11 PROCEDURE — 90471 IMMUNIZATION ADMIN: CPT | Mod: S$GLB,,, | Performed by: INTERNAL MEDICINE

## 2019-02-11 PROCEDURE — 99999 PR PBB SHADOW E&M-EST. PATIENT-LVL III: CPT | Mod: PBBFAC,,, | Performed by: INTERNAL MEDICINE

## 2019-02-11 PROCEDURE — 90670 PNEUMOCOCCAL CONJUGATE VACCINE 13-VALENT LESS THAN 5YO & GREATER THAN: ICD-10-PCS | Mod: S$GLB,,, | Performed by: INTERNAL MEDICINE

## 2019-02-11 PROCEDURE — 90471 PNEUMOCOCCAL CONJUGATE VACCINE 13-VALENT LESS THAN 5YO & GREATER THAN: ICD-10-PCS | Mod: S$GLB,,, | Performed by: INTERNAL MEDICINE

## 2019-02-11 PROCEDURE — 90715 TDAP VACCINE 7 YRS/> IM: CPT | Mod: S$GLB,,, | Performed by: INTERNAL MEDICINE

## 2019-02-11 PROCEDURE — 90715 TDAP VACCINE GREATER THAN OR EQUAL TO 7YO IM: ICD-10-PCS | Mod: S$GLB,,, | Performed by: INTERNAL MEDICINE

## 2019-02-11 PROCEDURE — 90472 IMMUNIZATION ADMIN EACH ADD: CPT | Mod: S$GLB,,, | Performed by: INTERNAL MEDICINE

## 2019-02-11 PROCEDURE — 3008F PR BODY MASS INDEX (BMI) DOCUMENTED: ICD-10-PCS | Mod: CPTII,S$GLB,, | Performed by: INTERNAL MEDICINE

## 2019-02-11 PROCEDURE — 90670 PCV13 VACCINE IM: CPT | Mod: S$GLB,,, | Performed by: INTERNAL MEDICINE

## 2019-02-11 PROCEDURE — 99204 OFFICE O/P NEW MOD 45 MIN: CPT | Mod: S$GLB,,, | Performed by: INTERNAL MEDICINE

## 2019-02-11 PROCEDURE — 90472 TDAP VACCINE GREATER THAN OR EQUAL TO 7YO IM: ICD-10-PCS | Mod: S$GLB,,, | Performed by: INTERNAL MEDICINE

## 2019-02-11 PROCEDURE — 99204 PR OFFICE/OUTPT VISIT, NEW, LEVL IV, 45-59 MIN: ICD-10-PCS | Mod: S$GLB,,, | Performed by: INTERNAL MEDICINE

## 2019-02-11 PROCEDURE — 99999 PR PBB SHADOW E&M-EST. PATIENT-LVL III: ICD-10-PCS | Mod: PBBFAC,,, | Performed by: INTERNAL MEDICINE

## 2019-02-11 PROCEDURE — 3008F BODY MASS INDEX DOCD: CPT | Mod: CPTII,S$GLB,, | Performed by: INTERNAL MEDICINE

## 2019-02-11 RX ORDER — LANOLIN ALCOHOL/MO/W.PET/CERES
1 CREAM (GRAM) TOPICAL 2 TIMES DAILY
Qty: 60 TABLET | Refills: 0 | Status: CANCELLED | OUTPATIENT
Start: 2019-02-11

## 2019-02-11 NOTE — LETTER
February 11, 2019      Page Salas MD  [address]  [facility] [address]           [facility] - Infectious Diseases  [address]  [facility] [address]  Phone: [phone]  Fax: [phone]          Patient: Hien Jeter   MR Number: [id number]   YOB: 1965   Date of Visit: 2/11/2019       Dear Dr. Page Salas:    Thank you for referring Hien Jeter to me for evaluation. Attached you will find relevant portions of my assessment and plan of care.    If you have questions, please do not hesitate to call me. I look forward to following Hien Jeter along with you.    Sincerely,    Joe Mc MD    Enclosure  CC:  No Recipients    If you would like to receive this communication electronically, please contact [email or web address] or [phone] to request more information on [employer] access.    For providers and/or their staff who would like to refer a patient to [facility], please contact us through our one-stop-shop provider referral line, [facility], at [phone].    If you feel you have received this communication in error or would no longer like to receive these types of communications, please e-mail [email or web address]

## 2019-02-11 NOTE — PROGRESS NOTES
Infectious Diseases Clinic Note    Subjective:       Patient ID: Hien Jeter is a 53 y.o. female.    Chief Complaint: No chief complaint on file.    HPI     52 y/o F h/o MS dx 2009, controlled on ocrevus here for vaccine eval      From [place], born in [place], lived in [place]  Retired   2 dogs at home, has bird but doesn't care for it routinely  No severe infections in past  No hunting but does fish but does clean fish  No raw foods      Quant gold negative  VZV IGG positive  strongy IGG negative  RPR negative  Hep A and B immune    Flu vaccine done    Past Medical History:   Diagnosis Date    Anxiety     Basal cell carcinoma 2000    left eyebrow     Depression     Environmental allergies     Headache(784.0)     Multiple food allergies     Multiple sclerosis 2009     shoulder 3/2015    left       Social History     Socioeconomic History    Marital status:      Spouse name: Not on file    Number of children: Not on file    Years of education: Not on file    Highest education level: Not on file   Social Needs    Financial resource strain: Not on file    Food insecurity - worry: Not on file    Food insecurity - inability: Not on file    Transportation needs - medical: Not on file    Transportation needs - non-medical: Not on file   Occupational History     Employer: [employer]   Tobacco Use    Smoking status: Never Smoker    Smokeless tobacco: Never Used   Substance and Sexual Activity    Alcohol use: Yes     Comment: socially/ not weekly    Drug use: No    Sexual activity: Yes     Partners: Male     Birth control/protection: OCP     Comment: , menarche 15   Other Topics Concern    Are you pregnant or think you may be? Not Asked    Breast-feeding Not Asked   Social History Narrative    Not on file         Current Outpatient Medications:     azelastine (ASTELIN) 137 mcg (0.1 %) nasal spray, 1 spray (137 mcg  total) by Nasal route 2 (two) times daily. (Patient taking differently: 1 spray by Nasal route continuous prn. ), Disp: 30 mL, Rfl: 11    baclofen (LIORESAL) 10 MG tablet, TAKE 1/2 TABLET BY MOUTH 2-3 TIMES A DAY, Disp: 60 tablet, Rfl: 5    cholecalciferol, vitamin D3, 3,000 unit Tab, Take 3,000 Units by mouth once daily. , Disp: , Rfl:     docusate sodium (COLACE) 100 MG capsule, Take 1 capsule (100 mg total) by mouth 2 (two) times daily., Disp: 60 capsule, Rfl: 0    escitalopram oxalate (LEXAPRO) 10 MG tablet, TAKE 1.5 TABLETS (15 MG TOTAL) BY MOUTH EVERY EVENING., Disp: 45 tablet, Rfl: 2    estradiol-norethindrone (ACTIVELLA) 1-0.5 mg per tablet, Take 1 tablet by mouth once daily., Disp: 90 tablet, Rfl: 3    flaxseed oil 1,000 mg Cap, Take 1 capsule by mouth once daily. , Disp: , Rfl:     magnesium oxide (MAG-OX) 400 mg tablet, Take 1 tablet (400 mg total) by mouth 2 (two) times daily., Disp: 60 tablet, Rfl: 12    multivitamin-Ca-iron-minerals (ONE-A-DAY WOMENS FORMULA) 27-0.4 mg Tab, Take 1 tablet by mouth once daily. , Disp: , Rfl:     riboflavin, vitamin B2, (VITAMIN B-2) 100 mg Tab tablet, TAKE 2 TABLETS BY MOUTH ONCE DAILY., Disp: 60 tablet, Rfl: 11    topiramate (TOPAMAX) 25 MG tablet, Take 1 tablet (25 mg total) by mouth once daily., Disp: 30 tablet, Rfl: 12    ZOLMitriptan (ZOMIG) 5 mg Spry, SPRAY 1 SPRAY IN NOSTRIL ONCE AS NEEDED. Max 2 doses in 24 hrs, Disp: 12 each, Rfl: 12    Review of Systems   Constitutional: Negative for activity change, chills and fever.   HENT: Negative for congestion, mouth sores, rhinorrhea, sinus pressure and sore throat.    Eyes: Negative for photophobia, pain and redness.   Respiratory: Negative for cough, chest tightness, shortness of breath and wheezing.    Cardiovascular: Negative for chest pain and leg swelling.   Gastrointestinal: Negative for abdominal distention, abdominal pain, diarrhea, nausea and vomiting.   Endocrine: Negative for polyuria.    Genitourinary: Negative for decreased urine volume, dysuria and flank pain.   Musculoskeletal: Negative for joint swelling and neck pain.   Skin: Negative for color change.   Allergic/Immunologic: Negative for food allergies.   Neurological: Negative for dizziness, weakness and headaches.   Hematological: Negative for adenopathy.   Psychiatric/Behavioral: Negative for agitation and confusion. The patient is not nervous/anxious.            Objective:      Vitals:    02/11/19 1450   BP: 102/78   Pulse: 69   Temp: 98.4 °F (36.9 °C)     Physical Exam   Constitutional: She is oriented to person, place, and time. She appears well-developed and well-nourished.   HENT:   Head: Normocephalic and atraumatic.   Eyes: Pupils are equal, round, and reactive to light.   Neck: Normal range of motion. Neck supple.   Cardiovascular: Normal rate.   Pulmonary/Chest: Effort normal and breath sounds normal.   Abdominal: Soft. Bowel sounds are normal.   Musculoskeletal: She exhibits no edema or tenderness.   Neurological: She is alert and oriented to person, place, and time.   Skin: Skin is warm and dry.   Psychiatric: She has a normal mood and affect.           Assessment/Plan:       No diagnosis found.    52 y/o F h/o MS dx 2009, controlled on ocrevus here for vaccine eval  shingrix  prevnar f/b pneumovax after 8 weeks  tdap  Counseled on safe living

## 2019-02-16 DIAGNOSIS — G43.719 INTRACTABLE CHRONIC MIGRAINE WITHOUT AURA AND WITHOUT STATUS MIGRAINOSUS: ICD-10-CM

## 2019-02-16 RX ORDER — TOPIRAMATE 25 MG/1
25 TABLET ORAL DAILY
Qty: 30 TABLET | Refills: 12 | Status: CANCELLED | OUTPATIENT
Start: 2019-02-16

## 2019-02-18 ENCOUNTER — OFFICE VISIT (OUTPATIENT)
Dept: PSYCHIATRY | Facility: CLINIC | Age: 54
End: 2019-02-18
Payer: COMMERCIAL

## 2019-02-18 ENCOUNTER — OFFICE VISIT (OUTPATIENT)
Dept: ORTHOPEDICS | Facility: CLINIC | Age: 54
End: 2019-02-18
Payer: COMMERCIAL

## 2019-02-18 VITALS — WEIGHT: 138 LBS | BODY MASS INDEX: 25.4 KG/M2 | HEIGHT: 62 IN

## 2019-02-18 DIAGNOSIS — M75.02 ADHESIVE CAPSULITIS OF LEFT SHOULDER: Primary | ICD-10-CM

## 2019-02-18 DIAGNOSIS — M75.42 IMPINGEMENT SYNDROME OF LEFT SHOULDER: ICD-10-CM

## 2019-02-18 DIAGNOSIS — F41.1 GENERALIZED ANXIETY DISORDER: ICD-10-CM

## 2019-02-18 DIAGNOSIS — M75.102 ROTATOR CUFF TEAR, LEFT: ICD-10-CM

## 2019-02-18 DIAGNOSIS — F43.21 GRIEF: Primary | ICD-10-CM

## 2019-02-18 PROCEDURE — 99214 OFFICE O/P EST MOD 30 MIN: CPT | Mod: S$GLB,,, | Performed by: ORTHOPAEDIC SURGERY

## 2019-02-18 PROCEDURE — 99214 PR OFFICE/OUTPT VISIT, EST, LEVL IV, 30-39 MIN: ICD-10-PCS | Mod: S$GLB,,, | Performed by: ORTHOPAEDIC SURGERY

## 2019-02-18 PROCEDURE — 90791 PR PSYCHIATRIC DIAGNOSTIC EVALUATION: ICD-10-PCS | Mod: S$GLB,,, | Performed by: SOCIAL WORKER

## 2019-02-18 PROCEDURE — 3008F BODY MASS INDEX DOCD: CPT | Mod: CPTII,S$GLB,, | Performed by: ORTHOPAEDIC SURGERY

## 2019-02-18 PROCEDURE — 90791 PSYCH DIAGNOSTIC EVALUATION: CPT | Mod: S$GLB,,, | Performed by: SOCIAL WORKER

## 2019-02-18 PROCEDURE — 3008F PR BODY MASS INDEX (BMI) DOCUMENTED: ICD-10-PCS | Mod: CPTII,S$GLB,, | Performed by: ORTHOPAEDIC SURGERY

## 2019-02-18 PROCEDURE — 99999 PR PBB SHADOW E&M-EST. PATIENT-LVL III: ICD-10-PCS | Mod: PBBFAC,,, | Performed by: ORTHOPAEDIC SURGERY

## 2019-02-18 PROCEDURE — 99999 PR PBB SHADOW E&M-EST. PATIENT-LVL I: ICD-10-PCS | Mod: PBBFAC,,, | Performed by: SOCIAL WORKER

## 2019-02-18 PROCEDURE — 99999 PR PBB SHADOW E&M-EST. PATIENT-LVL I: CPT | Mod: PBBFAC,,, | Performed by: SOCIAL WORKER

## 2019-02-18 PROCEDURE — 99999 PR PBB SHADOW E&M-EST. PATIENT-LVL III: CPT | Mod: PBBFAC,,, | Performed by: ORTHOPAEDIC SURGERY

## 2019-02-18 NOTE — Clinical Note
Hien is requesting a letter for her  exempting him from emergency duties during a natural disaster this calendar year as pt is having surgery on shoulder.  He would just work his regular hours at his regular place of employment.  Do you agree with this request?

## 2019-02-18 NOTE — PROGRESS NOTES
"Psychiatry Initial Visit (PhD/LCSW)  Diagnostic Interview - CPT 85241    Date: 2/18/2019    Site: [facility]    Referral source: Patient (referred self back to counseling)    Clinical status of patient: Outpatient    Hien Jeter, a 53 y.o. female, for initial evaluation visit.  Met with patient.    Chief complaint/reason for encounter: grief after death of two close relatives within the past 6 months    History of present illness: Hien is a   female known to this writer for previous treatment of anxiety.  She referred herself back to counseling 3 months after experiencing the death of her mother and mother-in-law.  She reports symptoms consistent with grief: sudden waves of tearfulness and sadness, feelings of guilt.  Initially, she experienced profound sadness and did not want to leave the house, but she states this lasted for 2 weeks, only.  She is returning to a more normal routine.    Hien also has a history of anxiety and was feeling stable on Lexapro prior to the recent deaths.  She did experience appropriate worry and apprehension as her mother was being diagnosed and treated for cancer prior to her death, but she continues to experience excessive worry that interferes with her sleep (related to her feelings of guilt and what she believes she "should" have done for her mother) and increased fatigue.    Pain: not assessed    Symptoms:   · Mood: fatigue, worthlessness/guilt and tearfulness, sad  · Anxiety: excessive anxiety/worry and difficulty sleeping  · Substance abuse: denied  · Cognitive functioning: denied  · Health behaviors: noncontributory    Psychiatric history: has participated in counseling/psychotherapy on an outpatient basis in the past and is currently prescribed Lexapro by her neurologist   Previous counseling with Rody Irby LCSW and forrest KELLY, both of [facility]    Medical history: Multiple sclerosis, s/p removal of ganglion cyst in right " foot, pending left rotator cuff repair surgery     Family history of psychiatric illness: not known     Social history (marriage, employment, etc.):   17 (Rody Irby, [employer]) Grew up with father and mother and older siblings. Father worked as an  in the oil/gas business, so family traveled a lot. Mom worked as a Teacher at the [employer]. Lived in [place] and [place] and attending boarding school in [place] starting at age 15. Enjoyed her life abroad. Moved back to [place] as senior in high school and attended boarding school in [place]. Attended [employer] and majored in Education. [employer]  while visiting [place]. No children by choice. Has 2 dogs. 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 Environment became too stressful. Moved to [place] [facility] [place] and where she is currently teaching. Feels unsupported and undervalued. Worries about aging parents and in-laws. Feels that she needs to be available to care for them. Particularly worries about her father. Dx with MS 10+ years ago.     18 (Initial assessment by this writer) Ms. Jeter remains , without children.  She has two dogs.  Lives in a neighborhood with several in-laws nearby.  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  She has close and open relationships with her own family and is close with her 's family, too. She reports a close relationship with her sister-in-law Lynette.    20 Hien's mother and mother-in-law  in October and 2018, respectively.  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  The recent death of her mother has exacerbated some tensions with her sister and a brother.      Substance use:   Alcohol: social   Drugs: none   Tobacco: none   Caffeine: not assessed    Current medications and drug reactions (include OTC, herbal): see medication list     Strengths and liabilities: Strength:  Patient accepts guidance/feedback, Strength: Patient is expressive/articulate., Strength: Patient is intelligent., Strength: Patient has positive support network., Strength: Patient has reasonable judgment.    Current Evaluation:     Mental Status Exam:  General Appearance:  unremarkable, age appropriate   Speech: normal tone, normal rate, normal pitch, normal volume      Level of Cooperation: cooperative      Thought Processes: normal and logical   Mood: sad      Thought Content: normal, no suicidality, no homicidality, delusions, or paranoia   Affect: congruent and appropriate   Orientation: Oriented x3   Memory: intact   Attention Span & Concentration: intact   Fund of General Knowledge: intact and appropriate to age and level of education   Abstract Reasoning: intact   Judgment & Insight: good     Language  intact     Diagnostic Impression - Plan:       ICD-10-CM ICD-9-CM   1. Grief F43.21 309.0   2. Generalized anxiety disorder F41.1 300.02       Plan:individual psychotherapy and medication management by physician    Return to Clinic: 2 weeks    Length of Service (minutes): 60

## 2019-02-21 ENCOUNTER — OFFICE VISIT (OUTPATIENT)
Dept: ORTHOPEDICS | Facility: CLINIC | Age: 54
End: 2019-02-21
Payer: COMMERCIAL

## 2019-02-21 VITALS — BODY MASS INDEX: 24.74 KG/M2 | WEIGHT: 135.25 LBS | RESPIRATION RATE: 18 BRPM

## 2019-02-21 DIAGNOSIS — Z09 S/P ORTHOPEDIC SURGERY, FOLLOW-UP EXAM: Primary | ICD-10-CM

## 2019-02-21 PROCEDURE — 99999 PR PBB SHADOW E&M-EST. PATIENT-LVL II: CPT | Mod: PBBFAC,,, | Performed by: ORTHOPAEDIC SURGERY

## 2019-02-21 PROCEDURE — 99024 POSTOP FOLLOW-UP VISIT: CPT | Mod: S$GLB,,, | Performed by: ORTHOPAEDIC SURGERY

## 2019-02-21 PROCEDURE — 99999 PR PBB SHADOW E&M-EST. PATIENT-LVL II: ICD-10-PCS | Mod: PBBFAC,,, | Performed by: ORTHOPAEDIC SURGERY

## 2019-02-21 PROCEDURE — 99024 PR POST-OP FOLLOW-UP VISIT: ICD-10-PCS | Mod: S$GLB,,, | Performed by: ORTHOPAEDIC SURGERY

## 2019-02-21 NOTE — PROGRESS NOTES
Hien Jeter  Returns today for follow-up.  She is 6 weeks postop from excision of a synovial cyst under the ball of her right big toe.  She reports she is doing well.  She is not taking any pain medication.  She states only time she has some discomfort is when she wears an enclosed shoe and she may get some burning along the medial side of the toe near the incision.    Examination:  The incision is well healed.  There is some mild erythema and swelling which would be expected at this point.  She has painless range of motion of her big toe.  She is neurovascularly intact.    Impression:  6 weeks postop removal synovial cyst right big toe, doing well    Recommendation:  She can resume shoe wear and activities as tolerated    Follow-up as needed

## 2019-02-22 ENCOUNTER — PATIENT MESSAGE (OUTPATIENT)
Dept: NEUROLOGY | Facility: CLINIC | Age: 54
End: 2019-02-22

## 2019-02-22 DIAGNOSIS — G43.719 INTRACTABLE CHRONIC MIGRAINE WITHOUT AURA AND WITHOUT STATUS MIGRAINOSUS: ICD-10-CM

## 2019-02-22 RX ORDER — TOPIRAMATE 25 MG/1
25 TABLET ORAL DAILY
Qty: 30 TABLET | Refills: 0 | Status: CANCELLED | OUTPATIENT
Start: 2019-02-22

## 2019-02-22 RX ORDER — RIBOFLAVIN (VITAMIN B2) 100 MG
TABLET ORAL
Qty: 60 TABLET | Refills: 0 | Status: CANCELLED | OUTPATIENT
Start: 2019-02-22

## 2019-02-22 RX ORDER — LANOLIN ALCOHOL/MO/W.PET/CERES
1 CREAM (GRAM) TOPICAL 2 TIMES DAILY
Qty: 60 TABLET | Refills: 0 | Status: CANCELLED | OUTPATIENT
Start: 2019-02-22

## 2019-02-22 NOTE — TELEPHONE ENCOUNTER
----- Message from Julienne Martin RN sent at 2/21/2019  8:56 AM CST -----  Contact: Pt. [phone]      ----- Message -----  From: Morales Brar  Sent: 2/21/2019   8:50 AM  To: Ernie Julian Staff    Needs Advice    Reason for call: The patient would like to speak to someone regarding scheduling her appointment. Please contact the patient to discuss further.          Communication Preference:PHONE     Additional Information:

## 2019-02-23 NOTE — TELEPHONE ENCOUNTER
Dr. Klein,    Mrs. Jeter stopped by the office on yesterday to schedule an appt and request refills on her Magnesium/Vitamin B2 and Topamax. She's requesting her Topamax 25 mg daily be increased to BID. She states that she's been taking it twice a daily because she's been under a lot of stress lately losing her mother and her mother in law. Is it okay to increase her dosage?     FYI:    She's scheduled to see you 03/20/2019 in [place].

## 2019-03-07 ENCOUNTER — OFFICE VISIT (OUTPATIENT)
Dept: PSYCHIATRY | Facility: CLINIC | Age: 54
End: 2019-03-07
Payer: COMMERCIAL

## 2019-03-07 DIAGNOSIS — F41.1 GENERALIZED ANXIETY DISORDER: ICD-10-CM

## 2019-03-07 DIAGNOSIS — F43.21 GRIEF: Primary | ICD-10-CM

## 2019-03-07 PROCEDURE — 90837 PR PSYCHOTHERAPY W/PATIENT, 60 MIN: ICD-10-PCS | Mod: S$GLB,,, | Performed by: SOCIAL WORKER

## 2019-03-07 PROCEDURE — 90837 PSYTX W PT 60 MINUTES: CPT | Mod: S$GLB,,, | Performed by: SOCIAL WORKER
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
## 2019-03-07 NOTE — Clinical Note
Hien asked if you all would be comfortable writing a letter for her , who works for the [employer], stating he should be released from emergency duty in the event of a natural disaster as pt could need help/care for her MS.  I assume she means in the event of a relapse because she functions independently now.  Thoughts?

## 2019-03-11 ENCOUNTER — PATIENT MESSAGE (OUTPATIENT)
Dept: SURGERY | Facility: HOSPITAL | Age: 54
End: 2019-03-11

## 2019-03-18 ENCOUNTER — PATIENT MESSAGE (OUTPATIENT)
Dept: PSYCHIATRY | Facility: CLINIC | Age: 54
End: 2019-03-18

## 2019-03-18 DIAGNOSIS — F32.0 MILD SINGLE CURRENT EPISODE OF MAJOR DEPRESSIVE DISORDER: ICD-10-CM

## 2019-03-18 RX ORDER — ESCITALOPRAM OXALATE 10 MG/1
15 TABLET ORAL NIGHTLY
Qty: 45 TABLET | Refills: 6 | Status: SHIPPED | OUTPATIENT
Start: 2019-03-18 | End: 2019-10-20 | Stop reason: SDUPTHER

## 2019-03-20 ENCOUNTER — OFFICE VISIT (OUTPATIENT)
Dept: NEUROLOGY | Facility: CLINIC | Age: 54
End: 2019-03-20
Payer: COMMERCIAL

## 2019-03-20 VITALS
DIASTOLIC BLOOD PRESSURE: 77 MMHG | SYSTOLIC BLOOD PRESSURE: 106 MMHG | WEIGHT: 134.94 LBS | BODY MASS INDEX: 24.83 KG/M2 | HEIGHT: 62 IN | HEART RATE: 70 BPM

## 2019-03-20 DIAGNOSIS — G35 MS (MULTIPLE SCLEROSIS): ICD-10-CM

## 2019-03-20 DIAGNOSIS — M54.81 BILATERAL OCCIPITAL NEURALGIA: Primary | ICD-10-CM

## 2019-03-20 DIAGNOSIS — G43.719 INTRACTABLE CHRONIC MIGRAINE WITHOUT AURA AND WITHOUT STATUS MIGRAINOSUS: ICD-10-CM

## 2019-03-20 PROCEDURE — 99999 PR PBB SHADOW E&M-EST. PATIENT-LVL III: CPT | Mod: PBBFAC,,, | Performed by: PSYCHIATRY & NEUROLOGY

## 2019-03-20 PROCEDURE — 99999 PR PBB SHADOW E&M-EST. PATIENT-LVL III: ICD-10-PCS | Mod: PBBFAC,,, | Performed by: PSYCHIATRY & NEUROLOGY

## 2019-03-20 PROCEDURE — 3008F PR BODY MASS INDEX (BMI) DOCUMENTED: ICD-10-PCS | Mod: CPTII,S$GLB,, | Performed by: PSYCHIATRY & NEUROLOGY

## 2019-03-20 PROCEDURE — 99213 PR OFFICE/OUTPT VISIT, EST, LEVL III, 20-29 MIN: ICD-10-PCS | Mod: S$GLB,,, | Performed by: PSYCHIATRY & NEUROLOGY

## 2019-03-20 PROCEDURE — 99213 OFFICE O/P EST LOW 20 MIN: CPT | Mod: S$GLB,,, | Performed by: PSYCHIATRY & NEUROLOGY

## 2019-03-20 PROCEDURE — 3008F BODY MASS INDEX DOCD: CPT | Mod: CPTII,S$GLB,, | Performed by: PSYCHIATRY & NEUROLOGY

## 2019-03-20 RX ORDER — NORTRIPTYLINE HYDROCHLORIDE 10 MG/1
20 CAPSULE ORAL NIGHTLY
Qty: 60 CAPSULE | Refills: 11 | Status: SHIPPED | OUTPATIENT
Start: 2019-03-20 | End: 2020-01-23

## 2019-03-20 RX ORDER — TOPIRAMATE 25 MG/1
25 TABLET ORAL 2 TIMES DAILY
Qty: 60 TABLET | Refills: 12 | Status: SHIPPED | OUTPATIENT
Start: 2019-03-20 | End: 2019-10-23

## 2019-03-20 RX ORDER — LANOLIN ALCOHOL/MO/W.PET/CERES
1 CREAM (GRAM) TOPICAL 2 TIMES DAILY
Qty: 60 TABLET | Refills: 12 | Status: SHIPPED | OUTPATIENT
Start: 2019-03-20 | End: 2021-01-05 | Stop reason: CLARIF

## 2019-03-20 RX ORDER — RIBOFLAVIN (VITAMIN B2) 100 MG
TABLET ORAL
Qty: 60 TABLET | Refills: 11 | Status: SHIPPED | OUTPATIENT
Start: 2019-03-20 | End: 2020-08-19

## 2019-03-20 RX ORDER — ZOLMITRIPTAN 5 MG/1
SPRAY NASAL
Qty: 12 EACH | Refills: 12 | Status: SHIPPED | OUTPATIENT
Start: 2019-03-20 | End: 2020-03-27 | Stop reason: SDUPTHER
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
## 2019-03-20 NOTE — LETTER
March 20, 2019      Other  [address]  [place]  [facility] [address]           [facility]  [address] [place], [place] [address]  [place] [address]  Phone: [phone]  Fax: [phone]          Patient: Hien Jeter   MR Number: [id number]   YOB: 1965   Date of Visit: 3/20/2019       Dear Other:    Thank you for referring Hien Jeter to me for evaluation. Attached you will find relevant portions of my assessment and plan of care.    If you have questions, please do not hesitate to call me. I look forward to following Hien Jeter along with you.    Sincerely,    Eliel Klein MD    Enclosure  CC:  No Recipients    If you would like to receive this communication electronically, please contact [email or web address] or [phone] to request more information on [employer] access.    For providers and/or their staff who would like to refer a patient to [facility], please contact us through our one-stop-shop provider referral line, [facility], at [phone].    If you feel you have received this communication in error or would no longer like to receive these types of communications, please e-mail [email or web address]

## 2019-03-26 DIAGNOSIS — G43.719 INTRACTABLE CHRONIC MIGRAINE WITHOUT AURA AND WITHOUT STATUS MIGRAINOSUS: ICD-10-CM

## 2019-03-26 RX ORDER — TOPIRAMATE 25 MG/1
TABLET ORAL
Qty: 30 TABLET | Refills: 0 | OUTPATIENT
Start: 2019-03-26

## 2019-04-01 ENCOUNTER — CLINICAL SUPPORT (OUTPATIENT)
Dept: LAB | Facility: HOSPITAL | Age: 54
End: 2019-04-01
Attending: ORTHOPAEDIC SURGERY
Payer: COMMERCIAL

## 2019-04-01 ENCOUNTER — ANESTHESIA EVENT (OUTPATIENT)
Dept: SURGERY | Facility: HOSPITAL | Age: 54
End: 2019-04-01
Payer: COMMERCIAL

## 2019-04-01 ENCOUNTER — HOSPITAL ENCOUNTER (OUTPATIENT)
Dept: PREADMISSION TESTING | Facility: HOSPITAL | Age: 54
Discharge: HOME | End: 2019-04-01
Attending: ORTHOPAEDIC SURGERY
Payer: COMMERCIAL

## 2019-04-01 DIAGNOSIS — Z01.818 PRE-OP TESTING: ICD-10-CM

## 2019-04-01 DIAGNOSIS — Z01.818 PRE-OP TESTING: Primary | ICD-10-CM

## 2019-04-01 PROCEDURE — 93005 ELECTROCARDIOGRAM TRACING: CPT

## 2019-04-01 RX ORDER — SODIUM CHLORIDE, SODIUM LACTATE, POTASSIUM CHLORIDE, CALCIUM CHLORIDE 600; 310; 30; 20 MG/100ML; MG/100ML; MG/100ML; MG/100ML
INJECTION, SOLUTION INTRAVENOUS CONTINUOUS
Status: CANCELLED | OUTPATIENT
Start: 2019-04-01

## 2019-04-01 RX ORDER — LIDOCAINE HYDROCHLORIDE 10 MG/ML
1 INJECTION, SOLUTION EPIDURAL; INFILTRATION; INTRACAUDAL; PERINEURAL ONCE
Status: CANCELLED | OUTPATIENT
Start: 2019-04-01 | End: 2019-04-01

## 2019-04-01 NOTE — DISCHARGE INSTRUCTIONS
Your surgery is scheduled for 4/5/19.    Please report to Outpatient Surgery Intake Office on the 2nd FLOOR at 5:30 a.m.          INSTRUCTIONS IMPORTANT!!!  ¨ Do not eat or drink after 12 midnight-including water. OK to brush teeth, no   gum, candy or mints!        ____  Proceed to [facility] on 4/1/19 for additional blood test.        ____  Do not wear makeup, including mascara.  ____  No powder, lotions or creams to surgical area.  ____  Please remove all jewelry, including piercings and leave at home.  ____  No money or valuables needed. Please leave at home.  ____  Please bring any documents given by your doctor.  ____  If going home the same day, arrange for a ride home. You will not be able to             drive if Anesthesia was used.  ____  Wear loose fitting clothing. Allow for dressings, bandages.  ____  Stop Aspirin, Ibuprofen, Motrin and Aleve at least 3-5 days before surgery, unless otherwise instructed by your doctor, or the nurse.   You MAY use Tylenol/acetaminophen until day of surgery.  ____  Wash the surgical area with Hibiclens the night before surgery, and again the             morning of surgery.  Be sure to rinse hibiclens off completely (if instructed by   nurse).  ____  If you take diabetic medication, do not take am of surgery unless instructed by Doctor.  ____  Call MD for temperature above 101 degrees or any other signs of infection such as Urinary (bladder) infection, Upper respiratory infection, skin boils, etc.  ____ Stop taking any Fish Oil supplement or any Vitamins that contain Vitamin E at least 5 days prior to surgery.  ____ Do Not wear your contact lenses the day of your procedure.  You may wear your glasses.      ____Do not shave surgical site for 3 days prior to surgery.  ____ Practice Good hand washing before, during, and after procedure.      I have read or had read and explained to me, and understand the above information.  Additional comments or  instructions:  For additional questions call [phone]      ANESTHESIA SIDE EFFECTS  -For the first 24 hours after surgery:  Do not drive, use heavy equipment, make important decisions, or drink alcohol  -It is normal to feel sleepy for several hours.  Rest until you are more awake.  -Have someone stay with you, if needed.  They can watch for problems and help keep you safe.  -Some possible post anesthesia side effects include: nausea and vomiting, sore throat and hoarseness, sleepiness, and dizziness.        Pre-Op Bathing Instructions    Before surgery, you can play an important role in your own health.    Because skin is not sterile, we need to be sure that your skin is as free of germs as possible. By following the instructions below, you can reduce the number of germs on your skin before surgery.    IMPORTANT: You will need to shower with a special soap called Hibiclens*, available at any pharmacy.  If you are allergic to Chlorhexidine (the antiseptic in Hibiclens), use an antibacterial soap such as Dial Soap for your preoperative shower.  You will shower with Hibiclens both the night before your surgery and the morning of your surgery.  Do not use Hibiclens on the head, face or genitals to avoid injury to those areas.    STEP #1: THE NIGHT BEFORE YOUR SURGERY     1. Do not shave the area of your body where your surgery will be performed.  2. Shower and wash your hair and body as usual with your normal soap and shampoo.  3. Rinse your hair and body thoroughly after you shower to remove all soap residue.  4. With your hand, apply one packet of Hibiclens soap to the surgical site.   5. Wash the site gently for five (5) minutes. Do not scrub your skin too hard.   6. Do not wash with your regular soap after Hibiclens is used.  7. Rinse your body thoroughly.  8. Pat yourself dry with a clean, soft towel.  9. Do not use lotion, cream, or powder.  10. Wear clean clothes.    STEP #2: THE MORNING OF YOUR SURGERY      1. Repeat Step #1.    * Not to be used by people allergic to Chlorhexidine.          Shoulder Arthroscopy  The shoulder is your bodys most flexible joint. It lets the arm move in almost any direction. But this flexibility has a price -- it makes the joint prone to injury. If you have a shoulder problem, a surgical procedure called arthroscopy can help.     A camera in the arthroscope allows your surgeon to view your shoulder joint on a monitor.   Your orthopaedic evaluation  Your doctor will ask about your symptoms and the history of your shoulder problem. He or she will examine your shoulder and may give you tests, such as an X-ray or MRI. These help your doctor find the cause of your shoulder problem.  Arthroscopy: Looking inside your joint  Arthroscopy is a procedure that allows your doctor to see and work inside your shoulder joint. Your doctor makes small incision in your shoulder and inserts a long, thin, lighted instrument, called an arthroscope. During surgery, the arthroscope sends live video images from inside your joint to a screen that your doctor views. Using these images, your doctor can diagnose and treat your shoulder problem. Because arthroscopy uses much smaller incisions than open surgery, recovery is often shorter and less painful.  Risks and possible complications of shoulder arthroscopy  · Stiffness or ongoing pain in your shoulder  · Bleeding or blood clots  · Infection  · Damage to nerves or blood vessels  You may still need open surgery after having arthroscopy.  Date Last Reviewed: 9/10/2015  © 9512-2539 [employer]. [address] [place] [address], [place] [address]. All rights reserved. This information is not intended as a substitute for professional medical care. Always follow your healthcare professional's instructions.

## 2019-04-01 NOTE — PRE-PROCEDURE INSTRUCTIONS
Duong Roland  [phone]    Allergies, medical, surgical, family and psychosocial histories reviewed with patient. Periop plan of care reviewed. Preop instructions given, including medications to take and to hold. Hibiclens soap and instructions on use given. Time allotted for questions to be addressed.  Patient verbalized understanding.

## 2019-04-01 NOTE — ANESTHESIA PREPROCEDURE EVALUATION
04/01/2019  Hien Jeter is a 53 y.o., female scheduled for left arthroscopic RCR on 4/5/19.    Past Medical History:   Diagnosis Date    Anxiety     Basal cell carcinoma 2000    left eyebrow     Depression     Environmental allergies     Headache(784.0)     Multiple food allergies     Multiple sclerosis 2009     shoulder 3/2015    left     Past Surgical History:   Procedure Laterality Date    BASAL CELL CARCINOMA EXCISION  1998    COLONOSCOPY N/A 5/25/2017    Performed by Marielle Dietz MD at [facility] (4TH FLR)    EXCISION, MASS Right foot Right 1/8/2019    Performed by Sagar Burgos MD at [facility] OR 1ST FLR    LASIK  2000    WISDOM TOOTH EXTRACTION         Anesthesia Evaluation    I have reviewed the Patient Summary Reports.     I have reviewed the Medications.   Steroids Taken In Past Year:     Review of Systems  Anesthesia Hx:  No problems with previous Anesthesia  Denies Family Hx of Anesthesia complications.    Social:  Non-Smoker, Social Alcohol Use    Hematology/Oncology:  Hematology Normal        Cardiovascular:  Cardiovascular Normal   Denies Angina.        Pulmonary:  Pulmonary Normal  Denies Shortness of breath.    Renal/:  Renal/ Normal     Hepatic/GI:  Hepatic/GI Normal  Denies GERD. Denies Liver Disease.    Neurological:   Denies TIA. Denies CVA. Denies Seizures.  Dx of Headaches, Migraine Headache Neuromuscular Disease, Multiple Sclerosis (clinically stable on Ocrevus)   Endocrine:  Endocrine Normal        Physical Exam  General:  Well nourished    Airway/Jaw/Neck:  Airway Findings: Mouth Opening: Normal Tongue: Normal  General Airway Assessment: Adult  Mallampati: II         Dental:  Dental Findings: In tact   Chest/Lungs:  Chest/Lungs Findings: Clear to auscultation, Normal Respiratory Rate     Heart/Vascular:  Heart Findings: Rate: Normal  Rhythm: Regular  Rhythm  Sounds: Normal  Heart murmur: negative       Mental Status:  Mental Status Findings:  Cooperative, Alert and Oriented         Anesthesia Plan  Type of Anesthesia, risks & benefits discussed:  Anesthesia Type:  regional, general  Patient's Preference:   Intra-op Monitoring Plan: standard ASA monitors  Intra-op Monitoring Plan Comments:   Post Op Pain Control Plan: per primary service following discharge from PACU  Post Op Pain Control Plan Comments:   Induction:   IV  Beta Blocker:  Patient is not currently on a Beta-Blocker (No further documentation required).       Informed Consent: Patient understands risks and agrees with Anesthesia plan.  Questions answered. Anesthesia consent signed with patient.  ASA Score: 2     Day of Surgery Review of History & Physical:  There are no significant changes.      Anesthesia Plan Notes: Anesthesia consent to be signed prior to procedure on 4/5/19  Receives 100 mg methylprednisolone with Ocrevus infusions every 6 months, last infusion 1/2019.          Ready For Surgery From Anesthesia Perspective.

## 2019-04-02 ENCOUNTER — PATIENT MESSAGE (OUTPATIENT)
Dept: SURGERY | Facility: HOSPITAL | Age: 54
End: 2019-04-02

## 2019-04-05 ENCOUNTER — TELEPHONE (OUTPATIENT)
Dept: NEUROLOGY | Facility: CLINIC | Age: 54
End: 2019-04-05

## 2019-04-05 ENCOUNTER — ANESTHESIA (OUTPATIENT)
Dept: SURGERY | Facility: HOSPITAL | Age: 54
End: 2019-04-05
Payer: COMMERCIAL

## 2019-04-05 ENCOUNTER — HOSPITAL ENCOUNTER (OUTPATIENT)
Facility: HOSPITAL | Age: 54
Discharge: HOME | End: 2019-04-05
Attending: ORTHOPAEDIC SURGERY | Admitting: ORTHOPAEDIC SURGERY
Payer: COMMERCIAL

## 2019-04-05 VITALS
HEART RATE: 90 BPM | BODY MASS INDEX: 25.4 KG/M2 | RESPIRATION RATE: 17 BRPM | WEIGHT: 138 LBS | OXYGEN SATURATION: 97 % | SYSTOLIC BLOOD PRESSURE: 132 MMHG | HEIGHT: 62 IN | TEMPERATURE: 98 F | DIASTOLIC BLOOD PRESSURE: 72 MMHG

## 2019-04-05 DIAGNOSIS — M75.102 ROTATOR CUFF TEAR, LEFT: ICD-10-CM

## 2019-04-05 DIAGNOSIS — M75.42 IMPINGEMENT SYNDROME OF LEFT SHOULDER: ICD-10-CM

## 2019-04-05 PROCEDURE — 37000009 HC ANESTHESIA EA ADD 15 MINS: Performed by: ORTHOPAEDIC SURGERY

## 2019-04-05 PROCEDURE — 76942 ECHO GUIDE FOR BIOPSY: CPT | Performed by: STUDENT IN AN ORGANIZED HEALTH CARE EDUCATION/TRAINING PROGRAM

## 2019-04-05 PROCEDURE — 64415 NJX AA&/STRD BRCH PLXS IMG: CPT | Performed by: STUDENT IN AN ORGANIZED HEALTH CARE EDUCATION/TRAINING PROGRAM

## 2019-04-05 PROCEDURE — 36000710: Performed by: ORTHOPAEDIC SURGERY

## 2019-04-05 PROCEDURE — 71000015 HC POSTOP RECOV 1ST HR: Performed by: ORTHOPAEDIC SURGERY

## 2019-04-05 PROCEDURE — 63600175 PHARM REV CODE 636 W HCPCS: Performed by: ORTHOPAEDIC SURGERY

## 2019-04-05 PROCEDURE — 71000033 HC RECOVERY, INTIAL HOUR: Performed by: ORTHOPAEDIC SURGERY

## 2019-04-05 PROCEDURE — 63600175 PHARM REV CODE 636 W HCPCS: Performed by: ANESTHESIOLOGY

## 2019-04-05 PROCEDURE — 29822 PR SHLDR ARTHROSCOP,PART DEBRIDE: ICD-10-PCS | Mod: LT,,, | Performed by: ORTHOPAEDIC SURGERY

## 2019-04-05 PROCEDURE — 37000008 HC ANESTHESIA 1ST 15 MINUTES: Performed by: ORTHOPAEDIC SURGERY

## 2019-04-05 PROCEDURE — 63600175 PHARM REV CODE 636 W HCPCS: Performed by: FAMILY MEDICINE

## 2019-04-05 PROCEDURE — 29822 SHO ARTHRS SRG LMTD DBRDMT: CPT | Mod: LT,,, | Performed by: ORTHOPAEDIC SURGERY

## 2019-04-05 PROCEDURE — 29826 SHO ARTHRS SRG DECOMPRESSION: CPT | Mod: LT,,, | Performed by: ORTHOPAEDIC SURGERY

## 2019-04-05 PROCEDURE — 71000016 HC POSTOP RECOV ADDL HR: Performed by: ORTHOPAEDIC SURGERY

## 2019-04-05 PROCEDURE — 36000711: Performed by: ORTHOPAEDIC SURGERY

## 2019-04-05 PROCEDURE — 25000003 PHARM REV CODE 250: Performed by: FAMILY MEDICINE

## 2019-04-05 PROCEDURE — 29826 PR SHLDR ARTHROSCOP,PART ACROMIOPLAS: ICD-10-PCS | Mod: LT,,, | Performed by: ORTHOPAEDIC SURGERY

## 2019-04-05 PROCEDURE — 27201423 OPTIME MED/SURG SUP & DEVICES STERILE SUPPLY: Performed by: ORTHOPAEDIC SURGERY

## 2019-04-05 PROCEDURE — 25000003 PHARM REV CODE 250: Performed by: NURSE PRACTITIONER

## 2019-04-05 RX ORDER — ROPIVACAINE HYDROCHLORIDE 5 MG/ML
INJECTION, SOLUTION EPIDURAL; INFILTRATION; PERINEURAL
Status: DISCONTINUED | OUTPATIENT
Start: 2019-04-05 | End: 2019-04-05

## 2019-04-05 RX ORDER — LIDOCAINE HCL/PF 100 MG/5ML
SYRINGE (ML) INTRAVENOUS
Status: DISCONTINUED | OUTPATIENT
Start: 2019-04-05 | End: 2019-04-05

## 2019-04-05 RX ORDER — HYDROCODONE BITARTRATE AND ACETAMINOPHEN 7.5; 325 MG/1; MG/1
1 TABLET ORAL EVERY 4 HOURS PRN
Qty: 30 TABLET | Refills: 0 | Status: SHIPPED | OUTPATIENT
Start: 2019-04-05 | End: 2019-04-16 | Stop reason: SDUPTHER

## 2019-04-05 RX ORDER — ROCURONIUM BROMIDE 10 MG/ML
INJECTION, SOLUTION INTRAVENOUS
Status: DISCONTINUED | OUTPATIENT
Start: 2019-04-05 | End: 2019-04-05

## 2019-04-05 RX ORDER — MIDAZOLAM HYDROCHLORIDE 1 MG/ML
INJECTION, SOLUTION INTRAMUSCULAR; INTRAVENOUS
Status: DISCONTINUED | OUTPATIENT
Start: 2019-04-05 | End: 2019-04-05

## 2019-04-05 RX ORDER — EPINEPHRINE 1 MG/ML
INJECTION, SOLUTION INTRACARDIAC; INTRAMUSCULAR; INTRAVENOUS; SUBCUTANEOUS
Status: DISCONTINUED | OUTPATIENT
Start: 2019-04-05 | End: 2019-04-05 | Stop reason: HOSPADM

## 2019-04-05 RX ORDER — OXYCODONE HYDROCHLORIDE 5 MG/1
10 TABLET ORAL EVERY 4 HOURS PRN
Status: DISCONTINUED | OUTPATIENT
Start: 2019-04-05 | End: 2019-04-05 | Stop reason: HOSPADM

## 2019-04-05 RX ORDER — ONDANSETRON 4 MG/1
4 TABLET, ORALLY DISINTEGRATING ORAL EVERY 6 HOURS PRN
Qty: 12 TABLET | Refills: 1 | Status: SHIPPED | OUTPATIENT
Start: 2019-04-05 | End: 2019-06-10

## 2019-04-05 RX ORDER — NEOSTIGMINE METHYLSULFATE 1 MG/ML
INJECTION, SOLUTION INTRAVENOUS
Status: DISCONTINUED | OUTPATIENT
Start: 2019-04-05 | End: 2019-04-05

## 2019-04-05 RX ORDER — LIDOCAINE HYDROCHLORIDE 10 MG/ML
1 INJECTION, SOLUTION EPIDURAL; INFILTRATION; INTRACAUDAL; PERINEURAL ONCE
Status: DISCONTINUED | OUTPATIENT
Start: 2019-04-05 | End: 2019-04-05 | Stop reason: HOSPADM

## 2019-04-05 RX ORDER — SUCCINYLCHOLINE CHLORIDE 20 MG/ML
INJECTION INTRAMUSCULAR; INTRAVENOUS
Status: DISCONTINUED | OUTPATIENT
Start: 2019-04-05 | End: 2019-04-05

## 2019-04-05 RX ORDER — GLYCOPYRROLATE 0.2 MG/ML
INJECTION INTRAMUSCULAR; INTRAVENOUS
Status: DISCONTINUED | OUTPATIENT
Start: 2019-04-05 | End: 2019-04-05

## 2019-04-05 RX ORDER — HYDROCODONE BITARTRATE AND ACETAMINOPHEN 5; 325 MG/1; MG/1
1 TABLET ORAL EVERY 4 HOURS PRN
Status: DISCONTINUED | OUTPATIENT
Start: 2019-04-05 | End: 2019-04-05 | Stop reason: HOSPADM

## 2019-04-05 RX ORDER — CEFAZOLIN SODIUM 1 G/3ML
INJECTION, POWDER, FOR SOLUTION INTRAMUSCULAR; INTRAVENOUS
Status: DISCONTINUED | OUTPATIENT
Start: 2019-04-05 | End: 2019-04-05

## 2019-04-05 RX ORDER — PROPOFOL 10 MG/ML
VIAL (ML) INTRAVENOUS
Status: DISCONTINUED | OUTPATIENT
Start: 2019-04-05 | End: 2019-04-05

## 2019-04-05 RX ORDER — ACETAMINOPHEN 325 MG/1
650 TABLET ORAL EVERY 4 HOURS PRN
Status: DISCONTINUED | OUTPATIENT
Start: 2019-04-05 | End: 2019-04-05 | Stop reason: HOSPADM

## 2019-04-05 RX ORDER — ACETAMINOPHEN 10 MG/ML
1000 INJECTION, SOLUTION INTRAVENOUS ONCE
Status: COMPLETED | OUTPATIENT
Start: 2019-04-05 | End: 2019-04-05

## 2019-04-05 RX ORDER — SODIUM CHLORIDE, SODIUM LACTATE, POTASSIUM CHLORIDE, CALCIUM CHLORIDE 600; 310; 30; 20 MG/100ML; MG/100ML; MG/100ML; MG/100ML
INJECTION, SOLUTION INTRAVENOUS CONTINUOUS
Status: DISCONTINUED | OUTPATIENT
Start: 2019-04-05 | End: 2019-04-05 | Stop reason: HOSPADM

## 2019-04-05 RX ORDER — CEFAZOLIN SODIUM 2 G/50ML
2 SOLUTION INTRAVENOUS
Status: DISCONTINUED | OUTPATIENT
Start: 2019-04-05 | End: 2019-04-05 | Stop reason: HOSPADM

## 2019-04-05 RX ORDER — ONDANSETRON 2 MG/ML
INJECTION INTRAMUSCULAR; INTRAVENOUS
Status: DISCONTINUED | OUTPATIENT
Start: 2019-04-05 | End: 2019-04-05

## 2019-04-05 RX ORDER — ONDANSETRON 8 MG/1
8 TABLET, ORALLY DISINTEGRATING ORAL EVERY 8 HOURS PRN
Status: DISCONTINUED | OUTPATIENT
Start: 2019-04-05 | End: 2019-04-05 | Stop reason: HOSPADM

## 2019-04-05 RX ORDER — HYDROMORPHONE HYDROCHLORIDE 2 MG/ML
0.5 INJECTION, SOLUTION INTRAMUSCULAR; INTRAVENOUS; SUBCUTANEOUS EVERY 5 MIN PRN
Status: DISCONTINUED | OUTPATIENT
Start: 2019-04-05 | End: 2019-04-05 | Stop reason: HOSPADM

## 2019-04-05 RX ORDER — PHENYLEPHRINE HYDROCHLORIDE 10 MG/ML
INJECTION INTRAVENOUS
Status: DISCONTINUED | OUTPATIENT
Start: 2019-04-05 | End: 2019-04-05

## 2019-04-05 RX ADMIN — HYDROMORPHONE HYDROCHLORIDE 0.5 MG: 2 INJECTION, SOLUTION INTRAMUSCULAR; INTRAVENOUS; SUBCUTANEOUS at 09:04

## 2019-04-05 RX ADMIN — GLYCOPYRROLATE 0.8 MG: 0.2 INJECTION, SOLUTION INTRAMUSCULAR; INTRAVENOUS at 08:04

## 2019-04-05 RX ADMIN — ROCURONIUM BROMIDE 5 MG: 10 INJECTION, SOLUTION INTRAVENOUS at 07:04

## 2019-04-05 RX ADMIN — SUCCINYLCHOLINE CHLORIDE 140 MG: 20 INJECTION, SOLUTION INTRAMUSCULAR; INTRAVENOUS at 07:04

## 2019-04-05 RX ADMIN — ACETAMINOPHEN 1000 MG: 10 INJECTION, SOLUTION INTRAVENOUS at 09:04

## 2019-04-05 RX ADMIN — MIDAZOLAM 3 MG: 1 INJECTION INTRAMUSCULAR; INTRAVENOUS at 07:04

## 2019-04-05 RX ADMIN — PROPOFOL 150 MG: 10 INJECTION, EMULSION INTRAVENOUS at 07:04

## 2019-04-05 RX ADMIN — PHENYLEPHRINE HYDROCHLORIDE 50 MCG: 10 INJECTION INTRAVENOUS at 08:04

## 2019-04-05 RX ADMIN — SODIUM CHLORIDE, SODIUM LACTATE, POTASSIUM CHLORIDE, AND CALCIUM CHLORIDE: .6; .31; .03; .02 INJECTION, SOLUTION INTRAVENOUS at 07:04

## 2019-04-05 RX ADMIN — ROPIVACAINE HYDROCHLORIDE 40 ML: 5 INJECTION, SOLUTION EPIDURAL; INFILTRATION; PERINEURAL at 06:04

## 2019-04-05 RX ADMIN — ROPIVACAINE HYDROCHLORIDE 15 ML: 5 INJECTION, SOLUTION EPIDURAL; INFILTRATION; PERINEURAL at 09:04

## 2019-04-05 RX ADMIN — NEOSTIGMINE METHYLSULFATE 5 MG: 1 INJECTION INTRAVENOUS at 08:04

## 2019-04-05 RX ADMIN — CEFAZOLIN 2 G: 330 INJECTION, POWDER, FOR SOLUTION INTRAMUSCULAR; INTRAVENOUS at 07:04

## 2019-04-05 RX ADMIN — LIDOCAINE HYDROCHLORIDE 80 MG: 20 INJECTION, SOLUTION INTRAVENOUS at 07:04

## 2019-04-05 RX ADMIN — Medication 10 MCG: at 08:04

## 2019-04-05 RX ADMIN — ONDANSETRON 4 MG: 2 INJECTION, SOLUTION INTRAMUSCULAR; INTRAVENOUS at 08:04

## 2019-04-05 RX ADMIN — Medication 10 MCG: at 07:04

## 2019-04-05 RX ADMIN — ROCURONIUM BROMIDE 45 MG: 10 INJECTION, SOLUTION INTRAVENOUS at 07:04

## 2019-04-05 NOTE — TRANSFER OF CARE
"Anesthesia Transfer of Care Note    Patient: Hien Jeter    Procedure(s) Performed: Procedure(s) (LRB):  REPAIR, ROTATOR CUFF, ARTHROSCOPIC (Left)    Patient location: PACU    Anesthesia Type: general and regional    Transport from OR: Transported from OR on 6-10 L/min O2 by face mask with adequate spontaneous ventilation    Post pain: adequate analgesia    Post assessment: no apparent anesthetic complications    Post vital signs: stable    Level of consciousness: awake, alert and oriented    Complications: none    Transfer of care protocol was followed      Last vitals:   Visit Vitals  /70 (BP Location: Right arm, Patient Position: Lying)   Pulse 102   Temp (P) 36.3 °C (97.3 °F) (Temporal)   Resp (P) 16   Ht 5' 2" (1.575 m)   Wt 62.6 kg (138 lb)   LMP 04/24/2017   SpO2 100%   BMI 25.24 kg/m²     "

## 2019-04-05 NOTE — ANESTHESIA PROCEDURE NOTES
Peripheral Block    Patient location during procedure: pre-op   Block not for primary anesthetic.  Reason for block: at surgeon's request and post-op pain management   Post-op Pain Location: Left shoulder  Start time: 4/5/2019 6:53 AM  Timeout: 4/5/2019 6:50 AM   End time: 4/5/2019 6:58 AM  Staffing  Anesthesiologist: Brijesh Baumann MD  Resident/CRNA: Simon Macedo MD  Performed: resident/CRNA   Preanesthetic Checklist  Completed: patient identified, site marked, surgical consent, pre-op evaluation, timeout performed, IV checked, risks and benefits discussed and monitors and equipment checked  Peripheral Block  Patient position: sitting  Prep: ChloraPrep  Patient monitoring: heart rate, cardiac monitor, continuous pulse ox, continuous capnometry and frequent blood pressure checks  Block type: interscalene  Laterality: left  Injection technique: single shot  Needle  Needle type: Stimuplex   Needle gauge: 22 G  Needle length: 2 in  Needle localization: anatomical landmarks and ultrasound guidance   -ultrasound image captured on disc.  Assessment  Injection assessment: negative aspiration, negative parasthesia and local visualized surrounding nerve  Paresthesia pain: none  Heart rate change: no  Slow fractionated injection: yes  Additional Notes  VSS.  [facility] RN monitoring vitals throughout procedure.  Patient tolerated procedure well.

## 2019-04-05 NOTE — PLAN OF CARE
David Baumann and Jay at bed side for PECS nerve block  0858 time out completed.  0859 Procedure started.  0901 Procedure completed.

## 2019-04-05 NOTE — INTERVAL H&P NOTE
The patient has been examined and the H&P has been reviewed:    I concur with the findings and no changes have occurred since H&P was written.    Anesthesia/Surgery risks, benefits and alternative options discussed and understood by patient/family.          Active Hospital Problems    Diagnosis  POA    Rotator cuff tear, left [M75.102]  Yes      Resolved Hospital Problems   No resolved problems to display.

## 2019-04-05 NOTE — OP NOTE
DATE OF PROCEDURE:   4/5/2019     PREOPERATIVE DIAGNOSES: left shoulder impingement tendinosis with partial tear rotator cuff     POSTOPERATIVE DIAGNOSES:left shoulder impingement tendinosis with partial tear     OPERATIVE PROCEDURES: left shoulder arthroscopy with subacromial decompression and debridement of partial tear rotator cuff     SURGEON:  Dc Moore Jr., M.D.     FIRST ASSISTANT:  None     ANESTHESIA:  General endotracheal.     ESTIMATED BLOOD LOSS:  Minimal.     COMPLICATIONS:  None.     SPECIMENS:  None.     BRIEF INDICATIONS:  A 53-year-old female with impingement of the shoulder, possible partial tear, taken to surgery for the above procedure.     OPERATIVE PROCEDURE IN DETAIL:  After operative consent was obtained, the   patient brought to the Operating Room, placed supine on the operating room   table.  Anesthesia by GET method was performed by the Anesthesia staff.  After   the patient was asleep, carefully turned to lateral decubitus position   stabilized on the bean bag, theleft shoulder then prepped and draped out in the normal sterile fashion suspended longitudinal traction 12 pounds.     Posterolateral stab incision was made with a #15 blade and the scope inserted in   the left shoulder joint.  Diagnostic arthroscopy showed a partial tear on the   bursal side less than 50%.  The scope was placed into the subacromial space and   a lateral portal was created with a #15 blade and a sucker shaver inserted   laterally and a complete bursectomy performed including removal of the CA   ligament, which was very tight and thickened.  The rotator cuff was visualized   and noted to have a partial tear less than 50%.  This was just debrided   carefully.  The bur was then brought in laterally and an acromioplasty was   performed from lateral to medial, decompressing the subacromial space all the   way to the AC joint.  The distal clavicle had some mild degeneration and it was   co-planed slightly.  After  smoothing all bony surfaces, the rotator cuff was   visualized again noted to be intact with excellent decompression of the space.    The instruments were then removed from the joint.  Excess fluid and debris   evacuated from the joint.  The portals then closed using interrupted 3-0 nylon   suture on the skin.  Sterile dressing applied followed by a sling.  The patient   extubated and brought to Recovery Room in stable condition.  All sponge and   needle counts reported as correct.  No complications.

## 2019-04-05 NOTE — BRIEF OP NOTE
[facility]  Brief Operative Note     SUMMARY     Surgery Date: 4/5/2019     Surgeon(s) and Role:     * Dc Moore Jr., MD - Primary    Assisting Surgeon: None    Pre-op Diagnosis:  Impingement syndrome of left shoulder [M75.42]    Post-op Diagnosis:  Post-Op Diagnosis Codes:     * Impingement syndrome of left shoulder [M75.42]    Procedure(s) (LRB):  REPAIR, ROTATOR CUFF, ARTHROSCOPIC (Left)    Anesthesia: General    Description of the findings of the procedure: left shoulder impingement    Findings/Key Components: left shoulder SAD    Estimated Blood Loss: * No values recorded between 4/5/2019  7:37 AM and 4/5/2019  8:42 AM *         Specimens:   Specimen (12h ago, onward)    None          Discharge Note    SUMMARY     Admit Date: 4/5/2019    Discharge Date and Time:  04/05/2019 8:43 AM    Hospital Course (synopsis of major diagnoses, care, treatment, and services provided during the course of the hospital stay): see op note     Final Diagnosis: Post-Op Diagnosis Codes:     * Impingement syndrome of left shoulder [M75.42]    Disposition: Home or Self Care    Follow Up/Patient Instructions:     Medications:  Reconciled Home Medications:      Medication List      START taking these medications    HYDROcodone-acetaminophen 7.5-325 mg per tablet  Commonly known as:  NORCO  Take 1 tablet by mouth every 4 (four) hours as needed for Pain.     ondansetron 4 MG Tbdl  Commonly known as:  ZOFRAN-ODT  Take 1 tablet (4 mg total) by mouth every 6 (six) hours as needed (nausea).        CHANGE how you take these medications    azelastine 137 mcg (0.1 %) nasal spray  Commonly known as:  ASTELIN  1 spray (137 mcg total) by Nasal route 2 (two) times daily.  What changed:    · when to take this  · reasons to take this        CONTINUE taking these medications    baclofen 10 MG tablet  Commonly known as:  LIORESAL  TAKE 1/2 TABLET BY MOUTH 2-3 TIMES A DAY     cholecalciferol (vitamin D3) 3,000 unit Tab  Take  3,000 Units by mouth once daily.     escitalopram oxalate 10 MG tablet  Commonly known as:  LEXAPRO  TAKE 1.5 TABLETS (15 MG TOTAL) BY MOUTH EVERY EVENING.     estradiol-norethindrone 1-0.5 mg per tablet  Commonly known as:  ACTIVELLA  Take 1 tablet by mouth once daily.     flaxseed oil 1,000 mg Cap  Take 1 capsule by mouth once daily.     magnesium oxide 400 mg (241.3 mg magnesium) tablet  Commonly known as:  MAG-OX  Take 1 tablet (400 mg total) by mouth 2 (two) times daily.     nortriptyline 10 MG capsule  Commonly known as:  PAMELOR  Take 2 capsules (20 mg total) by mouth every evening.     ONE-A-DAY WOMENS FORMULA 27-0.4 mg Tab  Generic drug:  multivitamin-Ca-iron-minerals  Take 1 tablet by mouth once daily.     riboflavin (vitamin B2) 100 mg Tab tablet  Commonly known as:  VITAMIN B-2  TAKE 2 TABLETS BY MOUTH ONCE DAILY.     STOOL SOFTENER 100 MG capsule  Generic drug:  docusate sodium  Take 1 capsule (100 mg total) by mouth 2 (two) times daily.     topiramate 25 MG tablet  Commonly known as:  TOPAMAX  Take 1 tablet (25 mg total) by mouth 2 (two) times daily.     ZOLMitriptan 5 mg Spry  Commonly known as:  ZOMIG  SPRAY 1 SPRAY IN NOSTRIL ONCE AS NEEDED. Max 2 doses in 24 hrs          Discharge Procedure Orders   Diet general     Call MD for:  temperature >100.4     Call MD for:  persistent nausea and vomiting     Call MD for:  severe uncontrolled pain     Ice to affected area     Remove dressing in 48 hours     Shower on day dressing removed (No bath)

## 2019-04-05 NOTE — ANESTHESIA POSTPROCEDURE EVALUATION
Anesthesia Post Evaluation    Patient: Hien Jeter    Procedure(s) Performed: Procedure(s) (LRB):  REPAIR, ROTATOR CUFF, ARTHROSCOPIC (Left)    Final Anesthesia Type: general  Patient location during evaluation: PACU  Patient participation: Yes- Able to Participate  Level of consciousness: awake and alert, oriented and awake  Post-procedure vital signs: reviewed and stable  Pain management: adequate  Airway patency: patent  PONV status at discharge: No PONV  Anesthetic complications: no      Cardiovascular status: blood pressure returned to baseline  Respiratory status: unassisted and room air  Hydration status: euvolemic  Follow-up not needed.          Vitals Value Taken Time   /71 4/5/2019  9:36 AM   Temp 36.3 °C (97.3 °F) 4/5/2019  9:12 AM   Pulse 98 4/5/2019  9:42 AM   Resp 11 4/5/2019  9:41 AM   SpO2 99 % 4/5/2019  9:42 AM   Vitals shown include unvalidated device data.      Event Time     Out of Recovery 09:38:24          Pain/Lennox Score: Pain Rating Prior to Med Admin: 8 (4/5/2019  9:19 AM)  Pain Rating Post Med Admin: 7 (4/5/2019  9:45 AM)  Lennox Score: 10 (4/5/2019  9:45 AM)

## 2019-04-05 NOTE — PLAN OF CARE
The patient meets discharge criteria, pain well controlled, VSS, pt states now comfortable and ready to go home. Dressing CDI, sling in place, left arm/hand numb from nerve block, fingers warm/pink and < 3 sec cap refill

## 2019-04-05 NOTE — ANESTHESIA PROCEDURE NOTES
Peripheral Block    Patient location during procedure: post-op   Block not for primary anesthetic.  Reason for block: at surgeon's request and post-op pain management   Post-op Pain Location: Left shoulder  Start time: 4/5/2019 8:59 AM  Timeout: 4/5/2019 8:58 AM   End time: 4/5/2019 9:01 AM  Staffing  Anesthesiologist: Brijesh Baumann MD  Resident/CRNA: Simon Macedo MD  Performed: anesthesiologist   Preanesthetic Checklist  Completed: patient identified, site marked, surgical consent, pre-op evaluation, timeout performed, IV checked, risks and benefits discussed and monitors and equipment checked  Peripheral Block  Patient position: supine  Prep: ChloraPrep  Patient monitoring: heart rate, cardiac monitor, continuous pulse ox, continuous capnometry and frequent blood pressure checks  Block type: pectoral and Serratus Plane  Laterality: left  Injection technique: single shot  Needle  Needle type: Stimuplex   Needle gauge: 21 G  Needle length: 4 in  Needle localization: anatomical landmarks and ultrasound guidance   -ultrasound image captured on disc.  Assessment  Injection assessment: negative aspiration, negative parasthesia and local visualized surrounding nerve  Paresthesia pain: none  Heart rate change: no  Slow fractionated injection: yes  Additional Notes  Performed in PACU   VSS.  [facility] RN monitoring vitals throughout procedure.  Patient tolerated procedure well.

## 2019-04-05 NOTE — H&P
CHIEF COMPLAINT:  Left shoulder impingement, partial tear of rotator cuff.     HISTORY OF PRESENT ILLNESS:  A 53-year-old female with ongoing symptoms, left   shoulder for almost a year now.  At one point, she developed severe adhesive   capsulitis, which required therapy and injections and definitely improved, but   she continues to have pain in the left shoulder and now that she has more   motion, actually is having more pain.     PAST MEDICAL HISTORY:  Significant for anxiety, depression, headaches, multiple   sclerosis.     PAST SURGICAL HISTORY:  Includes wisdom tooth extraction, LASIK surgery and   colonoscopy.     FAMILY HISTORY:  Positive for dementia, cancer and breast cancer.     SOCIAL HISTORY:  The patient does not smoke.  Drinks socially on the weekends.     REVIEW OF SYSTEMS:  Negative fever, chills, rashes.     CURRENT MEDICATIONS:  Reviewed on chart.     ALLERGIES:  CODEINE, DICLOFENAC, IBUPROFEN AND TRAMADOL.     PHYSICAL EXAMINATION:  GENERAL:  Well-developed, well-nourished female in no acute distress.  Alert and   oriented x3.  HEENT:  Unremarkable.  LUNGS:  Clear to auscultation.  HEART:  Regular rate and rhythm.  ABDOMEN:  Soft, nontender.  EXTREMITIES:  Significant for the left shoulder.  No swelling, no tenderness.    Range of motion is full, but she does have a positive impingement sign with   abduction, internal rotation, especially positive supraspinatus stress test.    Slight weakness rotator cuff.  NEUROLOGIC:  Intact.  No instability.     IMPRESSION:  Left shoulder impingement with partial tear rotator cuff.     PLAN:  I think at this point, we should go ahead and proceed with surgery for   left shoulder arthroscopy and rotator cuff repair.  The patient is in agreement.    The risks and benefits of surgery explained to her today.  She understands.

## 2019-04-05 NOTE — DISCHARGE INSTRUCTIONS
ANESTHESIA  -For the first 24 hours after surgery:  Do not drive, use heavy equipment, make important decisions, or drink alcohol  -It is normal to feel sleepy for several hours.  Rest until you are more awake.  -Have someone stay with you, if needed.  They can watch for problems and help keep you safe.  -Some possible post anesthesia side effects include: nausea and vomiting, sore throat and hoarseness, sleepiness, and dizziness.    PAIN  -If you have pain after surgery, pain medicine will help you feel better.  Take it as directed, before pain becomes severe.  Most pain relievers taken by mouth need at least 20-30 minutes to start working.  -Do not drive or drink alcohol while taking pain medicine.  -Pain medication can upset your stomach.  Taking them with a little food may help.  -Other ways to help control pain: elevation, ice, and relaxation  -Call your surgeon if still having unmanageable pain an hour after taking pain medicine.  -Pain medicine can cause constipation.  Taking an over-the counter stool softener while on prescription pain medicine and drinking plenty of fluids can prevent this side effect.  -Call your surgeon if you have severe side effects like: breathing problems, trouble waking up, dizziness, confusion, or severe constipation.    NAUSEA  -Some people have nausea after surgery.  This is often because of anesthesia, pain, pain medicine, or the stress of surgery.  -Do not push yourself to eat.  Start off with clear liquids and soup.  Slowly move to solid foods.  Don't eat fatty, rich, spicy foods at first.  Eat smaller amounts.  -If you develop persistent nausea and vomiting please notify your surgeon immediately.    BLEEDING  -Different types of surgery require different types of care and dressing changes.  It is important to follow all instructions and advice from your surgeon.  Change dressing as directed.  Call your surgeon for any concerns regarding postop bleeding.    SIGNS OF  INFECTION  -Signs of infection include: fever, swelling, drainage, and redness  -Notify your surgeon if you have a fever of 100.4 F (38.0 C) or higher.  -Notify your surgeon if you notice redness, swelling, increased pain, pus, or a foul smell at the incision site.              Discharge Instructions for Open Rotator Cuff Repair  You had a procedure called open rotator cuff repair. The rotator cuff consists of the muscles and tendons that surround your shoulder. The rotator cuff keeps the top of your upper arm bone (humerus) securely in the shoulder joint. Your doctor made an incision near your shoulder blade and repaired the torn muscles or tendons in your shoulder. Here are instructions to follow when caring for your arm at home.    · After surgery, rest your arm and relax for the rest of day.  · If you had general anesthesia (were put to sleep for the procedure), dont operate power tools or machinery, drink alcohol, or make any major decisions for at least 24 hours after surgery.  · Wear your sling, brace, or immobilizer, as directed.  · Dont drive a car until your doctor says its OK. And never drive while taking opioid pain medicine.  · Flex your wrist and wiggle your fingers often to help blood flow.  · Check your incision daily for redness, tenderness, or drainage.  · Dont soak in a bathtub, hot tub, or pool until your doctor says its OK.  · Wait several days after your surgery to start showering, or until your doctor says it's OK. Then shower as needed. Carefully wash your incision with soap and water. Gently pat it dry. Dont rub the incision, or apply creams or lotions.  · Your incision was closed using sutures, staples, or strips of tape. If you have sutures or staples, they may need to be removed up to 2 to 3 weeks after surgery. Allow the strips of tape to fall off on their own.  Home care  · Use pain medicine as directed by your doctor.  · Apply an ice pack or bag of frozen peas--or something  similar--wrapped in a thin towel on your shoulder to reduce swelling for the first 48 hours. Leave the ice pack on for 20 minutes; then take it off for 20 minutes. Repeat as needed.  · Take your temperature daily for 7 days after your surgery. Report a fever above 100.4°F (38°C)  to your doctor. Fever may be a sign of infection.  Follow-up care  Follow up with your healthcare provider, or as advised.      When to call your healthcare provider  Call 911 right away if you have any of the following:  · Chest pain  · Shortness of breath  Otherwise, call your healthcare provider right away if you have any of these:  · Increasing shoulder pain or pain not relieved by medicine  · Pain or swelling in the arm on the side of your surgery  · Numbness, tingling, coolness, or blue-gray color of your arm or fingers on the side of your surgery  · Fever above 100.4°F (38°C), or as advised  · Shaking chills  · Drainage or oozing, redness, or warmth at the incision  · Nausea or vomiting   Date Last Reviewed: 7/1/2016 © 2000-2017 [employer]. [address] [place] [address]. All rights reserved. This information is not intended as a substitute for professional medical care. Always follow your healthcare professional's instructions.            Acetaminophen; Hydrocodone tablets or capsules  What is this medicine?  ACETAMINOPHEN; HYDROCODONE (a set a ALEXANDRA jimbo fen; jane droe KOE done) is a pain reliever. It is used to treat moderate to severe pain.  How should I use this medicine?  Take this medicine by mouth with a glass of water. Follow the directions on the prescription label. You can take it with or without food. If it upsets your stomach, take it with food. Do not take your medicine more often than directed.  A special MedGuide will be given to you by the pharmacist with each prescription and refill. Be sure to read this information carefully each time.  Talk to your pediatrician regarding the use of this  medicine in children. Special care may be needed.  What side effects may I notice from receiving this medicine?  Side effects that you should report to your doctor or health care professional as soon as possible:  · allergic reactions like skin rash, itching or hives, swelling of the face, lips, or tongue  · breathing problems  · confusion  · redness, blistering, peeling or loosening of the skin, including inside the mouth  · signs and symptoms of low blood pressure like dizziness; feeling faint or lightheaded, falls; unusually weak or tired  · trouble passing urine or change in the amount of urine  · yellowing of the eyes or skin  Side effects that usually do not require medical attention (report to your doctor or health care professional if they continue or are bothersome):  · constipation  · dry mouth  · nausea, vomiting  · tiredness  What may interact with this medicine?  This medicine may interact with the following medications:  · alcohol  · antiviral medicines for HIV or AIDS  · atropine  · antihistamines for allergy, cough and cold  · certain antibiotics like erythromycin, clarithromycin  · certain medicines for anxiety or sleep  · certain medicines for bladder problems like oxybutynin, tolterodine  · certain medicines for depression like amitriptyline, fluoxetine, sertraline  · certain medicines for fungal infections like ketoconazole and itraconazole  · certain medicines for Parkinson's disease like benztropine, trihexyphenidyl  · certain medicines for seizures like carbamazepine, phenobarbital, phenytoin, primidone  · certain medicines for stomach problems like dicyclomine, hyoscyamine  · certain medicines for travel sickness like scopolamine  · general anesthetics like halothane, isoflurane, methoxyflurane, propofol  · ipratropium  · local anesthetics like lidocaine, pramoxine, tetracaine  · MAOIs like Carbex, Eldepryl, Marplan, Nardil, and Parnate  · medicines that relax muscles for surgery  · other  medicines with acetaminophen  · other narcotic medicines for pain or cough  · phenothiazines like chlorpromazine, mesoridazine, prochlorperazine, thioridazine  · rifampin  What if I miss a dose?  If you miss a dose, take it as soon as you can. If it is almost time for your next dose, take only that dose. Do not take double or extra doses.  Where should I keep my medicine?  Keep out of the reach of children. This medicine can be abused. Keep your medicine in a safe place to protect it from theft. Do not share this medicine with anyone. Selling or giving away this medicine is dangerous and against the law.  This medicine may cause accidental overdose and death if it taken by other adults, children, or pets. Mix any unused medicine with a substance like cat litter or coffee grounds. Then throw the medicine away in a sealed container like a sealed bag or a coffee can with a lid. Do not use the medicine after the expiration date.  Store at room temperature between 15 and 30 degrees C (59 and 86 degrees F).  What should I tell my health care provider before I take this medicine?  They need to know if you have any of these conditions:  · brain tumor  · Crohn's disease, inflammatory bowel disease, or ulcerative colitis  · drug abuse or addiction  · head injury  · heart or circulation problems  · if you often drink alcohol  · kidney disease or problems going to the bathroom  · liver disease  · lung disease, asthma, or breathing problems  · an unusual or allergic reaction to acetaminophen, hydrocodone, other opioid analgesics, other medicines, foods, dyes, or preservatives  · pregnant or trying to get pregnant  · breast-feeding  What should I watch for while using this medicine?  Tell your doctor or health care professional if your pain does not go away, if it gets worse, or if you have new or a different type of pain. You may develop tolerance to the medicine. Tolerance means that you will need a higher dose of the medicine  for pain relief. Tolerance is normal and is expected if you take the medicine for a long time.  Do not suddenly stop taking your medicine because you may develop a severe reaction. Your body becomes used to the medicine. This does NOT mean you are addicted. Addiction is a behavior related to getting and using a drug for a non-medical reason. If you have pain, you have a medical reason to take pain medicine. Your doctor will tell you how much medicine to take. If your doctor wants you to stop the medicine, the dose will be slowly lowered over time to avoid any side effects.  There are different types of narcotic medicines (opiates). If you take more than one type at the same time or if you are taking another medicine that also causes drowsiness, you may have more side effects. Give your health care provider a list of all medicines you use. Your doctor will tell you how much medicine to take. Do not take more medicine than directed. Call emergency for help if you have problems breathing or unusual sleepiness.  Do not take other medicines that contain acetaminophen with this medicine. Always read labels carefully. If you have questions, ask your doctor or pharmacist.  If you take too much acetaminophen get medical help right away. Too much acetaminophen can be very dangerous and cause liver damage. Even if you do not have symptoms, it is important to get help right away.  You may get drowsy or dizzy. Do not drive, use machinery, or do anything that needs mental alertness until you know how this medicine affects you. Do not stand or sit up quickly, especially if you are an older patient. This reduces the risk of dizzy or fainting spells. Alcohol may interfere with the effect of this medicine. Avoid alcoholic drinks.  The medicine will cause constipation. Try to have a bowel movement at least every 2 to 3 days. If you do not have a bowel movement for 3 days, call your doctor or health care professional.  Your mouth may  get dry. Chewing sugarless gum or sucking hard candy, and drinking plenty of water may help. Contact your doctor if the problem does not go away or is severe.  NOTE:This sheet is a summary. It may not cover all possible information. If you have questions about this medicine, talk to your doctor, pharmacist, or health care provider. Copyright© 2017 [employer]            Ondansetron tablets  What is this medicine?  ONDANSETRON (on DAN se jasmeet) is used to treat nausea and vomiting caused by chemotherapy. It is also used to prevent or treat nausea and vomiting after surgery.  How should I use this medicine?  Take this medicine by mouth with a glass of water. Follow the directions on your prescription label. Take your doses at regular intervals. Do not take your medicine more often than directed.  Talk to your pediatrician regarding the use of this medicine in children. Special care may be needed.  What side effects may I notice from receiving this medicine?  Side effects that you should report to your doctor or health care professional as soon as possible:  · allergic reactions like skin rash, itching or hives, swelling of the face, lips or tongue  · breathing problems  · confusion  · dizziness  · fast or irregular heartbeat  · feeling faint or lightheaded, falls  · fever and chills  · loss of balance or coordination  · seizures  · sweating  · swelling of the hands or feet  · tightness in the chest  · tremors  · unusually weak or tired  Side effects that usually do not require medical attention (report to your doctor or health care professional if they continue or are bothersome):  · constipation or diarrhea  · headache  What may interact with this medicine?  Do not take this medicine with any of the following medications:  · apomorphine  · certain medicines for fungal infections like fluconazole, itraconazole, ketoconazole, posaconazole,  voriconazole  · cisapride  · dofetilide  · dronedarone  · pimozide  · thioridazine  · ziprasidone  This medicine may also interact with the following medications:  · carbamazepine  · certain medicines for depression, anxiety, or psychotic disturbances  · fentanyl  · linezolid  · MAOIs like Carbex, Eldepryl, Marplan, Nardil, and Parnate  · methylene blue (injected into a vein)  · other medicines that prolong the QT interval (cause an abnormal heart rhythm)  · phenytoin  · rifampicin  · tramadol  What if I miss a dose?  If you miss a dose, take it as soon as you can. If it is almost time for your next dose, take only that dose. Do not take double or extra doses.  Where should I keep my medicine?  Keep out of the reach of children.  Store between 2 and 30 degrees C (36 and 86 degrees F). Throw away any unused medicine after the expiration date.  What should I tell my health care provider before I take this medicine?  They need to know if you have any of these conditions:  · heart disease  · history of irregular heartbeat  · liver disease  · low levels of magnesium or potassium in the blood  · an unusual or allergic reaction to ondansetron, granisetron, other medicines, foods, dyes, or preservatives  · pregnant or trying to get pregnant  · breast-feeding  What should I watch for while using this medicine?  Check with your doctor or health care professional right away if you have any sign of an allergic reaction.  NOTE:This sheet is a summary. It may not cover all possible information. If you have questions about this medicine, talk to your doctor, pharmacist, or health care provider. Copyright© 2017 [employer]

## 2019-04-05 NOTE — PLAN OF CARE
Meets criteria for discharge from PACU. CHRISTOPHER Alford easily. Pain  7/10 slowly getting better. , Report to Dr. Baumann

## 2019-04-05 NOTE — PLAN OF CARE
Pt states name and . Verified to armband. Pt verifies procedure to be done. Verified to consent x2 and EPIC chart. Placed on cardiac monitor x3, and pulse ox. Time out done.     0653: interscalene nerve block started left arm. Cont to monitor.     0658: Nerve block complete. VSS. Cont to monitor.     0702: pt states left arm starting to feel numb, still able to move and lift it.     0705: Report given to Dr Harley, with time allotted for questions.

## 2019-04-08 ENCOUNTER — PATIENT MESSAGE (OUTPATIENT)
Dept: ADMINISTRATIVE | Facility: OTHER | Age: 54
End: 2019-04-08

## 2019-04-16 ENCOUNTER — OFFICE VISIT (OUTPATIENT)
Dept: ORTHOPEDICS | Facility: CLINIC | Age: 54
End: 2019-04-16
Payer: COMMERCIAL

## 2019-04-16 VITALS — HEIGHT: 62 IN | BODY MASS INDEX: 25.4 KG/M2 | WEIGHT: 138 LBS

## 2019-04-16 DIAGNOSIS — Z98.890 STATUS POST ARTHROSCOPY OF LEFT SHOULDER: Primary | ICD-10-CM

## 2019-04-16 PROCEDURE — 99999 PR PBB SHADOW E&M-EST. PATIENT-LVL III: CPT | Mod: PBBFAC,,, | Performed by: ORTHOPAEDIC SURGERY

## 2019-04-16 PROCEDURE — 99024 PR POST-OP FOLLOW-UP VISIT: ICD-10-PCS | Mod: S$GLB,,, | Performed by: ORTHOPAEDIC SURGERY

## 2019-04-16 PROCEDURE — 99024 POSTOP FOLLOW-UP VISIT: CPT | Mod: S$GLB,,, | Performed by: ORTHOPAEDIC SURGERY

## 2019-04-16 PROCEDURE — 99999 PR PBB SHADOW E&M-EST. PATIENT-LVL III: ICD-10-PCS | Mod: PBBFAC,,, | Performed by: ORTHOPAEDIC SURGERY

## 2019-04-16 RX ORDER — HYDROCODONE BITARTRATE AND ACETAMINOPHEN 7.5; 325 MG/1; MG/1
1 TABLET ORAL EVERY 4 HOURS PRN
Qty: 15 TABLET | Refills: 0 | Status: SHIPPED | OUTPATIENT
Start: 2019-04-16 | End: 2019-10-23

## 2019-04-16 NOTE — PROGRESS NOTES
Subjective:      Patient ID: Hien Jeter is a 53 y.o. female.    Chief Complaint: Post-op Evaluation (Suture removal)      HPI: Hien Jeter is here for postop visit.  She is approximately 11 days status post left shoulder arthroscopy with subacromial decompression and debridement of partial rotator cuff tear. She is doing well postoperatively.  Pain is controlled with half Norco and over-the-counter analgesics.  She has discontinued the sling for light activities.  No postoperative complaints.    Past Medical History:   Diagnosis Date    Anxiety     Basal cell carcinoma 2000    left eyebrow     Depression     Environmental allergies     Headache(784.0)     Multiple food allergies     Multiple sclerosis 2009     shoulder 3/2015    left       Current Outpatient Medications:     azelastine (ASTELIN) 137 mcg (0.1 %) nasal spray, 1 spray (137 mcg total) by Nasal route 2 (two) times daily. (Patient taking differently: 1 spray by Nasal route continuous prn. ), Disp: 30 mL, Rfl: 11    baclofen (LIORESAL) 10 MG tablet, TAKE 1/2 TABLET BY MOUTH 2-3 TIMES A DAY, Disp: 60 tablet, Rfl: 5    cholecalciferol, vitamin D3, 3,000 unit Tab, Take 3,000 Units by mouth once daily. , Disp: , Rfl:     docusate sodium (COLACE) 100 MG capsule, Take 1 capsule (100 mg total) by mouth 2 (two) times daily., Disp: 60 capsule, Rfl: 0    escitalopram oxalate (LEXAPRO) 10 MG tablet, TAKE 1.5 TABLETS (15 MG TOTAL) BY MOUTH EVERY EVENING., Disp: 45 tablet, Rfl: 6    estradiol-norethindrone (ACTIVELLA) 1-0.5 mg per tablet, Take 1 tablet by mouth once daily., Disp: 90 tablet, Rfl: 3    flaxseed oil 1,000 mg Cap, Take 1 capsule by mouth once daily. , Disp: , Rfl:     HYDROcodone-acetaminophen (NORCO) 7.5-325 mg per tablet, Take 1 tablet by mouth every 4 (four) hours as needed for Pain., Disp: 15 tablet, Rfl: 0    magnesium oxide (MAG-OX) 400 mg (241.3 mg magnesium) tablet, Take 1 tablet (400 mg total) by mouth 2 (two) times  "daily., Disp: 60 tablet, Rfl: 12    multivitamin-Ca-iron-minerals (ONE-A-DAY WOMENS FORMULA) 27-0.4 mg Tab, Take 1 tablet by mouth once daily. , Disp: , Rfl:     nortriptyline (PAMELOR) 10 MG capsule, Take 2 capsules (20 mg total) by mouth every evening., Disp: 60 capsule, Rfl: 11    ondansetron (ZOFRAN-ODT) 4 MG TbDL, dissolve 1 tablet (4 mg total) by mouth every 6 (six) hours as needed (nausea)., Disp: 12 tablet, Rfl: 1    riboflavin, vitamin B2, (VITAMIN B-2) 100 mg Tab tablet, TAKE 2 TABLETS BY MOUTH ONCE DAILY., Disp: 60 tablet, Rfl: 11    topiramate (TOPAMAX) 25 MG tablet, Take 1 tablet (25 mg total) by mouth 2 (two) times daily., Disp: 60 tablet, Rfl: 12    ZOLMitriptan (ZOMIG) 5 mg Spry, SPRAY 1 SPRAY IN NOSTRIL ONCE AS NEEDED. Max 2 doses in 24 hrs, Disp: 12 each, Rfl: 12  Review of patient's allergies indicates:   Allergen Reactions    Diclofenac Nausea Only    Ibuprofen Other (See Comments)    Tramadol Other (See Comments)     Nausea        Ht 5' 2" (1.575 m)   Wt 62.6 kg (138 lb)   LMP 04/24/2017   BMI 25.24 kg/m²     Review of Systems   Constitution: Negative for chills and fever.   Cardiovascular: Negative for chest pain and palpitations.   Respiratory: Negative for shortness of breath and wheezing.    Skin: Negative for poor wound healing and rash.   Musculoskeletal: Positive for joint pain ( minimal), myalgias and stiffness. Negative for falls.   Gastrointestinal: Negative for nausea and vomiting.   Genitourinary: Negative for dysuria and hematuria.   Neurological: Negative for numbness, paresthesias, seizures and tremors.   Psychiatric/Behavioral: Negative for altered mental status.   Allergic/Immunologic: Negative for environmental allergies and persistent infections.         Objective:    Ortho Exam       Left shoulder  Skin: Too small arthroscopic incisions with sutures in place. Wound margins well approximated.  There is no sign of redness, warmth, drainage or other sign of " infection. moderate bruising anterior upper extremity.  Atrophy: none noted.  Tenderness to palpation:  Minimal  AROM (deg): abduction-90, flexion-120, rotation- unrestricted, painful rotation- absent.  Rotator cuff:  Not performed, Impingement test- not performed.  Cross arm adduction test- negative.  Instability testing: negative.   Distal neuro: normal, no muscle wasting or atrophy.  Pulses: Positive peripheral pulses..  GEN: Well developed, well nourished female. AAOX3. No acute distress.   Normocephalic, atraumatic.   HANY  Breathing unlabored.  Mood and affect appropriate.     Assessment:     Imaging:  No new imaging        1. Status post arthroscopy of left shoulder          Plan:       Patient is doing well postop.  Sutures removed today without complication.  Wound care discussed with regular soap and water.  Start applying thin layer of Neosporin once daily.  May wean out of the sling at home for light activities.  Wear the sling while in crowded public spaces.  Start physical therapy.  Pain med refill.  All questions were answered.    Orders Placed This Encounter    Ambulatory Referral to Physical/Occupational Therapy    HYDROcodone-acetaminophen (NORCO) 7.5-325 mg per tablet     Follow up in about 3 weeks (around 5/7/2019).

## 2019-05-02 ENCOUNTER — CLINICAL SUPPORT (OUTPATIENT)
Dept: REHABILITATION | Facility: HOSPITAL | Age: 54
End: 2019-05-02
Attending: ORTHOPAEDIC SURGERY
Payer: COMMERCIAL

## 2019-05-02 DIAGNOSIS — G89.29 CHRONIC LEFT SHOULDER PAIN: ICD-10-CM

## 2019-05-02 DIAGNOSIS — M25.612 DECREASED RANGE OF MOTION OF LEFT SHOULDER: ICD-10-CM

## 2019-05-02 DIAGNOSIS — M25.512 CHRONIC LEFT SHOULDER PAIN: ICD-10-CM

## 2019-05-02 DIAGNOSIS — M62.81 MUSCLE WEAKNESS OF LEFT UPPER EXTREMITY: ICD-10-CM

## 2019-05-02 DIAGNOSIS — M62.89 MUSCLE TIGHTNESS: ICD-10-CM

## 2019-05-02 PROCEDURE — 97161 PT EVAL LOW COMPLEX 20 MIN: CPT | Mod: PO

## 2019-05-02 PROCEDURE — 97110 THERAPEUTIC EXERCISES: CPT | Mod: PO
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
## 2019-05-02 NOTE — PROGRESS NOTES
[facility]  Physical Therapy Initial Evaluation    Name: Hien Jeter  Clinic Number: [id number]    Therapy Diagnosis:   Encounter Diagnoses   Name Primary?    Chronic left shoulder pain     Muscle weakness of left upper extremity     Decreased range of motion of left shoulder     Muscle tightness      Physician: Natalee Nagel, JEWELL    Physician Orders: PT Eval and Treat   Medical Diagnosis from Referral: Status post arthroscopy of left shoulder  Evaluation Date: 5/2/2019  Authorization Period Expiration: 12/31/19  Plan of Care Expiration: 8/2/19  Visit # / Visits authorized: 1/ 50    Time In: 1507  Time Out: 1557  Total Billable Time: 50 minutes    Precautions: standard, MS    See full physical therapy evaluation in POC.     TREATMENT   Treatment Time In: 1525  Treatment Time Out: 1557  Total Treatment time separate from Evaluation: 32 minutes    Hien received therapeutic exercises to develop strength, endurance, ROM, flexibility and posture for 27 minutes including:    Supine sh flexion AAROM w dowel 5 sec hold x 30  Supine sh ER AAROM w dowel 5 sec hold x 20  Rows w green TB x 20  Sh IR w orange TB and towel roll x 20  Freemotion shoulder flexion AAROM 7# x 20    Hien received the following manual therapy techniques x 5 min: STM to R subscapular and infraspinatus    Home Exercises and Patient Education Provided    Education provided:   - shoulder mechanics  - importance in performing HEP daily to tolerance    Written Home Exercises Provided: yes. (supine sh flexion, supine sh ER, rows, shoulder IR. Pt given green and orange theraband for home use.)  Exercises were reviewed and Hien was able to demonstrate them prior to the end of the session.  Hien demonstrated good  understanding of the education provided.     See EMR under Patient Instructions for exercises provided 5/2/2019.    Assessment   Hien is a 53 y.o. female referred to outpatient Physical Therapy with a medical  diagnosis of status post arthroscopy of left shoulder. Pt presents with L shoulder pain, decreased L shoulder AROM, UE weakness, postural imbalance, and muscle tightness. Improvement in L shoulder AROM by end of session. Soft tissue dysfunction noted in subscapularis and infraspinatus.    Pt prognosis is Good.   Pt will benefit from skilled outpatient Physical Therapy to address the deficits stated above and in the chart below, provide pt/family education, and to maximize pt's level of independence.     Plan of care discussed with patient: Yes  Pt's spiritual, cultural and educational needs considered and patient is agreeable to the plan of care and goals as stated below:     Anticipated Barriers for therapy: none    Medical Necessity is demonstrated by the following  History  Co-morbidities and personal factors that may impact the plan of care Co-morbidities:   MS    Personal Factors:   lifestyle     moderate   Examination  Body Structures and Functions, activity limitations and participation restrictions that may impact the plan of care Body Regions:   neck  upper extremities  trunk    Body Systems:    gross symmetry  ROM  strength  motor control  motor learning    Participation Restrictions:   ADLs, IADLs, domestic duties    Activity limitations:   Learning and applying knowledge  no deficits    General Tasks and Commands  no deficits    Communication  no deficits    Mobility  lifting and carrying objects  fine hand use (grasping/picking up)  driving (bike, car, motorcycle)    Self care  washing oneself (bathing, drying, washing hands)  caring for body parts (brushing teeth, shaving, grooming)  dressing    Domestic Life  doing house work (cleaning house, washing dishes, laundry)    Interactions/Relationships  no deficits    Life Areas  no deficits    Community and Social Life  recreation and leisure         high   Clinical Presentation stable and uncomplicated low   Decision Making/ Complexity Score: low      Medical necessity is demonstrated by the following IMPAIRMENTS/PROBLEM LIST:   1) Increase in pain level limiting function   2) Decreased shoulder AROM   3) UE weakness   4) Difficulty reaching overhead   5) Lack of HEP    GOALS: Short Term Goals:  6 weeks  1. Report decreased L shoulder pain  <  / =  5/10 at worst to increase tolerance for driving.  2. Pt will increase L shoulder flexion AROM to 150 deg to indicate improved flexibility and mobility.   3. Pt will be able to tolerate multi-directional UE strengthening without pain to improve functional use of UEs.  4. Pt will report 50% improvement in ability to reach overhead without pain since start of care to indicate improved functional mobility.   5. Pt to tolerate HEP to improve ROM and independence with ADL's.    Long Term Goals: 12 weeks  1. Report decreased L shoulder pain  <  / =  3/10 at worst to increase tolerance for driving.  2. Pt will demonstrate increase L shoulder AROM in all planes to indicate improved flexibility and mobility.   3. Pt will be able to perform 2 x 10 shoulder flexion with 2 lbs to indicate improved strength in B UEs.  4. Pt will report 80% improvement in ability to reach overhead without pain since start of care to indicate improved functional mobility.   5. Pt to be Independent with HEP to improve ROM and independence with ADL's.    Plan   Plan of care Certification: 5/2/2019 to 8/2/19.    Outpatient Physical Therapy 2 times weekly for 12 weeks to include the following interventions: Manual Therapy, Moist Heat/ Ice, Neuromuscular Re-ed, Patient Education, Therapeutic Activites and Therapeutic Exercise.     Angie Ta, PT

## 2019-05-08 ENCOUNTER — CLINICAL SUPPORT (OUTPATIENT)
Dept: REHABILITATION | Facility: HOSPITAL | Age: 54
End: 2019-05-08
Attending: ORTHOPAEDIC SURGERY
Payer: COMMERCIAL

## 2019-05-08 DIAGNOSIS — M25.612 DECREASED RANGE OF MOTION OF LEFT SHOULDER: ICD-10-CM

## 2019-05-08 DIAGNOSIS — M62.89 MUSCLE TIGHTNESS: ICD-10-CM

## 2019-05-08 DIAGNOSIS — G89.29 CHRONIC LEFT SHOULDER PAIN: ICD-10-CM

## 2019-05-08 DIAGNOSIS — M62.81 MUSCLE WEAKNESS OF LEFT UPPER EXTREMITY: ICD-10-CM

## 2019-05-08 DIAGNOSIS — M25.512 CHRONIC LEFT SHOULDER PAIN: ICD-10-CM

## 2019-05-08 PROCEDURE — 97110 THERAPEUTIC EXERCISES: CPT | Mod: PO

## 2019-05-08 PROCEDURE — 97140 MANUAL THERAPY 1/> REGIONS: CPT | Mod: PO

## 2019-05-08 NOTE — PROGRESS NOTES
Physical Therapy Daily Treatment Note     Name: Hien Jeter  Clinic Number: [id number]    Therapy Diagnosis:   Encounter Diagnoses   Name Primary?    Chronic left shoulder pain     Muscle weakness of left upper extremity     Decreased range of motion of left shoulder     Muscle tightness      Physician: Natalee Nagel PA-C    Visit Date: 5/8/2019    Physician Orders: PT Eval and Treat   Medical Diagnosis from Referral: Status post arthroscopy of left shoulder  Evaluation Date: 5/2/2019  Authorization Period Expiration: 12/31/19  Plan of Care Expiration: 8/2/19  Visit # / Visits authorized: 2 / 50  PTA Visit Number: 1    Time In: 11:00 am   Time Out: 11:45 am    Total Billable Time:  45 minutes  Charges: TE - 3    Precautions: Standard    Subjective     Pt reports: Pain is greatly decreased pain since time of surgery.   She was compliant with home exercise program.  Response to previous treatment: Tolerated evaluation well   Functional change: None     Pain: 3/10  Location: left shoulder      Objective     Hien received therapeutic exercises to develop strength, endurance, ROM, flexibility and posture for 35 minutes including:    Pulleys flex/ scap: x 3 minutes each   Supine sh flexion AAROM w dowel 5 sec hold x 30  Supine sh ER AAROM w dowel 5 sec hold x 20  Rows w green TB x 20  Sh IR w orange TB and towel roll x 20  Freemotion shoulder flexion AAROM 7# x 20     Hien received the following manual therapy techniques x 10 min: STM to R subscapular and infraspinatus        Home Exercises Provided and Patient Education Provided     Education provided:       Written Home Exercises Provided: Patient instructed to cont prior HEP.  Exercises were reviewed and Hien was able to demonstrate them prior to the end of the session.  Hien demonstrated good  understanding of the education provided.     See EMR under Patient Instructions for exercises provided prior visit.    Assessment     Patient tolerated therapy  session well. Required cueing to prevent upper trap compensation with overhead motions.   Hien is progressing well towards her goals.   Pt prognosis is Good.     Pt will continue to benefit from skilled outpatient physical therapy to address the deficits listed in the problem list box on initial evaluation, provide pt/family education and to maximize pt's level of independence in the home and community environment.     Pt's spiritual, cultural and educational needs considered and pt agreeable to plan of care and goals.     Anticipated barriers to physical therapy: None     Goals:     Short Term Goals:  6 weeks  1. Report decreased L shoulder pain  <  / =  5/10 at worst to increase tolerance for driving. - Progressing, not met 5/8/2019   2. Pt will increase L shoulder flexion AROM to 150 deg to indicate improved flexibility and mobility.  - Progressing, not met 5/8/2019   3. Pt will be able to tolerate multi-directional UE strengthening without pain to improve functional use of UEs. - Progressing, not met 5/8/2019   4. Pt will report 50% improvement in ability to reach overhead without pain since start of care to indicate improved functional mobility. - Progressing, not met  5/8/2019   5. Pt to tolerate HEP to improve ROM and independence with ADL's. - Progressing, not met 5/8/2019       Long Term Goals: 12 weeks  1. Report decreased L shoulder pain  <  / =  3/10 at worst to increase tolerance for driving. - Progressing, not met 5/8/2019   2. Pt will demonstrate increase L shoulder AROM in all planes to indicate improved flexibility and mobility.  - Progressing, not met 5/8/2019   3. Pt will be able to perform 2 x 10 shoulder flexion with 2 lbs to indicate improved strength in B UEs. - Progressing, not met 5/8/2019   4. Pt will report 80% improvement in ability to reach overhead without pain since start of care to indicate improved functional mobility.  - Progressing, not met 5/8/2019   5. Pt to be Independent with  HEP to improve ROM and independence with ADL's. - Progressing, not met 5/8/2019     Plan     Cont with PT POC     Sophie Butcher, PTA

## 2019-05-10 ENCOUNTER — CLINICAL SUPPORT (OUTPATIENT)
Dept: REHABILITATION | Facility: HOSPITAL | Age: 54
End: 2019-05-10
Attending: ORTHOPAEDIC SURGERY
Payer: COMMERCIAL

## 2019-05-10 DIAGNOSIS — M25.612 DECREASED RANGE OF MOTION OF LEFT SHOULDER: ICD-10-CM

## 2019-05-10 DIAGNOSIS — G89.29 CHRONIC LEFT SHOULDER PAIN: ICD-10-CM

## 2019-05-10 DIAGNOSIS — M62.89 MUSCLE TIGHTNESS: ICD-10-CM

## 2019-05-10 DIAGNOSIS — M25.512 CHRONIC LEFT SHOULDER PAIN: ICD-10-CM

## 2019-05-10 DIAGNOSIS — M62.81 MUSCLE WEAKNESS OF LEFT UPPER EXTREMITY: ICD-10-CM

## 2019-05-10 PROCEDURE — 97110 THERAPEUTIC EXERCISES: CPT | Mod: PO

## 2019-05-10 NOTE — PROGRESS NOTES
Physical Therapy Daily Treatment Note     Name: Hien Jeter  Clinic Number: [id number]    Therapy Diagnosis:   Encounter Diagnoses   Name Primary?    Chronic left shoulder pain     Muscle weakness of left upper extremity     Decreased range of motion of left shoulder     Muscle tightness      Physician: Natalee Nagel PA-C    Visit Date: 5/10/2019    Physician Orders: PT Eval and Treat   Medical Diagnosis from Referral: Status post arthroscopy of left shoulder  Evaluation Date: 5/2/2019  Authorization Period Expiration: 12/31/19  Plan of Care Expiration: 8/2/19  Visit # / Visits authorized: 3 / 50  PTA Visit Number: 2    Time In: 0900 am   Time Out: 09:45  am    Total Billable Time:  45 minutes  Charges: TE - 3    Precautions: Standard    Subjective     Pt reports: some soreness after last treatment session but no significant pain   She was compliant with home exercise program.  Response to previous treatment: Tolerated evaluation well   Functional change: None     Pain: 3/10  Location: left shoulder      Objective     Hien received therapeutic exercises to develop strength, endurance, ROM, flexibility and posture for 35 minutes including:    Pulleys flex/ scap: x 3 minutes each   Supine sh flexion AAROM w dowel 5 sec hold x 30  Supine sh ER AAROM w dowel 5 sec hold x 20  Rows w green TB x 20  Sh IR w orange TB and towel roll x 20  Freemotion shoulder flexion AAROM 7# x 20     Hien received the following manual therapy techniques x 10 min: STM to R subscapular and infraspinatus        Home Exercises Provided and Patient Education Provided     Education provided:       Written Home Exercises Provided: Patient instructed to cont prior HEP.  Exercises were reviewed and Hien was able to demonstrate them prior to the end of the session.  Hien demonstrated good  understanding of the education provided.     See EMR under Patient Instructions for exercises provided prior visit.    Assessment     Patient  tolerated therapy session well. Required cueing to prevent upper trap compensation with overhead motions.   Hien is progressing well towards her goals.   Pt prognosis is Good.     Pt will continue to benefit from skilled outpatient physical therapy to address the deficits listed in the problem list box on initial evaluation, provide pt/family education and to maximize pt's level of independence in the home and community environment.     Pt's spiritual, cultural and educational needs considered and pt agreeable to plan of care and goals.     Anticipated barriers to physical therapy: None     Goals:     Short Term Goals:  6 weeks  1. Report decreased L shoulder pain  <  / =  5/10 at worst to increase tolerance for driving. - Progressing, not met 5/10/2019   2. Pt will increase L shoulder flexion AROM to 150 deg to indicate improved flexibility and mobility.  - Progressing, not met 5/10/2019   3. Pt will be able to tolerate multi-directional UE strengthening without pain to improve functional use of UEs. - Progressing, not met 5/10/2019   4. Pt will report 50% improvement in ability to reach overhead without pain since start of care to indicate improved functional mobility. - Progressing, not met  5/10/2019   5. Pt to tolerate HEP to improve ROM and independence with ADL's. - Progressing, not met 5/10/2019       Long Term Goals: 12 weeks  1. Report decreased L shoulder pain  <  / =  3/10 at worst to increase tolerance for driving. - Progressing, not met 5/10/2019   2. Pt will demonstrate increase L shoulder AROM in all planes to indicate improved flexibility and mobility.  - Progressing, not met 5/10/2019   3. Pt will be able to perform 2 x 10 shoulder flexion with 2 lbs to indicate improved strength in B UEs. - Progressing, not met 5/10/2019   4. Pt will report 80% improvement in ability to reach overhead without pain since start of care to indicate improved functional mobility.  - Progressing, not met 5/10/2019   5.  Pt to be Independent with HEP to improve ROM and independence with ADL's. - Progressing, not met 5/10/2019     Plan     Cont with PT POC     Sophie Butcher, PTA

## 2019-05-14 ENCOUNTER — CLINICAL SUPPORT (OUTPATIENT)
Dept: REHABILITATION | Facility: HOSPITAL | Age: 54
End: 2019-05-14
Attending: ORTHOPAEDIC SURGERY
Payer: COMMERCIAL

## 2019-05-14 DIAGNOSIS — M62.89 MUSCLE TIGHTNESS: ICD-10-CM

## 2019-05-14 DIAGNOSIS — M25.512 CHRONIC LEFT SHOULDER PAIN: ICD-10-CM

## 2019-05-14 DIAGNOSIS — G89.29 CHRONIC LEFT SHOULDER PAIN: ICD-10-CM

## 2019-05-14 DIAGNOSIS — M62.81 MUSCLE WEAKNESS OF LEFT UPPER EXTREMITY: ICD-10-CM

## 2019-05-14 DIAGNOSIS — M25.612 DECREASED RANGE OF MOTION OF LEFT SHOULDER: ICD-10-CM

## 2019-05-14 PROCEDURE — 97110 THERAPEUTIC EXERCISES: CPT | Mod: PO

## 2019-05-14 NOTE — PROGRESS NOTES
Physical Therapy Daily Treatment Note     Name: Hien Jeter  Clinic Number: [id number]    Therapy Diagnosis:   Encounter Diagnoses   Name Primary?    Chronic left shoulder pain     Muscle weakness of left upper extremity     Decreased range of motion of left shoulder     Muscle tightness      Physician: Natalee Nagel PA-C    Visit Date: 5/14/2019    Physician Orders: PT Eval and Treat   Medical Diagnosis from Referral: Status post arthroscopy of left shoulder  Evaluation Date: 5/2/2019  Authorization Period Expiration: 12/31/19  Plan of Care Expiration: 8/2/19  Visit # / Visits authorized: 3 / 50  PTA Visit Number: 2    Time In: 0900 am   Time Out: 09:45  am    Total Billable Time:  45 minutes  Charges: TE - 3    Precautions: Standard    Subjective     Pt reports: some stiffness but no significant pain   She was compliant with home exercise program.  Response to previous treatment: Tolerated evaluation well   Functional change: None     Pain: 3/10  Location: left shoulder      Objective     Hien received therapeutic exercises to develop strength, endurance, ROM, flexibility and posture for 35 minutes including:    Pulleys flex/ scap: x 3 minutes each   Wall slides: x 20   Prone rows/ext: 2 x 10   Supine sh flexion AAROM w dowel 5 sec hold x 30  Supine sh ER AAROM w dowel 5 sec hold x 20  Rows w green TB x 20  Sh IR w orange TB and towel roll x 20  Freemotion shoulder flexion AAROM 7# x 20     Hien received the following manual therapy techniques x 10 min: STM to R subscapular and infraspinatus      Home Exercises Provided and Patient Education Provided     Education provided:       Written Home Exercises Provided: Patient instructed to cont prior HEP.  Exercises were reviewed and Hien was able to demonstrate them prior to the end of the session.  Hien demonstrated good  understanding of the education provided.     See EMR under Patient Instructions for exercises provided prior visit.    Assessment      Patient tolerated exercise progression well. Required cueing to prevent upper trap compensation with overhead motions.   Hien is progressing well towards her goals.   Pt prognosis is Good.     Pt will continue to benefit from skilled outpatient physical therapy to address the deficits listed in the problem list box on initial evaluation, provide pt/family education and to maximize pt's level of independence in the home and community environment.     Pt's spiritual, cultural and educational needs considered and pt agreeable to plan of care and goals.     Anticipated barriers to physical therapy: None     Goals:     Short Term Goals:  6 weeks  1. Report decreased L shoulder pain  <  / =  5/10 at worst to increase tolerance for driving. - Progressing, not met 5/14/2019   2. Pt will increase L shoulder flexion AROM to 150 deg to indicate improved flexibility and mobility.  - Progressing, not met 5/14/2019   3. Pt will be able to tolerate multi-directional UE strengthening without pain to improve functional use of UEs. - Progressing, not met 5/14/2019   4. Pt will report 50% improvement in ability to reach overhead without pain since start of care to indicate improved functional mobility. - Progressing, not met  5/14/2019   5. Pt to tolerate HEP to improve ROM and independence with ADL's. - Progressing, not met 5/14/2019       Long Term Goals: 12 weeks  1. Report decreased L shoulder pain  <  / =  3/10 at worst to increase tolerance for driving. - Progressing, not met 5/14/2019   2. Pt will demonstrate increase L shoulder AROM in all planes to indicate improved flexibility and mobility.  - Progressing, not met 5/14/2019   3. Pt will be able to perform 2 x 10 shoulder flexion with 2 lbs to indicate improved strength in B UEs. - Progressing, not met 5/14/2019   4. Pt will report 80% improvement in ability to reach overhead without pain since start of care to indicate improved functional mobility.  - Progressing, not  met 5/14/2019   5. Pt to be Independent with HEP to improve ROM and independence with ADL's. - Progressing, not met 5/14/2019     Plan     Cont with PT POC     Sophie Butcher, PTA

## 2019-05-16 ENCOUNTER — CLINICAL SUPPORT (OUTPATIENT)
Dept: REHABILITATION | Facility: HOSPITAL | Age: 54
End: 2019-05-16
Attending: ORTHOPAEDIC SURGERY
Payer: COMMERCIAL

## 2019-05-16 DIAGNOSIS — G89.29 CHRONIC LEFT SHOULDER PAIN: ICD-10-CM

## 2019-05-16 DIAGNOSIS — M62.81 MUSCLE WEAKNESS OF LEFT UPPER EXTREMITY: ICD-10-CM

## 2019-05-16 DIAGNOSIS — M25.612 DECREASED RANGE OF MOTION OF LEFT SHOULDER: ICD-10-CM

## 2019-05-16 DIAGNOSIS — M25.512 CHRONIC LEFT SHOULDER PAIN: ICD-10-CM

## 2019-05-16 DIAGNOSIS — M62.89 MUSCLE TIGHTNESS: ICD-10-CM

## 2019-05-16 PROCEDURE — 97110 THERAPEUTIC EXERCISES: CPT | Mod: PO

## 2019-05-16 PROCEDURE — 97140 MANUAL THERAPY 1/> REGIONS: CPT | Mod: PO

## 2019-05-16 NOTE — PROGRESS NOTES
Physical Therapy Daily Treatment Note     Name: Hien Jeter  Clinic Number: [id number]    Therapy Diagnosis:   Encounter Diagnoses   Name Primary?    Chronic left shoulder pain     Muscle weakness of left upper extremity     Decreased range of motion of left shoulder     Muscle tightness      Physician: Natalee Nagel PA-C    Visit Date: 5/16/2019    Physician Orders: PT Eval and Treat   Medical Diagnosis from Referral: Status post arthroscopy of left shoulder  Evaluation Date: 5/2/2019  Authorization Period Expiration: 12/31/19  Plan of Care Expiration: 8/2/19  Visit # / Visits authorized: 3 / 50  PTA Visit Number: 2    Time In: 04:00 pm   Time Out: 04:55 pm    Total Billable Time:  55 minutes  Charges: TE - 3 MT - 1     Precautions: Standard    Subjective     Pt reports: some stiffness but no significant pain   She was compliant with home exercise program.  Response to previous treatment: Tolerated evaluation well   Functional change: None     Pain: 3/10  Location: left shoulder      Objective     Hien received therapeutic exercises to develop strength, endurance, ROM, flexibility and posture for 45 minutes including:    Pulleys flex/ scap: x 3 minutes each   Wall slides: x 20   Prone rows/ext: 2 x 10  Flexion/scap slides on stair rail: 20 x 5 seconds each    Supine sh flexion AAROM w dowel 5 sec hold x 30  Supine sh ER AAROM w dowel 5 sec hold x 20  Rows w green TB x 20  Sh IR w orange TB and towel roll x 20  Freemotion shoulder flexion AAROM 7# x 20 - NP     Hien received the following manual therapy techniques x 10 min: STM to R subscapular and infraspinatus      Home Exercises Provided and Patient Education Provided     Education provided:       Written Home Exercises Provided: Patient instructed to cont prior HEP.  Exercises were reviewed and Hien was able to demonstrate them prior to the end of the session.  Hien demonstrated good  understanding of the education provided.     See EMR under  Patient Instructions for exercises provided prior visit.    Assessment     Patient tolerated exercise progression well. Required cueing to prevent upper trap compensation with overhead motions.   Hien is progressing well towards her goals.   Pt prognosis is Good.     Pt will continue to benefit from skilled outpatient physical therapy to address the deficits listed in the problem list box on initial evaluation, provide pt/family education and to maximize pt's level of independence in the home and community environment.     Pt's spiritual, cultural and educational needs considered and pt agreeable to plan of care and goals.     Anticipated barriers to physical therapy: None     Goals:     Short Term Goals:  6 weeks  1. Report decreased L shoulder pain  <  / =  5/10 at worst to increase tolerance for driving. - Progressing, not met 5/16/2019   2. Pt will increase L shoulder flexion AROM to 150 deg to indicate improved flexibility and mobility.  - Progressing, not met 5/16/2019   3. Pt will be able to tolerate multi-directional UE strengthening without pain to improve functional use of UEs. - Progressing, not met 5/16/2019   4. Pt will report 50% improvement in ability to reach overhead without pain since start of care to indicate improved functional mobility. - Progressing, not met  5/16/2019   5. Pt to tolerate HEP to improve ROM and independence with ADL's. - Progressing, not met 5/16/2019       Long Term Goals: 12 weeks  1. Report decreased L shoulder pain  <  / =  3/10 at worst to increase tolerance for driving. - Progressing, not met 5/16/2019   2. Pt will demonstrate increase L shoulder AROM in all planes to indicate improved flexibility and mobility.  - Progressing, not met 5/16/2019   3. Pt will be able to perform 2 x 10 shoulder flexion with 2 lbs to indicate improved strength in B UEs. - Progressing, not met 5/16/2019   4. Pt will report 80% improvement in ability to reach overhead without pain since start  of care to indicate improved functional mobility.  - Progressing, not met 5/16/2019   5. Pt to be Independent with HEP to improve ROM and independence with ADL's. - Progressing, not met 5/16/2019     Plan     Cont with PT POC     Sophie Butcher, PTA

## 2019-05-21 ENCOUNTER — CLINICAL SUPPORT (OUTPATIENT)
Dept: REHABILITATION | Facility: HOSPITAL | Age: 54
End: 2019-05-21
Attending: ORTHOPAEDIC SURGERY
Payer: COMMERCIAL

## 2019-05-21 DIAGNOSIS — M62.81 MUSCLE WEAKNESS OF LEFT UPPER EXTREMITY: ICD-10-CM

## 2019-05-21 DIAGNOSIS — M62.89 MUSCLE TIGHTNESS: ICD-10-CM

## 2019-05-21 DIAGNOSIS — G89.29 CHRONIC LEFT SHOULDER PAIN: ICD-10-CM

## 2019-05-21 DIAGNOSIS — M25.512 CHRONIC LEFT SHOULDER PAIN: ICD-10-CM

## 2019-05-21 DIAGNOSIS — M25.612 DECREASED RANGE OF MOTION OF LEFT SHOULDER: ICD-10-CM

## 2019-05-21 PROCEDURE — 97110 THERAPEUTIC EXERCISES: CPT | Mod: PO

## 2019-05-21 NOTE — PROGRESS NOTES
Physical Therapy Daily Treatment Note     Name: Hien Jeter  Clinic Number: [id number]    Therapy Diagnosis:   Encounter Diagnoses   Name Primary?    Chronic left shoulder pain     Muscle weakness of left upper extremity     Decreased range of motion of left shoulder     Muscle tightness      Physician: Natalee Nagel PA-C    Visit Date: 5/21/2019    Physician Orders: PT Eval and Treat   Medical Diagnosis from Referral: Status post arthroscopy of left shoulder  Evaluation Date: 5/2/2019  Last PN: 5/21/19 (visit 6)  Authorization Period Expiration: 12/31/19  Plan of Care Expiration: 8/2/19  Visit # / Visits authorized: 6 / 50  PTA Visit Number: 0    Time In: 0955  Time Out: 1055  Total Billable Time:  45 minutes  Charges: TE - 3    Precautions: Standard    Subjective     Pt reports: fell on wet leaves recently but no serious injuries. Pain is at worst 3-4/10. 50% improvement in ability to reach overhead without pain since start of care.   She was compliant with home exercise program.  Response to previous treatment: good  Functional change: reaching out in front without pain    Pain: 3/10  Location: left shoulder      Objective     Taken 5/21/19:  L shoulder flexion PROM: 150 deg    Hien received therapeutic exercises to develop strength, endurance, ROM, flexibility and posture for 50 minutes including:    UBE 3'F/3'B  Pulleys flex/ scap: x 3 minutes each   Wall slides w orange TB: x 20   Prone rows/ext: 2 x 10  Flexion/scap slides on stair rail: 20 x 5 seconds each    Supine sh flexion AAROM w dowel 5 sec hold x 30  Supine sh ER AAROM w dowel 5 sec hold x 20  Rows w green TB x 20  Sh IR w green TB and towel roll x 20  Sh ext w green TB x 20  SL sh ER w towel roll x 20  Freemotion shoulder flexion AAROM 7# x 20      Hien received the following manual therapy techniques x 10 min: STM to R subscapular and infraspinatus    Home Exercises Provided and Patient Education Provided     Education provided:    -importance of proper posture with therapeutic exercises    Written Home Exercises Provided: Patient instructed to cont prior HEP. Addition of wall slides, SL sh ER, B sh ext w theraband.   Exercises were reviewed and Hien was able to demonstrate them prior to the end of the session.  Hien demonstrated good  understanding of the education provided.     See EMR under Patient Instructions for exercises provided prior visit.    Functional Limitation Report- G-CODE:  CMS Impairment/Limitation/Restriction for FOTO Shoulder Survey  Status Limitation G-Code CMS Severity Modifier  Intake 49% 51%  Predicted 69% 31% Goal Status+ CJ - At least 20 percent but less than 40 percent  5/21/2019 57% 43% Current Status CK - At least 40 percent but less than 60 percent  D/C Status CK **only report if this is discharge survey  +Based on FOTO predicted change score     Assessment     Patient was re-assessed today with 5/5 STGs being met indicating improvements in L shoulder pain, shoulder AROM, tolerance for HEP and UE strengthening, and ability to reach overhead without pain since start of care. She continues with shoulder pain, decreased shoulder AROM, UE weakness, and decreased functional use of UE. Pt could benefit from continued physical therapy services to address deficits.     She had good tolerance to treatment today with no adverse effects. Post-treatment L shoulder pain rated as 0/10. Appropriate exercise-induced fatigue achieved by end of session. Pt demonstrates compensatory mechanisms with overhead range of motion but able to achieve functional range passively without shoulder hike. Good response to progression of exercise program.     Hien is progressing well towards her goals.   Pt prognosis is Good.     Pt will continue to benefit from skilled outpatient physical therapy to address the deficits listed in the problem list box on initial evaluation, provide pt/family education and to maximize pt's level of  independence in the home and community environment.     Pt's spiritual, cultural and educational needs considered and pt agreeable to plan of care and goals.     Anticipated barriers to physical therapy: None     Goals:     Short Term Goals:  6 weeks  1. Report decreased L shoulder pain  <  / =  5/10 at worst to increase tolerance for driving. - met 5/21/19  2. Pt will increase L shoulder flexion AROM to 150 deg to indicate improved flexibility and mobility.  - met 5/21/19  3. Pt will be able to tolerate multi-directional UE strengthening without pain to improve functional use of UEs. - met 5/21/19  4. Pt will report 50% improvement in ability to reach overhead without pain since start of care to indicate improved functional mobility. - met 5/21/19  5. Pt to tolerate HEP to improve ROM and independence with ADL's. - met 5/21/19     Long Term Goals: 12 weeks  1. Report decreased L shoulder pain  <  / =  3/10 at worst to increase tolerance for driving. - Progressing, not met 5/21/2019   2. Pt will demonstrate increase L shoulder AROM in all planes to indicate improved flexibility and mobility.  - Progressing, not met 5/21/2019   3. Pt will be able to perform 2 x 10 shoulder flexion with 2 lbs to indicate improved strength in B UEs. - Progressing, not met 5/21/2019   4. Pt will report 80% improvement in ability to reach overhead without pain since start of care to indicate improved functional mobility.  - Progressing, not met 5/21/2019   5. Pt to be Independent with HEP to improve ROM and independence with ADL's. - Progressing, not met 5/21/2019     Plan     Progression shoulder AROM and periscapular strengthening.     Angie Ta, PT

## 2019-05-23 ENCOUNTER — CLINICAL SUPPORT (OUTPATIENT)
Dept: REHABILITATION | Facility: HOSPITAL | Age: 54
End: 2019-05-23
Attending: ORTHOPAEDIC SURGERY
Payer: COMMERCIAL

## 2019-05-23 DIAGNOSIS — M25.512 CHRONIC LEFT SHOULDER PAIN: ICD-10-CM

## 2019-05-23 DIAGNOSIS — M62.89 MUSCLE TIGHTNESS: ICD-10-CM

## 2019-05-23 DIAGNOSIS — G89.29 CHRONIC LEFT SHOULDER PAIN: ICD-10-CM

## 2019-05-23 DIAGNOSIS — M62.81 MUSCLE WEAKNESS OF LEFT UPPER EXTREMITY: ICD-10-CM

## 2019-05-23 DIAGNOSIS — M25.612 DECREASED RANGE OF MOTION OF LEFT SHOULDER: ICD-10-CM

## 2019-05-23 PROCEDURE — 97110 THERAPEUTIC EXERCISES: CPT | Mod: PO

## 2019-05-23 NOTE — PROGRESS NOTES
Physical Therapy Daily Treatment Note     Name: Hien Jeter  Clinic Number: [id number]    Therapy Diagnosis:   Encounter Diagnoses   Name Primary?    Chronic left shoulder pain     Muscle weakness of left upper extremity     Decreased range of motion of left shoulder     Muscle tightness      Physician: Natalee Nagel PA-C    Visit Date: 5/23/2019    Physician Orders: PT Eval and Treat   Medical Diagnosis from Referral: Status post arthroscopy of left shoulder  Evaluation Date: 5/2/2019  Last PN: 5/21/19 (visit 6)  Authorization Period Expiration: 12/31/19  Plan of Care Expiration: 8/2/19  Visit # / Visits authorized: 7 / 50  PTA Visit Number: 0    Time In: 1006  Time Out: 1100  Total Billable Time: 27 minutes  Charges: TE - 2    Precautions: Standard    Subjective     Pt reports: she is feeling better. Has not had to take pain medication lately.   She was compliant with home exercise program.  Response to previous treatment: good- some soreness  Functional change: reaching higher    Pain: 0/10  Location: left shoulder      Objective     Taken 5/23/19:  L shoulder flexion PROM: 150 deg    Hien received therapeutic exercises to develop strength, endurance, ROM, flexibility and posture for 44 minutes including:    UBE 3'F/3'B  Pulleys flex/ scap: x 3 minutes each   Wall slides w orange TB: x 20   Prone rows/ext: 2 x 10- NP  Flexion/scap slides on stair rail: 20 x 5 seconds each -NP  Supine sh flexion AAROM w dowel 5 sec hold x 30  Supine sh ER AAROM w dowel 5 sec hold x 20- NP  Rows w green TB x 20  Sh IR w green TB and towel roll x 20  Sh ext w green TB x 20  SL sh ER w towel roll x 20  Freemotion shoulder flexion AAROM 7# x 20      Hien received the following manual therapy techniques x 10 min: STM to R subscapular and infraspinatus    Home Exercises Provided and Patient Education Provided     Education provided:   -importance of proper posture with therapeutic exercises    Written Home Exercises  Provided: Patient instructed to cont prior HEP. (wall slides, SL sh ER, B sh ext w theraband)  Exercises were reviewed and Hien was able to demonstrate them prior to the end of the session.  Hien demonstrated good  understanding of the education provided.     See EMR under Patient Instructions for exercises provided prior visit.    Assessment     Patient had good tolerance to treatment today with no adverse effects. Post-treatment L shoulder pain rated as 0/10. Pt with improvement in shoulder AROM upon presentation. Soft tissue dysfunction noted in L subscapularis and infraspinatus. Continues with scapular dyskinesia especially with overhead ranges.     Hien is progressing well towards her goals.   Pt prognosis is Good.     Pt will continue to benefit from skilled outpatient physical therapy to address the deficits listed in the problem list box on initial evaluation, provide pt/family education and to maximize pt's level of independence in the home and community environment.     Pt's spiritual, cultural and educational needs considered and pt agreeable to plan of care and goals.     Anticipated barriers to physical therapy: None     Goals:     Short Term Goals:  6 weeks  1. Report decreased L shoulder pain  <  / =  5/10 at worst to increase tolerance for driving. - met 5/21/19  2. Pt will increase L shoulder flexion AROM to 150 deg to indicate improved flexibility and mobility.  - met 5/21/19  3. Pt will be able to tolerate multi-directional UE strengthening without pain to improve functional use of UEs. - met 5/21/19  4. Pt will report 50% improvement in ability to reach overhead without pain since start of care to indicate improved functional mobility. - met 5/21/19  5. Pt to tolerate HEP to improve ROM and independence with ADL's. - met 5/21/19     Long Term Goals: 12 weeks  1. Report decreased L shoulder pain  <  / =  3/10 at worst to increase tolerance for driving. - Progressing, not met 5/23/2019   2. Pt  will demonstrate increase L shoulder AROM in all planes to indicate improved flexibility and mobility.  - Progressing, not met 5/23/2019   3. Pt will be able to perform 2 x 10 shoulder flexion with 2 lbs to indicate improved strength in B UEs. - Progressing, not met 5/23/2019   4. Pt will report 80% improvement in ability to reach overhead without pain since start of care to indicate improved functional mobility.  - Progressing, not met 5/23/2019   5. Pt to be Independent with HEP to improve ROM and independence with ADL's. - Progressing, not met 5/23/2019     Plan     Progression shoulder AROM and periscapular strengthening.     Angie Ta, PT

## 2019-05-29 ENCOUNTER — CLINICAL SUPPORT (OUTPATIENT)
Dept: REHABILITATION | Facility: HOSPITAL | Age: 54
End: 2019-05-29
Attending: ORTHOPAEDIC SURGERY
Payer: COMMERCIAL

## 2019-05-29 DIAGNOSIS — M62.89 MUSCLE TIGHTNESS: ICD-10-CM

## 2019-05-29 DIAGNOSIS — M62.81 MUSCLE WEAKNESS OF LEFT UPPER EXTREMITY: ICD-10-CM

## 2019-05-29 DIAGNOSIS — M25.512 CHRONIC LEFT SHOULDER PAIN: ICD-10-CM

## 2019-05-29 DIAGNOSIS — M25.612 DECREASED RANGE OF MOTION OF LEFT SHOULDER: ICD-10-CM

## 2019-05-29 DIAGNOSIS — G89.29 CHRONIC LEFT SHOULDER PAIN: ICD-10-CM

## 2019-05-29 PROCEDURE — 97110 THERAPEUTIC EXERCISES: CPT | Mod: PO

## 2019-05-29 NOTE — PROGRESS NOTES
Physical Therapy Daily Treatment Note     Name: Hien Jeter  Clinic Number: [id number]    Therapy Diagnosis:   Encounter Diagnoses   Name Primary?    Chronic left shoulder pain     Muscle weakness of left upper extremity     Decreased range of motion of left shoulder     Muscle tightness      Physician: Natalee Nagel PA-C    Visit Date: 5/29/2019    Physician Orders: PT Eval and Treat   Medical Diagnosis from Referral: Status post arthroscopy of left shoulder  Evaluation Date: 5/2/2019  Last PN: 5/21/19 (visit 6)  Authorization Period Expiration: 12/31/19  Plan of Care Expiration: 8/2/19  Visit # / Visits authorized: 8 / 50  PTA Visit Number: 1    Time In: 08:00 am   Time Out: 09:00 am   Total Billable Time: 30 minutes  Charges: TE - 2    Precautions: Standard    Subjective     Pt reports: feeling better overall, reports no pain but does states she feels stiff. .   She was compliant with home exercise program.  Response to previous treatment: good- some soreness  Functional change: reaching higher    Pain: 0/10  Location: left shoulder      Objective     Taken 5/23/19:  L shoulder flexion PROM: 150 deg    Hien received therapeutic exercises to develop strength, endurance, ROM, flexibility and posture for 44 minutes including:    UBE 3'F/3'B  Pulleys flex/ scap: x 3 minutes each   Wall slides w orange TB: x 20   Prone rows/ext: 2 x 10- NP  Flexion/scap slides on stair rail: 20 x 5 seconds each -NP  Supine sh flexion AAROM w dowel 5 sec hold x 30  Supine sh ER AAROM w dowel 5 sec hold x 20- NP  Rows w green TB x 20  Sh IR w green TB and towel roll x 20  Sh ext w green TB x 20  SL sh ER w towel roll x 20  Freemotion shoulder flexion AAROM 7# x 20      Hien received the following manual therapy techniques x 10 min: STM to R subscapular and infraspinatus    Home Exercises Provided and Patient Education Provided     Education provided:   -importance of proper posture with therapeutic exercises    Written Home  Exercises Provided: Patient instructed to cont prior HEP. (wall slides, SL sh ER, B sh ext w theraband)  Exercises were reviewed and Hien was able to demonstrate them prior to the end of the session.  Hien demonstrated good  understanding of the education provided.     See EMR under Patient Instructions for exercises provided prior visit.    Assessment     Patient tolerated therapy session well, is making excellent progress towards goals.   Hien is progressing well towards her goals.   Pt prognosis is Good.     Pt will continue to benefit from skilled outpatient physical therapy to address the deficits listed in the problem list box on initial evaluation, provide pt/family education and to maximize pt's level of independence in the home and community environment.     Pt's spiritual, cultural and educational needs considered and pt agreeable to plan of care and goals.     Anticipated barriers to physical therapy: None     Goals:     Short Term Goals:  6 weeks  1. Report decreased L shoulder pain  <  / =  5/10 at worst to increase tolerance for driving. - met 5/21/19  2. Pt will increase L shoulder flexion AROM to 150 deg to indicate improved flexibility and mobility.  - met 5/21/19  3. Pt will be able to tolerate multi-directional UE strengthening without pain to improve functional use of UEs. - met 5/21/19  4. Pt will report 50% improvement in ability to reach overhead without pain since start of care to indicate improved functional mobility. - met 5/21/19  5. Pt to tolerate HEP to improve ROM and independence with ADL's. - met 5/21/19     Long Term Goals: 12 weeks  1. Report decreased L shoulder pain  <  / =  3/10 at worst to increase tolerance for driving. - Progressing, not met 5/29/2019   2. Pt will demonstrate increase L shoulder AROM in all planes to indicate improved flexibility and mobility.  - Progressing, not met 5/29/2019   3. Pt will be able to perform 2 x 10 shoulder flexion with 2 lbs to indicate  improved strength in B UEs. - Progressing, not met 5/29/2019   4. Pt will report 80% improvement in ability to reach overhead without pain since start of care to indicate improved functional mobility.  - Progressing, not met 5/29/2019   5. Pt to be Independent with HEP to improve ROM and independence with ADL's. - Progressing, not met 5/29/2019     Plan     Progression shoulder AROM and periscapular strengthening.     Sophie Butcher, PTA

## 2019-06-04 ENCOUNTER — CLINICAL SUPPORT (OUTPATIENT)
Dept: REHABILITATION | Facility: HOSPITAL | Age: 54
End: 2019-06-04
Attending: ORTHOPAEDIC SURGERY
Payer: COMMERCIAL

## 2019-06-04 ENCOUNTER — HOSPITAL ENCOUNTER (OUTPATIENT)
Dept: RADIOLOGY | Facility: HOSPITAL | Age: 54
Discharge: HOME | End: 2019-06-04
Attending: PSYCHIATRY & NEUROLOGY
Payer: COMMERCIAL

## 2019-06-04 DIAGNOSIS — G89.29 CHRONIC LEFT SHOULDER PAIN: ICD-10-CM

## 2019-06-04 DIAGNOSIS — M62.89 MUSCLE TIGHTNESS: ICD-10-CM

## 2019-06-04 DIAGNOSIS — G35 MS (MULTIPLE SCLEROSIS): ICD-10-CM

## 2019-06-04 DIAGNOSIS — M62.81 MUSCLE WEAKNESS OF LEFT UPPER EXTREMITY: ICD-10-CM

## 2019-06-04 DIAGNOSIS — M25.512 CHRONIC LEFT SHOULDER PAIN: ICD-10-CM

## 2019-06-04 DIAGNOSIS — M25.612 DECREASED RANGE OF MOTION OF LEFT SHOULDER: ICD-10-CM

## 2019-06-04 PROCEDURE — 97140 MANUAL THERAPY 1/> REGIONS: CPT | Mod: PO

## 2019-06-04 PROCEDURE — 97110 THERAPEUTIC EXERCISES: CPT | Mod: PO

## 2019-06-04 PROCEDURE — 70551 MRI BRAIN STEM W/O DYE: CPT | Mod: TC

## 2019-06-04 PROCEDURE — 70551 MRI BRAIN STEM W/O DYE: CPT | Mod: 26,,, | Performed by: RADIOLOGY

## 2019-06-04 PROCEDURE — 70551 MRI BRAIN DEMYELINATING WITHOUT CONTRAST: ICD-10-PCS | Mod: 26,,, | Performed by: RADIOLOGY

## 2019-06-04 NOTE — PROGRESS NOTES
Physical Therapy Daily Treatment Note     Name: Hien Jeter  Clinic Number: [id number]    Therapy Diagnosis:   Encounter Diagnoses   Name Primary?    Chronic left shoulder pain     Muscle weakness of left upper extremity     Decreased range of motion of left shoulder     Muscle tightness      Physician: Natalee Nagel PA-C    Visit Date: 6/4/2019    Physician Orders: PT Eval and Treat   Medical Diagnosis from Referral: Status post arthroscopy of left shoulder  Evaluation Date: 5/2/2019  Last PN: 5/21/19 (visit 6)  Authorization Period Expiration: 12/31/19  Plan of Care Expiration: 8/2/19  Visit # / Visits authorized: 8 / 50  PTA Visit Number: 1    Time In: 11:00 am   Time Out: 12:00 pm   Total Billable Time: 60 minutes  Charges: TE - 4    Precautions: Standard    Subjective     Pt reports:  She is sore from moving many boxes at her parents home.   She was compliant with home exercise program.  Response to previous treatment: good- some soreness  Functional change: reaching higher    Pain: 0/10  Location: left shoulder      Objective     Taken 06/04/19:  L shoulder flexion PROM: 152 deg    Hien received therapeutic exercises to develop strength, endurance, ROM, flexibility and posture for 45 minutes including:    UBE 3'F/3'B  Pulleys flex/ scap: x 3 minutes each   Wall slides w orange TB: x 20   Prone rows/ext: 2 x 10- NP  Flexion/scap slides on stair rail: 20 x 5 seconds each -NP  Supine sh flexion AAROM w dowel 5 sec hold x 30  Supine sh ER AAROM w dowel 5 sec hold x 20- NP  Rows w green TB x 20  Sh IR w green TB and towel roll x 20  Sh ext w green TB x 20  SL sh ER w towel roll x 20  Freemotion shoulder flexion AAROM 7# x 20      Hien received the following manual therapy techniques x 10 min: STM to R subscapular and infraspinatus    Home Exercises Provided and Patient Education Provided     Education provided:   -importance of proper posture with therapeutic exercises    Written Home Exercises  Provided: Patient instructed to cont prior HEP. (wall slides, SL sh ER, B sh ext w theraband)  Exercises were reviewed and Hien was able to demonstrate them prior to the end of the session.  Hien demonstrated good  understanding of the education provided.     See EMR under Patient Instructions for exercises provided prior visit.    Assessment     Patient tolerated therapy session well, is making excellent progress towards goals as indicated by increasing range of motion and joint mobility  Hien is progressing well towards her goals.   Pt prognosis is Good.     Pt will continue to benefit from skilled outpatient physical therapy to address the deficits listed in the problem list box on initial evaluation, provide pt/family education and to maximize pt's level of independence in the home and community environment.     Pt's spiritual, cultural and educational needs considered and pt agreeable to plan of care and goals.     Anticipated barriers to physical therapy: None     Goals:     Short Term Goals:  6 weeks  1. Report decreased L shoulder pain  <  / =  5/10 at worst to increase tolerance for driving. - met 5/21/19  2. Pt will increase L shoulder flexion AROM to 150 deg to indicate improved flexibility and mobility.  - met 5/21/19  3. Pt will be able to tolerate multi-directional UE strengthening without pain to improve functional use of UEs. - met 5/21/19  4. Pt will report 50% improvement in ability to reach overhead without pain since start of care to indicate improved functional mobility. - met 5/21/19  5. Pt to tolerate HEP to improve ROM and independence with ADL's. - met 5/21/19     Long Term Goals: 12 weeks  1. Report decreased L shoulder pain  <  / =  3/10 at worst to increase tolerance for driving. - Progressing, not met 6/4/2019   2. Pt will demonstrate increase L shoulder AROM in all planes to indicate improved flexibility and mobility.  - Progressing, not met 6/4/2019   3. Pt will be able to perform 2  x 10 shoulder flexion with 2 lbs to indicate improved strength in B UEs. - Progressing, not met 6/4/2019   4. Pt will report 80% improvement in ability to reach overhead without pain since start of care to indicate improved functional mobility.  - Progressing, not met 6/4/2019   5. Pt to be Independent with HEP to improve ROM and independence with ADL's. - Progressing, not met 6/4/2019     Plan     Progression shoulder AROM and periscapular strengthening.     Sophie Butcher, PTA

## 2019-06-07 ENCOUNTER — CLINICAL SUPPORT (OUTPATIENT)
Dept: REHABILITATION | Facility: HOSPITAL | Age: 54
End: 2019-06-07
Attending: ORTHOPAEDIC SURGERY
Payer: COMMERCIAL

## 2019-06-07 DIAGNOSIS — M25.512 CHRONIC LEFT SHOULDER PAIN: ICD-10-CM

## 2019-06-07 DIAGNOSIS — G89.29 CHRONIC LEFT SHOULDER PAIN: ICD-10-CM

## 2019-06-07 DIAGNOSIS — M62.81 MUSCLE WEAKNESS OF LEFT UPPER EXTREMITY: ICD-10-CM

## 2019-06-07 DIAGNOSIS — M25.612 DECREASED RANGE OF MOTION OF LEFT SHOULDER: ICD-10-CM

## 2019-06-07 DIAGNOSIS — M62.89 MUSCLE TIGHTNESS: ICD-10-CM

## 2019-06-07 PROCEDURE — 97110 THERAPEUTIC EXERCISES: CPT | Mod: PO

## 2019-06-07 PROCEDURE — 97140 MANUAL THERAPY 1/> REGIONS: CPT | Mod: PO

## 2019-06-07 NOTE — PROGRESS NOTES
Physical Therapy Daily Treatment Note     Name: Hien Jeter  Clinic Number: [id number]    Therapy Diagnosis:   Encounter Diagnoses   Name Primary?    Chronic left shoulder pain     Muscle weakness of left upper extremity     Decreased range of motion of left shoulder     Muscle tightness      Physician: Natalee Nagel PA-C    Visit Date: 6/7/2019    Physician Orders: PT Eval and Treat   Medical Diagnosis from Referral: Status post arthroscopy of left shoulder  Evaluation Date: 5/2/2019  Last PN: 5/21/19 (visit 6)  Authorization Period Expiration: 12/31/19  Plan of Care Expiration: 8/2/19  Visit # / Visits authorized: 8 / 50  PTA Visit Number: 1    Time In: 11:00 am   Time Out: 12:00 pm   Total Billable Time: 60 minutes  Charges: TE - 4    Precautions: Standard    Subjective     Pt reports:  She is sore from moving many boxes at her parents home.   She was compliant with home exercise program.  Response to previous treatment: good- some soreness  Functional change: reaching higher    Pain: 0/10  Location: left shoulder      Objective     Taken 06/04/19:  L shoulder flexion PROM: 152 deg    Hien received therapeutic exercises to develop strength, endurance, ROM, flexibility and posture for 45 minutes including:    UBE 3'F/3'B  Pulleys flex/ scap: x 3 minutes each   Wall slides w orange TB: x 20   Prone rows/ext: 2 x 10- NP  Flexion/scap slides on stair rail: 20 x 5 seconds each -NP  Supine sh flexion AAROM w dowel 5 sec hold x 30  Supine sh ER AAROM w dowel 5 sec hold x 20- NP  Rows w green TB x 20  Sh IR w green TB and towel roll x 20  Sh ext w green TB x 20  SL sh ER w towel roll x 20  Freemotion shoulder flexion AAROM 7# x 20      Hien received the following manual therapy techniques x 10 min: STM to R subscapular and infraspinatus    Home Exercises Provided and Patient Education Provided     Education provided:   -importance of proper posture with therapeutic exercises    Written Home Exercises  Provided: Patient instructed to cont prior HEP. (wall slides, SL sh ER, B sh ext w theraband)  Exercises were reviewed and Hien was able to demonstrate them prior to the end of the session.  Hien demonstrated good  understanding of the education provided.     See EMR under Patient Instructions for exercises provided prior visit.    Assessment     Patient tolerated therapy session well, is making excellent progress towards goals as indicated by increasing range of motion and joint mobility  Hien is progressing well towards her goals.   Pt prognosis is Good.     Pt will continue to benefit from skilled outpatient physical therapy to address the deficits listed in the problem list box on initial evaluation, provide pt/family education and to maximize pt's level of independence in the home and community environment.     Pt's spiritual, cultural and educational needs considered and pt agreeable to plan of care and goals.     Anticipated barriers to physical therapy: None     Goals:     Short Term Goals:  6 weeks  1. Report decreased L shoulder pain  <  / =  5/10 at worst to increase tolerance for driving. - met 5/21/19  2. Pt will increase L shoulder flexion AROM to 150 deg to indicate improved flexibility and mobility.  - met 5/21/19  3. Pt will be able to tolerate multi-directional UE strengthening without pain to improve functional use of UEs. - met 5/21/19  4. Pt will report 50% improvement in ability to reach overhead without pain since start of care to indicate improved functional mobility. - met 5/21/19  5. Pt to tolerate HEP to improve ROM and independence with ADL's. - met 5/21/19     Long Term Goals: 12 weeks  1. Report decreased L shoulder pain  <  / =  3/10 at worst to increase tolerance for driving. - Progressing, not met 6/7/2019   2. Pt will demonstrate increase L shoulder AROM in all planes to indicate improved flexibility and mobility.  - Progressing, not met 6/7/2019   3. Pt will be able to perform 2  x 10 shoulder flexion with 2 lbs to indicate improved strength in B UEs. - Progressing, not met 6/7/2019   4. Pt will report 80% improvement in ability to reach overhead without pain since start of care to indicate improved functional mobility.  - Progressing, not met 6/7/2019   5. Pt to be Independent with HEP to improve ROM and independence with ADL's. - Progressing, not met 6/7/2019     Plan     Progression shoulder AROM and periscapular strengthening.     Sophie Butcher, PTA

## 2019-06-10 ENCOUNTER — OFFICE VISIT (OUTPATIENT)
Dept: NEUROLOGY | Facility: CLINIC | Age: 54
End: 2019-06-10
Payer: COMMERCIAL

## 2019-06-10 VITALS
HEIGHT: 62 IN | DIASTOLIC BLOOD PRESSURE: 75 MMHG | WEIGHT: 132 LBS | HEART RATE: 83 BPM | BODY MASS INDEX: 24.29 KG/M2 | SYSTOLIC BLOOD PRESSURE: 116 MMHG

## 2019-06-10 DIAGNOSIS — E55.9 VITAMIN D INSUFFICIENCY: ICD-10-CM

## 2019-06-10 DIAGNOSIS — D84.9 IMMUNOSUPPRESSED STATUS: ICD-10-CM

## 2019-06-10 DIAGNOSIS — F32.A DEPRESSION, UNSPECIFIED DEPRESSION TYPE: ICD-10-CM

## 2019-06-10 DIAGNOSIS — Z79.899 ENCOUNTER FOR LONG-TERM (CURRENT) USE OF HIGH-RISK MEDICATION: ICD-10-CM

## 2019-06-10 DIAGNOSIS — Z71.89 COUNSELING REGARDING GOALS OF CARE: ICD-10-CM

## 2019-06-10 DIAGNOSIS — R25.2 SPASTICITY: ICD-10-CM

## 2019-06-10 DIAGNOSIS — G35 MULTIPLE SCLEROSIS: Primary | ICD-10-CM

## 2019-06-10 PROCEDURE — 99215 OFFICE O/P EST HI 40 MIN: CPT | Mod: S$GLB,,, | Performed by: PHYSICIAN ASSISTANT

## 2019-06-10 PROCEDURE — 99215 PR OFFICE/OUTPT VISIT, EST, LEVL V, 40-54 MIN: ICD-10-PCS | Mod: S$GLB,,, | Performed by: PHYSICIAN ASSISTANT

## 2019-06-10 PROCEDURE — 99999 PR PBB SHADOW E&M-EST. PATIENT-LVL IV: ICD-10-PCS | Mod: PBBFAC,,, | Performed by: PHYSICIAN ASSISTANT

## 2019-06-10 PROCEDURE — 3008F PR BODY MASS INDEX (BMI) DOCUMENTED: ICD-10-PCS | Mod: CPTII,S$GLB,, | Performed by: PHYSICIAN ASSISTANT

## 2019-06-10 PROCEDURE — 3008F BODY MASS INDEX DOCD: CPT | Mod: CPTII,S$GLB,, | Performed by: PHYSICIAN ASSISTANT

## 2019-06-10 PROCEDURE — 99999 PR PBB SHADOW E&M-EST. PATIENT-LVL IV: CPT | Mod: PBBFAC,,, | Performed by: PHYSICIAN ASSISTANT

## 2019-06-10 NOTE — PROGRESS NOTES
Subjective:       Patient ID: Hien Jeter is a 53 y.o. female who presents today for a routine clinic visit for MS.      MS HPI:  · DMT: Ocrevus(January 28,2019)-Due in July 2019  · Side effects from DMT? No  · Taking vitamin D3 as recommended? Yes    · Recovering from Shoulder surgery -going to PT twice weekly  · No infections for greater than a year    SOCIAL HISTORY  Social History     Tobacco Use    Smoking status: Never Smoker    Smokeless tobacco: Never Used   Substance Use Topics    Alcohol use: Yes     Comment: socially/ not weekly    Drug use: No       MS REVIEW OF SYMPTOMS 6/3/2019   Do you feel abnormally tired on most days? No   Do you feel you generally sleep well? No-unsure if this is shoulder related-restarted Pamelor   Do you have difficulty controlling your bladder?  No   Do you have difficulty controlling your bowels?  No   Do you have frequent muscle cramps, tightness or spasms in your limbs?  No-controlled with Baclofen 10- 20 HS   Do you have new visual symptoms?  No   Do you have worsening difficulty with your memory or thinking? No   Do you have worsening symptoms of anxiety or depression?  No-controlled with Lexapro 15mg QD   For patients who walk, Do you have more difficulty walking?  No   Have you fallen since your last visit?  Yes-Mother's Day. [place]level surface in woods -wet leaves.   For patients who use wheelchairs: Do you have any skin wounds or breakdown? Not Applicable   Do you have difficulty using your hands?  No   Do you have shooting or burning pain? Yes--related to Left shoulder   Do you have difficulty with sexual function?  No   If you are sexually active, are you using birth control? Y/N  N/A No-menopause   Do you often choke when swallowing liquids or solid food?  No   Do you experience worsening symptoms when overheated? No   Do you need any new equipment such as a wheelchair, walker or shower chair? No   Do you receive co-pay financial assistance for your principal MS  medicine? Yes   Would you be interested in participating in an MS research trial in the future? Yes   Do you feel you have adequate family/friend support?  Yes   Do you have health insurance?   Yes   Are you currently employed? No   Do you receive SSDI/SSI?  No   Do you use marijuana or cannabis products? No   Have you been diagnosed with a urinary tract infection since your last visit here? No   Have you been diagnosed with a respiratory tract infection since your last visit here? No   Have you been to the emergency room since your last visit here? No   Have you been hospitalized since your last visit here?  No     FSS SCORE & INTERPRETATION 6/3/2019   FSS SCORE  33   FSS SCORE INTERPRETATION May not be suffering from fatigue     MS ANGELIQUE-D SCORE & INTERPRETATION 6/3/2019   ANGELIQUE-D SCORE  16   ANGELIQUE-D INTERPRETATION  Mild to moderate Depression     MS MT-7 SCORE & INTERPRETATION 6/3/2019   MT-7 SCORE  3   MT-7 SCORE INTERPRETATION Normal     PEQ MS MOS PAIN EFFECTS SCORE & INTERPRETATION 6/3/2019   PES SCORE 19   PES SCORE INTERPRETATION Scores can range from 6-30.  Items are scaled so that higher scores indicate a greater impact of pain on a patients mood and behavior.     PEQ MS SEXUAL SATISFACTION SCORE & INTERPRETATION 6/3/2019   SSS SCORE  8   SSS SCORE INTERPRETATION Scores can range from 4-24.  Higher scores indicate greater problems with sexual satisfaction.     MS BLADDER CONTROL SCORE & INTERPRETATION 6/3/2019   BLCS SCORE 0   BLCS SCORE INTERPRETATION  Scores can range from 0-22, with higher scores indicating greater bladder control problems.     MS BOWEL CONTROL SCORE & INTERPRETATION 6/3/2019   BWCS SCORE 2   BWCS SCORE INTERPRETATION Scores can range from 0-26, with higher scores indicating greater bowel control problems.     PEQ MS IMPACT OF VISUAL IMPAIRMENT SCORE & INTERPRETATION 6/3/2019   MEJIA SCALE SCORE  0   MEJIA SCORE INTERPRETATION Scores can range from 0-15, with higher scores indicating  greater impact of visual problems on daily activites.     MS PDQ SCORE & INTERPRETATION 6/3/2019   PDQ RETROSPECTIVE MEMORY SUBSCALE 3   PDQ ATTENTION/CONCENTRATION SUBSCALE 5   PDQ PROSPECTIVE MEMORY SUBSCALE 2   PDQ PLANNING/ORGANIZATION SUBSCALE 4   PDQ TOTAL SCORE 14   PDQ SCORE INTERPRETATION Scores can range from 0-80, with higher scores indicating greater perceived cognitive impairment.     MSSS SCORE & INTERPRETATION 6/3/2019   MSSS TANGIBLE SUPPORT SUBSCALE 100   MSSS EMOTIONAL/INFORMATIONAL SUPPORT SUBSCALE 81.25   MSSS AFFECTIONATE SUPPORT SUBSCALE 75   MSSS POSITIVE SOCIAL INTERACTION SUBSCALE 75   MSSS TOTAL SCORE 82.81   MSSS SCORE INTERPRETATION Scores can range from 0-100, with higher scores indicating greater perceived support.             Objective:        1. 25 foot timed walk:4.1s  Timed 25 Foot Walk: 10/26/2016 2/15/2017   Did patient wear an AFO? No No   Was assistive device used? No No   Time for 25 Foot Walk (seconds) 4 4.2   Time for 25 Foot Walk (seconds) 4.3 -       Neurologic Exam     Mental Status   Oriented to person, place, and time.   Follows 3 step commands.   Speech: speech is normal   Level of consciousness: alert  Normal comprehension.     Cranial Nerves     CN II   Visual acuity: normal with correction (20/20 OD and OS with  hand held chart at 14 inches)    CN III, IV, VI   Pupils are equal, round, and reactive to light.  Extraocular motions are normal.     CN V   Facial sensation intact.     CN VII   Facial expression full, symmetric.     CN VIII   Hearing: intact (finger rub)    CN IX, X   Palate: symmetric    CN XI   CN XI normal.     CN XII   Tongue deviation: none    Motor Exam   Muscle bulk: normal  Overall muscle tone: normal    Strength   Right deltoid: 5/5  Right triceps: 5/5  Left triceps: 5/5  Right wrist extension: 5/5  Left wrist extension: 5/5  Right interossei: 5/5  Left interossei: 5/5  Right iliopsoas: 5/5  Left iliopsoas: 5/5  Right hamstrin/5  Left  hamstrin/5  Right anterior tibial: 5/5  Left anterior tibial: 5/5  Right peroneal: 5/5  Left peroneal: 5/5L AROM at shoulder limited due to recent shoulder surgery-approx 95 degrees flexion       Sensory Exam   Right arm light touch: slightly tingling in finger tips.  Left arm light touch: slight tingling in fingertips.  Right leg light touch: normal  Left leg light touch: normal  Right arm vibration: decreased from fingers  Left arm vibration: decreased from fingers  Right leg vibration: decreased from toes  Left leg vibration: decreased from toes    Gait, Coordination, and Reflexes     Gait  Gait: normal    Coordination   Finger to nose coordination: normal  Tandem walking coordination: normal    Tremor   Resting tremor: absent  Action tremor: absent    Reflexes   Right brachioradialis: 2+  Left brachioradialis: 2+  Right biceps: 2+  Left biceps: 2+  Right triceps: 2+  Left triceps: 2+  Right patellar: 2+  Left patellar: 2+  Right achilles: 2+  Left achilles: 2+  Right plantar: normal  Left plantar: normal  Right ankle clonus: absent  Left ankle clonus: absent  Right pendular knee jerk: absent  Left pendular knee jerk: absentNormal Heel/toe walk  Normal RSM  Patient able to hop on each foot individually             Imaging:     Results for orders placed during the hospital encounter of 19   MRI Brain Demyelinating Without Contrast    Impression Findings are consistent with the clinically suspected diagnosis of multiple sclerosis with a mild plaque burden.  There is no significant interval change compared with the prior study.      Electronically signed by: Rolando Leija MD  Date:    2019  Time:    11:55         Labs:     Lab Results   Component Value Date    YLEBPSNW79NA 45 2018    NIPVGYIS75TH 72 02/15/2017    GIFMDAET07LK 63 2016     No results found for: JCVINDEX, JCVANTIBODY  Lab Results   Component Value Date    LH3YIGUC 64.7 2017    ABSOLUTECD3 837 2017    SA8RJVLN  12.5 12/20/2017    ABSOLUTECD8 161 (L) 12/20/2017    FF1ZZLAD 53.0 12/20/2017    ABSOLUTECD4 685 12/20/2017    LABCD48 4.25 (H) 12/20/2017     Lab Results   Component Value Date    WBC 7.82 06/10/2019    RBC 4.33 06/10/2019    HGB 14.2 06/10/2019    HCT 43.5 06/10/2019     (H) 06/10/2019    MCH 32.8 (H) 06/10/2019    MCHC 32.6 06/10/2019    RDW 12.3 06/10/2019     06/10/2019    MPV 9.8 06/10/2019    GRAN 5.4 06/10/2019    GRAN 69.4 06/10/2019    LYMPH 1.3 06/10/2019    LYMPH 16.1 (L) 06/10/2019    MONO 0.8 06/10/2019    MONO 9.8 06/10/2019    EOS 0.3 06/10/2019    BASO 0.08 06/10/2019    EOSINOPHIL 3.2 06/10/2019    BASOPHIL 1.0 06/10/2019     Sodium   Date Value Ref Range Status   04/01/2019 139 136 - 145 mmol/L Final     Potassium   Date Value Ref Range Status   04/01/2019 4.4 3.5 - 5.1 mmol/L Final     Chloride   Date Value Ref Range Status   04/01/2019 106 95 - 110 mmol/L Final     CO2   Date Value Ref Range Status   04/01/2019 27 23 - 29 mmol/L Final     Glucose   Date Value Ref Range Status   04/01/2019 80 70 - 110 mg/dL Final     BUN, Bld   Date Value Ref Range Status   04/01/2019 12 6 - 20 mg/dL Final     Creatinine   Date Value Ref Range Status   04/01/2019 0.8 0.5 - 1.4 mg/dL Final     Calcium   Date Value Ref Range Status   04/01/2019 9.5 8.7 - 10.5 mg/dL Final     Total Protein   Date Value Ref Range Status   11/20/2017 7.3 6.0 - 8.4 g/dL Final     Albumin   Date Value Ref Range Status   11/20/2017 3.4 (L) 3.5 - 5.2 g/dL Final     Total Bilirubin   Date Value Ref Range Status   11/20/2017 0.4 0.1 - 1.0 mg/dL Final     Comment:     For infants and newborns, interpretation of results should be based  on gestational age, weight and in agreement with clinical  observations.  Premature Infant recommended reference ranges:  Up to 24 hours.............<8.0 mg/dL  Up to 48 hours............<12.0 mg/dL  3-5 days..................<15.0 mg/dL  6-29 days.................<15.0 mg/dL       Alkaline  Phosphatase   Date Value Ref Range Status   11/20/2017 79 55 - 135 U/L Final     AST   Date Value Ref Range Status   11/20/2017 19 10 - 40 U/L Final     ALT   Date Value Ref Range Status   11/20/2017 18 10 - 44 U/L Final     Anion Gap   Date Value Ref Range Status   04/01/2019 6 (L) 8 - 16 mmol/L Final     eGFR if    Date Value Ref Range Status   04/01/2019 >60 >60 mL/min/1.73 m^2 Final     eGFR if non    Date Value Ref Range Status   04/01/2019 >60 >60 mL/min/1.73 m^2 Final     Comment:     Calculation used to obtain the estimated glomerular filtration  rate (eGFR) is the CKD-EPI equation.          Diagnosis/Assessment/Plan:   1.  Multiple Sclerosis  · Assessment: Patient is clinically and radiographically stable on Ocrevus;   · Imaging: MRI without contrast stable as above-reviewed images with patient today in clinic         Disease Modifying Therapies: Ocrevus and high dose Vit D3. Safety labs today The patient was counseled about the risks associated with immune suppressive therapy, including a higher risk of serious infections and malignancy, as well as the importance of avoiding all live virus vaccines while on immune suppressive medication.      2.   MS Symptom Assessment / Management   Mood: encouraged restarting counseling-met with MSSW-May Galvan today to reestablish care-disability paperwork brought to clinic as well today for completion              No other changes to management of care today     Our visit today lasted 40 minutes, and 100% of this time was spent face to face with the patient. Over 50% of this visit included discussion of the treatment plan/medication changes/symptom management/exam findings/imaging results/coordination of care. The patient agrees with the plan of care.     Follow up in about 6 months (around 12/10/2019) for follow up with me.  Patient agreed to POC today.    Attending, Dr. Salas, was available during today's encounter.      Shelbi Wisdom PA-C  [facility]      Problem List Items Addressed This Visit        Other    Encounter for long-term (current) use of high-risk medication    Relevant Orders    Hepatitis B surface antibody (Completed)    Hepatitis B surface antigen (Completed)    Hepatitis B core antibody, total (Completed)    CBC auto differential (Completed)    Rituxan Sensitivity    Counseling regarding goals of care      Other Visit Diagnoses     Multiple sclerosis    -  Primary    Relevant Orders    Hepatitis B surface antibody (Completed)    Hepatitis B surface antigen (Completed)    Hepatitis B core antibody, total (Completed)    CBC auto differential (Completed)    Rituxan Sensitivity    Spasticity        Vitamin D insufficiency        Immunosuppressed status        Depression, unspecified depression type

## 2019-06-10 NOTE — PROGRESS NOTES
LETICIA met briefly with pt following her appointment with SHAKEEL Wisdom PA-C.  Scheduled a counseling visit for 6/17/19.  Pt also provided disability forms, which SW will review and complete with provider's assistance.

## 2019-06-11 ENCOUNTER — CLINICAL SUPPORT (OUTPATIENT)
Dept: REHABILITATION | Facility: HOSPITAL | Age: 54
End: 2019-06-11
Attending: ORTHOPAEDIC SURGERY
Payer: COMMERCIAL

## 2019-06-11 DIAGNOSIS — M25.512 CHRONIC LEFT SHOULDER PAIN: ICD-10-CM

## 2019-06-11 DIAGNOSIS — M62.81 MUSCLE WEAKNESS OF LEFT UPPER EXTREMITY: ICD-10-CM

## 2019-06-11 DIAGNOSIS — G89.29 CHRONIC LEFT SHOULDER PAIN: ICD-10-CM

## 2019-06-11 DIAGNOSIS — M25.612 DECREASED RANGE OF MOTION OF LEFT SHOULDER: ICD-10-CM

## 2019-06-11 DIAGNOSIS — M62.89 MUSCLE TIGHTNESS: ICD-10-CM

## 2019-06-11 PROCEDURE — 97110 THERAPEUTIC EXERCISES: CPT | Mod: PO

## 2019-06-11 PROCEDURE — 97140 MANUAL THERAPY 1/> REGIONS: CPT | Mod: PO

## 2019-06-11 NOTE — PROGRESS NOTES
Physical Therapy Daily Treatment Note     Name: Hien Jeter  Clinic Number: [id number]    Therapy Diagnosis:   Encounter Diagnoses   Name Primary?    Chronic left shoulder pain     Muscle weakness of left upper extremity     Decreased range of motion of left shoulder     Muscle tightness      Physician: Natalee Nagel PA-C    Visit Date: 6/11/2019    Physician Orders: PT Eval and Treat   Medical Diagnosis from Referral: Status post arthroscopy of left shoulder  Evaluation Date: 5/2/2019  Last PN: 5/21/19 (visit 6)  Authorization Period Expiration: 12/31/19  Plan of Care Expiration: 8/2/19  Visit # / Visits authorized: 9 / 50  PTA Visit Number: 0    Time In: 0900  Time Out: 1000  Total Billable Time: 45 minutes  Charges: TE - 2, MT - 1    Precautions: Standard    Subjective     Pt reports: she slept on arm wrong the other night and felt sore yesterday. Having trouble reaching out to open doors.   She was compliant with home exercise program.  Response to previous treatment: good- some soreness  Functional change: none    Pain: 2-2.5/10  Location: left shoulder      Objective     Taken 06/10/19:  L shoulder flexion PROM: 150 deg    Hien received therapeutic exercises to develop strength, endurance, ROM, flexibility and posture for 40 minutes including:    UBE 3'F/3'B  Pulleys flex/ scap: x 3 minutes each- NP  Wall slides w orange TB: x 20   Prone rows/ext: 2 x 10- NP  Flexion/scap slides on stair rail: 20 x 5 seconds each -NP  Supine sh flexion AAROM w dowel 5 sec hold x 30  Supine sh ER AAROM w dowel 5 sec hold x 20- NP  Rows w green TB x 20- NP  Sh IR w green TB and towel roll x 20- NP  Sh ext w green TB x 20- NP  SL sh ER w towel roll x 20- NP  Freemotion shoulder flexion AAROM 7# x 20   +Supine pec stretch w hands behind head w 2.5# at elbow x 3 min   +Serratus wall slides w bolster x 20    Pt received moist heat to L shoulder at beginning of treatment session for 10 minutes.     Hien received the  following manual therapy techniques x 10 min: Contract-relax into L shoulder flexion, grade III inferior and anterior GH joint mobs, and STM to R subscapularis, pec, and infraspinatus     Home Exercises Provided and Patient Education Provided     Education provided:   -importance of proper posture with therapeutic exercises    Written Home Exercises Provided: Patient instructed to cont prior HEP. (wall slides, SL sh ER, B sh ext w theraband)  Exercises were reviewed and Hien was able to demonstrate them prior to the end of the session.  Hien demonstrated good  understanding of the education provided.     See EMR under Patient Instructions for exercises provided prior visit.    Assessment     Patient had good tolerance to treatment today with no adverse effects. Post-treatment L shoulder pain rated as 0/10. Pt with continued soft tissue dysfunction in rotator cuff muscles and limited shoulder ER and scaption active and passive ROM. She was challenged with supine pec stretch requiring occasional breaks throughout 3 minutes. Improvement in shoulder ROM by end of session.     Hien is progressing well towards her goals.   Pt prognosis is Good.     Pt will continue to benefit from skilled outpatient physical therapy to address the deficits listed in the problem list box on initial evaluation, provide pt/family education and to maximize pt's level of independence in the home and community environment.     Pt's spiritual, cultural and educational needs considered and pt agreeable to plan of care and goals.     Anticipated barriers to physical therapy: None     Goals:     Short Term Goals:  6 weeks  1. Report decreased L shoulder pain  <  / =  5/10 at worst to increase tolerance for driving. - met 5/21/19  2. Pt will increase L shoulder flexion AROM to 150 deg to indicate improved flexibility and mobility.  - met 5/21/19  3. Pt will be able to tolerate multi-directional UE strengthening without pain to improve functional  use of UEs. - met 5/21/19  4. Pt will report 50% improvement in ability to reach overhead without pain since start of care to indicate improved functional mobility. - met 5/21/19  5. Pt to tolerate HEP to improve ROM and independence with ADL's. - met 5/21/19     Long Term Goals: 12 weeks  1. Report decreased L shoulder pain  <  / =  3/10 at worst to increase tolerance for driving. - Progressing, not met 6/11/2019   2. Pt will demonstrate increase L shoulder AROM in all planes to indicate improved flexibility and mobility.  - Progressing, not met 6/11/2019   3. Pt will be able to perform 2 x 10 shoulder flexion with 2 lbs to indicate improved strength in B UEs. - Progressing, not met 6/11/2019   4. Pt will report 80% improvement in ability to reach overhead without pain since start of care to indicate improved functional mobility.  - Progressing, not met 6/11/2019   5. Pt to be Independent with HEP to improve ROM and independence with ADL's. - Progressing, not met 6/11/2019     Plan     Progress shoulder AROM, manual techniques, and periscapular strengthening.     Angie Ta, PT

## 2019-06-13 ENCOUNTER — CLINICAL SUPPORT (OUTPATIENT)
Dept: REHABILITATION | Facility: HOSPITAL | Age: 54
End: 2019-06-13
Attending: ORTHOPAEDIC SURGERY
Payer: COMMERCIAL

## 2019-06-13 DIAGNOSIS — M62.81 MUSCLE WEAKNESS OF LEFT UPPER EXTREMITY: ICD-10-CM

## 2019-06-13 DIAGNOSIS — M25.512 CHRONIC LEFT SHOULDER PAIN: ICD-10-CM

## 2019-06-13 DIAGNOSIS — M25.612 DECREASED RANGE OF MOTION OF LEFT SHOULDER: ICD-10-CM

## 2019-06-13 DIAGNOSIS — M62.89 MUSCLE TIGHTNESS: ICD-10-CM

## 2019-06-13 DIAGNOSIS — G89.29 CHRONIC LEFT SHOULDER PAIN: ICD-10-CM

## 2019-06-13 PROCEDURE — 97140 MANUAL THERAPY 1/> REGIONS: CPT | Mod: PO

## 2019-06-13 PROCEDURE — 97110 THERAPEUTIC EXERCISES: CPT | Mod: PO

## 2019-06-13 NOTE — PROGRESS NOTES
PT/PTA met face to face to discuss patient's treatment plan and progress towards established goals. Treatment will be continued as described in initial report/eval and progress notes. Patient will be seen by physical therapist every sixth visit and minimally once per month.

## 2019-06-13 NOTE — PROGRESS NOTES
Physical Therapy Daily Treatment Note     Name: Hien Jeter  Clinic Number: [id number]    Therapy Diagnosis:   Encounter Diagnoses   Name Primary?    Chronic left shoulder pain     Muscle weakness of left upper extremity     Decreased range of motion of left shoulder     Muscle tightness      Physician: Natalee Nagel PA-C    Visit Date: 6/13/2019    Physician Orders: PT Eval and Treat   Medical Diagnosis from Referral: Status post arthroscopy of left shoulder  Evaluation Date: 5/2/2019  Last PN: 5/21/19 (visit 6)  Authorization Period Expiration: 12/31/19  Plan of Care Expiration: 8/2/19  Visit # / Visits authorized: 10 / 50  PTA Visit Number: 0    Time In: 0900  Time Out: 1000  Total Billable Time: 45 minutes  Charges: TE - 2, MT - 1    Precautions: Standard    Subjective     Pt reports: stiffness in shoulder this morning.   She was compliant with home exercise program.  Response to previous treatment: good  Functional change: none    Pain: 2/10  Location: left shoulder      Objective     Taken 06/10/19:  L shoulder flexion PROM: 155 deg  L shoulder flexion AROM: 135 deg    Hien received therapeutic exercises to develop strength, endurance, ROM, flexibility and posture for 40 minutes including:    UBE 3'F/3'B- NP  Pulleys flex/ scap: x 3 minutes each- NP  Wall slides w orange TB: x 20   Prone rows/ext: 2 x 10- NP  Flexion/scap slides on stair rail: 20 x 5 seconds each -NP  Supine sh flexion AAROM w dowel 5 sec hold x 30  Supine sh ER AAROM w dowel 5 sec hold x 20- NP  Rows w green TB x 20- NP  Sh IR w green TB and towel roll x 20- NP  Sh ext w green TB x 20  SL sh ER w towel roll x 20- NP  Freemotion shoulder flexion AAROM 10# x 3 min  Supine pec stretch w hands behind head w 2.5# at elbow x 3 min - NP  Serratus wall slides w bolster x 20- NP  +Shoulder flexion wall walk w lunge 5 sec hold x 30 (x 20 then x 10)  +AROM sh flex holding bolster and back against wall x 20  +Post cap stretch 3 x 30 sec    Pt  received moist heat to L shoulder at beginning of treatment session for 10 minutes.     Hien received the following manual therapy techniques x 10 min: Grade III inferior and anterior GH joint mobs, and STM to R subscapularis, pec, and infraspinatus     Home Exercises Provided and Patient Education Provided     Education provided:   -importance of proper posture with therapeutic exercises    Written Home Exercises Provided: Patient instructed to cont prior HEP. (wall slides, SL sh ER, B sh ext w theraband)  Exercises were reviewed and Hien was able to demonstrate them prior to the end of the session.  Hien demonstrated good  understanding of the education provided.     See EMR under Patient Instructions for exercises provided prior visit.    Assessment     Patient had good tolerance to treatment today with no adverse effects. Post-treatment L shoulder pain rated as 0/10. Improvement in symptoms with wall walks. Decreased compensations noted into greater shoulder flexion range. Pt continues with soft tissue dysfunction in subscapularis and infraspinatus. Some posterior capsule tightness.     Hien is progressing well towards her goals.   Pt prognosis is Good.     Pt will continue to benefit from skilled outpatient physical therapy to address the deficits listed in the problem list box on initial evaluation, provide pt/family education and to maximize pt's level of independence in the home and community environment.     Pt's spiritual, cultural and educational needs considered and pt agreeable to plan of care and goals.     Anticipated barriers to physical therapy: None     Goals:     Short Term Goals:  6 weeks  1. Report decreased L shoulder pain  <  / =  5/10 at worst to increase tolerance for driving. - met 5/21/19  2. Pt will increase L shoulder flexion AROM to 150 deg to indicate improved flexibility and mobility.  - met 5/21/19  3. Pt will be able to tolerate multi-directional UE strengthening without pain to  improve functional use of UEs. - met 5/21/19  4. Pt will report 50% improvement in ability to reach overhead without pain since start of care to indicate improved functional mobility. - met 5/21/19  5. Pt to tolerate HEP to improve ROM and independence with ADL's. - met 5/21/19     Long Term Goals: 12 weeks  1. Report decreased L shoulder pain  <  / =  3/10 at worst to increase tolerance for driving. - Progressing, not met 6/13/2019   2. Pt will demonstrate increase L shoulder AROM in all planes to indicate improved flexibility and mobility.  - Progressing, not met 6/13/2019   3. Pt will be able to perform 2 x 10 shoulder flexion with 2 lbs to indicate improved strength in B UEs. - Progressing, not met 6/13/2019   4. Pt will report 80% improvement in ability to reach overhead without pain since start of care to indicate improved functional mobility.  - Progressing, not met 6/13/2019   5. Pt to be Independent with HEP to improve ROM and independence with ADL's. - Progressing, not met 6/13/2019     Plan     Progress shoulder AROM, manual techniques, and periscapular strengthening.    Angie Ta, PT

## 2019-06-14 ENCOUNTER — TELEPHONE (OUTPATIENT)
Dept: PSYCHIATRY | Facility: CLINIC | Age: 54
End: 2019-06-14

## 2019-06-14 NOTE — TELEPHONE ENCOUNTER
Faxed disability paperwork to [employer] at [phone].  Copy scanned to chart and original will be provided to pt on Monday.

## 2019-06-17 ENCOUNTER — TELEPHONE (OUTPATIENT)
Dept: NEUROLOGY | Facility: CLINIC | Age: 54
End: 2019-06-17

## 2019-06-17 ENCOUNTER — OFFICE VISIT (OUTPATIENT)
Dept: PSYCHIATRY | Facility: CLINIC | Age: 54
End: 2019-06-17
Payer: COMMERCIAL

## 2019-06-17 DIAGNOSIS — F43.21 GRIEF: Primary | ICD-10-CM

## 2019-06-17 DIAGNOSIS — F41.1 GENERALIZED ANXIETY DISORDER: ICD-10-CM

## 2019-06-17 PROCEDURE — 90837 PR PSYCHOTHERAPY W/PATIENT, 60 MIN: ICD-10-PCS | Mod: S$GLB,,, | Performed by: SOCIAL WORKER

## 2019-06-17 PROCEDURE — 90837 PSYTX W PT 60 MINUTES: CPT | Mod: S$GLB,,, | Performed by: SOCIAL WORKER

## 2019-06-17 NOTE — PROGRESS NOTES
Individual Psychotherapy (PhD/LCSW)    6/17/2019    Site:  [facility]         Therapeutic Intervention: Met with patient.  Outpatient - Insight oriented psychotherapy 60 min - CPT code 88001 and Outpatient - Supportive psychotherapy 60 min - CPT Code 91922    Chief complaint/reason for encounter: grief, family stressors, anxiety    Interval history and content of current session: Hien arrived to session neatly dressed and with good hygiene.  She continues to report improvement in her mood with occasional grief reactions (tearfulness, sadness) related to her mother's death.  She shared that she is now able to talk about her mother without becoming overwhelmed by emotions or tearfulness.  She spent time in session, today, sharing memories of her mother and the qualities she possessed.  Hien continues to experience some stressful family dynamics, especially with her sister and brother-in-law.  She presented an example of something that occurred over the Father's Day weekend.  Hien was able to speak directly to the individual who's behavior she found irritating and then let the situation continue.  She admitted to feeling irritated for much of the afternoon but was still able to enjoy the family gathering.  SW informed Hien of this SWs upcoming move out-of-state.  Hien does not desire ongoing or regular counseling at this time, but understands this SW may be available for virtual visits and/or to assist Hien in connecting to another provider, if needed.      Treatment plan:  · Target symptoms: anxiety , grief  · Why chosen therapy is appropriate versus another modality: relevant to diagnosis, patient responds to this modality   · Outcome monitoring methods: self-report, observation      · Therapeutic intervention type: insight oriented psychotherapy, supportive psychotherapy    Risk parameters:  Patient reports no suicidal ideation  Patient reports no homicidal ideation  Patient reports no self-injurious  behavior  Patient reports no violent behavior    Verbal deficits: None    Patient's response to intervention:  The patient's response to intervention is accepting, motivated.    Progress toward goals and other mental status changes:  The patient's progress toward goals is good.    Diagnosis:     ICD-10-CM ICD-9-CM   1. Grief F43.21 309.0   2. Generalized anxiety disorder F41.1 300.02       Plan:  medication management by physician, contact SW if virtual visits are needed or to connect with a new therapist    Return to clinic: as needed     Length of Service (minutes): 60

## 2019-06-17 NOTE — TELEPHONE ENCOUNTER
----- Message from Shelbi Wisdom PA-C sent at 6/10/2019  4:01 PM CDT -----  Hep B surface Antibody-Positive  Hep B core antibody, total-negative  Hep B surface antigen-negative  CBC- [id number], [id number]

## 2019-06-18 ENCOUNTER — CLINICAL SUPPORT (OUTPATIENT)
Dept: REHABILITATION | Facility: HOSPITAL | Age: 54
End: 2019-06-18
Attending: ORTHOPAEDIC SURGERY
Payer: COMMERCIAL

## 2019-06-18 DIAGNOSIS — M62.81 MUSCLE WEAKNESS OF LEFT UPPER EXTREMITY: ICD-10-CM

## 2019-06-18 DIAGNOSIS — M25.512 CHRONIC LEFT SHOULDER PAIN: ICD-10-CM

## 2019-06-18 DIAGNOSIS — M25.612 DECREASED RANGE OF MOTION OF LEFT SHOULDER: ICD-10-CM

## 2019-06-18 DIAGNOSIS — M62.89 MUSCLE TIGHTNESS: ICD-10-CM

## 2019-06-18 DIAGNOSIS — G89.29 CHRONIC LEFT SHOULDER PAIN: ICD-10-CM

## 2019-06-18 PROCEDURE — 97140 MANUAL THERAPY 1/> REGIONS: CPT | Mod: PO

## 2019-06-18 PROCEDURE — 97110 THERAPEUTIC EXERCISES: CPT | Mod: PO

## 2019-06-18 NOTE — PROGRESS NOTES
Physical Therapy Daily Treatment Note     Name: Hien Jeter  Clinic Number: [id number]    Therapy Diagnosis:   Encounter Diagnoses   Name Primary?    Chronic left shoulder pain     Muscle weakness of left upper extremity     Decreased range of motion of left shoulder     Muscle tightness      Physician: Natalee Nagel PA-C    Visit Date: 6/18/2019    Physician Orders: PT Eval and Treat   Medical Diagnosis from Referral: Status post arthroscopy of left shoulder  Evaluation Date: 5/2/2019  Last PN: 6/18/19 (visit 11)  Authorization Period Expiration: 12/31/19   Plan of Care Expiration: 8/2/19  Visit # / Visits authorized: 11 / 50  PTA Visit Number: 0     Time In: 0904  Time Out: 1000  Total Billable Time: 56 minutes  Charges: TE - 2, MT - 1    Precautions: Standard    Subjective     Pt reports: she was performing her wall stretches over the weekend and felt some pain in her upper trap. It has gotten better since but is  to touch. Pain is at worst 3/10. 60% improvement in ability to reach overhead without pain since start of care.   She was compliant with home exercise program.  Response to previous treatment: good  Functional change: her arm is more functional now    Pain: 0/10  Location: left shoulder      Objective     Taken 06/18/19:  L shoulder flexion PROM: 150 deg  L shoulder flexion AROM: 145 deg    Hien received therapeutic exercises to develop strength, endurance, ROM, flexibility and posture for 36 minutes including:    UBE 3'F/3'B- NP  Pulleys flex/ scap: x 3 minutes each- NP  Wall slides w orange TB: x 20- NP  Prone rows/ext: 2 x 10- NP  Flexion/scap slides on stair rail: 20 x 5 seconds each -NP  Supine sh flexion AAROM w dowel 5 sec hold x 30- NP  Supine sh ER AAROM w dowel 5 sec hold x 20- NP  Rows w green TB x 20- NP  Sh IR w green TB and towel roll x 20- NP  Sh ext w green TB x 20- NP  SL sh ER w towel roll x 20- NP   Freemotion shoulder flexion AAROM 10# x 3 min  Supine pec  stretch w hands behind head x 3 min  Serratus wall slides w bolster x 20- NP  Shoulder flexion wall walk w lunge 5 sec hold x 20  Supine AROM sh flex holding bolster 5 sec hold x 20  Post cap stretch 3 x 30 sec    Pt received moist heat to L shoulder at beginning of treatment session for 10 minutes.     Hien received the following manual therapy techniques x 10 min: Grade III inferior and anterior GH joint mobs, and STM to R subscapularis, pec, infraspinatus, and upper trap/scalenes    Home Exercises Provided and Patient Education Provided     Education provided:   -importance of proper posture with therapeutic exercises    Written Home Exercises Provided: Patient instructed to cont prior HEP. (wall slides, SL sh ER, B sh ext w theraband)  Exercises were reviewed and Hien was able to demonstrate them prior to the end of the session.  Hien demonstrated good  understanding of the education provided.     See EMR under Patient Instructions for exercises provided prior visit.    Functional Limitation Report:  CMS Impairment/Limitation/Restriction for FOTO Shoulder Survey  Status Limitation G-Code CMS Severity Modifier  Intake 49% 51%  Predicted 69% 31% Goal Status+ CJ - At least 20 percent but less than 40 percent  5/21/2019 57% 43%  6/18/2019 64% 36% Current Status CJ - At least 20 percent but less than 40 percent  D/C Status CJ **only report if this is discharge survey  +Based on FOTO predicted change score     Assessment     Patient was re-assessed today with 1/5 LTGs being met indicating improvements in L shoulder pain since start of care. Improving in shoulder AROM. She continues with L shoulder pain, decreased L shoulder AROM, UE weakness, soft tissue dysfunction, and decreased functional use of UE. Pt could benefit from continued physical therapy services to address deficits.     She had good tolerance to treatment today with no adverse effects. Post-treatment L shoulder pain rated as 0/10. Improvement in  shoulder AROM by end of session. Decreased L shoulder pain with posterior capsule stretch. Increased ease and less compensation in upper trap with active shoulder flexion.     Hien is progressing well towards her goals.   Pt prognosis is Good.     Pt will continue to benefit from skilled outpatient physical therapy to address the deficits listed in the problem list box on initial evaluation, provide pt/family education and to maximize pt's level of independence in the home and community environment.     Pt's spiritual, cultural and educational needs considered and pt agreeable to plan of care and goals.     Anticipated barriers to physical therapy: None     Goals:     Short Term Goals:  6 weeks  1. Report decreased L shoulder pain  <  / =  5/10 at worst to increase tolerance for driving. - met 5/21/19  2. Pt will increase L shoulder flexion AROM to 150 deg to indicate improved flexibility and mobility.  - met 5/21/19  3. Pt will be able to tolerate multi-directional UE strengthening without pain to improve functional use of UEs. - met 5/21/19  4. Pt will report 50% improvement in ability to reach overhead without pain since start of care to indicate improved functional mobility. - met 5/21/19  5. Pt to tolerate HEP to improve ROM and independence with ADL's. - met 5/21/19     Long Term Goals: 12 weeks  1. Report decreased L shoulder pain  <  / =  3/10 at worst to increase tolerance for driving. - met 6/18/2019   2. Pt will demonstrate increase L shoulder AROM in all planes to indicate improved flexibility and mobility.  - Progressing, not met 6/18/2019   3. Pt will be able to perform 2 x 10 shoulder flexion with 2 lbs to indicate improved strength in B UEs. - Progressing, not met 6/18/2019   4. Pt will report 80% improvement in ability to reach overhead without pain since start of care to indicate improved functional mobility.  - Progressing, not met 6/18/2019   5. Pt to be Independent with HEP to improve ROM and  independence with ADL's. - Progressing, not met 6/18/2019     Plan     Progress shoulder AROM, manual techniques, and periscapular strengthening.    Angie Ta, PT

## 2019-06-20 ENCOUNTER — CLINICAL SUPPORT (OUTPATIENT)
Dept: REHABILITATION | Facility: HOSPITAL | Age: 54
End: 2019-06-20
Attending: ORTHOPAEDIC SURGERY
Payer: COMMERCIAL

## 2019-06-20 DIAGNOSIS — G89.29 CHRONIC LEFT SHOULDER PAIN: ICD-10-CM

## 2019-06-20 DIAGNOSIS — M25.612 DECREASED RANGE OF MOTION OF LEFT SHOULDER: ICD-10-CM

## 2019-06-20 DIAGNOSIS — M25.512 CHRONIC LEFT SHOULDER PAIN: ICD-10-CM

## 2019-06-20 DIAGNOSIS — M62.89 MUSCLE TIGHTNESS: ICD-10-CM

## 2019-06-20 DIAGNOSIS — M62.81 MUSCLE WEAKNESS OF LEFT UPPER EXTREMITY: ICD-10-CM

## 2019-06-20 PROCEDURE — 97110 THERAPEUTIC EXERCISES: CPT | Mod: PO

## 2019-06-20 NOTE — PROGRESS NOTES
Physical Therapy Daily Treatment Note     Name: Hien Jeter  Clinic Number: [id number]    Therapy Diagnosis:   Encounter Diagnoses   Name Primary?    Chronic left shoulder pain     Muscle weakness of left upper extremity     Decreased range of motion of left shoulder     Muscle tightness      Physician: Natalee Nagel PA-C    Visit Date: 6/20/2019    Physician Orders: PT Eval and Treat   Medical Diagnosis from Referral: Status post arthroscopy of left shoulder  Evaluation Date: 5/2/2019  Last PN: 6/18/19 (visit 11)  Authorization Period Expiration: 12/31/19   Plan of Care Expiration: 8/2/19  Visit # / Visits authorized: 12 / 50  PTA Visit Number: 0     Time In: 0900  Time Out: 1000  Total Billable Time: 30 minutes  Charges: TE - 2    Precautions: Standard    Subjective     Pt reports: no new complaints.   She was compliant with home exercise program.  Response to previous treatment: good  Functional change: easier to reach overhead    Pain: 0/10  Location: left shoulder      Objective     Taken 06/20/19:  L shoulder flexion PROM: 160 deg  L shoulder flexion AROM: 145 deg    Hien received therapeutic exercises to develop strength, endurance, ROM, flexibility and posture for 40 minutes including:    UBE 3'F/3'B  Pulleys flex/ scap: x 3 minutes each- NP  Wall slides w orange TB: x 20- NP  Prone rows/ext: 2 x 10- NP  Flexion/scap slides on stair rail: 20 x 5 seconds each -NP  Supine sh flexion AAROM w dowel 5 sec hold x 30- NP  Supine sh ER AAROM w dowel 5 sec hold x 20- NP  Rows w green TB x 20- NP  Sh IR w green TB and towel roll x 20- NP  Sh ext w green TB x 20- NP   SL sh ER w towel roll x 20- NP   Freemotion shoulder flexion AAROM facing away 10# x 3 minutes  Supine pec stretch w hands behind head x 3 minutes 2#  Serratus wall slides w bolster x 20- NP  Shoulder flexion wall walk w lunge 5 sec hold x 20  Supine AROM sh flex holding bolster 5 sec hold x 20  Post cap stretch 3 x 30 sec  Supine flexion 2# x  20    Pt received moist heat to L shoulder at beginning of treatment session for 10 minutes.     Hien received the following manual therapy techniques x 10 min: Grade III inferior and posterior GH joint mobs, and STM to R subscapularis, pec, infraspinatus, and upper trap/scalenes    Home Exercises Provided and Patient Education Provided     Education provided:   -importance of proper posture with therapeutic exercises    Written Home Exercises Provided: Patient instructed to cont prior HEP. (wall slides, SL sh ER, B sh ext w theraband)  Exercises were reviewed and Hien was able to demonstrate them prior to the end of the session.  Hien demonstrated good  understanding of the education provided.     See EMR under Patient Instructions for exercises provided prior visit.    Assessment     Patient had good tolerance to treatment today with no adverse effects. Post-treatment L shoulder pain rated as 0/10. Good response to progression of exercise program. Improvement in shoulder flexion PROM with with manual posterior glide of humeral head. Continues with soft tissue dysfunction in infraspinatus and subscapularis. Decreased compensatory mechanisms noted with overhead range.     Hien is progressing well towards her goals.   Pt prognosis is Good.     Pt will continue to benefit from skilled outpatient physical therapy to address the deficits listed in the problem list box on initial evaluation, provide pt/family education and to maximize pt's level of independence in the home and community environment.     Pt's spiritual, cultural and educational needs considered and pt agreeable to plan of care and goals.     Anticipated barriers to physical therapy: None     Goals:     Short Term Goals:  6 weeks  1. Report decreased L shoulder pain  <  / =  5/10 at worst to increase tolerance for driving. - met 5/21/19  2. Pt will increase L shoulder flexion AROM to 150 deg to indicate improved flexibility and mobility. - met  5/21/19  3. Pt will be able to tolerate multi-directional UE strengthening without pain to improve functional use of UEs.- met 5/21/19  4. Pt will report 50% improvement in ability to reach overhead without pain since start of care to indicate improved functional mobility. - met 5/21/19  5. Pt to tolerate HEP to improve ROM and independence with ADL's. - met 5/21/19     Long Term Goals: 12 weeks  1. Report decreased L shoulder pain  <  / =  3/10 at worst to increase tolerance for driving. - met 6/18/2019   2. Pt will demonstrate increase L shoulder AROM in all planes to indicate improved flexibility and mobility.  - Progressing, not met 6/20/2019   3. Pt will be able to perform 2 x 10 shoulder flexion with 2 lbs to indicate improved strength in B UEs. - Progressing, not met 6/20/2019   4. Pt will report 80% improvement in ability to reach overhead without pain since start of care to indicate improved functional mobility.  - Progressing, not met 6/20/2019   5. Pt to be Independent with HEP to improve ROM and independence with ADL's. - Progressing, not met 6/20/2019     Plan     Progress shoulder AROM, manual techniques, and periscapular strengthening.    Angie Ta, PT

## 2019-06-25 ENCOUNTER — CLINICAL SUPPORT (OUTPATIENT)
Dept: REHABILITATION | Facility: HOSPITAL | Age: 54
End: 2019-06-25
Attending: ORTHOPAEDIC SURGERY
Payer: COMMERCIAL

## 2019-06-25 DIAGNOSIS — M62.89 MUSCLE TIGHTNESS: ICD-10-CM

## 2019-06-25 DIAGNOSIS — M25.612 DECREASED RANGE OF MOTION OF LEFT SHOULDER: ICD-10-CM

## 2019-06-25 DIAGNOSIS — G89.29 CHRONIC LEFT SHOULDER PAIN: ICD-10-CM

## 2019-06-25 DIAGNOSIS — M62.81 MUSCLE WEAKNESS OF LEFT UPPER EXTREMITY: ICD-10-CM

## 2019-06-25 DIAGNOSIS — M25.512 CHRONIC LEFT SHOULDER PAIN: ICD-10-CM

## 2019-06-25 PROCEDURE — 97140 MANUAL THERAPY 1/> REGIONS: CPT | Mod: PO

## 2019-06-25 PROCEDURE — 97110 THERAPEUTIC EXERCISES: CPT | Mod: PO

## 2019-06-25 NOTE — PROGRESS NOTES
Physical Therapy Daily Treatment Note     Name: Hien Jeter  Clinic Number: [id number]    Therapy Diagnosis:   Encounter Diagnoses   Name Primary?    Chronic left shoulder pain     Muscle weakness of left upper extremity     Decreased range of motion of left shoulder     Muscle tightness      Physician: Natalee Nagel PA-C    Visit Date: 6/25/2019    Physician Orders: PT Eval and Treat   Medical Diagnosis from Referral: Status post arthroscopy of left shoulder  Evaluation Date: 5/2/2019  Last PN: 6/18/19 (visit 11)  Authorization Period Expiration: 12/31/19   Plan of Care Expiration: 8/2/19  Visit # / Visits authorized: 13 / 50  PTA Visit Number: 1     Time In: 0900  Time Out: 1000  Total Billable Time: 60 minutes  Charges: TE - 3 MT- 1     Precautions: Standard    Subjective     Pt reports: no new complaints.   She was compliant with home exercise program.  Response to previous treatment: good  Functional change: easier to reach overhead    Pain: 0/10  Location: left shoulder      Objective     Taken 06/20/19:  L shoulder flexion PROM: 160 deg  L shoulder flexion AROM: 145 deg    Hien received therapeutic exercises to develop strength, endurance, ROM, flexibility and posture for 45 minutes including:    UBE 3'F/3'B  Freemotion shoulder flexion AAROM facing away 10# x 3 minutes  Supine pec stretch w hands behind head x 3 minutes 2#  Serratus wall slides w bolster x 20  Shoulder flexion wall walk w lunge 5 sec hold x 20  Supine AROM sh flex holding bolster 5 sec hold x 20  Post cap stretch 3 x 30 sec  Supine flexion 2# x 20  Rows w green TB x 20  Sh IR w green TB and towel roll x 20  Sh ext w green TB x 20  SL sh ER w towel roll x 20      Not performed today:  Pulleys flex/ scap: x 3 minutes each- NP  Wall slides w orange TB: x 20- NP  Prone rows/ext: 2 x 10- NP  Flexion/scap slides on stair rail: 20 x 5 seconds each -NP  Supine sh flexion AAROM w dowel 5 sec hold x 30- NP  Supine sh ER AAROM w dowel 5 sec  hold x 20- NP      Pt received moist heat to L shoulder at beginning of treatment session for 10 minutes.     Hien received the following manual therapy techniques x 15 min: Grade III inferior and posterior GH joint mobs, and STM to R subscapularis, pec, infraspinatus, and upper trap/scalenes    Home Exercises Provided and Patient Education Provided     Education provided:   -importance of proper posture with therapeutic exercises    Written Home Exercises Provided: Patient instructed to cont prior HEP. (wall slides, SL sh ER, B sh ext w theraband)  Exercises were reviewed and Hien was able to demonstrate them prior to the end of the session.  Hien demonstrated good  understanding of the education provided.     See EMR under Patient Instructions for exercises provided prior visit.    Assessment     Patient had good tolerance to today's therapy session. Patient with improving strength and range of motion and improved form with therex noting decreased compensations tawanna with overhead motions.     Hien is progressing well towards her goals.   Pt prognosis is Good.     Pt will continue to benefit from skilled outpatient physical therapy to address the deficits listed in the problem list box on initial evaluation, provide pt/family education and to maximize pt's level of independence in the home and community environment.     Pt's spiritual, cultural and educational needs considered and pt agreeable to plan of care and goals.     Anticipated barriers to physical therapy: None     Goals:     Short Term Goals:  6 weeks  1. Report decreased L shoulder pain  <  / =  5/10 at worst to increase tolerance for driving. - met 5/21/19  2. Pt will increase L shoulder flexion AROM to 150 deg to indicate improved flexibility and mobility. - met 5/21/19  3. Pt will be able to tolerate multi-directional UE strengthening without pain to improve functional use of UEs.- met 5/21/19  4. Pt will report 50% improvement in ability to reach  overhead without pain since start of care to indicate improved functional mobility. - met 5/21/19  5. Pt to tolerate HEP to improve ROM and independence with ADL's. - met 5/21/19     Long Term Goals: 12 weeks  1. Report decreased L shoulder pain  <  / =  3/10 at worst to increase tolerance for driving. - met 6/18/2019   2. Pt will demonstrate increase L shoulder AROM in all planes to indicate improved flexibility and mobility.  - Progressing, not met 6/25/2019   3. Pt will be able to perform 2 x 10 shoulder flexion with 2 lbs to indicate improved strength in B UEs. - Progressing, not met 6/25/2019   4. Pt will report 80% improvement in ability to reach overhead without pain since start of care to indicate improved functional mobility.  - Progressing, not met 6/25/2019   5. Pt to be Independent with HEP to improve ROM and independence with ADL's. - Progressing, not met 6/25/2019     Plan     Progress shoulder AROM, manual techniques, and periscapular strengthening.    Sophie Butcher, PTA

## 2019-06-27 ENCOUNTER — CLINICAL SUPPORT (OUTPATIENT)
Dept: REHABILITATION | Facility: HOSPITAL | Age: 54
End: 2019-06-27
Attending: ORTHOPAEDIC SURGERY
Payer: COMMERCIAL

## 2019-06-27 DIAGNOSIS — M25.612 DECREASED RANGE OF MOTION OF LEFT SHOULDER: ICD-10-CM

## 2019-06-27 DIAGNOSIS — M62.89 MUSCLE TIGHTNESS: ICD-10-CM

## 2019-06-27 DIAGNOSIS — M62.81 MUSCLE WEAKNESS OF LEFT UPPER EXTREMITY: ICD-10-CM

## 2019-06-27 DIAGNOSIS — M25.512 CHRONIC LEFT SHOULDER PAIN: ICD-10-CM

## 2019-06-27 DIAGNOSIS — G89.29 CHRONIC LEFT SHOULDER PAIN: ICD-10-CM

## 2019-06-27 PROCEDURE — 97140 MANUAL THERAPY 1/> REGIONS: CPT | Mod: PO

## 2019-06-27 PROCEDURE — 97110 THERAPEUTIC EXERCISES: CPT | Mod: PO

## 2019-06-27 NOTE — PROGRESS NOTES
Physical Therapy Daily Treatment Note     Name: Hien Jeter  Clinic Number: [id number]    Therapy Diagnosis:   Encounter Diagnoses   Name Primary?    Chronic left shoulder pain     Muscle weakness of left upper extremity     Decreased range of motion of left shoulder     Muscle tightness      Physician: Natalee Nagel PA-C    Visit Date: 6/27/2019    Physician Orders: PT Eval and Treat   Medical Diagnosis from Referral: Status post arthroscopy of left shoulder  Evaluation Date: 5/2/2019  Last PN: 6/18/19 (visit 11)  Authorization Period Expiration: 12/31/19   Plan of Care Expiration: 8/2/19  Visit # / Visits authorized: 14 / 50  PTA Visit Number: 0     Time In: 1000  Time Out: 1100  Total Billable Time: 60 minutes  Charges: TE - 3 MT- 1     Precautions: Standard    Subjective     Pt reports: no pain and improved use of L arm when lifting items from overhead shelves.  She was compliant with home exercise program.  Response to previous treatment: good  Functional change: easier to reach overhead    Pain: 0/10  Location: left shoulder      Objective     Taken 06/20/19:  L shoulder flexion PROM: 160 deg  L shoulder flexion AROM: 140 deg  L shoulder abd AROM: 145 deg    BOLD= performed today    Hien received therapeutic exercises to develop strength, endurance, ROM, flexibility and posture for 40 minutes including:    UBE 3'F/3'B  Freemotion shoulder flexion AAROM facing away 10# x 3 minutes  Supine pec stretch w hands behind head x 3 minutes 2#- NP  Serratus wall slides w bolster x 20  Shoulder flexion wall walk w lunge 5 sec hold x 20  Supine AROM sh flex holding bolster 5 sec hold x 20  Post cap stretch 3 x 30 sec  Supine flexion 2# x 20  Rows w green TB x 20  Sh IR w green TB and towel roll x 20  Sh ext w green TB x 20  SL sh ER w towel roll x 20  +Wall push up plus x 20   +D2 flex w green TB x 20  Side lying horiz abd + serratus punches w/ Green TB for inferior glide: x 20    Not performed today:  Pulleys  flex/ scap: x 3 minutes each- NP  Wall slides w orange TB: x 20- NP  Prone rows/ext: 2 x 10- NP  Flexion/scap slides on stair rail: 20 x 5 seconds each -NP  Supine sh flexion AAROM w dowel 5 sec hold x 30- NP  Supine sh ER AAROM w dowel 5 sec hold x 20- NP    Pt received moist heat to L shoulder at beginning of treatment session for 10 minutes.     Hien received the following manual therapy techniques x 10 min: Grade III inferior and posterior GH joint mobs, and STM to R subscapularis, pec, infraspinatus, and upper trap/scalenes    Home Exercises Provided and Patient Education Provided     Education provided:   -importance of proper posture with therapeutic exercises    Written Home Exercises Provided: Patient instructed to cont prior HEP. (wall slides, SL sh ER, B sh ext w theraband)  Exercises were reviewed and Hien was able to demonstrate them prior to the end of the session.  Hien demonstrated good  understanding of the education provided.     See EMR under Patient Instructions for exercises provided prior visit.    Assessment     Patient had good tolerance to treatment today with no adverse effects. Post-treatment L shoulder pain rated as 0/10. Pt tolerated manual therapy well with some pain at end range flexion and abduction. Continues to demonstrate compensatory mechanisms with shoulder range above 100 deg. Pt had good response to moist heat with no adverse effects. She had some pain with reaching arm outward that was improved with inferior glide during serratus punches in side lying. Normal exercise-induced fatigue expressed at end of therapy treatment session. Pt will continue therapy for improvement in L shoulder ROM and strength.    Hien is progressing well towards her goals.   Pt prognosis is Good.     Pt will continue to benefit from skilled outpatient physical therapy to address the deficits listed in the problem list box on initial evaluation, provide pt/family education and to maximize pt's level  of independence in the home and community environment.     Pt's spiritual, cultural and educational needs considered and pt agreeable to plan of care and goals.     Anticipated barriers to physical therapy: None     Goals:     Short Term Goals:  6 weeks  1. Report decreased L shoulder pain  <  / =  5/10 at worst to increase tolerance for driving. - met 5/21/19  2. Pt will increase L shoulder flexion AROM to 150 deg to indicate improved flexibility and mobility. - met 5/21/19  3. Pt will be able to tolerate multi-directional UE strengthening without pain to improve functional use of UEs.- met 5/21/19  4. Pt will report 50% improvement in ability to reach overhead without pain since start of care to indicate improved functional mobility. - met 5/21/19  5. Pt to tolerate HEP to improve ROM and independence with ADL's. - met 5/21/19     Long Term Goals: 12 weeks  1. Report decreased L shoulder pain  <  / =  3/10 at worst to increase tolerance for driving. - met 6/18/2019   2. Pt will demonstrate increase L shoulder AROM in all planes to indicate improved flexibility and mobility.  - Progressing, not met 6/27/2019   3. Pt will be able to perform 2 x 10 shoulder flexion with 2 lbs to indicate improved strength in B UEs. - Progressing, not met 6/27/2019   4. Pt will report 80% improvement in ability to reach overhead without pain since start of care to indicate improved functional mobility.  - Progressing, not met 6/27/2019   5. Pt to be Independent with HEP to improve ROM and independence with ADL's. - Progressing, not met 6/27/2019     Plan     Progress shoulder AROM, manual techniques, and periscapular strengthening.    Tj Law, SPT  Angie Ta, PT   I certify that I was present in the room directing the student in service delivery and guiding them using my skilled judgment. As the co-signing therapist I have reviewed the students documentation and am responsible for the treatment, assessment, and plan.

## 2019-07-02 ENCOUNTER — CLINICAL SUPPORT (OUTPATIENT)
Dept: REHABILITATION | Facility: HOSPITAL | Age: 54
End: 2019-07-02
Attending: ORTHOPAEDIC SURGERY
Payer: COMMERCIAL

## 2019-07-02 DIAGNOSIS — M62.81 MUSCLE WEAKNESS OF LEFT UPPER EXTREMITY: ICD-10-CM

## 2019-07-02 DIAGNOSIS — M62.89 MUSCLE TIGHTNESS: ICD-10-CM

## 2019-07-02 DIAGNOSIS — G89.29 CHRONIC LEFT SHOULDER PAIN: ICD-10-CM

## 2019-07-02 DIAGNOSIS — M25.612 DECREASED RANGE OF MOTION OF LEFT SHOULDER: ICD-10-CM

## 2019-07-02 DIAGNOSIS — M25.512 CHRONIC LEFT SHOULDER PAIN: ICD-10-CM

## 2019-07-02 PROCEDURE — 97110 THERAPEUTIC EXERCISES: CPT | Mod: PO

## 2019-07-02 PROCEDURE — 97140 MANUAL THERAPY 1/> REGIONS: CPT | Mod: PO

## 2019-07-02 NOTE — PROGRESS NOTES
Physical Therapy Daily Treatment Note     Name: Hien Jeter  Clinic Number: [id number]    Therapy Diagnosis:   Encounter Diagnoses   Name Primary?    Chronic left shoulder pain     Muscle weakness of left upper extremity     Decreased range of motion of left shoulder     Muscle tightness      Physician: Natalee Nagel PA-C    Visit Date: 7/2/2019    Physician Orders: PT Eval and Treat   Medical Diagnosis from Referral: Status post arthroscopy of left shoulder  Evaluation Date: 5/2/2019  Last PN: 6/18/19 (visit 11)  Authorization Period Expiration: 12/31/19   Plan of Care Expiration: 8/2/19  Visit # / Visits authorized: 15 / 50  PTA Visit Number: 0     Time In: 204 PM  Time Out: 305 PM  Total Billable Time: 51 minutes  Charges: TE - 3 MT- 1     Precautions: Standard    Subjective     Pt reports: slight pain prior to therapy today. She states her pain is deep in the joint and occurs when she raises her arm overhead without a bend in the elbow.  She was compliant with home exercise program.  Response to previous treatment: good  Functional change: easier to reach overhead    Pain: 2/10  Location: left shoulder      Objective     Taken 06/20/19:  L shoulder flexion PROM: 160 deg  L shoulder flexion AROM: 140 deg  L shoulder abd AROM: 145 deg    BOLD= performed today    Hien received therapeutic exercises to develop strength, endurance, ROM, flexibility and posture for 40 minutes including:    UBE 3'F/3'B  Freemotion shoulder flexion AAROM facing away 10# x 3 minutes  Supine pec stretch w hands behind head x 3 minutes 2#- NP  Serratus wall slides w bolster x 20  Shoulder flexion wall walk w lunge 5 sec hold x 20  Supine AROM sh flex holding bolster 5 sec hold x 20  Post cap stretch 3 x 30 sec  Supine flexion 2# x 20  Rows w green TB x 20  Sh IR w green TB and towel roll x 20  Sh ext w green TB x 20  SL sh ER w towel roll x 20  Wall push up plus x 20   D2 flex w green TB x 20  Side lying horiz abd + serratus  punches w/ Green TB for inferior glide: x 20    Not performed today:  Pulleys flex/ scap: x 3 minutes each- NP  Wall slides w orange TB: x 20- NP  Prone rows/ext: 2 x 10- NP  Flexion/scap slides on stair rail: 20 x 5 seconds each -NP  Supine sh flexion AAROM w dowel 5 sec hold x 30- NP  Supine sh ER AAROM w dowel 5 sec hold x 20- NP    Pt received moist heat to L shoulder at beginning of treatment session for 10 minutes.     Hien received the following manual therapy techniques x 11 min: Grade III inferior and posterior GH joint mobs, and STM to L subscapularis, pec, infraspinatus, and upper trap/scalenes    Home Exercises Provided and Patient Education Provided     Education provided:   -importance of proper posture with therapeutic exercises    Written Home Exercises Provided: Patient instructed to cont prior HEP. (wall slides, SL sh ER, B sh ext w theraband)  Exercises were reviewed and Hien was able to demonstrate them prior to the end of the session.  Hien demonstrated good  understanding of the education provided.     See EMR under Patient Instructions for exercises provided prior visit.    Assessment     Pt tolerated tx session well today with min cueing required to prevent compensatory strategies such as trunk rotation and upper trap compensation with activities requiring L arm elevation. Pt tolerated manual therapy well with some pain at end range flexion and abduction and some discomfort with STM to L subscapularis mm. Pt reports compliance with her HEP. Pt had good response to moist heat prior to session with no adverse effects. Pt will continue therapy for improvement in L shoulder ROM and strength.    Hien is progressing well towards her goals.   Pt prognosis is Good.     Pt will continue to benefit from skilled outpatient physical therapy to address the deficits listed in the problem list box on initial evaluation, provide pt/family education and to maximize pt's level of independence in the home  and community environment.     Pt's spiritual, cultural and educational needs considered and pt agreeable to plan of care and goals.     Anticipated barriers to physical therapy: None     Goals:     Short Term Goals:  6 weeks  1. Report decreased L shoulder pain  <  / =  5/10 at worst to increase tolerance for driving. - met 5/21/19  2. Pt will increase L shoulder flexion AROM to 150 deg to indicate improved flexibility and mobility. - met 5/21/19  3. Pt will be able to tolerate multi-directional UE strengthening without pain to improve functional use of UEs.- met 5/21/19  4. Pt will report 50% improvement in ability to reach overhead without pain since start of care to indicate improved functional mobility. - met 5/21/19  5. Pt to tolerate HEP to improve ROM and independence with ADL's. - met 5/21/19     Long Term Goals: 12 weeks  1. Report decreased L shoulder pain  <  / =  3/10 at worst to increase tolerance for driving. - met 6/18/2019   2. Pt will demonstrate increase L shoulder AROM in all planes to indicate improved flexibility and mobility.  - Progressing, not met 7/2/2019   3. Pt will be able to perform 2 x 10 shoulder flexion with 2 lbs to indicate improved strength in B UEs. - Progressing, not met 7/2/2019   4. Pt will report 80% improvement in ability to reach overhead without pain since start of care to indicate improved functional mobility.  - Progressing, not met 7/2/2019   5. Pt to be Independent with HEP to improve ROM and independence with ADL's. - Progressing, not met 7/2/2019     Plan     Progress shoulder AROM, manual techniques, and periscapular strengthening.    Ashley Holstein, PT

## 2019-07-03 ENCOUNTER — OFFICE VISIT (OUTPATIENT)
Dept: ORTHOPEDICS | Facility: CLINIC | Age: 54
End: 2019-07-03
Attending: ORTHOPAEDIC SURGERY
Payer: COMMERCIAL

## 2019-07-03 VITALS — HEIGHT: 62 IN | BODY MASS INDEX: 24.29 KG/M2 | WEIGHT: 132 LBS

## 2019-07-03 DIAGNOSIS — M75.42 IMPINGEMENT SYNDROME OF LEFT SHOULDER: Primary | ICD-10-CM

## 2019-07-03 PROCEDURE — 99999 PR PBB SHADOW E&M-EST. PATIENT-LVL III: ICD-10-PCS | Mod: PBBFAC,,, | Performed by: ORTHOPAEDIC SURGERY

## 2019-07-03 PROCEDURE — 99024 POSTOP FOLLOW-UP VISIT: CPT | Mod: S$GLB,,, | Performed by: ORTHOPAEDIC SURGERY

## 2019-07-03 PROCEDURE — 99024 PR POST-OP FOLLOW-UP VISIT: ICD-10-PCS | Mod: S$GLB,,, | Performed by: ORTHOPAEDIC SURGERY

## 2019-07-03 PROCEDURE — 99999 PR PBB SHADOW E&M-EST. PATIENT-LVL III: CPT | Mod: PBBFAC,,, | Performed by: ORTHOPAEDIC SURGERY

## 2019-07-03 RX ORDER — HYDROCODONE BITARTRATE AND ACETAMINOPHEN 7.5; 325 MG/1; MG/1
1 TABLET ORAL EVERY 12 HOURS PRN
Qty: 20 TABLET | Refills: 0 | Status: SHIPPED | OUTPATIENT
Start: 2019-07-03 | End: 2019-10-23

## 2019-07-03 NOTE — PROGRESS NOTES
Subjective:      Patient ID: Hien Jeter is a 54 y.o. female.  Chief Complaint: Pain and Follow-up of the Left Shoulder      HPI  Hien Jeter is a  54 y.o. female presenting today for post op visit.  She is s/p left shoulder arthroscopy and decompression  She is now about 2 and half months postop she is doing well currently in therapy pain minimal.     Review of patient's allergies indicates:   Allergen Reactions    Diclofenac Nausea Only    Ibuprofen Other (See Comments)    Tramadol Other (See Comments)     Nausea          Current Outpatient Medications   Medication Sig Dispense Refill    azelastine (ASTELIN) 137 mcg (0.1 %) nasal spray 1 spray (137 mcg total) by Nasal route 2 (two) times daily. (Patient taking differently: 1 spray by Nasal route continuous prn. ) 30 mL 11    baclofen (LIORESAL) 10 MG tablet TAKE 1/2 TABLET BY MOUTH 2-3 TIMES A DAY 60 tablet 5    cholecalciferol, vitamin D3, 3,000 unit Tab Take 3,000 Units by mouth once daily.       escitalopram oxalate (LEXAPRO) 10 MG tablet TAKE 1.5 TABLETS (15 MG TOTAL) BY MOUTH EVERY EVENING. 45 tablet 6    estradiol-norethindrone (ACTIVELLA) 1-0.5 mg per tablet Take 1 tablet by mouth once daily. 90 tablet 3    flaxseed oil 1,000 mg Cap Take 1 capsule by mouth once daily.       magnesium oxide (MAG-OX) 400 mg (241.3 mg magnesium) tablet Take 1 tablet (400 mg total) by mouth 2 (two) times daily. 60 tablet 12    multivitamin-Ca-iron-minerals (ONE-A-DAY WOMENS FORMULA) 27-0.4 mg Tab Take 1 tablet by mouth once daily.       nortriptyline (PAMELOR) 10 MG capsule Take 2 capsules (20 mg total) by mouth every evening. 60 capsule 11    riboflavin, vitamin B2, (VITAMIN B-2) 100 mg Tab tablet TAKE 2 TABLETS BY MOUTH ONCE DAILY. 60 tablet 11    ZOLMitriptan (ZOMIG) 5 mg [place] SPRAY 1 SPRAY IN NOSTRIL ONCE AS NEEDED. Max 2 doses in 24 hrs 12 each 12    HYDROcodone-acetaminophen (NORCO) 7.5-325 mg per tablet Take 1 tablet by mouth every 4 (four) hours as  "needed for Pain. 15 tablet 0    HYDROcodone-acetaminophen (NORCO) 7.5-325 mg per tablet Take 1 tablet by mouth every 12 (twelve) hours as needed for Pain. 20 tablet 0    topiramate (TOPAMAX) 25 MG tablet Take 1 tablet (25 mg total) by mouth 2 (two) times daily. 60 tablet 12     No current facility-administered medications for this visit.        Past Medical History:   Diagnosis Date    Anxiety     Basal cell carcinoma 2000    left eyebrow     Depression     Environmental allergies     Headache(784.0)     Multiple food allergies     Multiple sclerosis 2009     shoulder 3/2015    left       Past Surgical History:   Procedure Laterality Date    ARTHROSCOPY, SHOULDER, WITH SUBACROMIAL SPACE DECOMPRESSION  4/5/2019    Performed by Dc Moore Jr., [facility] at [facility] OR    BASAL CELL CARCINOMA EXCISION  1998    COLONOSCOPY N/A 5/25/2017    Performed by Marielle Dietz MD at [facility] ENDO (4TH FLR)    DEBRIDEMENT, ROTATOR CUFF, ARTHROSCOPIC Left 4/5/2019    Performed by Dc Moore Jr[facility] at [facility] OR    EXCISION, MASS Right foot Right 1/8/2019    Performed by Sagar Burgos MD at [facility] OR 1ST FLR    FOOT SURGERY Right 01/08/2019    LASIK  2000    SHOULDER SURGERY Left 04/05/2019    WISDOM TOOTH EXTRACTION         OBJECTIVE:   PHYSICAL EXAM:  Height: 5' 2" (157.5 cm) Weight: 59.9 kg (132 lb)  Vitals:    07/03/19 1450   Weight: 59.9 kg (132 lb)   Height: 5' 2" (1.575 m)   PainSc: 0-No pain     Ortho/SPM Exam  Examination left shoulder no tenderness no swelling incisions well-healed range of motion excellent strength improved    RADIOGRAPHS:  None  Comments: I have personally reviewed the imaging and I agree with the above radiologist's report.    ASSESSMENT/PLAN:     IMPRESSION:  Status post left shoulder arthroscopy doing well    PLAN:  Continue therapy increase activities as tolerated wean off hydrocodone switch over to Advil or Tylenol    FOLLOW UP:  4-6 weeks    Disclaimer: This note has " been generated using voice-recognition software. There may be typographical errors that have been missed during proof-reading.

## 2019-07-08 NOTE — PROGRESS NOTES
Physical Therapy Daily Treatment Note     Name: Hien Jeter  Clinic Number: [id number]    Therapy Diagnosis:   Encounter Diagnoses   Name Primary?    Chronic left shoulder pain     Muscle weakness of left upper extremity     Decreased range of motion of left shoulder     Muscle tightness      Physician: Natalee Nagel PA-C    Visit Date: 7/9/2019    Physician Orders: PT Eval and Treat   Medical Diagnosis from Referral: Status post arthroscopy of left shoulder  Evaluation Date: 5/2/2019  Last PN: 6/18/19 (visit 11)  Authorization Period Expiration: 12/31/19   Plan of Care Expiration: 8/2/19  Visit # / Visits authorized: 16 / 50  PTA Visit Number: 0     Time In: 0857  Time Out: 0957  Total Billable Time: 60 minutes  Charges: TE - 3 MT- 1    Precautions: Standard    Subjective     Pt reports: she is continuing to work on improving her shoulder range.   She was compliant with home exercise program.  Response to previous treatment: good  Functional change: decreased pain reaching outward    Pain: 0/10  Location: left shoulder      Objective     Taken 07/9/19:  L shoulder flexion PROM: 160 deg  L shoulder flexion AROM: 150 deg  L shoulder abd AROM: 150 deg    BOLD= performed today    Hien received therapeutic exercises to develop strength, endurance, ROM, flexibility and posture for 50 minutes including:    UBE 3'F/3'B level 2  Freemotion shoulder flexion AAROM facing away 10# x 3 minutes  Supine pec stretch w hands behind head x 3 minutes 2#- NP  Serratus wall slides w orange TB + lift off x 20  Shoulder flexion wall walk w lunge 5 sec hold x 20  Supine AROM sh flex holding bolster 5 sec hold x 20  Post cap stretch 3 x 30 sec  Supine flexion against orange TB x 20  Rows w green TB x 20  Sh IR w green TB and towel roll x 20  Sh ext w green TB x 20  SL sh ER w towel roll x 20  Wall push up plus x 20   D2 flex w green TB x 20  Side lying sh abd w orange TB for inferior glide: x 20  +Prone army crawl 10 x 10  sec  +AAROM sh abd w 2 GTB 2 x 10    Not performed today:  Pulleys flex/ scap: x 3 minutes each- NP  Wall slides w orange TB: x 20- NP  Prone rows/ext: 2 x 10- NP  Flexion/scap slides on stair rail: 20 x 5 seconds each -NP  Supine sh flexion AAROM w dowel 5 sec hold x 30- NP  Supine sh ER AAROM w dowel 5 sec hold x 20- NP    Pt received moist heat to L shoulder at beginning of treatment session for 0 minutes.     Hien received the following manual therapy techniques x 10 min: Grade III inferior and posterior GH joint mobs and STM to L subscapularis and infraspinatus    Home Exercises Provided and Patient Education Provided     Education provided:   -importance of proper posture with therapeutic exercises    Written Home Exercises Provided: Patient instructed to cont prior HEP. (wall slides, SL sh ER, B sh ext w theraband) Addition of prone army crawls and D2 flexion  Exercises were reviewed and Hien was able to demonstrate them prior to the end of the session.  Hien demonstrated good  understanding of the education provided.     See EMR under Patient Instructions for exercises provided prior visit.    Assessment     Pt had good tolerance to treatment today with no adverse effects. Post-treatment L shoulder pain rated as 0/10. Improvement in shoulder flexion AROM with Freemotion AAROM. Able to achieve full shoulder range. She was challenged with shoulder abduction AROM and overhead UE strengthening. Continues with soft tissue dysfunction in rotator cuff.     Hien is progressing well towards her goals.   Pt prognosis is Good.     Pt will continue to benefit from skilled outpatient physical therapy to address the deficits listed in the problem list box on initial evaluation, provide pt/family education and to maximize pt's level of independence in the home and community environment.     Pt's spiritual, cultural and educational needs considered and pt agreeable to plan of care and goals.     Anticipated barriers to  physical therapy: None     Goals:     Short Term Goals:  6 weeks  1. Report decreased L shoulder pain  <  / =  5/10 at worst to increase tolerance for driving. - met 5/21/19  2. Pt will increase L shoulder flexion AROM to 150 deg to indicate improved flexibility and mobility. - met 5/21/19  3. Pt will be able to tolerate multi-directional UE strengthening without pain to improve functional use of UEs.- met 5/21/19  4. Pt will report 50% improvement in ability to reach overhead without pain since start of care to indicate improved functional mobility. - met 5/21/19  5. Pt to tolerate HEP to improve ROM and independence with ADL's. - met 5/21/19     Long Term Goals: 12 weeks  1. Report decreased L shoulder pain  <  / =  3/10 at worst to increase tolerance for driving. - met 6/18/2019   2. Pt will demonstrate increase L shoulder AROM in all planes to indicate improved flexibility and mobility.  - Progressing, not met 7/9/2019   3. Pt will be able to perform 2 x 10 shoulder flexion with 2 lbs to indicate improved strength in B UEs. - Progressing, not met 7/9/2019   4. Pt will report 80% improvement in ability to reach overhead without pain since start of care to indicate improved functional mobility.  - Progressing, not met 7/9/2019   5. Pt to be Independent with HEP to improve ROM and independence with ADL's. - Progressing, not met 7/9/2019     Plan     Progress shoulder AROM and strength, manual techniques, and periscapular strengthening.    Angie Ta, PT

## 2019-07-09 ENCOUNTER — CLINICAL SUPPORT (OUTPATIENT)
Dept: REHABILITATION | Facility: HOSPITAL | Age: 54
End: 2019-07-09
Attending: ORTHOPAEDIC SURGERY
Payer: COMMERCIAL

## 2019-07-09 DIAGNOSIS — M25.512 CHRONIC LEFT SHOULDER PAIN: ICD-10-CM

## 2019-07-09 DIAGNOSIS — M62.81 MUSCLE WEAKNESS OF LEFT UPPER EXTREMITY: ICD-10-CM

## 2019-07-09 DIAGNOSIS — M62.89 MUSCLE TIGHTNESS: ICD-10-CM

## 2019-07-09 DIAGNOSIS — M25.612 DECREASED RANGE OF MOTION OF LEFT SHOULDER: ICD-10-CM

## 2019-07-09 DIAGNOSIS — G89.29 CHRONIC LEFT SHOULDER PAIN: ICD-10-CM

## 2019-07-09 PROCEDURE — 97110 THERAPEUTIC EXERCISES: CPT | Mod: PO

## 2019-07-09 PROCEDURE — 97140 MANUAL THERAPY 1/> REGIONS: CPT | Mod: PO

## 2019-07-11 ENCOUNTER — CLINICAL SUPPORT (OUTPATIENT)
Dept: REHABILITATION | Facility: HOSPITAL | Age: 54
End: 2019-07-11
Attending: ORTHOPAEDIC SURGERY
Payer: COMMERCIAL

## 2019-07-11 DIAGNOSIS — G89.29 CHRONIC LEFT SHOULDER PAIN: ICD-10-CM

## 2019-07-11 DIAGNOSIS — M25.612 DECREASED RANGE OF MOTION OF LEFT SHOULDER: ICD-10-CM

## 2019-07-11 DIAGNOSIS — M25.512 CHRONIC LEFT SHOULDER PAIN: ICD-10-CM

## 2019-07-11 DIAGNOSIS — M62.89 MUSCLE TIGHTNESS: ICD-10-CM

## 2019-07-11 DIAGNOSIS — M62.81 MUSCLE WEAKNESS OF LEFT UPPER EXTREMITY: ICD-10-CM

## 2019-07-11 PROCEDURE — 97140 MANUAL THERAPY 1/> REGIONS: CPT | Mod: PO

## 2019-07-11 PROCEDURE — 97110 THERAPEUTIC EXERCISES: CPT | Mod: PO

## 2019-07-11 RX ORDER — AZELASTINE 1 MG/ML
1 SPRAY, METERED NASAL 2 TIMES DAILY
Qty: 30 ML | Refills: 11 | Status: SHIPPED | OUTPATIENT
Start: 2019-07-11 | End: 2022-08-18

## 2019-07-11 NOTE — PROGRESS NOTES
Physical Therapy Daily Treatment Note     Name: Hien Jeter  Clinic Number: [id number]    Therapy Diagnosis:   Encounter Diagnoses   Name Primary?    Chronic left shoulder pain     Muscle weakness of left upper extremity     Decreased range of motion of left shoulder     Muscle tightness      Physician: Natalee Nagel PA-C    Visit Date: 7/11/2019    Physician Orders: PT Eval and Treat   Medical Diagnosis from Referral: Status post arthroscopy of left shoulder  Evaluation Date: 5/2/2019  Last PN: 6/18/19 (visit 11)  Authorization Period Expiration: 12/31/19   Plan of Care Expiration: 8/2/19  Visit # / Visits authorized: 17 / 50  PTA Visit Number: 0     Time In: 0857  Time Out: 1000  Total Billable Time: 63 minutes  Charges: TE - 3 MT- 1    Precautions: Standard    Subjective     Pt reports:no pain at rest, but has some discomfort when using L arm. She also reports being more compliant with HEP.  She was compliant with home exercise program.  Response to previous treatment: good  Functional change: Decreased pain with use of L UE    Pain: 0/10  Location: left shoulder      Objective     Taken 07/11/19:  L shoulder flexion PROM: 165 deg    BOLD= performed today    Hien received therapeutic exercises to develop strength, endurance, ROM, flexibility and posture for 53 minutes including:    UBE 3'F/3'B level 2  Freemotion shoulder flexion AAROM facing away 10# x 3 minutes  Supine pec stretch w hands behind head x 3 minutes 2#- NP  Serratus wall slides w orange TB + lift off x 20  Shoulder flexion wall walk w lunge 5 sec hold x 20  Supine AROM sh flex holding bolster 5 sec hold x 20  Post cap stretch 3 x 30 sec  Supine flexion against orange TB x 20  Rows w green TB x 20  Sh IR w green TB and towel roll x 20  Sh ext w green TB x 20  SL sh ER w towel roll x 20  Wall push up plus x 20   D2 flex w green TB x 20  Side lying sh abd w orange TB for inferior glide: x 20  Prone army crawl 10 x 10 sec  AAROM sh abd w 2  GTB 2 x 10    Not performed today:  Pulleys flex/ scap: x 3 minutes each- NP  Wall slides w orange TB: x 20- NP  Prone rows/ext: 2 x 10- NP  Flexion/scap slides on stair rail: 20 x 5 seconds each -NP  Supine sh flexion AAROM w dowel 5 sec hold x 30- NP  Supine sh ER AAROM w dowel 5 sec hold x 20- NP    Pt received moist heat to L shoulder at beginning of treatment session for 0 minutes.     Hien received the following manual therapy techniques x 10 min: Grade III inferior and posterior GH joint mobs and STM to L subscapularis and infraspinatus    Home Exercises Provided and Patient Education Provided     Education provided:   -importance of proper posture with therapeutic exercises    Written Home Exercises Provided: Patient instructed to cont prior HEP. (wall slides, SL sh ER, B sh ext w theraband, prone army crawls, D2 flexion)  Exercises were reviewed and Hien was able to demonstrate them prior to the end of the session.  Hien demonstrated good  understanding of the education provided.     See EMR under Patient Instructions for exercises provided prior visit.    Assessment     Pt had good tolerance to treatment today with no adverse effects. Post-treatment L shoulder pain rated as 0/10. Pt tolerated therapeutic exercises well with expected exercise-induced fatigue achieved. She tolerated manual therapy well with some pain at end range flexion and abduction of L shoulder. Pt continue to lack full ROM of L shoulder and has weakness limiting ability to reach full available ROM. She will continue therapy for further strengthening of the L shoulder and increase of L shoulder ROM. Pt conhtinues to compensate  with shoulder hike when performing shoulder flexion due to abnormal scapulohumeral rhythm which is likely limiting R shoulder ROM due to impingement.     Hien is progressing well towards her goals.   Pt prognosis is Good.     Pt will continue to benefit from skilled outpatient physical therapy to address the  deficits listed in the problem list box on initial evaluation, provide pt/family education and to maximize pt's level of independence in the home and community environment.     Pt's spiritual, cultural and educational needs considered and pt agreeable to plan of care and goals.     Anticipated barriers to physical therapy: None     Goals:     Short Term Goals:  6 weeks  1. Report decreased L shoulder pain  <  / =  5/10 at worst to increase tolerance for driving. - met 5/21/19  2. Pt will increase L shoulder flexion AROM to 150 deg to indicate improved flexibility and mobility. - met 5/21/19  3. Pt will be able to tolerate multi-directional UE strengthening without pain to improve functional use of UEs.- met 5/21/19  4. Pt will report 50% improvement in ability to reach overhead without pain since start of care to indicate improved functional mobility. - met 5/21/19  5. Pt to tolerate HEP to improve ROM and independence with ADL's. - met 5/21/19     Long Term Goals: 12 weeks  1. Report decreased L shoulder pain  <  / =  3/10 at worst to increase tolerance for driving. - met 6/18/2019   2. Pt will demonstrate increase L shoulder AROM in all planes to indicate improved flexibility and mobility.  - Progressing, not met 7/11/2019   3. Pt will be able to perform 2 x 10 shoulder flexion with 2 lbs to indicate improved strength in B UEs. - Progressing, not met 7/11/2019   4. Pt will report 80% improvement in ability to reach overhead without pain since start of care to indicate improved functional mobility.  - Progressing, not met 7/11/2019   5. Pt to be Independent with HEP to improve ROM and independence with ADL's. - Progressing, not met 7/11/2019     Plan     Progress shoulder AROM and strength, manual techniques, and periscapular strengthening.  Tj Law, SPT  Angie Ta, PT   I certify that I was present in the room directing the student in service delivery and guiding them using my skilled judgment. As the  co-signing therapist I have reviewed the students documentation and am responsible for the treatment, assessment, and plan.

## 2019-07-16 ENCOUNTER — CLINICAL SUPPORT (OUTPATIENT)
Dept: REHABILITATION | Facility: HOSPITAL | Age: 54
End: 2019-07-16
Attending: ORTHOPAEDIC SURGERY
Payer: COMMERCIAL

## 2019-07-16 DIAGNOSIS — G89.29 CHRONIC LEFT SHOULDER PAIN: ICD-10-CM

## 2019-07-16 DIAGNOSIS — M25.512 CHRONIC LEFT SHOULDER PAIN: ICD-10-CM

## 2019-07-16 DIAGNOSIS — M62.89 MUSCLE TIGHTNESS: ICD-10-CM

## 2019-07-16 DIAGNOSIS — M25.612 DECREASED RANGE OF MOTION OF LEFT SHOULDER: ICD-10-CM

## 2019-07-16 DIAGNOSIS — M62.81 MUSCLE WEAKNESS OF LEFT UPPER EXTREMITY: ICD-10-CM

## 2019-07-16 PROCEDURE — 97110 THERAPEUTIC EXERCISES: CPT | Mod: PO

## 2019-07-17 NOTE — PROGRESS NOTES
Physical Therapy Daily Treatment Note     Name: Hien Jeter  Clinic Number: [id number]    Therapy Diagnosis:   Encounter Diagnoses   Name Primary?    Chronic left shoulder pain     Muscle weakness of left upper extremity     Decreased range of motion of left shoulder     Muscle tightness      Physician: Natalee Nagel PA-C    Visit Date: 7/18/2019    Physician Orders: PT Eval and Treat   Medical Diagnosis from Referral: Status post arthroscopy of left shoulder  Evaluation Date: 5/2/2019  Last PN: 7/18/19 (visit 19)  Authorization Period Expiration: 12/31/19   Plan of Care Expiration: 8/2/19  Visit # / Visits authorized: 19 / 50  PTA Visit Number: 0      Time In: 0901  Time Out: 1002  Total Billable Time: 61 minutes  Charges: TE - 4    Precautions: Standard    Subjective     Pt reports: she has found a way to perform exercises at [facility]. She has figured out a way to use arm bike and cable machine. 85% improvement in ability to reach overhead without pain since start of care.   She was compliant with home exercise program.  Response to previous treatment: good  Functional change: reaching overhead better with less pain    Pain: 0/10  Location: left shoulder      Objective     Taken 7/18/19:  L shoulder flexion AROM pre-treatment: 135 deg  L shoulder flexion AROM post-treatment: 150 deg    BOLD= performed today    Hien received therapeutic exercises to develop strength, endurance, ROM, flexibility and posture for 61 minutes including:    UBE 3'F/3'B level 2  Freemotion shoulder flexion AAROM facing away 10# x 3 minutes  Supine pec stretch w hands behind head x 3 minutes 2#- NP  Serratus wall slides w orange TB + lift off x 20  Shoulder flexion wall walk w lunge 5 sec hold x 20  Supine AROM sh flex holding bolster 5 sec hold x 20  Post cap stretch 3 x 30 sec  Supine flexion against orange TB x 20  Rows w green TB x 20  Sh IR w green TB and towel roll x 20  Sh ext w green TB x 20  SL sh  ER w towel roll x 20  Wall push up plus x 20   D2 flex w green TB x 20  Side lying sh abd w orange TB for inferior glide: x 20  Prone army crawl 10 x 10 sec  AAROM sh abd w 2 GTB 2 x 10   Seated reach roll and lift: 3 x 5   +Eccentric flexion on wall: 3x10  Flexion on wall up and back w/ GTB loop: 20x    Not performed today:  Pulleys flex/ scap: x 3 minutes each- NP  Wall slides w orange TB: x 20- NP  Prone rows/ext: 2 x 10- NP  Flexion/scap slides on stair rail: 20 x 5 seconds each -NP  Supine sh flexion AAROM w dowel 5 sec hold x 30- NP  Supine sh ER AAROM w dowel 5 sec hold x 20- NP     Pt received moist heat to L shoulder at beginning of treatment session for 0 minutes.     Hien received the following manual therapy techniques x 0 min: Grade III inferior and posterior GH joint mobs and STM to L subscapularis and infraspinatus - not performed     Home Exercises Provided and Patient Education Provided     Education provided:   -importance of proper posture with therapeutic exercises    Written Home Exercises Provided: Patient instructed to cont prior HEP. (wall slides, SL sh ER, B sh ext w theraband, prone army crawls, D2 flexion)  Exercises were reviewed and Hien was able to demonstrate them prior to the end of the session.  Hien demonstrated good  understanding of the education provided.     See EMR under Patient Instructions for exercises provided prior visit.    CMS Impairment/Limitation/Restriction for FOTO shoulder Survey    Therapist reviewed FOTO scores for Hien Jeter on 7/18/2019.   FOTO documents entered into [employer] - see Media section.    Limitation Score: 31%  Category: Mobility    Current : CJ = at least 20% but < 40% impaired, limited or restricted  Goal: CJ = at least 20% but < 40% impaired, limited or restricted       Assessment     Patient was re-assessed today with 5/5 STGs and 3/5 LTGs being met indicating improvements in L shoulder reaching overhead and independence with HEP since start of  care. Pt continues to lack full L shoulder ROM. She has less irritability in L shoulder with exercises and continues to lack strength in L shoulder. Pt did well with addition of eccentric shoulder exercises. She continues to have shoulder hike compensation with end range shoulder flexion. She had good tolerance to treatment today with no adverse effects. Pt is challenged with strengthening exercises for L shoulder flexion. Post-treatment L shoulder pain rated as 0/10 with some fatigue. She will continue therapy for further improvements in L shoulder ROM and strength.    Hien is progressing well towards her goals.   Pt prognosis is Good.     Pt will continue to benefit from skilled outpatient physical therapy to address the deficits listed in the problem list box on initial evaluation, provide pt/family education and to maximize pt's level of independence in the home and community environment.     Pt's spiritual, cultural and educational needs considered and pt agreeable to plan of care and goals.     Anticipated barriers to physical therapy: None     Goals:     Short Term Goals:  6 weeks  1. Report decreased L shoulder pain  <  / =  5/10 at worst to increase tolerance for driving. - met 5/21/19  2. Pt will increase L shoulder flexion AROM to 150 deg to indicate improved flexibility and mobility. - met 5/21/19  3. Pt will be able to tolerate multi-directional UE strengthening without pain to improve functional use of UEs.- met 5/21/19  4. Pt will report 50% improvement in ability to reach overhead without pain since start of care to indicate improved functional mobility. - met 5/21/19  5. Pt to tolerate HEP to improve ROM and independence with ADL's. - met 5/21/19     Long Term Goals: 12 weeks  1. Report decreased L shoulder pain  <  / =  3/10 at worst to increase tolerance for driving. - met 6/18/2019   2. Pt will demonstrate increase L shoulder AROM in all planes to indicate improved flexibility and mobility.  -  Progressing, not met 7/18/2019   3. Pt will be able to perform 2 x 10 shoulder flexion with 2 lbs to indicate improved strength in B UEs. - Progressing, not met 7/18/2019   4. Pt will report 80% improvement in ability to reach overhead without pain since start of care to indicate improved functional mobility. - met 7/18/19  5. Pt to be Independent with HEP to improve ROM and independence with ADL's. - met 7/18/19    Plan     Progress shoulder AROM and strengthening, including eccentric shoulder strengthening for flexion.     Tj Law, SPT  Angie Ta, PT   I certify that I was present in the room directing the student in service delivery and guiding them using my skilled judgment. As the co-signing therapist I have reviewed the students documentation and am responsible for the treatment, assessment, and plan.

## 2019-07-18 ENCOUNTER — CLINICAL SUPPORT (OUTPATIENT)
Dept: REHABILITATION | Facility: HOSPITAL | Age: 54
End: 2019-07-18
Attending: ORTHOPAEDIC SURGERY
Payer: COMMERCIAL

## 2019-07-18 DIAGNOSIS — M25.512 CHRONIC LEFT SHOULDER PAIN: ICD-10-CM

## 2019-07-18 DIAGNOSIS — M62.89 MUSCLE TIGHTNESS: ICD-10-CM

## 2019-07-18 DIAGNOSIS — M25.612 DECREASED RANGE OF MOTION OF LEFT SHOULDER: ICD-10-CM

## 2019-07-18 DIAGNOSIS — M62.81 MUSCLE WEAKNESS OF LEFT UPPER EXTREMITY: ICD-10-CM

## 2019-07-18 DIAGNOSIS — G89.29 CHRONIC LEFT SHOULDER PAIN: ICD-10-CM

## 2019-07-18 PROCEDURE — 97110 THERAPEUTIC EXERCISES: CPT | Mod: PO

## 2019-07-22 ENCOUNTER — INFUSION (OUTPATIENT)
Dept: INFUSION THERAPY | Facility: HOSPITAL | Age: 54
End: 2019-07-22
Attending: PHYSICIAN ASSISTANT
Payer: COMMERCIAL

## 2019-07-22 VITALS
SYSTOLIC BLOOD PRESSURE: 99 MMHG | RESPIRATION RATE: 18 BRPM | HEART RATE: 80 BPM | TEMPERATURE: 98 F | DIASTOLIC BLOOD PRESSURE: 62 MMHG

## 2019-07-22 DIAGNOSIS — G35 MS (MULTIPLE SCLEROSIS): Primary | ICD-10-CM

## 2019-07-22 PROCEDURE — 96375 TX/PRO/DX INJ NEW DRUG ADDON: CPT

## 2019-07-22 PROCEDURE — 96366 THER/PROPH/DIAG IV INF ADDON: CPT

## 2019-07-22 PROCEDURE — S0028 INJECTION, FAMOTIDINE, 20 MG: HCPCS | Performed by: PSYCHIATRY & NEUROLOGY

## 2019-07-22 PROCEDURE — 25000003 PHARM REV CODE 250: Performed by: PSYCHIATRY & NEUROLOGY

## 2019-07-22 PROCEDURE — 96365 THER/PROPH/DIAG IV INF INIT: CPT

## 2019-07-22 PROCEDURE — 96367 TX/PROPH/DG ADDL SEQ IV INF: CPT

## 2019-07-22 PROCEDURE — 63600175 PHARM REV CODE 636 W HCPCS: Performed by: PSYCHIATRY & NEUROLOGY

## 2019-07-22 RX ORDER — ACETAMINOPHEN 500 MG
1000 TABLET ORAL
Status: COMPLETED | OUTPATIENT
Start: 2019-07-22 | End: 2019-07-22

## 2019-07-22 RX ORDER — HEPARIN 100 UNIT/ML
100 SYRINGE INTRAVENOUS
Status: DISCONTINUED | OUTPATIENT
Start: 2019-07-22 | End: 2019-07-22 | Stop reason: HOSPADM

## 2019-07-22 RX ORDER — FAMOTIDINE 10 MG/ML
20 INJECTION INTRAVENOUS
Status: COMPLETED | OUTPATIENT
Start: 2019-07-22 | End: 2019-07-22

## 2019-07-22 RX ORDER — HEPARIN 100 UNIT/ML
100 SYRINGE INTRAVENOUS
Status: CANCELLED | OUTPATIENT
Start: 2019-12-30

## 2019-07-22 RX ORDER — SODIUM CHLORIDE 0.9 % (FLUSH) 0.9 %
10 SYRINGE (ML) INJECTION
Status: DISCONTINUED | OUTPATIENT
Start: 2019-07-22 | End: 2019-07-22 | Stop reason: HOSPADM

## 2019-07-22 RX ORDER — ACETAMINOPHEN 500 MG
1000 TABLET ORAL
Status: CANCELLED | OUTPATIENT
Start: 2019-12-30

## 2019-07-22 RX ORDER — SODIUM CHLORIDE 0.9 % (FLUSH) 0.9 %
10 SYRINGE (ML) INJECTION
Status: CANCELLED | OUTPATIENT
Start: 2019-12-30

## 2019-07-22 RX ADMIN — DEXTROSE: 50 INJECTION, SOLUTION INTRAVENOUS at 08:07

## 2019-07-22 RX ADMIN — OCRELIZUMAB 600 MG: 300 INJECTION INTRAVENOUS at 09:07

## 2019-07-22 RX ADMIN — ACETAMINOPHEN 1000 MG: 500 TABLET, FILM COATED ORAL at 08:07

## 2019-07-22 RX ADMIN — Medication 50 MG: at 08:07

## 2019-07-22 RX ADMIN — FAMOTIDINE 20 MG: 10 INJECTION, SOLUTION INTRAVENOUS at 08:07

## 2019-07-22 NOTE — PLAN OF CARE
Problem: Adult Inpatient Plan of Care  Goal: Plan of Care Review  Outcome: Ongoing (interventions implemented as appropriate)  1353-Patient tolerated treatment well. Discharged without complaints or S/S of adverse event. AVS given.  Instructed to call provider for any questions or concerns.

## 2019-07-22 NOTE — PLAN OF CARE
Problem: Adult Inpatient Plan of Care  Goal: Patient-Specific Goal (Individualization)  Outcome: Ongoing (interventions implemented as appropriate)  0745-Labs , hx, and medications reviewed, patient is here for 6 month maintenance ocrevus. Assessment completed. Discussed plan of care with patient. Patient in agreement. Chair reclined and warm blanket and snack offered.

## 2019-07-23 ENCOUNTER — CLINICAL SUPPORT (OUTPATIENT)
Dept: REHABILITATION | Facility: HOSPITAL | Age: 54
End: 2019-07-23
Attending: ORTHOPAEDIC SURGERY
Payer: COMMERCIAL

## 2019-07-23 DIAGNOSIS — M25.512 CHRONIC LEFT SHOULDER PAIN: ICD-10-CM

## 2019-07-23 DIAGNOSIS — M62.89 MUSCLE TIGHTNESS: ICD-10-CM

## 2019-07-23 DIAGNOSIS — M62.81 MUSCLE WEAKNESS OF LEFT UPPER EXTREMITY: ICD-10-CM

## 2019-07-23 DIAGNOSIS — G89.29 CHRONIC LEFT SHOULDER PAIN: ICD-10-CM

## 2019-07-23 DIAGNOSIS — M25.612 DECREASED RANGE OF MOTION OF LEFT SHOULDER: ICD-10-CM

## 2019-07-23 PROCEDURE — 97110 THERAPEUTIC EXERCISES: CPT | Mod: PO

## 2019-07-23 NOTE — PROGRESS NOTES
Physical Therapy Daily Treatment Note     Name: Hien Jeter  Clinic Number: [id number]    Therapy Diagnosis:   Encounter Diagnoses   Name Primary?    Chronic left shoulder pain     Muscle weakness of left upper extremity     Decreased range of motion of left shoulder     Muscle tightness      Physician: Natalee Nagel PA-C    Visit Date: 7/23/2019    Physician Orders: PT Eval and Treat   Medical Diagnosis from Referral: Status post arthroscopy of left shoulder  Evaluation Date: 5/2/2019  Last PN: 7/18/19 (visit 19)  Authorization Period Expiration: 12/31/19   Plan of Care Expiration: 8/2/19  Visit # / Visits authorized: 20 / 50  PTA Visit Number: 1      Time In: 09:00 am   Time Out: 10:00 am   Total Billable Time: 60 minutes  Charges: TE - 4    Precautions: Standard    Subjective     Pt reports: She is complaint with home exercise program. Feels her shoulder motion is improving.   She was compliant with home exercise program.  Response to previous treatment: good  Functional change: reaching overhead better with less pain    Pain: 0/10  Location: left shoulder      Objective   BOLD= performed today    Hien received therapeutic exercises to develop strength, endurance, ROM, flexibility and posture for 60 minutes including:    UBE 3'F/3'B level 2  Freemotion shoulder flexion AAROM facing away 10# x 3 minutes  Supine pec stretch w hands behind head x 3 minutes 2#- NP  Serratus wall slides w orange TB + lift off x 20  Shoulder flexion wall walk w lunge 5 sec hold x 20  Supine AROM sh flex holding bolster 5 sec hold x 20  Post cap stretch 3 x 30 sec  Supine flexion against orange TB x 20  Rows w green TB x 20  Sh IR w green TB and towel roll x 20  Sh ext w green TB x 20  SL sh ER w towel roll x 20  Wall push up plus x 20   D2 flex w green TB x 20  Side lying sh abd w orange TB for inferior glide: x 20  Prone army crawl 10 x 10 sec  AAROM sh abd w 2 GTB 2 x 10   Seated reach roll and lift: 3 x 5   Eccentric  flexion on wall: 3x10  Flexion on wall up and back w/ GTB loop: 20x    Not performed today:  Pulleys flex/ scap: x 3 minutes each- NP  Wall slides w orange TB: x 20- NP  Prone rows/ext: 2 x 10- NP  Flexion/scap slides on stair rail: 20 x 5 seconds each -NP  Supine sh flexion AAROM w dowel 5 sec hold x 30- NP  Supine sh ER AAROM w dowel 5 sec hold x 20- NP     Pt received moist heat to L shoulder at beginning of treatment session for 0 minutes.     Hien received the following manual therapy techniques x 0 min: Grade III inferior and posterior GH joint mobs and STM to L subscapularis and infraspinatus - not performed     Home Exercises Provided and Patient Education Provided     Education provided:   -importance of proper posture with therapeutic exercises    Written Home Exercises Provided: Patient instructed to cont prior HEP. (wall slides, SL sh ER, B sh ext w theraband, prone army crawls, D2 flexion)  Exercises were reviewed and Hien was able to demonstrate them prior to the end of the session.  Hien demonstrated good  understanding of the education provided.     See EMR under Patient Instructions for exercises provided prior visit.    CMS Impairment/Limitation/Restriction for FOTO shoulder Survey    Therapist reviewed FOTO scores for Hien Jeter on 7/23/2019.   FOTO documents entered into [employer] - see Media section.    Limitation Score: 31%  Category: Mobility    Current : CJ = at least 20% but < 40% impaired, limited or restricted  Goal: CJ = at least 20% but < 40% impaired, limited or restricted       Assessment     Patient tolerated therapy session well. Patient with good range of motion but remains with noted weakness tawanna with end range flexion.     Hien is progressing well towards her goals.   Pt prognosis is Good.     Pt will continue to benefit from skilled outpatient physical therapy to address the deficits listed in the problem list box on initial evaluation, provide pt/family education and to  maximize pt's level of independence in the home and community environment.     Pt's spiritual, cultural and educational needs considered and pt agreeable to plan of care and goals.     Anticipated barriers to physical therapy: None     Goals:     Short Term Goals:  6 weeks  1. Report decreased L shoulder pain  <  / =  5/10 at worst to increase tolerance for driving. - met 5/21/19  2. Pt will increase L shoulder flexion AROM to 150 deg to indicate improved flexibility and mobility. - met 5/21/19  3. Pt will be able to tolerate multi-directional UE strengthening without pain to improve functional use of UEs.- met 5/21/19  4. Pt will report 50% improvement in ability to reach overhead without pain since start of care to indicate improved functional mobility. - met 5/21/19  5. Pt to tolerate HEP to improve ROM and independence with ADL's. - met 5/21/19     Long Term Goals: 12 weeks  1. Report decreased L shoulder pain  <  / =  3/10 at worst to increase tolerance for driving. - met 6/18/2019   2. Pt will demonstrate increase L shoulder AROM in all planes to indicate improved flexibility and mobility.  - Progressing, not met 7/23/2019   3. Pt will be able to perform 2 x 10 shoulder flexion with 2 lbs to indicate improved strength in B UEs. - Progressing, not met 7/23/2019   4. Pt will report 80% improvement in ability to reach overhead without pain since start of care to indicate improved functional mobility. - met 7/18/19  5. Pt to be Independent with HEP to improve ROM and independence with ADL's. - met 7/18/19    Plan     Progress shoulder AROM and strengthening, including eccentric shoulder strengthening for flexion.       Sophie Butcher, PTA

## 2019-07-25 ENCOUNTER — CLINICAL SUPPORT (OUTPATIENT)
Dept: REHABILITATION | Facility: HOSPITAL | Age: 54
End: 2019-07-25
Attending: ORTHOPAEDIC SURGERY
Payer: COMMERCIAL

## 2019-07-25 DIAGNOSIS — M62.89 MUSCLE TIGHTNESS: ICD-10-CM

## 2019-07-25 DIAGNOSIS — G89.29 CHRONIC LEFT SHOULDER PAIN: ICD-10-CM

## 2019-07-25 DIAGNOSIS — M25.612 DECREASED RANGE OF MOTION OF LEFT SHOULDER: ICD-10-CM

## 2019-07-25 DIAGNOSIS — M62.81 MUSCLE WEAKNESS OF LEFT UPPER EXTREMITY: ICD-10-CM

## 2019-07-25 DIAGNOSIS — M25.512 CHRONIC LEFT SHOULDER PAIN: ICD-10-CM

## 2019-07-25 PROCEDURE — 97110 THERAPEUTIC EXERCISES: CPT | Mod: PO

## 2019-07-25 NOTE — PROGRESS NOTES
Physical Therapy Daily Treatment Note     Name: Hien Jeter  Clinic Number: [id number]    Therapy Diagnosis:   Encounter Diagnoses   Name Primary?    Chronic left shoulder pain     Muscle weakness of left upper extremity     Decreased range of motion of left shoulder     Muscle tightness      Physician: Natalee Nagel PA-C    Visit Date: 7/25/2019    Physician Orders: PT Eval and Treat   Medical Diagnosis from Referral: Status post arthroscopy of left shoulder  Evaluation Date: 5/2/2019  Last PN: 7/18/19 (visit 19)  Authorization Period Expiration: 12/31/19   Plan of Care Expiration: 8/25/19  Visit # / Visits authorized: 21 / 50  PTA Visit Number: 0      Time In: 09:00 am   Time Out: 10:00 am   Total Billable Time: 60 minutes  Charges: TE - 4    Precautions: Standard    Subjective     Pt reports: She feels good and feels like she has more motion in her L arm. She has been doing exercises at home and they help her to maintain her ROM.  She was compliant with home exercise program.  Response to previous treatment: good  Functional change: reaching overhead better with less pain    Pain: 0/10  Location: left shoulder      Objective     L shoulder AROM flexion (7/25/19): 155 deg      BOLD= performed today    Hien received therapeutic exercises to develop strength, endurance, ROM, flexibility and posture for 60 minutes including:    UBE 3'F/3'B level 2  Freemotion shoulder flexion AAROM facing away 10# x 3 minutes  Supine pec stretch w hands behind head x 3 minutes 2#- NP  Serratus wall slides w orange TB + lift off x 20  Shoulder flexion wall walk w lunge 5 sec hold x 20  Supine AROM sh flex holding bolster 5 sec hold x 20  Post cap stretch 3 x 30 sec  Supine flexion against orange TB x 20  Rows w green TB x 20  Sh IR w green TB and towel roll x 20  Sh ext w green TB x 20  SL sh ER w towel roll x 20  Wall push up plus x 20   D2 flex w green TB x 20  Side lying sh abd w orange TB for inferior glide: x  20  Prone army crawl 10 x 10 sec  AAROM sh abd w 2 GTB 2 x 10   Seated reach roll and lift: 3 x 5   Eccentric flexion on wall: 3x10  Flexion on wall up and back w/ GTB loop: 20x    Not performed today:  Pulleys flex/ scap: x 3 minutes each- NP  Wall slides w orange TB: x 20- NP  Prone rows/ext: 2 x 10- NP  Flexion/scap slides on stair rail: 20 x 5 seconds each -NP  Supine sh flexion AAROM w dowel 5 sec hold x 30- NP  Supine sh ER AAROM w dowel 5 sec hold x 20- NP     Pt received moist heat to L shoulder at beginning of treatment session for 0 minutes.     Hien received the following manual therapy techniques x 0 min: Grade III inferior and posterior GH joint mobs and STM to L subscapularis and infraspinatus - not performed     Home Exercises Provided and Patient Education Provided     Education provided:   -importance of proper posture with therapeutic exercises    Written Home Exercises Provided: Patient instructed to cont prior HEP. (wall slides, SL sh ER, B sh ext w theraband, prone army crawls, D2 flexion)  Exercises were reviewed and Hien was able to demonstrate them prior to the end of the session.  Hien demonstrated good  understanding of the education provided.     See EMR under Patient Instructions for exercises provided prior visit.    Assessment     Pt tolerated therapy well with no adverse effects. She had expected exercise-induced fatigue with therapeutic exercises but no increase of L shoulder pain. She continues to have UE weakness at end-range and lacks full ROM of L shoulder. Continues with compensatory movements with overhead ranges. Pt will decrease frequency of visits and increase independence with home exercises. Pt will continue therapy, once a week for 4 weeks, for further improvements in L shoulder ROM and strength.    Hien is progressing well towards her goals.   Pt prognosis is Good.     Pt will continue to benefit from skilled outpatient physical therapy to address the deficits listed  in the problem list box on initial evaluation, provide pt/family education and to maximize pt's level of independence in the home and community environment.     Pt's spiritual, cultural and educational needs considered and pt agreeable to plan of care and goals.     Anticipated barriers to physical therapy: None     Goals:     Short Term Goals:  6 weeks  1. Report decreased L shoulder pain  <  / =  5/10 at worst to increase tolerance for driving. - met 5/21/19  2. Pt will increase L shoulder flexion AROM to 150 deg to indicate improved flexibility and mobility. - met 5/21/19  3. Pt will be able to tolerate multi-directional UE strengthening without pain to improve functional use of UEs.- met 5/21/19  4. Pt will report 50% improvement in ability to reach overhead without pain since start of care to indicate improved functional mobility. - met 5/21/19  5. Pt to tolerate HEP to improve ROM and independence with ADL's. - met 5/21/19     Long Term Goals: 12 weeks  1. Report decreased L shoulder pain  <  / =  3/10 at worst to increase tolerance for driving. - met 6/18/2019   2. Pt will demonstrate increase L shoulder AROM in all planes to indicate improved flexibility and mobility.  - Progressing, not met 7/25/2019   3. Pt will be able to perform 2 x 10 shoulder flexion with 2 lbs to indicate improved strength in B UEs. - Progressing, not met 7/25/2019   4. Pt will report 80% improvement in ability to reach overhead without pain since start of care to indicate improved functional mobility. - met 7/18/19  5. Pt to be Independent with HEP to improve ROM and independence with ADL's. - met 7/18/19    Plan     Progress shoulder AROM and strengthening, including eccentric shoulder strengthening for flexion.Continue 1x/week for 4 weeks. Extend POC to 8/25/19.    Tj Law, SPT  Angie Ta, PT   I certify that I was present in the room directing the student in service delivery and guiding them using my skilled judgment. As the  co-signing therapist I have reviewed the students documentation and am responsible for the treatment, assessment, and plan.

## 2019-07-30 ENCOUNTER — CLINICAL SUPPORT (OUTPATIENT)
Dept: REHABILITATION | Facility: HOSPITAL | Age: 54
End: 2019-07-30
Attending: ORTHOPAEDIC SURGERY
Payer: COMMERCIAL

## 2019-07-30 DIAGNOSIS — M25.512 CHRONIC LEFT SHOULDER PAIN: ICD-10-CM

## 2019-07-30 DIAGNOSIS — M62.89 MUSCLE TIGHTNESS: ICD-10-CM

## 2019-07-30 DIAGNOSIS — G89.29 CHRONIC LEFT SHOULDER PAIN: ICD-10-CM

## 2019-07-30 DIAGNOSIS — M62.81 MUSCLE WEAKNESS OF LEFT UPPER EXTREMITY: ICD-10-CM

## 2019-07-30 DIAGNOSIS — M25.612 DECREASED RANGE OF MOTION OF LEFT SHOULDER: ICD-10-CM

## 2019-07-30 PROCEDURE — 97110 THERAPEUTIC EXERCISES: CPT | Mod: PO

## 2019-07-30 NOTE — PROGRESS NOTES
Physical Therapy Daily Treatment Note     Name: Hien Jeter  Clinic Number: [id number]    Therapy Diagnosis:   Encounter Diagnoses   Name Primary?    Chronic left shoulder pain     Muscle weakness of left upper extremity     Decreased range of motion of left shoulder     Muscle tightness      Physician: Natalee Nagel PA-C    Visit Date: 7/30/2019    Physician Orders: PT Eval and Treat   Medical Diagnosis from Referral: Status post arthroscopy of left shoulder  Evaluation Date: 5/2/2019  Last PN: 7/18/19 (visit 19)  Authorization Period Expiration: 12/31/19   Plan of Care Expiration: 8/25/19  Visit # / Visits authorized: 22 / 50  PTA Visit Number: 0      Time In: 1600  Time Out: 1700   Total Billable Time: 35 minutes  Charges: TE - 2    Precautions: Standard    Subjective     Pt reports: no new complaints. She is continuing to see improvements.  She was compliant with home exercise program.  Response to previous treatment: good  Functional change: less pain with range    Pain: 0/10  Location: left shoulder      Objective     L shoulder AROM flexion (7/30/19): 155 deg    BOLD= performed today    Hien received therapeutic exercises to develop strength, endurance, ROM, flexibility and posture for 60 minutes including:    UBE 3'F/3'B level 2  Freemotion shoulder flexion AAROM facing away 10# x 3 minutes  Serratus wall slides w orange TB + lift off x 20  Supine flexion against orange TB x 20  Rows w green TB x 20  Sh IR w green TB and towel roll x 20  Sh ext w green TB x 20  SL sh ER w towel roll x 20  Wall push up plus x 20   Supine D2 flex w green TB x 20  Side lying sh abd w orange TB for inferior glide: x 20  Prone army crawl 10 x 10 sec  AAROM sh abd w 2 GTB 2 x 10   Seated reach roll and lift: 3 x 5   Eccentric flexion on wall: 3x10  Flexion on wall up and back w/ GTB loop: 20x  +Prone Ts w min A x 20  +Prone Ys w min A x 20    Not performed today:  Pulleys flex/ scap: x 3 minutes each- NP  Wall slides w  orange TB: x 20- NP  Prone rows/ext: 2 x 10- NP  Flexion/scap slides on stair rail: 20 x 5 seconds each -NP  Supine sh flexion AAROM w dowel 5 sec hold x 30- NP  Supine sh ER AAROM w dowel 5 sec hold x 20- NP     Pt received moist heat to L shoulder at beginning of treatment session for 0 minutes.     Hine received the following manual therapy techniques x 0 min: Grade III inferior and posterior GH joint mobs and STM to L subscapularis and infraspinatus - not performed     Home Exercises Provided and Patient Education Provided     Education provided:   -importance of proper posture with therapeutic exercises    Written Home Exercises Provided: Patient instructed to cont prior HEP. (wall slides, SL sh ER, B sh ext w theraband, prone army crawls, D2 flexion)  Exercises were reviewed and Hien was able to demonstrate them prior to the end of the session.  Hien demonstrated good  understanding of the education provided.     See EMR under Patient Instructions for exercises provided prior visit.    Assessment     Pt had good tolerance to treatment today with no adverse effects. Post-treatment L shoulder pain rated as 0/10. Pt able to achieve full shoulder PROM but continues with weakness in end-range. She responded well to addition of prone shoulder strengthening but required min A. Updated HEP provided.     Hien is progressing well towards her goals.   Pt prognosis is Good.     Pt will continue to benefit from skilled outpatient physical therapy to address the deficits listed in the problem list box on initial evaluation, provide pt/family education and to maximize pt's level of independence in the home and community environment.     Pt's spiritual, cultural and educational needs considered and pt agreeable to plan of care and goals.     Anticipated barriers to physical therapy: None     Goals:     Short Term Goals:  6 weeks  1. Report decreased L shoulder pain  <  / =  5/10 at worst to increase tolerance for driving.  - met 5/21/19  2. Pt will increase L shoulder flexion AROM to 150 deg to indicate improved flexibility and mobility. - met 5/21/19  3. Pt will be able to tolerate multi-directional UE strengthening without pain to improve functional use of UEs.- met 5/21/19  4. Pt will report 50% improvement in ability to reach overhead without pain since start of care to indicate improved functional mobility. - met 5/21/19  5. Pt to tolerate HEP to improve ROM and independence with ADL's. - met 5/21/19     Long Term Goals: 12 weeks  1. Report decreased L shoulder pain  <  / =  3/10 at worst to increase tolerance for driving. - met 6/18/2019   2. Pt will demonstrate increase L shoulder AROM in all planes to indicate improved flexibility and mobility.  - Progressing, not met 7/30/2019   3. Pt will be able to perform 2 x 10 shoulder flexion with 2 lbs to indicate improved strength in B UEs. - Progressing, not met 7/30/2019   4. Pt will report 80% improvement in ability to reach overhead without pain since start of care to indicate improved functional mobility. - met 7/18/19  5. Pt to be Independent with HEP to improve ROM and independence with ADL's. - met 7/18/19    Plan     Progress shoulder AROM and strengthening, including eccentric shoulder strengthening for flexion.    Angie Ta, PT

## 2019-08-01 DIAGNOSIS — N95.1 MENOPAUSAL SYMPTOMS: ICD-10-CM

## 2019-08-08 ENCOUNTER — CLINICAL SUPPORT (OUTPATIENT)
Dept: REHABILITATION | Facility: HOSPITAL | Age: 54
End: 2019-08-08
Attending: ORTHOPAEDIC SURGERY
Payer: COMMERCIAL

## 2019-08-08 DIAGNOSIS — M62.81 MUSCLE WEAKNESS OF LEFT UPPER EXTREMITY: ICD-10-CM

## 2019-08-08 DIAGNOSIS — G89.29 CHRONIC LEFT SHOULDER PAIN: ICD-10-CM

## 2019-08-08 DIAGNOSIS — M25.612 DECREASED RANGE OF MOTION OF LEFT SHOULDER: ICD-10-CM

## 2019-08-08 DIAGNOSIS — M62.89 MUSCLE TIGHTNESS: ICD-10-CM

## 2019-08-08 DIAGNOSIS — M25.512 CHRONIC LEFT SHOULDER PAIN: ICD-10-CM

## 2019-08-08 PROCEDURE — 97110 THERAPEUTIC EXERCISES: CPT | Mod: PO

## 2019-08-08 PROCEDURE — 97140 MANUAL THERAPY 1/> REGIONS: CPT | Mod: PO

## 2019-08-08 NOTE — PROGRESS NOTES
Physical Therapy Daily Treatment Note     Name: Hien Jeter  Clinic Number: [id number]    Therapy Diagnosis:   Encounter Diagnoses   Name Primary?    Chronic left shoulder pain     Muscle weakness of left upper extremity     Decreased range of motion of left shoulder     Muscle tightness      Physician: Natalee Nagel PA-C    Visit Date: 8/8/2019    Physician Orders: PT Eval and Treat   Medical Diagnosis from Referral: Status post arthroscopy of left shoulder  Evaluation Date: 5/2/2019  Last PN: 7/18/19 (visit 19)  Authorization Period Expiration: 12/31/19   Plan of Care Expiration: 8/25/19  Visit # / Visits authorized: 23 / 50  PTA Visit Number: 0      Time In: 1608  Time Out: 1721   Total Billable Time: 53 minutes  Charges: TE - 3, MT- 1    Precautions: Standard    Subjective     Pt reports: she had to carry her 23 lb dog for 2 blocks recently after hurting it's paw. Her shoulder was sore after and was not able to perform HEP lately.   She was compliant with home exercise program.  Response to previous treatment: good  Functional change: none to note    Pain: 0/10  Location: left shoulder      Objective     L shoulder AROM flexion (7/30/19): 155 deg    BOLD= performed today    Hien received therapeutic exercises to develop strength, endurance, ROM, flexibility and posture for 53 minutes including:    UBE 3'F/3'B level 2  Freemotion shoulder flexion AAROM facing away 10# x 3 minutes  Serratus wall slides w orange TB + lift off x 20  Supine flexion against orange TB x 20  Rows w green TB x 20  Sh IR w green TB and towel roll x 20  Sh ext w green TB x 20  SL sh ER w towel roll x 20  Wall push up plus x 20   Seated D2 flex w green TB x 20  Side lying sh abd w orange TB for inferior glide: x 20  Prone army crawl 10 x 10 sec  +Prone sh flexion in end-range x 10  AAROM sh abd w 2 GTB 2 x 10   Seated reach roll and lift: 3 x 5   +Rhomboid stretch 3 x 30 sec  Eccentric flexion on wall: 3x10  Flexion on wall up  and back w/ GTB loop: 20x  Prone Ts w min A x 20  Prone Ys w min A x 20    Not performed today:  Pulleys flex/ scap: x 3 minutes each- NP  Wall slides w orange TB: x 20- NP  Prone rows/ext: 2 x 10- NP  Flexion/scap slides on stair rail: 20 x 5 seconds each -NP  Supine sh flexion AAROM w dowel 5 sec hold x 30- NP  Supine sh ER AAROM w dowel 5 sec hold x 20- NP     Pt received cryotherapy to L shoulder at end of treatment session for 10 minutes.     Hien received the following manual therapy techniques x 10 min:   Grade III inferior and posterior GH joint mobs in supine and prone end-range flexion   STM to L subscapularis and infraspinatus    Home Exercises Provided and Patient Education Provided     Education provided:   -importance of proper posture with therapeutic exercises    Written Home Exercises Provided: Patient instructed to cont prior HEP. (wall slides, SL sh ER, B sh ext w theraband, prone army crawls, D2 flexion)  Exercises were reviewed and Hien was able to demonstrate them prior to the end of the session.  Hien demonstrated good  understanding of the education provided.     See EMR under Patient Instructions for exercises provided prior visit.    Assessment     Pt had good tolerance to treatment today with no adverse effects. Post-treatment L shoulder pain rated as 0/10. Pt continues with impaired scapulohumeral rhythm and decreased shoulder flexion and abduction AROM. She was challenged with end-range shoulder flexion in prone. Decreased shoulder mobility noted.     Hien is progressing well towards her goals.   Pt prognosis is Good.     Pt will continue to benefit from skilled outpatient physical therapy to address the deficits listed in the problem list box on initial evaluation, provide pt/family education and to maximize pt's level of independence in the home and community environment.     Pt's spiritual, cultural and educational needs considered and pt agreeable to plan of care and goals.      Anticipated barriers to physical therapy: None     Goals:     Short Term Goals:  6 weeks  1. Report decreased L shoulder pain  <  / =  5/10 at worst to increase tolerance for driving. - met 5/21/19  2. Pt will increase L shoulder flexion AROM to 150 deg to indicate improved flexibility and mobility. - met 5/21/19  3. Pt will be able to tolerate multi-directional UE strengthening without pain to improve functional use of UEs.- met 5/21/19  4. Pt will report 50% improvement in ability to reach overhead without pain since start of care to indicate improved functional mobility. - met 5/21/19  5. Pt to tolerate HEP to improve ROM and independence with ADL's. - met 5/21/19     Long Term Goals: 12 weeks  1. Report decreased L shoulder pain  <  / =  3/10 at worst to increase tolerance for driving. - met 6/18/2019   2. Pt will demonstrate increase L shoulder AROM in all planes to indicate improved flexibility and mobility.  - Progressing, not met 8/8/2019   3. Pt will be able to perform 2 x 10 shoulder flexion with 2 lbs to indicate improved strength in B UEs. - Progressing, not met 8/8/2019   4. Pt will report 80% improvement in ability to reach overhead without pain since start of care to indicate improved functional mobility. - met 7/18/19  5. Pt to be Independent with HEP to improve ROM and independence with ADL's. - met 7/18/19    Plan     Progress shoulder AROM and strengthening, including eccentric shoulder strengthening for flexion.    Angie Ta, PT

## 2019-08-12 RX ORDER — ESTRADIOL AND NORETHINDRONE ACETATE 1; .5 MG/1; MG/1
TABLET, FILM COATED ORAL
Qty: 84 TABLET | Refills: 0 | Status: SHIPPED | OUTPATIENT
Start: 2019-08-12 | End: 2019-11-04 | Stop reason: SDUPTHER

## 2019-08-14 ENCOUNTER — OFFICE VISIT (OUTPATIENT)
Dept: ORTHOPEDICS | Facility: CLINIC | Age: 54
End: 2019-08-14
Attending: ORTHOPAEDIC SURGERY
Payer: COMMERCIAL

## 2019-08-14 DIAGNOSIS — M75.112 INCOMPLETE TEAR OF LEFT ROTATOR CUFF: Primary | ICD-10-CM

## 2019-08-14 PROCEDURE — 99024 POSTOP FOLLOW-UP VISIT: CPT | Mod: S$GLB,,, | Performed by: ORTHOPAEDIC SURGERY

## 2019-08-14 PROCEDURE — 99024 PR POST-OP FOLLOW-UP VISIT: ICD-10-PCS | Mod: S$GLB,,, | Performed by: ORTHOPAEDIC SURGERY

## 2019-08-14 PROCEDURE — 99999 PR PBB SHADOW E&M-EST. PATIENT-LVL III: CPT | Mod: PBBFAC,,, | Performed by: ORTHOPAEDIC SURGERY

## 2019-08-14 PROCEDURE — 99999 PR PBB SHADOW E&M-EST. PATIENT-LVL III: ICD-10-PCS | Mod: PBBFAC,,, | Performed by: ORTHOPAEDIC SURGERY

## 2019-08-14 NOTE — PROGRESS NOTES
Subjective:      Patient ID: Hien Jeter is a 54 y.o. female.  Chief Complaint: No chief complaint on file.      HPI  Hien Jeter is a  54 y.o. female presenting today for post op visit.  She is s/p rotator cuff repair left shoulder  Now a little over 3 months postop  Currently in therapy doing well pain is minimal.     Review of patient's allergies indicates:   Allergen Reactions    Diclofenac Nausea Only    Hydrocodone-acetaminophen Nausea And Vomiting     Other reaction(s): Vomiting    Ibuprofen Other (See Comments)    Tramadol Other (See Comments)     Nausea          Current Outpatient Medications   Medication Sig Dispense Refill    azelastine (ASTELIN) 137 mcg (0.1 %) nasal spray 1 SPRAY (137 MCG TOTAL) BY NASAL ROUTE 2 (TWO) TIMES DAILY. 30 mL 11    baclofen (LIORESAL) 10 MG tablet TAKE 1/2 TABLET BY MOUTH 2-3 TIMES A DAY 60 tablet 5    cholecalciferol, vitamin D3, 3,000 unit Tab Take 3,000 Units by mouth once daily.       escitalopram oxalate (LEXAPRO) 10 MG tablet TAKE 1.5 TABLETS (15 MG TOTAL) BY MOUTH EVERY EVENING. 45 tablet 6    flaxseed oil 1,000 mg Cap Take 1 capsule by mouth once daily.       HYDROcodone-acetaminophen (NORCO) 7.5-325 mg per tablet Take 1 tablet by mouth every 4 (four) hours as needed for Pain. 15 tablet 0    HYDROcodone-acetaminophen (NORCO) 7.5-325 mg per tablet Take 1 tablet by mouth every 12 (twelve) hours as needed for Pain. 20 tablet 0    magnesium oxide (MAG-OX) 400 mg (241.3 mg magnesium) tablet Take 1 tablet (400 mg total) by mouth 2 (two) times daily. 60 tablet 12    MIMVEY 1-0.5 mg per tablet TAKE BY MOUTH 1 TABLET ONCE DAILY 84 tablet 0    multivitamin-Ca-iron-minerals (ONE-A-DAY WOMENS FORMULA) 27-0.4 mg Tab Take 1 tablet by mouth once daily.       nortriptyline (PAMELOR) 10 MG capsule Take 2 capsules (20 mg total) by mouth every evening. 60 capsule 11    riboflavin, vitamin B2, (VITAMIN B-2) 100 mg Tab tablet TAKE 2 TABLETS BY MOUTH ONCE DAILY. 60  tablet 11    ZOLMitriptan (ZOMIG) 5 mg [place] SPRAY 1 SPRAY IN NOSTRIL ONCE AS NEEDED. Max 2 doses in 24 hrs 12 each 12    topiramate (TOPAMAX) 25 MG tablet Take 1 tablet (25 mg total) by mouth 2 (two) times daily. 60 tablet 12     No current facility-administered medications for this visit.        Past Medical History:   Diagnosis Date    Anxiety     Basal cell carcinoma 2000    left eyebrow     Depression     Environmental allergies     Headache(784.0)     Multiple food allergies     Multiple sclerosis 2009     shoulder 3/2015    left       Past Surgical History:   Procedure Laterality Date    ARTHROSCOPY, SHOULDER, WITH SUBACROMIAL SPACE DECOMPRESSION  4/5/2019    Performed by Dc Moore Jr., MD at [facility] OR    BASAL CELL CARCINOMA EXCISION  1998    COLONOSCOPY N/A 5/25/2017    Performed by Marielle Dietz MD at [facility] ENDO (4TH FLR)    DEBRIDEMENT, ROTATOR CUFF, ARTHROSCOPIC Left 4/5/2019    Performed by Dc Moore Jr[facility] at [facility] OR    EXCISION, MASS Right foot Right 1/8/2019    Performed by Sagar Burgos MD at [facility] OR 1ST FLR    FOOT SURGERY Right 01/08/2019    LASIK  2000    SHOULDER SURGERY Left 04/05/2019    WISDOM TOOTH EXTRACTION         OBJECTIVE:   PHYSICAL EXAM:       Vitals:    08/14/19 1409   PainSc: 0-No pain     Ortho/SPM Exam  Examination left shoulder no tenderness no swelling range of motion excellent almost full active and passive elevation  Minimal pain    RADIOGRAPHS:  None  Comments: I have personally reviewed the imaging and I agree with the above radiologist's report.    ASSESSMENT/PLAN:     IMPRESSION:  Status post rotator cuff repair left shoulder doing well    PLAN:  Continue therapy increase activities as tolerated avoid any heavy or painful maneuvers    FOLLOW UP:  4-6 weeks    Disclaimer: This note has been generated using voice-recognition software. There may be typographical errors that have been missed during proof-reading.

## 2019-08-15 ENCOUNTER — CLINICAL SUPPORT (OUTPATIENT)
Dept: REHABILITATION | Facility: HOSPITAL | Age: 54
End: 2019-08-15
Attending: ORTHOPAEDIC SURGERY
Payer: COMMERCIAL

## 2019-08-15 DIAGNOSIS — M62.81 MUSCLE WEAKNESS OF LEFT UPPER EXTREMITY: ICD-10-CM

## 2019-08-15 DIAGNOSIS — M25.512 CHRONIC LEFT SHOULDER PAIN: ICD-10-CM

## 2019-08-15 DIAGNOSIS — M62.89 MUSCLE TIGHTNESS: ICD-10-CM

## 2019-08-15 DIAGNOSIS — G89.29 CHRONIC LEFT SHOULDER PAIN: ICD-10-CM

## 2019-08-15 DIAGNOSIS — M25.612 DECREASED RANGE OF MOTION OF LEFT SHOULDER: ICD-10-CM

## 2019-08-15 PROCEDURE — 97110 THERAPEUTIC EXERCISES: CPT | Mod: PO

## 2019-08-15 PROCEDURE — 97140 MANUAL THERAPY 1/> REGIONS: CPT | Mod: PO

## 2019-08-15 NOTE — PROGRESS NOTES
Physical Therapy Daily Treatment Note     Name: Hien Jeter  Clinic Number: [id number]    Therapy Diagnosis:   Encounter Diagnoses   Name Primary?    Chronic left shoulder pain     Muscle weakness of left upper extremity     Decreased range of motion of left shoulder     Muscle tightness      Physician: Natalee Nagel PA-C    Visit Date: 8/15/2019    Physician Orders: PT Eval and Treat   Medical Diagnosis from Referral: Status post arthroscopy of left shoulder  Evaluation Date: 5/2/2019  Last PN: 8/15/19 (visit 24)  Authorization Period Expiration: 12/31/19   Plan of Care Expiration: 9/26/19  Visit # / Visits authorized: 24 / 50  PTA Visit Number: 0      Time In: 0900  Time Out: 1015  Total Billable Time: 65 minutes  Charges: TE - 3, MT- 1    Precautions: Standard    Subjective     Pt reports: she has her doctor and he is pleased with her progress.  She was compliant with home exercise program.  Response to previous treatment: good  Functional change: can  things from overhead better    Pain: 0/10  Location: left shoulder      Objective     L shoulder AROM flexion (8/15/19): 155 deg    BOLD= performed today    Hien received therapeutic exercises to develop strength, endurance, ROM, flexibility and posture for 35 minutes including:    UBE 3'F/3'B level 2  Freemotion shoulder flexion AAROM facing away 10# x 3 minutes  Serratus wall slides w orange TB + lift off x 20  Supine flexion against orange TB x 20  Rows w green TB x 20  Sh IR w green TB and towel roll x 20  Sh ext w green TB x 20  SL sh ER w towel roll x 20  Wall push up plus x 20   Seated D2 flex w green TB x 20  Side lying sh abd w orange TB for inferior glide: x 20  Prone army crawl + lift off x 20  Prone sh flexion in end-range x 10  AAROM sh abd w 2 GTB 2 x 10   Seated reach roll and lift: 3 x 5   Rhomboid stretch 3 x 30 sec  Eccentric flexion on wall: 3x10  +R sidelying sh flexion x 20  +R sidelying sh abd x 20  +L sidelying posterior  capsule stretch 3 x 30 sec  Flexion on wall up and back w/ GTB loop: 20x  Prone Ts w min A x 20  Prone Ys w min A x 20    Not performed today:  Pulleys flex/ scap: x 3 minutes each- NP  Wall slides w orange TB: x 20- NP  Prone rows/ext: 2 x 10- NP  Flexion/scap slides on stair rail: 20 x 5 seconds each -NP  Supine sh flexion AAROM w dowel 5 sec hold x 30- NP  Supine sh ER AAROM w dowel 5 sec hold x 20- NP     Pt received cryotherapy to L shoulder at end of treatment session for 10 minutes.     Hien received the following manual therapy techniques x 20 min:   Grade III inferior and posterior GH joint mobs in supine and prone end-range flexion   STM to L subscapularis and infraspinatus  +Subscapularis release in R sidelying  +Pt cleared of contraindications and verbal and written consent acquired. Pt given option of copy of consent form. Pt educated on benefits and potential side effects of DN. FDN performed to L subscapularis and pec minor. FDN performed to reduce pain and muscle tension, promote blood flow, and improve ROM and function x 10 minutes. Pt tolerated tx well without adverse effects. Pt was educated on what to expect following the procedure and verbalized understanding.     Home Exercises Provided and Patient Education Provided     Education provided:   -importance of proper posture with therapeutic exercises    Written Home Exercises Provided: Patient instructed to cont prior HEP. (wall slides, SL sh ER, B sh ext w theraband, prone army crawls, D2 flexion) addition of sidelying posterior capsule stretch  Exercises were reviewed and Hien was able to demonstrate them prior to the end of the session.  Hien demonstrated good  understanding of the education provided.     See EMR under Patient Instructions for exercises provided prior visit.    CMS Impairment/Limitation/Restriction for FOTO shoulder Survey    Therapist reviewed FOTO scores for Hien Jeter on 8/15/2019.   FOTO documents entered into EPIC -  see Media section.    Limitation Score: 38%  Category: Mobility    Current : CJ = at least 20% but < 40% impaired, limited or restricted  Goal: CJ = at least 20% but < 40% impaired, limited or restricted       Assessment     Pt was re-assessed today with 3/5 STGs being met indicating improvements in L shoulder pain and ability to reach overhead since start of care. She continues with limitations in end-range shoulder AROM, impaired functional use of UE, and UE weakness. Pt could benefit from continued physical therapy services to address deficits.     She had good tolerance to treatment today with no adverse effects. Post-treatment L shoulder pain rated as 0/10. Pt with L subscapularis and pec minor soft tissue dysfunction. Improvement in shoulder range following manual therapy techniques. Good response to dry needling with no adverse effects. She continues with limitations into end-range shoulder flexion and demonstrates compensatory movement patterns in overhead range.     Hien is progressing well towards her goals.   Pt prognosis is Good.     Pt will continue to benefit from skilled outpatient physical therapy to address the deficits listed in the problem list box on initial evaluation, provide pt/family education and to maximize pt's level of independence in the home and community environment.     Pt's spiritual, cultural and educational needs considered and pt agreeable to plan of care and goals.     Anticipated barriers to physical therapy: None     Goals:     Short Term Goals:  6 weeks  1. Report decreased L shoulder pain  <  / =  5/10 at worst to increase tolerance for driving. - met 5/21/19  2. Pt will increase L shoulder flexion AROM to 150 deg to indicate improved flexibility and mobility. - met 5/21/19  3. Pt will be able to tolerate multi-directional UE strengthening without pain to improve functional use of UEs.- met 5/21/19  4. Pt will report 50% improvement in ability to reach overhead without pain  since start of care to indicate improved functional mobility. - met 5/21/19  5. Pt to tolerate HEP to improve ROM and independence with ADL's. - met 5/21/19     Long Term Goals: 12 weeks  1. Report decreased L shoulder pain  <  / =  3/10 at worst to increase tolerance for driving. - met 6/18/2019   2. Pt will demonstrate increase L shoulder AROM in all planes to indicate improved flexibility and mobility.  - Progressing, not met 8/15/2019   3. Pt will be able to perform 2 x 10 shoulder flexion with 2 lbs to indicate improved strength in B UEs. - Progressing, not met 8/15/2019   4. Pt will report 80% improvement in ability to reach overhead without pain since start of care to indicate improved functional mobility. - met 7/18/19  5. Pt to be Independent with HEP to improve ROM and independence with ADL's. - met 7/18/19    Plan     Progress shoulder AROM and strengthening, including eccentric shoulder strengthening for flexion. Continue 1-2x/week for 6 weeks.    Angie Ta, PT

## 2019-08-22 ENCOUNTER — CLINICAL SUPPORT (OUTPATIENT)
Dept: REHABILITATION | Facility: HOSPITAL | Age: 54
End: 2019-08-22
Attending: ORTHOPAEDIC SURGERY
Payer: COMMERCIAL

## 2019-08-22 DIAGNOSIS — M25.612 DECREASED RANGE OF MOTION OF LEFT SHOULDER: ICD-10-CM

## 2019-08-22 DIAGNOSIS — G89.29 CHRONIC LEFT SHOULDER PAIN: ICD-10-CM

## 2019-08-22 DIAGNOSIS — M25.512 CHRONIC LEFT SHOULDER PAIN: ICD-10-CM

## 2019-08-22 DIAGNOSIS — M62.89 MUSCLE TIGHTNESS: ICD-10-CM

## 2019-08-22 DIAGNOSIS — M62.81 MUSCLE WEAKNESS OF LEFT UPPER EXTREMITY: ICD-10-CM

## 2019-08-22 PROCEDURE — 97110 THERAPEUTIC EXERCISES: CPT | Mod: PO

## 2019-08-22 NOTE — PROGRESS NOTES
Physical Therapy Daily Treatment Note     Name: Hien Jeter  Clinic Number: [id number]    Therapy Diagnosis:   Encounter Diagnoses   Name Primary?    Chronic left shoulder pain     Muscle weakness of left upper extremity     Decreased range of motion of left shoulder     Muscle tightness      Physician: Natalee Nagel PA-C    Visit Date: 8/22/2019    Physician Orders: PT Eval and Treat   Medical Diagnosis from Referral: Status post arthroscopy of left shoulder  Evaluation Date: 5/2/2019  Last PN: 8/15/19 (visit 24)  Authorization Period Expiration: 12/31/19   Plan of Care Expiration: 9/26/19  Visit # / Visits authorized: 25 / 50  PTA Visit Number: 0      Time In: 1502  Time Out: 1608  Total Billable Time: 30 minutes  Charges: TE - 2    Precautions: Standard    Subjective     Pt reports: she has been having some difficulty lifting arm at end-range. Her shoulder pain increases to 3 with movement but 0 at rest.   She was compliant with home exercise program.  Response to previous treatment: soreness and difficulty lifting arm  Functional change: none to note    Pain: 0/10  Location: left shoulder      Objective     L shoulder AROM flexion (8/22/19): 160 deg    BOLD= performed today    Hien received therapeutic exercises to develop strength, endurance, ROM, flexibility and posture for 41 minutes including:    UBE 3'F/3'B level 2  Freemotion shoulder flexion AAROM facing away 10# x 3 minutes  Serratus wall slides w orange TB x 20  +Shoulder flexion w orange band around wrists + back against wall x 20  +Shoulder IR propped on plinth w orange TB 2 x 10  Supine flexion against orange TB x 20  Rows w green TB x 20  Sh IR w green TB and towel roll x 20  Sh ext w green TB x 20  SL sh ER w towel roll x 20  Wall push up plus x 20   Seated D2 flex w green TB x 20  Side lying sh abd w orange TB for inferior glide: x 20  Prone army crawl x 20  Prone sh flexion in end-range x 10  AAROM sh abd w 2 GTB 2 x 10   Seated reach  roll and lift: x 20  Rhomboid stretch 3 x 30 sec  Eccentric flexion on wall: 3x10  R sidelying sh flexion x 20  R sidelying sh abd x 20  L sidelying posterior capsule stretch 3 x 30 sec  Flexion on wall up and back w/ GTB loop: 20x  Prone Ts w min A x 20  Prone Ys w min A x 20    Not performed today:  Pulleys flex/ scap: x 3 minutes each- NP  Wall slides w orange TB: x 20- NP  Prone rows/ext: 2 x 10- NP  Flexion/scap slides on stair rail: 20 x 5 seconds each -NP  Supine sh flexion AAROM w dowel 5 sec hold x 30- NP  Supine sh ER AAROM w dowel 5 sec hold x 20- NP     Pt received cryotherapy to L shoulder at end of treatment session for 10 minutes.     Hien received the following manual therapy techniques x 15 min:   Grade III inferior and posterior GH joint mobs in supine and prone end-range flexion   STM to L subscapularis and infraspinatus  Subscapularis release in R sidelying  Pt cleared of contraindications and verbal and written consent acquired. Pt given option of copy of consent form. Pt educated on benefits and potential side effects of DN. FDN performed to L subscapularis FDN performed to reduce pain and muscle tension, promote blood flow, and improve ROM and function x 10 minutes. Pt tolerated tx well without adverse effects. Pt was educated on what to expect following the procedure and verbalized understanding.     Home Exercises Provided and Patient Education Provided     Education provided:   -importance of proper posture with therapeutic exercises    Written Home Exercises Provided: Patient instructed to cont prior HEP. (wall slides, SL sh ER, B sh ext w theraband, prone army crawls, D2 flexion) addition of sidelying posterior capsule stretch  Exercises were reviewed and Hien was able to demonstrate them prior to the end of the session.  Hien demonstrated good  understanding of the education provided.     See EMR under Patient Instructions for exercises provided prior visit.    Assessment     Pt had  good tolerance to treatment today with no adverse effects. Post-treatment L shoulder pain rated as 1/10. Pt with decrease in compensatory movement pattern noted with overhead ranges. Decreased pain with use of theraband for inferior shoulder mobilizations and rotator cuff engagement. Soft tissue dysfunction noted in subscapularis and infraspinatus but improvements noted from previous visits. Good response to dry needling. Good response to progression of exercise program.     Hien is progressing well towards her goals.   Pt prognosis is Good.     Pt will continue to benefit from skilled outpatient physical therapy to address the deficits listed in the problem list box on initial evaluation, provide pt/family education and to maximize pt's level of independence in the home and community environment.     Pt's spiritual, cultural and educational needs considered and pt agreeable to plan of care and goals.     Anticipated barriers to physical therapy: None     Goals:     Short Term Goals:  6 weeks  1. Report decreased L shoulder pain  <  / =  5/10 at worst to increase tolerance for driving. - met 5/21/19  2. Pt will increase L shoulder flexion AROM to 150 deg to indicate improved flexibility and mobility. - met 5/21/19  3. Pt will be able to tolerate multi-directional UE strengthening without pain to improve functional use of UEs.- met 5/21/19  4. Pt will report 50% improvement in ability to reach overhead without pain since start of care to indicate improved functional mobility. - met 5/21/19  5. Pt to tolerate HEP to improve ROM and independence with ADL's. - met 5/21/19     Long Term Goals: 12 weeks  1. Report decreased L shoulder pain  <  / =  3/10 at worst to increase tolerance for driving. - met 6/18/2019   2. Pt will demonstrate increase L shoulder AROM in all planes to indicate improved flexibility and mobility.  - Progressing, not met 8/22/2019   3. Pt will be able to perform 2 x 10 shoulder flexion with 2 lbs  to indicate improved strength in B UEs. - Progressing, not met 8/22/2019   4. Pt will report 80% improvement in ability to reach overhead without pain since start of care to indicate improved functional mobility. - met 7/18/19  5. Pt to be Independent with HEP to improve ROM and independence with ADL's. - met 7/18/19    Plan     Progress shoulder AROM and strengthening, including eccentric shoulder strengthening for flexion. Progress manual therapy techniques.     Angie Ta, PT

## 2019-08-29 ENCOUNTER — CLINICAL SUPPORT (OUTPATIENT)
Dept: REHABILITATION | Facility: HOSPITAL | Age: 54
End: 2019-08-29
Attending: ORTHOPAEDIC SURGERY
Payer: COMMERCIAL

## 2019-08-29 DIAGNOSIS — M62.81 MUSCLE WEAKNESS OF LEFT UPPER EXTREMITY: ICD-10-CM

## 2019-08-29 DIAGNOSIS — M62.89 MUSCLE TIGHTNESS: ICD-10-CM

## 2019-08-29 DIAGNOSIS — M25.612 DECREASED RANGE OF MOTION OF LEFT SHOULDER: ICD-10-CM

## 2019-08-29 DIAGNOSIS — G89.29 CHRONIC LEFT SHOULDER PAIN: ICD-10-CM

## 2019-08-29 DIAGNOSIS — M25.512 CHRONIC LEFT SHOULDER PAIN: ICD-10-CM

## 2019-08-29 PROCEDURE — 97110 THERAPEUTIC EXERCISES: CPT | Mod: PO

## 2019-08-29 NOTE — PROGRESS NOTES
Physical Therapy Daily Treatment Note     Name: Hien Jeter  Clinic Number: [id number]    Therapy Diagnosis:   Encounter Diagnoses   Name Primary?    Chronic left shoulder pain     Muscle weakness of left upper extremity     Decreased range of motion of left shoulder     Muscle tightness      Physician: Natalee Nagel PA-C    Visit Date: 8/29/2019    Physician Orders: PT Eval and Treat   Medical Diagnosis from Referral: Status post arthroscopy of left shoulder  Evaluation Date: 5/2/2019  Last PN: 8/15/19 (visit 24)  Authorization Period Expiration: 12/31/19   Plan of Care Expiration: 9/26/19  Visit # / Visits authorized: 26 / 50  PTA Visit Number: 0      Time In: 0858  Time Out: 1010  Total Billable Time: 31 minutes  Charges: TE - 2    Precautions: Standard    Subjective     Pt reports: her shoulder is feeling better since last treatment.   She was compliant with home exercise program.  Response to previous treatment: good   Functional change: none to note    Pain: 0/10  Location: left shoulder      Objective     L shoulder AROM flexion (8/29/19): 165 deg    BOLD= performed today    Hien received therapeutic exercises to develop strength, endurance, ROM, flexibility and posture for 32 minutes including:    UBE 3'F/3'B level 2  Freemotion shoulder flexion AAROM facing away 7# x 3 minutes  Serratus wall slides w orange TB x 20  Shoulder flexion w orange band around wrists + back against wall x 20  Shoulder IR propped on plinth w orange TB 2 x 10  +Corner stretch 5 x 30 sec  Supine flexion against orange TB x 20  Rows w green TB x 20  Sh IR w green TB and towel roll x 20  Sh ext w green TB x 20  SL sh ER w towel roll x 20  Wall push up plus x 20   Seated D2 flex w green TB x 20  Side lying sh abd w orange TB for inferior glide: x 20  Prone army crawl x 20  Prone sh flexion in end-range x 10  AAROM sh abd w 2 GTB 2 x 10   Seated reach roll and lift: x 20  Rhomboid stretch 3 x 30 sec  Eccentric flexion on  wall: 3x10  R sidelying sh flexion x 20  R sidelying sh abd x 20  L sidelying posterior capsule stretch 3 x 30 sec  Flexion on wall up and back w/ GTB loop: 20x  Prone Ts w min A x 20  Prone Ys w min A x 20  +Prone Is x 20     Pt received cryotherapy to L shoulder at end of treatment session for 10 minutes in pec stretch position.     Hien received the following manual therapy techniques x 20 min:   Grade III inferior and posterior GH joint mobs in supine and prone end-range flexion   STM to L subscapularis and infraspinatus  Subscapularis release in R sidelying  Hold-relax into flexion   Pt cleared of contraindications and verbal and written consent acquired. Pt given option of copy of consent form. Pt educated on benefits and potential side effects of DN. FDN performed to L subscapularis and pec minor. FDN performed to reduce pain and muscle tension, promote blood flow, and improve ROM and function x 10 minutes. Pt tolerated tx well without adverse effects. Pt was educated on what to expect following the procedure and verbalized understanding.     Home Exercises Provided and Patient Education Provided     Education provided:   -importance of proper posture with therapeutic exercises  -techniques for self-mobilization of pec muscles    Written Home Exercises Provided: Patient instructed to cont prior HEP. (wall slides, SL sh ER, B sh ext w theraband, prone army crawls, D2 flexion, sidelying posterior capsule stretch)  Exercises were reviewed and Hien was able to demonstrate them prior to the end of the session.  Hien demonstrated good  understanding of the education provided.     See EMR under Patient Instructions for exercises provided prior visit.    Assessment     Pt had good tolerance to treatment today with no adverse effects. Post-treatment L shoulder pain rated as 0/10 following cryotherapy. Pt presents to clinic with limited end-range shoulder flexion. Pt with tenderness in L pec muscles and posterior  shoulder. Improvements in symptoms following manual therapy techniques. No adverse effects to dry needling. Improvements in range by end of session.     Hien is progressing well towards her goals.   Pt prognosis is Good.     Pt will continue to benefit from skilled outpatient physical therapy to address the deficits listed in the problem list box on initial evaluation, provide pt/family education and to maximize pt's level of independence in the home and community environment.     Pt's spiritual, cultural and educational needs considered and pt agreeable to plan of care and goals.     Anticipated barriers to physical therapy: None     Goals:     Short Term Goals:  6 weeks  1. Report decreased L shoulder pain  <  / =  5/10 at worst to increase tolerance for driving. - met 5/21/19  2. Pt will increase L shoulder flexion AROM to 150 deg to indicate improved flexibility and mobility. - met 5/21/19  3. Pt will be able to tolerate multi-directional UE strengthening without pain to improve functional use of UEs.- met 5/21/19  4. Pt will report 50% improvement in ability to reach overhead without pain since start of care to indicate improved functional mobility. - met 5/21/19  5. Pt to tolerate HEP to improve ROM and independence with ADL's. - met 5/21/19     Long Term Goals: 12 weeks  1. Report decreased L shoulder pain  <  / =  3/10 at worst to increase tolerance for driving. - met 6/18/2019   2. Pt will demonstrate increase L shoulder AROM in all planes to indicate improved flexibility and mobility.  - Progressing, not met 8/29/2019   3. Pt will be able to perform 2 x 10 shoulder flexion with 2 lbs to indicate improved strength in B UEs. - Progressing, not met 8/29/2019   4. Pt will report 80% improvement in ability to reach overhead without pain since start of care to indicate improved functional mobility. - met 7/18/19  5. Pt to be Independent with HEP to improve ROM and independence with ADL's. - met 7/18/19    Plan      Progress shoulder AROM and strengthening, including eccentric shoulder strengthening for flexion. Progress manual therapy techniques.     Angie Ta, PT

## 2019-09-05 ENCOUNTER — CLINICAL SUPPORT (OUTPATIENT)
Dept: REHABILITATION | Facility: HOSPITAL | Age: 54
End: 2019-09-05
Attending: ORTHOPAEDIC SURGERY
Payer: COMMERCIAL

## 2019-09-05 DIAGNOSIS — M62.81 MUSCLE WEAKNESS OF LEFT UPPER EXTREMITY: ICD-10-CM

## 2019-09-05 DIAGNOSIS — M25.612 DECREASED RANGE OF MOTION OF LEFT SHOULDER: ICD-10-CM

## 2019-09-05 DIAGNOSIS — M62.89 MUSCLE TIGHTNESS: ICD-10-CM

## 2019-09-05 DIAGNOSIS — M25.512 CHRONIC LEFT SHOULDER PAIN: ICD-10-CM

## 2019-09-05 DIAGNOSIS — G89.29 CHRONIC LEFT SHOULDER PAIN: ICD-10-CM

## 2019-09-05 PROCEDURE — 97140 MANUAL THERAPY 1/> REGIONS: CPT | Mod: PO

## 2019-09-05 PROCEDURE — 97110 THERAPEUTIC EXERCISES: CPT | Mod: PO

## 2019-09-05 NOTE — PROGRESS NOTES
Physical Therapy Daily Treatment Note     Name: Hien Jeter  Clinic Number: [id number]    Therapy Diagnosis:   Encounter Diagnoses   Name Primary?    Chronic left shoulder pain     Muscle weakness of left upper extremity     Decreased range of motion of left shoulder     Muscle tightness      Physician: Natalee Nagel PA-C    Visit Date: 9/5/2019    Physician Orders: PT Eval and Treat   Medical Diagnosis from Referral: Status post arthroscopy of left shoulder  Evaluation Date: 5/2/2019  Last PN: 8/15/19 (visit 24)  Authorization Period Expiration: 12/31/19   Plan of Care Expiration: 9/26/19  Visit # / Visits authorized: 27 / 50  PTA Visit Number: 0      Time In: 1600  Time Out: 1710  Total Billable Time: 60 minutes  Charges: TE - 2, MT- 2    Precautions: Standard    Subjective     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 Has been performing self-mobs on pec at home. No significant improvements.  She was compliant with home exercise program.  Response to previous treatment: good- soreness that resolved after 2 days  Functional change: none to note    Pain: 2-3/10  Location: left shoulder      Objective     L shoulder AROM flexion at end of session (9/5/19): 165 deg    BOLD= performed today    Hien received therapeutic exercises to develop strength, endurance, ROM, flexibility and posture for 30 minutes including:    UBE 3'F/3'B level 2  +Sh IR stretch w hand behind back w strap 5 sec hold x 20  Freemotion shoulder flexion AAROM facing away 7# x 3 minutes  Serratus wall slides w orange TB x 20  Shoulder flexion w orange band around wrists + back against wall x 20  Shoulder IR propped on plinth w orange TB 2 x 10  Shoulder flexion wall walk w lunge 5 sec hold x 20  Corner stretch 3 x 30 sec  +Corner slides x 20  Supine flexion against orange TB x 20  Rows w green TB x 20  Sh IR w green TB and towel roll x 20  Sh ext w green TB x 20  SL sh ER w towel roll x 20  Wall push up plus x 20   Seated D2 flex w  green TB x 20  Side lying sh abd w orange TB for inferior glide: x 20  Prone army crawl x 20  Prone sh flexion in end-range x 10  AAROM sh abd w 2 GTB 2 x 10   Seated reach roll and lift: x 20  Rhomboid stretch 3 x 30 sec  Eccentric flexion on wall: 3x10  R sidelying sh flexion x 20  R sidelying sh abd x 20  L posterior capsule stretch 3 x 30 sec  Flexion on wall up and back w/ GTB loop: 20x  Prone Ts w min A x 20  Prone Ys w min A x 20  Prone Is x 20     Pt received cryotherapy to L shoulder at end of treatment session for 10 minutes in sitting with arm propped on wedge.      Hien received the following manual therapy techniques x 30 min:   Grade III inferior and posterior GH joint mobs in supine and prone end-range flexion   STM to L subscapularis and infraspinatus  Subscapularis release in R sidelying  Hold-relax into flexion   Pt cleared of contraindications and verbal and written consent acquired. Pt given option of copy of consent form. Pt educated on benefits and potential side effects of DN. FDN performed to L subscapularis (supine and prone) and infraspinatus. FDN performed to reduce pain and muscle tension, promote blood flow, and improve ROM and function x 20 minutes. Pt tolerated tx well without adverse effects. Pt was educated on what to expect following the procedure and verbalized understanding.     Home Exercises Provided and Patient Education Provided     Education provided:   -importance of proper posture with therapeutic exercises  -techniques for self-mobilization of pec muscles    Written Home Exercises Provided: Patient instructed to cont prior HEP. (wall slides, SL sh ER, B sh ext w theraband, prone army crawls, D2 flexion, sidelying posterior capsule stretch)  Exercises were reviewed and Hien was able to demonstrate them prior to the end of the session.  Hien demonstrated good  understanding of the education provided.     See EMR under Patient Instructions for exercises provided prior  visit.    Assessment     Pt had good tolerance to treatment today with no adverse effects. Post-treatment L shoulder pain rated as 0/10. Pt presents to clinic with decreased shoulder flexion AROM at beginning of session. Significant improvement in shoulder range following manual therapy techniques and pec stretching. Good response to overhead shoulder flexion with lunge for increased pec stretching and improving ROM. She continues with scapular dyskinesia but decreased compensatory patterns. Pt requires occasional verbal cueing for scapular retraction with therapeutic exercises. Soft tissue dysfunction noted in pec and subscapularis. No adverse effects to dry needling techniques. Increase in functional shoulder IR ROM behind back following stretches.     Hien is progressing well towards her goals.   Pt prognosis is Good.     Pt will continue to benefit from skilled outpatient physical therapy to address the deficits listed in the problem list box on initial evaluation, provide pt/family education and to maximize pt's level of independence in the home and community environment.     Pt's spiritual, cultural and educational needs considered and pt agreeable to plan of care and goals.     Anticipated barriers to physical therapy: None     Goals:     Short Term Goals:  6 weeks  1. Report decreased L shoulder pain  <  / =  5/10 at worst to increase tolerance for driving. - met 5/21/19  2. Pt will increase L shoulder flexion AROM to 150 deg to indicate improved flexibility and mobility. - met 5/21/19  3. Pt will be able to tolerate multi-directional UE strengthening without pain to improve functional use of UEs.- met 5/21/19  4. Pt will report 50% improvement in ability to reach overhead without pain since start of care to indicate improved functional mobility. - met 5/21/19  5. Pt to tolerate HEP to improve ROM and independence with ADL's. - met 5/21/19     Long Term Goals: 12 weeks  1. Report decreased L shoulder pain   <  / =  3/10 at worst to increase tolerance for driving. - met 6/18/2019   2. Pt will demonstrate increase L shoulder AROM in all planes to indicate improved flexibility and mobility.  - Progressing, not met 9/5/2019   3. Pt will be able to perform 2 x 10 shoulder flexion with 2 lbs to indicate improved strength in B UEs. - Progressing, not met 9/5/2019   4. Pt will report 80% improvement in ability to reach overhead without pain since start of care to indicate improved functional mobility. - met 7/18/19  5. Pt to be Independent with HEP to improve ROM and independence with ADL's. - met 7/18/19    Plan     Progress shoulder AROM and strengthening, including eccentric shoulder strengthening for flexion. Progress manual therapy techniques.     Angie Ta, PT

## 2019-09-12 ENCOUNTER — CLINICAL SUPPORT (OUTPATIENT)
Dept: REHABILITATION | Facility: HOSPITAL | Age: 54
End: 2019-09-12
Attending: ORTHOPAEDIC SURGERY
Payer: COMMERCIAL

## 2019-09-12 DIAGNOSIS — M25.512 CHRONIC LEFT SHOULDER PAIN: ICD-10-CM

## 2019-09-12 DIAGNOSIS — M62.81 MUSCLE WEAKNESS OF LEFT UPPER EXTREMITY: ICD-10-CM

## 2019-09-12 DIAGNOSIS — M62.89 MUSCLE TIGHTNESS: ICD-10-CM

## 2019-09-12 DIAGNOSIS — M25.612 DECREASED RANGE OF MOTION OF LEFT SHOULDER: ICD-10-CM

## 2019-09-12 DIAGNOSIS — G89.29 CHRONIC LEFT SHOULDER PAIN: ICD-10-CM

## 2019-09-12 PROCEDURE — 97140 MANUAL THERAPY 1/> REGIONS: CPT | Mod: PO

## 2019-09-12 PROCEDURE — 97110 THERAPEUTIC EXERCISES: CPT | Mod: PO

## 2019-09-12 NOTE — PROGRESS NOTES
Physical Therapy Daily Treatment Note     Name: Hien Jeter  Clinic Number: [id number]    Therapy Diagnosis:   Encounter Diagnoses   Name Primary?    Chronic left shoulder pain     Muscle weakness of left upper extremity     Decreased range of motion of left shoulder     Muscle tightness      Physician: Natalee Nagel PA-C    Visit Date: 9/12/2019    Physician Orders: PT Eval and Treat   Medical Diagnosis from Referral: Status post arthroscopy of left shoulder  Evaluation Date: 5/2/2019  Last PN: 9/12/19 (visit 28)  Authorization Period Expiration: 12/31/19   Plan of Care Expiration: 9/26/19  Visit # / Visits authorized: 28 / 50  PTA Visit Number: 0      Time In: 1400  Time Out: 1500  Total Billable Time: 32 minutes  Charges: TE - 1, MT- 1    Precautions: Standard    Subjective     Pt reports: she feels she has more range in UE after doing exercises  She was compliant with home exercise program.  Response to previous treatment: good- no soreness  Functional change: increased eased lifting arm    Pain: 0/10  Location: left shoulder      Objective     L shoulder AROM flexion at end of session (9/12/19): 165 deg    BOLD= performed today    Hien received therapeutic exercises to develop strength, endurance, ROM, flexibility and posture for 20 minutes including:    UBE 3'F/3'B level 2  Sh IR stretch w hand behind back w strap 5 sec hold x 20  Freemotion shoulder flexion AAROM facing away 7# x 3 minutes  Serratus wall slides w orange TB x 20  Shoulder flexion w orange band around wrists + back against wall x 20  Shoulder IR propped on plinth w orange TB 2 x 10  Shoulder flexion wall walk w lunge 5 sec hold x 20  +Shoulder abduction wall walk w lunge 5 sec hold x 20  Corner stretch 3 x 30 sec  Corner slides x 20  Supine flexion against orange TB x 20  Rows w green TB x 20  Sh IR w green TB and towel roll x 20  Sh ext w green TB x 20  SL sh ER w towel roll x 20  Wall push up plus x 20   Seated D2 flex w green TB  x 20  Side lying sh abd w orange TB for inferior glide: x 20  Prone army crawl x 20  Prone sh flexion in end-range x 10  AAROM sh abd w 2 GTB 2 x 10   Seated reach roll and lift: x 20  Rhomboid stretch 3 x 30 sec  Eccentric flexion on wall: 3x10  R sidelying sh flexion x 20  R sidelying sh abd x 20  L posterior capsule stretch 3 x 30 sec  Flexion on wall up and back w/ GTB loop: 20x  Prone Ts w min A x 20  Prone Ys w min A x 20  Prone Is x 20     Pt received moist heat to L shoulder at beginning of treatment session for 10 minutes in sitting.      Hien received the following manual therapy techniques x 30 min:     Grade III inferior and posterior GH joint mobs in supine and prone end-range flexion   STM and IASTM to L subscapularis and infraspinatus in R sidelying with passive shoulder abduction  STM to L pec  Subscapularis release in R sidelying  Hold-relax into flexion   Pt cleared of contraindications and verbal and written consent acquired. Pt given option of copy of consent form. Pt educated on benefits and potential side effects of DN. FDN performed to L subscapularis (supine and prone) and infraspinatus. FDN performed to reduce pain and muscle tension, promote blood flow, and improve ROM and function x 20 minutes. Pt tolerated tx well without adverse effects. Pt was educated on what to expect following the procedure and verbalized understanding.     Home Exercises Provided and Patient Education Provided     Education provided:   -importance of proper posture with therapeutic exercises  -techniques for self-mobilization of pec muscles    Written Home Exercises Provided: Patient instructed to cont prior HEP. (wall slides, SL sh ER, B sh ext w theraband, prone army crawls, D2 flexion, sidelying posterior capsule stretch)  Exercises were reviewed and Hien was able to demonstrate them prior to the end of the session.  Hien demonstrated good  understanding of the education provided.     See EMR under Patient  Instructions for exercises provided prior visit.    Assessment     Pt was re-assessed today with no new goals being met since last re-assessment. She continues with decreased L shoulder AROM, UE weakness, muscle tightness, and decreased functional use of UE. Improvements noted in shoulder ROM and soft tissue quality with treatments. Pt could benefit from continued physical therapy services to address deficits.     She had good tolerance to treatment today with no adverse effects. Post-treatment L shoulder pain rated as 0/10. Pt continues with scapular dyskinesia and soft tissue dysfunction in subscapularis. Improvement in L shoulder PROM following manual therapy techniques compared to initial presentation. Decreased tenderness to L pec noted.     Hien is progressing well towards her goals.   Pt prognosis is Good.     Pt will continue to benefit from skilled outpatient physical therapy to address the deficits listed in the problem list box on initial evaluation, provide pt/family education and to maximize pt's level of independence in the home and community environment.     Pt's spiritual, cultural and educational needs considered and pt agreeable to plan of care and goals.     Anticipated barriers to physical therapy: None     Goals:     Short Term Goals:  6 weeks  1. Report decreased L shoulder pain  <  / =  5/10 at worst to increase tolerance for driving. - met 5/21/19  2. Pt will increase L shoulder flexion AROM to 150 deg to indicate improved flexibility and mobility. - met 5/21/19  3. Pt will be able to tolerate multi-directional UE strengthening without pain to improve functional use of UEs.- met 5/21/19  4. Pt will report 50% improvement in ability to reach overhead without pain since start of care to indicate improved functional mobility. - met 5/21/19  5. Pt to tolerate HEP to improve ROM and independence with ADL's. - met 5/21/19     Long Term Goals: 12 weeks  1. Report decreased L shoulder pain  <  / =   3/10 at worst to increase tolerance for driving. - met 6/18/2019   2. Pt will demonstrate increase L shoulder AROM in all planes to indicate improved flexibility and mobility.  - Progressing, not met 9/12/2019   3. Pt will be able to perform 2 x 10 shoulder flexion with 2 lbs to indicate improved strength in B UEs. - Progressing, not met 9/12/2019   4. Pt will report 80% improvement in ability to reach overhead without pain since start of care to indicate improved functional mobility. - met 7/18/19  5. Pt to be Independent with HEP to improve ROM and independence with ADL's. - met 7/18/19    Plan     Progress shoulder AROM and strengthening, including eccentric shoulder strengthening for flexion. Progress manual therapy techniques.     Angie Ta, PT

## 2019-09-12 NOTE — PROGRESS NOTES
Physical Therapy Daily Treatment Note     Name: Hien Jeter  Clinic Number: [id number]    Therapy Diagnosis:   Encounter Diagnoses   Name Primary?    Chronic left shoulder pain     Muscle weakness of left upper extremity     Decreased range of motion of left shoulder     Muscle tightness      Physician: Natalee Nagel PA-C    Visit Date: 9/17/2019    Physician Orders: PT Eval and Treat   Medical Diagnosis from Referral: Status post arthroscopy of left shoulder  Evaluation Date: 5/2/2019  Last PN: 9/12/19 (visit 28)  Authorization Period Expiration: 12/31/19   Plan of Care Expiration: 9/26/19  Visit # / Visits authorized: 29 / 50  PTA Visit Number: 0      Time In: 1702  Time Out: 1810  Total Billable Time: 68 minutes  Charges: TE - 3, MT- 1    Precautions: Standard    Subjective     Pt reports: she recently tripped over a tree root and fell onto her R side. No serious injuries or bruises. She has been working on her shoulder exercises. Pt just completed 1.5 hour drive.   She was compliant with home exercise program.  Response to previous treatment: good- no soreness  Functional change: none to note    Pain: 0/10  Location: left shoulder      Objective     L shoulder AROM flexion at end of session (9/17/19): 165 deg    BOLD= performed today    Hien received therapeutic exercises to develop strength, endurance, ROM, flexibility and posture for 48 minutes including:    UBE 3'F/3'B level 2  Sh IR stretch w hand behind back w strap 5 sec hold x 20  Freemotion shoulder flexion AAROM facing away 7# x 3 minutes  +Rhomboid stretch on treadmill rail 5 x 30 sec   Serratus wall slides w orange TB x 20  Shoulder flexion w orange band around wrists + back against wall x 20  Shoulder IR propped on plinth w orange TB 2 x 10  Shoulder flexion wall walk w lunge 5 sec hold x 20  Shoulder abduction wall walk w lunge 5 sec hold x 20  Corner stretch 3 x 30 sec  Corner slides x 20  Supine flexion against orange TB x 20  Rows w  green TB x 20  Sh IR w green TB and towel roll x 20  Sh ext w green TB x 20  +Sh ER at 90 deg abduction w yellow TB 2 x 10  PROM shoulder IR and ER at 90 deg  SL sh ER w towel roll x 20  Wall push up plus x 20   Seated D2 flex w green TB x 20  Side lying sh abd w orange TB for inferior glide: x 20  Prone army crawl x 20  Prone sh flexion in end-range x 10  AAROM sh abd w 2 GTB 2 x 10   Seated reach roll and lift: x 20  Rhomboid stretch 3 x 30 sec  Eccentric flexion on wall: 3x10  R sidelying sh flexion x 20  R sidelying sh abd x 20  L posterior capsule stretch 3 x 30 sec  Flexion on wall up and back w/ GTB loop: 20x  Prone Ts w min A x 20  Prone Ys w min A x 20  Prone Is x 20     Pt received moist heat to L shoulder at beginning of treatment session for 0 minutes in sitting.      Hien received the following manual therapy techniques x 20 min:     Grade III inferior and posterior GH joint mobs in supine end-range flexion   STM to L subscapularis and infraspinatus in R sidelying with passive shoulder abduction  STM to L pec  Subscapularis release in R sidelying  Hold-relax into flexion and shoulder ER  Pt cleared of contraindications and verbal and written consent acquired. Pt given option of copy of consent form. Pt educated on benefits and potential side effects of DN. FDN performed to L subscapularis (supine and prone) and infraspinatus. FDN performed to reduce pain and muscle tension, promote blood flow, and improve ROM and function x 0 minutes. Pt tolerated tx well without adverse effects. Pt was educated on what to expect following the procedure and verbalized understanding. - NP    Home Exercises Provided and Patient Education Provided     Education provided:   -importance of proper posture with therapeutic exercises  -techniques for self-mobilization of pec muscles    Written Home Exercises Provided: Patient instructed to cont prior HEP. (wall slides, SL sh ER, B sh ext w theraband, prone army crawls, D2  flexion, sidelying posterior capsule stretch)  Exercises were reviewed and Hien was able to demonstrate them prior to the end of the session.  Hien demonstrated good  understanding of the education provided.     See EMR under Patient Instructions for exercises provided prior visit.    Assessment     Pt had good tolerance to treatment today with no adverse effects. Post-treatment L shoulder pain rated as 0/10. Tenderness noted in L pec muscle and proximal biceps. Some improvements following manual therapy techniques. She continues with limitations in L shoulder flexion PROM with firm end-feel and reports of pec insertion/proximal biceps pain. Good response to addition of new therapeutic exercises.     Hine is progressing well towards her goals.   Pt prognosis is Good.     Pt will continue to benefit from skilled outpatient physical therapy to address the deficits listed in the problem list box on initial evaluation, provide pt/family education and to maximize pt's level of independence in the home and community environment.     Pt's spiritual, cultural and educational needs considered and pt agreeable to plan of care and goals.     Anticipated barriers to physical therapy: None     Goals:     Short Term Goals:  6 weeks  1. Report decreased L shoulder pain  <  / =  5/10 at worst to increase tolerance for driving. - met 5/21/19  2. Pt will increase L shoulder flexion AROM to 150 deg to indicate improved flexibility and mobility. - met 5/21/19  3. Pt will be able to tolerate multi-directional UE strengthening without pain to improve functional use of UEs.- met 5/21/19  4. Pt will report 50% improvement in ability to reach overhead without pain since start of care to indicate improved functional mobility. - met 5/21/19  5. Pt to tolerate HEP to improve ROM and independence with ADL's. - met 5/21/19     Long Term Goals: 12 weeks  1. Report decreased L shoulder pain  <  / =  3/10 at worst to increase tolerance for  driving. - met 6/18/2019   2. Pt will demonstrate increase L shoulder AROM in all planes to indicate improved flexibility and mobility.  - Progressing, not met 9/17/2019   3. Pt will be able to perform 2 x 10 shoulder flexion with 2 lbs to indicate improved strength in B UEs. - Progressing, not met 9/17/2019   4. Pt will report 80% improvement in ability to reach overhead without pain since start of care to indicate improved functional mobility. - met 7/18/19  5. Pt to be Independent with HEP to improve ROM and independence with ADL's. - met 7/18/19    Plan     Progress shoulder AROM and strengthening, including eccentric shoulder strengthening for flexion. Progress manual therapy techniques.     Angie Ta, PT

## 2019-09-17 ENCOUNTER — CLINICAL SUPPORT (OUTPATIENT)
Dept: REHABILITATION | Facility: HOSPITAL | Age: 54
End: 2019-09-17
Attending: ORTHOPAEDIC SURGERY
Payer: COMMERCIAL

## 2019-09-17 DIAGNOSIS — M62.81 MUSCLE WEAKNESS OF LEFT UPPER EXTREMITY: ICD-10-CM

## 2019-09-17 DIAGNOSIS — M25.612 DECREASED RANGE OF MOTION OF LEFT SHOULDER: ICD-10-CM

## 2019-09-17 DIAGNOSIS — M62.89 MUSCLE TIGHTNESS: ICD-10-CM

## 2019-09-17 DIAGNOSIS — G89.29 CHRONIC LEFT SHOULDER PAIN: ICD-10-CM

## 2019-09-17 DIAGNOSIS — M25.512 CHRONIC LEFT SHOULDER PAIN: ICD-10-CM

## 2019-09-17 PROCEDURE — 97110 THERAPEUTIC EXERCISES: CPT | Mod: PO

## 2019-09-17 PROCEDURE — 97140 MANUAL THERAPY 1/> REGIONS: CPT | Mod: PO

## 2019-09-19 ENCOUNTER — CLINICAL SUPPORT (OUTPATIENT)
Dept: REHABILITATION | Facility: HOSPITAL | Age: 54
End: 2019-09-19
Attending: ORTHOPAEDIC SURGERY
Payer: COMMERCIAL

## 2019-09-19 DIAGNOSIS — M62.89 MUSCLE TIGHTNESS: ICD-10-CM

## 2019-09-19 DIAGNOSIS — M25.612 DECREASED RANGE OF MOTION OF LEFT SHOULDER: ICD-10-CM

## 2019-09-19 DIAGNOSIS — M25.512 CHRONIC LEFT SHOULDER PAIN: ICD-10-CM

## 2019-09-19 DIAGNOSIS — M62.81 MUSCLE WEAKNESS OF LEFT UPPER EXTREMITY: ICD-10-CM

## 2019-09-19 DIAGNOSIS — G89.29 CHRONIC LEFT SHOULDER PAIN: ICD-10-CM

## 2019-09-19 PROCEDURE — 97110 THERAPEUTIC EXERCISES: CPT | Mod: PO

## 2019-09-19 NOTE — PROGRESS NOTES
Physical Therapy Daily Treatment Note     Name: Hien Jeter  Clinic Number: [id number]    Therapy Diagnosis:   Encounter Diagnoses   Name Primary?    Chronic left shoulder pain     Muscle weakness of left upper extremity     Decreased range of motion of left shoulder     Muscle tightness      Physician: Natalee Nagel PA-C    Visit Date: 9/19/2019    Physician Orders: PT Eval and Treat   Medical Diagnosis from Referral: Status post arthroscopy of left shoulder  Evaluation Date: 5/2/2019  Last PN: 9/12/19 (visit 28)  Authorization Period Expiration: 12/31/19   Plan of Care Expiration: 9/26/19  Visit # / Visits authorized: 30 / 50  PTA Visit Number: 0      Time In: 1002  Time Out: 1107  Total Billable Time: 29 minutes  Charges: TE - 2    Precautions: Standard    Subjective     Pt reports: she was really sore yesterday and was not able to perform exercises. Her shoulder is feeling a little stiff.   She was compliant with home exercise program.  Response to previous treatment: good- soreness   Functional change: none to note    Pain: 3/10  Location: left shoulder      Objective     L shoulder AROM flexion at end of session (9/17/19): 165 deg    BOLD= performed today    Hien received therapeutic exercises to develop strength, endurance, ROM, flexibility and posture for 55 minutes including:    UBE 3'F/3'B level 2  Sh IR stretch w hand behind back w strap 5 sec hold x 20  Freemotion shoulder flexion AAROM facing away 7# x 5 minutes  Rhomboid stretch on treadmill rail 3 x 30 sec   Serratus wall slides w orange TB x 20  Shoulder flexion w orange band around wrists + back against wall x 20  Shoulder IR propped on plinth w orange TB 2 x 10  Shoulder flexion wall walk w lunge 5 sec hold x 20  Shoulder abduction wall walk w lunge 5 sec hold x 20  Corner stretch 3 x 30 sec  Corner slides x 10, w orange TB and lift off x 20  Supine flexion against orange TB x 20  Rows w green TB x 20  Sh IR w green TB and towel roll x  20  Sh ext w green TB x 20  Sh ER at 90 deg abduction w yellow TB 2 x 10  PROM shoulder IR and ER at 90 deg  SL sh ER w towel roll x 20  Wall push up plus x 20   Seated D2 flex w green TB x 20  Side lying sh abd w orange TB for inferior glide: x 20  Prone army crawl x 20  Prone sh flexion in end-range x 10  AAROM sh abd w 2 GTB 2 x 10   Seated reach roll and lift: x 20  Rhomboid stretch 3 x 30 sec  Eccentric flexion on wall: 3x10  R sidelying sh flexion x 20  R sidelying sh abd x 20  L posterior capsule stretch 3 x 30 sec  Flexion on wall up and back w/ GTB loop: 20x  Prone Ts w min A PRN 3 x 10  Prone Ys w min A 3 x 10  Prone Is w min A 3 x 10     Pt received cryotherapy to L shoulder at end of treatment session for 10 minutes in sitting.      Hien received the following manual therapy techniques x 0 min:     Grade III inferior and posterior GH joint mobs in supine end-range flexion   STM to L subscapularis and infraspinatus in R sidelying with passive shoulder abduction  STM to L pec  Subscapularis release in R sidelying  Hold-relax into flexion and shoulder ER  Pt cleared of contraindications and verbal and written consent acquired. Pt given option of copy of consent form. Pt educated on benefits and potential side effects of DN. FDN performed to L subscapularis (supine and prone) and infraspinatus. FDN performed to reduce pain and muscle tension, promote blood flow, and improve ROM and function x 0 minutes. Pt tolerated tx well without adverse effects. Pt was educated on what to expect following the procedure and verbalized understanding. - NP    Home Exercises Provided and Patient Education Provided     Education provided:   -importance of proper posture with therapeutic exercises  -techniques for self-mobilization of pec muscles    Written Home Exercises Provided: Patient instructed to cont prior HEP. (wall slides, SL sh ER, B sh ext w theraband, prone army crawls, D2 flexion, sidelying posterior capsule  stretch)  Exercises were reviewed and Hien was able to demonstrate them prior to the end of the session.  Hien demonstrated good  understanding of the education provided.     See EMR under Patient Instructions for exercises provided prior visit.    Assessment     Pt had good tolerance to treatment today with no adverse effects. Post-treatment L shoulder pain rated as 0/10. She demonstrates good shoulder flexion and abduction AAROM with use of Freemotion cables. Pt continues with decreased UE strength in last 30 degrees of end-range. Pt with decreased assist required with last set of prone Ts. She was challenged with low trap strengthening. Improved symptoms compared to arrival.    Hien is progressing well towards her goals.   Pt prognosis is Good.     Pt will continue to benefit from skilled outpatient physical therapy to address the deficits listed in the problem list box on initial evaluation, provide pt/family education and to maximize pt's level of independence in the home and community environment.     Pt's spiritual, cultural and educational needs considered and pt agreeable to plan of care and goals.     Anticipated barriers to physical therapy: None     Goals:     Short Term Goals:  6 weeks  1. Report decreased L shoulder pain  <  / =  5/10 at worst to increase tolerance for driving. - met 5/21/19  2. Pt will increase L shoulder flexion AROM to 150 deg to indicate improved flexibility and mobility. - met 5/21/19  3. Pt will be able to tolerate multi-directional UE strengthening without pain to improve functional use of UEs.- met 5/21/19  4. Pt will report 50% improvement in ability to reach overhead without pain since start of care to indicate improved functional mobility. - met 5/21/19  5. Pt to tolerate HEP to improve ROM and independence with ADL's. - met 5/21/19     Long Term Goals: 12 weeks  1. Report decreased L shoulder pain  <  / =  3/10 at worst to increase tolerance for driving. - met 6/18/2019    2. Pt will demonstrate increase L shoulder AROM in all planes to indicate improved flexibility and mobility.  - Progressing, not met 9/19/2019   3. Pt will be able to perform 2 x 10 shoulder flexion with 2 lbs to indicate improved strength in B UEs. - Progressing, not met 9/19/2019   4. Pt will report 80% improvement in ability to reach overhead without pain since start of care to indicate improved functional mobility. - met 7/18/19  5. Pt to be Independent with HEP to improve ROM and independence with ADL's. - met 7/18/19    Plan     Progress shoulder AROM and strengthening, including eccentric shoulder strengthening for flexion. Progress manual therapy techniques.     Angie Ta, PT

## 2019-09-20 ENCOUNTER — PATIENT OUTREACH (OUTPATIENT)
Dept: ADMINISTRATIVE | Facility: OTHER | Age: 54
End: 2019-09-20

## 2019-09-24 ENCOUNTER — CLINICAL SUPPORT (OUTPATIENT)
Dept: REHABILITATION | Facility: HOSPITAL | Age: 54
End: 2019-09-24
Attending: ORTHOPAEDIC SURGERY
Payer: COMMERCIAL

## 2019-09-24 DIAGNOSIS — M62.89 MUSCLE TIGHTNESS: ICD-10-CM

## 2019-09-24 DIAGNOSIS — M25.612 DECREASED RANGE OF MOTION OF LEFT SHOULDER: ICD-10-CM

## 2019-09-24 DIAGNOSIS — M25.512 CHRONIC LEFT SHOULDER PAIN: ICD-10-CM

## 2019-09-24 DIAGNOSIS — G89.29 CHRONIC LEFT SHOULDER PAIN: ICD-10-CM

## 2019-09-24 DIAGNOSIS — M62.81 MUSCLE WEAKNESS OF LEFT UPPER EXTREMITY: ICD-10-CM

## 2019-09-24 PROCEDURE — 97110 THERAPEUTIC EXERCISES: CPT | Mod: PO

## 2019-09-24 NOTE — PROGRESS NOTES
Physical Therapy Daily Treatment Note     Name: Hien Jeter  Clinic Number: [id number]    Therapy Diagnosis:   Encounter Diagnoses   Name Primary?    Chronic left shoulder pain     Muscle weakness of left upper extremity     Decreased range of motion of left shoulder     Muscle tightness      Physician: Natalee Nagel PA-C    Visit Date: 9/24/2019    Physician Orders: PT Eval and Treat   Medical Diagnosis from Referral: Status post arthroscopy of left shoulder  Evaluation Date: 5/2/2019  Last PN: 9/12/19 (visit 28)  Authorization Period Expiration: 12/31/19   Plan of Care Expiration: 10/26/19  Visit # / Visits authorized: 31 / 50  PTA Visit Number: 0      Time In: 1600  Time Out: 1705  Total Billable Time: 65 minutes  Charges: TE - 4    Precautions: Standard    Subjective     Pt reports: she [facility] the [facility] to work on her exercises. She was able to perform some with additional weight without much difficulty.   She was compliant with home exercise program.  Response to previous treatment: good- soreness that resolved with Aleve  Functional change: able to reach forward with more ease and less pain    Pain: 0/10  Location: left shoulder      Objective     L shoulder AROM flexion at end of session (9/17/19): 165 deg    BOLD= performed today    Hien received therapeutic exercises to develop strength, endurance, ROM, flexibility and posture for 65 minutes including:    UBE 3'F/3'B level 2  Sh IR stretch w hand behind back w strap 5 sec hold x 20  Freemotion shoulder flexion AAROM facing away 7# x 5 minutes  Rhomboid stretch on treadmill rail 3 x 30 sec   Serratus wall slides w orange TB x 20  Shoulder flexion w orange band around wrists + back against wall x 20  Shoulder IR propped on plinth w orange TB 2 x 10  Shoulder flexion wall walk w lunge 5 sec hold x 20  Shoulder abduction wall walk w lunge 5 sec hold x 20  Corner stretch 3 x 30 sec  Corner slides x 20  Wall slide flexion + lift off 2 x  10  +Seated overhead press 2# 3 x 10  Supine flexion against orange TB x 20  Rows w green TB x 20  Sh IR w green TB and towel roll x 20  Sh ext w green TB x 20  Sh ER at 90 deg abduction w yellow TB 2 x 10  PROM shoulder IR and ER at 90 deg  SL sh ER w towel roll x 20  Wall push up plus x 20   Seated D2 flex w green TB x 20  Side lying sh abd w orange TB for inferior glide: x 20  Prone army crawl x 20  Prone sh flexion in end-range x 10  AAROM sh abd w 2 GTB 2 x 10   Seated reach roll and lift: x 20  Rhomboid stretch 3 x 30 sec  Eccentric flexion on wall: 3x10  R sidelying sh flexion x 20  R sidelying sh abd x 20  L posterior capsule stretch 3 x 30 sec  Flexion on wall up and back w/ GTB loop: 20x  Prone Ts w min A 3 x 10  Prone Ys w min A 3 x 10  Prone Is w min A 3 x 10     Pt received cryotherapy to L shoulder at end of treatment session for 0 minutes in sitting.      Hien received the following manual therapy techniques x 0 min:     Grade III inferior and posterior GH joint mobs in supine end-range flexion   STM to L subscapularis and infraspinatus in R sidelying with passive shoulder abduction  STM to L pec  Subscapularis release in R sidelying  Hold-relax into flexion and shoulder ER  Pt cleared of contraindications and verbal and written consent acquired. Pt given option of copy of consent form. Pt educated on benefits and potential side effects of DN. FDN performed to L subscapularis (supine and prone) and infraspinatus. FDN performed to reduce pain and muscle tension, promote blood flow, and improve ROM and function x 0 minutes. Pt tolerated tx well without adverse effects. Pt was educated on what to expect following the procedure and verbalized understanding. - NP    Home Exercises Provided and Patient Education Provided     Education provided:   -importance of proper posture with therapeutic exercises  -techniques for self-mobilization of pec muscles    Written Home Exercises Provided: Patient instructed  to cont prior HEP. (wall slides, SL sh ER, B sh ext w theraband, prone army crawls, D2 flexion, sidelying posterior capsule stretch)  Exercises were reviewed and Hien was able to demonstrate them prior to the end of the session.  Hien demonstrated good  understanding of the education provided.     See EMR under Patient Instructions for exercises provided prior visit.    Assessment     Pt had good tolerance to treatment today with no adverse effects. Post-treatment L shoulder pain rated as 0/10. Pt with decreased need for manual assistance with mid trap and low trap strengthening in prone. Good response to addition of overhead press with weight. Pt reporting increased ease in achieving end-range shoulder flexion by end of session.     Hien is progressing well towards her goals.   Pt prognosis is Good.     Pt will continue to benefit from skilled outpatient physical therapy to address the deficits listed in the problem list box on initial evaluation, provide pt/family education and to maximize pt's level of independence in the home and community environment.     Pt's spiritual, cultural and educational needs considered and pt agreeable to plan of care and goals.     Anticipated barriers to physical therapy: None     Goals:     Short Term Goals:  6 weeks  1. Report decreased L shoulder pain  <  / =  5/10 at worst to increase tolerance for driving. - met 5/21/19  2. Pt will increase L shoulder flexion AROM to 150 deg to indicate improved flexibility and mobility. - met 5/21/19  3. Pt will be able to tolerate multi-directional UE strengthening without pain to improve functional use of UEs.- met 5/21/19  4. Pt will report 50% improvement in ability to reach overhead without pain since start of care to indicate improved functional mobility. - met 5/21/19  5. Pt to tolerate HEP to improve ROM and independence with ADL's. - met 5/21/19     Long Term Goals: 12 weeks  1. Report decreased L shoulder pain  <  / =  3/10 at  worst to increase tolerance for driving. - met 6/18/2019   2. Pt will demonstrate increase L shoulder AROM in all planes to indicate improved flexibility and mobility.  - Progressing, not met 9/24/2019   3. Pt will be able to perform 2 x 10 shoulder flexion with 2 lbs to indicate improved strength in B UEs. - met 9/24/19  4. Pt will report 80% improvement in ability to reach overhead without pain since start of care to indicate improved functional mobility. - met 7/18/19  5. Pt to be Independent with HEP to improve ROM and independence with ADL's. - met 7/18/19    Plan     Progress UE strengthening. Continue with physical therapy services 1x/2 weeks for 5 weeks.     Angie Ta, PT

## 2019-09-25 ENCOUNTER — OFFICE VISIT (OUTPATIENT)
Dept: ORTHOPEDICS | Facility: CLINIC | Age: 54
End: 2019-09-25
Attending: ORTHOPAEDIC SURGERY
Payer: COMMERCIAL

## 2019-09-25 VITALS — BODY MASS INDEX: 24.29 KG/M2 | WEIGHT: 132 LBS | HEIGHT: 62 IN

## 2019-09-25 DIAGNOSIS — M75.112 INCOMPLETE TEAR OF LEFT ROTATOR CUFF: ICD-10-CM

## 2019-09-25 DIAGNOSIS — M75.42 IMPINGEMENT SYNDROME OF LEFT SHOULDER: Primary | ICD-10-CM

## 2019-09-25 PROCEDURE — 3008F BODY MASS INDEX DOCD: CPT | Mod: CPTII,S$GLB,, | Performed by: ORTHOPAEDIC SURGERY

## 2019-09-25 PROCEDURE — 99999 PR PBB SHADOW E&M-EST. PATIENT-LVL III: ICD-10-PCS | Mod: PBBFAC,,, | Performed by: ORTHOPAEDIC SURGERY

## 2019-09-25 PROCEDURE — 99999 PR PBB SHADOW E&M-EST. PATIENT-LVL III: CPT | Mod: PBBFAC,,, | Performed by: ORTHOPAEDIC SURGERY

## 2019-09-25 PROCEDURE — 99213 PR OFFICE/OUTPT VISIT, EST, LEVL III, 20-29 MIN: ICD-10-PCS | Mod: S$GLB,,, | Performed by: ORTHOPAEDIC SURGERY

## 2019-09-25 PROCEDURE — 99213 OFFICE O/P EST LOW 20 MIN: CPT | Mod: S$GLB,,, | Performed by: ORTHOPAEDIC SURGERY

## 2019-09-25 PROCEDURE — 3008F PR BODY MASS INDEX (BMI) DOCUMENTED: ICD-10-PCS | Mod: CPTII,S$GLB,, | Performed by: ORTHOPAEDIC SURGERY

## 2019-09-25 NOTE — PROGRESS NOTES
Subjective:      Patient ID: Hien Jeter is a 54 y.o. female.  Chief Complaint: Follow-up of the Left Shoulder      HPI  Hien Jeter is a  54 y.o. female presenting today for follow up of rotator cuff repair left shoulder.  She reports that she is doing well just about finished up therapy very pleased with the results of her shoulder almost back regular activities.    Review of patient's allergies indicates:   Allergen Reactions    Diclofenac Nausea Only    Ibuprofen Other (See Comments)    Tramadol Other (See Comments)     Nausea          Current Outpatient Medications   Medication Sig Dispense Refill    azelastine (ASTELIN) 137 mcg (0.1 %) nasal spray 1 SPRAY (137 MCG TOTAL) BY NASAL ROUTE 2 (TWO) TIMES DAILY. 30 mL 11    baclofen (LIORESAL) 10 MG tablet TAKE 1/2 TABLET BY MOUTH 2-3 TIMES A DAY 60 tablet 5    cholecalciferol, vitamin D3, 3,000 unit Tab Take 3,000 Units by mouth once daily.       escitalopram oxalate (LEXAPRO) 10 MG tablet TAKE 1.5 TABLETS (15 MG TOTAL) BY MOUTH EVERY EVENING. 45 tablet 6    flaxseed oil 1,000 mg Cap Take 1 capsule by mouth once daily.       HYDROcodone-acetaminophen (NORCO) 7.5-325 mg per tablet Take 1 tablet by mouth every 12 (twelve) hours as needed for Pain. 20 tablet 0    magnesium oxide (MAG-OX) 400 mg (241.3 mg magnesium) tablet Take 1 tablet (400 mg total) by mouth 2 (two) times daily. 60 tablet 12    MIMVEY 1-0.5 mg per tablet TAKE BY MOUTH 1 TABLET ONCE DAILY 84 tablet 0    multivitamin-Ca-iron-minerals (ONE-A-DAY WOMENS FORMULA) 27-0.4 mg Tab Take 1 tablet by mouth once daily.       nortriptyline (PAMELOR) 10 MG capsule Take 2 capsules (20 mg total) by mouth every evening. 60 capsule 11    riboflavin, vitamin B2, (VITAMIN B-2) 100 mg Tab tablet TAKE 2 TABLETS BY MOUTH ONCE DAILY. 60 tablet 11    ZOLMitriptan (ZOMIG) 5 mg Reserve SPRAY 1 SPRAY IN NOSTRIL ONCE AS NEEDED. Max 2 doses in 24 hrs 12 each 12    HYDROcodone-acetaminophen (NORCO) 7.5-325 mg per  "tablet Take 1 tablet by mouth every 4 (four) hours as needed for Pain. 15 tablet 0    topiramate (TOPAMAX) 25 MG tablet Take 1 tablet (25 mg total) by mouth 2 (two) times daily. 60 tablet 12     No current facility-administered medications for this visit.        Past Medical History:   Diagnosis Date    Anxiety     Basal cell carcinoma 2000    left eyebrow     Depression     Environmental allergies     Headache(784.0)     Multiple food allergies     Multiple sclerosis 2009     shoulder 3/2015    left       Past Surgical History:   Procedure Laterality Date    ARTHROSCOPIC DEBRIDEMENT OF ROTATOR CUFF Left 4/5/2019    Procedure: DEBRIDEMENT, ROTATOR CUFF, ARTHROSCOPIC;  Surgeon: Dc Moore Jr., MD;  Location: [facility];  Service: Orthopedics;  Laterality: Left;  need opus system (Julito notified)  video    ARTHROSCOPY OF SHOULDER WITH DECOMPRESSION OF SUBACROMIAL SPACE  4/5/2019    Procedure: ARTHROSCOPY, SHOULDER, WITH SUBACROMIAL SPACE DECOMPRESSION;  Surgeon: Dc Moore Jr., MD;  Location: [facility];  Service: Orthopedics;;    BASAL CELL CARCINOMA EXCISION  1998    COLONOSCOPY N/A 5/25/2017    Procedure: COLONOSCOPY;  Surgeon: Marielle Dietz MD;  Location: [facility] ([address] [place] [address]);  Service: Endoscopy;  Laterality: N/A;  Patient requests PM.    PM prep.    FOOT SURGERY Right 01/08/2019    LASIK  2000    SHOULDER SURGERY Left 04/05/2019    WISDOM TOOTH EXTRACTION         OBJECTIVE:   PHYSICAL EXAM:  Height: 5' 2" (157.5 cm) Weight: 59.9 kg (132 lb)  Vitals:    09/25/19 1324   Weight: 59.9 kg (132 lb)   Height: 5' 2" (1.575 m)   PainSc: 0-No pain     Ortho/SPM Exam  Examination left shoulder no tenderness no swelling full range of motion negative impingement sign  Strength improved  Neurologic exam intact    RADIOGRAPHS:  None  Comments: I have personally reviewed the imaging and I agree with the above radiologist's report.    ASSESSMENT/PLAN:     IMPRESSION:  None status post rotator " cuff repair left shoulder doing well    PLAN:  Continue home program for strengthening return to full activity    FOLLOW UP:  As needed    Disclaimer: This note has been generated using voice-recognition software. There may be typographical errors that have been missed during proof-reading.

## 2019-09-26 ENCOUNTER — CLINICAL SUPPORT (OUTPATIENT)
Dept: REHABILITATION | Facility: HOSPITAL | Age: 54
End: 2019-09-26
Attending: ORTHOPAEDIC SURGERY
Payer: COMMERCIAL

## 2019-09-26 DIAGNOSIS — M25.512 CHRONIC LEFT SHOULDER PAIN: ICD-10-CM

## 2019-09-26 DIAGNOSIS — M62.89 MUSCLE TIGHTNESS: ICD-10-CM

## 2019-09-26 DIAGNOSIS — M25.612 DECREASED RANGE OF MOTION OF LEFT SHOULDER: ICD-10-CM

## 2019-09-26 DIAGNOSIS — G89.29 CHRONIC LEFT SHOULDER PAIN: ICD-10-CM

## 2019-09-26 DIAGNOSIS — M62.81 MUSCLE WEAKNESS OF LEFT UPPER EXTREMITY: ICD-10-CM

## 2019-09-26 PROCEDURE — 97110 THERAPEUTIC EXERCISES: CPT | Mod: PO

## 2019-09-26 PROCEDURE — 97140 MANUAL THERAPY 1/> REGIONS: CPT | Mod: PO

## 2019-09-26 NOTE — PROGRESS NOTES
Physical Therapy Daily Treatment Note     Name: Hien Jeter  Clinic Number: [id number]    Therapy Diagnosis:   Encounter Diagnoses   Name Primary?    Chronic left shoulder pain     Muscle weakness of left upper extremity     Decreased range of motion of left shoulder     Muscle tightness      Physician: Natalee Nagel PA-C    Visit Date: 9/26/2019    Physician Orders: PT Eval and Treat   Medical Diagnosis from Referral: Status post arthroscopy of left shoulder  Evaluation Date: 5/2/2019  Last PN: 9/12/19 (visit 28)  Authorization Period Expiration: 12/31/19   Plan of Care Expiration: 10/26/19  Visit # / Visits authorized: 32 / 50  PTA Visit Number: 0      Time In: 1402  Time Out: 1520  Total Billable Time: 68 minutes  Charges: TE - 4, MT- 1    Precautions: Standard    Subjective     Pt reports: she did a workout yesterday that made her a little sore. Increased ease with raising arm.  She was compliant with home exercise program.  Response to previous treatment: good- normal soreness  Functional change: reaching with ease    Pain: 0/10  Location: left shoulder      Objective     L shoulder AROM flexion at end of session (9/26/19): 168 deg    BOLD= performed today    Hien received therapeutic exercises to develop strength, endurance, ROM, flexibility and posture for 58 minutes including:    UBE 3'F/3'B level 2  Sh IR stretch w hand behind back w strap 5 sec hold x 20  Freemotion shoulder flexion AAROM facing away 7# x 5 minutes  Rhomboid stretch on treadmill rail 3 x 30 sec   +Supine serratus punch x 30  +Serratus punches w orange TB x 20 ea  Serratus wall slides w orange TB x 20  Shoulder flexion w orange band around wrists + back against wall x 20  Shoulder IR propped on plinth w orange TB 2 x 10  Shoulder flexion wall walk w lunge 5 sec hold x 20  Shoulder abduction wall walk w lunge 5 sec hold x 20  Corner stretch 3 x 30 sec  Corner slides x 20  Wall slide flexion + lift off 2 x 10  Seated overhead  press 2# 3 x 10  Supine flexion against orange TB x 20  Rows w green TB x 20  Sh IR w green TB and towel roll x 20  Sh ext w green TB x 20  Sh ER at 90 deg abduction w yellow TB 2 x 10  PROM shoulder IR and ER at 90 deg  SL sh ER w towel roll x 20  Wall push up plus x 20   Seated D2 flex w green TB x 20  Side lying sh abd w orange TB for inferior glide: x 20  Prone army crawl x 20  Prone sh flexion in end-range x 10  AAROM sh abd w 2 GTB 2 x 10   Seated reach roll and lift: x 20  Rhomboid stretch 3 x 30 sec  Eccentric flexion on wall: 3x10  R sidelying sh flexion x 20  R sidelying sh abd x 20  L posterior capsule stretch 3 x 30 sec  Flexion on wall up and back w/ GTB loop: 20x  Prone Ts w min A 1 x 10  Prone Ys w min A 1 x 10  Prone Is w min A 1 x 10  +Prone horiz abd w elbow bent at 90 + snow marcia sh abd x 10  +SL serratus punch x 20     Pt received cryotherapy to L shoulder at end of treatment session for 10 minutes in sitting.      Hien received the following manual therapy techniques x 10 min:   STM to L serratus anterior and scapular mobilization    Not performed:   Grade III inferior and posterior GH joint mobs in supine end-range flexion   STM to L subscapularis and infraspinatus in R sidelying with passive shoulder abduction  STM to L pec  Subscapularis release in R sidelying  Hold-relax into flexion and shoulder ER  Pt cleared of contraindications and verbal and written consent acquired. Pt given option of copy of consent form. Pt educated on benefits and potential side effects of DN. FDN performed to L subscapularis (supine and prone) and infraspinatus. FDN performed to reduce pain and muscle tension, promote blood flow, and improve ROM and function x 0 minutes. Pt tolerated tx well without adverse effects. Pt was educated on what to expect following the procedure and verbalized understanding. - NP    Home Exercises Provided and Patient Education Provided     Education provided:   -importance of proper  posture with therapeutic exercises  -techniques for self-mobilization of pec muscles    Written Home Exercises Provided: Patient instructed to cont prior HEP. (wall slides, SL sh ER, B sh ext w theraband, prone army crawls, D2 flexion, sidelying posterior capsule stretch)  Exercises were reviewed and Hien was able to demonstrate them prior to the end of the session.  Hien demonstrated good  understanding of the education provided.     See EMR under Patient Instructions for exercises provided prior visit.    Assessment     Pt had good tolerance to treatment today with no adverse effects. Post-treatment L shoulder pain rated as 0/10. Pt with improvement in serratus anterior symptoms following therapeutic exercises. She was challenged with serratus strengthening. Pt with good response to progression of exercise program. Improvement in shoulder range with addition of scapular protraction.     Hien is progressing well towards her goals.   Pt prognosis is Good.     Pt will continue to benefit from skilled outpatient physical therapy to address the deficits listed in the problem list box on initial evaluation, provide pt/family education and to maximize pt's level of independence in the home and community environment.     Pt's spiritual, cultural and educational needs considered and pt agreeable to plan of care and goals.     Anticipated barriers to physical therapy: None     Goals:     Short Term Goals:  6 weeks  1. Report decreased L shoulder pain  <  / =  5/10 at worst to increase tolerance for driving. - met 5/21/19  2. Pt will increase L shoulder flexion AROM to 150 deg to indicate improved flexibility and mobility. - met 5/21/19  3. Pt will be able to tolerate multi-directional UE strengthening without pain to improve functional use of UEs.- met 5/21/19  4. Pt will report 50% improvement in ability to reach overhead without pain since start of care to indicate improved functional mobility. - met 5/21/19  5. Pt to  tolerate HEP to improve ROM and independence with ADL's. - met 5/21/19     Long Term Goals: 12 weeks  1. Report decreased L shoulder pain  <  / =  3/10 at worst to increase tolerance for driving. - met 6/18/2019   2. Pt will demonstrate increase L shoulder AROM in all planes to indicate improved flexibility and mobility.  - Progressing, not met 9/26/2019   3. Pt will be able to perform 2 x 10 shoulder flexion with 2 lbs to indicate improved strength in B UEs. - met 9/24/19  4. Pt will report 80% improvement in ability to reach overhead without pain since start of care to indicate improved functional mobility. - met 7/18/19  5. Pt to be Independent with HEP to improve ROM and independence with ADL's. - met 7/18/19    Plan     Progress UE strengthening.     Angie Ta, PT

## 2019-10-10 ENCOUNTER — CLINICAL SUPPORT (OUTPATIENT)
Dept: REHABILITATION | Facility: HOSPITAL | Age: 54
End: 2019-10-10
Attending: ORTHOPAEDIC SURGERY
Payer: COMMERCIAL

## 2019-10-10 DIAGNOSIS — M62.89 MUSCLE TIGHTNESS: ICD-10-CM

## 2019-10-10 DIAGNOSIS — M25.512 CHRONIC LEFT SHOULDER PAIN: ICD-10-CM

## 2019-10-10 DIAGNOSIS — G89.29 CHRONIC LEFT SHOULDER PAIN: ICD-10-CM

## 2019-10-10 DIAGNOSIS — M62.81 MUSCLE WEAKNESS OF LEFT UPPER EXTREMITY: ICD-10-CM

## 2019-10-10 DIAGNOSIS — M25.612 DECREASED RANGE OF MOTION OF LEFT SHOULDER: ICD-10-CM

## 2019-10-10 PROCEDURE — 97110 THERAPEUTIC EXERCISES: CPT | Mod: PO

## 2019-10-10 NOTE — PROGRESS NOTES
Physical Therapy Daily Treatment Note     Name: Hien Jeter  Clinic Number: [id number]    Therapy Diagnosis:   Encounter Diagnoses   Name Primary?    Chronic left shoulder pain     Muscle weakness of left upper extremity     Decreased range of motion of left shoulder     Muscle tightness      Physician: Natalee Nagel PA-C    Visit Date: 10/10/2019    Physician Orders: PT Eval and Treat   Medical Diagnosis from Referral: Status post arthroscopy of left shoulder  Evaluation Date: 5/2/2019  Last PN: 10/10/19 (visit 33)  Authorization Period Expiration: 12/31/19   Plan of Care Expiration: 10/26/19  Visit # / Visits authorized: 33 / 50  PTA Visit Number: 0      Time In: 1400  Time Out: 1505  Total Billable Time: 30 minutes  Charges: TE - 2    Precautions: Standard    Subjective     Pt reports: improvement in overhead reaching with shoulder. She did some exercises this morning and she could bring her arm to her ear.   She was compliant with home exercise program.   Response to previous treatment: good  Functional change: increased range overhead    Pain: 0/10   Location: left shoulder      Objective     L shoulder AROM flexion at end of session (9/26/19): 168 deg    BOLD= performed today    Hien received therapeutic exercises to develop strength, endurance, ROM, flexibility and posture for 55 minutes including:    UBE 3'F/3'B level 2  Sh IR stretch w hand behind back w strap 5 sec hold x 20  Freemotion shoulder flexion AAROM facing away 3# x 3 minutes  Rhomboid stretch on treadmill rail 3 x 30 sec   Supine serratus punch x 30  Serratus punches w orange TB x 20 ea  +Serratus wall slides w foam roller x 20  Serratus wall slides w orange TB x 20  Shoulder flexion w orange band around wrists + back against wall x 20  Shoulder IR propped on plinth w orange TB 2 x 10  Shoulder flexion wall walk w lunge 5 sec hold x 20  Shoulder abduction wall walk w lunge 5 sec hold x 20  Corner stretch 3 x 30 sec  Corner slides x  20  Wall slide flexion + lift off 2 x 10  Seated overhead press 2# 3 x 10  Supine flexion against orange TB x 20  Rows w green TB x 20  Sh IR w green TB and towel roll x 20  Sh ext w green TB x 20  Sh ER at 90 deg abduction w yellow TB 2 x 10  PROM shoulder IR and ER at 90 deg  SL sh ER w towel roll x 20  Wall push up plus x 20   Seated D2 flex w green TB x 20  Side lying sh abd w orange TB for inferior glide: x 20  Prone army crawl x 20  Prone sh flexion in end-range x 10  AAROM sh abd w 2 GTB 2 x 10   Seated reach roll and lift: x 20  Rhomboid stretch 3 x 30 sec  Eccentric flexion on wall: 3x10  R sidelying sh flexion x 20  R sidelying sh abd x 20  L posterior capsule stretch 3 x 30 sec  Supine pec stretch w hands behind head x 3 min  Flexion on wall up and back w/ GTB loop: 20x  Prone Ts w min A 1 x 10  Prone Ys w min A 1 x 10  Prone Is w min A 1 x 10  Prone horiz abd w elbow bent at 90 + snow marcia sh abd x 10  SL serratus punch x 20     Pt received moist heat to L shoulder at  beginning of treatment session for 10 minutes in sitting.      Hien received the following manual therapy techniques x 10 min:   STM to L serratus anterior and subscapularis and scapular mobilization    Not performed:   Grade III inferior and posterior GH joint mobs in supine end-range flexion   STM to L subscapularis and infraspinatus in R sidelying with passive shoulder abduction  STM to L pec  Subscapularis release in R sidelying  Hold-relax into flexion and shoulder ER  Pt cleared of contraindications and verbal and written consent acquired. Pt given option of copy of consent form. Pt educated on benefits and potential side effects of DN. FDN performed to L subscapularis (supine and prone) and infraspinatus. FDN performed to reduce pain and muscle tension, promote blood flow, and improve ROM and function x 0 minutes. Pt tolerated tx well without adverse effects. Pt was educated on what to expect following the procedure and verbalized  understanding. - NP    Home Exercises Provided and Patient Education Provided     Education provided:   -importance of proper posture with therapeutic exercises  -techniques for self-mobilization of pec muscles    Written Home Exercises Provided: Patient instructed to cont prior HEP. (wall slides, SL sh ER, B sh ext w theraband, prone army crawls, D2 flexion, sidelying posterior capsule stretch)  Exercises were reviewed and Hien was able to demonstrate them prior to the end of the session.  Hien demonstrated good  understanding of the education provided.     See EMR under Patient Instructions for exercises provided prior visit.    CMS Impairment/Limitation/Restriction for FOTO shoulder Survey    Therapist reviewed FOTO scores for Hien Jeter on 10/10/2019.   FOTO documents entered into [employer] - see Media section.    Limitation Score: 33%  Category: Mobility    Current : CJ = at least 20% but < 40% impaired, limited or restricted  Goal: CJ = at least 20% but < 40% impaired, limited or restricted       Assessment     Pt was re-assessed today with 5/5 STGs and 4/5 LTGs being met indicating no new goals met since last re-assessment. Pt with improvements in overhead shoulder range and ease of movement. She continues to lack full shoulder AROM and demonstrates scapular dyskinesia. Pt could benefit from continued physical therapy services to address deficits.     She had good tolerance to treatment today with no adverse effects. Post-treatment L shoulder pain rated as 0/10. Improvement in shoulder AROM following overhead stretches. Increase in supine shoulder range with verbal cueing for protraction. Occasional increase in shoulder symptoms that was relieved with shoulder IR and pec stretching. Mild pec tightness noted on L compared to R.     Hien is progressing well towards her goals.   Pt prognosis is Good.     Pt will continue to benefit from skilled outpatient physical therapy to address the deficits listed in the  problem list box on initial evaluation, provide pt/family education and to maximize pt's level of independence in the home and community environment.     Pt's spiritual, cultural and educational needs considered and pt agreeable to plan of care and goals.     Anticipated barriers to physical therapy: None     Goals:     Short Term Goals:  6 weeks  1. Report decreased L shoulder pain  <  / =  5/10 at worst to increase tolerance for driving. - met 5/21/19  2. Pt will increase L shoulder flexion AROM to 150 deg to indicate improved flexibility and mobility. - met 5/21/19  3. Pt will be able to tolerate multi-directional UE strengthening without pain to improve functional use of UEs.- met 5/21/19  4. Pt will report 50% improvement in ability to reach overhead without pain since start of care to indicate improved functional mobility. - met 5/21/19  5. Pt to tolerate HEP to improve ROM and independence with ADL's. - met 5/21/19     Long Term Goals: 12 weeks  1. Report decreased L shoulder pain  <  / =  3/10 at worst to increase tolerance for driving. - met 6/18/2019   2. Pt will demonstrate increase L shoulder AROM in all planes to indicate improved flexibility and mobility.  - Progressing, not met 10/10/2019   3. Pt will be able to perform 2 x 10 shoulder flexion with 2 lbs to indicate improved strength in B UEs. - met 9/24/19  4. Pt will report 80% improvement in ability to reach overhead without pain since start of care to indicate improved functional mobility. - met 7/18/19  5. Pt to be Independent with HEP to improve ROM and independence with ADL's. - met 7/18/19    Plan     Progress UE strengthening.     Angie Ta, PT

## 2019-10-18 DIAGNOSIS — F32.0 MILD SINGLE CURRENT EPISODE OF MAJOR DEPRESSIVE DISORDER: ICD-10-CM

## 2019-10-20 RX ORDER — ESCITALOPRAM OXALATE 10 MG/1
15 TABLET ORAL NIGHTLY
Qty: 45 TABLET | Refills: 11 | Status: SHIPPED | OUTPATIENT
Start: 2019-10-20 | End: 2020-11-27 | Stop reason: SDUPTHER

## 2019-10-21 ENCOUNTER — PATIENT OUTREACH (OUTPATIENT)
Dept: ADMINISTRATIVE | Facility: OTHER | Age: 54
End: 2019-10-21

## 2019-10-23 ENCOUNTER — OFFICE VISIT (OUTPATIENT)
Dept: OBSTETRICS AND GYNECOLOGY | Facility: CLINIC | Age: 54
End: 2019-10-23
Payer: COMMERCIAL

## 2019-10-23 VITALS
DIASTOLIC BLOOD PRESSURE: 78 MMHG | WEIGHT: 139.56 LBS | BODY MASS INDEX: 25.52 KG/M2 | SYSTOLIC BLOOD PRESSURE: 102 MMHG

## 2019-10-23 DIAGNOSIS — Z12.31 ENCOUNTER FOR SCREENING MAMMOGRAM FOR BREAST CANCER: ICD-10-CM

## 2019-10-23 DIAGNOSIS — Z23 NEEDS FLU SHOT: ICD-10-CM

## 2019-10-23 DIAGNOSIS — Z01.419 WOMEN'S ANNUAL ROUTINE GYNECOLOGICAL EXAMINATION: Primary | ICD-10-CM

## 2019-10-23 PROCEDURE — 99396 PREV VISIT EST AGE 40-64: CPT | Mod: S$GLB,,, | Performed by: NURSE PRACTITIONER

## 2019-10-23 PROCEDURE — 99396 PR PREVENTIVE VISIT,EST,40-64: ICD-10-PCS | Mod: S$GLB,,, | Performed by: NURSE PRACTITIONER

## 2019-10-23 PROCEDURE — 99999 PR PBB SHADOW E&M-EST. PATIENT-LVL III: ICD-10-PCS | Mod: PBBFAC,,, | Performed by: NURSE PRACTITIONER

## 2019-10-23 PROCEDURE — 99999 PR PBB SHADOW E&M-EST. PATIENT-LVL III: CPT | Mod: PBBFAC,,, | Performed by: NURSE PRACTITIONER

## 2019-10-23 RX ORDER — TOPIRAMATE 25 MG/1
25 TABLET ORAL 2 TIMES DAILY
Refills: 12 | COMMUNITY
Start: 2019-09-18 | End: 2020-04-24

## 2019-10-23 NOTE — PROGRESS NOTES
Physical Therapy Daily Treatment Note     Name: Hien Jeter  Clinic Number: [id number]    Therapy Diagnosis:   Encounter Diagnoses   Name Primary?    Chronic left shoulder pain     Muscle weakness of left upper extremity     Decreased range of motion of left shoulder     Muscle tightness      Physician: Natalee Nagel PA-C    Visit Date: 10/24/2019    Physician Orders: PT Eval and Treat   Medical Diagnosis from Referral: Status post arthroscopy of left shoulder  Evaluation Date: 5/2/2019  Last PN: 10/10/19 (visit 33)  Authorization Period Expiration: 12/31/19   Plan of Care Expiration: 11/29/19  Visit # / Visits authorized: 34 / 50  PTA Visit Number: 0      Time In: 1500  Time Out: 1655  Total Billable Time: 60 minutes  Charges: TE - 3, NM- 1    Precautions: Standard    Subjective     Pt reports: she did some exercises yesterday multiple times and earlier today.   She was compliant with home exercise program.   Response to previous treatment: good   Functional change: increased ease with exercises    Pain: 0/10   Location: left shoulder      Objective     L shoulder AROM flexion at end of session (9/26/19): 168 deg    BOLD= performed today    Hien received therapeutic exercises to develop strength, endurance, ROM, flexibility and posture for 45 minutes including:    UBE 3'F/3'B level 2  Sh IR stretch w hand behind back w strap 5 sec hold x 20  Freemotion shoulder flexion AAROM facing away 3# w head turned to L to decreased UT compensation x 3 minutes  Shoulder IR propped on plinth w orange TB 2 x 10  Corner stretch 3 x 30 sec  Corner slides x 20  Sh ER at 90 deg abduction w yellow TB 2 x 10  PROM shoulder IR and ER at 90 deg x 10 min  L posterior capsule stretch 3 x 30 sec  Supine pec stretch w hands behind head over horizontal half roll for thoracic extension x 3 min  Flexion on wall up and back w/ GTB loop: 20x  Prone Ts 2# + manual cueing at UT 2 x 8  Prone Ys 2# + manual cueing at UT 2 x 8  Prone Is w  min A 1 x 10  Prone horiz abd w elbow bent at 90 + snow marcia sh abd x 10    Pt participated in the following neuromuscular re-education to develop kinesthetic awareness, proprioception, and proper shoulder mechanics for 30 minutes:    +Supine shoulder flexion PROM w deep breathing x 10, addition of towel roll between shoulder blades x 10  +Seated shoulder flexion w manual downward assist of scapula and VC to decreased UT compensation x 15     Pt received cryotherapy to L shoulder at end of treatment session for 10 minutes in sitting.      Hien received the following manual therapy techniques x 30 min:   STM to L serratus anterior and subscapularis  Scapular mobilization  STM to B upper traps and scalenes    Not performed:   Grade III inferior and posterior GH joint mobs in supine end-range flexion   STM to L subscapularis and infraspinatus in R sidelying with passive shoulder abduction  STM to L pec  Subscapularis release in R sidelying  Hold-relax into flexion and shoulder ER  Pt cleared of contraindications and verbal and written consent acquired. Pt given option of copy of consent form. Pt educated on benefits and potential side effects of DN. FDN performed to L subscapularis (supine and prone) and infraspinatus. FDN performed to reduce pain and muscle tension, promote blood flow, and improve ROM and function x 0 minutes. Pt tolerated tx well without adverse effects. Pt was educated on what to expect following the procedure and verbalized understanding. - NP    Home Exercises Provided and Patient Education Provided     Education provided:   -importance of proper posture with therapeutic exercises  -techniques for self-mobilization of pec muscles    Written Home Exercises Provided: Patient instructed to cont prior HEP. (wall slides, SL sh ER, B sh ext w theraband, prone army crawls, D2 flexion, sidelying posterior capsule stretch)  Exercises were reviewed and Hien was able to demonstrate them prior to the end  of the session.  Hien demonstrated good  understanding of the education provided.     See EMR under Patient Instructions for exercises provided prior visit.    Assessment     Pt had good tolerance to treatment today with no adverse effects. Post-treatment L shoulder pain rated as 0/10. Soft tissue restrictions noted in B cervical musculature. Decreased R cervical rotation AROM noted. Improvement following manual therapy techniques. Over compensation of upper trap noted with overhead range. Time spent on proper shoulder mechanics and need for manual assistance with downward rotation of scapula with scaption. Significant increase in shoulder flexion PROM by end of session. She demonstrates need for heavy manual and verbal cueing for neuromuscular re-education of scapulohumeral rhythm. Appropriate exercise-induced fatigue achieved with prone periscapular strengthening. Improvement in symptoms with cryotherapy.     Hien is progressing well towards her goals.   Pt prognosis is Good.     Pt will continue to benefit from skilled outpatient physical therapy to address the deficits listed in the problem list box on initial evaluation, provide pt/family education and to maximize pt's level of independence in the home and community environment.     Pt's spiritual, cultural and educational needs considered and pt agreeable to plan of care and goals.     Anticipated barriers to physical therapy: None     Goals:     Short Term Goals:  6 weeks  1. Report decreased L shoulder pain  <  / =  5/10 at worst to increase tolerance for driving. - met 5/21/19  2. Pt will increase L shoulder flexion AROM to 150 deg to indicate improved flexibility and mobility. - met 5/21/19  3. Pt will be able to tolerate multi-directional UE strengthening without pain to improve functional use of UEs.- met 5/21/19  4. Pt will report 50% improvement in ability to reach overhead without pain since start of care to indicate improved functional mobility. -  met 5/21/19  5. Pt to tolerate HEP to improve ROM and independence with ADL's. - met 5/21/19     Long Term Goals: 12 weeks  1. Report decreased L shoulder pain  <  / =  3/10 at worst to increase tolerance for driving. - met 6/18/2019   2. Pt will demonstrate increase L shoulder AROM in all planes to indicate improved flexibility and mobility.  - Progressing, not met 10/24/2019   3. Pt will be able to perform 2 x 10 shoulder flexion with 2 lbs to indicate improved strength in B UEs. - met 9/24/19  4. Pt will report 80% improvement in ability to reach overhead without pain since start of care to indicate improved functional mobility. - met 7/18/19  5. Pt to be Independent with HEP to improve ROM and independence with ADL's. - met 7/18/19    Plan     Progress UE strengthening and shoulder AROM. Continue physical therapy 1x/week for 4 weeks. Reassess next visit.     Angie Ta, PT

## 2019-10-23 NOTE — PROGRESS NOTES
CC: Annual  HPI: Pt is a 54 y.o.  female who presents for routine annual exam.  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 She lost her mother and mother in law in 10/18.  She is postmenopausal.  Denies any PMB. She does not want STD screening.  Denies any GYN complaints.  She is in need of a screening mammo.  Pt does breast MRI screening q 6 months for elevated TC score. She is in need of a coloscopy had in 2017.        FH:  Breast cancer: none  Colon cancer: mother   Ovarian cancer: none  Endometrial cancer: none    ROS:  GENERAL: Feeling well overall. Denies fever or chills.   SKIN: Denies rash or lesions.   HEAD: Denies head injury or headache.   NODES: Denies enlarged lymph nodes.   CHEST: Denies chest pain or shortness of breath.   CARDIOVASCULAR: Denies palpitations or left sided chest pain.   ABDOMEN: No abdominal pain, constipation, diarrhea, nausea, vomiting or rectal bleeding.   URINARY: No dysuria, hematuria, or burning on urination.  REPRODUCTIVE: See HPI.   BREASTS: Denies pain, lumps, or nipple discharge.   HEMATOLOGIC: No easy bruisability or excessive bleeding.   MUSCULOSKELETAL: Denies joint pain or swelling.   NEUROLOGIC: Denies syncope or weakness.   PSYCHIATRIC: Denies depression, anxiety or mood swings.    PE:   APPEARANCE: Well nourished, well developed, White female in no acute distress.  NODES: no cervical, supraclavicular, or inguinal lymphadenopathy  BREASTS: Symmetrical, no skin changes or visible lesions. No palpable masses, nipple discharge or adenopathy bilaterally.  ABDOMEN: Soft. No tenderness or masses. No distention. No hernias palpated. No CVA tenderness.  VULVA: No lesions. Normal external female genitalia.  URETHRAL MEATUS: Normal size and location, no lesions, no prolapse.  URETHRA: No masses, tenderness, or prolapse.  VAGINA: Moist. No lesions or lacerations noted. No abnormal discharge present. No odor present.   CERVIX: No lesions or discharge. No cervical motion tenderness.   UTERUS:  Normal size, regular shape, mobile, non-tender.  ADNEXA: No tenderness. No fullness or masses palpated in the adnexal regions.   ANUS PERINEUM: Normal.      Diagnosis:  1. Women's annual routine gynecological examination    2. Encounter for screening mammogram for breast cancer    3. Needs flu shot        Plan:   Pap not indicated- last pap 5/17 WNL  Mammo   Orders Placed This Encounter    Mammo Digital Screening Bilat w/ [facility]    Influenza - Quadrivalent (PF)       Patient was counseled today on the new ACS guidelines for cervical cytology screening as well as the current recommendations for breast cancer screening. She was counseled to follow up with her PCP for other routine health maintenance. Counseling session lasted approximately 10 minutes, and all her questions were answered.    Follow-up with me in 1 year for routine exam    OMAR Leonardo

## 2019-10-24 ENCOUNTER — CLINICAL SUPPORT (OUTPATIENT)
Dept: REHABILITATION | Facility: HOSPITAL | Age: 54
End: 2019-10-24
Attending: ORTHOPAEDIC SURGERY
Payer: COMMERCIAL

## 2019-10-24 ENCOUNTER — IMMUNIZATION (OUTPATIENT)
Dept: PHARMACY | Facility: CLINIC | Age: 54
End: 2019-10-24
Payer: COMMERCIAL

## 2019-10-24 DIAGNOSIS — M25.512 CHRONIC LEFT SHOULDER PAIN: ICD-10-CM

## 2019-10-24 DIAGNOSIS — M25.612 DECREASED RANGE OF MOTION OF LEFT SHOULDER: ICD-10-CM

## 2019-10-24 DIAGNOSIS — M62.81 MUSCLE WEAKNESS OF LEFT UPPER EXTREMITY: ICD-10-CM

## 2019-10-24 DIAGNOSIS — M62.89 MUSCLE TIGHTNESS: ICD-10-CM

## 2019-10-24 DIAGNOSIS — G89.29 CHRONIC LEFT SHOULDER PAIN: ICD-10-CM

## 2019-10-24 PROCEDURE — 97110 THERAPEUTIC EXERCISES: CPT | Mod: PO

## 2019-10-24 PROCEDURE — 97112 NEUROMUSCULAR REEDUCATION: CPT | Mod: PO

## 2019-11-04 DIAGNOSIS — N95.1 MENOPAUSAL SYMPTOMS: ICD-10-CM

## 2019-11-05 RX ORDER — ESTRADIOL AND NORETHINDRONE ACETATE 1; .5 MG/1; MG/1
TABLET, FILM COATED ORAL
Qty: 84 TABLET | Refills: 0 | Status: SHIPPED | OUTPATIENT
Start: 2019-11-05 | End: 2020-12-07

## 2019-11-12 ENCOUNTER — CLINICAL SUPPORT (OUTPATIENT)
Dept: REHABILITATION | Facility: HOSPITAL | Age: 54
End: 2019-11-12
Attending: ORTHOPAEDIC SURGERY
Payer: COMMERCIAL

## 2019-11-12 DIAGNOSIS — G89.29 CHRONIC LEFT SHOULDER PAIN: ICD-10-CM

## 2019-11-12 DIAGNOSIS — M62.81 MUSCLE WEAKNESS OF LEFT UPPER EXTREMITY: ICD-10-CM

## 2019-11-12 DIAGNOSIS — M25.612 DECREASED RANGE OF MOTION OF LEFT SHOULDER: ICD-10-CM

## 2019-11-12 DIAGNOSIS — M62.89 MUSCLE TIGHTNESS: ICD-10-CM

## 2019-11-12 DIAGNOSIS — M25.512 CHRONIC LEFT SHOULDER PAIN: ICD-10-CM

## 2019-11-12 PROCEDURE — 97110 THERAPEUTIC EXERCISES: CPT | Mod: PO

## 2019-11-18 NOTE — PROGRESS NOTES
Physical Therapy Daily Treatment Note     Name: Hien Jeter  Clinic Number: [id number]    Therapy Diagnosis:   Encounter Diagnoses   Name Primary?    Chronic left shoulder pain     Muscle weakness of left upper extremity     Decreased range of motion of left shoulder     Muscle tightness      Physician: Natalee Nagel PA-C    Visit Date: 11/19/2019    Physician Orders: PT Eval and Treat   Medical Diagnosis from Referral: Status post arthroscopy of left shoulder  Evaluation Date: 5/2/2019  Last PN: 11/12/19 (visit 35)  Authorization Period Expiration: 12/31/19   Plan of Care Expiration: 11/29/19  Visit # / Visits authorized: 36 / 50  PTA Visit Number: 0      Time In: 1004  Time Out: 1109  Total Billable Time: 65 minutes  Charges: TE -3, MT- 1    Precautions: Standard    Subjective     Pt reports: the last two days she hasn't been performing HEP due to dog being sick. Having a bad morning. She had to put her dog down this morning.   She was compliant with home exercise program.   Response to previous treatment: good   Functional change: none to note    Pain: 0/10   Location: left shoulder      Objective     L shoulder AROM flexion at end of session (11/12/19): 175 deg    BOLD= performed today    Hien received therapeutic exercises to develop strength, endurance, ROM, flexibility and posture for 45 minutes including:    Standing UBE 3'F/3'B level 2  Supine sh flexion x 20  Seated sh flexion AROM x 20  Sidelying sh abd AROM x 20  Sh IR stretch w hand behind back w strap 5 sec hold x 20  Freemotion shoulder flexion AAROM facing away 3# w head turned to L to decreased UT compensation x 5 minutes  Shoulder IR propped on plinth w orange TB 2 x 10  Corner stretch 3 x 30 sec  Corner slides x 20  Sh ER at 90 deg abduction w yellow TB 2 x 10  PROM shoulder IR and ER at 90 deg x 5 min  L posterior capsule stretch 3 x 30 sec  Supine pec stretch w hands behind head over horizontal half roll for thoracic extension x 3  min  Flexion on wall up and back w/ GTB loop: 20x  Prone Ts 2# + manual cueing at UT to fatigue  Prone Ys 2# + manual cueing at UT to fatigue  Prone Is 2# x 20  Prone rows 2# x 20  Wall slides with lift off x 20  Stretch on rail 3 x 30 sec  Prone horiz abd w elbow bent at 90 + snow marcia sh abd x 10    Pt participated in the following neuromuscular re-education to develop kinesthetic awareness, proprioception, and proper shoulder mechanics for 5 minutes:    Supine shoulder flexion PROM w deep breathing x 10, addition of towel roll between shoulder blades x 10  Seated shoulder flexion w manual downward assist of scapula and VC to decreased UT compensation x 5     Pt received cryotherapy to L shoulder at end of treatment session for 0 minutes in sitting.      Hien received the following manual therapy techniques x 15 min:   STM to L serratus anterior and subscapularis and pec  Scapular mobilization  STM to B upper traps and scalenes  Grade III/IV posterior and inferior L GH joint mobs    Home Exercises Provided and Patient Education Provided     Education provided:   -importance of proper posture with therapeutic exercises  -techniques for self-mobilization of pec muscles    Written Home Exercises Provided: Patient instructed to cont prior HEP. (wall slides, SL sh ER, B sh ext w theraband, prone army crawls, D2 flexion, sidelying posterior capsule stretch)  Exercises were reviewed and Hien was able to demonstrate them prior to the end of the session.  Hien demonstrated good  understanding of the education provided.     See EMR under Patient Instructions for exercises provided prior visit.    Assessment     Pt had good tolerance to treatment today with no adverse effects. Post-treatment L shoulder pain rated as 0/10. Pt presents to clinic with decreased overhead AROM and poor scapulohumeral rhythm. Heavy focus on performing all therapeutic exercises with proper scapular retraction and avoiding upper trap  compensations. Significant improvement in shoulder flexion and abduction AROM by end of session. Pt with little limitations in shoulder internal and external rotation. Soft tissue dysfunction in R upper trap and scalenes noted. Able to achieve full AROM with AAROM at end of treatment.      Hien is progressing well towards her goals.   Pt prognosis is Good.     Pt will continue to benefit from skilled outpatient physical therapy to address the deficits listed in the problem list box on initial evaluation, provide pt/family education and to maximize pt's level of independence in the home and community environment.     Pt's spiritual, cultural and educational needs considered and pt agreeable to plan of care and goals.     Anticipated barriers to physical therapy: None     Goals:   Short Term Goals:  6 weeks  1. Report decreased L shoulder pain  <  / =  5/10 at worst to increase tolerance for driving. - met 5/21/19  2. Pt will increase L shoulder flexion AROM to 150 deg to indicate improved flexibility and mobility. - met 5/21/19  3. Pt will be able to tolerate multi-directional UE strengthening without pain to improve functional use of UEs.- met 5/21/19  4. Pt will report 50% improvement in ability to reach overhead without pain since start of care to indicate improved functional mobility. - met 5/21/19  5. Pt to tolerate HEP to improve ROM and independence with ADL's. - met 5/21/19     Long Term Goals: 12 weeks  1. Report decreased L shoulder pain  <  / =  3/10 at worst to increase tolerance for driving. - met 6/18/2019   2. Pt will demonstrate increase L shoulder AROM in all planes to indicate improved flexibility and mobility.  - Progressing, not met 11/19/2019   3. Pt will be able to perform 2 x 10 shoulder flexion with 2 lbs to indicate improved strength in B UEs. - met 9/24/19  4. Pt will report 80% improvement in ability to reach overhead without pain since start of care to indicate improved functional  mobility. - met 7/18/19  5. Pt to be Independent with HEP to improve ROM and independence with ADL's. - met 7/18/19    Plan     Progress UE strengthening and shoulder AROM.     Angie Ta, PT

## 2019-11-19 ENCOUNTER — CLINICAL SUPPORT (OUTPATIENT)
Dept: REHABILITATION | Facility: HOSPITAL | Age: 54
End: 2019-11-19
Attending: ORTHOPAEDIC SURGERY
Payer: COMMERCIAL

## 2019-11-19 DIAGNOSIS — G89.29 CHRONIC LEFT SHOULDER PAIN: ICD-10-CM

## 2019-11-19 DIAGNOSIS — M62.81 MUSCLE WEAKNESS OF LEFT UPPER EXTREMITY: ICD-10-CM

## 2019-11-19 DIAGNOSIS — M62.89 MUSCLE TIGHTNESS: ICD-10-CM

## 2019-11-19 DIAGNOSIS — M25.512 CHRONIC LEFT SHOULDER PAIN: ICD-10-CM

## 2019-11-19 DIAGNOSIS — M25.612 DECREASED RANGE OF MOTION OF LEFT SHOULDER: ICD-10-CM

## 2019-11-19 PROCEDURE — 97110 THERAPEUTIC EXERCISES: CPT | Mod: PO

## 2019-11-19 PROCEDURE — 97140 MANUAL THERAPY 1/> REGIONS: CPT | Mod: PO

## 2019-11-20 ENCOUNTER — OFFICE VISIT (OUTPATIENT)
Dept: ORTHOPEDICS | Facility: CLINIC | Age: 54
End: 2019-11-20
Attending: ORTHOPAEDIC SURGERY
Payer: COMMERCIAL

## 2019-11-20 DIAGNOSIS — M75.42 IMPINGEMENT SYNDROME OF LEFT SHOULDER: Primary | ICD-10-CM

## 2019-11-20 PROCEDURE — 99499 NO LOS: ICD-10-PCS | Mod: S$GLB,,, | Performed by: ORTHOPAEDIC SURGERY

## 2019-11-20 PROCEDURE — 99999 PR PBB SHADOW E&M-EST. PATIENT-LVL III: ICD-10-PCS | Mod: PBBFAC,,, | Performed by: ORTHOPAEDIC SURGERY

## 2019-11-20 PROCEDURE — 99499 UNLISTED E&M SERVICE: CPT | Mod: S$GLB,,, | Performed by: ORTHOPAEDIC SURGERY

## 2019-11-20 PROCEDURE — 99999 PR PBB SHADOW E&M-EST. PATIENT-LVL III: CPT | Mod: PBBFAC,,, | Performed by: ORTHOPAEDIC SURGERY

## 2019-11-20 NOTE — PROGRESS NOTES
Subjective:      Patient ID: Hien Jeter is a 54 y.o. female.  Chief Complaint: Pain of the Right Hand      HPI  Hien Jeter is a  54 y.o. female presenting today for post op visit.  She is s/p rotator cuff repair left shoulder  She is currently in therapy about 4 months postop  Doing well making good progress  Pain is minimal  On an unrelated matter she has noticed a small bump on her right palm she was concerned about this.     Review of patient's allergies indicates:   Allergen Reactions    Diclofenac Nausea Only    Ibuprofen Other (See Comments)    Tramadol Other (See Comments)     Nausea          Current Outpatient Medications   Medication Sig Dispense Refill    azelastine (ASTELIN) 137 mcg (0.1 %) nasal spray 1 SPRAY (137 MCG TOTAL) BY NASAL ROUTE 2 (TWO) TIMES DAILY. 30 mL 11    baclofen (LIORESAL) 10 MG tablet TAKE 1/2 TABLET BY MOUTH 2-3 TIMES A DAY 60 tablet 5    cholecalciferol, vitamin D3, 3,000 unit Tab Take 3,000 Units by mouth once daily.       escitalopram oxalate (LEXAPRO) 10 MG tablet TAKE 1.5 TABLETS (15 MG TOTAL) BY MOUTH EVERY EVENING. 45 tablet 11    flaxseed oil 1,000 mg Cap Take 1 capsule by mouth once daily.       magnesium oxide (MAG-OX) 400 mg (241.3 mg magnesium) tablet Take 1 tablet (400 mg total) by mouth 2 (two) times daily. 60 tablet 12    MIMVEY 1-0.5 mg per tablet TAKE BY MOUTH 1 TABLET ONCE DAILY 84 tablet 0    multivitamin-Ca-iron-minerals (ONE-A-DAY WOMENS FORMULA) 27-0.4 mg Tab Take 1 tablet by mouth once daily.       nortriptyline (PAMELOR) 10 MG capsule Take 2 capsules (20 mg total) by mouth every evening. 60 capsule 11    riboflavin, vitamin B2, (VITAMIN B-2) 100 mg Tab tablet TAKE 2 TABLETS BY MOUTH ONCE DAILY. 60 tablet 11    topiramate (TOPAMAX) 25 MG tablet Take 25 mg by mouth 2 (two) times daily.  12    ZOLMitriptan (ZOMIG) 5 mg Burr SPRAY 1 SPRAY IN NOSTRIL ONCE AS NEEDED. Max 2 doses in 24 hrs 12 each 12     No current facility-administered  medications for this visit.        Past Medical History:   Diagnosis Date    Anxiety     Basal cell carcinoma 2000    left eyebrow     Depression     Environmental allergies     Headache(784.0)     Multiple food allergies     Multiple sclerosis 2009     shoulder 3/2015    left       Past Surgical History:   Procedure Laterality Date    ARTHROSCOPIC DEBRIDEMENT OF ROTATOR CUFF Left 4/5/2019    Procedure: DEBRIDEMENT, ROTATOR CUFF, ARTHROSCOPIC;  Surgeon: Dc Moore Jr., MD;  Location: [facility];  Service: Orthopedics;  Laterality: Left;  need opus system (Julito notified)  video    ARTHROSCOPY OF SHOULDER WITH DECOMPRESSION OF SUBACROMIAL SPACE  4/5/2019    Procedure: ARTHROSCOPY, SHOULDER, WITH SUBACROMIAL SPACE DECOMPRESSION;  Surgeon: Dc Moore Jr., MD;  Location: [facility] OR;  Service: Orthopedics;;    BASAL CELL CARCINOMA EXCISION  1998    COLONOSCOPY N/A 5/25/2017    Procedure: COLONOSCOPY;  Surgeon: Marielle Dietz MD;  Location: [facility] ENDO ([address] [place] [address]);  Service: Endoscopy;  Laterality: N/A;  Patient requests PM.    PM prep.    FOOT SURGERY Right 01/08/2019    LASIK  2000    SHOULDER SURGERY Left 04/05/2019    WISDOM TOOTH EXTRACTION         OBJECTIVE:   PHYSICAL EXAM:       Vitals:    11/20/19 1551   PainSc:   2     Ortho/SPM Exam  Examination left shoulder no tenderness no swelling range of motion almost full  Good strength and good active elevation  Neurologic exam intact  Examination right hand demonstrates a small nodule in the palm consistent with a Dupuytren's nodule nontender  No contracture  Full range of motion fingers no triggering    RADIOGRAPHS:  None  Comments: I have personally reviewed the imaging and I agree with the above radiologist's report.    ASSESSMENT/PLAN:     IMPRESSION:  1.  Status post rotator cuff repair left shoulder doing well  2.  Dupuytren's nodule right palm    PLAN:  I explained the nature of the nodule to the patient. Recommended observation for  now  If symptoms get worse we may consider surgical excision  The shoulder continue therapy advance activities as tolerated      FOLLOW UP:  4-6 weeks    Disclaimer: This note has been generated using voice-recognition software. There may be typographical errors that have been missed during proof-reading.

## 2019-11-25 NOTE — PROGRESS NOTES
Physical Therapy Daily Treatment Note     Name: Hien Jeter  Clinic Number: [id number]    Therapy Diagnosis:   Encounter Diagnoses   Name Primary?    Chronic left shoulder pain     Muscle weakness of left upper extremity     Decreased range of motion of left shoulder     Muscle tightness      Physician: Natalee Nagel PA-C    Visit Date: 11/26/2019    Physician Orders: PT Eval and Treat   Medical Diagnosis from Referral: Status post arthroscopy of left shoulder  Evaluation Date: 5/2/2019  Last PN: 11/12/19 (visit 35)  Authorization Period Expiration: 12/31/19   Plan of Care Expiration: 11/29/19  Visit # / Visits authorized: 37 / 50  PTA Visit Number: 0      Time In: 1004  Time Out: 1100  Total Billable Time: 57 minutes  Charges: TE -3, MT- 1     Precautions: Standard    Subjective     Pt reports: lately when stretch in bed, she has been able to straighten arm in flexion and abduction. She saw Dr. Moore and he thinks she is doing good.   She was compliant with home exercise program.   Response to previous treatment: good   Functional change: no difficulties with tasks     Pain: 0/10   Location: left shoulder      Objective     L shoulder AROM flexion at end of session (11/12/19): 175 deg    BOLD= performed today    Hien received therapeutic exercises to develop strength, endurance, ROM, flexibility and posture for 47 minutes including:    Standing UBE 3'F/3'B level 2  Supine sh flexion x 20  Seated sh flexion AROM x 20  Sidelying sh abd AROM x 20  Upper trap stretch w overpressure 3 x 30 sec   Sh IR stretch w hand behind back w strap 5 sec hold x 20  Freemotion shoulder flexion AAROM facing away 3# w decreased UT compensation x 3 minutes  Shoulder IR propped on plinth w orange TB 2 x 10  Corner stretch 3 x 30 sec  Corner slides x 20  Sh IR at 90 deg abduction w orange TB 3 x 10  PROM shoulder IR and ER at 90 deg x 5 min  +Sh IR w green TB 3 x 10  +Sh ER w green TB 3 x 10  L posterior capsule stretch 3 x  30 sec  Supine pec stretch w hands behind head over horizontal half roll for thoracic extension x 3 min  Flexion on wall up and back w/ GTB loop: 20x  Prone Ts 2# + manual cueing at UT to fatigue  Prone Ys 2# + manual cueing at UT to fatigue  Prone Is 2# x 20  Prone rows 2# x 20  Wall slides with lift off x 20  +Overhead press 2 lbs x 20  +Open books x 20 ea  +SL gators 2 lbs x 30  Stretch on rail 3 x 30 sec  Prone horiz abd w elbow bent at 90 + snow marcia sh abd x 10    Pt participated in the following neuromuscular re-education to develop kinesthetic awareness, proprioception, and proper shoulder mechanics for 0 minutes:    Supine shoulder flexion PROM w deep breathing x 10, addition of towel roll between shoulder blades x 10  Seated shoulder flexion w manual downward assist of scapula and VC to decreased UT compensation x 5     Pt received cryotherapy to L shoulder at end of treatment session for 0 minutes in sitting.      Hien received the following manual therapy techniques x 10 min:   STM to L subscapularis  Scapular mobilization  STM to B upper traps and scalenes  Grade III/IV posterior and inferior L GH joint mobs    Home Exercises Provided and Patient Education Provided     Education provided:   -importance of proper posture with therapeutic exercises  -techniques for self-mobilization of pec muscles    Written Home Exercises Provided: Patient instructed to cont prior HEP. (wall slides, SL sh ER, B sh ext w theraband, prone army crawls, D2 flexion, sidelying posterior capsule stretch)  Exercises were reviewed and Hien was able to demonstrate them prior to the end of the session.  Hien demonstrated good  understanding of the education provided.     See EMR under Patient Instructions for exercises provided prior visit.    Assessment     Pt was re-assessed today with 5/5 STGs being met indicating improvements in L shoulder pain, UE strength, ability to reach overhead, L shoulder AROM, and independence with  HEP since start of care. Pt has made good progress with physical therapy services. She had good tolerance to treatment today with no adverse effects. Post-treatment L shoulder pain rated as 0/10. Improvement in shoulder AROM following manual therapy techniques and therapeutic exercises. Occasional upper trap compensations with overhead range. Pt not limited with functional tasks. She demonstrates good independence with HEP and ability to self-manage condition.     Goals:   Short Term Goals:  6 weeks  1. Report decreased L shoulder pain  <  / =  5/10 at worst to increase tolerance for driving. - met 5/21/19  2. Pt will increase L shoulder flexion AROM to 150 deg to indicate improved flexibility and mobility. - met 5/21/19  3. Pt will be able to tolerate multi-directional UE strengthening without pain to improve functional use of UEs.- met 5/21/19  4. Pt will report 50% improvement in ability to reach overhead without pain since start of care to indicate improved functional mobility. - met 5/21/19  5. Pt to tolerate HEP to improve ROM and independence with ADL's. - met 5/21/19     Long Term Goals: 12 weeks  1. Report decreased L shoulder pain  <  / =  3/10 at worst to increase tolerance for driving. - met 6/18/2019   2. Pt will demonstrate increase L shoulder AROM in all planes to indicate improved flexibility and mobility.  - met 11/26/19  3. Pt will be able to perform 2 x 10 shoulder flexion with 2 lbs to indicate improved strength in B UEs. - met 9/24/19  4. Pt will report 80% improvement in ability to reach overhead without pain since start of care to indicate improved functional mobility. - met 7/18/19  5. Pt to be Independent with HEP to improve ROM and independence with ADL's. - met 7/18/19    Plan     Pt is discharged from physical therapy services.    Angie Ta, PT

## 2019-11-26 ENCOUNTER — CLINICAL SUPPORT (OUTPATIENT)
Dept: REHABILITATION | Facility: HOSPITAL | Age: 54
End: 2019-11-26
Attending: ORTHOPAEDIC SURGERY
Payer: COMMERCIAL

## 2019-11-26 DIAGNOSIS — M62.81 MUSCLE WEAKNESS OF LEFT UPPER EXTREMITY: ICD-10-CM

## 2019-11-26 DIAGNOSIS — G89.29 CHRONIC LEFT SHOULDER PAIN: ICD-10-CM

## 2019-11-26 DIAGNOSIS — M62.89 MUSCLE TIGHTNESS: ICD-10-CM

## 2019-11-26 DIAGNOSIS — M25.512 CHRONIC LEFT SHOULDER PAIN: ICD-10-CM

## 2019-11-26 DIAGNOSIS — M25.612 DECREASED RANGE OF MOTION OF LEFT SHOULDER: ICD-10-CM

## 2019-11-26 PROCEDURE — 97110 THERAPEUTIC EXERCISES: CPT | Mod: PO

## 2019-11-26 PROCEDURE — 97140 MANUAL THERAPY 1/> REGIONS: CPT | Mod: PO

## 2019-12-05 DIAGNOSIS — G35 MULTIPLE SCLEROSIS: Primary | ICD-10-CM

## 2019-12-09 NOTE — PROGRESS NOTES
Subjective:        Patient ID: Hien Jeter is a 54 y.o. female who presents today for a routine clinic visit for MS.  [facility] visit to [facility] in  with this provider    MS HPI:  · DMT: ocrelizumab-last dosed 2019-due in January  · Side effects from DMT? No  · Taking vitamin D3 as recommended? Yes - 3,000IU/d   · Feeling well-no new MS symptoms-likes Ocrevus  · States her dog  suddenly just prior to  after eating a poisonous mushroom  · She is recently completed PT and feels her L shoulder is much better.  · She is working out every other day at [facility]    Medications:  Current Outpatient Medications   Medication Sig    azelastine (ASTELIN) 137 mcg (0.1 %) nasal spray 1 SPRAY (137 MCG TOTAL) BY NASAL ROUTE 2 (TWO) TIMES DAILY.    baclofen (LIORESAL) 10 MG tablet TAKE 1/2 TABLET BY MOUTH 2-3 TIMES A DAY    cholecalciferol, vitamin D3, 3,000 unit Tab Take 3,000 Units by mouth once daily.     escitalopram oxalate (LEXAPRO) 10 MG tablet TAKE 1.5 TABLETS (15 MG TOTAL) BY MOUTH EVERY EVENING.    flaxseed oil 1,000 mg Cap Take 1 capsule by mouth once daily.     magnesium oxide (MAG-OX) 400 mg (241.3 mg magnesium) tablet Take 1 tablet (400 mg total) by mouth 2 (two) times daily.    MIMVEY 1-0.5 mg per tablet TAKE BY MOUTH 1 TABLET ONCE DAILY    multivitamin-Ca-iron-minerals (ONE-A-DAY WOMENS FORMULA) 27-0.4 mg Tab Take 1 tablet by mouth once daily.     nortriptyline (PAMELOR) 10 MG capsule Take 2 capsules (20 mg total) by mouth every evening.    riboflavin, vitamin B2, (VITAMIN B-2) 100 mg Tab tablet TAKE 2 TABLETS BY MOUTH ONCE DAILY.    topiramate (TOPAMAX) 25 MG tablet Take 25 mg by mouth 2 (two) times daily.    ZOLMitriptan (ZOMIG) 5 mg [place] SPRAY 1 SPRAY IN NOSTRIL ONCE AS NEEDED. Max 2 doses in 24 hrs     No current facility-administered medications for this visit.        SOCIAL HISTORY  Social History     Tobacco Use    Smoking status: Never Smoker    Smokeless tobacco:  Never Used   Substance Use Topics    Alcohol use: Yes     Comment: socially/ not weekly    Drug use: No       Living arrangements - the patient lives with their spouse.    MS ROS:      MS REVIEW OF SYMPTOMS 12/11/2019   Do you feel abnormally tired on most days? No   Do you feel you generally sleep well? Yes   Do you have difficulty controlling your bladder?  no   Do you have difficulty controlling your bowels?  No   Do you have frequent muscle cramps, tightness or spasms in your limbs?  No--exercising every other day and only taking baclofen occasionally(not daily)   Do you have new visual symptoms?  No   Do you have worsening difficulty with your memory or thinking? No   Do you have worsening symptoms of anxiety or depression?  No   For patients who walk, Do you have more difficulty walking?  No   Have you fallen since your last visit?  No   For patients who use wheelchairs: Do you have any skin wounds or breakdown? Not Applicable   Do you have difficulty using your hands?  No   Do you have shooting or burning pain? No   Do you have difficulty with sexual function?  -   If you are sexually active, are you using birth control? Y/N  N/A Not Applicable   Do you often choke when swallowing liquids or solid food?  No   Do you experience worsening symptoms when overheated? No   Do you need any new equipment such as a wheelchair, walker or shower chair? No   Do you receive co-pay financial assistance for your principal MS medicine? Yes   Would you be interested in participating in an MS research trial in the future? Yes   Do you feel you have adequate family/friend support?  Yes   Do you have health insurance?   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  Receives private disability from school system   Do you use marijuana or cannabis products? No   Have you been diagnosed with a urinary tract infection since your last visit here? No   Have you been diagnosed  with a respiratory tract infection since your last visit here? No   Have you been to the emergency room since your last visit here? No   Have you been hospitalized since your last visit here?  No                Objective:        1. 25 foot timed walk:4.1s last visit  Timed 25 Foot Walk: 10/26/2016 2/15/2017   Did patient wear an AFO? No No   Was assistive device used? No No   Time for 25 Foot Walk (seconds) 4 4.2   Time for 25 Foot Walk (seconds) 4.3 -       Neurologic Exam     Mental Status   Oriented to person, place, and time.   Follows 3 step commands.   Speech: speech is normal   Level of consciousness: alert  Normal comprehension.     Cranial Nerves     CN II   Visual acuity: normal with correction (20/20 OD and OS with  hand held chart at 14 inches)    CN III, IV, VI   Pupils are equal, round, and reactive to light.  Extraocular motions are normal.     CN V   Facial sensation intact.     CN VII   Facial expression full, symmetric.     CN VIII   Hearing: intact (finger rub)    CN IX, X   Palate: symmetric    CN XI   CN XI normal.     CN XII   Tongue deviation: none    Motor Exam   Muscle bulk: normal  Overall muscle tone: normal    Strength   Right deltoid: 5/5  Right triceps: 5/5  Left triceps: 5/5  Right wrist extension: 5/5  Left wrist extension: 5/5  Right interossei: 5/5  Left interossei: 5/5  Right iliopsoas: 5/5  Left iliopsoas: 5/5  Right hamstrin/5  Left hamstrin/5  Right anterior tibial: 5/5  Left anterior tibial: 5/5  Right peroneal: 5/5  Left peroneal: 5/5L AROM at shoulder slightly limited but WFL-much improved from last visit.        Sensory Exam   Right arm light touch: normal  Left arm light touch: normal  Right leg light touch: normal  Left leg light touch: normal  Right arm vibration: normal  Left arm vibration: normal  Right leg vibration: decreased from toes  Left leg vibration: decreased from toes    Gait, Coordination, and Reflexes     Gait  Gait: normal    Coordination   Finger  to nose coordination: normal  Tandem walking coordination: normal    Tremor   Resting tremor: absent  Action tremor: absent    Reflexes   Right brachioradialis: 2+  Left brachioradialis: 2+  Right biceps: 2+  Left biceps: 2+  Right triceps: 2+  Left triceps: 2+  Right patellar: 2+  Left patellar: 2+  Right achilles: 2+  Left achilles: 2+  Right plantar: normal  Left plantar: normal  Right ankle clonus: absent  Left ankle clonus: absent  Right pendular knee jerk: absent  Left pendular knee jerk: absentNormal Heel/toe walk  Normal RSM  Patient able to hop on each foot individually         Imaging:     Results for orders placed during the hospital encounter of 06/04/19   MRI Brain Demyelinating Without Contrast    Impression Findings are consistent with the clinically suspected diagnosis of multiple sclerosis with a mild plaque burden.  There is no significant interval change compared with the prior study.      Electronically signed by: Rolando Leija MD  Date:    06/04/2019  Time:    11:55         Labs:     Lab Results   Component Value Date    JTHARZUY02UR 74 12/11/2019    EMVXDVBK31IM 45 06/11/2018    YZDYHEEA74PQ 72 02/15/2017     No results found for: JCVINDEX, JCVANTIBODY  Lab Results   Component Value Date    LR1XUKKF 64.7 12/20/2017    ABSOLUTECD3 837 12/20/2017    OR6HXLYG 12.5 12/20/2017    ABSOLUTECD8 161 (L) 12/20/2017    NE6NQJMY 53.0 12/20/2017    ABSOLUTECD4 685 12/20/2017    LABCD48 4.25 (H) 12/20/2017     Lab Results   Component Value Date    WBC 6.44 12/11/2019    RBC 4.52 12/11/2019    HGB 15.0 12/11/2019    HCT 45.2 12/11/2019     (H) 12/11/2019    MCH 33.2 (H) 12/11/2019    MCHC 33.2 12/11/2019    RDW 12.2 12/11/2019     12/11/2019    MPV 9.9 12/11/2019    GRAN 4.6 12/11/2019    GRAN 72.0 12/11/2019    LYMPH 0.8 (L) 12/11/2019    LYMPH 12.4 (L) 12/11/2019    MONO 0.6 12/11/2019    MONO 9.9 12/11/2019    EOS 0.3 12/11/2019    BASO 0.06 12/11/2019    EOSINOPHIL 4.3 12/11/2019     BASOPHIL 0.9 12/11/2019     Sodium   Date Value Ref Range Status   04/01/2019 139 136 - 145 mmol/L Final     Potassium   Date Value Ref Range Status   04/01/2019 4.4 3.5 - 5.1 mmol/L Final     Chloride   Date Value Ref Range Status   04/01/2019 106 95 - 110 mmol/L Final     CO2   Date Value Ref Range Status   04/01/2019 27 23 - 29 mmol/L Final     Glucose   Date Value Ref Range Status   04/01/2019 80 70 - 110 mg/dL Final     BUN, Bld   Date Value Ref Range Status   04/01/2019 12 6 - 20 mg/dL Final     Creatinine   Date Value Ref Range Status   04/01/2019 0.8 0.5 - 1.4 mg/dL Final     Calcium   Date Value Ref Range Status   04/01/2019 9.5 8.7 - 10.5 mg/dL Final     Total Protein   Date Value Ref Range Status   11/20/2017 7.3 6.0 - 8.4 g/dL Final     Albumin   Date Value Ref Range Status   11/20/2017 3.4 (L) 3.5 - 5.2 g/dL Final     Total Bilirubin   Date Value Ref Range Status   11/20/2017 0.4 0.1 - 1.0 mg/dL Final     Comment:     For infants and newborns, interpretation of results should be based  on gestational age, weight and in agreement with clinical  observations.  Premature Infant recommended reference ranges:  Up to 24 hours.............<8.0 mg/dL  Up to 48 hours............<12.0 mg/dL  3-5 days..................<15.0 mg/dL  6-29 days.................<15.0 mg/dL       Alkaline Phosphatase   Date Value Ref Range Status   11/20/2017 79 55 - 135 U/L Final     AST   Date Value Ref Range Status   11/20/2017 19 10 - 40 U/L Final     ALT   Date Value Ref Range Status   11/20/2017 18 10 - 44 U/L Final     Anion Gap   Date Value Ref Range Status   04/01/2019 6 (L) 8 - 16 mmol/L Final     eGFR if    Date Value Ref Range Status   04/01/2019 >60 >60 mL/min/1.73 m^2 Final     eGFR if non    Date Value Ref Range Status   04/01/2019 >60 >60 mL/min/1.73 m^2 Final     Comment:     Calculation used to obtain the estimated glomerular filtration  rate (eGFR) is the CKD-EPI equation.           Lab  Results   Component Value Date    HEPBSAG Negative 12/11/2019    HEPBSAB Positive (A) 12/11/2019    HEPBCAB Negative 12/11/2019         Impression:       Labs reviewed: N/A       Ordered today  Imaging tests reviewed: N/A       Ordered today      Diagnosis of MS: Yes      MS Diagnosis based on:    Progression:  Number of relapses in the past year:  0  Clinical Progression:  Clinically Stable  MRI Progression:  Stable            Stable as of June 2019    Plan:            Continue Ocrevus --planned for January--will ensure labs are stable   Monitoring labs ordered?:  Yes   Imaging labs ordered?:  Yes            Due June 2020  -MRI Brain without constrast    Additional Impression:          Over 50% of this 40 minute visit was spent in direct face to face counseling of the patient about MS, DMT considerations, and MS symptom management.     Problem List Items Addressed This Visit        Immunology/Multi System    Immunosuppression       Other    Encounter for long-term (current) use of high-risk medication    Counseling regarding goals of care      Other Visit Diagnoses     Multiple sclerosis    -  Primary    continue Ocrevus    Relevant Orders    Vitamin D (Completed)    MRI Brain Demyelinating Without Contrast    Vitamin D insufficiency        will check level today    Relevant Orders    Vitamin D (Completed)    Spasticity        Depression, unspecified depression type            Follow up in about 6 months (around 6/11/2020) for follow up with Dr. Salas.  Patient agreed to POC today.    Attending, Dr. Salas, was available during today's encounter.     Shelbi Wisdom PA-C  [facility]

## 2019-12-11 ENCOUNTER — LAB VISIT (OUTPATIENT)
Dept: LAB | Facility: HOSPITAL | Age: 54
End: 2019-12-11
Payer: COMMERCIAL

## 2019-12-11 ENCOUNTER — OFFICE VISIT (OUTPATIENT)
Dept: NEUROLOGY | Facility: CLINIC | Age: 54
End: 2019-12-11
Payer: COMMERCIAL

## 2019-12-11 VITALS
BODY MASS INDEX: 25.19 KG/M2 | HEART RATE: 78 BPM | DIASTOLIC BLOOD PRESSURE: 76 MMHG | HEIGHT: 62 IN | WEIGHT: 136.88 LBS | SYSTOLIC BLOOD PRESSURE: 110 MMHG

## 2019-12-11 DIAGNOSIS — E55.9 VITAMIN D INSUFFICIENCY: ICD-10-CM

## 2019-12-11 DIAGNOSIS — Z71.89 COUNSELING REGARDING GOALS OF CARE: ICD-10-CM

## 2019-12-11 DIAGNOSIS — G35 MULTIPLE SCLEROSIS: ICD-10-CM

## 2019-12-11 DIAGNOSIS — Z79.899 ENCOUNTER FOR LONG-TERM (CURRENT) USE OF HIGH-RISK MEDICATION: ICD-10-CM

## 2019-12-11 DIAGNOSIS — G35 MULTIPLE SCLEROSIS: Primary | ICD-10-CM

## 2019-12-11 DIAGNOSIS — F32.A DEPRESSION, UNSPECIFIED DEPRESSION TYPE: ICD-10-CM

## 2019-12-11 DIAGNOSIS — R25.2 SPASTICITY: ICD-10-CM

## 2019-12-11 DIAGNOSIS — D84.9 IMMUNOSUPPRESSION: ICD-10-CM

## 2019-12-11 LAB
25(OH)D3+25(OH)D2 SERPL-MCNC: 74 NG/ML (ref 30–96)
BASOPHILS # BLD AUTO: 0.06 K/UL (ref 0–0.2)
BASOPHILS NFR BLD: 0.9 % (ref 0–1.9)
DIFFERENTIAL METHOD: ABNORMAL
EOSINOPHIL # BLD AUTO: 0.3 K/UL (ref 0–0.5)
EOSINOPHIL NFR BLD: 4.3 % (ref 0–8)
ERYTHROCYTE [DISTWIDTH] IN BLOOD BY AUTOMATED COUNT: 12.2 % (ref 11.5–14.5)
HBV CORE AB SERPL QL IA: NEGATIVE
HBV SURFACE AB SER-ACNC: POSITIVE M[IU]/ML
HBV SURFACE AG SERPL QL IA: NEGATIVE
HCT VFR BLD AUTO: 45.2 % (ref 37–48.5)
HGB BLD-MCNC: 15 G/DL (ref 12–16)
IMM GRANULOCYTES # BLD AUTO: 0.03 K/UL (ref 0–0.04)
IMM GRANULOCYTES NFR BLD AUTO: 0.5 % (ref 0–0.5)
LYMPHOCYTES # BLD AUTO: 0.8 K/UL (ref 1–4.8)
LYMPHOCYTES NFR BLD: 12.4 % (ref 18–48)
MCH RBC QN AUTO: 33.2 PG (ref 27–31)
MCHC RBC AUTO-ENTMCNC: 33.2 G/DL (ref 32–36)
MCV RBC AUTO: 100 FL (ref 82–98)
MONOCYTES # BLD AUTO: 0.6 K/UL (ref 0.3–1)
MONOCYTES NFR BLD: 9.9 % (ref 4–15)
NEUTROPHILS # BLD AUTO: 4.6 K/UL (ref 1.8–7.7)
NEUTROPHILS NFR BLD: 72 % (ref 38–73)
NRBC BLD-RTO: 0 /100 WBC
PLATELET # BLD AUTO: 321 K/UL (ref 150–350)
PMV BLD AUTO: 9.9 FL (ref 9.2–12.9)
RBC # BLD AUTO: 4.52 M/UL (ref 4–5.4)
WBC # BLD AUTO: 6.44 K/UL (ref 3.9–12.7)

## 2019-12-11 PROCEDURE — 86356 MONONUCLEAR CELL ANTIGEN: CPT

## 2019-12-11 PROCEDURE — 36415 COLL VENOUS BLD VENIPUNCTURE: CPT

## 2019-12-11 PROCEDURE — 86704 HEP B CORE ANTIBODY TOTAL: CPT

## 2019-12-11 PROCEDURE — 86706 HEP B SURFACE ANTIBODY: CPT

## 2019-12-11 PROCEDURE — 99215 PR OFFICE/OUTPT VISIT, EST, LEVL V, 40-54 MIN: ICD-10-PCS | Mod: S$GLB,,, | Performed by: PHYSICIAN ASSISTANT

## 2019-12-11 PROCEDURE — 82306 VITAMIN D 25 HYDROXY: CPT

## 2019-12-11 PROCEDURE — 3008F BODY MASS INDEX DOCD: CPT | Mod: CPTII,S$GLB,, | Performed by: PHYSICIAN ASSISTANT

## 2019-12-11 PROCEDURE — 99999 PR PBB SHADOW E&M-EST. PATIENT-LVL III: CPT | Mod: PBBFAC,,, | Performed by: PHYSICIAN ASSISTANT

## 2019-12-11 PROCEDURE — 99215 OFFICE O/P EST HI 40 MIN: CPT | Mod: S$GLB,,, | Performed by: PHYSICIAN ASSISTANT

## 2019-12-11 PROCEDURE — 85025 COMPLETE CBC W/AUTO DIFF WBC: CPT

## 2019-12-11 PROCEDURE — 3008F PR BODY MASS INDEX (BMI) DOCUMENTED: ICD-10-PCS | Mod: CPTII,S$GLB,, | Performed by: PHYSICIAN ASSISTANT

## 2019-12-11 PROCEDURE — 87340 HEPATITIS B SURFACE AG IA: CPT

## 2019-12-11 PROCEDURE — 99999 PR PBB SHADOW E&M-EST. PATIENT-LVL III: ICD-10-PCS | Mod: PBBFAC,,, | Performed by: PHYSICIAN ASSISTANT

## 2019-12-11 NOTE — Clinical Note
She's due for Ocrevus in January--her ALC is a little low--please reach out to recheck CBC prior to infusion(I reordered)

## 2019-12-16 ENCOUNTER — HOSPITAL ENCOUNTER (OUTPATIENT)
Dept: RADIOLOGY | Facility: HOSPITAL | Age: 54
Discharge: HOME | End: 2019-12-16
Attending: NURSE PRACTITIONER
Payer: COMMERCIAL

## 2019-12-16 DIAGNOSIS — Z12.31 ENCOUNTER FOR SCREENING MAMMOGRAM FOR BREAST CANCER: ICD-10-CM

## 2019-12-16 LAB — CD20 CELLS NFR SPEC: NORMAL %

## 2019-12-16 PROCEDURE — 77067 MAMMO DIGITAL SCREENING BILAT WITH TOMOSYNTHESIS_CAD: ICD-10-PCS | Mod: 26,,, | Performed by: RADIOLOGY

## 2019-12-16 PROCEDURE — 77067 SCR MAMMO BI INCL CAD: CPT | Mod: 26,,, | Performed by: RADIOLOGY

## 2019-12-16 PROCEDURE — 77063 MAMMO DIGITAL SCREENING BILAT WITH TOMOSYNTHESIS_CAD: ICD-10-PCS | Mod: 26,,, | Performed by: RADIOLOGY

## 2019-12-16 PROCEDURE — 77063 BREAST TOMOSYNTHESIS BI: CPT | Mod: 26,,, | Performed by: RADIOLOGY

## 2019-12-16 PROCEDURE — 77067 SCR MAMMO BI INCL CAD: CPT | Mod: TC,PO

## 2019-12-23 ENCOUNTER — PATIENT MESSAGE (OUTPATIENT)
Dept: OBSTETRICS AND GYNECOLOGY | Facility: CLINIC | Age: 54
End: 2019-12-23

## 2019-12-23 ENCOUNTER — TELEPHONE (OUTPATIENT)
Dept: OBSTETRICS AND GYNECOLOGY | Facility: CLINIC | Age: 54
End: 2019-12-23

## 2019-12-23 ENCOUNTER — HOSPITAL ENCOUNTER (OUTPATIENT)
Dept: RADIOLOGY | Facility: HOSPITAL | Age: 54
Discharge: HOME | End: 2019-12-23
Attending: NURSE PRACTITIONER
Payer: COMMERCIAL

## 2019-12-23 ENCOUNTER — TELEPHONE (OUTPATIENT)
Dept: RADIOLOGY | Facility: HOSPITAL | Age: 54
End: 2019-12-23

## 2019-12-23 DIAGNOSIS — R92.8 ABNORMAL MAMMOGRAM: ICD-10-CM

## 2019-12-23 DIAGNOSIS — R92.8 ABNORMAL MAMMOGRAM: Primary | ICD-10-CM

## 2019-12-23 PROCEDURE — 77065 DX MAMMO INCL CAD UNI: CPT | Mod: 26,LT,, | Performed by: RADIOLOGY

## 2019-12-23 PROCEDURE — 77061 BREAST TOMOSYNTHESIS UNI: CPT | Mod: 26,LT,, | Performed by: RADIOLOGY

## 2019-12-23 PROCEDURE — 77065 DX MAMMO INCL CAD UNI: CPT | Mod: TC,PO,LT

## 2019-12-23 PROCEDURE — 77061 BREAST TOMOSYNTHESIS UNI: CPT | Mod: TC,PO,LT

## 2019-12-23 PROCEDURE — 77061 MAMMO DIGITAL DIAGNOSTIC LEFT WITH TOMOSYNTHESIS_CAD: ICD-10-PCS | Mod: 26,LT,, | Performed by: RADIOLOGY

## 2019-12-23 PROCEDURE — 77065 MAMMO DIGITAL DIAGNOSTIC LEFT WITH TOMOSYNTHESIS_CAD: ICD-10-PCS | Mod: 26,LT,, | Performed by: RADIOLOGY

## 2019-12-23 NOTE — TELEPHONE ENCOUNTER
Contacted patient and informed of mammogram results. Patient informed me she did a repeat mammogram on 12/23/19 per Willian. Patient will wait for resulted repeat  Mammogram before moving forward.

## 2019-12-23 NOTE — TELEPHONE ENCOUNTER
Spoke with patient and explained mammogram findings.Patient expressed understanding of results. Patient scheduled abnormal mammogram follow up appointment at [facility] [place] on 12/23/2019.

## 2019-12-24 ENCOUNTER — TELEPHONE (OUTPATIENT)
Dept: OBSTETRICS AND GYNECOLOGY | Facility: CLINIC | Age: 54
End: 2019-12-24

## 2019-12-24 DIAGNOSIS — Z91.89 AT RISK FOR BREAST CANCER: Primary | ICD-10-CM

## 2020-01-08 ENCOUNTER — OFFICE VISIT (OUTPATIENT)
Dept: ORTHOPEDICS | Facility: CLINIC | Age: 55
End: 2020-01-08
Attending: ORTHOPAEDIC SURGERY
Payer: COMMERCIAL

## 2020-01-08 ENCOUNTER — OFFICE VISIT (OUTPATIENT)
Dept: ORTHOPEDICS | Facility: CLINIC | Age: 55
End: 2020-01-08
Payer: COMMERCIAL

## 2020-01-08 DIAGNOSIS — M77.41 METATARSALGIA OF RIGHT FOOT: ICD-10-CM

## 2020-01-08 DIAGNOSIS — M72.0 DUPUYTREN'S DISEASE OF PALM: ICD-10-CM

## 2020-01-08 DIAGNOSIS — M67.471 GANGLION CYST OF RIGHT FOOT: Primary | ICD-10-CM

## 2020-01-08 PROCEDURE — 99999 PR PBB SHADOW E&M-EST. PATIENT-LVL II: CPT | Mod: PBBFAC,,, | Performed by: ORTHOPAEDIC SURGERY

## 2020-01-08 PROCEDURE — 99213 PR OFFICE/OUTPT VISIT, EST, LEVL III, 20-29 MIN: ICD-10-PCS | Mod: S$GLB,,, | Performed by: ORTHOPAEDIC SURGERY

## 2020-01-08 PROCEDURE — 99213 OFFICE O/P EST LOW 20 MIN: CPT | Mod: S$GLB,,, | Performed by: ORTHOPAEDIC SURGERY

## 2020-01-08 PROCEDURE — 99999 PR PBB SHADOW E&M-EST. PATIENT-LVL III: ICD-10-PCS | Mod: PBBFAC,,, | Performed by: ORTHOPAEDIC SURGERY

## 2020-01-08 PROCEDURE — 99999 PR PBB SHADOW E&M-EST. PATIENT-LVL II: ICD-10-PCS | Mod: PBBFAC,,, | Performed by: ORTHOPAEDIC SURGERY

## 2020-01-08 PROCEDURE — 99999 PR PBB SHADOW E&M-EST. PATIENT-LVL III: CPT | Mod: PBBFAC,,, | Performed by: ORTHOPAEDIC SURGERY

## 2020-01-08 NOTE — PROGRESS NOTES
Subjective:      Patient ID: Hien Jeter is a 54 y.o. female.  Chief Complaint: Pain of the Right Hand      HPI  Hien Jeter is a  54 y.o. female presenting today for follow up of Dupuytren's nodule right palm.  She reports that she is doing well and is not really causing the pain has not gotten any larger no contracture noted no numbness or tingling noted.    Review of patient's allergies indicates:   Allergen Reactions    Diclofenac Nausea Only    Ibuprofen Other (See Comments)    Tramadol Other (See Comments)     Nausea          Current Outpatient Medications   Medication Sig Dispense Refill    azelastine (ASTELIN) 137 mcg (0.1 %) nasal spray 1 SPRAY (137 MCG TOTAL) BY NASAL ROUTE 2 (TWO) TIMES DAILY. 30 mL 11    baclofen (LIORESAL) 10 MG tablet TAKE 1/2 TABLET BY MOUTH 2-3 TIMES A DAY 60 tablet 5    cholecalciferol, vitamin D3, 3,000 unit Tab Take 3,000 Units by mouth once daily.       escitalopram oxalate (LEXAPRO) 10 MG tablet TAKE 1.5 TABLETS (15 MG TOTAL) BY MOUTH EVERY EVENING. 45 tablet 11    flaxseed oil 1,000 mg Cap Take 1 capsule by mouth once daily.       magnesium oxide (MAG-OX) 400 mg (241.3 mg magnesium) tablet Take 1 tablet (400 mg total) by mouth 2 (two) times daily. 60 tablet 12    MIMVEY 1-0.5 mg per tablet TAKE BY MOUTH 1 TABLET ONCE DAILY 84 tablet 0    multivitamin-Ca-iron-minerals (ONE-A-DAY WOMENS FORMULA) 27-0.4 mg Tab Take 1 tablet by mouth once daily.       nortriptyline (PAMELOR) 10 MG capsule Take 2 capsules (20 mg total) by mouth every evening. 60 capsule 11    riboflavin, vitamin B2, (VITAMIN B-2) 100 mg Tab tablet TAKE 2 TABLETS BY MOUTH ONCE DAILY. 60 tablet 11    topiramate (TOPAMAX) 25 MG tablet Take 25 mg by mouth 2 (two) times daily.  12    ZOLMitriptan (ZOMIG) 5 mg [place] SPRAY 1 SPRAY IN NOSTRIL ONCE AS NEEDED. Max 2 doses in 24 hrs 12 each 12     No current facility-administered medications for this visit.        Past Medical History:   Diagnosis Date     Anxiety     Basal cell carcinoma 2000    left eyebrow     Depression     Environmental allergies     Headache(784.0)     Multiple food allergies     Multiple sclerosis 2009     shoulder 3/2015    left       Past Surgical History:   Procedure Laterality Date    ARTHROSCOPIC DEBRIDEMENT OF ROTATOR CUFF Left 4/5/2019    Procedure: DEBRIDEMENT, ROTATOR CUFF, ARTHROSCOPIC;  Surgeon: Dc Moore Jr., MD;  Location: [facility];  Service: Orthopedics;  Laterality: Left;  need opus system (Julito notified)  video    ARTHROSCOPY OF SHOULDER WITH DECOMPRESSION OF SUBACROMIAL SPACE  4/5/2019    Procedure: ARTHROSCOPY, SHOULDER, WITH SUBACROMIAL SPACE DECOMPRESSION;  Surgeon: Dc Moore Jr., MD;  Location: [facility] OR;  Service: Orthopedics;;    BASAL CELL CARCINOMA EXCISION  1998    COLONOSCOPY N/A 5/25/2017    Procedure: COLONOSCOPY;  Surgeon: Marielle Dietz MD;  Location: [facility] ([address] [place] [address]);  Service: Endoscopy;  Laterality: N/A;  Patient requests PM.    PM prep.    FOOT SURGERY Right 01/08/2019    LASIK  2000    SHOULDER SURGERY Left 04/05/2019    WISDOM TOOTH EXTRACTION         OBJECTIVE:   PHYSICAL EXAM:       Vitals:    01/08/20 1534   PainSc: 0-No pain     Ortho/SPM Exam  Examination right hand there is a Dupuytren's nodule in the palm little bit tender range of motion fingers full sensation intact no triggering    RADIOGRAPHS:  None  Comments: I have personally reviewed the imaging and I agree with the above radiologist's report.    ASSESSMENT/PLAN:   Dupuytren's nodule right palm stable  IMPRESSION:  As above    PLAN:  I explained the nature of the problem the patient recommended that we keep an eye on this with observation over time  If it gets larger than I would recommend surgical excision    FOLLOW UP:  As needed    Disclaimer: This note has been generated using voice-recognition software. There may be typographical errors that have been missed during proof-reading.

## 2020-01-08 NOTE — PROGRESS NOTES
Hien Jeter  Returns today for follow-up.  This is a 54-year-old female who I operated on 1 year ago for excision of a 2 x 2 cm synovial cyst from under the right big toe MTP joint. She was doing well until a few weeks ago when she noticed she was having some discomfort in the ball of her right foot under the big toe.  She is concerned that she may have a recurrent or new cyst.  She does not report any injury or change in activity.  She does report any fevers or weight loss.    Examination:  On standing inspection she has plantigrade alignment of her right foot. On sitting exam there is no obvious enlargement as she had previously underneath the big toe.  There is some mild fullness just proximal to the sesamoid bones.  She has no pain on range of motion of her big toe.  She is neurovascularly intact.    Impression:  1.  Metatarsalgia right big toe                       2.  Concern for recurrent or new cyst    Recommendation:  I am going to order an MRI of the right forefoot to discern whether not she is developing a new cyst or if there is some other pathology to explain her recent pain.    Follow-up with results of MRI

## 2020-01-10 ENCOUNTER — HOSPITAL ENCOUNTER (OUTPATIENT)
Dept: RADIOLOGY | Facility: HOSPITAL | Age: 55
Discharge: HOME | End: 2020-01-10
Attending: ORTHOPAEDIC SURGERY
Payer: COMMERCIAL

## 2020-01-10 DIAGNOSIS — M67.471 GANGLION CYST OF RIGHT FOOT: ICD-10-CM

## 2020-01-10 DIAGNOSIS — M77.41 METATARSALGIA OF RIGHT FOOT: ICD-10-CM

## 2020-01-10 PROCEDURE — 73720 MRI FOOT (FOREFOOT) RIGHT W W/O CONTRAST: ICD-10-PCS | Mod: 26,RT,, | Performed by: RADIOLOGY

## 2020-01-10 PROCEDURE — A9585 GADOBUTROL INJECTION: HCPCS | Performed by: ORTHOPAEDIC SURGERY

## 2020-01-10 PROCEDURE — 73720 MRI LWR EXTREMITY W/O&W/DYE: CPT | Mod: 26,RT,, | Performed by: RADIOLOGY

## 2020-01-10 PROCEDURE — 25500020 PHARM REV CODE 255: Performed by: ORTHOPAEDIC SURGERY

## 2020-01-10 PROCEDURE — 73720 MRI LWR EXTREMITY W/O&W/DYE: CPT | Mod: TC,RT

## 2020-01-10 RX ORDER — GADOBUTROL 604.72 MG/ML
7 INJECTION INTRAVENOUS
Status: COMPLETED | OUTPATIENT
Start: 2020-01-10 | End: 2020-01-10

## 2020-01-10 RX ADMIN — GADOBUTROL 7 ML: 604.72 INJECTION INTRAVENOUS at 05:01

## 2020-01-13 ENCOUNTER — TELEPHONE (OUTPATIENT)
Dept: SURGERY | Facility: CLINIC | Age: 55
End: 2020-01-13

## 2020-01-13 ENCOUNTER — PATIENT MESSAGE (OUTPATIENT)
Dept: SURGERY | Facility: CLINIC | Age: 55
End: 2020-01-13

## 2020-01-13 ENCOUNTER — OFFICE VISIT (OUTPATIENT)
Dept: ORTHOPEDICS | Facility: CLINIC | Age: 55
End: 2020-01-13
Payer: COMMERCIAL

## 2020-01-13 DIAGNOSIS — M77.41 METATARSALGIA OF RIGHT FOOT: Primary | ICD-10-CM

## 2020-01-13 PROCEDURE — 99999 PR PBB SHADOW E&M-EST. PATIENT-LVL I: ICD-10-PCS | Mod: PBBFAC,,, | Performed by: ORTHOPAEDIC SURGERY

## 2020-01-13 PROCEDURE — 99212 PR OFFICE/OUTPT VISIT, EST, LEVL II, 10-19 MIN: ICD-10-PCS | Mod: S$GLB,,, | Performed by: ORTHOPAEDIC SURGERY

## 2020-01-13 PROCEDURE — 99999 PR PBB SHADOW E&M-EST. PATIENT-LVL I: CPT | Mod: PBBFAC,,, | Performed by: ORTHOPAEDIC SURGERY

## 2020-01-13 PROCEDURE — 99212 OFFICE O/P EST SF 10 MIN: CPT | Mod: S$GLB,,, | Performed by: ORTHOPAEDIC SURGERY

## 2020-01-13 NOTE — PROGRESS NOTES
Hien Jeter  Returns today for follow-up.  This is a 54-year-old female who had a previous synovial cyst removed from under the right big toe MTP joint who presented with a recurrence of discomfort in the ball of her right foot close to where her some previous cyst was removed. She had concern that she may have developed a new cyst.  I ordered an MRI.    MRI results:  The MRI of the right forefoot reveals no evidence of any recurrence of a cyst on her 1st MTP joint. There are no other obvious structural abnormalities noted.    Impression:  Metatarsalgia right foot without evidence of recurrent cyst    Follow-up as needed

## 2020-01-13 NOTE — TELEPHONE ENCOUNTER
Returned call to Ms. Jeter regarding a referral placed in the system for and apt in the [facility] due to Family Hx Breast Cancer. Please call Mabel kaur @ [phone] as I would be happy to schedule this apt.

## 2020-01-14 ENCOUNTER — PATIENT MESSAGE (OUTPATIENT)
Dept: NEUROLOGY | Facility: CLINIC | Age: 55
End: 2020-01-14

## 2020-01-14 ENCOUNTER — TELEPHONE (OUTPATIENT)
Dept: SURGERY | Facility: CLINIC | Age: 55
End: 2020-01-14

## 2020-01-14 NOTE — TELEPHONE ENCOUNTER
Returned call to  regarding a referral placed in the system by Dr.George Solorio GYN for High Risk Family Hx Breast Cancer. Patient was scheduled to see Haleigh RAY on 02/28/20 @ 8:30 am apt slip has been mailed out.

## 2020-01-15 ENCOUNTER — LAB VISIT (OUTPATIENT)
Dept: LAB | Facility: HOSPITAL | Age: 55
End: 2020-01-15
Attending: PHYSICIAN ASSISTANT
Payer: COMMERCIAL

## 2020-01-15 DIAGNOSIS — Z79.899 ENCOUNTER FOR LONG-TERM (CURRENT) USE OF HIGH-RISK MEDICATION: ICD-10-CM

## 2020-01-15 DIAGNOSIS — G35 MULTIPLE SCLEROSIS: ICD-10-CM

## 2020-01-15 LAB
BASOPHILS # BLD AUTO: 0.08 K/UL (ref 0–0.2)
BASOPHILS NFR BLD: 1.1 % (ref 0–1.9)
DIFFERENTIAL METHOD: ABNORMAL
EOSINOPHIL # BLD AUTO: 0.3 K/UL (ref 0–0.5)
EOSINOPHIL NFR BLD: 4.4 % (ref 0–8)
ERYTHROCYTE [DISTWIDTH] IN BLOOD BY AUTOMATED COUNT: 12.3 % (ref 11.5–14.5)
HCT VFR BLD AUTO: 45.5 % (ref 37–48.5)
HGB BLD-MCNC: 14.3 G/DL (ref 12–16)
IMM GRANULOCYTES # BLD AUTO: 0.03 K/UL (ref 0–0.04)
IMM GRANULOCYTES NFR BLD AUTO: 0.4 % (ref 0–0.5)
LYMPHOCYTES # BLD AUTO: 1.6 K/UL (ref 1–4.8)
LYMPHOCYTES NFR BLD: 21.3 % (ref 18–48)
MCH RBC QN AUTO: 31.9 PG (ref 27–31)
MCHC RBC AUTO-ENTMCNC: 31.4 G/DL (ref 32–36)
MCV RBC AUTO: 102 FL (ref 82–98)
MONOCYTES # BLD AUTO: 1.1 K/UL (ref 0.3–1)
MONOCYTES NFR BLD: 14.2 % (ref 4–15)
NEUTROPHILS # BLD AUTO: 4.4 K/UL (ref 1.8–7.7)
NEUTROPHILS NFR BLD: 58.6 % (ref 38–73)
NRBC BLD-RTO: 0 /100 WBC
PLATELET # BLD AUTO: 351 K/UL (ref 150–350)
PMV BLD AUTO: 10.2 FL (ref 9.2–12.9)
RBC # BLD AUTO: 4.48 M/UL (ref 4–5.4)
WBC # BLD AUTO: 7.52 K/UL (ref 3.9–12.7)

## 2020-01-15 PROCEDURE — 36415 COLL VENOUS BLD VENIPUNCTURE: CPT | Mod: PO

## 2020-01-15 PROCEDURE — 85025 COMPLETE CBC W/AUTO DIFF WBC: CPT

## 2020-01-17 RX ORDER — SODIUM CHLORIDE 0.9 % (FLUSH) 0.9 %
10 SYRINGE (ML) INJECTION
Status: CANCELLED | OUTPATIENT
Start: 2020-01-17

## 2020-01-17 RX ORDER — HEPARIN 100 UNIT/ML
100 SYRINGE INTRAVENOUS
Status: CANCELLED | OUTPATIENT
Start: 2020-01-17

## 2020-01-17 RX ORDER — ACETAMINOPHEN 325 MG/1
1000 TABLET ORAL
Status: CANCELLED | OUTPATIENT
Start: 2020-01-17

## 2020-01-23 ENCOUNTER — INFUSION (OUTPATIENT)
Dept: INFUSION THERAPY | Facility: HOSPITAL | Age: 55
End: 2020-01-23
Attending: PHYSICIAN ASSISTANT
Payer: COMMERCIAL

## 2020-01-23 VITALS
HEIGHT: 62 IN | DIASTOLIC BLOOD PRESSURE: 62 MMHG | SYSTOLIC BLOOD PRESSURE: 94 MMHG | HEART RATE: 80 BPM | TEMPERATURE: 98 F | BODY MASS INDEX: 25.6 KG/M2 | WEIGHT: 139.13 LBS | RESPIRATION RATE: 18 BRPM

## 2020-01-23 DIAGNOSIS — G35 MS (MULTIPLE SCLEROSIS): Primary | ICD-10-CM

## 2020-01-23 PROCEDURE — 96366 THER/PROPH/DIAG IV INF ADDON: CPT

## 2020-01-23 PROCEDURE — 25000003 PHARM REV CODE 250: Performed by: PSYCHIATRY & NEUROLOGY

## 2020-01-23 PROCEDURE — 96367 TX/PROPH/DG ADDL SEQ IV INF: CPT

## 2020-01-23 PROCEDURE — 63600175 PHARM REV CODE 636 W HCPCS: Performed by: PSYCHIATRY & NEUROLOGY

## 2020-01-23 PROCEDURE — S0028 INJECTION, FAMOTIDINE, 20 MG: HCPCS | Performed by: PSYCHIATRY & NEUROLOGY

## 2020-01-23 PROCEDURE — 96375 TX/PRO/DX INJ NEW DRUG ADDON: CPT

## 2020-01-23 PROCEDURE — 96365 THER/PROPH/DIAG IV INF INIT: CPT

## 2020-01-23 RX ORDER — ACETAMINOPHEN 500 MG
1000 TABLET ORAL
Status: CANCELLED | OUTPATIENT
Start: 2020-07-02

## 2020-01-23 RX ORDER — SODIUM CHLORIDE 0.9 % (FLUSH) 0.9 %
10 SYRINGE (ML) INJECTION
Status: CANCELLED | OUTPATIENT
Start: 2020-07-02

## 2020-01-23 RX ORDER — HEPARIN 100 UNIT/ML
100 SYRINGE INTRAVENOUS
Status: DISCONTINUED | OUTPATIENT
Start: 2020-01-23 | End: 2020-01-23 | Stop reason: HOSPADM

## 2020-01-23 RX ORDER — ACETAMINOPHEN 500 MG
1000 TABLET ORAL
Status: COMPLETED | OUTPATIENT
Start: 2020-01-23 | End: 2020-01-23

## 2020-01-23 RX ORDER — HEPARIN 100 UNIT/ML
100 SYRINGE INTRAVENOUS
Status: CANCELLED | OUTPATIENT
Start: 2020-07-02

## 2020-01-23 RX ORDER — NORTRIPTYLINE HYDROCHLORIDE 10 MG/1
20 CAPSULE ORAL NIGHTLY
Qty: 180 CAPSULE | Refills: 3 | Status: SHIPPED | OUTPATIENT
Start: 2020-01-23 | End: 2021-01-17

## 2020-01-23 RX ORDER — SODIUM CHLORIDE 0.9 % (FLUSH) 0.9 %
10 SYRINGE (ML) INJECTION
Status: DISCONTINUED | OUTPATIENT
Start: 2020-01-23 | End: 2020-01-23 | Stop reason: HOSPADM

## 2020-01-23 RX ADMIN — OCRELIZUMAB 600 MG: 300 INJECTION INTRAVENOUS at 10:01

## 2020-01-23 RX ADMIN — ACETAMINOPHEN 1000 MG: 500 TABLET, FILM COATED ORAL at 09:01

## 2020-01-23 RX ADMIN — SODIUM CHLORIDE: 0.9 INJECTION, SOLUTION INTRAVENOUS at 09:01

## 2020-01-23 RX ADMIN — DIPHENHYDRAMINE HYDROCHLORIDE 50 MG: 50 INJECTION INTRAMUSCULAR; INTRAVENOUS at 09:01

## 2020-01-23 RX ADMIN — FAMOTIDINE 20 MG: 10 INJECTION, SOLUTION INTRAVENOUS at 09:01

## 2020-01-23 RX ADMIN — DEXTROSE: 50 INJECTION, SOLUTION INTRAVENOUS at 09:01

## 2020-01-23 NOTE — NURSING
0850  Pt here for Ocrevus infusion, no new complaints or concerns at present; pt reports tolerating prior infusions w/out difficulty; discussed treatment plan for today, all questions answered and pt agrees to proceed

## 2020-01-23 NOTE — PLAN OF CARE
1439   Infusion completed, pt tolerated well; pt instructed to remain well hydrated; discussed when to contact MD, when to report to ER; pt declined AVS, next appt not yet scheduled, pt states she will contact MD Salas to scheduled next labs/treatment; pt verbalized understanding of all discussed

## 2020-02-19 ENCOUNTER — TELEPHONE (OUTPATIENT)
Dept: SURGERY | Facility: CLINIC | Age: 55
End: 2020-02-19

## 2020-02-19 NOTE — TELEPHONE ENCOUNTER
Contacted the patient regards to appt time change per CORY Rueda. Patient appt time was changed on 2/28/20 to 3:30pm. Patient voiced understanding of her new appt time.

## 2020-02-28 ENCOUNTER — OFFICE VISIT (OUTPATIENT)
Dept: SURGERY | Facility: CLINIC | Age: 55
End: 2020-02-28
Payer: COMMERCIAL

## 2020-02-28 VITALS
HEIGHT: 62 IN | DIASTOLIC BLOOD PRESSURE: 72 MMHG | HEART RATE: 89 BPM | WEIGHT: 140.63 LBS | BODY MASS INDEX: 25.88 KG/M2 | SYSTOLIC BLOOD PRESSURE: 108 MMHG

## 2020-02-28 DIAGNOSIS — Z91.89 AT HIGH RISK FOR BREAST CANCER: ICD-10-CM

## 2020-02-28 PROCEDURE — 99212 OFFICE O/P EST SF 10 MIN: CPT | Mod: S$GLB,,, | Performed by: PHYSICIAN ASSISTANT

## 2020-02-28 PROCEDURE — 3008F PR BODY MASS INDEX (BMI) DOCUMENTED: ICD-10-PCS | Mod: CPTII,S$GLB,, | Performed by: PHYSICIAN ASSISTANT

## 2020-02-28 PROCEDURE — 99999 PR PBB SHADOW E&M-EST. PATIENT-LVL III: ICD-10-PCS | Mod: PBBFAC,,, | Performed by: PHYSICIAN ASSISTANT

## 2020-02-28 PROCEDURE — 99999 PR PBB SHADOW E&M-EST. PATIENT-LVL III: CPT | Mod: PBBFAC,,, | Performed by: PHYSICIAN ASSISTANT

## 2020-02-28 PROCEDURE — 3008F BODY MASS INDEX DOCD: CPT | Mod: CPTII,S$GLB,, | Performed by: PHYSICIAN ASSISTANT

## 2020-02-28 PROCEDURE — 99212 PR OFFICE/OUTPT VISIT, EST, LEVL II, 10-19 MIN: ICD-10-PCS | Mod: S$GLB,,, | Performed by: PHYSICIAN ASSISTANT

## 2020-02-28 NOTE — PROGRESS NOTES
BREAST HIGH RISK CLINIC  [facility]  Breast Surgery      Date of Visit:  2020    Referring provider:  Suellen Gallagher, NEAL  [address] [address]    PCP:  Rolando Sawyer MD    HIGH RISK    Hien Jeter is a 54 y.o. postmenopausal female referred for evaluation of an increased risk of breast cancer based on her Tyrer-Cuzick score.  She is here today to discuss options of high risk screening and risk reduction.    Other breast cancer risk factors include family hx on father's side, sister with breast CA, nulliparous and family hx of colon CA.     She denies any history of breast biopsies.  She denies any nipple discharge or palpating any breast masses bilaterally.      On 19 she had a bilateral screening mammogram that showed asymmetry at the inner region of the left breast. This was read as BIRADS 0 (incomplete).  With additional spot compression this area effaces into normal glandular tissue (BIRADS 2, benign).      Personal history of cancer: no  Prior chest wall radiation at ages 10-30: no  Genetic testing: none  Ashkenazi inheritance: no  OB/Gyn history:    Number of pregnancies & age at first gestation:    Age of menarche & menopause: 14; 49   Body mass index is 25.73 kg/m².   Contraceptive Use/ HRT: Currently using combined HRT (since 2016 for hot flashes).     Breastfeeding: N/A    Tyrer-Cuzick Score: 20.7% (re-calculated in clinic today).      Family History: Mother was diagnosed with colon cancer in her 50's and  of liver cancer; did not have genetic testing. Paternal aunt was diagnosed with breast cancer in her 50's and is now 93.  Sister had breast cancer at age 51 and is now 55. Maternal grandfather had lung cancer.     Past Medical History:   Diagnosis Date    Anxiety     Basal cell carcinoma     left eyebrow     Depression     Environmental allergies     Headache(784.0)     Multiple food allergies     Multiple sclerosis       shoulder 3/2015    left     Social History     Socioeconomic History    Marital status:      Spouse name: Not on file    Number of children: Not on file    Years of education: Not on file    Highest education level: Not on file   Occupational History     Employer: [employer]   Social Needs    Financial resource strain: Not on file    Food insecurity:     Worry: Not on file     Inability: Not on file    Transportation needs:     Medical: Not on file     Non-medical: Not on file   Tobacco Use    Smoking status: Never Smoker    Smokeless tobacco: Never Used   Substance and Sexual Activity    Alcohol use: Yes     Comment: socially/ not weekly    Drug use: No    Sexual activity: Yes     Partners: Male     Birth control/protection: OCP     Comment: , menarche 15   Lifestyle    Physical activity:     Days per week: Not on file     Minutes per session: Not on file    Stress: Not on file   Relationships    Social connections:     Talks on phone: Not on file     Gets together: Not on file     Attends Gnosticist service: Not on file     Active member of club or organization: Not on file     Attends meetings of clubs or organizations: Not on file     Relationship status: Not on file   Other Topics Concern    Are you pregnant or think you may be? Not Asked    Breast-feeding Not Asked   Social History Narrative    Not on file     Review of Systems: Denies any fever or chills.  Denies any chest pain or shortness of breath.       Objective:     Vitals:    02/28/20 1512   BP: 108/72   Pulse: 89     Physical Exam:  HEENT: Normocephalic, atraumatic.   General: alert and oriented; no acute distress.  Breast: No masses or tenderness bilaterally.  No nipple discharge, nipple inversion, or skin changes bilaterally.  Lymph: No adjacent axillary lymphadenopathy bilaterally. No cervical or supraclavicular lymphadenopathy.     Assessment:     This is a 54 y.o. female with an increased  risk of breast cancer based on Tyrer Cuzick score of 20.7%.      Plan:   The patient's estimated lifetime risk of Breast Cancer (to age 85) based on the Tyrer-Cuzick 7 risk assessment model is 20.7 %.  According to the American Cancer Society, patients with a lifetime breast cancer risk of 20% or higher might benefit from supplemental screening tests.    We discussed that she would benefit from annual MRI's in addition to yearly mammograms, alternating imaging every 6 months until age 75.  The pros and cons of MRI screening were presented.  I also recommended two physical exams per year, one can be with her OB/GYN.      Risk reduction options with chemoprevention such as Tamoxifen or Raloxifene were discussed.  These have been shown to lower the risk of breast cancer incidence, however there is no survival benefit in patients who don't have breast cancer.  I explained the most common side effects and risks including hot flashes, thromboembolism, stroke, endometrial cancer, weight changes, and pain. She is not interested in taking this medication.    We also discussed modifiable measures that affect breast cancer risk including diet, exercise, avoiding obesity, and limiting alcohol intake. I recommended at least 30 minutes of cardiovascular exercise 4-5 times per week.  Exercise can lower the relative risk of breast cancer by ~18-20%.  We also discussed that obesity is linked to a higher risk of breast cancer; therefore, exercise is very important.  I recommended proper nutrition with fresh fruits and vegetables and lean meats and avoidance of processed foods.  Non-modifiable risk factors were also discussed such as age at menarche, age at menopause, family history, and age at first pregnancy.    We did discuss hormone replacement therapy as well.  In patients at increased risk, I usually do not recommend HRT for long periods of time.      She will need an MRI of the breast in June, and she will see her OBGYN  for a  second breast exam.  Due for mammogram in December of 2020, and she can follow up with me in one year.    I referred her to our genetic counselor Rowdy Moon for testing.      Haleigh Norman PA-C

## 2020-02-28 NOTE — LETTER
February 28, 2020      Suellen Gallagher, NEAL  [address]  [address]  [place] [address]           [facility]  [address]  [facility] [address]  Phone: [phone]  Fax: [phone]          Patient: Hien Jeter   MR Number: [id number]   YOB: 1965   Date of Visit: 2/28/2020       Dear Suellen Gallagher:    Thank you for referring Hien Jeter to me for evaluation. Attached you will find relevant portions of my assessment and plan of care.    If you have questions, please do not hesitate to call me. I look forward to following Hien Jeter along with you.    Sincerely,    CORY Cowan    Enclosure  CC:  No Recipients    If you would like to receive this communication electronically, please contact [email or web address] or [phone] to request more information on [employer] access.    For providers and/or their staff who would like to refer a patient to [facility], please contact us through our one-stop-shop provider referral line, [facility], at [phone].    If you feel you have received this communication in error or would no longer like to receive these types of communications, please e-mail [email or web address]

## 2020-03-04 ENCOUNTER — PATIENT MESSAGE (OUTPATIENT)
Dept: OBSTETRICS AND GYNECOLOGY | Facility: CLINIC | Age: 55
End: 2020-03-04

## 2020-03-26 ENCOUNTER — PATIENT MESSAGE (OUTPATIENT)
Dept: NEUROLOGY | Facility: CLINIC | Age: 55
End: 2020-03-26

## 2020-03-27 DIAGNOSIS — G43.719 INTRACTABLE CHRONIC MIGRAINE WITHOUT AURA AND WITHOUT STATUS MIGRAINOSUS: ICD-10-CM

## 2020-03-30 RX ORDER — ZOLMITRIPTAN 5 MG/1
SPRAY NASAL
Qty: 12 EACH | Refills: 12 | Status: SHIPPED | OUTPATIENT
Start: 2020-03-30 | End: 2021-06-21 | Stop reason: ALTCHOICE

## 2020-04-24 RX ORDER — TOPIRAMATE 25 MG/1
TABLET ORAL
Qty: 60 TABLET | Refills: 12 | Status: SHIPPED | OUTPATIENT
Start: 2020-04-24 | End: 2021-04-12

## 2020-05-19 ENCOUNTER — PATIENT MESSAGE (OUTPATIENT)
Dept: NEUROLOGY | Facility: CLINIC | Age: 55
End: 2020-05-19

## 2020-05-20 ENCOUNTER — OFFICE VISIT (OUTPATIENT)
Dept: NEUROLOGY | Facility: CLINIC | Age: 55
End: 2020-05-20
Payer: COMMERCIAL

## 2020-05-20 DIAGNOSIS — G35 MULTIPLE SCLEROSIS: ICD-10-CM

## 2020-05-20 DIAGNOSIS — Z71.89 COUNSELING REGARDING GOALS OF CARE: ICD-10-CM

## 2020-05-20 DIAGNOSIS — R20.9 SENSORY DISTURBANCE: Primary | ICD-10-CM

## 2020-05-20 PROCEDURE — 99213 PR OFFICE/OUTPT VISIT, EST, LEVL III, 20-29 MIN: ICD-10-PCS | Mod: 95,,, | Performed by: PHYSICIAN ASSISTANT

## 2020-05-20 PROCEDURE — 99213 OFFICE O/P EST LOW 20 MIN: CPT | Mod: 95,,, | Performed by: PHYSICIAN ASSISTANT

## 2020-05-20 NOTE — PROGRESS NOTES
"Subjective:          Patient ID: Hien Jeter is a 54 y.o. female who presents today for a fit-in clinic visit for R LE pain     MS HPI:  · DMT: ocrelizumab-last infusion January 2020-due in July 2020  · Side effects from DMT? No  · Taking vitamin D3 as recommended? Yes   ·   · Today Area above ankle outside calf-R LE, mild and intermittent on thigh feels like it is on fire. Any type of movement made it worse. Right Leg  on "fire"--not on the surface. No redness/swelling/weakness. No hip or ankle pain. When she moves the knee it causes a radiating pain back up to thigh and into knee   · Baclofen was helpful for a short period, she tried anti-inflammatory which she said was helpful as well and perhaps lasted longer  · Started on Sunday, and is overall better today. Also appears less painful in the mornings and more painful in the afternoon/evening.  · One fall in April(Easter Weekend)    The patient location is: home  The chief complaint leading to consultation is: leg pain    Visit type: audiovisual    Face to Face time with patient: 20 min  minutes of total time spent on the encounter, which includes face to face time and non-face to face time preparing to see the patient (eg, review of tests), Obtaining and/or reviewing separately obtained history, Documenting clinical information in the electronic or other health record, Independently interpreting results (not separately reported) and communicating results to the patient/family/caregiver, or Care coordination (not separately reported).     Each patient to whom he or she provides medical services by telemedicine is:  (1) informed of the relationship between the physician and patient and the respective role of any other health care provider with respect to management of the patient; and (2) notified that he or she may decline to receive medical services by telemedicine and may withdraw from such care at any time.    ·   ·     Medications:  Current Outpatient " Medications   Medication Sig    azelastine (ASTELIN) 137 mcg (0.1 %) nasal spray 1 SPRAY (137 MCG TOTAL) BY NASAL ROUTE 2 (TWO) TIMES DAILY.    baclofen (LIORESAL) 10 MG tablet TAKE 1/2 TABLET BY MOUTH 2-3 TIMES A DAY    cholecalciferol, vitamin D3, 3,000 unit Tab Take 3,000 Units by mouth once daily.     escitalopram oxalate (LEXAPRO) 10 MG tablet TAKE 1.5 TABLETS (15 MG TOTAL) BY MOUTH EVERY EVENING.    flaxseed oil 1,000 mg Cap Take 1 capsule by mouth once daily.     magnesium oxide (MAG-OX) 400 mg (241.3 mg magnesium) tablet Take 1 tablet (400 mg total) by mouth 2 (two) times daily.    MIMVEY 1-0.5 mg per tablet TAKE BY MOUTH 1 TABLET ONCE DAILY    multivitamin-Ca-iron-minerals (ONE-A-DAY WOMENS FORMULA) 27-0.4 mg Tab Take 1 tablet by mouth once daily.     nortriptyline (PAMELOR) 10 MG capsule TAKE 2 CAPSULES (20 MG TOTAL) BY MOUTH EVERY EVENING.    riboflavin, vitamin B2, (VITAMIN B-2) 100 mg Tab tablet TAKE 2 TABLETS BY MOUTH ONCE DAILY.    topiramate (TOPAMAX) 25 MG tablet TAKE 1 TABLET BY MOUTH TWICE DAILY    ZOLMitriptan (ZOMIG) 5 mg [place] SPRAY 1 SPRAY IN NOSTRIL ONCE AS NEEDED. Max 2 doses in 24 hrs     No current facility-administered medications for this visit.        SOCIAL HISTORY  Social History     Tobacco Use    Smoking status: Never Smoker    Smokeless tobacco: Never Used   Substance Use Topics    Alcohol use: Yes     Comment: socially/ not weekly    Drug use: No       Living arrangements - the patient lives with their spouse.    ROS:  As Above             Objective:        1. 25 foot timed walk:  Timed 25 Foot Walk: 10/26/2016 2/15/2017   Did patient wear an AFO? No No   Was assistive device used? No No   Time for 25 Foot Walk (seconds) 4 4.2   Time for 25 Foot Walk (seconds) 4.3 -       Neurologic Exam     Mental Status   Oriented to person, place, and time.   Follows 3 step commands.   Speech: speech is normal   Level of consciousness: alert  Normal comprehension.     Gait,  Coordination, and Reflexes     Gait  Gait: (patient ambulating around home during visit without issue)        Imaging:     Results for orders placed during the hospital encounter of 06/04/19   MRI Brain Demyelinating Without Contrast    Impression Findings are consistent with the clinically suspected diagnosis of multiple sclerosis with a mild plaque burden.  There is no significant interval change compared with the prior study.      Electronically signed by: Rolando Leija MD  Date:    06/04/2019  Time:    11:55         Labs:     Lab Results   Component Value Date    ZYDRUWTQ54KB 74 12/11/2019    VGRRQJOW63UI 45 06/11/2018    RQXPTRKI59VW 72 02/15/2017     No results found for: JCVINDEX, JCVANTIBODY  Lab Results   Component Value Date    ZW0ZWZOV 64.7 12/20/2017    ABSOLUTECD3 837 12/20/2017    TQ7HAMUN 12.5 12/20/2017    ABSOLUTECD8 161 (L) 12/20/2017    BK6UMVWT 53.0 12/20/2017    ABSOLUTECD4 685 12/20/2017    LABCD48 4.25 (H) 12/20/2017     Lab Results   Component Value Date    WBC 7.52 01/15/2020    RBC 4.48 01/15/2020    HGB 14.3 01/15/2020    HCT 45.5 01/15/2020     (H) 01/15/2020    MCH 31.9 (H) 01/15/2020    MCHC 31.4 (L) 01/15/2020    RDW 12.3 01/15/2020     (H) 01/15/2020    MPV 10.2 01/15/2020    GRAN 4.4 01/15/2020    GRAN 58.6 01/15/2020    LYMPH 1.6 01/15/2020    LYMPH 21.3 01/15/2020    MONO 1.1 (H) 01/15/2020    MONO 14.2 01/15/2020    EOS 0.3 01/15/2020    BASO 0.08 01/15/2020    EOSINOPHIL 4.4 01/15/2020    BASOPHIL 1.1 01/15/2020     Sodium   Date Value Ref Range Status   04/01/2019 139 136 - 145 mmol/L Final     Potassium   Date Value Ref Range Status   04/01/2019 4.4 3.5 - 5.1 mmol/L Final     Chloride   Date Value Ref Range Status   04/01/2019 106 95 - 110 mmol/L Final     CO2   Date Value Ref Range Status   04/01/2019 27 23 - 29 mmol/L Final     Glucose   Date Value Ref Range Status   04/01/2019 80 70 - 110 mg/dL Final     BUN, Bld   Date Value Ref Range Status   04/01/2019 12 6  "- 20 mg/dL Final     Creatinine   Date Value Ref Range Status   04/01/2019 0.8 0.5 - 1.4 mg/dL Final     Calcium   Date Value Ref Range Status   04/01/2019 9.5 8.7 - 10.5 mg/dL Final     Total Protein   Date Value Ref Range Status   11/20/2017 7.3 6.0 - 8.4 g/dL Final     Albumin   Date Value Ref Range Status   11/20/2017 3.4 (L) 3.5 - 5.2 g/dL Final     Total Bilirubin   Date Value Ref Range Status   11/20/2017 0.4 0.1 - 1.0 mg/dL Final     Comment:     For infants and newborns, interpretation of results should be based  on gestational age, weight and in agreement with clinical  observations.  Premature Infant recommended reference ranges:  Up to 24 hours.............<8.0 mg/dL  Up to 48 hours............<12.0 mg/dL  3-5 days..................<15.0 mg/dL  6-29 days.................<15.0 mg/dL       Alkaline Phosphatase   Date Value Ref Range Status   11/20/2017 79 55 - 135 U/L Final     AST   Date Value Ref Range Status   11/20/2017 19 10 - 40 U/L Final     ALT   Date Value Ref Range Status   11/20/2017 18 10 - 44 U/L Final     Anion Gap   Date Value Ref Range Status   04/01/2019 6 (L) 8 - 16 mmol/L Final     eGFR if    Date Value Ref Range Status   04/01/2019 >60 >60 mL/min/1.73 m^2 Final     eGFR if non    Date Value Ref Range Status   04/01/2019 >60 >60 mL/min/1.73 m^2 Final     Comment:     Calculation used to obtain the estimated glomerular filtration  rate (eGFR) is the CKD-EPI equation.        Lab Results   Component Value Date    HEPBSAG Negative 12/11/2019    HEPBSAB Positive (A) 12/11/2019    HEPBCAB Negative 12/11/2019           MS Impression and Plan:        Patient seen for urgent care visit via video related to R LE intermittent sensory symptoms("fire") to thigh and lower leg. As symptoms are intermittent, worse at particular times of day, worse with particular movements and improved with anti-inflammatories this does not appear MS related(ie acute MS " relapse/pseudo-relapse). She can not recall any unusual movements/falls since Sunday, and overall feels her symptoms are improving. She has no edema/erythema/rash. At this point, we will have a watch/wait approach over the next 24 hours. If her symptoms do not continue to improve we will refer to ortho for evaluation.       Our video  visit today lasted 20 minutes, and 100% of this time was spent face to face with the patient. Over 50% of this visit included discussion of the treatment plan/medication changes/symptom management/exam findings/coordination of care.     Problem List Items Addressed This Visit        Other    Counseling regarding goals of care      Other Visit Diagnoses     Sensory disturbance    -  Primary    Multiple sclerosis            Follow up in about 1 month (around 6/20/2020) for follow up with Dr. Salas.  Patient agreed to POC today.    Attending, Dr. Salsa, was available during today's encounter.     Shelbi Wisdom PA-C  [facility]

## 2020-06-08 DIAGNOSIS — G35 MULTIPLE SCLEROSIS: Primary | ICD-10-CM

## 2020-06-11 ENCOUNTER — HOSPITAL ENCOUNTER (OUTPATIENT)
Dept: RADIOLOGY | Facility: HOSPITAL | Age: 55
Discharge: HOME | End: 2020-06-11
Attending: PHYSICIAN ASSISTANT
Payer: COMMERCIAL

## 2020-06-11 ENCOUNTER — LAB VISIT (OUTPATIENT)
Dept: LAB | Facility: HOSPITAL | Age: 55
End: 2020-06-11
Attending: PHYSICIAN ASSISTANT
Payer: COMMERCIAL

## 2020-06-11 DIAGNOSIS — G35 MULTIPLE SCLEROSIS: ICD-10-CM

## 2020-06-11 DIAGNOSIS — Z91.89 AT HIGH RISK FOR BREAST CANCER: ICD-10-CM

## 2020-06-11 DIAGNOSIS — Z79.899 ENCOUNTER FOR LONG-TERM (CURRENT) USE OF HIGH-RISK MEDICATION: ICD-10-CM

## 2020-06-11 LAB
BASOPHILS # BLD AUTO: 0.07 K/UL (ref 0–0.2)
BASOPHILS NFR BLD: 1.1 % (ref 0–1.9)
DIFFERENTIAL METHOD: ABNORMAL
EOSINOPHIL # BLD AUTO: 0.2 K/UL (ref 0–0.5)
EOSINOPHIL NFR BLD: 3.2 % (ref 0–8)
ERYTHROCYTE [DISTWIDTH] IN BLOOD BY AUTOMATED COUNT: 12.2 % (ref 11.5–14.5)
HCT VFR BLD AUTO: 43.8 % (ref 37–48.5)
HGB BLD-MCNC: 13.9 G/DL (ref 12–16)
IMM GRANULOCYTES # BLD AUTO: 0.02 K/UL (ref 0–0.04)
IMM GRANULOCYTES NFR BLD AUTO: 0.3 % (ref 0–0.5)
LYMPHOCYTES # BLD AUTO: 1.4 K/UL (ref 1–4.8)
LYMPHOCYTES NFR BLD: 22.3 % (ref 18–48)
MCH RBC QN AUTO: 31.9 PG (ref 27–31)
MCHC RBC AUTO-ENTMCNC: 31.7 G/DL (ref 32–36)
MCV RBC AUTO: 101 FL (ref 82–98)
MONOCYTES # BLD AUTO: 0.8 K/UL (ref 0.3–1)
MONOCYTES NFR BLD: 12 % (ref 4–15)
NEUTROPHILS # BLD AUTO: 3.8 K/UL (ref 1.8–7.7)
NEUTROPHILS NFR BLD: 61.1 % (ref 38–73)
NRBC BLD-RTO: 0 /100 WBC
PLATELET # BLD AUTO: 321 K/UL (ref 150–350)
PMV BLD AUTO: 10.3 FL (ref 9.2–12.9)
RBC # BLD AUTO: 4.36 M/UL (ref 4–5.4)
WBC # BLD AUTO: 6.27 K/UL (ref 3.9–12.7)

## 2020-06-11 PROCEDURE — 77049 MRI BREAST C-+ W/CAD BI: CPT | Mod: TC

## 2020-06-11 PROCEDURE — 77049 MRI BREAST W/WO CONTRAST, W/CAD, BILATERAL: ICD-10-PCS | Mod: 26,,, | Performed by: RADIOLOGY

## 2020-06-11 PROCEDURE — 86356 MONONUCLEAR CELL ANTIGEN: CPT

## 2020-06-11 PROCEDURE — 77049 MRI BREAST C-+ W/CAD BI: CPT | Mod: 26,,, | Performed by: RADIOLOGY

## 2020-06-11 PROCEDURE — A9577 INJ MULTIHANCE: HCPCS | Performed by: PHYSICIAN ASSISTANT

## 2020-06-11 PROCEDURE — 25500020 PHARM REV CODE 255: Performed by: PHYSICIAN ASSISTANT

## 2020-06-11 PROCEDURE — 70551 MRI BRAIN STEM W/O DYE: CPT | Mod: TC

## 2020-06-11 PROCEDURE — 70551 MRI BRAIN DEMYELINATING WITHOUT CONTRAST: ICD-10-PCS | Mod: 26,,, | Performed by: RADIOLOGY

## 2020-06-11 PROCEDURE — 70551 MRI BRAIN STEM W/O DYE: CPT | Mod: 26,,, | Performed by: RADIOLOGY

## 2020-06-11 PROCEDURE — 86706 HEP B SURFACE ANTIBODY: CPT

## 2020-06-11 PROCEDURE — 86704 HEP B CORE ANTIBODY TOTAL: CPT

## 2020-06-11 PROCEDURE — 87340 HEPATITIS B SURFACE AG IA: CPT

## 2020-06-11 PROCEDURE — 85025 COMPLETE CBC W/AUTO DIFF WBC: CPT

## 2020-06-11 PROCEDURE — 36415 COLL VENOUS BLD VENIPUNCTURE: CPT

## 2020-06-11 RX ADMIN — GADOBENATE DIMEGLUMINE 14 ML: 529 INJECTION, SOLUTION INTRAVENOUS at 10:06

## 2020-06-12 LAB
HBV CORE AB SERPL QL IA: NEGATIVE
HBV SURFACE AB SER-ACNC: POSITIVE M[IU]/ML
HBV SURFACE AG SERPL QL IA: NEGATIVE

## 2020-06-15 LAB
APPEARANCE SPEC: NORMAL
CD20 CELLS NFR SPEC: NORMAL %
SPECIMEN SOURCE: NORMAL
VIABLE CELLS NFR SPEC: 98 %

## 2020-06-17 ENCOUNTER — TELEPHONE (OUTPATIENT)
Dept: NEUROLOGY | Facility: CLINIC | Age: 55
End: 2020-06-17

## 2020-06-17 DIAGNOSIS — Z13.9 SCREENING FOR CONDITION: Primary | ICD-10-CM

## 2020-06-17 NOTE — TELEPHONE ENCOUNTER
----- Message from Shelbi Wisdom PA-C sent at 6/16/2020  9:02 PM CDT -----  0 % lymphocytes express CD19/20  Hep B core, total-negative  Hep B surface antibody-positive  Hep B surface antigen-negative  ALC-1400, ANC-3800  Ocrevus due in July-OK to proceed

## 2020-07-12 ENCOUNTER — CLINICAL SUPPORT (OUTPATIENT)
Dept: URGENT CARE | Facility: CLINIC | Age: 55
End: 2020-07-12
Payer: COMMERCIAL

## 2020-07-12 DIAGNOSIS — Z13.9 SCREENING FOR CONDITION: ICD-10-CM

## 2020-07-12 PROCEDURE — U0003 INFECTIOUS AGENT DETECTION BY NUCLEIC ACID (DNA OR RNA); SEVERE ACUTE RESPIRATORY SYNDROME CORONAVIRUS 2 (SARS-COV-2) (CORONAVIRUS DISEASE [COVID-19]), AMPLIFIED PROBE TECHNIQUE, MAKING USE OF HIGH THROUGHPUT TECHNOLOGIES AS DESCRIBED BY CMS-2020-01-R: HCPCS

## 2020-07-13 LAB — SARS-COV-2 RNA RESP QL NAA+PROBE: NOT DETECTED

## 2020-07-15 ENCOUNTER — INFUSION (OUTPATIENT)
Dept: INFUSION THERAPY | Facility: HOSPITAL | Age: 55
End: 2020-07-15
Attending: PSYCHIATRY & NEUROLOGY
Payer: COMMERCIAL

## 2020-07-15 VITALS
RESPIRATION RATE: 18 BRPM | HEART RATE: 84 BPM | SYSTOLIC BLOOD PRESSURE: 114 MMHG | TEMPERATURE: 98 F | DIASTOLIC BLOOD PRESSURE: 69 MMHG

## 2020-07-15 DIAGNOSIS — G35 MS (MULTIPLE SCLEROSIS): Primary | ICD-10-CM

## 2020-07-15 PROCEDURE — S0028 INJECTION, FAMOTIDINE, 20 MG: HCPCS | Performed by: PSYCHIATRY & NEUROLOGY

## 2020-07-15 PROCEDURE — 96366 THER/PROPH/DIAG IV INF ADDON: CPT

## 2020-07-15 PROCEDURE — 63600175 PHARM REV CODE 636 W HCPCS: Performed by: PSYCHIATRY & NEUROLOGY

## 2020-07-15 PROCEDURE — 25000003 PHARM REV CODE 250: Performed by: PSYCHIATRY & NEUROLOGY

## 2020-07-15 PROCEDURE — 96376 TX/PRO/DX INJ SAME DRUG ADON: CPT

## 2020-07-15 PROCEDURE — 96367 TX/PROPH/DG ADDL SEQ IV INF: CPT

## 2020-07-15 PROCEDURE — 96375 TX/PRO/DX INJ NEW DRUG ADDON: CPT

## 2020-07-15 PROCEDURE — 96368 THER/DIAG CONCURRENT INF: CPT

## 2020-07-15 PROCEDURE — 96365 THER/PROPH/DIAG IV INF INIT: CPT

## 2020-07-15 RX ORDER — HEPARIN 100 UNIT/ML
100 SYRINGE INTRAVENOUS
Status: CANCELLED | OUTPATIENT
Start: 2020-12-23

## 2020-07-15 RX ORDER — SODIUM CHLORIDE 0.9 % (FLUSH) 0.9 %
10 SYRINGE (ML) INJECTION
Status: CANCELLED | OUTPATIENT
Start: 2020-12-23

## 2020-07-15 RX ORDER — ACETAMINOPHEN 500 MG
1000 TABLET ORAL
Status: COMPLETED | OUTPATIENT
Start: 2020-07-15 | End: 2020-07-15

## 2020-07-15 RX ORDER — ACETAMINOPHEN 500 MG
1000 TABLET ORAL
Status: CANCELLED | OUTPATIENT
Start: 2020-12-23

## 2020-07-15 RX ORDER — FAMOTIDINE 10 MG/ML
20 INJECTION INTRAVENOUS
Status: COMPLETED | OUTPATIENT
Start: 2020-07-15 | End: 2020-07-15

## 2020-07-15 RX ORDER — HEPARIN 100 UNIT/ML
100 SYRINGE INTRAVENOUS
Status: DISCONTINUED | OUTPATIENT
Start: 2020-07-15 | End: 2020-07-15 | Stop reason: HOSPADM

## 2020-07-15 RX ORDER — SODIUM CHLORIDE 0.9 % (FLUSH) 0.9 %
10 SYRINGE (ML) INJECTION
Status: DISCONTINUED | OUTPATIENT
Start: 2020-07-15 | End: 2020-07-15 | Stop reason: HOSPADM

## 2020-07-15 RX ADMIN — FAMOTIDINE 20 MG: 10 INJECTION INTRAVENOUS at 08:07

## 2020-07-15 RX ADMIN — OCRELIZUMAB 600 MG: 300 INJECTION INTRAVENOUS at 09:07

## 2020-07-15 RX ADMIN — DIPHENHYDRAMINE HYDROCHLORIDE 50 MG: 50 INJECTION, SOLUTION INTRAMUSCULAR; INTRAVENOUS at 08:07

## 2020-07-15 RX ADMIN — DEXTROSE MONOHYDRATE: 50 INJECTION, SOLUTION INTRAVENOUS at 08:07

## 2020-07-15 RX ADMIN — SODIUM CHLORIDE: 9 INJECTION, SOLUTION INTRAVENOUS at 08:07

## 2020-07-15 RX ADMIN — ACETAMINOPHEN 1000 MG: 500 TABLET, FILM COATED ORAL at 08:07

## 2020-07-15 NOTE — PLAN OF CARE
Tolerated Orcevus well.  Observed pt 1 hour post infusion, no reactions noted.  No questions or concerns at this time.  Ambulated off unit in NAD..

## 2020-08-03 ENCOUNTER — TELEPHONE (OUTPATIENT)
Dept: PSYCHIATRY | Facility: CLINIC | Age: 55
End: 2020-08-03

## 2020-08-04 ENCOUNTER — TELEPHONE (OUTPATIENT)
Dept: NEUROLOGY | Facility: CLINIC | Age: 55
End: 2020-08-04

## 2020-08-04 NOTE — TELEPHONE ENCOUNTER
----- Message from Hector Moore sent at 8/4/2020 11:36 AM CDT -----  Contact: Patient @ [phone]  Patient calling to get an update on the disability paperwork, pls advise

## 2020-08-04 NOTE — TELEPHONE ENCOUNTER
Faxed pt's LTD forms to [employer] at [phone].  Copy sent to pt in Via[employer] and scanned to chart.

## 2020-08-19 ENCOUNTER — OFFICE VISIT (OUTPATIENT)
Dept: INTERNAL MEDICINE | Facility: CLINIC | Age: 55
End: 2020-08-19
Payer: COMMERCIAL

## 2020-08-19 ENCOUNTER — LAB VISIT (OUTPATIENT)
Dept: LAB | Facility: HOSPITAL | Age: 55
End: 2020-08-19
Attending: INTERNAL MEDICINE
Payer: COMMERCIAL

## 2020-08-19 VITALS
RESPIRATION RATE: 16 BRPM | HEART RATE: 98 BPM | BODY MASS INDEX: 24.91 KG/M2 | TEMPERATURE: 98 F | HEIGHT: 62 IN | SYSTOLIC BLOOD PRESSURE: 92 MMHG | DIASTOLIC BLOOD PRESSURE: 66 MMHG | WEIGHT: 135.38 LBS | OXYGEN SATURATION: 96 %

## 2020-08-19 DIAGNOSIS — Z00.00 ANNUAL PHYSICAL EXAM: Primary | ICD-10-CM

## 2020-08-19 DIAGNOSIS — G35 MS (MULTIPLE SCLEROSIS): ICD-10-CM

## 2020-08-19 DIAGNOSIS — G47.00 INSOMNIA, UNSPECIFIED TYPE: ICD-10-CM

## 2020-08-19 DIAGNOSIS — F41.9 ANXIETY: ICD-10-CM

## 2020-08-19 DIAGNOSIS — Z00.00 ANNUAL PHYSICAL EXAM: ICD-10-CM

## 2020-08-19 DIAGNOSIS — M85.80 OSTEOPENIA, UNSPECIFIED LOCATION: ICD-10-CM

## 2020-08-19 PROBLEM — D84.9 IMMUNOSUPPRESSION: Status: RESOLVED | Noted: 2019-02-05 | Resolved: 2020-08-19

## 2020-08-19 LAB
ALBUMIN SERPL BCP-MCNC: 3.8 G/DL (ref 3.5–5.2)
ALP SERPL-CCNC: 89 U/L (ref 55–135)
ALT SERPL W/O P-5'-P-CCNC: 24 U/L (ref 10–44)
ANION GAP SERPL CALC-SCNC: 10 MMOL/L (ref 8–16)
AST SERPL-CCNC: 27 U/L (ref 10–40)
BASOPHILS # BLD AUTO: 0.06 K/UL (ref 0–0.2)
BASOPHILS NFR BLD: 0.9 % (ref 0–1.9)
BILIRUB SERPL-MCNC: 0.3 MG/DL (ref 0.1–1)
BUN SERPL-MCNC: 13 MG/DL (ref 6–20)
CALCIUM SERPL-MCNC: 9.8 MG/DL (ref 8.7–10.5)
CHLORIDE SERPL-SCNC: 108 MMOL/L (ref 95–110)
CHOLEST SERPL-MCNC: 239 MG/DL (ref 120–199)
CHOLEST/HDLC SERPL: 3.5 {RATIO} (ref 2–5)
CO2 SERPL-SCNC: 23 MMOL/L (ref 23–29)
CREAT SERPL-MCNC: 1 MG/DL (ref 0.5–1.4)
DIFFERENTIAL METHOD: ABNORMAL
EOSINOPHIL # BLD AUTO: 0.4 K/UL (ref 0–0.5)
EOSINOPHIL NFR BLD: 5.4 % (ref 0–8)
ERYTHROCYTE [DISTWIDTH] IN BLOOD BY AUTOMATED COUNT: 12.3 % (ref 11.5–14.5)
EST. GFR  (AFRICAN AMERICAN): >60 ML/MIN/1.73 M^2
EST. GFR  (NON AFRICAN AMERICAN): >60 ML/MIN/1.73 M^2
GLUCOSE SERPL-MCNC: 83 MG/DL (ref 70–110)
HCT VFR BLD AUTO: 44.4 % (ref 37–48.5)
HDLC SERPL-MCNC: 69 MG/DL (ref 40–75)
HDLC SERPL: 28.9 % (ref 20–50)
HGB BLD-MCNC: 14.4 G/DL (ref 12–16)
IMM GRANULOCYTES # BLD AUTO: 0.02 K/UL (ref 0–0.04)
IMM GRANULOCYTES NFR BLD AUTO: 0.3 % (ref 0–0.5)
LDLC SERPL CALC-MCNC: 142.6 MG/DL (ref 63–159)
LYMPHOCYTES # BLD AUTO: 1.4 K/UL (ref 1–4.8)
LYMPHOCYTES NFR BLD: 22.1 % (ref 18–48)
MCH RBC QN AUTO: 32.4 PG (ref 27–31)
MCHC RBC AUTO-ENTMCNC: 32.4 G/DL (ref 32–36)
MCV RBC AUTO: 100 FL (ref 82–98)
MONOCYTES # BLD AUTO: 0.8 K/UL (ref 0.3–1)
MONOCYTES NFR BLD: 12.1 % (ref 4–15)
NEUTROPHILS # BLD AUTO: 3.8 K/UL (ref 1.8–7.7)
NEUTROPHILS NFR BLD: 59.2 % (ref 38–73)
NONHDLC SERPL-MCNC: 170 MG/DL
NRBC BLD-RTO: 0 /100 WBC
PLATELET # BLD AUTO: 320 K/UL (ref 150–350)
PMV BLD AUTO: 11.5 FL (ref 9.2–12.9)
POTASSIUM SERPL-SCNC: 5.1 MMOL/L (ref 3.5–5.1)
PROT SERPL-MCNC: 7.3 G/DL (ref 6–8.4)
RBC # BLD AUTO: 4.45 M/UL (ref 4–5.4)
SODIUM SERPL-SCNC: 141 MMOL/L (ref 136–145)
TRIGL SERPL-MCNC: 137 MG/DL (ref 30–150)
TSH SERPL DL<=0.005 MIU/L-ACNC: 2.82 UIU/ML (ref 0.4–4)
WBC # BLD AUTO: 6.43 K/UL (ref 3.9–12.7)

## 2020-08-19 PROCEDURE — 36415 COLL VENOUS BLD VENIPUNCTURE: CPT | Mod: PO

## 2020-08-19 PROCEDURE — 99396 PREV VISIT EST AGE 40-64: CPT | Mod: 25,S$GLB,, | Performed by: INTERNAL MEDICINE

## 2020-08-19 PROCEDURE — 90471 IMMUNIZATION ADMIN: CPT | Mod: S$GLB,,, | Performed by: INTERNAL MEDICINE

## 2020-08-19 PROCEDURE — 80053 COMPREHEN METABOLIC PANEL: CPT

## 2020-08-19 PROCEDURE — 3008F PR BODY MASS INDEX (BMI) DOCUMENTED: ICD-10-PCS | Mod: CPTII,S$GLB,, | Performed by: INTERNAL MEDICINE

## 2020-08-19 PROCEDURE — 90471 PNEUMOCOCCAL POLYSACCHARIDE VACCINE 23-VALENT =>2YO SQ IM: ICD-10-PCS | Mod: S$GLB,,, | Performed by: INTERNAL MEDICINE

## 2020-08-19 PROCEDURE — 90732 PNEUMOCOCCAL POLYSACCHARIDE VACCINE 23-VALENT =>2YO SQ IM: ICD-10-PCS | Mod: S$GLB,,, | Performed by: INTERNAL MEDICINE

## 2020-08-19 PROCEDURE — 99999 PR PBB SHADOW E&M-EST. PATIENT-LVL IV: CPT | Mod: PBBFAC,,, | Performed by: INTERNAL MEDICINE

## 2020-08-19 PROCEDURE — 80061 LIPID PANEL: CPT

## 2020-08-19 PROCEDURE — 90732 PPSV23 VACC 2 YRS+ SUBQ/IM: CPT | Mod: S$GLB,,, | Performed by: INTERNAL MEDICINE

## 2020-08-19 PROCEDURE — 99999 PR PBB SHADOW E&M-EST. PATIENT-LVL IV: ICD-10-PCS | Mod: PBBFAC,,, | Performed by: INTERNAL MEDICINE

## 2020-08-19 PROCEDURE — 84443 ASSAY THYROID STIM HORMONE: CPT

## 2020-08-19 PROCEDURE — 85025 COMPLETE CBC W/AUTO DIFF WBC: CPT

## 2020-08-19 PROCEDURE — 99396 PR PREVENTIVE VISIT,EST,40-64: ICD-10-PCS | Mod: 25,S$GLB,, | Performed by: INTERNAL MEDICINE

## 2020-08-19 PROCEDURE — 3008F BODY MASS INDEX DOCD: CPT | Mod: CPTII,S$GLB,, | Performed by: INTERNAL MEDICINE

## 2020-08-19 RX ORDER — CLOTRIMAZOLE AND BETAMETHASONE DIPROPIONATE 10; .64 MG/G; MG/G
CREAM TOPICAL 2 TIMES DAILY
Qty: 45 G | Refills: 1 | Status: SHIPPED | OUTPATIENT
Start: 2020-08-19 | End: 2022-04-19 | Stop reason: ALTCHOICE

## 2020-08-19 NOTE — PROGRESS NOTES
Subjective:       Patient ID: Hien Jeter is a 55 y.o. female.    Chief Complaint: Annual Exam    HPI   55 y.o. Female here for annual exam.     Vaccines: Influenza (2019); Tetanus (2019); Prevnar (2019); Shingrix (will get)  Eye exam: 2020  Mammogram: 12/19  Gyn exam: 10/19  Colonoscopy: 3/17    Exercise: swims daily  Diet: regular    Past Medical History:  No date: Anxiety  2000: Basal cell carcinoma      Comment:  left eyebrow   No date: Depression  No date: Environmental allergies  No date: Headache(784.0)  No date: Multiple food allergies  2009: Multiple sclerosis  3/2015:  shoulder      Comment:  left  Past Surgical History:  4/5/2019: ARTHROSCOPIC DEBRIDEMENT OF ROTATOR CUFF; Left      Comment:  Procedure: DEBRIDEMENT, ROTATOR CUFF, ARTHROSCOPIC;                 Surgeon: Dc Moore Jr., MD;  Location: [facility];                 Service: Orthopedics;  Laterality: Left;  need opus                system (Julito notified)video  4/5/2019: ARTHROSCOPY OF SHOULDER WITH DECOMPRESSION OF SUBACROMIAL   SPACE      Comment:  Procedure: ARTHROSCOPY, SHOULDER, WITH SUBACROMIAL SPACE               DECOMPRESSION;  Surgeon: Dc Moore Jr., MD;                 Location: [facility];  Service: Orthopedics;;  1998: BASAL CELL CARCINOMA EXCISION  5/25/2017: COLONOSCOPY; N/A      Comment:  Procedure: COLONOSCOPY;  Surgeon: Marielle Dietz MD;                 Location: [address]);  Service: Endoscopy;                 Laterality: N/A;  Patient requests PM.PM prep.  01/08/2019: FOOT SURGERY; Right  2000: LASIK  04/05/2019: SHOULDER SURGERY; Left  No date: WISDOM TOOTH EXTRACTION  Social History    Socioeconomic History      Marital status:       Spouse name: Not on file      Number of children: Not on file      Years of education: Not on file      Highest education level: Not on file    Occupational History        Employer: [employer]    Social Needs      Financial resource strain: Not  on file      Food insecurity        Worry: Not on file        Inability: Not on file      Transportation needs        Medical: Not on file        Non-medical: Not on file    Tobacco Use      Smoking status: Never Smoker      Smokeless tobacco: Never Used    Substance and Sexual Activity      Alcohol use: Yes        Comment: socially/ not weekly      Drug use: No      Sexual activity: Yes        Partners: Male        Birth control/protection: OCP        Comment: , menarche 15    Lifestyle      Physical activity        Days per week: Not on file        Minutes per session: Not on file      Stress: Not on file    Relationships      Social connections        Talks on phone: Not on file        Gets together: Not on file        Attends Restorationist service: Not on file        Active member of club or organization: Not on file        Attends meetings of clubs or organizations: Not on file        Relationship status: Not on file    Other Topics      Concerns:        Are you pregnant or think you may be?: Not Asked        Breast-feeding: Not Asked    Social History Narrative      Not on file    Review of patient's allergies indicates:   -- Diclofenac -- Nausea Only   -- Ibuprofen -- Other (See Comments)   -- Tramadol -- Other (See Comments)    --  Nausea  Hien HARRISPili Jeter had no medications administered during this visit.    Review of Systems   Constitutional: Negative for activity change, appetite change, chills, diaphoresis, fatigue, fever and unexpected weight change.   HENT: Negative for nasal congestion, mouth sores, postnasal drip, rhinorrhea, sinus pressure/congestion, sneezing, sore throat, trouble swallowing and voice change.    Eyes: Negative for pain, discharge and visual disturbance.   Respiratory: Negative for cough, shortness of breath and wheezing.    Cardiovascular: Negative for chest pain, palpitations and leg swelling.   Gastrointestinal: Negative for abdominal pain, blood in stool, constipation,  diarrhea, nausea and vomiting.   Endocrine: Negative for cold intolerance and heat intolerance.   Genitourinary: Negative for difficulty urinating, dysuria, frequency, hematuria and urgency.   Musculoskeletal: Negative for arthralgias and myalgias.   Integumentary:  Negative for rash and wound.   Allergic/Immunologic: Negative for environmental allergies and food allergies.   Neurological: Negative for dizziness, tremors, seizures, syncope, weakness, light-headedness and headaches.   Hematological: Negative for adenopathy. Does not bruise/bleed easily.   Psychiatric/Behavioral: Positive for sleep disturbance. Negative for confusion, self-injury and suicidal ideas. The patient is not nervous/anxious.          Objective:      Physical Exam  Vitals signs and nursing note reviewed.   Constitutional:       General: She is not in acute distress.     Appearance: She is well-developed.   HENT:      Head: Normocephalic and atraumatic.      Mouth/Throat:      Pharynx: No oropharyngeal exudate.   Eyes:      General: No scleral icterus.        Right eye: No discharge.         Left eye: No discharge.      Conjunctiva/sclera: Conjunctivae normal.      Pupils: Pupils are equal, round, and reactive to light.   Neck:      Musculoskeletal: Neck supple.      Thyroid: No thyromegaly.      Vascular: No JVD.   Cardiovascular:      Rate and Rhythm: Normal rate and regular rhythm.      Heart sounds: Normal heart sounds. No murmur.   Pulmonary:      Effort: Pulmonary effort is normal.      Breath sounds: Normal breath sounds. No wheezing or rales.   Chest:      Chest wall: No tenderness.   Abdominal:      General: There is no distension.      Palpations: Abdomen is soft.      Tenderness: There is no abdominal tenderness. There is no guarding.   Lymphadenopathy:      Cervical: No cervical adenopathy.   Skin:     General: Skin is warm and dry.   Neurological:      Mental Status: She is alert and oriented to person, place, and time.    Psychiatric:         Judgment: Judgment normal.         Assessment:       1. Annual physical exam    2. MS (multiple sclerosis)    3. Anxiety    4. Insomnia, unspecified type    5. Osteopenia, unspecified location        Plan:    1. Blood work ordered       Vaccines: Influenza (2019); Tetanus (2019); PNA (done); Shingrix (will get)       Eye exam: 2020       Mammogram: 12/19       Gyn exam: 10/19       Colonoscopy: 3/17   2. Stable, followed by Neuro   3. Controlled on Lexapro    4. Stable on Pamelor qHS   5. Check DEXA

## 2020-09-08 ENCOUNTER — APPOINTMENT (OUTPATIENT)
Dept: RADIOLOGY | Facility: CLINIC | Age: 55
End: 2020-09-08
Attending: INTERNAL MEDICINE
Payer: COMMERCIAL

## 2020-09-08 DIAGNOSIS — M85.80 OSTEOPENIA, UNSPECIFIED LOCATION: ICD-10-CM

## 2020-09-08 PROCEDURE — 77080 DXA BONE DENSITY AXIAL: CPT | Mod: TC,PO

## 2020-09-08 PROCEDURE — 77080 DEXA BONE DENSITY SPINE HIP: ICD-10-PCS | Mod: 26,,, | Performed by: INTERNAL MEDICINE

## 2020-09-08 PROCEDURE — 77080 DXA BONE DENSITY AXIAL: CPT | Mod: 26,,, | Performed by: INTERNAL MEDICINE

## 2020-10-16 ENCOUNTER — PATIENT OUTREACH (OUTPATIENT)
Dept: ADMINISTRATIVE | Facility: OTHER | Age: 55
End: 2020-10-16

## 2020-10-16 NOTE — PROGRESS NOTES
Health Maintenance Due   Topic Date Due    Shingles Vaccine (1 of 2) 06/26/2016    Cervical Cancer Screening  05/02/2020    Influenza Vaccine (1) 08/01/2020     Updates were requested from care everywhere.  Chart was reviewed for overdue Proactive [facility] Encounters (DERRICK) topics (CRS, Breast Cancer Screening, Eye exam)  Health Maintenance has been updated.  LINKS immunization registry triggered.  Immunizations were reconciled.

## 2020-10-19 ENCOUNTER — TELEPHONE (OUTPATIENT)
Dept: ORTHOPEDICS | Facility: CLINIC | Age: 55
End: 2020-10-19

## 2020-10-19 ENCOUNTER — HOSPITAL ENCOUNTER (OUTPATIENT)
Dept: RADIOLOGY | Facility: HOSPITAL | Age: 55
Discharge: HOME | End: 2020-10-19
Attending: ORTHOPAEDIC SURGERY
Payer: COMMERCIAL

## 2020-10-19 ENCOUNTER — OFFICE VISIT (OUTPATIENT)
Dept: ORTHOPEDICS | Facility: CLINIC | Age: 55
End: 2020-10-19
Payer: COMMERCIAL

## 2020-10-19 VITALS — HEIGHT: 62 IN | BODY MASS INDEX: 24.91 KG/M2 | WEIGHT: 135.38 LBS

## 2020-10-19 DIAGNOSIS — M25.511 RIGHT SHOULDER PAIN, UNSPECIFIED CHRONICITY: Primary | ICD-10-CM

## 2020-10-19 DIAGNOSIS — M25.811 SHOULDER IMPINGEMENT, RIGHT: ICD-10-CM

## 2020-10-19 DIAGNOSIS — M75.111 NONTRAUMATIC INCOMPLETE TEAR OF RIGHT ROTATOR CUFF: Primary | ICD-10-CM

## 2020-10-19 DIAGNOSIS — M25.511 RIGHT SHOULDER PAIN, UNSPECIFIED CHRONICITY: ICD-10-CM

## 2020-10-19 PROCEDURE — 73030 XR SHOULDER COMPLETE 2 OR MORE VIEWS RIGHT: ICD-10-PCS | Mod: 26,RT,, | Performed by: RADIOLOGY

## 2020-10-19 PROCEDURE — 99999 PR PBB SHADOW E&M-EST. PATIENT-LVL III: CPT | Mod: PBBFAC,,, | Performed by: ORTHOPAEDIC SURGERY

## 2020-10-19 PROCEDURE — 3008F BODY MASS INDEX DOCD: CPT | Mod: CPTII,S$GLB,, | Performed by: ORTHOPAEDIC SURGERY

## 2020-10-19 PROCEDURE — 73030 X-RAY EXAM OF SHOULDER: CPT | Mod: 26,RT,, | Performed by: RADIOLOGY

## 2020-10-19 PROCEDURE — 99213 OFFICE O/P EST LOW 20 MIN: CPT | Mod: 25,S$GLB,, | Performed by: ORTHOPAEDIC SURGERY

## 2020-10-19 PROCEDURE — 3008F PR BODY MASS INDEX (BMI) DOCUMENTED: ICD-10-PCS | Mod: CPTII,S$GLB,, | Performed by: ORTHOPAEDIC SURGERY

## 2020-10-19 PROCEDURE — 20610 DRAIN/INJ JOINT/BURSA W/O US: CPT | Mod: RT,S$GLB,, | Performed by: ORTHOPAEDIC SURGERY

## 2020-10-19 PROCEDURE — 73030 X-RAY EXAM OF SHOULDER: CPT | Mod: TC,PN,RT

## 2020-10-19 PROCEDURE — 99999 PR PBB SHADOW E&M-EST. PATIENT-LVL III: ICD-10-PCS | Mod: PBBFAC,,, | Performed by: ORTHOPAEDIC SURGERY

## 2020-10-19 PROCEDURE — 20610 PR DRAIN/INJECT LARGE JOINT/BURSA: ICD-10-PCS | Mod: RT,S$GLB,, | Performed by: ORTHOPAEDIC SURGERY

## 2020-10-19 PROCEDURE — 99213 PR OFFICE/OUTPT VISIT, EST, LEVL III, 20-29 MIN: ICD-10-PCS | Mod: 25,S$GLB,, | Performed by: ORTHOPAEDIC SURGERY

## 2020-10-19 RX ORDER — TRIAMCINOLONE ACETONIDE 40 MG/ML
40 INJECTION, SUSPENSION INTRA-ARTICULAR; INTRAMUSCULAR
Status: COMPLETED | OUTPATIENT
Start: 2020-10-19 | End: 2020-10-19

## 2020-10-19 RX ADMIN — TRIAMCINOLONE ACETONIDE 40 MG: 40 INJECTION, SUSPENSION INTRA-ARTICULAR; INTRAMUSCULAR at 01:10

## 2020-10-19 NOTE — PROGRESS NOTES
Subjective:      Patient ID: Hien Jeter is a 55 y.o. female.    Chief Complaint: Shoulder Pain (right )      HPI  Hien Jeter is a  55 y.o. female presenting today for right shoulder pain.  There was not a history of trauma.  Onset of symptoms began several months ago  She has tried rest and anti-inflammatory medication but continues to have pain in the right shoulder particularly with elevation  No numbness or tingling is reported  She did have previous rotator cuff surgery on the left side.      Review of patient's allergies indicates:   Allergen Reactions    Diclofenac Nausea Only    Ibuprofen Other (See Comments)    Tramadol Other (See Comments)     Nausea          Current Outpatient Medications   Medication Sig Dispense Refill    cholecalciferol, vitamin D3, 3,000 unit Tab Take 3,000 Units by mouth once daily.       escitalopram oxalate (LEXAPRO) 10 MG tablet TAKE 1.5 TABLETS (15 MG TOTAL) BY MOUTH EVERY EVENING. 45 tablet 11    flaxseed oil 1,000 mg Cap Take 1 capsule by mouth once daily.       magnesium oxide (MAG-OX) 400 mg (241.3 mg magnesium) tablet Take 1 tablet (400 mg total) by mouth 2 (two) times daily. 60 tablet 12    multivitamin-Ca-iron-minerals (ONE-A-DAY WOMENS FORMULA) 27-0.4 mg Tab Take 1 tablet by mouth once daily.       nortriptyline (PAMELOR) 10 MG capsule TAKE 2 CAPSULES (20 MG TOTAL) BY MOUTH EVERY EVENING. 180 capsule 3    topiramate (TOPAMAX) 25 MG tablet TAKE 1 TABLET BY MOUTH TWICE DAILY 60 tablet 12    azelastine (ASTELIN) 137 mcg (0.1 %) nasal spray 1 SPRAY (137 MCG TOTAL) BY NASAL ROUTE 2 (TWO) TIMES DAILY. (Patient not taking: Reported on 10/19/2020) 30 mL 11    baclofen (LIORESAL) 10 MG tablet TAKE 1/2 TABLET BY MOUTH 2-3 TIMES A DAY 60 tablet 5    clotrimazole-betamethasone 1-0.05% (LOTRISONE) cream Apply topically 2 (two) times daily. (Patient not taking: Reported on 10/19/2020) 45 g 1    MIMVEY 1-0.5 mg per tablet TAKE BY MOUTH 1 TABLET ONCE DAILY 84 tablet 0  "   ZOLMitriptan (ZOMIG) 5 mg [place] SPRAY 1 SPRAY IN NOSTRIL ONCE AS NEEDED. Max 2 doses in 24 hrs (Patient not taking: Reported on 10/19/2020) 12 each 12     No current facility-administered medications for this visit.        Past Medical History:   Diagnosis Date    Anxiety     Basal cell carcinoma 2000    left eyebrow     Depression     Environmental allergies     Headache(784.0)     Multiple food allergies     Multiple sclerosis 2009     shoulder 3/2015    left       Past Surgical History:   Procedure Laterality Date    ARTHROSCOPIC DEBRIDEMENT OF ROTATOR CUFF Left 4/5/2019    Procedure: DEBRIDEMENT, ROTATOR CUFF, ARTHROSCOPIC;  Surgeon: Dc Moore Jr., MD;  Location: [facility] OR;  Service: Orthopedics;  Laterality: Left;  need opus system (Julito notified)  video    ARTHROSCOPY OF SHOULDER WITH DECOMPRESSION OF SUBACROMIAL SPACE  4/5/2019    Procedure: ARTHROSCOPY, SHOULDER, WITH SUBACROMIAL SPACE DECOMPRESSION;  Surgeon: Dc Moore Jr., MD;  Location: [facility] OR;  Service: Orthopedics;;    BASAL CELL CARCINOMA EXCISION  1998    COLONOSCOPY N/A 5/25/2017    Procedure: COLONOSCOPY;  Surgeon: Marielle Dietz MD;  Location: [address]);  Service: Endoscopy;  Laterality: N/A;  Patient requests PM.    PM prep.    FOOT SURGERY Right 01/08/2019    LASIK  2000    SHOULDER SURGERY Left 04/05/2019    WISDOM TOOTH EXTRACTION         Review of Systems:  ROS    OBJECTIVE:     PHYSICAL EXAM:  Height: 5' 2" (157.5 cm) Weight: 61.4 kg (135 lb 5.8 oz)  Vitals:    10/19/20 1336   Weight: 61.4 kg (135 lb 5.8 oz)   Height: 5' 2" (1.575 m)   PainSc:   5   PainLoc: Shoulder     Well developed, well nourished female in no acute distress  Alert and oriented x 3  HEENT- Normal exam  Lungs- Clear to auscultation  Heart- Regular rate and rhythm  Abdomen- Soft nontender  Extremity exam- examination right shoulder no tenderness no swelling  Range of motion full  Positive impingement sign with abduction " internal rotation of the shoulder  Rotator cuff strength intact  No instability  Mildly positive supraspinatus stress test    RADIOGRAPHS:  AP lateral x-ray right shoulder demonstrates spurring at the anterolateral acromion  Comments: I have personally reviewed the imaging and I agree with the above radiologist's report.    ASSESSMENT/PLAN:     IMPRESSION:  1.  Right shoulder impingement tendinosis.      2.  Possible rotator cuff tear right shoulder    3.  Dupuytren's nodule right palm    PLAN:  For the Dupuytren's nodule of recommended some Pennsaid anti-inflammatory cream for topical use and observation for now  For the right shoulder she would like injection  After pause for time-out identified the right shoulder injected with Kenalog 40 mg 2 cc xylocaine sterile technique  I also ordered an MRI scan of for the right shoulder to check for possible rotator cuff tear because of the duration of her symptoms  Follow-up after the MRI is complete       - We talked at length about the anatomy and pathophysiology of   Encounter Diagnoses   Name Primary?    Nontraumatic incomplete tear of right rotator cuff Yes    Shoulder impingement, right            Disclaimer: This note has been generated using voice-recognition software. There may be typographical errors that have been missed during proof-reading.

## 2020-10-20 ENCOUNTER — IMMUNIZATION (OUTPATIENT)
Dept: PHARMACY | Facility: CLINIC | Age: 55
End: 2020-10-20
Payer: COMMERCIAL

## 2020-10-31 ENCOUNTER — PATIENT MESSAGE (OUTPATIENT)
Dept: ORTHOPEDICS | Facility: CLINIC | Age: 55
End: 2020-10-31

## 2020-11-02 ENCOUNTER — PATIENT MESSAGE (OUTPATIENT)
Dept: PSYCHIATRY | Facility: CLINIC | Age: 55
End: 2020-11-02

## 2020-11-02 ENCOUNTER — HOSPITAL ENCOUNTER (OUTPATIENT)
Dept: RADIOLOGY | Facility: HOSPITAL | Age: 55
Discharge: HOME | End: 2020-11-02
Attending: ORTHOPAEDIC SURGERY
Payer: COMMERCIAL

## 2020-11-02 DIAGNOSIS — M75.111 NONTRAUMATIC INCOMPLETE TEAR OF RIGHT ROTATOR CUFF: ICD-10-CM

## 2020-11-02 PROCEDURE — 73221 MRI SHOULDER WITHOUT CONTRAST RIGHT: ICD-10-PCS | Mod: 26,RT,, | Performed by: RADIOLOGY

## 2020-11-02 PROCEDURE — 73221 MRI JOINT UPR EXTREM W/O DYE: CPT | Mod: TC,RT

## 2020-11-02 PROCEDURE — 73221 MRI JOINT UPR EXTREM W/O DYE: CPT | Mod: 26,RT,, | Performed by: RADIOLOGY

## 2020-11-10 ENCOUNTER — TELEPHONE (OUTPATIENT)
Dept: ORTHOPEDICS | Facility: CLINIC | Age: 55
End: 2020-11-10

## 2020-11-10 ENCOUNTER — PATIENT MESSAGE (OUTPATIENT)
Dept: ORTHOPEDICS | Facility: CLINIC | Age: 55
End: 2020-11-10

## 2020-11-10 NOTE — TELEPHONE ENCOUNTER
----- Message from Yamilet Andrade MA sent at 11/10/2020 10:33 AM CST -----  Regarding: Questions  Type:  Needs Medical Advice    Who Called: pt   Would the patient rather a call back or a response via [facility]? Call back   Best Call Back Number: [phone]  Additional Information: pt was informed to notify office after getting her MRI. MRI was done 11/2/2020

## 2020-11-10 NOTE — TELEPHONE ENCOUNTER
----- Message from Yamilet Andrade MA sent at 11/10/2020 10:33 AM CST -----  Regarding: Questions  Type:  Needs Medical Advice    Who Called: pt   Would the patient rather a call back or a response via [facility]? Call back   Best Call Back Number: [phone]  Additional Information: pt was informed to notify office after getting her MRI. MRI was done 11/2/2020

## 2020-12-04 ENCOUNTER — DOCUMENTATION ONLY (OUTPATIENT)
Dept: NEUROLOGY | Facility: CLINIC | Age: 55
End: 2020-12-04

## 2020-12-07 ENCOUNTER — OFFICE VISIT (OUTPATIENT)
Dept: ORTHOPEDICS | Facility: CLINIC | Age: 55
End: 2020-12-07
Payer: COMMERCIAL

## 2020-12-07 ENCOUNTER — TELEPHONE (OUTPATIENT)
Dept: ORTHOPEDICS | Facility: CLINIC | Age: 55
End: 2020-12-07

## 2020-12-07 VITALS — WEIGHT: 135 LBS | BODY MASS INDEX: 24.84 KG/M2 | HEIGHT: 62 IN

## 2020-12-07 DIAGNOSIS — Z41.9 ELECTIVE SURGERY: ICD-10-CM

## 2020-12-07 DIAGNOSIS — M25.811 SHOULDER IMPINGEMENT, RIGHT: Primary | ICD-10-CM

## 2020-12-07 PROCEDURE — 1126F PR PAIN SEVERITY QUANTIFIED, NO PAIN PRESENT: ICD-10-PCS | Mod: S$GLB,,, | Performed by: ORTHOPAEDIC SURGERY

## 2020-12-07 PROCEDURE — 99999 PR PBB SHADOW E&M-EST. PATIENT-LVL IV: ICD-10-PCS | Mod: PBBFAC,,, | Performed by: ORTHOPAEDIC SURGERY

## 2020-12-07 PROCEDURE — 3008F BODY MASS INDEX DOCD: CPT | Mod: CPTII,S$GLB,, | Performed by: ORTHOPAEDIC SURGERY

## 2020-12-07 PROCEDURE — 3008F PR BODY MASS INDEX (BMI) DOCUMENTED: ICD-10-PCS | Mod: CPTII,S$GLB,, | Performed by: ORTHOPAEDIC SURGERY

## 2020-12-07 PROCEDURE — 99214 PR OFFICE/OUTPT VISIT, EST, LEVL IV, 30-39 MIN: ICD-10-PCS | Mod: S$GLB,,, | Performed by: ORTHOPAEDIC SURGERY

## 2020-12-07 PROCEDURE — 1126F AMNT PAIN NOTED NONE PRSNT: CPT | Mod: S$GLB,,, | Performed by: ORTHOPAEDIC SURGERY

## 2020-12-07 PROCEDURE — 99999 PR PBB SHADOW E&M-EST. PATIENT-LVL IV: CPT | Mod: PBBFAC,,, | Performed by: ORTHOPAEDIC SURGERY

## 2020-12-07 PROCEDURE — 99214 OFFICE O/P EST MOD 30 MIN: CPT | Mod: S$GLB,,, | Performed by: ORTHOPAEDIC SURGERY

## 2020-12-07 RX ORDER — DICLOFENAC SODIUM 20 MG/G
SOLUTION TOPICAL
COMMUNITY
Start: 2020-10-21 | End: 2022-04-07

## 2020-12-07 NOTE — PROGRESS NOTES
CC:  Right shoulder impingement tendinosis        HPI:  Hien Jeter is a very pleasant 55 y.o. female with ongoing symptoms right shoulder for more than 6 months  She had previous rotator cuff surgery on the left several years ago  More recently she has been swimming and having quite a bit of pain in the right shoulder with overhead activities  We have tried some exercises at home as well as an injection with temporary improvement  Recent MRI scan of the right shoulder shows impingement tendinosis as well as bursitis of the right shoulder and spurring as well  We have discussed surgery as an option         PAST MEDICAL HISTORY:   Past Medical History:   Diagnosis Date    Anxiety     Basal cell carcinoma 2000    left eyebrow     Depression     Environmental allergies     Headache(784.0)     Multiple food allergies     Multiple sclerosis 2009     shoulder 3/2015    left     PAST SURGICAL HISTORY:   Past Surgical History:   Procedure Laterality Date    ARTHROSCOPIC DEBRIDEMENT OF ROTATOR CUFF Left 4/5/2019    Procedure: DEBRIDEMENT, ROTATOR CUFF, ARTHROSCOPIC;  Surgeon: Dc Moore Jr., MD;  Location: [facility];  Service: Orthopedics;  Laterality: Left;  need opus system (Julito notified)  video    ARTHROSCOPY OF SHOULDER WITH DECOMPRESSION OF SUBACROMIAL SPACE  4/5/2019    Procedure: ARTHROSCOPY, SHOULDER, WITH SUBACROMIAL SPACE DECOMPRESSION;  Surgeon: Dc Moore Jr., MD;  Location: [facility];  Service: Orthopedics;;    BASAL CELL CARCINOMA EXCISION  1998    COLONOSCOPY N/A 5/25/2017    Procedure: COLONOSCOPY;  Surgeon: Marielle Dietz MD;  Location: [facility] ENDO [address]);  Service: Endoscopy;  Laterality: N/A;  Patient requests PM.    PM prep.    FOOT SURGERY Right 01/08/2019    LASIK  2000    SHOULDER SURGERY Left 04/05/2019    WISDOM TOOTH EXTRACTION       FAMILY HISTORY:   Family History   Problem Relation Age of Onset    Colon cancer Mother     Diabetes Mother     Osteoporosis  Mother     Cancer Mother     Arrhythmia Father     Dementia Maternal Grandmother     Cancer Paternal Grandfather     Breast cancer Paternal Aunt     Breast cancer Sister     Multiple sclerosis Neg Hx     Ovarian cancer Neg Hx      SOCIAL HISTORY:   Social History     Socioeconomic History    Marital status:      Spouse name: Not on file    Number of children: Not on file    Years of education: Not on file    Highest education level: Not on file   Occupational History     Employer: [employer]   Social Needs    Financial resource strain: Not on file    Food insecurity     Worry: Not on file     Inability: Not on file    Transportation needs     Medical: Not on file     Non-medical: Not on file   Tobacco Use    Smoking status: Never Smoker    Smokeless tobacco: Never Used   Substance and Sexual Activity    Alcohol use: Yes     Comment: socially/ not weekly    Drug use: No    Sexual activity: Yes     Partners: Male     Birth control/protection: OCP     Comment: , menarche 15   Lifestyle    Physical activity     Days per week: Not on file     Minutes per session: Not on file    Stress: Not on file   Relationships    Social connections     Talks on phone: Not on file     Gets together: Not on file     Attends Buddhism service: Not on file     Active member of club or organization: Not on file     Attends meetings of clubs or organizations: Not on file     Relationship status: Not on file   Other Topics Concern    Are you pregnant or think you may be? Not Asked    Breast-feeding Not Asked   Social History Narrative    Not on file       MEDICATIONS:   Current Outpatient Medications:     cholecalciferol, vitamin D3, 3,000 unit Tab, Take 3,000 Units by mouth once daily. , Disp: , Rfl:     escitalopram oxalate (LEXAPRO) 10 MG tablet, TAKE 1.5 TABLETS (15 MG TOTAL) BY MOUTH EVERY EVENING., Disp: 135 tablet, Rfl: 3    flaxseed oil 1,000 mg Cap, Take 1 capsule by mouth  once daily. , Disp: , Rfl:     multivitamin-Ca-iron-minerals (ONE-A-DAY WOMENS FORMULA) 27-0.4 mg Tab, Take 1 tablet by mouth once daily. , Disp: , Rfl:     nortriptyline (PAMELOR) 10 MG capsule, TAKE 2 CAPSULES (20 MG TOTAL) BY MOUTH EVERY EVENING., Disp: 180 capsule, Rfl: 3    PENNSAID 20 mg/gram /actuation(2 %) sopm, APPLY 2 PUMPS TO THE AFFECTED KNEE(S) TWICE DAILY, Disp: , Rfl:     topiramate (TOPAMAX) 25 MG tablet, TAKE 1 TABLET BY MOUTH TWICE DAILY, Disp: 60 tablet, Rfl: 12    azelastine (ASTELIN) 137 mcg (0.1 %) nasal spray, 1 SPRAY (137 MCG TOTAL) BY NASAL ROUTE 2 (TWO) TIMES DAILY. (Patient not taking: Reported on 10/19/2020), Disp: 30 mL, Rfl: 11    clotrimazole-betamethasone 1-0.05% (LOTRISONE) cream, Apply topically 2 (two) times daily. (Patient not taking: Reported on 10/19/2020), Disp: 45 g, Rfl: 1    magnesium oxide (MAG-OX) 400 mg (241.3 mg magnesium) tablet, Take 1 tablet (400 mg total) by mouth 2 (two) times daily. (Patient not taking: Reported on 12/7/2020), Disp: 60 tablet, Rfl: 12    ZOLMitriptan (ZOMIG) 5 mg [place], SPRAY 1 SPRAY IN NOSTRIL ONCE AS NEEDED. Max 2 doses in 24 hrs (Patient not taking: Reported on 10/19/2020), Disp: 12 each, Rfl: 12  ALLERGIES:   Review of patient's allergies indicates:   Allergen Reactions    Diclofenac Nausea Only    Ibuprofen Other (See Comments)    Tramadol Other (See Comments)     Nausea        Review of Systems:  Constitutional: no fever or chills  ENT: no nasal congestion or sore throat  Respiratory: no cough or shortness of breath  Cardiovascular: no chest pain or palpitations  Gastrointestinal: no nausea or vomiting, PUD, GERD, NSAID intolerance  Genitourinary: no hematuria or dysuria  Integument/Breast: no rash or pruritis  Hematologic/Lymphatic: no easy bruising or lymphadenopathy  Musculoskeletal: see HPI  Neurological: no seizures or tremors  Behavioral/Psych: no auditory or visual hallucinations      Physical Exam   Vitals:    12/07/20 1121  "  Weight: 61.2 kg (135 lb)   Height: 5' 2" (1.575 m)   PainSc: 0-No pain       Constitutional: Oriented to person, place, and time. Appears well-developed and well-nourished.   HENT:   Head: Normocephalic and atraumatic.   Nose: Nose normal.   Eyes: No scleral icterus.   Neck: Normal range of motion.   Cardiovascular: Normal rate and regular rhythm.    Pulses:       Radial pulses are 2+ on the right side, and 2+ on the left side.   Pulmonary/Chest: Effort normal and breath sounds normal.   Abdominal: Soft.   Neurological: Alert and oriented to person, place, and time.   Skin: Skin is warm.   Psychiatric: Normal mood and affect.     MUSCULOSKELETAL UPPER EXTREMITY:  Examination right shoulder no tenderness no swelling  Range of motion full  Positive impingement sign  Slight clicking with elevation internal rotation of the shoulder causing pain  No instability  Rotator cuff strength intact  Neurologic exam normal            Diagnostic Studies:  MRI demonstrates impingement tendinosis of the rotator cuff right shoulder        Assessment:  Impingement tendinosis rotator cuff right shoulder with bone spurring    Plan:  I explained the nature of the problem to the patient  I think it would be advisable to go ahead and treat this surgically now to remove the bone spur and prevent any further damage to the rotator cuff  The patient is in agreement she would like to go ahead and set up surgery after the holidays for right shoulder arthroscopy and subacromial decompression  Of course if there is damage to the rotator cuff will address that as well at the time of surgery she understands      The risks and benefits of surgery were discussed with the patient today and they understand.  The consent was signed in the office for surgery.      Dc Moore MD (Jay)  [facility]  Orthopedic Upper Extremity Surgery      "

## 2020-12-09 ENCOUNTER — PATIENT MESSAGE (OUTPATIENT)
Dept: INTERNAL MEDICINE | Facility: CLINIC | Age: 55
End: 2020-12-09

## 2020-12-10 ENCOUNTER — TELEPHONE (OUTPATIENT)
Dept: INTERNAL MEDICINE | Facility: CLINIC | Age: 55
End: 2020-12-10

## 2020-12-10 NOTE — TELEPHONE ENCOUNTER
Spoke to patient, was exposed yesterday to covid, Instructed to quarantine 5-6 days then go to Urgent care to Be tested. Stated she would.

## 2020-12-10 NOTE — TELEPHONE ENCOUNTER
----- Message from Mignon Cook sent at 12/10/2020  1:42 PM CST -----  Contact: self   Pt was exposed to Covid yesterday morning. She is not showing any symptoms but would like an order put in for drive thru testing.  Please call and advise

## 2020-12-14 ENCOUNTER — OFFICE VISIT (OUTPATIENT)
Dept: INTERNAL MEDICINE | Facility: CLINIC | Age: 55
End: 2020-12-14
Payer: COMMERCIAL

## 2020-12-14 ENCOUNTER — LAB VISIT (OUTPATIENT)
Dept: INTERNAL MEDICINE | Facility: CLINIC | Age: 55
End: 2020-12-14
Payer: COMMERCIAL

## 2020-12-14 ENCOUNTER — TELEPHONE (OUTPATIENT)
Dept: INTERNAL MEDICINE | Facility: CLINIC | Age: 55
End: 2020-12-14

## 2020-12-14 DIAGNOSIS — G35 MS (MULTIPLE SCLEROSIS): ICD-10-CM

## 2020-12-14 DIAGNOSIS — Z20.822 CLOSE EXPOSURE TO COVID-19 VIRUS: Primary | ICD-10-CM

## 2020-12-14 DIAGNOSIS — Z20.822 CLOSE EXPOSURE TO COVID-19 VIRUS: ICD-10-CM

## 2020-12-14 PROCEDURE — 99213 OFFICE O/P EST LOW 20 MIN: CPT | Mod: 95,,, | Performed by: INTERNAL MEDICINE

## 2020-12-14 PROCEDURE — 99213 PR OFFICE/OUTPT VISIT, EST, LEVL III, 20-29 MIN: ICD-10-PCS | Mod: 95,,, | Performed by: INTERNAL MEDICINE

## 2020-12-14 PROCEDURE — U0003 INFECTIOUS AGENT DETECTION BY NUCLEIC ACID (DNA OR RNA); SEVERE ACUTE RESPIRATORY SYNDROME CORONAVIRUS 2 (SARS-COV-2) (CORONAVIRUS DISEASE [COVID-19]), AMPLIFIED PROBE TECHNIQUE, MAKING USE OF HIGH THROUGHPUT TECHNOLOGIES AS DESCRIBED BY CMS-2020-01-R: HCPCS

## 2020-12-14 NOTE — TELEPHONE ENCOUNTER
----- Message from Zulma Silva sent at 12/14/2020  9:12 AM CST -----  Contact: self [phone]  Patient is calling to ask, if she can get an order to get tested for the Covid, she was exposed, please see previous messages, call patient back    Thank you

## 2020-12-14 NOTE — TELEPHONE ENCOUNTER
Sister and all her family are sick.  They got ms. Stanford 's dad sick , she went to see him Monday no knowing he had been around them.  He started with fever Wednesday and ms. Huber as been quarantining ever since Monday.    I set up audio appt for today with dr camejo. Her internet is down at home.

## 2020-12-14 NOTE — PROGRESS NOTES
Established Patient - Audio Only Telehealth Visit     The patient location is: home  The chief complaint leading to consultation is: COVID  Visit type: Virtual visit with audio only (telephone)  Total time spent with patient: 15 min       The reason for the audio only service rather than synchronous audio and video virtual visit was related to technical difficulties or patient preference/necessity.     Each patient to whom I provide medical services by telemedicine is:  (1) informed of the relationship between the physician and patient and the respective role of any other health care provider with respect to management of the patient; and (2) notified that they may decline to receive medical services by telemedicine and may withdraw from such care at any time. Patient verbally consented to receive this service via voice-only telephone call.       HPI:  55-year-old white female patient mine who is concerned she may have been exposed to COVID through her 's family.  Patient frequently will have allergy activation in the fall and has slight rhinitis.  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  The sister-in-law apparently was positive for COVID and father-in-law's now sick for about the last week.  They are uncertain whether not he has the infection.  Patient also has multiple sclerosis and has been doing reasonably well with that.    Physical exam:  Not done since this is a virtual visit    Impression:  1.  Possible COVID exposure   2.  Multiple scleroses-doing well    Plan:  COVID test was ordered.                        This service was not originating from a related E/M service provided within the previous 7 days nor will  to an E/M service or procedure within the next 24 hours or my soonest available appointment.  Prevailing standard of care was able to be met in this audio-only visit.

## 2020-12-15 LAB — SARS-COV-2 RNA RESP QL NAA+PROBE: NOT DETECTED

## 2020-12-17 ENCOUNTER — LAB VISIT (OUTPATIENT)
Dept: LAB | Facility: HOSPITAL | Age: 55
End: 2020-12-17
Payer: COMMERCIAL

## 2020-12-17 DIAGNOSIS — G35 MULTIPLE SCLEROSIS: ICD-10-CM

## 2020-12-17 LAB
BASOPHILS # BLD AUTO: 0.08 K/UL (ref 0–0.2)
BASOPHILS NFR BLD: 1.2 % (ref 0–1.9)
DIFFERENTIAL METHOD: ABNORMAL
EOSINOPHIL # BLD AUTO: 0.3 K/UL (ref 0–0.5)
EOSINOPHIL NFR BLD: 4.5 % (ref 0–8)
ERYTHROCYTE [DISTWIDTH] IN BLOOD BY AUTOMATED COUNT: 12.6 % (ref 11.5–14.5)
HCT VFR BLD AUTO: 45.8 % (ref 37–48.5)
HGB BLD-MCNC: 14.7 G/DL (ref 12–16)
IMM GRANULOCYTES # BLD AUTO: 0.01 K/UL (ref 0–0.04)
IMM GRANULOCYTES NFR BLD AUTO: 0.1 % (ref 0–0.5)
LYMPHOCYTES # BLD AUTO: 1.6 K/UL (ref 1–4.8)
LYMPHOCYTES NFR BLD: 24 % (ref 18–48)
MCH RBC QN AUTO: 32 PG (ref 27–31)
MCHC RBC AUTO-ENTMCNC: 32.1 G/DL (ref 32–36)
MCV RBC AUTO: 100 FL (ref 82–98)
MONOCYTES # BLD AUTO: 0.8 K/UL (ref 0.3–1)
MONOCYTES NFR BLD: 12.1 % (ref 4–15)
NEUTROPHILS # BLD AUTO: 3.9 K/UL (ref 1.8–7.7)
NEUTROPHILS NFR BLD: 58.1 % (ref 38–73)
NRBC BLD-RTO: 0 /100 WBC
PLATELET # BLD AUTO: 324 K/UL (ref 150–350)
PMV BLD AUTO: 10.5 FL (ref 9.2–12.9)
RBC # BLD AUTO: 4.59 M/UL (ref 4–5.4)
WBC # BLD AUTO: 6.72 K/UL (ref 3.9–12.7)

## 2020-12-17 PROCEDURE — 85025 COMPLETE CBC W/AUTO DIFF WBC: CPT

## 2020-12-17 PROCEDURE — 36415 COLL VENOUS BLD VENIPUNCTURE: CPT | Mod: PO

## 2020-12-17 PROCEDURE — 86706 HEP B SURFACE ANTIBODY: CPT

## 2020-12-17 PROCEDURE — 87340 HEPATITIS B SURFACE AG IA: CPT

## 2020-12-17 PROCEDURE — 86704 HEP B CORE ANTIBODY TOTAL: CPT

## 2020-12-17 PROCEDURE — 86356 MONONUCLEAR CELL ANTIGEN: CPT

## 2020-12-19 LAB
APPEARANCE SPEC: NORMAL
CD20 CELLS NFR SPEC: NORMAL %
SPECIMEN SOURCE: NORMAL
VIABLE CELLS NFR SPEC: 99 %

## 2020-12-22 ENCOUNTER — TELEPHONE (OUTPATIENT)
Dept: NEUROLOGY | Facility: CLINIC | Age: 55
End: 2020-12-22

## 2021-01-04 ENCOUNTER — PATIENT MESSAGE (OUTPATIENT)
Dept: ADMINISTRATIVE | Facility: HOSPITAL | Age: 56
End: 2021-01-04

## 2021-01-05 ENCOUNTER — LAB VISIT (OUTPATIENT)
Dept: FAMILY MEDICINE | Facility: CLINIC | Age: 56
End: 2021-01-05
Payer: COMMERCIAL

## 2021-01-05 ENCOUNTER — HOSPITAL ENCOUNTER (OUTPATIENT)
Dept: PREADMISSION TESTING | Facility: HOSPITAL | Age: 56
Discharge: HOME | End: 2021-01-05
Attending: ORTHOPAEDIC SURGERY
Payer: COMMERCIAL

## 2021-01-05 ENCOUNTER — ANESTHESIA EVENT (OUTPATIENT)
Dept: SURGERY | Facility: HOSPITAL | Age: 56
End: 2021-01-05
Payer: COMMERCIAL

## 2021-01-05 VITALS
WEIGHT: 129 LBS | RESPIRATION RATE: 14 BRPM | HEART RATE: 82 BPM | HEIGHT: 62 IN | BODY MASS INDEX: 23.74 KG/M2 | SYSTOLIC BLOOD PRESSURE: 105 MMHG | TEMPERATURE: 98 F | OXYGEN SATURATION: 97 % | DIASTOLIC BLOOD PRESSURE: 70 MMHG

## 2021-01-05 DIAGNOSIS — Z41.9 ELECTIVE SURGERY: ICD-10-CM

## 2021-01-05 PROCEDURE — U0003 INFECTIOUS AGENT DETECTION BY NUCLEIC ACID (DNA OR RNA); SEVERE ACUTE RESPIRATORY SYNDROME CORONAVIRUS 2 (SARS-COV-2) (CORONAVIRUS DISEASE [COVID-19]), AMPLIFIED PROBE TECHNIQUE, MAKING USE OF HIGH THROUGHPUT TECHNOLOGIES AS DESCRIBED BY CMS-2020-01-R: HCPCS

## 2021-01-05 RX ORDER — SCOLOPAMINE TRANSDERMAL SYSTEM 1 MG/1
1 PATCH, EXTENDED RELEASE TRANSDERMAL
Status: CANCELLED | OUTPATIENT
Start: 2021-01-05

## 2021-01-05 RX ORDER — SODIUM CHLORIDE, SODIUM LACTATE, POTASSIUM CHLORIDE, CALCIUM CHLORIDE 600; 310; 30; 20 MG/100ML; MG/100ML; MG/100ML; MG/100ML
INJECTION, SOLUTION INTRAVENOUS CONTINUOUS
Status: CANCELLED | OUTPATIENT
Start: 2021-01-05

## 2021-01-05 RX ORDER — LIDOCAINE HYDROCHLORIDE 10 MG/ML
1 INJECTION, SOLUTION EPIDURAL; INFILTRATION; INTRACAUDAL; PERINEURAL ONCE
Status: CANCELLED | OUTPATIENT
Start: 2021-01-05 | End: 2021-01-05

## 2021-01-06 LAB — SARS-COV-2 RNA RESP QL NAA+PROBE: NOT DETECTED

## 2021-01-08 ENCOUNTER — ANESTHESIA (OUTPATIENT)
Dept: SURGERY | Facility: HOSPITAL | Age: 56
End: 2021-01-08
Payer: COMMERCIAL

## 2021-01-08 ENCOUNTER — HOSPITAL ENCOUNTER (OUTPATIENT)
Facility: HOSPITAL | Age: 56
Discharge: HOME | End: 2021-01-08
Attending: ORTHOPAEDIC SURGERY | Admitting: ORTHOPAEDIC SURGERY
Payer: COMMERCIAL

## 2021-01-08 VITALS
RESPIRATION RATE: 17 BRPM | DIASTOLIC BLOOD PRESSURE: 78 MMHG | BODY MASS INDEX: 23.92 KG/M2 | TEMPERATURE: 98 F | WEIGHT: 130 LBS | HEIGHT: 62 IN | OXYGEN SATURATION: 98 % | HEART RATE: 68 BPM | SYSTOLIC BLOOD PRESSURE: 121 MMHG

## 2021-01-08 DIAGNOSIS — M25.811 SHOULDER IMPINGEMENT, RIGHT: ICD-10-CM

## 2021-01-08 PROCEDURE — 29822 SHO ARTHRS SRG LMTD DBRDMT: CPT | Mod: RT,,, | Performed by: ORTHOPAEDIC SURGERY

## 2021-01-08 PROCEDURE — C9290 INJ, BUPIVACAINE LIPOSOME: HCPCS | Performed by: STUDENT IN AN ORGANIZED HEALTH CARE EDUCATION/TRAINING PROGRAM

## 2021-01-08 PROCEDURE — 71000016 HC POSTOP RECOV ADDL HR: Performed by: ORTHOPAEDIC SURGERY

## 2021-01-08 PROCEDURE — 37000008 HC ANESTHESIA 1ST 15 MINUTES: Performed by: ORTHOPAEDIC SURGERY

## 2021-01-08 PROCEDURE — 25000003 PHARM REV CODE 250: Performed by: STUDENT IN AN ORGANIZED HEALTH CARE EDUCATION/TRAINING PROGRAM

## 2021-01-08 PROCEDURE — 64415 NJX AA&/STRD BRCH PLXS IMG: CPT | Performed by: ANESTHESIOLOGY

## 2021-01-08 PROCEDURE — 27201423 OPTIME MED/SURG SUP & DEVICES STERILE SUPPLY: Performed by: ORTHOPAEDIC SURGERY

## 2021-01-08 PROCEDURE — 36000711: Performed by: ORTHOPAEDIC SURGERY

## 2021-01-08 PROCEDURE — 63600175 PHARM REV CODE 636 W HCPCS: Performed by: ORTHOPAEDIC SURGERY

## 2021-01-08 PROCEDURE — 63600175 PHARM REV CODE 636 W HCPCS: Performed by: ANESTHESIOLOGY

## 2021-01-08 PROCEDURE — 63600175 PHARM REV CODE 636 W HCPCS: Performed by: STUDENT IN AN ORGANIZED HEALTH CARE EDUCATION/TRAINING PROGRAM

## 2021-01-08 PROCEDURE — 71000015 HC POSTOP RECOV 1ST HR: Performed by: ORTHOPAEDIC SURGERY

## 2021-01-08 PROCEDURE — 71000039 HC RECOVERY, EACH ADD'L HOUR: Performed by: ORTHOPAEDIC SURGERY

## 2021-01-08 PROCEDURE — 36000710: Performed by: ORTHOPAEDIC SURGERY

## 2021-01-08 PROCEDURE — 37000009 HC ANESTHESIA EA ADD 15 MINS: Performed by: ORTHOPAEDIC SURGERY

## 2021-01-08 PROCEDURE — 29822 PR SHLDR ARTHROSCOP,PART DEBRIDE: ICD-10-PCS | Mod: RT,,, | Performed by: ORTHOPAEDIC SURGERY

## 2021-01-08 PROCEDURE — 71000033 HC RECOVERY, INTIAL HOUR: Performed by: ORTHOPAEDIC SURGERY

## 2021-01-08 PROCEDURE — 76942 ECHO GUIDE FOR BIOPSY: CPT | Performed by: ANESTHESIOLOGY

## 2021-01-08 RX ORDER — DEXAMETHASONE SODIUM PHOSPHATE 4 MG/ML
INJECTION, SOLUTION INTRA-ARTICULAR; INTRALESIONAL; INTRAMUSCULAR; INTRAVENOUS; SOFT TISSUE
Status: DISCONTINUED | OUTPATIENT
Start: 2021-01-08 | End: 2021-01-08

## 2021-01-08 RX ORDER — ONDANSETRON 8 MG/1
8 TABLET, ORALLY DISINTEGRATING ORAL EVERY 8 HOURS PRN
Status: CANCELLED | OUTPATIENT
Start: 2021-01-08

## 2021-01-08 RX ORDER — SODIUM CHLORIDE 0.9 % (FLUSH) 0.9 %
10 SYRINGE (ML) INJECTION
Status: DISCONTINUED | OUTPATIENT
Start: 2021-01-08 | End: 2021-01-08 | Stop reason: HOSPADM

## 2021-01-08 RX ORDER — CEFAZOLIN SODIUM 2 G/50ML
2 SOLUTION INTRAVENOUS
Status: DISCONTINUED | OUTPATIENT
Start: 2021-01-08 | End: 2021-01-08 | Stop reason: HOSPADM

## 2021-01-08 RX ORDER — ACETAMINOPHEN 325 MG/1
650 TABLET ORAL EVERY 4 HOURS PRN
Status: CANCELLED | OUTPATIENT
Start: 2021-01-08

## 2021-01-08 RX ORDER — PROPOFOL 10 MG/ML
VIAL (ML) INTRAVENOUS
Status: DISCONTINUED | OUTPATIENT
Start: 2021-01-08 | End: 2021-01-08

## 2021-01-08 RX ORDER — LIDOCAINE HCL/PF 100 MG/5ML
SYRINGE (ML) INTRAVENOUS
Status: DISCONTINUED | OUTPATIENT
Start: 2021-01-08 | End: 2021-01-08

## 2021-01-08 RX ORDER — PHENYLEPHRINE HYDROCHLORIDE 10 MG/ML
INJECTION INTRAVENOUS
Status: DISCONTINUED | OUTPATIENT
Start: 2021-01-08 | End: 2021-01-08

## 2021-01-08 RX ORDER — FENTANYL CITRATE 50 UG/ML
INJECTION, SOLUTION INTRAMUSCULAR; INTRAVENOUS
Status: DISCONTINUED | OUTPATIENT
Start: 2021-01-08 | End: 2021-01-08

## 2021-01-08 RX ORDER — HYDROMORPHONE HYDROCHLORIDE 2 MG/ML
0.5 INJECTION, SOLUTION INTRAMUSCULAR; INTRAVENOUS; SUBCUTANEOUS EVERY 5 MIN PRN
Status: ACTIVE | OUTPATIENT
Start: 2021-01-08 | End: 2021-01-08

## 2021-01-08 RX ORDER — HYDROCODONE BITARTRATE AND ACETAMINOPHEN 5; 325 MG/1; MG/1
1 TABLET ORAL
Status: DISCONTINUED | OUTPATIENT
Start: 2021-01-08 | End: 2021-01-08 | Stop reason: HOSPADM

## 2021-01-08 RX ORDER — EPINEPHRINE 1 MG/ML
INJECTION, SOLUTION INTRACARDIAC; INTRAMUSCULAR; INTRAVENOUS; SUBCUTANEOUS
Status: DISCONTINUED | OUTPATIENT
Start: 2021-01-08 | End: 2021-01-08 | Stop reason: HOSPADM

## 2021-01-08 RX ORDER — NEOSTIGMINE METHYLSULFATE 1 MG/ML
INJECTION, SOLUTION INTRAVENOUS
Status: DISCONTINUED | OUTPATIENT
Start: 2021-01-08 | End: 2021-01-08

## 2021-01-08 RX ORDER — SCOLOPAMINE TRANSDERMAL SYSTEM 1 MG/1
1 PATCH, EXTENDED RELEASE TRANSDERMAL
Status: DISCONTINUED | OUTPATIENT
Start: 2021-01-08 | End: 2021-01-08 | Stop reason: HOSPADM

## 2021-01-08 RX ORDER — OXYCODONE HYDROCHLORIDE 5 MG/1
10 TABLET ORAL EVERY 4 HOURS PRN
Status: CANCELLED | OUTPATIENT
Start: 2021-01-08

## 2021-01-08 RX ORDER — SODIUM CHLORIDE, SODIUM LACTATE, POTASSIUM CHLORIDE, CALCIUM CHLORIDE 600; 310; 30; 20 MG/100ML; MG/100ML; MG/100ML; MG/100ML
INJECTION, SOLUTION INTRAVENOUS CONTINUOUS
Status: DISCONTINUED | OUTPATIENT
Start: 2021-01-08 | End: 2021-01-08 | Stop reason: HOSPADM

## 2021-01-08 RX ORDER — KETOROLAC TROMETHAMINE 30 MG/ML
15 INJECTION, SOLUTION INTRAMUSCULAR; INTRAVENOUS ONCE
Status: CANCELLED | OUTPATIENT
Start: 2021-01-08 | End: 2021-01-11

## 2021-01-08 RX ORDER — LIDOCAINE HYDROCHLORIDE 10 MG/ML
1 INJECTION, SOLUTION EPIDURAL; INFILTRATION; INTRACAUDAL; PERINEURAL ONCE
Status: DISCONTINUED | OUTPATIENT
Start: 2021-01-08 | End: 2021-01-08 | Stop reason: HOSPADM

## 2021-01-08 RX ORDER — ROCURONIUM BROMIDE 10 MG/ML
INJECTION, SOLUTION INTRAVENOUS
Status: DISCONTINUED | OUTPATIENT
Start: 2021-01-08 | End: 2021-01-08

## 2021-01-08 RX ORDER — ACETAMINOPHEN 10 MG/ML
INJECTION, SOLUTION INTRAVENOUS
Status: DISCONTINUED | OUTPATIENT
Start: 2021-01-08 | End: 2021-01-08

## 2021-01-08 RX ORDER — BUPIVACAINE HYDROCHLORIDE 2.5 MG/ML
INJECTION, SOLUTION EPIDURAL; INFILTRATION; INTRACAUDAL
Status: DISCONTINUED | OUTPATIENT
Start: 2021-01-08 | End: 2021-01-08

## 2021-01-08 RX ORDER — HYDROCODONE BITARTRATE AND ACETAMINOPHEN 7.5; 325 MG/1; MG/1
1 TABLET ORAL EVERY 4 HOURS PRN
Qty: 40 TABLET | Refills: 0 | Status: SHIPPED | OUTPATIENT
Start: 2021-01-08 | End: 2021-06-21

## 2021-01-08 RX ORDER — SODIUM CHLORIDE, SODIUM LACTATE, POTASSIUM CHLORIDE, CALCIUM CHLORIDE 600; 310; 30; 20 MG/100ML; MG/100ML; MG/100ML; MG/100ML
INJECTION, SOLUTION INTRAVENOUS CONTINUOUS PRN
Status: DISCONTINUED | OUTPATIENT
Start: 2021-01-08 | End: 2021-01-08

## 2021-01-08 RX ORDER — MIDAZOLAM HYDROCHLORIDE 1 MG/ML
INJECTION, SOLUTION INTRAMUSCULAR; INTRAVENOUS
Status: DISCONTINUED | OUTPATIENT
Start: 2021-01-08 | End: 2021-01-08

## 2021-01-08 RX ADMIN — DEXAMETHASONE SODIUM PHOSPHATE 8 MG: 4 INJECTION, SOLUTION INTRA-ARTICULAR; INTRALESIONAL; INTRAMUSCULAR; INTRAVENOUS; SOFT TISSUE at 08:01

## 2021-01-08 RX ADMIN — FENTANYL CITRATE 50 MCG: 50 INJECTION, SOLUTION INTRAMUSCULAR; INTRAVENOUS at 08:01

## 2021-01-08 RX ADMIN — PHENYLEPHRINE HYDROCHLORIDE 100 MCG: 10 INJECTION INTRAVENOUS at 09:01

## 2021-01-08 RX ADMIN — DEXTROSE 2 G: 50 INJECTION, SOLUTION INTRAVENOUS at 08:01

## 2021-01-08 RX ADMIN — PHENYLEPHRINE HYDROCHLORIDE 50 MCG: 10 INJECTION INTRAVENOUS at 09:01

## 2021-01-08 RX ADMIN — NEOSTIGMINE METHYLSULFATE 3 MG: 1 INJECTION INTRAVENOUS at 09:01

## 2021-01-08 RX ADMIN — SUGAMMADEX 200 MG: 100 INJECTION, SOLUTION INTRAVENOUS at 09:01

## 2021-01-08 RX ADMIN — ACETAMINOPHEN 1000 MG: 10 INJECTION, SOLUTION INTRAVENOUS at 09:01

## 2021-01-08 RX ADMIN — PHENYLEPHRINE HYDROCHLORIDE 100 MCG: 10 INJECTION INTRAVENOUS at 08:01

## 2021-01-08 RX ADMIN — BUPIVACAINE HYDROCHLORIDE 10 ML: 2.5 INJECTION, SOLUTION EPIDURAL; INFILTRATION; INTRACAUDAL; PERINEURAL at 07:01

## 2021-01-08 RX ADMIN — LIDOCAINE HYDROCHLORIDE 100 MG: 20 INJECTION, SOLUTION INTRAVENOUS at 08:01

## 2021-01-08 RX ADMIN — PROPOFOL 150 MG: 10 INJECTION, EMULSION INTRAVENOUS at 08:01

## 2021-01-08 RX ADMIN — BUPIVACAINE 10 ML: 13.3 INJECTION, SUSPENSION, LIPOSOMAL INFILTRATION at 07:01

## 2021-01-08 RX ADMIN — SODIUM CHLORIDE, SODIUM LACTATE, POTASSIUM CHLORIDE, AND CALCIUM CHLORIDE: .6; .31; .03; .02 INJECTION, SOLUTION INTRAVENOUS at 08:01

## 2021-01-08 RX ADMIN — ROCURONIUM BROMIDE 50 MG: 10 INJECTION, SOLUTION INTRAVENOUS at 08:01

## 2021-01-08 RX ADMIN — GLYCOPYRROLATE 0.6 MG: 0.2 INJECTION, SOLUTION INTRAMUSCULAR; INTRAVITREAL at 09:01

## 2021-01-08 RX ADMIN — MIDAZOLAM 2 MG: 1 INJECTION INTRAMUSCULAR; INTRAVENOUS at 07:01

## 2021-01-08 RX ADMIN — FENTANYL CITRATE 50 MCG: 50 INJECTION, SOLUTION INTRAMUSCULAR; INTRAVENOUS at 09:01

## 2021-01-13 ENCOUNTER — PATIENT MESSAGE (OUTPATIENT)
Dept: NEUROLOGY | Facility: CLINIC | Age: 56
End: 2021-01-13

## 2021-01-14 ENCOUNTER — PATIENT MESSAGE (OUTPATIENT)
Dept: ADMINISTRATIVE | Facility: OTHER | Age: 56
End: 2021-01-14

## 2021-01-14 ENCOUNTER — PATIENT OUTREACH (OUTPATIENT)
Dept: ADMINISTRATIVE | Facility: OTHER | Age: 56
End: 2021-01-14

## 2021-01-14 DIAGNOSIS — Z12.31 ENCOUNTER FOR SCREENING MAMMOGRAM FOR MALIGNANT NEOPLASM OF BREAST: Primary | ICD-10-CM

## 2021-01-15 ENCOUNTER — OFFICE VISIT (OUTPATIENT)
Dept: NEUROLOGY | Facility: CLINIC | Age: 56
End: 2021-01-15
Payer: COMMERCIAL

## 2021-01-15 VITALS — BODY MASS INDEX: 23.78 KG/M2 | WEIGHT: 130 LBS

## 2021-01-15 DIAGNOSIS — G43.719 INTRACTABLE CHRONIC MIGRAINE WITHOUT AURA AND WITHOUT STATUS MIGRAINOSUS: ICD-10-CM

## 2021-01-15 DIAGNOSIS — E55.9 VITAMIN D INSUFFICIENCY: ICD-10-CM

## 2021-01-15 DIAGNOSIS — D84.821 IMMUNOSUPPRESSION DUE TO DRUG THERAPY: ICD-10-CM

## 2021-01-15 DIAGNOSIS — Z79.899 IMMUNOSUPPRESSION DUE TO DRUG THERAPY: ICD-10-CM

## 2021-01-15 DIAGNOSIS — G35 MS (MULTIPLE SCLEROSIS): ICD-10-CM

## 2021-01-15 DIAGNOSIS — G35 MULTIPLE SCLEROSIS: Primary | ICD-10-CM

## 2021-01-15 PROCEDURE — 3008F BODY MASS INDEX DOCD: CPT | Mod: CPTII,,, | Performed by: PHYSICIAN ASSISTANT

## 2021-01-15 PROCEDURE — 99215 PR OFFICE/OUTPT VISIT, EST, LEVL V, 40-54 MIN: ICD-10-PCS | Mod: 95,,, | Performed by: PHYSICIAN ASSISTANT

## 2021-01-15 PROCEDURE — 3008F PR BODY MASS INDEX (BMI) DOCUMENTED: ICD-10-PCS | Mod: CPTII,,, | Performed by: PHYSICIAN ASSISTANT

## 2021-01-15 PROCEDURE — 99215 OFFICE O/P EST HI 40 MIN: CPT | Mod: 95,,, | Performed by: PHYSICIAN ASSISTANT

## 2021-01-20 ENCOUNTER — TELEPHONE (OUTPATIENT)
Dept: NEUROLOGY | Facility: CLINIC | Age: 56
End: 2021-01-20

## 2021-01-20 DIAGNOSIS — Z13.9 SCREENING FOR CONDITION: Primary | ICD-10-CM

## 2021-01-20 NOTE — TELEPHONE ENCOUNTER
----- Message from Shelbi Wisdom PA-C sent at 1/15/2021  9:11 AM CST -----  Polo-not on a [id number] to infuse, she has completed financial paperwork for financial assistance

## 2021-01-22 ENCOUNTER — OFFICE VISIT (OUTPATIENT)
Dept: ORTHOPEDICS | Facility: CLINIC | Age: 56
End: 2021-01-22
Payer: COMMERCIAL

## 2021-01-22 VITALS
SYSTOLIC BLOOD PRESSURE: 110 MMHG | HEART RATE: 86 BPM | BODY MASS INDEX: 23.93 KG/M2 | DIASTOLIC BLOOD PRESSURE: 83 MMHG | HEIGHT: 62 IN | WEIGHT: 130.06 LBS

## 2021-01-22 DIAGNOSIS — M75.111 NONTRAUMATIC INCOMPLETE TEAR OF RIGHT ROTATOR CUFF: Primary | ICD-10-CM

## 2021-01-22 PROCEDURE — 3008F PR BODY MASS INDEX (BMI) DOCUMENTED: ICD-10-PCS | Mod: CPTII,S$GLB,, | Performed by: PHYSICIAN ASSISTANT

## 2021-01-22 PROCEDURE — 99024 POSTOP FOLLOW-UP VISIT: CPT | Mod: S$GLB,,, | Performed by: PHYSICIAN ASSISTANT

## 2021-01-22 PROCEDURE — 99024 PR POST-OP FOLLOW-UP VISIT: ICD-10-PCS | Mod: S$GLB,,, | Performed by: PHYSICIAN ASSISTANT

## 2021-01-22 PROCEDURE — 1125F AMNT PAIN NOTED PAIN PRSNT: CPT | Mod: S$GLB,,, | Performed by: PHYSICIAN ASSISTANT

## 2021-01-22 PROCEDURE — 3008F BODY MASS INDEX DOCD: CPT | Mod: CPTII,S$GLB,, | Performed by: PHYSICIAN ASSISTANT

## 2021-01-22 PROCEDURE — 99999 PR PBB SHADOW E&M-EST. PATIENT-LVL III: CPT | Mod: PBBFAC,,, | Performed by: PHYSICIAN ASSISTANT

## 2021-01-22 PROCEDURE — 99999 PR PBB SHADOW E&M-EST. PATIENT-LVL III: ICD-10-PCS | Mod: PBBFAC,,, | Performed by: PHYSICIAN ASSISTANT

## 2021-01-22 PROCEDURE — 1125F PR PAIN SEVERITY QUANTIFIED, PAIN PRESENT: ICD-10-PCS | Mod: S$GLB,,, | Performed by: PHYSICIAN ASSISTANT

## 2021-01-22 RX ORDER — HEPARIN 100 UNIT/ML
100 SYRINGE INTRAVENOUS
Status: CANCELLED | OUTPATIENT
Start: 2021-01-22

## 2021-01-22 RX ORDER — ACETAMINOPHEN 325 MG/1
1000 TABLET ORAL
Status: CANCELLED | OUTPATIENT
Start: 2021-01-22

## 2021-01-22 RX ORDER — SODIUM CHLORIDE 0.9 % (FLUSH) 0.9 %
10 SYRINGE (ML) INJECTION
Status: CANCELLED | OUTPATIENT
Start: 2021-01-22

## 2021-01-29 ENCOUNTER — CLINICAL SUPPORT (OUTPATIENT)
Dept: REHABILITATION | Facility: HOSPITAL | Age: 56
End: 2021-01-29
Attending: ORTHOPAEDIC SURGERY
Payer: COMMERCIAL

## 2021-01-29 DIAGNOSIS — M25.611 DECREASED RANGE OF MOTION OF RIGHT SHOULDER: Primary | ICD-10-CM

## 2021-01-29 DIAGNOSIS — Z74.09 IMPAIRED FUNCTIONAL MOBILITY AND ACTIVITY TOLERANCE: ICD-10-CM

## 2021-01-29 DIAGNOSIS — M25.511 ACUTE PAIN OF RIGHT SHOULDER: ICD-10-CM

## 2021-01-29 DIAGNOSIS — M62.81 MUSCLE WEAKNESS: ICD-10-CM

## 2021-01-29 DIAGNOSIS — M25.811 SHOULDER IMPINGEMENT, RIGHT: ICD-10-CM

## 2021-01-29 PROCEDURE — 97162 PT EVAL MOD COMPLEX 30 MIN: CPT | Mod: PO

## 2021-01-29 PROCEDURE — 97110 THERAPEUTIC EXERCISES: CPT | Mod: PO

## 2021-02-01 ENCOUNTER — CLINICAL SUPPORT (OUTPATIENT)
Dept: REHABILITATION | Facility: HOSPITAL | Age: 56
End: 2021-02-01
Attending: ORTHOPAEDIC SURGERY
Payer: COMMERCIAL

## 2021-02-01 DIAGNOSIS — M62.81 MUSCLE WEAKNESS: ICD-10-CM

## 2021-02-01 DIAGNOSIS — M25.611 DECREASED RANGE OF MOTION OF RIGHT SHOULDER: ICD-10-CM

## 2021-02-01 DIAGNOSIS — Z74.09 IMPAIRED FUNCTIONAL MOBILITY AND ACTIVITY TOLERANCE: ICD-10-CM

## 2021-02-01 DIAGNOSIS — M25.511 ACUTE PAIN OF RIGHT SHOULDER: ICD-10-CM

## 2021-02-01 PROCEDURE — 97110 THERAPEUTIC EXERCISES: CPT | Mod: PO

## 2021-02-02 ENCOUNTER — PATIENT MESSAGE (OUTPATIENT)
Dept: ORTHOPEDICS | Facility: CLINIC | Age: 56
End: 2021-02-02

## 2021-02-04 ENCOUNTER — DOCUMENTATION ONLY (OUTPATIENT)
Dept: REHABILITATION | Facility: HOSPITAL | Age: 56
End: 2021-02-04

## 2021-02-04 ENCOUNTER — CLINICAL SUPPORT (OUTPATIENT)
Dept: REHABILITATION | Facility: HOSPITAL | Age: 56
End: 2021-02-04
Attending: ORTHOPAEDIC SURGERY
Payer: COMMERCIAL

## 2021-02-04 DIAGNOSIS — M62.81 MUSCLE WEAKNESS: ICD-10-CM

## 2021-02-04 DIAGNOSIS — M25.611 DECREASED RANGE OF MOTION OF RIGHT SHOULDER: ICD-10-CM

## 2021-02-04 DIAGNOSIS — Z74.09 IMPAIRED FUNCTIONAL MOBILITY AND ACTIVITY TOLERANCE: ICD-10-CM

## 2021-02-04 DIAGNOSIS — M25.511 ACUTE PAIN OF RIGHT SHOULDER: ICD-10-CM

## 2021-02-04 PROCEDURE — 97110 THERAPEUTIC EXERCISES: CPT | Mod: PO,CQ

## 2021-02-04 PROCEDURE — 97140 MANUAL THERAPY 1/> REGIONS: CPT | Mod: PO,CQ

## 2021-02-05 ENCOUNTER — PATIENT MESSAGE (OUTPATIENT)
Dept: NEUROLOGY | Facility: CLINIC | Age: 56
End: 2021-02-05

## 2021-02-11 ENCOUNTER — CLINICAL SUPPORT (OUTPATIENT)
Dept: REHABILITATION | Facility: HOSPITAL | Age: 56
End: 2021-02-11
Attending: ORTHOPAEDIC SURGERY
Payer: COMMERCIAL

## 2021-02-11 DIAGNOSIS — M62.81 MUSCLE WEAKNESS: ICD-10-CM

## 2021-02-11 DIAGNOSIS — M25.611 DECREASED RANGE OF MOTION OF RIGHT SHOULDER: ICD-10-CM

## 2021-02-11 DIAGNOSIS — Z74.09 IMPAIRED FUNCTIONAL MOBILITY AND ACTIVITY TOLERANCE: ICD-10-CM

## 2021-02-11 DIAGNOSIS — M25.511 ACUTE PAIN OF RIGHT SHOULDER: ICD-10-CM

## 2021-02-11 PROCEDURE — 97110 THERAPEUTIC EXERCISES: CPT | Mod: PO,CQ

## 2021-02-11 PROCEDURE — 97140 MANUAL THERAPY 1/> REGIONS: CPT | Mod: PO,CQ

## 2021-02-18 ENCOUNTER — PATIENT MESSAGE (OUTPATIENT)
Dept: NEUROLOGY | Facility: CLINIC | Age: 56
End: 2021-02-18

## 2021-02-22 ENCOUNTER — OFFICE VISIT (OUTPATIENT)
Dept: ORTHOPEDICS | Facility: CLINIC | Age: 56
End: 2021-02-22
Payer: COMMERCIAL

## 2021-02-22 VITALS — HEIGHT: 62 IN | BODY MASS INDEX: 23.92 KG/M2 | WEIGHT: 130 LBS

## 2021-02-22 DIAGNOSIS — M25.811 SHOULDER IMPINGEMENT, RIGHT: Primary | ICD-10-CM

## 2021-02-22 PROCEDURE — 99999 PR PBB SHADOW E&M-EST. PATIENT-LVL III: ICD-10-PCS | Mod: PBBFAC,,, | Performed by: ORTHOPAEDIC SURGERY

## 2021-02-22 PROCEDURE — 1125F AMNT PAIN NOTED PAIN PRSNT: CPT | Mod: S$GLB,,, | Performed by: ORTHOPAEDIC SURGERY

## 2021-02-22 PROCEDURE — 3008F BODY MASS INDEX DOCD: CPT | Mod: CPTII,S$GLB,, | Performed by: ORTHOPAEDIC SURGERY

## 2021-02-22 PROCEDURE — 99024 PR POST-OP FOLLOW-UP VISIT: ICD-10-PCS | Mod: S$GLB,,, | Performed by: ORTHOPAEDIC SURGERY

## 2021-02-22 PROCEDURE — 3008F PR BODY MASS INDEX (BMI) DOCUMENTED: ICD-10-PCS | Mod: CPTII,S$GLB,, | Performed by: ORTHOPAEDIC SURGERY

## 2021-02-22 PROCEDURE — 99024 POSTOP FOLLOW-UP VISIT: CPT | Mod: S$GLB,,, | Performed by: ORTHOPAEDIC SURGERY

## 2021-02-22 PROCEDURE — 99999 PR PBB SHADOW E&M-EST. PATIENT-LVL III: CPT | Mod: PBBFAC,,, | Performed by: ORTHOPAEDIC SURGERY

## 2021-02-22 PROCEDURE — 1125F PR PAIN SEVERITY QUANTIFIED, PAIN PRESENT: ICD-10-PCS | Mod: S$GLB,,, | Performed by: ORTHOPAEDIC SURGERY

## 2021-02-23 ENCOUNTER — CLINICAL SUPPORT (OUTPATIENT)
Dept: REHABILITATION | Facility: HOSPITAL | Age: 56
End: 2021-02-23
Attending: ORTHOPAEDIC SURGERY
Payer: COMMERCIAL

## 2021-02-23 DIAGNOSIS — M25.511 ACUTE PAIN OF RIGHT SHOULDER: ICD-10-CM

## 2021-02-23 DIAGNOSIS — Z74.09 IMPAIRED FUNCTIONAL MOBILITY AND ACTIVITY TOLERANCE: ICD-10-CM

## 2021-02-23 DIAGNOSIS — M62.81 MUSCLE WEAKNESS: ICD-10-CM

## 2021-02-23 DIAGNOSIS — M25.611 DECREASED RANGE OF MOTION OF RIGHT SHOULDER: ICD-10-CM

## 2021-02-23 PROCEDURE — 97110 THERAPEUTIC EXERCISES: CPT | Mod: PO,CQ

## 2021-02-23 PROCEDURE — 97140 MANUAL THERAPY 1/> REGIONS: CPT | Mod: PO,CQ

## 2021-02-26 ENCOUNTER — CLINICAL SUPPORT (OUTPATIENT)
Dept: REHABILITATION | Facility: HOSPITAL | Age: 56
End: 2021-02-26
Attending: ORTHOPAEDIC SURGERY
Payer: COMMERCIAL

## 2021-02-26 DIAGNOSIS — M25.611 DECREASED RANGE OF MOTION OF RIGHT SHOULDER: ICD-10-CM

## 2021-02-26 DIAGNOSIS — Z74.09 IMPAIRED FUNCTIONAL MOBILITY AND ACTIVITY TOLERANCE: ICD-10-CM

## 2021-02-26 DIAGNOSIS — M62.81 MUSCLE WEAKNESS: ICD-10-CM

## 2021-02-26 DIAGNOSIS — M25.511 ACUTE PAIN OF RIGHT SHOULDER: ICD-10-CM

## 2021-02-26 PROCEDURE — 97140 MANUAL THERAPY 1/> REGIONS: CPT | Mod: PO,CQ

## 2021-02-26 PROCEDURE — 97110 THERAPEUTIC EXERCISES: CPT | Mod: PO,CQ

## 2021-03-01 ENCOUNTER — CLINICAL SUPPORT (OUTPATIENT)
Dept: REHABILITATION | Facility: HOSPITAL | Age: 56
End: 2021-03-01
Attending: ORTHOPAEDIC SURGERY
Payer: COMMERCIAL

## 2021-03-01 DIAGNOSIS — M25.511 ACUTE PAIN OF RIGHT SHOULDER: ICD-10-CM

## 2021-03-01 DIAGNOSIS — M62.81 MUSCLE WEAKNESS: ICD-10-CM

## 2021-03-01 DIAGNOSIS — Z74.09 IMPAIRED FUNCTIONAL MOBILITY AND ACTIVITY TOLERANCE: ICD-10-CM

## 2021-03-01 DIAGNOSIS — M25.611 DECREASED RANGE OF MOTION OF RIGHT SHOULDER: ICD-10-CM

## 2021-03-01 PROCEDURE — 97110 THERAPEUTIC EXERCISES: CPT | Mod: PO

## 2021-03-05 ENCOUNTER — CLINICAL SUPPORT (OUTPATIENT)
Dept: REHABILITATION | Facility: HOSPITAL | Age: 56
End: 2021-03-05
Attending: ORTHOPAEDIC SURGERY
Payer: COMMERCIAL

## 2021-03-05 DIAGNOSIS — M62.81 MUSCLE WEAKNESS: ICD-10-CM

## 2021-03-05 DIAGNOSIS — M25.511 ACUTE PAIN OF RIGHT SHOULDER: ICD-10-CM

## 2021-03-05 DIAGNOSIS — Z74.09 IMPAIRED FUNCTIONAL MOBILITY AND ACTIVITY TOLERANCE: ICD-10-CM

## 2021-03-05 DIAGNOSIS — M25.611 DECREASED RANGE OF MOTION OF RIGHT SHOULDER: ICD-10-CM

## 2021-03-05 PROCEDURE — 97140 MANUAL THERAPY 1/> REGIONS: CPT | Mod: PO

## 2021-03-05 PROCEDURE — 97110 THERAPEUTIC EXERCISES: CPT | Mod: PO

## 2021-03-08 ENCOUNTER — CLINICAL SUPPORT (OUTPATIENT)
Dept: REHABILITATION | Facility: HOSPITAL | Age: 56
End: 2021-03-08
Attending: ORTHOPAEDIC SURGERY
Payer: COMMERCIAL

## 2021-03-08 DIAGNOSIS — Z74.09 IMPAIRED FUNCTIONAL MOBILITY AND ACTIVITY TOLERANCE: ICD-10-CM

## 2021-03-08 DIAGNOSIS — M25.611 DECREASED RANGE OF MOTION OF RIGHT SHOULDER: ICD-10-CM

## 2021-03-08 DIAGNOSIS — M62.81 MUSCLE WEAKNESS: ICD-10-CM

## 2021-03-08 DIAGNOSIS — M25.511 ACUTE PAIN OF RIGHT SHOULDER: ICD-10-CM

## 2021-03-08 PROCEDURE — 97530 THERAPEUTIC ACTIVITIES: CPT | Mod: PO

## 2021-03-08 PROCEDURE — 97140 MANUAL THERAPY 1/> REGIONS: CPT | Mod: PO

## 2021-03-08 PROCEDURE — 97110 THERAPEUTIC EXERCISES: CPT | Mod: PO

## 2021-03-10 ENCOUNTER — PATIENT OUTREACH (OUTPATIENT)
Dept: ADMINISTRATIVE | Facility: OTHER | Age: 56
End: 2021-03-10

## 2021-03-12 ENCOUNTER — OFFICE VISIT (OUTPATIENT)
Dept: NEUROLOGY | Facility: CLINIC | Age: 56
End: 2021-03-12
Payer: COMMERCIAL

## 2021-03-12 ENCOUNTER — CLINICAL SUPPORT (OUTPATIENT)
Dept: REHABILITATION | Facility: HOSPITAL | Age: 56
End: 2021-03-12
Attending: ORTHOPAEDIC SURGERY
Payer: COMMERCIAL

## 2021-03-12 DIAGNOSIS — F41.9 ANXIETY: ICD-10-CM

## 2021-03-12 DIAGNOSIS — M25.611 DECREASED RANGE OF MOTION OF RIGHT SHOULDER: ICD-10-CM

## 2021-03-12 DIAGNOSIS — D84.821 IMMUNOSUPPRESSION DUE TO DRUG THERAPY: ICD-10-CM

## 2021-03-12 DIAGNOSIS — E55.9 VITAMIN D INSUFFICIENCY: ICD-10-CM

## 2021-03-12 DIAGNOSIS — Z98.890 HISTORY OF SHOULDER SURGERY: ICD-10-CM

## 2021-03-12 DIAGNOSIS — M25.511 ACUTE PAIN OF RIGHT SHOULDER: ICD-10-CM

## 2021-03-12 DIAGNOSIS — Z79.899 IMMUNOSUPPRESSION DUE TO DRUG THERAPY: ICD-10-CM

## 2021-03-12 DIAGNOSIS — Z74.09 IMPAIRED FUNCTIONAL MOBILITY AND ACTIVITY TOLERANCE: ICD-10-CM

## 2021-03-12 DIAGNOSIS — M62.81 MUSCLE WEAKNESS: ICD-10-CM

## 2021-03-12 DIAGNOSIS — Z71.89 COUNSELING REGARDING GOALS OF CARE: ICD-10-CM

## 2021-03-12 DIAGNOSIS — G35 MULTIPLE SCLEROSIS: Primary | ICD-10-CM

## 2021-03-12 PROCEDURE — 99214 OFFICE O/P EST MOD 30 MIN: CPT | Mod: 95,,, | Performed by: PHYSICIAN ASSISTANT

## 2021-03-12 PROCEDURE — 97110 THERAPEUTIC EXERCISES: CPT | Mod: PO,CQ

## 2021-03-12 PROCEDURE — 99214 PR OFFICE/OUTPT VISIT, EST, LEVL IV, 30-39 MIN: ICD-10-PCS | Mod: 95,,, | Performed by: PHYSICIAN ASSISTANT

## 2021-03-16 ENCOUNTER — TELEPHONE (OUTPATIENT)
Dept: NEUROLOGY | Facility: CLINIC | Age: 56
End: 2021-03-16

## 2021-03-16 DIAGNOSIS — Z13.9 SCREENING FOR CONDITION: Primary | ICD-10-CM

## 2021-03-16 NOTE — TELEPHONE ENCOUNTER
----- Message from Shelbi Wisdom PA-C sent at 3/12/2021 11:45 AM CST -----  She is getting second covid vaccine 3/17.  Labs afterward. Can plan for Ocrevus at [place] mid April

## 2021-03-22 ENCOUNTER — CLINICAL SUPPORT (OUTPATIENT)
Dept: REHABILITATION | Facility: HOSPITAL | Age: 56
End: 2021-03-22
Attending: ORTHOPAEDIC SURGERY
Payer: COMMERCIAL

## 2021-03-22 DIAGNOSIS — Z74.09 IMPAIRED FUNCTIONAL MOBILITY AND ACTIVITY TOLERANCE: ICD-10-CM

## 2021-03-22 DIAGNOSIS — M25.511 ACUTE PAIN OF RIGHT SHOULDER: ICD-10-CM

## 2021-03-22 DIAGNOSIS — M25.611 DECREASED RANGE OF MOTION OF RIGHT SHOULDER: ICD-10-CM

## 2021-03-22 DIAGNOSIS — M62.81 MUSCLE WEAKNESS: ICD-10-CM

## 2021-03-22 PROCEDURE — 97110 THERAPEUTIC EXERCISES: CPT | Mod: PO

## 2021-03-22 PROCEDURE — 97140 MANUAL THERAPY 1/> REGIONS: CPT | Mod: PO

## 2021-03-23 ENCOUNTER — LAB VISIT (OUTPATIENT)
Dept: LAB | Facility: HOSPITAL | Age: 56
End: 2021-03-23
Attending: PHYSICIAN ASSISTANT
Payer: COMMERCIAL

## 2021-03-23 DIAGNOSIS — Z79.899 IMMUNOSUPPRESSION DUE TO DRUG THERAPY: ICD-10-CM

## 2021-03-23 DIAGNOSIS — G35 MULTIPLE SCLEROSIS: ICD-10-CM

## 2021-03-23 DIAGNOSIS — D84.821 IMMUNOSUPPRESSION DUE TO DRUG THERAPY: ICD-10-CM

## 2021-03-23 LAB
BASOPHILS # BLD AUTO: 0.07 K/UL (ref 0–0.2)
BASOPHILS NFR BLD: 1.2 % (ref 0–1.9)
DIFFERENTIAL METHOD: ABNORMAL
EOSINOPHIL # BLD AUTO: 0.6 K/UL (ref 0–0.5)
EOSINOPHIL NFR BLD: 9.8 % (ref 0–8)
ERYTHROCYTE [DISTWIDTH] IN BLOOD BY AUTOMATED COUNT: 12.2 % (ref 11.5–14.5)
HCT VFR BLD AUTO: 41.5 % (ref 37–48.5)
HGB BLD-MCNC: 13.8 G/DL (ref 12–16)
IMM GRANULOCYTES # BLD AUTO: 0.02 K/UL (ref 0–0.04)
IMM GRANULOCYTES NFR BLD AUTO: 0.4 % (ref 0–0.5)
LYMPHOCYTES # BLD AUTO: 1.6 K/UL (ref 1–4.8)
LYMPHOCYTES NFR BLD: 28.2 % (ref 18–48)
MCH RBC QN AUTO: 32.5 PG (ref 27–31)
MCHC RBC AUTO-ENTMCNC: 33.3 G/DL (ref 32–36)
MCV RBC AUTO: 98 FL (ref 82–98)
MONOCYTES # BLD AUTO: 0.8 K/UL (ref 0.3–1)
MONOCYTES NFR BLD: 13.5 % (ref 4–15)
NEUTROPHILS # BLD AUTO: 2.6 K/UL (ref 1.8–7.7)
NEUTROPHILS NFR BLD: 46.9 % (ref 38–73)
NRBC BLD-RTO: 0 /100 WBC
PLATELET # BLD AUTO: 331 K/UL (ref 150–350)
PMV BLD AUTO: 10.2 FL (ref 9.2–12.9)
RBC # BLD AUTO: 4.24 M/UL (ref 4–5.4)
WBC # BLD AUTO: 5.63 K/UL (ref 3.9–12.7)

## 2021-03-23 PROCEDURE — 36415 COLL VENOUS BLD VENIPUNCTURE: CPT | Mod: PO | Performed by: PHYSICIAN ASSISTANT

## 2021-03-23 PROCEDURE — 85025 COMPLETE CBC W/AUTO DIFF WBC: CPT | Performed by: PHYSICIAN ASSISTANT

## 2021-03-23 PROCEDURE — 86356 MONONUCLEAR CELL ANTIGEN: CPT | Performed by: PHYSICIAN ASSISTANT

## 2021-03-24 ENCOUNTER — PATIENT MESSAGE (OUTPATIENT)
Dept: NEUROLOGY | Facility: CLINIC | Age: 56
End: 2021-03-24

## 2021-03-24 ENCOUNTER — CLINICAL SUPPORT (OUTPATIENT)
Dept: REHABILITATION | Facility: HOSPITAL | Age: 56
End: 2021-03-24
Attending: ORTHOPAEDIC SURGERY
Payer: COMMERCIAL

## 2021-03-24 DIAGNOSIS — Z74.09 IMPAIRED FUNCTIONAL MOBILITY AND ACTIVITY TOLERANCE: ICD-10-CM

## 2021-03-24 DIAGNOSIS — M25.511 ACUTE PAIN OF RIGHT SHOULDER: ICD-10-CM

## 2021-03-24 DIAGNOSIS — M25.611 DECREASED RANGE OF MOTION OF RIGHT SHOULDER: ICD-10-CM

## 2021-03-24 DIAGNOSIS — M62.81 MUSCLE WEAKNESS: ICD-10-CM

## 2021-03-24 PROCEDURE — 97140 MANUAL THERAPY 1/> REGIONS: CPT | Mod: PO

## 2021-03-24 PROCEDURE — 97110 THERAPEUTIC EXERCISES: CPT | Mod: PO

## 2021-03-25 LAB
APPEARANCE SPEC: NORMAL
CD20 CELLS NFR SPEC: NORMAL %
SPECIMEN SOURCE: NORMAL
VIABLE CELLS NFR SPEC: 95 %

## 2021-03-29 ENCOUNTER — CLINICAL SUPPORT (OUTPATIENT)
Dept: REHABILITATION | Facility: HOSPITAL | Age: 56
End: 2021-03-29
Attending: ORTHOPAEDIC SURGERY
Payer: COMMERCIAL

## 2021-03-29 DIAGNOSIS — M62.81 MUSCLE WEAKNESS: ICD-10-CM

## 2021-03-29 DIAGNOSIS — Z74.09 IMPAIRED FUNCTIONAL MOBILITY AND ACTIVITY TOLERANCE: ICD-10-CM

## 2021-03-29 DIAGNOSIS — M25.611 DECREASED RANGE OF MOTION OF RIGHT SHOULDER: ICD-10-CM

## 2021-03-29 DIAGNOSIS — M25.511 ACUTE PAIN OF RIGHT SHOULDER: ICD-10-CM

## 2021-03-29 PROCEDURE — 97110 THERAPEUTIC EXERCISES: CPT | Mod: PO,CQ

## 2021-03-29 PROCEDURE — 97140 MANUAL THERAPY 1/> REGIONS: CPT | Mod: PO,CQ

## 2021-03-30 ENCOUNTER — PATIENT MESSAGE (OUTPATIENT)
Dept: NEUROLOGY | Facility: CLINIC | Age: 56
End: 2021-03-30

## 2021-03-30 DIAGNOSIS — E53.8 B12 DEFICIENCY: Primary | ICD-10-CM

## 2021-03-31 ENCOUNTER — CLINICAL SUPPORT (OUTPATIENT)
Dept: REHABILITATION | Facility: HOSPITAL | Age: 56
End: 2021-03-31
Attending: ORTHOPAEDIC SURGERY
Payer: COMMERCIAL

## 2021-03-31 DIAGNOSIS — M25.511 ACUTE PAIN OF RIGHT SHOULDER: ICD-10-CM

## 2021-03-31 DIAGNOSIS — M62.81 MUSCLE WEAKNESS: ICD-10-CM

## 2021-03-31 DIAGNOSIS — M25.611 DECREASED RANGE OF MOTION OF RIGHT SHOULDER: ICD-10-CM

## 2021-03-31 DIAGNOSIS — Z74.09 IMPAIRED FUNCTIONAL MOBILITY AND ACTIVITY TOLERANCE: ICD-10-CM

## 2021-03-31 PROCEDURE — 97110 THERAPEUTIC EXERCISES: CPT | Mod: PO

## 2021-03-31 PROCEDURE — 97140 MANUAL THERAPY 1/> REGIONS: CPT | Mod: PO

## 2021-04-05 ENCOUNTER — PATIENT MESSAGE (OUTPATIENT)
Dept: ADMINISTRATIVE | Facility: HOSPITAL | Age: 56
End: 2021-04-05

## 2021-04-05 ENCOUNTER — OFFICE VISIT (OUTPATIENT)
Dept: ORTHOPEDICS | Facility: CLINIC | Age: 56
End: 2021-04-05
Payer: COMMERCIAL

## 2021-04-05 ENCOUNTER — LAB VISIT (OUTPATIENT)
Dept: LAB | Facility: HOSPITAL | Age: 56
End: 2021-04-05
Attending: PHYSICIAN ASSISTANT
Payer: COMMERCIAL

## 2021-04-05 VITALS — WEIGHT: 130.06 LBS | BODY MASS INDEX: 23.93 KG/M2 | HEIGHT: 62 IN

## 2021-04-05 DIAGNOSIS — E53.8 B12 DEFICIENCY: ICD-10-CM

## 2021-04-05 DIAGNOSIS — Z98.890 S/P ARTHROSCOPY OF RIGHT SHOULDER: Primary | ICD-10-CM

## 2021-04-05 LAB — VIT B12 SERPL-MCNC: 664 PG/ML (ref 210–950)

## 2021-04-05 PROCEDURE — 1126F AMNT PAIN NOTED NONE PRSNT: CPT | Mod: S$GLB,,, | Performed by: ORTHOPAEDIC SURGERY

## 2021-04-05 PROCEDURE — 99999 PR PBB SHADOW E&M-EST. PATIENT-LVL III: CPT | Mod: PBBFAC,,, | Performed by: ORTHOPAEDIC SURGERY

## 2021-04-05 PROCEDURE — 82607 VITAMIN B-12: CPT | Performed by: PHYSICIAN ASSISTANT

## 2021-04-05 PROCEDURE — 99024 POSTOP FOLLOW-UP VISIT: CPT | Mod: S$GLB,,, | Performed by: ORTHOPAEDIC SURGERY

## 2021-04-05 PROCEDURE — 1126F PR PAIN SEVERITY QUANTIFIED, NO PAIN PRESENT: ICD-10-PCS | Mod: S$GLB,,, | Performed by: ORTHOPAEDIC SURGERY

## 2021-04-05 PROCEDURE — 36415 COLL VENOUS BLD VENIPUNCTURE: CPT | Mod: PO | Performed by: PHYSICIAN ASSISTANT

## 2021-04-05 PROCEDURE — 3008F BODY MASS INDEX DOCD: CPT | Mod: CPTII,S$GLB,, | Performed by: ORTHOPAEDIC SURGERY

## 2021-04-05 PROCEDURE — 99999 PR PBB SHADOW E&M-EST. PATIENT-LVL III: ICD-10-PCS | Mod: PBBFAC,,, | Performed by: ORTHOPAEDIC SURGERY

## 2021-04-05 PROCEDURE — 3008F PR BODY MASS INDEX (BMI) DOCUMENTED: ICD-10-PCS | Mod: CPTII,S$GLB,, | Performed by: ORTHOPAEDIC SURGERY

## 2021-04-05 PROCEDURE — 99024 PR POST-OP FOLLOW-UP VISIT: ICD-10-PCS | Mod: S$GLB,,, | Performed by: ORTHOPAEDIC SURGERY

## 2021-04-12 ENCOUNTER — LAB VISIT (OUTPATIENT)
Dept: INTERNAL MEDICINE | Facility: CLINIC | Age: 56
End: 2021-04-12
Payer: COMMERCIAL

## 2021-04-12 DIAGNOSIS — Z13.9 SCREENING FOR CONDITION: ICD-10-CM

## 2021-04-12 PROCEDURE — U0003 INFECTIOUS AGENT DETECTION BY NUCLEIC ACID (DNA OR RNA); SEVERE ACUTE RESPIRATORY SYNDROME CORONAVIRUS 2 (SARS-COV-2) (CORONAVIRUS DISEASE [COVID-19]), AMPLIFIED PROBE TECHNIQUE, MAKING USE OF HIGH THROUGHPUT TECHNOLOGIES AS DESCRIBED BY CMS-2020-01-R: HCPCS | Performed by: PHYSICIAN ASSISTANT

## 2021-04-12 PROCEDURE — U0005 INFEC AGEN DETEC AMPLI PROBE: HCPCS | Performed by: PHYSICIAN ASSISTANT

## 2021-04-13 LAB — SARS-COV-2 RNA RESP QL NAA+PROBE: NOT DETECTED

## 2021-04-14 ENCOUNTER — DOCUMENTATION ONLY (OUTPATIENT)
Dept: REHABILITATION | Facility: HOSPITAL | Age: 56
End: 2021-04-14

## 2021-04-14 ENCOUNTER — CLINICAL SUPPORT (OUTPATIENT)
Dept: REHABILITATION | Facility: HOSPITAL | Age: 56
End: 2021-04-14
Attending: ORTHOPAEDIC SURGERY
Payer: COMMERCIAL

## 2021-04-14 DIAGNOSIS — Z74.09 IMPAIRED FUNCTIONAL MOBILITY AND ACTIVITY TOLERANCE: ICD-10-CM

## 2021-04-14 DIAGNOSIS — M25.511 ACUTE PAIN OF RIGHT SHOULDER: ICD-10-CM

## 2021-04-14 DIAGNOSIS — M25.611 DECREASED RANGE OF MOTION OF RIGHT SHOULDER: ICD-10-CM

## 2021-04-14 DIAGNOSIS — M62.81 MUSCLE WEAKNESS: ICD-10-CM

## 2021-04-14 PROCEDURE — 97140 MANUAL THERAPY 1/> REGIONS: CPT | Mod: PO,CQ

## 2021-04-14 PROCEDURE — 97110 THERAPEUTIC EXERCISES: CPT | Mod: PO,CQ

## 2021-04-15 ENCOUNTER — INFUSION (OUTPATIENT)
Dept: INFUSION THERAPY | Facility: HOSPITAL | Age: 56
End: 2021-04-15
Payer: COMMERCIAL

## 2021-04-15 VITALS
SYSTOLIC BLOOD PRESSURE: 110 MMHG | RESPIRATION RATE: 18 BRPM | HEART RATE: 93 BPM | DIASTOLIC BLOOD PRESSURE: 81 MMHG | TEMPERATURE: 98 F

## 2021-04-15 DIAGNOSIS — G35 MS (MULTIPLE SCLEROSIS): Primary | ICD-10-CM

## 2021-04-15 PROCEDURE — 25000003 PHARM REV CODE 250: Performed by: PSYCHIATRY & NEUROLOGY

## 2021-04-15 PROCEDURE — 96375 TX/PRO/DX INJ NEW DRUG ADDON: CPT

## 2021-04-15 PROCEDURE — 96366 THER/PROPH/DIAG IV INF ADDON: CPT

## 2021-04-15 PROCEDURE — 96365 THER/PROPH/DIAG IV INF INIT: CPT

## 2021-04-15 PROCEDURE — 63600175 PHARM REV CODE 636 W HCPCS: Performed by: PSYCHIATRY & NEUROLOGY

## 2021-04-15 PROCEDURE — 96367 TX/PROPH/DG ADDL SEQ IV INF: CPT

## 2021-04-15 RX ORDER — ACETAMINOPHEN 500 MG
1000 TABLET ORAL
Status: CANCELLED | OUTPATIENT
Start: 2021-06-10 | End: 2021-06-10

## 2021-04-15 RX ORDER — HEPARIN 100 UNIT/ML
100 SYRINGE INTRAVENOUS
Status: CANCELLED | OUTPATIENT
Start: 2021-06-10

## 2021-04-15 RX ORDER — SODIUM CHLORIDE 0.9 % (FLUSH) 0.9 %
10 SYRINGE (ML) INJECTION
Status: DISCONTINUED | OUTPATIENT
Start: 2021-04-15 | End: 2021-04-15 | Stop reason: HOSPADM

## 2021-04-15 RX ORDER — FAMOTIDINE 10 MG/ML
20 INJECTION INTRAVENOUS
Status: COMPLETED | OUTPATIENT
Start: 2021-04-15 | End: 2021-04-15

## 2021-04-15 RX ORDER — ACETAMINOPHEN 500 MG
1000 TABLET ORAL
Status: COMPLETED | OUTPATIENT
Start: 2021-04-15 | End: 2021-04-15

## 2021-04-15 RX ORDER — SODIUM CHLORIDE 0.9 % (FLUSH) 0.9 %
10 SYRINGE (ML) INJECTION
Status: CANCELLED | OUTPATIENT
Start: 2021-06-10

## 2021-04-15 RX ORDER — HEPARIN 100 UNIT/ML
100 SYRINGE INTRAVENOUS
Status: DISCONTINUED | OUTPATIENT
Start: 2021-04-15 | End: 2021-04-15 | Stop reason: HOSPADM

## 2021-04-15 RX ADMIN — DIPHENHYDRAMINE HYDROCHLORIDE 50 MG: 50 INJECTION INTRAMUSCULAR; INTRAVENOUS at 07:04

## 2021-04-15 RX ADMIN — OCRELIZUMAB 600 MG: 300 INJECTION INTRAVENOUS at 08:04

## 2021-04-15 RX ADMIN — DEXTROSE: 50 INJECTION, SOLUTION INTRAVENOUS at 08:04

## 2021-04-15 RX ADMIN — ACETAMINOPHEN 1000 MG: 500 TABLET ORAL at 07:04

## 2021-04-15 RX ADMIN — FAMOTIDINE 20 MG: 10 INJECTION INTRAVENOUS at 07:04

## 2021-04-20 ENCOUNTER — CLINICAL SUPPORT (OUTPATIENT)
Dept: REHABILITATION | Facility: HOSPITAL | Age: 56
End: 2021-04-20
Attending: ORTHOPAEDIC SURGERY
Payer: COMMERCIAL

## 2021-04-20 DIAGNOSIS — M62.81 MUSCLE WEAKNESS: ICD-10-CM

## 2021-04-20 DIAGNOSIS — Z74.09 IMPAIRED FUNCTIONAL MOBILITY AND ACTIVITY TOLERANCE: ICD-10-CM

## 2021-04-20 DIAGNOSIS — M25.511 ACUTE PAIN OF RIGHT SHOULDER: ICD-10-CM

## 2021-04-20 DIAGNOSIS — M25.611 DECREASED RANGE OF MOTION OF RIGHT SHOULDER: ICD-10-CM

## 2021-04-20 PROCEDURE — 97110 THERAPEUTIC EXERCISES: CPT | Mod: PO,CQ

## 2021-04-23 ENCOUNTER — CLINICAL SUPPORT (OUTPATIENT)
Dept: REHABILITATION | Facility: HOSPITAL | Age: 56
End: 2021-04-23
Attending: ORTHOPAEDIC SURGERY
Payer: COMMERCIAL

## 2021-04-23 DIAGNOSIS — M25.511 ACUTE PAIN OF RIGHT SHOULDER: ICD-10-CM

## 2021-04-23 DIAGNOSIS — M25.611 DECREASED RANGE OF MOTION OF RIGHT SHOULDER: ICD-10-CM

## 2021-04-23 DIAGNOSIS — Z74.09 IMPAIRED FUNCTIONAL MOBILITY AND ACTIVITY TOLERANCE: ICD-10-CM

## 2021-04-23 DIAGNOSIS — M62.81 MUSCLE WEAKNESS: ICD-10-CM

## 2021-04-23 PROCEDURE — 97110 THERAPEUTIC EXERCISES: CPT | Mod: PO

## 2021-04-23 PROCEDURE — 97530 THERAPEUTIC ACTIVITIES: CPT | Mod: PO

## 2021-04-23 PROCEDURE — 97140 MANUAL THERAPY 1/> REGIONS: CPT | Mod: PO

## 2021-04-26 ENCOUNTER — CLINICAL SUPPORT (OUTPATIENT)
Dept: REHABILITATION | Facility: HOSPITAL | Age: 56
End: 2021-04-26
Attending: ORTHOPAEDIC SURGERY
Payer: COMMERCIAL

## 2021-04-26 DIAGNOSIS — M25.511 ACUTE PAIN OF RIGHT SHOULDER: ICD-10-CM

## 2021-04-26 DIAGNOSIS — Z74.09 IMPAIRED FUNCTIONAL MOBILITY AND ACTIVITY TOLERANCE: ICD-10-CM

## 2021-04-26 DIAGNOSIS — M62.81 MUSCLE WEAKNESS: ICD-10-CM

## 2021-04-26 DIAGNOSIS — M25.611 DECREASED RANGE OF MOTION OF RIGHT SHOULDER: ICD-10-CM

## 2021-04-26 PROCEDURE — 97110 THERAPEUTIC EXERCISES: CPT | Mod: PO

## 2021-04-26 PROCEDURE — 97140 MANUAL THERAPY 1/> REGIONS: CPT | Mod: PO

## 2021-04-29 ENCOUNTER — CLINICAL SUPPORT (OUTPATIENT)
Dept: REHABILITATION | Facility: HOSPITAL | Age: 56
End: 2021-04-29
Attending: ORTHOPAEDIC SURGERY
Payer: COMMERCIAL

## 2021-04-29 DIAGNOSIS — M62.81 MUSCLE WEAKNESS: ICD-10-CM

## 2021-04-29 DIAGNOSIS — M25.611 DECREASED RANGE OF MOTION OF RIGHT SHOULDER: ICD-10-CM

## 2021-04-29 DIAGNOSIS — Z74.09 IMPAIRED FUNCTIONAL MOBILITY AND ACTIVITY TOLERANCE: ICD-10-CM

## 2021-04-29 DIAGNOSIS — M25.511 ACUTE PAIN OF RIGHT SHOULDER: ICD-10-CM

## 2021-04-29 PROCEDURE — 97110 THERAPEUTIC EXERCISES: CPT | Mod: PO,CQ

## 2021-04-30 ENCOUNTER — DOCUMENTATION ONLY (OUTPATIENT)
Dept: REHABILITATION | Facility: HOSPITAL | Age: 56
End: 2021-04-30

## 2021-05-03 ENCOUNTER — CLINICAL SUPPORT (OUTPATIENT)
Dept: REHABILITATION | Facility: HOSPITAL | Age: 56
End: 2021-05-03
Attending: ORTHOPAEDIC SURGERY
Payer: COMMERCIAL

## 2021-05-03 DIAGNOSIS — M62.81 MUSCLE WEAKNESS: ICD-10-CM

## 2021-05-03 DIAGNOSIS — Z74.09 IMPAIRED FUNCTIONAL MOBILITY AND ACTIVITY TOLERANCE: ICD-10-CM

## 2021-05-03 DIAGNOSIS — M25.611 DECREASED RANGE OF MOTION OF RIGHT SHOULDER: ICD-10-CM

## 2021-05-03 DIAGNOSIS — M25.511 ACUTE PAIN OF RIGHT SHOULDER: ICD-10-CM

## 2021-05-03 PROCEDURE — 97110 THERAPEUTIC EXERCISES: CPT | Mod: PO

## 2021-05-05 ENCOUNTER — CLINICAL SUPPORT (OUTPATIENT)
Dept: REHABILITATION | Facility: HOSPITAL | Age: 56
End: 2021-05-05
Attending: ORTHOPAEDIC SURGERY
Payer: COMMERCIAL

## 2021-05-05 DIAGNOSIS — M25.511 ACUTE PAIN OF RIGHT SHOULDER: ICD-10-CM

## 2021-05-05 DIAGNOSIS — M25.611 DECREASED RANGE OF MOTION OF RIGHT SHOULDER: ICD-10-CM

## 2021-05-05 DIAGNOSIS — Z74.09 IMPAIRED FUNCTIONAL MOBILITY AND ACTIVITY TOLERANCE: ICD-10-CM

## 2021-05-05 DIAGNOSIS — M62.81 MUSCLE WEAKNESS: ICD-10-CM

## 2021-05-05 PROCEDURE — 97110 THERAPEUTIC EXERCISES: CPT | Mod: PO

## 2021-05-10 ENCOUNTER — CLINICAL SUPPORT (OUTPATIENT)
Dept: REHABILITATION | Facility: HOSPITAL | Age: 56
End: 2021-05-10
Attending: ORTHOPAEDIC SURGERY
Payer: COMMERCIAL

## 2021-05-10 DIAGNOSIS — M25.511 ACUTE PAIN OF RIGHT SHOULDER: ICD-10-CM

## 2021-05-10 DIAGNOSIS — M62.81 MUSCLE WEAKNESS: ICD-10-CM

## 2021-05-10 DIAGNOSIS — M25.611 DECREASED RANGE OF MOTION OF RIGHT SHOULDER: ICD-10-CM

## 2021-05-10 DIAGNOSIS — Z74.09 IMPAIRED FUNCTIONAL MOBILITY AND ACTIVITY TOLERANCE: ICD-10-CM

## 2021-05-10 PROCEDURE — 97110 THERAPEUTIC EXERCISES: CPT | Mod: PO

## 2021-05-12 ENCOUNTER — CLINICAL SUPPORT (OUTPATIENT)
Dept: REHABILITATION | Facility: HOSPITAL | Age: 56
End: 2021-05-12
Attending: ORTHOPAEDIC SURGERY
Payer: COMMERCIAL

## 2021-05-12 DIAGNOSIS — M62.81 MUSCLE WEAKNESS: ICD-10-CM

## 2021-05-12 DIAGNOSIS — M25.611 DECREASED RANGE OF MOTION OF RIGHT SHOULDER: ICD-10-CM

## 2021-05-12 DIAGNOSIS — M25.511 ACUTE PAIN OF RIGHT SHOULDER: ICD-10-CM

## 2021-05-12 DIAGNOSIS — Z74.09 IMPAIRED FUNCTIONAL MOBILITY AND ACTIVITY TOLERANCE: ICD-10-CM

## 2021-05-12 PROCEDURE — 97140 MANUAL THERAPY 1/> REGIONS: CPT | Mod: PO

## 2021-05-12 PROCEDURE — 97110 THERAPEUTIC EXERCISES: CPT | Mod: PO

## 2021-05-17 ENCOUNTER — CLINICAL SUPPORT (OUTPATIENT)
Dept: REHABILITATION | Facility: HOSPITAL | Age: 56
End: 2021-05-17
Attending: ORTHOPAEDIC SURGERY
Payer: COMMERCIAL

## 2021-05-17 ENCOUNTER — OFFICE VISIT (OUTPATIENT)
Dept: ORTHOPEDICS | Facility: CLINIC | Age: 56
End: 2021-05-17
Payer: COMMERCIAL

## 2021-05-17 VITALS
BODY MASS INDEX: 23.93 KG/M2 | WEIGHT: 130.06 LBS | SYSTOLIC BLOOD PRESSURE: 119 MMHG | HEART RATE: 91 BPM | HEIGHT: 62 IN | DIASTOLIC BLOOD PRESSURE: 78 MMHG

## 2021-05-17 DIAGNOSIS — M25.611 DECREASED RANGE OF MOTION OF RIGHT SHOULDER: ICD-10-CM

## 2021-05-17 DIAGNOSIS — Z74.09 IMPAIRED FUNCTIONAL MOBILITY AND ACTIVITY TOLERANCE: ICD-10-CM

## 2021-05-17 DIAGNOSIS — M25.511 ACUTE PAIN OF RIGHT SHOULDER: ICD-10-CM

## 2021-05-17 DIAGNOSIS — M62.81 MUSCLE WEAKNESS: ICD-10-CM

## 2021-05-17 DIAGNOSIS — Z98.890 S/P ARTHROSCOPY OF RIGHT SHOULDER: Primary | ICD-10-CM

## 2021-05-17 PROCEDURE — 3008F BODY MASS INDEX DOCD: CPT | Mod: CPTII,S$GLB,, | Performed by: ORTHOPAEDIC SURGERY

## 2021-05-17 PROCEDURE — 1126F AMNT PAIN NOTED NONE PRSNT: CPT | Mod: S$GLB,,, | Performed by: ORTHOPAEDIC SURGERY

## 2021-05-17 PROCEDURE — 97110 THERAPEUTIC EXERCISES: CPT | Mod: PO

## 2021-05-17 PROCEDURE — 99213 PR OFFICE/OUTPT VISIT, EST, LEVL III, 20-29 MIN: ICD-10-PCS | Mod: S$GLB,,, | Performed by: ORTHOPAEDIC SURGERY

## 2021-05-17 PROCEDURE — 3008F PR BODY MASS INDEX (BMI) DOCUMENTED: ICD-10-PCS | Mod: CPTII,S$GLB,, | Performed by: ORTHOPAEDIC SURGERY

## 2021-05-17 PROCEDURE — 99999 PR PBB SHADOW E&M-EST. PATIENT-LVL IV: ICD-10-PCS | Mod: PBBFAC,,, | Performed by: ORTHOPAEDIC SURGERY

## 2021-05-17 PROCEDURE — 99213 OFFICE O/P EST LOW 20 MIN: CPT | Mod: S$GLB,,, | Performed by: ORTHOPAEDIC SURGERY

## 2021-05-17 PROCEDURE — 99999 PR PBB SHADOW E&M-EST. PATIENT-LVL IV: CPT | Mod: PBBFAC,,, | Performed by: ORTHOPAEDIC SURGERY

## 2021-05-17 PROCEDURE — 1126F PR PAIN SEVERITY QUANTIFIED, NO PAIN PRESENT: ICD-10-PCS | Mod: S$GLB,,, | Performed by: ORTHOPAEDIC SURGERY

## 2021-05-19 ENCOUNTER — CLINICAL SUPPORT (OUTPATIENT)
Dept: REHABILITATION | Facility: HOSPITAL | Age: 56
End: 2021-05-19
Attending: ORTHOPAEDIC SURGERY
Payer: COMMERCIAL

## 2021-05-19 DIAGNOSIS — M62.81 MUSCLE WEAKNESS: ICD-10-CM

## 2021-05-19 DIAGNOSIS — M25.611 DECREASED RANGE OF MOTION OF RIGHT SHOULDER: ICD-10-CM

## 2021-05-19 DIAGNOSIS — M25.511 ACUTE PAIN OF RIGHT SHOULDER: ICD-10-CM

## 2021-05-19 DIAGNOSIS — Z74.09 IMPAIRED FUNCTIONAL MOBILITY AND ACTIVITY TOLERANCE: ICD-10-CM

## 2021-05-19 PROCEDURE — 97110 THERAPEUTIC EXERCISES: CPT | Mod: PO

## 2021-05-24 ENCOUNTER — CLINICAL SUPPORT (OUTPATIENT)
Dept: REHABILITATION | Facility: HOSPITAL | Age: 56
End: 2021-05-24
Attending: ORTHOPAEDIC SURGERY
Payer: COMMERCIAL

## 2021-05-24 DIAGNOSIS — M25.511 ACUTE PAIN OF RIGHT SHOULDER: ICD-10-CM

## 2021-05-24 DIAGNOSIS — M62.81 MUSCLE WEAKNESS: ICD-10-CM

## 2021-05-24 DIAGNOSIS — M25.611 DECREASED RANGE OF MOTION OF RIGHT SHOULDER: ICD-10-CM

## 2021-05-24 DIAGNOSIS — Z74.09 IMPAIRED FUNCTIONAL MOBILITY AND ACTIVITY TOLERANCE: ICD-10-CM

## 2021-05-24 PROCEDURE — 97110 THERAPEUTIC EXERCISES: CPT | Mod: PO

## 2021-05-24 PROCEDURE — 97140 MANUAL THERAPY 1/> REGIONS: CPT | Mod: PO

## 2021-05-27 ENCOUNTER — PATIENT MESSAGE (OUTPATIENT)
Dept: INTERNAL MEDICINE | Facility: CLINIC | Age: 56
End: 2021-05-27

## 2021-05-31 ENCOUNTER — PATIENT MESSAGE (OUTPATIENT)
Dept: PSYCHIATRY | Facility: CLINIC | Age: 56
End: 2021-05-31

## 2021-06-01 ENCOUNTER — DOCUMENTATION ONLY (OUTPATIENT)
Dept: REHABILITATION | Facility: HOSPITAL | Age: 56
End: 2021-06-01

## 2021-06-01 ENCOUNTER — CLINICAL SUPPORT (OUTPATIENT)
Dept: REHABILITATION | Facility: HOSPITAL | Age: 56
End: 2021-06-01
Attending: ORTHOPAEDIC SURGERY
Payer: COMMERCIAL

## 2021-06-01 DIAGNOSIS — Z74.09 IMPAIRED FUNCTIONAL MOBILITY AND ACTIVITY TOLERANCE: ICD-10-CM

## 2021-06-01 DIAGNOSIS — M25.511 ACUTE PAIN OF RIGHT SHOULDER: ICD-10-CM

## 2021-06-01 DIAGNOSIS — M25.611 DECREASED RANGE OF MOTION OF RIGHT SHOULDER: ICD-10-CM

## 2021-06-01 DIAGNOSIS — M62.81 MUSCLE WEAKNESS: ICD-10-CM

## 2021-06-01 PROCEDURE — 97110 THERAPEUTIC EXERCISES: CPT | Mod: PO,CQ

## 2021-06-02 ENCOUNTER — LAB VISIT (OUTPATIENT)
Dept: LAB | Facility: HOSPITAL | Age: 56
End: 2021-06-02
Attending: PHYSICIAN ASSISTANT
Payer: COMMERCIAL

## 2021-06-02 DIAGNOSIS — D84.821 IMMUNOSUPPRESSION DUE TO DRUG THERAPY: ICD-10-CM

## 2021-06-02 DIAGNOSIS — G35 MULTIPLE SCLEROSIS: ICD-10-CM

## 2021-06-02 DIAGNOSIS — E55.9 VITAMIN D INSUFFICIENCY: ICD-10-CM

## 2021-06-02 DIAGNOSIS — Z79.899 IMMUNOSUPPRESSION DUE TO DRUG THERAPY: ICD-10-CM

## 2021-06-02 LAB
BASOPHILS # BLD AUTO: 0.06 K/UL (ref 0–0.2)
BASOPHILS NFR BLD: 1 % (ref 0–1.9)
DIFFERENTIAL METHOD: ABNORMAL
EOSINOPHIL # BLD AUTO: 0.2 K/UL (ref 0–0.5)
EOSINOPHIL NFR BLD: 2.6 % (ref 0–8)
ERYTHROCYTE [DISTWIDTH] IN BLOOD BY AUTOMATED COUNT: 12.9 % (ref 11.5–14.5)
HCT VFR BLD AUTO: 43.6 % (ref 37–48.5)
HGB BLD-MCNC: 13.8 G/DL (ref 12–16)
IMM GRANULOCYTES # BLD AUTO: 0.02 K/UL (ref 0–0.04)
IMM GRANULOCYTES NFR BLD AUTO: 0.3 % (ref 0–0.5)
LYMPHOCYTES # BLD AUTO: 1.3 K/UL (ref 1–4.8)
LYMPHOCYTES NFR BLD: 22.9 % (ref 18–48)
MCH RBC QN AUTO: 31.6 PG (ref 27–31)
MCHC RBC AUTO-ENTMCNC: 31.7 G/DL (ref 32–36)
MCV RBC AUTO: 100 FL (ref 82–98)
MONOCYTES # BLD AUTO: 0.7 K/UL (ref 0.3–1)
MONOCYTES NFR BLD: 12 % (ref 4–15)
NEUTROPHILS # BLD AUTO: 3.6 K/UL (ref 1.8–7.7)
NEUTROPHILS NFR BLD: 61.2 % (ref 38–73)
NRBC BLD-RTO: 0 /100 WBC
PLATELET # BLD AUTO: 350 K/UL (ref 150–450)
PMV BLD AUTO: 10.2 FL (ref 9.2–12.9)
RBC # BLD AUTO: 4.37 M/UL (ref 4–5.4)
WBC # BLD AUTO: 5.84 K/UL (ref 3.9–12.7)

## 2021-06-02 PROCEDURE — 85025 COMPLETE CBC W/AUTO DIFF WBC: CPT | Performed by: PHYSICIAN ASSISTANT

## 2021-06-02 PROCEDURE — 86706 HEP B SURFACE ANTIBODY: CPT | Performed by: PHYSICIAN ASSISTANT

## 2021-06-02 PROCEDURE — 82306 VITAMIN D 25 HYDROXY: CPT | Performed by: PHYSICIAN ASSISTANT

## 2021-06-02 PROCEDURE — 36415 COLL VENOUS BLD VENIPUNCTURE: CPT | Mod: PO | Performed by: PHYSICIAN ASSISTANT

## 2021-06-02 PROCEDURE — 87340 HEPATITIS B SURFACE AG IA: CPT | Performed by: PHYSICIAN ASSISTANT

## 2021-06-02 PROCEDURE — 86704 HEP B CORE ANTIBODY TOTAL: CPT | Performed by: PHYSICIAN ASSISTANT

## 2021-06-02 PROCEDURE — 88184 FLOWCYTOMETRY/ TC 1 MARKER: CPT | Performed by: PHYSICIAN ASSISTANT

## 2021-06-03 ENCOUNTER — CLINICAL SUPPORT (OUTPATIENT)
Dept: REHABILITATION | Facility: HOSPITAL | Age: 56
End: 2021-06-03
Attending: ORTHOPAEDIC SURGERY
Payer: COMMERCIAL

## 2021-06-03 ENCOUNTER — PATIENT MESSAGE (OUTPATIENT)
Dept: NEUROLOGY | Facility: CLINIC | Age: 56
End: 2021-06-03

## 2021-06-03 DIAGNOSIS — Z74.09 IMPAIRED FUNCTIONAL MOBILITY AND ACTIVITY TOLERANCE: ICD-10-CM

## 2021-06-03 DIAGNOSIS — M25.611 DECREASED RANGE OF MOTION OF RIGHT SHOULDER: ICD-10-CM

## 2021-06-03 DIAGNOSIS — M25.511 ACUTE PAIN OF RIGHT SHOULDER: ICD-10-CM

## 2021-06-03 DIAGNOSIS — M62.81 MUSCLE WEAKNESS: ICD-10-CM

## 2021-06-03 LAB
25(OH)D3+25(OH)D2 SERPL-MCNC: 60 NG/ML (ref 30–96)
HBV CORE AB SERPL QL IA: NEGATIVE
HBV SURFACE AB SER-ACNC: POSITIVE M[IU]/ML
HBV SURFACE AG SERPL QL IA: NEGATIVE

## 2021-06-03 PROCEDURE — 97110 THERAPEUTIC EXERCISES: CPT | Mod: PO,CQ

## 2021-06-03 PROCEDURE — 97140 MANUAL THERAPY 1/> REGIONS: CPT | Mod: PO,CQ

## 2021-06-04 LAB
PATH REPORT.FINAL DX SPEC: NORMAL
RITUXAN SENSITIVITY (CD20): NORMAL

## 2021-06-08 ENCOUNTER — CLINICAL SUPPORT (OUTPATIENT)
Dept: REHABILITATION | Facility: HOSPITAL | Age: 56
End: 2021-06-08
Attending: ORTHOPAEDIC SURGERY
Payer: COMMERCIAL

## 2021-06-08 DIAGNOSIS — Z74.09 IMPAIRED FUNCTIONAL MOBILITY AND ACTIVITY TOLERANCE: ICD-10-CM

## 2021-06-08 DIAGNOSIS — M62.81 MUSCLE WEAKNESS: ICD-10-CM

## 2021-06-08 DIAGNOSIS — M25.511 ACUTE PAIN OF RIGHT SHOULDER: ICD-10-CM

## 2021-06-08 DIAGNOSIS — M25.611 DECREASED RANGE OF MOTION OF RIGHT SHOULDER: ICD-10-CM

## 2021-06-08 PROCEDURE — 97140 MANUAL THERAPY 1/> REGIONS: CPT | Mod: PO,CQ

## 2021-06-08 PROCEDURE — 97110 THERAPEUTIC EXERCISES: CPT | Mod: PO,CQ

## 2021-06-15 ENCOUNTER — CLINICAL SUPPORT (OUTPATIENT)
Dept: REHABILITATION | Facility: HOSPITAL | Age: 56
End: 2021-06-15
Attending: ORTHOPAEDIC SURGERY
Payer: COMMERCIAL

## 2021-06-15 ENCOUNTER — OFFICE VISIT (OUTPATIENT)
Dept: PSYCHIATRY | Facility: CLINIC | Age: 56
End: 2021-06-15
Payer: COMMERCIAL

## 2021-06-15 DIAGNOSIS — M25.611 DECREASED RANGE OF MOTION OF RIGHT SHOULDER: ICD-10-CM

## 2021-06-15 DIAGNOSIS — M62.81 MUSCLE WEAKNESS: ICD-10-CM

## 2021-06-15 DIAGNOSIS — Z74.09 IMPAIRED FUNCTIONAL MOBILITY AND ACTIVITY TOLERANCE: ICD-10-CM

## 2021-06-15 DIAGNOSIS — M25.511 ACUTE PAIN OF RIGHT SHOULDER: ICD-10-CM

## 2021-06-15 DIAGNOSIS — F41.1 GENERALIZED ANXIETY DISORDER: Primary | ICD-10-CM

## 2021-06-15 PROCEDURE — 90837 PSYTX W PT 60 MINUTES: CPT | Mod: S$GLB,,, | Performed by: SOCIAL WORKER

## 2021-06-15 PROCEDURE — 97110 THERAPEUTIC EXERCISES: CPT | Mod: PO

## 2021-06-15 PROCEDURE — 90837 PR PSYCHOTHERAPY W/PATIENT, 60 MIN: ICD-10-PCS | Mod: S$GLB,,, | Performed by: SOCIAL WORKER

## 2021-06-16 ENCOUNTER — HOSPITAL ENCOUNTER (OUTPATIENT)
Dept: RADIOLOGY | Facility: HOSPITAL | Age: 56
Discharge: HOME | End: 2021-06-16
Attending: PHYSICIAN ASSISTANT
Payer: COMMERCIAL

## 2021-06-16 DIAGNOSIS — G35 MULTIPLE SCLEROSIS: ICD-10-CM

## 2021-06-16 PROCEDURE — 70551 MRI BRAIN DEMYELINATING WITHOUT CONTRAST: ICD-10-PCS | Mod: 26,,, | Performed by: RADIOLOGY

## 2021-06-16 PROCEDURE — 70551 MRI BRAIN STEM W/O DYE: CPT | Mod: TC

## 2021-06-16 PROCEDURE — 70551 MRI BRAIN STEM W/O DYE: CPT | Mod: 26,,, | Performed by: RADIOLOGY

## 2021-06-17 ENCOUNTER — PATIENT OUTREACH (OUTPATIENT)
Dept: ADMINISTRATIVE | Facility: OTHER | Age: 56
End: 2021-06-17

## 2021-06-17 ENCOUNTER — PATIENT MESSAGE (OUTPATIENT)
Dept: ADMINISTRATIVE | Facility: OTHER | Age: 56
End: 2021-06-17

## 2021-06-18 ENCOUNTER — CLINICAL SUPPORT (OUTPATIENT)
Dept: REHABILITATION | Facility: HOSPITAL | Age: 56
End: 2021-06-18
Attending: ORTHOPAEDIC SURGERY
Payer: COMMERCIAL

## 2021-06-18 DIAGNOSIS — M25.511 ACUTE PAIN OF RIGHT SHOULDER: ICD-10-CM

## 2021-06-18 DIAGNOSIS — M62.81 MUSCLE WEAKNESS: ICD-10-CM

## 2021-06-18 DIAGNOSIS — M25.611 DECREASED RANGE OF MOTION OF RIGHT SHOULDER: ICD-10-CM

## 2021-06-18 DIAGNOSIS — Z74.09 IMPAIRED FUNCTIONAL MOBILITY AND ACTIVITY TOLERANCE: ICD-10-CM

## 2021-06-18 PROCEDURE — 97140 MANUAL THERAPY 1/> REGIONS: CPT | Mod: PO

## 2021-06-18 PROCEDURE — 97110 THERAPEUTIC EXERCISES: CPT | Mod: PO

## 2021-06-21 ENCOUNTER — OFFICE VISIT (OUTPATIENT)
Dept: NEUROLOGY | Facility: CLINIC | Age: 56
End: 2021-06-21
Payer: COMMERCIAL

## 2021-06-21 VITALS
BODY MASS INDEX: 24.81 KG/M2 | DIASTOLIC BLOOD PRESSURE: 79 MMHG | WEIGHT: 134.81 LBS | HEIGHT: 62 IN | SYSTOLIC BLOOD PRESSURE: 105 MMHG | HEART RATE: 84 BPM

## 2021-06-21 DIAGNOSIS — Z71.89 COUNSELING REGARDING GOALS OF CARE: ICD-10-CM

## 2021-06-21 DIAGNOSIS — G35 MS (MULTIPLE SCLEROSIS): Primary | ICD-10-CM

## 2021-06-21 DIAGNOSIS — D84.821 IMMUNOSUPPRESSION DUE TO DRUG THERAPY: ICD-10-CM

## 2021-06-21 DIAGNOSIS — R90.89 ABNORMAL FINDING ON MRI OF BRAIN: ICD-10-CM

## 2021-06-21 DIAGNOSIS — Z79.899 IMMUNOSUPPRESSION DUE TO DRUG THERAPY: ICD-10-CM

## 2021-06-21 PROCEDURE — 1126F AMNT PAIN NOTED NONE PRSNT: CPT | Mod: S$GLB,,, | Performed by: PSYCHIATRY & NEUROLOGY

## 2021-06-21 PROCEDURE — 99999 PR PBB SHADOW E&M-EST. PATIENT-LVL III: ICD-10-PCS | Mod: PBBFAC,,, | Performed by: PSYCHIATRY & NEUROLOGY

## 2021-06-21 PROCEDURE — 1126F PR PAIN SEVERITY QUANTIFIED, NO PAIN PRESENT: ICD-10-PCS | Mod: S$GLB,,, | Performed by: PSYCHIATRY & NEUROLOGY

## 2021-06-21 PROCEDURE — 99999 PR PBB SHADOW E&M-EST. PATIENT-LVL III: CPT | Mod: PBBFAC,,, | Performed by: PSYCHIATRY & NEUROLOGY

## 2021-06-21 PROCEDURE — 99215 PR OFFICE/OUTPT VISIT, EST, LEVL V, 40-54 MIN: ICD-10-PCS | Mod: S$GLB,,, | Performed by: PSYCHIATRY & NEUROLOGY

## 2021-06-21 PROCEDURE — 99215 OFFICE O/P EST HI 40 MIN: CPT | Mod: S$GLB,,, | Performed by: PSYCHIATRY & NEUROLOGY

## 2021-06-21 PROCEDURE — 3008F BODY MASS INDEX DOCD: CPT | Mod: CPTII,S$GLB,, | Performed by: PSYCHIATRY & NEUROLOGY

## 2021-06-21 PROCEDURE — 3008F PR BODY MASS INDEX (BMI) DOCUMENTED: ICD-10-PCS | Mod: CPTII,S$GLB,, | Performed by: PSYCHIATRY & NEUROLOGY

## 2021-06-22 ENCOUNTER — DOCUMENTATION ONLY (OUTPATIENT)
Dept: REHABILITATION | Facility: HOSPITAL | Age: 56
End: 2021-06-22

## 2021-06-29 ENCOUNTER — HOSPITAL ENCOUNTER (OUTPATIENT)
Dept: RADIOLOGY | Facility: HOSPITAL | Age: 56
Discharge: HOME | End: 2021-06-29
Attending: INTERNAL MEDICINE
Payer: COMMERCIAL

## 2021-06-29 ENCOUNTER — OFFICE VISIT (OUTPATIENT)
Dept: PSYCHIATRY | Facility: CLINIC | Age: 56
End: 2021-06-29
Payer: COMMERCIAL

## 2021-06-29 ENCOUNTER — CLINICAL SUPPORT (OUTPATIENT)
Dept: REHABILITATION | Facility: HOSPITAL | Age: 56
End: 2021-06-29
Attending: ORTHOPAEDIC SURGERY
Payer: COMMERCIAL

## 2021-06-29 DIAGNOSIS — Z74.09 IMPAIRED FUNCTIONAL MOBILITY AND ACTIVITY TOLERANCE: ICD-10-CM

## 2021-06-29 DIAGNOSIS — Z12.31 ENCOUNTER FOR SCREENING MAMMOGRAM FOR MALIGNANT NEOPLASM OF BREAST: ICD-10-CM

## 2021-06-29 DIAGNOSIS — M25.511 ACUTE PAIN OF RIGHT SHOULDER: ICD-10-CM

## 2021-06-29 DIAGNOSIS — F41.1 GENERALIZED ANXIETY DISORDER: ICD-10-CM

## 2021-06-29 DIAGNOSIS — M62.81 MUSCLE WEAKNESS: ICD-10-CM

## 2021-06-29 DIAGNOSIS — M25.611 DECREASED RANGE OF MOTION OF RIGHT SHOULDER: ICD-10-CM

## 2021-06-29 DIAGNOSIS — F43.21 GRIEF: ICD-10-CM

## 2021-06-29 PROCEDURE — 77067 SCR MAMMO BI INCL CAD: CPT | Mod: 26,,, | Performed by: RADIOLOGY

## 2021-06-29 PROCEDURE — 90837 PR PSYCHOTHERAPY W/PATIENT, 60 MIN: ICD-10-PCS | Mod: 95,,, | Performed by: SOCIAL WORKER

## 2021-06-29 PROCEDURE — 77063 MAMMO DIGITAL SCREENING BILAT WITH TOMO: ICD-10-PCS | Mod: 26,,, | Performed by: RADIOLOGY

## 2021-06-29 PROCEDURE — 77063 BREAST TOMOSYNTHESIS BI: CPT | Mod: 26,,, | Performed by: RADIOLOGY

## 2021-06-29 PROCEDURE — 97110 THERAPEUTIC EXERCISES: CPT | Mod: PO

## 2021-06-29 PROCEDURE — 77067 MAMMO DIGITAL SCREENING BILAT WITH TOMO: ICD-10-PCS | Mod: 26,,, | Performed by: RADIOLOGY

## 2021-06-29 PROCEDURE — 77067 SCR MAMMO BI INCL CAD: CPT | Mod: TC

## 2021-06-29 PROCEDURE — 90837 PSYTX W PT 60 MINUTES: CPT | Mod: 95,,, | Performed by: SOCIAL WORKER

## 2021-07-01 ENCOUNTER — PATIENT MESSAGE (OUTPATIENT)
Dept: NEUROLOGY | Facility: CLINIC | Age: 56
End: 2021-07-01

## 2021-07-06 ENCOUNTER — PATIENT MESSAGE (OUTPATIENT)
Dept: ADMINISTRATIVE | Facility: HOSPITAL | Age: 56
End: 2021-07-06

## 2021-07-19 ENCOUNTER — OFFICE VISIT (OUTPATIENT)
Dept: ORTHOPEDICS | Facility: CLINIC | Age: 56
End: 2021-07-19
Payer: COMMERCIAL

## 2021-07-19 ENCOUNTER — HOSPITAL ENCOUNTER (OUTPATIENT)
Dept: RADIOLOGY | Facility: HOSPITAL | Age: 56
Discharge: HOME | End: 2021-07-19
Attending: ORTHOPAEDIC SURGERY
Payer: COMMERCIAL

## 2021-07-19 VITALS — WEIGHT: 134 LBS | BODY MASS INDEX: 24.66 KG/M2 | HEIGHT: 62 IN

## 2021-07-19 DIAGNOSIS — M25.811 SHOULDER IMPINGEMENT, RIGHT: ICD-10-CM

## 2021-07-19 DIAGNOSIS — M25.511 RIGHT SHOULDER PAIN, UNSPECIFIED CHRONICITY: ICD-10-CM

## 2021-07-19 DIAGNOSIS — M25.511 RIGHT SHOULDER PAIN, UNSPECIFIED CHRONICITY: Primary | ICD-10-CM

## 2021-07-19 PROCEDURE — 73030 X-RAY EXAM OF SHOULDER: CPT | Mod: TC,PN,RT

## 2021-07-19 PROCEDURE — 99213 PR OFFICE/OUTPT VISIT, EST, LEVL III, 20-29 MIN: ICD-10-PCS | Mod: S$GLB,,, | Performed by: ORTHOPAEDIC SURGERY

## 2021-07-19 PROCEDURE — 99999 PR PBB SHADOW E&M-EST. PATIENT-LVL III: ICD-10-PCS | Mod: PBBFAC,,, | Performed by: ORTHOPAEDIC SURGERY

## 2021-07-19 PROCEDURE — 99213 OFFICE O/P EST LOW 20 MIN: CPT | Mod: S$GLB,,, | Performed by: ORTHOPAEDIC SURGERY

## 2021-07-19 PROCEDURE — 99999 PR PBB SHADOW E&M-EST. PATIENT-LVL III: CPT | Mod: PBBFAC,,, | Performed by: ORTHOPAEDIC SURGERY

## 2021-07-19 PROCEDURE — 1126F AMNT PAIN NOTED NONE PRSNT: CPT | Mod: CPTII,S$GLB,, | Performed by: ORTHOPAEDIC SURGERY

## 2021-07-19 PROCEDURE — 73030 X-RAY EXAM OF SHOULDER: CPT | Mod: 26,RT,, | Performed by: RADIOLOGY

## 2021-07-19 PROCEDURE — 3008F BODY MASS INDEX DOCD: CPT | Mod: CPTII,S$GLB,, | Performed by: ORTHOPAEDIC SURGERY

## 2021-07-19 PROCEDURE — 3008F PR BODY MASS INDEX (BMI) DOCUMENTED: ICD-10-PCS | Mod: CPTII,S$GLB,, | Performed by: ORTHOPAEDIC SURGERY

## 2021-07-19 PROCEDURE — 73030 XR SHOULDER COMPLETE 2 OR MORE VIEWS RIGHT: ICD-10-PCS | Mod: 26,RT,, | Performed by: RADIOLOGY

## 2021-07-19 PROCEDURE — 1126F PR PAIN SEVERITY QUANTIFIED, NO PAIN PRESENT: ICD-10-PCS | Mod: CPTII,S$GLB,, | Performed by: ORTHOPAEDIC SURGERY

## 2021-07-27 ENCOUNTER — CLINICAL SUPPORT (OUTPATIENT)
Dept: REHABILITATION | Facility: HOSPITAL | Age: 56
End: 2021-07-27
Attending: ORTHOPAEDIC SURGERY
Payer: COMMERCIAL

## 2021-07-27 DIAGNOSIS — M62.81 MUSCLE WEAKNESS: ICD-10-CM

## 2021-07-27 DIAGNOSIS — Z74.09 IMPAIRED FUNCTIONAL MOBILITY AND ACTIVITY TOLERANCE: ICD-10-CM

## 2021-07-27 DIAGNOSIS — M25.611 DECREASED RANGE OF MOTION OF RIGHT SHOULDER: ICD-10-CM

## 2021-07-27 DIAGNOSIS — M25.511 ACUTE PAIN OF RIGHT SHOULDER: ICD-10-CM

## 2021-07-27 PROCEDURE — 97110 THERAPEUTIC EXERCISES: CPT | Mod: PO,CQ

## 2021-07-29 ENCOUNTER — CLINICAL SUPPORT (OUTPATIENT)
Dept: REHABILITATION | Facility: HOSPITAL | Age: 56
End: 2021-07-29
Attending: ORTHOPAEDIC SURGERY
Payer: COMMERCIAL

## 2021-07-29 DIAGNOSIS — M62.81 MUSCLE WEAKNESS: ICD-10-CM

## 2021-07-29 DIAGNOSIS — Z74.09 IMPAIRED FUNCTIONAL MOBILITY AND ACTIVITY TOLERANCE: ICD-10-CM

## 2021-07-29 DIAGNOSIS — M25.611 DECREASED RANGE OF MOTION OF RIGHT SHOULDER: ICD-10-CM

## 2021-07-29 DIAGNOSIS — M25.511 ACUTE PAIN OF RIGHT SHOULDER: ICD-10-CM

## 2021-07-29 PROCEDURE — 97110 THERAPEUTIC EXERCISES: CPT | Mod: PO,CQ

## 2021-08-02 ENCOUNTER — CLINICAL SUPPORT (OUTPATIENT)
Dept: REHABILITATION | Facility: HOSPITAL | Age: 56
End: 2021-08-02
Attending: ORTHOPAEDIC SURGERY
Payer: COMMERCIAL

## 2021-08-02 DIAGNOSIS — M25.811 SHOULDER IMPINGEMENT, RIGHT: Primary | ICD-10-CM

## 2021-08-02 DIAGNOSIS — M62.81 MUSCLE WEAKNESS: ICD-10-CM

## 2021-08-02 DIAGNOSIS — M25.611 DECREASED RANGE OF MOTION OF RIGHT SHOULDER: ICD-10-CM

## 2021-08-02 DIAGNOSIS — Z74.09 IMPAIRED FUNCTIONAL MOBILITY AND ACTIVITY TOLERANCE: ICD-10-CM

## 2021-08-02 PROCEDURE — 97110 THERAPEUTIC EXERCISES: CPT | Mod: PO

## 2021-08-04 ENCOUNTER — CLINICAL SUPPORT (OUTPATIENT)
Dept: REHABILITATION | Facility: HOSPITAL | Age: 56
End: 2021-08-04
Attending: ORTHOPAEDIC SURGERY
Payer: COMMERCIAL

## 2021-08-04 DIAGNOSIS — M25.511 ACUTE PAIN OF RIGHT SHOULDER: ICD-10-CM

## 2021-08-04 DIAGNOSIS — M25.611 DECREASED RANGE OF MOTION OF RIGHT SHOULDER: ICD-10-CM

## 2021-08-04 DIAGNOSIS — M62.81 MUSCLE WEAKNESS: ICD-10-CM

## 2021-08-04 DIAGNOSIS — Z74.09 IMPAIRED FUNCTIONAL MOBILITY AND ACTIVITY TOLERANCE: ICD-10-CM

## 2021-08-04 PROCEDURE — 97110 THERAPEUTIC EXERCISES: CPT | Mod: PO

## 2021-08-06 ENCOUNTER — PATIENT MESSAGE (OUTPATIENT)
Dept: NEUROLOGY | Facility: CLINIC | Age: 56
End: 2021-08-06

## 2021-08-09 ENCOUNTER — CLINICAL SUPPORT (OUTPATIENT)
Dept: REHABILITATION | Facility: HOSPITAL | Age: 56
End: 2021-08-09
Attending: ORTHOPAEDIC SURGERY
Payer: COMMERCIAL

## 2021-08-09 DIAGNOSIS — Z74.09 IMPAIRED FUNCTIONAL MOBILITY AND ACTIVITY TOLERANCE: ICD-10-CM

## 2021-08-09 DIAGNOSIS — M62.81 MUSCLE WEAKNESS: ICD-10-CM

## 2021-08-09 DIAGNOSIS — M25.511 ACUTE PAIN OF RIGHT SHOULDER: ICD-10-CM

## 2021-08-09 DIAGNOSIS — M25.611 DECREASED RANGE OF MOTION OF RIGHT SHOULDER: ICD-10-CM

## 2021-08-09 PROCEDURE — 97110 THERAPEUTIC EXERCISES: CPT | Mod: PO,CQ

## 2021-08-11 ENCOUNTER — CLINICAL SUPPORT (OUTPATIENT)
Dept: REHABILITATION | Facility: HOSPITAL | Age: 56
End: 2021-08-11
Attending: ORTHOPAEDIC SURGERY
Payer: COMMERCIAL

## 2021-08-11 DIAGNOSIS — M62.81 MUSCLE WEAKNESS: ICD-10-CM

## 2021-08-11 DIAGNOSIS — M25.511 ACUTE PAIN OF RIGHT SHOULDER: ICD-10-CM

## 2021-08-11 DIAGNOSIS — Z74.09 IMPAIRED FUNCTIONAL MOBILITY AND ACTIVITY TOLERANCE: ICD-10-CM

## 2021-08-11 DIAGNOSIS — M25.611 DECREASED RANGE OF MOTION OF RIGHT SHOULDER: ICD-10-CM

## 2021-08-11 PROCEDURE — 97110 THERAPEUTIC EXERCISES: CPT | Mod: PO,CQ

## 2021-08-16 ENCOUNTER — CLINICAL SUPPORT (OUTPATIENT)
Dept: REHABILITATION | Facility: HOSPITAL | Age: 56
End: 2021-08-16
Payer: COMMERCIAL

## 2021-08-16 DIAGNOSIS — Z74.09 IMPAIRED FUNCTIONAL MOBILITY AND ACTIVITY TOLERANCE: ICD-10-CM

## 2021-08-16 DIAGNOSIS — M25.611 DECREASED RANGE OF MOTION OF RIGHT SHOULDER: ICD-10-CM

## 2021-08-16 DIAGNOSIS — M25.511 ACUTE PAIN OF RIGHT SHOULDER: ICD-10-CM

## 2021-08-16 DIAGNOSIS — M62.81 MUSCLE WEAKNESS: ICD-10-CM

## 2021-08-16 PROCEDURE — 97110 THERAPEUTIC EXERCISES: CPT | Mod: PO

## 2021-08-18 ENCOUNTER — CLINICAL SUPPORT (OUTPATIENT)
Dept: REHABILITATION | Facility: HOSPITAL | Age: 56
End: 2021-08-18
Attending: ORTHOPAEDIC SURGERY
Payer: COMMERCIAL

## 2021-08-18 DIAGNOSIS — M25.511 ACUTE PAIN OF RIGHT SHOULDER: ICD-10-CM

## 2021-08-18 DIAGNOSIS — Z74.09 IMPAIRED FUNCTIONAL MOBILITY AND ACTIVITY TOLERANCE: ICD-10-CM

## 2021-08-18 DIAGNOSIS — M62.81 MUSCLE WEAKNESS: ICD-10-CM

## 2021-08-18 DIAGNOSIS — M25.611 DECREASED RANGE OF MOTION OF RIGHT SHOULDER: ICD-10-CM

## 2021-08-18 PROCEDURE — 97110 THERAPEUTIC EXERCISES: CPT | Mod: PO

## 2021-08-23 ENCOUNTER — CLINICAL SUPPORT (OUTPATIENT)
Dept: REHABILITATION | Facility: HOSPITAL | Age: 56
End: 2021-08-23
Attending: ORTHOPAEDIC SURGERY
Payer: COMMERCIAL

## 2021-08-23 DIAGNOSIS — Z74.09 IMPAIRED FUNCTIONAL MOBILITY AND ACTIVITY TOLERANCE: ICD-10-CM

## 2021-08-23 DIAGNOSIS — M25.511 ACUTE PAIN OF RIGHT SHOULDER: ICD-10-CM

## 2021-08-23 DIAGNOSIS — M62.81 MUSCLE WEAKNESS: ICD-10-CM

## 2021-08-23 DIAGNOSIS — M25.611 DECREASED RANGE OF MOTION OF RIGHT SHOULDER: ICD-10-CM

## 2021-08-23 PROCEDURE — 97110 THERAPEUTIC EXERCISES: CPT | Mod: PO

## 2021-08-25 ENCOUNTER — CLINICAL SUPPORT (OUTPATIENT)
Dept: REHABILITATION | Facility: HOSPITAL | Age: 56
End: 2021-08-25
Attending: ORTHOPAEDIC SURGERY
Payer: COMMERCIAL

## 2021-08-25 DIAGNOSIS — Z74.09 IMPAIRED FUNCTIONAL MOBILITY AND ACTIVITY TOLERANCE: ICD-10-CM

## 2021-08-25 DIAGNOSIS — M25.611 DECREASED RANGE OF MOTION OF RIGHT SHOULDER: ICD-10-CM

## 2021-08-25 DIAGNOSIS — M62.81 MUSCLE WEAKNESS: ICD-10-CM

## 2021-08-25 DIAGNOSIS — M25.511 ACUTE PAIN OF RIGHT SHOULDER: ICD-10-CM

## 2021-08-25 PROCEDURE — 97110 THERAPEUTIC EXERCISES: CPT | Mod: PO

## 2021-09-01 ENCOUNTER — PATIENT MESSAGE (OUTPATIENT)
Dept: PSYCHIATRY | Facility: CLINIC | Age: 56
End: 2021-09-01

## 2021-09-02 ENCOUNTER — PATIENT MESSAGE (OUTPATIENT)
Dept: PSYCHIATRY | Facility: CLINIC | Age: 56
End: 2021-09-02

## 2021-09-04 ENCOUNTER — PATIENT MESSAGE (OUTPATIENT)
Dept: NEUROLOGY | Facility: CLINIC | Age: 56
End: 2021-09-04

## 2021-09-07 ENCOUNTER — DOCUMENTATION ONLY (OUTPATIENT)
Dept: REHABILITATION | Facility: HOSPITAL | Age: 56
End: 2021-09-07

## 2021-09-13 ENCOUNTER — OFFICE VISIT (OUTPATIENT)
Dept: ORTHOPEDICS | Facility: CLINIC | Age: 56
End: 2021-09-13
Payer: COMMERCIAL

## 2021-09-13 VITALS — BODY MASS INDEX: 24.66 KG/M2 | HEIGHT: 62 IN | WEIGHT: 134 LBS

## 2021-09-13 DIAGNOSIS — M75.41 IMPINGEMENT SYNDROME OF RIGHT SHOULDER: Primary | ICD-10-CM

## 2021-09-13 PROCEDURE — 99213 PR OFFICE/OUTPT VISIT, EST, LEVL III, 20-29 MIN: ICD-10-PCS | Mod: S$GLB,,, | Performed by: ORTHOPAEDIC SURGERY

## 2021-09-13 PROCEDURE — 1159F MED LIST DOCD IN RCRD: CPT | Mod: CPTII,S$GLB,, | Performed by: ORTHOPAEDIC SURGERY

## 2021-09-13 PROCEDURE — 3008F PR BODY MASS INDEX (BMI) DOCUMENTED: ICD-10-PCS | Mod: CPTII,S$GLB,, | Performed by: ORTHOPAEDIC SURGERY

## 2021-09-13 PROCEDURE — 99213 OFFICE O/P EST LOW 20 MIN: CPT | Mod: S$GLB,,, | Performed by: ORTHOPAEDIC SURGERY

## 2021-09-13 PROCEDURE — 1159F PR MEDICATION LIST DOCUMENTED IN MEDICAL RECORD: ICD-10-PCS | Mod: CPTII,S$GLB,, | Performed by: ORTHOPAEDIC SURGERY

## 2021-09-13 PROCEDURE — 3008F BODY MASS INDEX DOCD: CPT | Mod: CPTII,S$GLB,, | Performed by: ORTHOPAEDIC SURGERY

## 2021-09-13 PROCEDURE — 99999 PR PBB SHADOW E&M-EST. PATIENT-LVL III: ICD-10-PCS | Mod: PBBFAC,,, | Performed by: ORTHOPAEDIC SURGERY

## 2021-09-13 PROCEDURE — 99999 PR PBB SHADOW E&M-EST. PATIENT-LVL III: CPT | Mod: PBBFAC,,, | Performed by: ORTHOPAEDIC SURGERY

## 2021-09-14 ENCOUNTER — LAB VISIT (OUTPATIENT)
Dept: LAB | Facility: HOSPITAL | Age: 56
End: 2021-09-14
Attending: PSYCHIATRY & NEUROLOGY
Payer: COMMERCIAL

## 2021-09-14 DIAGNOSIS — G35 MS (MULTIPLE SCLEROSIS): ICD-10-CM

## 2021-09-14 PROCEDURE — 36415 COLL VENOUS BLD VENIPUNCTURE: CPT | Mod: PO | Performed by: PSYCHIATRY & NEUROLOGY

## 2021-09-14 PROCEDURE — 86480 TB TEST CELL IMMUN MEASURE: CPT | Performed by: PSYCHIATRY & NEUROLOGY

## 2021-09-16 LAB
GAMMA INTERFERON BACKGROUND BLD IA-ACNC: 0.04 IU/ML
M TB IFN-G CD4+ BCKGRND COR BLD-ACNC: 0 IU/ML
MITOGEN IGNF BCKGRD COR BLD-ACNC: 6.4 IU/ML
TB GOLD PLUS: NEGATIVE
TB2 - NIL: 0 IU/ML

## 2021-09-28 ENCOUNTER — TELEPHONE (OUTPATIENT)
Dept: NEUROLOGY | Facility: CLINIC | Age: 56
End: 2021-09-28

## 2021-09-28 NOTE — TELEPHONE ENCOUNTER
CC'd Chart Routing History    Routing History    From: Page Salas MD On: 06/21/2021 09:39 AM  To: Maritza ALONSO Staff ([place])  Priority: Routine  Routing Comments:  On track for October Ocrevus; s/p vaccine; labs planned in September; [place]o

## 2021-10-04 ENCOUNTER — PATIENT MESSAGE (OUTPATIENT)
Dept: ADMINISTRATIVE | Facility: HOSPITAL | Age: 56
End: 2021-10-04

## 2021-10-15 ENCOUNTER — INFUSION (OUTPATIENT)
Dept: INFUSION THERAPY | Facility: HOSPITAL | Age: 56
End: 2021-10-15
Payer: COMMERCIAL

## 2021-10-15 VITALS
BODY MASS INDEX: 25.4 KG/M2 | HEART RATE: 82 BPM | SYSTOLIC BLOOD PRESSURE: 100 MMHG | RESPIRATION RATE: 16 BRPM | WEIGHT: 138 LBS | DIASTOLIC BLOOD PRESSURE: 57 MMHG | HEIGHT: 62 IN | TEMPERATURE: 98 F | OXYGEN SATURATION: 99 %

## 2021-10-15 DIAGNOSIS — G35 MS (MULTIPLE SCLEROSIS): Primary | ICD-10-CM

## 2021-10-15 PROCEDURE — 63600175 PHARM REV CODE 636 W HCPCS: Performed by: PSYCHIATRY & NEUROLOGY

## 2021-10-15 PROCEDURE — 25000003 PHARM REV CODE 250: Performed by: PSYCHIATRY & NEUROLOGY

## 2021-10-15 PROCEDURE — 96413 CHEMO IV INFUSION 1 HR: CPT

## 2021-10-15 PROCEDURE — 96415 CHEMO IV INFUSION ADDL HR: CPT

## 2021-10-15 PROCEDURE — 96375 TX/PRO/DX INJ NEW DRUG ADDON: CPT

## 2021-10-15 PROCEDURE — 96367 TX/PROPH/DG ADDL SEQ IV INF: CPT

## 2021-10-15 RX ORDER — ACETAMINOPHEN 500 MG
1000 TABLET ORAL
Status: COMPLETED | OUTPATIENT
Start: 2021-10-15 | End: 2021-10-15

## 2021-10-15 RX ORDER — SODIUM CHLORIDE 0.9 % (FLUSH) 0.9 %
10 SYRINGE (ML) INJECTION
Status: CANCELLED | OUTPATIENT
Start: 2022-03-18

## 2021-10-15 RX ORDER — ACETAMINOPHEN 500 MG
1000 TABLET ORAL
Status: CANCELLED | OUTPATIENT
Start: 2022-03-18 | End: 2022-03-18

## 2021-10-15 RX ORDER — FAMOTIDINE 10 MG/ML
20 INJECTION INTRAVENOUS
Status: COMPLETED | OUTPATIENT
Start: 2021-10-15 | End: 2021-10-15

## 2021-10-15 RX ORDER — HEPARIN 100 UNIT/ML
100 SYRINGE INTRAVENOUS
Status: DISCONTINUED | OUTPATIENT
Start: 2021-10-15 | End: 2021-10-15 | Stop reason: HOSPADM

## 2021-10-15 RX ORDER — SODIUM CHLORIDE 0.9 % (FLUSH) 0.9 %
10 SYRINGE (ML) INJECTION
Status: DISCONTINUED | OUTPATIENT
Start: 2021-10-15 | End: 2021-10-15 | Stop reason: HOSPADM

## 2021-10-15 RX ORDER — HEPARIN 100 UNIT/ML
100 SYRINGE INTRAVENOUS
Status: CANCELLED | OUTPATIENT
Start: 2022-03-18

## 2021-10-15 RX ADMIN — FAMOTIDINE 20 MG: 10 INJECTION INTRAVENOUS at 09:10

## 2021-10-15 RX ADMIN — OCRELIZUMAB 600 MG: 300 INJECTION INTRAVENOUS at 09:10

## 2021-10-15 RX ADMIN — DIPHENHYDRAMINE HYDROCHLORIDE 50 MG: 50 INJECTION, SOLUTION INTRAMUSCULAR; INTRAVENOUS at 09:10

## 2021-10-15 RX ADMIN — SODIUM CHLORIDE: 0.9 INJECTION, SOLUTION INTRAVENOUS at 07:10

## 2021-10-15 RX ADMIN — ACETAMINOPHEN 1000 MG: 500 TABLET ORAL at 07:10

## 2021-10-15 RX ADMIN — DEXTROSE: 50 INJECTION, SOLUTION INTRAVENOUS at 08:10

## 2021-12-21 ENCOUNTER — PATIENT MESSAGE (OUTPATIENT)
Dept: NEUROLOGY | Facility: CLINIC | Age: 56
End: 2021-12-21
Payer: COMMERCIAL

## 2021-12-27 ENCOUNTER — PATIENT MESSAGE (OUTPATIENT)
Dept: NEUROLOGY | Facility: CLINIC | Age: 56
End: 2021-12-27
Payer: COMMERCIAL

## 2022-01-01 ENCOUNTER — PATIENT MESSAGE (OUTPATIENT)
Dept: ADMINISTRATIVE | Facility: OTHER | Age: 57
End: 2022-01-01
Payer: COMMERCIAL

## 2022-01-25 ENCOUNTER — PATIENT MESSAGE (OUTPATIENT)
Dept: ADMINISTRATIVE | Facility: HOSPITAL | Age: 57
End: 2022-01-25
Payer: COMMERCIAL

## 2022-01-29 DIAGNOSIS — D84.9 IMMUNOSUPPRESSED STATUS: ICD-10-CM

## 2022-02-03 ENCOUNTER — INFUSION (OUTPATIENT)
Dept: INFUSION THERAPY | Facility: HOSPITAL | Age: 57
End: 2022-02-03
Payer: COMMERCIAL

## 2022-02-03 VITALS
TEMPERATURE: 99 F | DIASTOLIC BLOOD PRESSURE: 76 MMHG | OXYGEN SATURATION: 95 % | HEART RATE: 82 BPM | SYSTOLIC BLOOD PRESSURE: 109 MMHG | RESPIRATION RATE: 18 BRPM

## 2022-02-03 DIAGNOSIS — D84.9 IMMUNOSUPPRESSED STATUS: Primary | ICD-10-CM

## 2022-02-03 DIAGNOSIS — G35 MS (MULTIPLE SCLEROSIS): ICD-10-CM

## 2022-02-03 PROCEDURE — M0220 HC INJECTION ADMIN, TIXAGEVIMAB-CILGAVIMAB, INCL POST ADMIN MONIT: HCPCS | Performed by: INTERNAL MEDICINE

## 2022-02-03 PROCEDURE — 63600175 PHARM REV CODE 636 W HCPCS: Performed by: INTERNAL MEDICINE

## 2022-02-03 RX ORDER — ACETAMINOPHEN 325 MG/1
650 TABLET ORAL ONCE
Status: CANCELLED | OUTPATIENT
Start: 2022-02-03 | End: 2022-02-03

## 2022-02-03 RX ORDER — ALBUTEROL SULFATE 90 UG/1
2 AEROSOL, METERED RESPIRATORY (INHALATION) ONCE AS NEEDED
Status: CANCELLED | OUTPATIENT
Start: 2022-02-03

## 2022-02-03 RX ORDER — PREDNISONE 20 MG/1
40 TABLET ORAL ONCE
Status: CANCELLED | OUTPATIENT
Start: 2022-02-03 | End: 2022-02-03

## 2022-02-03 RX ORDER — DIPHENHYDRAMINE HCL 25 MG
25 CAPSULE ORAL ONCE
Status: CANCELLED | OUTPATIENT
Start: 2022-02-03 | End: 2022-02-03

## 2022-02-03 RX ORDER — ONDANSETRON 4 MG/1
4 TABLET, ORALLY DISINTEGRATING ORAL ONCE
Status: CANCELLED | OUTPATIENT
Start: 2022-02-03 | End: 2022-02-03

## 2022-02-03 RX ORDER — EPINEPHRINE 0.3 MG/.3ML
0.3 INJECTION SUBCUTANEOUS
Status: CANCELLED | OUTPATIENT
Start: 2022-02-03

## 2022-02-03 RX ADMIN — Medication 150 MG: at 10:02

## 2022-02-03 NOTE — NURSING
Patient received two gluteal intramuscular injections (left and right) of tixagevimab/cilgavimab (EVUSHELD) with no difficulties tolerating the medication

## 2022-02-04 DIAGNOSIS — D84.9 IMMUNOSUPPRESSED STATUS: ICD-10-CM

## 2022-02-06 DIAGNOSIS — D84.9 IMMUNOSUPPRESSED STATUS: ICD-10-CM

## 2022-02-18 ENCOUNTER — TELEPHONE (OUTPATIENT)
Dept: INTERNAL MEDICINE | Facility: CLINIC | Age: 57
End: 2022-02-18
Payer: COMMERCIAL

## 2022-02-18 ENCOUNTER — PATIENT MESSAGE (OUTPATIENT)
Dept: ADMINISTRATIVE | Facility: HOSPITAL | Age: 57
End: 2022-02-18
Payer: COMMERCIAL

## 2022-02-18 DIAGNOSIS — Z00.00 ANNUAL PHYSICAL EXAM: Primary | ICD-10-CM

## 2022-02-18 NOTE — TELEPHONE ENCOUNTER
----- Message from Emily Kang LPN sent at 2/18/2022  1:28 PM CST -----  Regarding: Lab orders needed  Annual & lab appt scheduled. Orders needed.    Thanks

## 2022-02-28 ENCOUNTER — PATIENT MESSAGE (OUTPATIENT)
Dept: PSYCHIATRY | Facility: CLINIC | Age: 57
End: 2022-02-28
Payer: COMMERCIAL

## 2022-03-09 ENCOUNTER — PATIENT MESSAGE (OUTPATIENT)
Dept: NEUROLOGY | Facility: CLINIC | Age: 57
End: 2022-03-09
Payer: COMMERCIAL

## 2022-03-10 ENCOUNTER — TELEPHONE (OUTPATIENT)
Dept: NEUROLOGY | Facility: CLINIC | Age: 57
End: 2022-03-10

## 2022-03-10 ENCOUNTER — INFUSION (OUTPATIENT)
Dept: INFUSION THERAPY | Facility: HOSPITAL | Age: 57
End: 2022-03-10
Payer: COMMERCIAL

## 2022-03-10 VITALS
DIASTOLIC BLOOD PRESSURE: 81 MMHG | OXYGEN SATURATION: 99 % | RESPIRATION RATE: 20 BRPM | TEMPERATURE: 98 F | SYSTOLIC BLOOD PRESSURE: 110 MMHG | HEART RATE: 82 BPM

## 2022-03-10 DIAGNOSIS — G35 MS (MULTIPLE SCLEROSIS): Primary | ICD-10-CM

## 2022-03-10 PROCEDURE — M0220 HC INJECTION ADMIN, TIXAGEVIMAB-CILGAVIMAB, INCL POST ADMIN MONIT: HCPCS | Performed by: INTERNAL MEDICINE

## 2022-03-10 PROCEDURE — 63600175 PHARM REV CODE 636 W HCPCS: Performed by: INTERNAL MEDICINE

## 2022-03-10 RX ORDER — EPINEPHRINE 0.3 MG/.3ML
0.3 INJECTION SUBCUTANEOUS ONCE
Status: CANCELLED | OUTPATIENT
Start: 2022-03-10 | End: 2022-03-10

## 2022-03-10 RX ORDER — ALBUTEROL SULFATE 90 UG/1
2 AEROSOL, METERED RESPIRATORY (INHALATION) ONCE
Status: CANCELLED | OUTPATIENT
Start: 2022-03-10 | End: 2022-03-10

## 2022-03-10 RX ORDER — ACETAMINOPHEN 325 MG/1
650 TABLET ORAL ONCE
Status: CANCELLED | OUTPATIENT
Start: 2022-03-10 | End: 2022-03-10

## 2022-03-10 RX ORDER — ONDANSETRON 4 MG/1
4 TABLET, ORALLY DISINTEGRATING ORAL ONCE
Status: CANCELLED | OUTPATIENT
Start: 2022-03-10 | End: 2022-03-10

## 2022-03-10 RX ORDER — DIPHENHYDRAMINE HCL 25 MG
25 CAPSULE ORAL ONCE
Status: CANCELLED | OUTPATIENT
Start: 2022-03-10 | End: 2022-03-10

## 2022-03-10 RX ORDER — PREDNISONE 20 MG/1
40 TABLET ORAL ONCE
Status: CANCELLED | OUTPATIENT
Start: 2022-03-10 | End: 2022-03-10

## 2022-03-10 RX ADMIN — Medication 150 MG: at 01:03

## 2022-03-10 NOTE — NURSING
Pt here to receive 2nd dose of Evusheld. Pt denies any symptoms from 1st dose. Patient received two gluteal intramuscular injections (left and right) of tixagevimab/cilgavimab (EVUSHELD) with no difficulties tolerating the medication

## 2022-03-10 NOTE — NURSING
Pt denies any symptoms at this time. Pt states she doesn't want to wait the hour because she didn't have any symptoms from previous dose and is heading to go get something to eat.

## 2022-03-15 ENCOUNTER — LAB VISIT (OUTPATIENT)
Dept: LAB | Facility: HOSPITAL | Age: 57
End: 2022-03-15
Attending: PSYCHIATRY & NEUROLOGY
Payer: COMMERCIAL

## 2022-03-15 DIAGNOSIS — G35 MS (MULTIPLE SCLEROSIS): ICD-10-CM

## 2022-03-15 LAB
BASOPHILS # BLD AUTO: 0.06 K/UL (ref 0–0.2)
BASOPHILS NFR BLD: 0.9 % (ref 0–1.9)
DIFFERENTIAL METHOD: ABNORMAL
EOSINOPHIL # BLD AUTO: 0.3 K/UL (ref 0–0.5)
EOSINOPHIL NFR BLD: 3.6 % (ref 0–8)
ERYTHROCYTE [DISTWIDTH] IN BLOOD BY AUTOMATED COUNT: 12.8 % (ref 11.5–14.5)
HCT VFR BLD AUTO: 44.5 % (ref 37–48.5)
HGB BLD-MCNC: 14.3 G/DL (ref 12–16)
IMM GRANULOCYTES # BLD AUTO: 0.02 K/UL (ref 0–0.04)
IMM GRANULOCYTES NFR BLD AUTO: 0.3 % (ref 0–0.5)
LYMPHOCYTES # BLD AUTO: 2.1 K/UL (ref 1–4.8)
LYMPHOCYTES NFR BLD: 30.2 % (ref 18–48)
MCH RBC QN AUTO: 32.5 PG (ref 27–31)
MCHC RBC AUTO-ENTMCNC: 32.1 G/DL (ref 32–36)
MCV RBC AUTO: 101 FL (ref 82–98)
MONOCYTES # BLD AUTO: 0.8 K/UL (ref 0.3–1)
MONOCYTES NFR BLD: 12.1 % (ref 4–15)
NEUTROPHILS # BLD AUTO: 3.7 K/UL (ref 1.8–7.7)
NEUTROPHILS NFR BLD: 52.9 % (ref 38–73)
NRBC BLD-RTO: 0 /100 WBC
PLATELET # BLD AUTO: 339 K/UL (ref 150–450)
PMV BLD AUTO: 10.1 FL (ref 9.2–12.9)
RBC # BLD AUTO: 4.4 M/UL (ref 4–5.4)
WBC # BLD AUTO: 6.95 K/UL (ref 3.9–12.7)

## 2022-03-15 PROCEDURE — 86706 HEP B SURFACE ANTIBODY: CPT | Performed by: PSYCHIATRY & NEUROLOGY

## 2022-03-15 PROCEDURE — 86704 HEP B CORE ANTIBODY TOTAL: CPT | Performed by: PSYCHIATRY & NEUROLOGY

## 2022-03-15 PROCEDURE — 36415 COLL VENOUS BLD VENIPUNCTURE: CPT | Mod: PO | Performed by: PSYCHIATRY & NEUROLOGY

## 2022-03-15 PROCEDURE — 87340 HEPATITIS B SURFACE AG IA: CPT | Performed by: PSYCHIATRY & NEUROLOGY

## 2022-03-15 PROCEDURE — 85025 COMPLETE CBC W/AUTO DIFF WBC: CPT | Performed by: PSYCHIATRY & NEUROLOGY

## 2022-04-06 ENCOUNTER — PATIENT OUTREACH (OUTPATIENT)
Dept: ADMINISTRATIVE | Facility: OTHER | Age: 57
End: 2022-04-06
Payer: COMMERCIAL

## 2022-04-07 ENCOUNTER — OFFICE VISIT (OUTPATIENT)
Dept: OBSTETRICS AND GYNECOLOGY | Facility: CLINIC | Age: 57
End: 2022-04-07
Payer: COMMERCIAL

## 2022-04-07 VITALS
BODY MASS INDEX: 25.97 KG/M2 | HEIGHT: 62 IN | WEIGHT: 141.13 LBS | SYSTOLIC BLOOD PRESSURE: 106 MMHG | DIASTOLIC BLOOD PRESSURE: 70 MMHG

## 2022-04-07 DIAGNOSIS — Z01.411 ENCOUNTER FOR GYNECOLOGICAL EXAMINATION WITH ABNORMAL FINDING: Primary | ICD-10-CM

## 2022-04-07 DIAGNOSIS — Z12.4 CERVICAL CANCER SCREENING: ICD-10-CM

## 2022-04-07 DIAGNOSIS — Z12.31 VISIT FOR SCREENING MAMMOGRAM: ICD-10-CM

## 2022-04-07 DIAGNOSIS — Z91.89 AT HIGH RISK FOR BREAST CANCER: ICD-10-CM

## 2022-04-07 DIAGNOSIS — N95.2 VAGINAL ATROPHY: ICD-10-CM

## 2022-04-07 PROCEDURE — 99999 PR PBB SHADOW E&M-EST. PATIENT-LVL III: CPT | Mod: PBBFAC,,, | Performed by: OBSTETRICS & GYNECOLOGY

## 2022-04-07 PROCEDURE — 1159F MED LIST DOCD IN RCRD: CPT | Mod: CPTII,S$GLB,, | Performed by: OBSTETRICS & GYNECOLOGY

## 2022-04-07 PROCEDURE — 88175 CYTOPATH C/V AUTO FLUID REDO: CPT | Performed by: OBSTETRICS & GYNECOLOGY

## 2022-04-07 PROCEDURE — 1159F PR MEDICATION LIST DOCUMENTED IN MEDICAL RECORD: ICD-10-PCS | Mod: CPTII,S$GLB,, | Performed by: OBSTETRICS & GYNECOLOGY

## 2022-04-07 PROCEDURE — 3078F DIAST BP <80 MM HG: CPT | Mod: CPTII,S$GLB,, | Performed by: OBSTETRICS & GYNECOLOGY

## 2022-04-07 PROCEDURE — 99999 PR PBB SHADOW E&M-EST. PATIENT-LVL III: ICD-10-PCS | Mod: PBBFAC,,, | Performed by: OBSTETRICS & GYNECOLOGY

## 2022-04-07 PROCEDURE — 3008F BODY MASS INDEX DOCD: CPT | Mod: CPTII,S$GLB,, | Performed by: OBSTETRICS & GYNECOLOGY

## 2022-04-07 PROCEDURE — 3078F PR MOST RECENT DIASTOLIC BLOOD PRESSURE < 80 MM HG: ICD-10-PCS | Mod: CPTII,S$GLB,, | Performed by: OBSTETRICS & GYNECOLOGY

## 2022-04-07 PROCEDURE — 3074F PR MOST RECENT SYSTOLIC BLOOD PRESSURE < 130 MM HG: ICD-10-PCS | Mod: CPTII,S$GLB,, | Performed by: OBSTETRICS & GYNECOLOGY

## 2022-04-07 PROCEDURE — 3074F SYST BP LT 130 MM HG: CPT | Mod: CPTII,S$GLB,, | Performed by: OBSTETRICS & GYNECOLOGY

## 2022-04-07 PROCEDURE — 99396 PREV VISIT EST AGE 40-64: CPT | Mod: S$GLB,,, | Performed by: OBSTETRICS & GYNECOLOGY

## 2022-04-07 PROCEDURE — 99396 PR PREVENTIVE VISIT,EST,40-64: ICD-10-PCS | Mod: S$GLB,,, | Performed by: OBSTETRICS & GYNECOLOGY

## 2022-04-07 PROCEDURE — 87624 HPV HI-RISK TYP POOLED RSLT: CPT | Performed by: OBSTETRICS & GYNECOLOGY

## 2022-04-07 PROCEDURE — 3008F PR BODY MASS INDEX (BMI) DOCUMENTED: ICD-10-PCS | Mod: CPTII,S$GLB,, | Performed by: OBSTETRICS & GYNECOLOGY

## 2022-04-07 NOTE — PROGRESS NOTES
Well woman exam    Hien Jeter is a 56 y.o.   patient who presents for a well woman exam.  LMP: Patient's last menstrual period was 2017.  Patient denies any menopausal symptoms or postmenopausal bleeding.  No gynecologic issues, problems, or complaints.      Past Medical History:   Diagnosis Date    Anxiety     Basal cell carcinoma     left eyebrow     Depression     Environmental allergies     Headache(784.0)     Multiple food allergies     Multiple sclerosis 2009     shoulder 3/2015    left     Past Surgical History:   Procedure Laterality Date    ARTHROSCOPIC DEBRIDEMENT OF ROTATOR CUFF Left 2019    Procedure: DEBRIDEMENT, ROTATOR CUFF, ARTHROSCOPIC;  Surgeon: Dc Moore Jr., MD;  Location: [facility];  Service: Orthopedics;  Laterality: Left;  need opus system (Julito notified)  video    ARTHROSCOPY OF SHOULDER WITH DECOMPRESSION OF SUBACROMIAL SPACE  2019    Procedure: ARTHROSCOPY, SHOULDER, WITH SUBACROMIAL SPACE DECOMPRESSION;  Surgeon: Dc Moore Jr., MD;  Location: [facility];  Service: Orthopedics;;    ARTHROSCOPY OF SHOULDER WITH DECOMPRESSION OF SUBACROMIAL SPACE Right 2021    Procedure: ARTHROSCOPY, SHOULDER, WITH SUBACROMIAL SPACE DECOMPRESSION;  Surgeon: Dc Moore Jr., MD;  Location: [facility];  Service: Orthopedics;  Laterality: Right;  possible rot cuff repair  need opus system (Addy FITZGERALD notified per Brii , )  video    BASAL CELL CARCINOMA EXCISION      COLONOSCOPY N/A 2017    Procedure: COLONOSCOPY;  Surgeon: Marielle Dietz MD;  Location: [address]);  Service: Endoscopy;  Laterality: N/A;  Patient requests PM.    PM prep.    FOOT SURGERY Right 2019    LASIK  2000    SHOULDER SURGERY Left 2019    WISDOM TOOTH EXTRACTION       Social History     Socioeconomic History    Marital status:    Occupational History     Employer: [employer]   Tobacco Use    Smoking status:  "Never Smoker    Smokeless tobacco: Never Used   Substance and Sexual Activity    Alcohol use: Yes     Comment: socially/ not weekly    Drug use: No    Sexual activity: Yes     Partners: Male     Birth control/protection: None     Comment: , menarche 15     Family History   Problem Relation Age of Onset    Colon cancer Mother     Diabetes Mother     Osteoporosis Mother     Cancer Mother     Arrhythmia Father     Prostate cancer Father     Dementia Maternal Grandmother     Cancer Paternal Grandfather     Breast cancer Paternal Aunt     Breast cancer Sister     Stomach cancer Sister     Colon cancer Sister         my risk negative     Prostate cancer Paternal Uncle     Uterine cancer Paternal Grandmother     Multiple sclerosis Neg Hx     Ovarian cancer Neg Hx      OB History        0    Para        Term   0            AB        Living           SAB        IAB        Ectopic        Multiple        Live Births                     /70   Ht 5' 2" (1.575 m)   Wt 64 kg (141 lb 1.5 oz)   LMP 2017   BMI 25.81 kg/m²       ROS:  GENERAL: Denies weight gain or weight loss. Feeling well overall.   SKIN: Denies rash or lesions.   HEAD: Denies head injury or headache.   NODES: Denies enlarged lymph nodes.   CHEST: Denies chest pain or shortness of breath.   CARDIOVASCULAR: Denies palpitations or left sided chest pain.   ABDOMEN: No abdominal pain, constipation, diarrhea, nausea, vomiting or rectal bleeding.   URINARY: No frequency, dysuria, hematuria, or burning on urination.  REPRODUCTIVE: See HPI.   BREASTS: The patient performs breast self-examination and denies pain, lumps, or nipple discharge.   HEMATOLOGIC: No easy bruisability or excessive bleeding.   MUSCULOSKELETAL: Denies joint pain or swelling.   NEUROLOGIC: Denies syncope or weakness.   PSYCHIATRIC: Denies depression, anxiety or mood swings.    PHYSICAL EXAM:  APPEARANCE: Well nourished, well developed, in no acute " distress.  AFFECT: WNL, alert and oriented x 3  SKIN: No acne or hirsutism  NECK: Neck symmetric without masses or thyromegaly  NODES: No inguinal, cervical, axillary, or femoral lymph node enlargement  CHEST: Good respiratory effect  ABDOMEN: Soft.  No tenderness or masses.  No hepatosplenomegaly.  No hernias.  BREASTS: Symmetrical, no skin changes or visible lesions.  No palpable masses, nipple discharge bilaterally.  PELVIC: Normal external genitalia without lesions.  Normal hair distribution.  Adequate perineal body, normal urethral meatus.  Vagina atrophic without lesions or discharge.  Cervix pink, without lesions, discharge or tenderness.  No significant cystocele or rectocele.  Bimanual exam shows uterus to be normal size, regular, mobile and nontender.  Adnexa without masses or tenderness.    EXTREMITIES: No edema.    Encounter for gynecological examination with abnormal finding    Vaginal atrophy    Cervical cancer screening  -     Liquid-Based Pap Smear, Screening  -     HPV High Risk Genotypes, PCR    Visit for screening mammogram  -     Mammo Digital Screening Bilat w/ [facility]; Future; Expected date: 04/07/2022    At high risk for breast cancer  -     Ambulatory referral/consult to Breast Surgery; Future; Expected date: 04/14/2022      Age specific counseling    Orders as above    Thin prep Pap smear with high-risk HPV Co test done today.    Patient counseled on vaginal atrophy/vaginal atrophy treatment options.  Patient declines today.  Patient instructed to contact office if treatment (local vaginal estrogen therapy) is desired.    Patient was counseled today on A.C.S. Pap guidelines and recommendations for yearly pelvic exams, mammograms and monthly self breast exams; to see her PCP for other health maintenance.     Follow up in about 1 year (around 4/7/2023) for Annual exam.     Eros Solorio IV, MD

## 2022-04-08 NOTE — PROGRESS NOTES
Physical Therapy Daily Treatment Note     Name: Hien Jeter  Clinic Number: [id number]    Therapy Diagnosis:   Encounter Diagnoses   Name Primary?    Chronic left shoulder pain     Muscle weakness of left upper extremity     Decreased range of motion of left shoulder     Muscle tightness      Physician: Natalee Nagel PA-C    Visit Date: 7/16/2019    Physician Orders: PT Eval and Treat   Medical Diagnosis from Referral: Status post arthroscopy of left shoulder  Evaluation Date: 5/2/2019  Last PN: 6/18/19 (visit 11)  Authorization Period Expiration: 12/31/19   Plan of Care Expiration: 8/2/19  Visit # / Visits authorized: 18 / 50  PTA Visit Number: 1     Time In: 09:00 am   Time Out: 09:55 am   Total Billable Time: 55 minutes  Charges: TE - 4    Precautions: Standard    Subjective     Pt reports: no pain today report compliance with HEP.   She was compliant with home exercise program.  Response to previous treatment: good  Functional change: Decreased pain with use of L UE    Pain: 0/10  Location: left shoulder      Objective       BOLD= performed today    Hien received therapeutic exercises to develop strength, endurance, ROM, flexibility and posture for 55 minutes including:    UBE 3'F/3'B level 2  Freemotion shoulder flexion AAROM facing away 10# x 3 minutes  Supine pec stretch w hands behind head x 3 minutes 2#- NP  Serratus wall slides w orange TB + lift off x 20  Shoulder flexion wall walk w lunge 5 sec hold x 20  Supine AROM sh flex holding bolster 5 sec hold x 20  Post cap stretch 3 x 30 sec  Supine flexion against orange TB x 20  Rows w green TB x 20  Sh IR w green TB and towel roll x 20  Sh ext w green TB x 20  SL sh ER w towel roll x 20  Wall push up plus x 20   D2 flex w green TB x 20  Side lying sh abd w orange TB for inferior glide: x 20  Prone army crawl 10 x 10 sec  AAROM sh abd w 2 GTB 2 x 10   Seated reach roll and lift: 3 x 5     Not performed today:  Pulleys flex/ scap: x 3 minutes each-  Initial / Assessment/Plan of Care Note     Baseline Assessment  85 year old admitted 4/7/2022 as Inpatient with a diagnosis of ptosis.   Prior to admission patient was living with Alone and residing at Apartment.  Patient does  have a Power of  for Healthcare.  Document is not activated.  Agent is Chante Gonzalez. Patient’s Primary Care Provider is Wallace Delgadillo MD.     Medical History  Past Medical History:   Diagnosis Date   • Aortic regurgitation    • Aortic valve regurgitation 2012    Mild-moderate   • Arthritis    • Atrial fibrillation, permanent (CMS/HCC)     Originally treated with Sotalol, Amio, Cardizem, atenolol   • Bilateral cataracts    • Cardiac failure congestive    • Chronic cough     Being evaluated by ENT/pulmonary   • Depression    • Diffuse large B cell lymphoma (CMS/HCC) 08/30/2016    Chemotherapy  last dose 1/4/2017   • Dizziness    • Dyspnea     mild   • Encephalopathy 3/2014    lethargy and inconsistent behavior after weaning from vent   • Esophageal reflux    • Failed moderate sedation during procedure     with cataract surgery, was not put out far enough could feel needle prick in eye   • Fatigue    • Heart block 5/25/2001    AVJ   • History of transfusion     Not anemia per pt, but given after heart surgery when unresponsive with propofol   • Hyperlipidemia    • Hypertension    • Hypothyroidism     hypothyroid   • ICD (implantable cardioverter-defibrillator) in place     [employer]tr[employer]   • LAE (left atrial enlargement) 2012    Severe, Volume 49 mL/m2   • Left ventricular ejection fraction less than 40% 2012   • Malignant neoplasm (CMS/HCC) 1980    breast - left   • Mitral valve regurgitation 2012    Mild-moderate   • Other chronic pain     low back pain, bilat hip into legs (has pain pump)   • Pacemaker lead malfunction 2006    Right atrial lead malfunction   • Palpitations    • Rectal adenocarcinoma (CMS/HCC) 7/25/2014    mod differentiated adenocarcinoma rectal polyp   •  NP  Wall slides w orange TB: x 20- NP  Prone rows/ext: 2 x 10- NP  Flexion/scap slides on stair rail: 20 x 5 seconds each -NP  Supine sh flexion AAROM w dowel 5 sec hold x 30- NP  Supine sh ER AAROM w dowel 5 sec hold x 20- NP    Pt received moist heat to L shoulder at beginning of treatment session for 0 minutes.     Hien received the following manual therapy techniques x 10 min: Grade III inferior and posterior GH joint mobs and STM to L subscapularis and infraspinatus - not performed     Home Exercises Provided and Patient Education Provided     Education provided:   -importance of proper posture with therapeutic exercises    Written Home Exercises Provided: Patient instructed to cont prior HEP. (wall slides, SL sh ER, B sh ext w theraband, prone army crawls, D2 flexion)  Exercises were reviewed and Hien was able to demonstrate them prior to the end of the session.  Hien demonstrated good  understanding of the education provided.     See EMR under Patient Instructions for exercises provided prior visit.    Assessment     Patient  tolerated therapy session well. Patient with improved range of motion but remains with decreased overall head strength and decreased scapulohumeral rhythm with verbal cueing to improve technique     Hien is progressing well towards her goals.   Pt prognosis is Good.     Pt will continue to benefit from skilled outpatient physical therapy to address the deficits listed in the problem list box on initial evaluation, provide pt/family education and to maximize pt's level of independence in the home and community environment.     Pt's spiritual, cultural and educational needs considered and pt agreeable to plan of care and goals.     Anticipated barriers to physical therapy: None     Goals:     Short Term Goals:  6 weeks  1. Report decreased L shoulder pain  <  / =  5/10 at worst to increase tolerance for driving. - met 5/21/19  2. Pt will increase L shoulder flexion AROM to 150 deg to  Respiratory failure, post-operative (CMS/[facility]) 3/2014    After heart surgery,  on ventilator, agitation with weaning off, needed sedation, vented 6 days   • Restless leg syndrome    • Severe tricuspid regurgitation 2012, 2013    Non-coaptation of leaflets   • Sleep apnea     uses CPAP   • Systolic heart failure (CMS/[facility]) 6/24/2013   • Urinary incontinence     OAB   • Use of cane as ambulatory aid     occasional use   • Wears dentures    • Wears hearing aid in both ears        Prior to Admission Status  Functional Status  Ambulation: Walker  Bathing: Facility Staff  Dressing: Facility Staff  Toileting: Facility Staff  Meal Preparation: Facility Staff  Medication Preparation: Pharmacy Setup  Medication Administration: Staff Administered    Agency/Support  Type of Services Prior to Hospitalization: Delivered meals, Housekeeping, PT  Support Systems: Friends/neighbors, Family members  Home Devices/Equipment: CPAP/BiPAP machine (T)  Mobility Assist Devices: Cane, Standard walker  Sensory Support Devices: Dentures    Current Status  PT Ambulation Tips:    PT Transfer Tips:     OT Bathing Tips: Bathes with maximal assist  OT Dressing Tips: Dresses with maximal assist  OT Toileting Tips: Toilets with maximal assist  OT Feeding Tips:    SLP Swallow/Feeding Tips:    SLP Comm/Cog Tips:    Current Mental Status: Alert, Oriented to Person, Oriented to Place  Stressors:      Insurance  Primary: MEDICARE  Secondary: [place]    Barriers to Discharge  Identified Barriers to Discharge/Transition Planning: Assessment/stabilization in progress    Progress Note    SW attended OFT's this date. The pt recently discharged from [facility] to [facility] in [place] 2/22/22. The pt discharged from [facility] to her apartment around March 24. She begand to have more weakness and body aches and went to [facility]. Was treated for UTI and went back to [facility] for additional rehab. Pt was admitted  indicate improved flexibility and mobility. - met 5/21/19  3. Pt will be able to tolerate multi-directional UE strengthening without pain to improve functional use of UEs.- met 5/21/19  4. Pt will report 50% improvement in ability to reach overhead without pain since start of care to indicate improved functional mobility. - met 5/21/19  5. Pt to tolerate HEP to improve ROM and independence with ADL's. - met 5/21/19     Long Term Goals: 12 weeks  1. Report decreased L shoulder pain  <  / =  3/10 at worst to increase tolerance for driving. - met 6/18/2019   2. Pt will demonstrate increase L shoulder AROM in all planes to indicate improved flexibility and mobility.  - Progressing, not met 7/16/2019   3. Pt will be able to perform 2 x 10 shoulder flexion with 2 lbs to indicate improved strength in B UEs. - Progressing, not met 7/16/2019   4. Pt will report 80% improvement in ability to reach overhead without pain since start of care to indicate improved functional mobility.  - Progressing, not met 7/16/2019   5. Pt to be Independent with HEP to improve ROM and independence with ADL's. - Progressing, not met 7/16/2019     Plan     Progress shoulder AROM and strength, manual techniques, and periscapular strengthening.      Sophie Butcher, PTA      to [place] on 4/7 from [facility] and decision was made to have pt admitted to [facility] for further evaluation.     BRYNN opened pt's chart for SW trigger regarding home with ongoing home care. As stated above pt was at [facility]. SW came to pt's room and introduced self and role to the pt and her dtr Chante. BRYNN requested permission from pt to talk in front of Chante. The pt was just getting ready to go off floor for medical testing.  The pt lives in an apartment with an elevator. She lives alone does have some help with housekeeping and laundry. She has both a 2 wheeled and 4 wheeled walker. The pt indicated that she will go back to [facility] at time of discharge. BRYNN placed call to [facility] admissions liaison ([phone]) and left a msg.     BRYNN to follow.     Plan  BRYNN/CM - Recommendations for Discharge: [facility]  PT - Recommendations for Discharge:      OT - Recommendations for Discharge: Sub-acute nursing home (return to [facility])    SLP - Recommendations for Discharge:       Refer to SW/CM Flowsheet for Goals and objective data.

## 2022-04-18 ENCOUNTER — LAB VISIT (OUTPATIENT)
Dept: LAB | Facility: HOSPITAL | Age: 57
End: 2022-04-18
Attending: INTERNAL MEDICINE
Payer: COMMERCIAL

## 2022-04-18 ENCOUNTER — PATIENT MESSAGE (OUTPATIENT)
Dept: NEUROLOGY | Facility: CLINIC | Age: 57
End: 2022-04-18
Payer: COMMERCIAL

## 2022-04-18 DIAGNOSIS — Z00.00 ANNUAL PHYSICAL EXAM: ICD-10-CM

## 2022-04-18 LAB
ALBUMIN SERPL BCP-MCNC: 3.6 G/DL (ref 3.5–5.2)
ALP SERPL-CCNC: 101 U/L (ref 55–135)
ALT SERPL W/O P-5'-P-CCNC: 19 U/L (ref 10–44)
ANION GAP SERPL CALC-SCNC: 7 MMOL/L (ref 8–16)
AST SERPL-CCNC: 18 U/L (ref 10–40)
BACTERIA #/AREA URNS AUTO: NORMAL /HPF
BASOPHILS # BLD AUTO: 0.06 K/UL (ref 0–0.2)
BASOPHILS NFR BLD: 1 % (ref 0–1.9)
BILIRUB SERPL-MCNC: 0.4 MG/DL (ref 0.1–1)
BUN SERPL-MCNC: 18 MG/DL (ref 6–20)
CALCIUM SERPL-MCNC: 9.7 MG/DL (ref 8.7–10.5)
CHLORIDE SERPL-SCNC: 106 MMOL/L (ref 95–110)
CHOLEST SERPL-MCNC: 235 MG/DL (ref 120–199)
CHOLEST/HDLC SERPL: 3.3 {RATIO} (ref 2–5)
CO2 SERPL-SCNC: 28 MMOL/L (ref 23–29)
CREAT SERPL-MCNC: 0.8 MG/DL (ref 0.5–1.4)
DIFFERENTIAL METHOD: ABNORMAL
EOSINOPHIL # BLD AUTO: 0.2 K/UL (ref 0–0.5)
EOSINOPHIL NFR BLD: 3.6 % (ref 0–8)
ERYTHROCYTE [DISTWIDTH] IN BLOOD BY AUTOMATED COUNT: 12.5 % (ref 11.5–14.5)
EST. GFR  (AFRICAN AMERICAN): >60 ML/MIN/1.73 M^2
EST. GFR  (NON AFRICAN AMERICAN): >60 ML/MIN/1.73 M^2
GLUCOSE SERPL-MCNC: 90 MG/DL (ref 70–110)
HCT VFR BLD AUTO: 42.6 % (ref 37–48.5)
HDLC SERPL-MCNC: 71 MG/DL (ref 40–75)
HDLC SERPL: 30.2 % (ref 20–50)
HGB BLD-MCNC: 13.9 G/DL (ref 12–16)
IMM GRANULOCYTES # BLD AUTO: 0.01 K/UL (ref 0–0.04)
IMM GRANULOCYTES NFR BLD AUTO: 0.2 % (ref 0–0.5)
LDLC SERPL CALC-MCNC: 129.6 MG/DL (ref 63–159)
LYMPHOCYTES # BLD AUTO: 1.6 K/UL (ref 1–4.8)
LYMPHOCYTES NFR BLD: 27.1 % (ref 18–48)
MCH RBC QN AUTO: 32.6 PG (ref 27–31)
MCHC RBC AUTO-ENTMCNC: 32.6 G/DL (ref 32–36)
MCV RBC AUTO: 100 FL (ref 82–98)
MICROSCOPIC COMMENT: NORMAL
MONOCYTES # BLD AUTO: 0.8 K/UL (ref 0.3–1)
MONOCYTES NFR BLD: 13.6 % (ref 4–15)
NEUTROPHILS # BLD AUTO: 3.2 K/UL (ref 1.8–7.7)
NEUTROPHILS NFR BLD: 54.5 % (ref 38–73)
NONHDLC SERPL-MCNC: 164 MG/DL
NRBC BLD-RTO: 0 /100 WBC
PLATELET # BLD AUTO: 343 K/UL (ref 150–450)
PMV BLD AUTO: 10.5 FL (ref 9.2–12.9)
POTASSIUM SERPL-SCNC: 4.6 MMOL/L (ref 3.5–5.1)
PROT SERPL-MCNC: 7 G/DL (ref 6–8.4)
RBC # BLD AUTO: 4.26 M/UL (ref 4–5.4)
RBC #/AREA URNS AUTO: 1 /HPF (ref 0–4)
SODIUM SERPL-SCNC: 141 MMOL/L (ref 136–145)
TRIGL SERPL-MCNC: 172 MG/DL (ref 30–150)
TSH SERPL DL<=0.005 MIU/L-ACNC: 2.46 UIU/ML (ref 0.4–4)
WBC # BLD AUTO: 5.9 K/UL (ref 3.9–12.7)

## 2022-04-18 PROCEDURE — 36415 COLL VENOUS BLD VENIPUNCTURE: CPT | Mod: PO | Performed by: INTERNAL MEDICINE

## 2022-04-18 PROCEDURE — 81001 URINALYSIS AUTO W/SCOPE: CPT | Performed by: INTERNAL MEDICINE

## 2022-04-18 PROCEDURE — 85025 COMPLETE CBC W/AUTO DIFF WBC: CPT | Performed by: INTERNAL MEDICINE

## 2022-04-18 PROCEDURE — 80053 COMPREHEN METABOLIC PANEL: CPT | Performed by: INTERNAL MEDICINE

## 2022-04-18 PROCEDURE — 80061 LIPID PANEL: CPT | Performed by: INTERNAL MEDICINE

## 2022-04-18 PROCEDURE — 84443 ASSAY THYROID STIM HORMONE: CPT | Performed by: INTERNAL MEDICINE

## 2022-04-19 ENCOUNTER — OFFICE VISIT (OUTPATIENT)
Dept: NEUROLOGY | Facility: CLINIC | Age: 57
End: 2022-04-19
Payer: COMMERCIAL

## 2022-04-19 VITALS
HEART RATE: 72 BPM | SYSTOLIC BLOOD PRESSURE: 102 MMHG | WEIGHT: 142.31 LBS | DIASTOLIC BLOOD PRESSURE: 71 MMHG | BODY MASS INDEX: 26.19 KG/M2 | HEIGHT: 62 IN

## 2022-04-19 DIAGNOSIS — F32.0 MILD SINGLE CURRENT EPISODE OF MAJOR DEPRESSIVE DISORDER: ICD-10-CM

## 2022-04-19 DIAGNOSIS — G35 MS (MULTIPLE SCLEROSIS): Primary | ICD-10-CM

## 2022-04-19 DIAGNOSIS — F41.9 ANXIETY: ICD-10-CM

## 2022-04-19 DIAGNOSIS — Z71.89 COUNSELING REGARDING GOALS OF CARE: ICD-10-CM

## 2022-04-19 DIAGNOSIS — Z79.899 IMMUNOSUPPRESSION DUE TO DRUG THERAPY: ICD-10-CM

## 2022-04-19 DIAGNOSIS — D84.821 IMMUNOSUPPRESSION DUE TO DRUG THERAPY: ICD-10-CM

## 2022-04-19 PROCEDURE — 1159F PR MEDICATION LIST DOCUMENTED IN MEDICAL RECORD: ICD-10-PCS | Mod: CPTII,S$GLB,, | Performed by: PSYCHIATRY & NEUROLOGY

## 2022-04-19 PROCEDURE — 3074F PR MOST RECENT SYSTOLIC BLOOD PRESSURE < 130 MM HG: ICD-10-PCS | Mod: CPTII,S$GLB,, | Performed by: PSYCHIATRY & NEUROLOGY

## 2022-04-19 PROCEDURE — 99999 PR PBB SHADOW E&M-EST. PATIENT-LVL III: ICD-10-PCS | Mod: PBBFAC,,, | Performed by: PSYCHIATRY & NEUROLOGY

## 2022-04-19 PROCEDURE — 1160F RVW MEDS BY RX/DR IN RCRD: CPT | Mod: CPTII,S$GLB,, | Performed by: PSYCHIATRY & NEUROLOGY

## 2022-04-19 PROCEDURE — 3008F PR BODY MASS INDEX (BMI) DOCUMENTED: ICD-10-PCS | Mod: CPTII,S$GLB,, | Performed by: PSYCHIATRY & NEUROLOGY

## 2022-04-19 PROCEDURE — 3008F BODY MASS INDEX DOCD: CPT | Mod: CPTII,S$GLB,, | Performed by: PSYCHIATRY & NEUROLOGY

## 2022-04-19 PROCEDURE — 99215 PR OFFICE/OUTPT VISIT, EST, LEVL V, 40-54 MIN: ICD-10-PCS | Mod: S$GLB,,, | Performed by: PSYCHIATRY & NEUROLOGY

## 2022-04-19 PROCEDURE — 1160F PR REVIEW ALL MEDS BY PRESCRIBER/CLIN PHARMACIST DOCUMENTED: ICD-10-PCS | Mod: CPTII,S$GLB,, | Performed by: PSYCHIATRY & NEUROLOGY

## 2022-04-19 PROCEDURE — 99999 PR PBB SHADOW E&M-EST. PATIENT-LVL III: CPT | Mod: PBBFAC,,, | Performed by: PSYCHIATRY & NEUROLOGY

## 2022-04-19 PROCEDURE — 99215 OFFICE O/P EST HI 40 MIN: CPT | Mod: S$GLB,,, | Performed by: PSYCHIATRY & NEUROLOGY

## 2022-04-19 PROCEDURE — 3078F DIAST BP <80 MM HG: CPT | Mod: CPTII,S$GLB,, | Performed by: PSYCHIATRY & NEUROLOGY

## 2022-04-19 PROCEDURE — 1159F MED LIST DOCD IN RCRD: CPT | Mod: CPTII,S$GLB,, | Performed by: PSYCHIATRY & NEUROLOGY

## 2022-04-19 PROCEDURE — 3078F PR MOST RECENT DIASTOLIC BLOOD PRESSURE < 80 MM HG: ICD-10-PCS | Mod: CPTII,S$GLB,, | Performed by: PSYCHIATRY & NEUROLOGY

## 2022-04-19 PROCEDURE — 3074F SYST BP LT 130 MM HG: CPT | Mod: CPTII,S$GLB,, | Performed by: PSYCHIATRY & NEUROLOGY

## 2022-04-19 RX ORDER — ESCITALOPRAM OXALATE 10 MG/1
10 TABLET ORAL NIGHTLY
Qty: 90 TABLET | Refills: 3
Start: 2022-04-19 | End: 2023-05-31 | Stop reason: SDUPTHER

## 2022-04-19 NOTE — PROGRESS NOTES
Subjective:          Patient ID: Hien Jeter is a 56 y.o. female who presents today for a routine clinic visit for MS.      MS HPI:  · DMT: ocrelizumab --last dose October 15th, [place]   · Side effects from DMT? No  · Taking vitamin D3 as recommended? Yes - 3,000 IU/day     · She is feeling neurologically stable;   · Her sister passed away last year;   · No sense of progressive decline  · migraine meds prescribed by Dr. Klein   · S/p 3 COVID vaccine;  · S/p 2 doses of Evusheld    Medications:  Current Outpatient Medications   Medication Sig    azelastine (ASTELIN) 137 mcg (0.1 %) nasal spray 1 SPRAY (137 MCG TOTAL) BY NASAL ROUTE 2 (TWO) TIMES DAILY.    cholecalciferol, vitamin D3, 3,000 unit Tab Take 3,000 Units by mouth once daily.     EScitalopram oxalate (LEXAPRO) 10 MG tablet TAKE 1.5 TABLETS (15 MG TOTAL) BY MOUTH EVERY EVENING.    flaxseed oil 1,000 mg Cap Take 1 capsule by mouth once daily.     multivitamin-Ca-iron-minerals 27-0.4 mg Tab Take 1 tablet by mouth once daily.     nortriptyline (PAMELOR) 10 MG capsule TAKE 2 CAPSULES (20 MG TOTAL) BY MOUTH EVERY EVENING.    topiramate (TOPAMAX) 25 MG tablet TAKE 1 TABLET BY MOUTH TWICE A DAY    clotrimazole-betamethasone 1-0.05% (LOTRISONE) cream Apply topically 2 (two) times daily.     No current facility-administered medications for this visit.       SOCIAL HISTORY  Social History     Tobacco Use    Smoking status: Never Smoker    Smokeless tobacco: Never Used   Substance Use Topics    Alcohol use: Yes     Comment: socially/ not weekly    Drug use: No       Living arrangements - the patient lives with their spouse.  Teacher's assisted disability ;     REVIEW OF SYMPTOMS 4/15/2022   Do you feel abnormally tired on most days? No   Do you feel you generally sleep well? Yes   Do you have difficulty controlling your bladder?  No   Do you have difficulty controlling your bowels?  No   Do you have frequent muscle cramps, tightness or spasms in your  limbs?  No   Do you have new visual symptoms?  No   Do you have worsening difficulty with your memory or thinking? No   Do you have worsening symptoms of anxiety or depression?  No   For patients who walk, Do you have more difficulty walking?  No   Have you fallen since your last visit?  No   For patients who use wheelchairs: Do you have any skin wounds or breakdown? Not Applicable   Do you have difficulty using your hands?  No   Do you have shooting or burning pain? No   Do you have difficulty with sexual function?  No   If you are sexually active, are you using birth control? Y/N  N/A No   Do you often choke when swallowing liquids or solid food?  No   Do you experience worsening symptoms when overheated? No   Do you need any new equipment such as a wheelchair, walker or shower chair? No   Do you receive co-pay financial assistance for your principal MS medicine? Yes   Would you be interested in participating in an MS research trial in the future? Yes   For patients on Gilenya, Tecfidera, Aubagio, Rituxan, Ocrevus, Tysabri, Lemtrada or Methotrexate, are you aware that you should NOT receive live virus vaccines?  Yes   Do you feel you have adequate family/friend support?  Yes   Do you have health insurance?   Yes   Are you currently employed? No   Do you receive SSDI/SSI?  No   Do you use marijuana or cannabis products? No   Have you been diagnosed with a urinary tract infection since your last visit here? No   Have you been diagnosed with a respiratory tract infection since your last visit here? No   Have you been to the emergency room since your last visit here? No   Have you been hospitalized since your last visit here?  No            Objective:        Timed 25 Foot Walk: 6/21/2021 4/19/2022   Did patient wear an AFO? No No   Was assistive device used? No No   Time for 25 Foot Walk (seconds) 3.7 3.75   Time for 25 Foot Walk (seconds) - -       Neurologic Exam    MENTAL STATUS: grossly intact  CRANIAL NERVE EXAM:  There is no internuclear ophthalmoplegia. Extraocular   muscles are intact.  No facial   asymmetry.There is no dysarthria.   MOTOR EXAM: Normal bulk and tone throughout UE and LE bilaterally. Rapid sequential movements are normal. Strength is 5/5 in all groups   in the lower extremities and upper extremities.   REFLEXES: Symmetric and 2+ throughout in all 4 extremities.   SENSORY EXAM: Normal to vibration t/o  COORDINATION: Normal finger-to-nose exam.   GAIT: Narrow based and stable.    Imaging:     Results for orders placed during the hospital encounter of 06/16/21    MRI Brain Demyelinating Without Contrast    Impression  No significant change from prior with continued few scattered small to punctate foci of T2 FLAIR signal hyperintensity throughout the brain parenchyma which remains nonspecific and concerning for mild degree of prior demyelinating plaque burden in light of history..    No definite new lesion or diffusion signal abnormality to suggest significant interval or active demyelination.    Clinical correlation and continued follow-up advised.      Electronically signed by: Trace Novak DO  Date:    06/16/2021  Time:    08:24    No results found for this or any previous visit.    No results found for this or any previous visit.    No results found for this or any previous visit.    No results found for this or any previous visit.    No results found for this or any previous visit.        Labs:     Lab Results   Component Value Date    JDGTODPP12EP 60 06/02/2021    WZCCAMZV42CX 74 12/11/2019    EDNZQLFF99NK 45 06/11/2018     No results found for: JCVINDEX, JCVANTIBODY  Lab Results   Component Value Date    CL4VECHN 64.7 12/20/2017    ABSOLUTECD3 837 12/20/2017    FR5JRXDB 12.5 12/20/2017    ABSOLUTECD8 161 (L) 12/20/2017    XS2QUNAI 53.0 12/20/2017    ABSOLUTECD4 685 12/20/2017    LABCD48 4.25 (H) 12/20/2017     Lab Results   Component Value Date    WBC 5.90 04/18/2022    RBC 4.26 04/18/2022    HGB 13.9  04/18/2022    HCT 42.6 04/18/2022     (H) 04/18/2022    MCH 32.6 (H) 04/18/2022    MCHC 32.6 04/18/2022    RDW 12.5 04/18/2022     04/18/2022    MPV 10.5 04/18/2022    GRAN 3.2 04/18/2022    GRAN 54.5 04/18/2022    LYMPH 1.6 04/18/2022    LYMPH 27.1 04/18/2022    MONO 0.8 04/18/2022    MONO 13.6 04/18/2022    EOS 0.2 04/18/2022    BASO 0.06 04/18/2022    EOSINOPHIL 3.6 04/18/2022    BASOPHIL 1.0 04/18/2022     Sodium   Date Value Ref Range Status   04/18/2022 141 136 - 145 mmol/L Final     Potassium   Date Value Ref Range Status   04/18/2022 4.6 3.5 - 5.1 mmol/L Final     Chloride   Date Value Ref Range Status   04/18/2022 106 95 - 110 mmol/L Final     CO2   Date Value Ref Range Status   04/18/2022 28 23 - 29 mmol/L Final     Glucose   Date Value Ref Range Status   04/18/2022 90 70 - 110 mg/dL Final     BUN   Date Value Ref Range Status   04/18/2022 18 6 - 20 mg/dL Final     Creatinine   Date Value Ref Range Status   04/18/2022 0.8 0.5 - 1.4 mg/dL Final     Calcium   Date Value Ref Range Status   04/18/2022 9.7 8.7 - 10.5 mg/dL Final     Total Protein   Date Value Ref Range Status   04/18/2022 7.0 6.0 - 8.4 g/dL Final     Albumin   Date Value Ref Range Status   04/18/2022 3.6 3.5 - 5.2 g/dL Final     Total Bilirubin   Date Value Ref Range Status   04/18/2022 0.4 0.1 - 1.0 mg/dL Final     Comment:     For infants and newborns, interpretation of results should be based  on gestational age, weight and in agreement with clinical  observations.    Premature Infant recommended reference ranges:  Up to 24 hours.............<8.0 mg/dL  Up to 48 hours............<12.0 mg/dL  3-5 days..................<15.0 mg/dL  6-29 days.................<15.0 mg/dL       Alkaline Phosphatase   Date Value Ref Range Status   04/18/2022 101 55 - 135 U/L Final     AST   Date Value Ref Range Status   04/18/2022 18 10 - 40 U/L Final     ALT   Date Value Ref Range Status   04/18/2022 19 10 - 44 U/L Final     Anion Gap   Date Value  Ref Range Status   04/18/2022 7 (L) 8 - 16 mmol/L Final     eGFR if    Date Value Ref Range Status   04/18/2022 >60.0 >60 mL/min/1.73 m^2 Final     eGFR if non    Date Value Ref Range Status   04/18/2022 >60.0 >60 mL/min/1.73 m^2 Final     Comment:     Calculation used to obtain the estimated glomerular filtration  rate (eGFR) is the CKD-EPI equation.        Lab Results   Component Value Date    HEPBSAG Negative 03/15/2022    HEPBSAB Positive 03/15/2022    HEPBCAB Negative 03/15/2022           MS Impression and Plan:     NEURO MULTIPLE SCLEROSIS IMPRESSION:   MS Status:     Number of relapses in the past year?:  0    Clinical Progression:  Clinically Stable    MRI Progression:  Stable  Plan:     DMT:  No change in management    Symptom Management:  No change in symptom management     Next Imaging Due: 6/1/2022     F/u 6 mo El[facility] Pes[facility]            Problem List Items Addressed This Visit        1 - High    MS (multiple sclerosis) - Primary    Relevant Orders    MRI Brain Demyelinating Without Contrast       Unprioritized    Counseling regarding goals of care      Other Visit Diagnoses     Mild single current episode of major depressive disorder        Relevant Medications    EScitalopram oxalate (LEXAPRO) 10 MG tablet    Immunosuppression due to drug therapy              Page Salas MD    I spent a total of 40 minutes on the day of the visit.This includes face to face time and non-face to face time preparing to see the patient (eg, review of tests), obtaining and/or reviewing separately obtained history, documenting clinical information in the electronic or other health record, independently interpreting results and communicating results to the patient/family/caregiver, or care coordinator.

## 2022-04-19 NOTE — Clinical Note
On track for Ocrevus; due now; [place] ; s/p 3 vaccine and evusheld; and labs are done, but she won't be able to get infusion until 2nd week of May -ish;  she's stable ; labs are done; thanks

## 2022-04-24 ENCOUNTER — TELEPHONE (OUTPATIENT)
Dept: NEUROLOGY | Facility: CLINIC | Age: 57
End: 2022-04-24

## 2022-04-24 NOTE — TELEPHONE ENCOUNTER
----- Message from Spring Munoz LPN sent at 4/22/2022  3:19 PM CDT -----    ----- Message -----  From: Page Salas MD  Sent: 4/19/2022   4:15 PM CDT  To: Marit[facility] ALONSO Staff    On track for Ocrevus; due now; [place] ; s/p 3 vaccine and evusheld; and labs are done, but she won't be able to get infusion until 2nd week of May -ish;  she's stable ; labs are done; thanks

## 2022-04-25 RX ORDER — HEPARIN 100 UNIT/ML
100 SYRINGE INTRAVENOUS
Status: CANCELLED | OUTPATIENT
Start: 2022-04-25

## 2022-04-25 RX ORDER — ACETAMINOPHEN 500 MG
1000 TABLET ORAL
Status: CANCELLED | OUTPATIENT
Start: 2022-04-25 | End: 2022-04-25

## 2022-04-25 RX ORDER — SODIUM CHLORIDE 0.9 % (FLUSH) 0.9 %
10 SYRINGE (ML) INJECTION
Status: CANCELLED | OUTPATIENT
Start: 2022-04-25

## 2022-04-25 NOTE — TELEPHONE ENCOUNTER
Spoke with patient and scheduled the following:    Covid testing: N/A  Infusion: 5/18 offered 5/2 but patient declined citing she is on vacation and will be busy for the next two weeks.

## 2022-04-28 ENCOUNTER — TELEPHONE (OUTPATIENT)
Dept: INTERNAL MEDICINE | Facility: CLINIC | Age: 57
End: 2022-04-28
Payer: COMMERCIAL

## 2022-05-09 ENCOUNTER — PATIENT MESSAGE (OUTPATIENT)
Dept: NEUROLOGY | Facility: CLINIC | Age: 57
End: 2022-05-09
Payer: COMMERCIAL

## 2022-05-26 ENCOUNTER — INFUSION (OUTPATIENT)
Dept: INFUSION THERAPY | Facility: HOSPITAL | Age: 57
End: 2022-05-26
Payer: COMMERCIAL

## 2022-05-26 VITALS
SYSTOLIC BLOOD PRESSURE: 111 MMHG | HEIGHT: 62 IN | HEART RATE: 77 BPM | TEMPERATURE: 99 F | OXYGEN SATURATION: 96 % | BODY MASS INDEX: 26.21 KG/M2 | RESPIRATION RATE: 18 BRPM | WEIGHT: 142.44 LBS | DIASTOLIC BLOOD PRESSURE: 77 MMHG

## 2022-05-26 DIAGNOSIS — G35 MS (MULTIPLE SCLEROSIS): Primary | ICD-10-CM

## 2022-05-26 PROCEDURE — 96367 TX/PROPH/DG ADDL SEQ IV INF: CPT

## 2022-05-26 PROCEDURE — 96417 CHEMO IV INFUS EACH ADDL SEQ: CPT

## 2022-05-26 PROCEDURE — 96365 THER/PROPH/DIAG IV INF INIT: CPT

## 2022-05-26 PROCEDURE — 25000003 PHARM REV CODE 250: Performed by: PSYCHIATRY & NEUROLOGY

## 2022-05-26 PROCEDURE — 63600175 PHARM REV CODE 636 W HCPCS: Mod: JG | Performed by: PSYCHIATRY & NEUROLOGY

## 2022-05-26 RX ORDER — SODIUM CHLORIDE 0.9 % (FLUSH) 0.9 %
10 SYRINGE (ML) INJECTION
Status: CANCELLED | OUTPATIENT
Start: 2022-08-25

## 2022-05-26 RX ORDER — HEPARIN 100 UNIT/ML
100 SYRINGE INTRAVENOUS
Status: CANCELLED | OUTPATIENT
Start: 2022-08-25

## 2022-05-26 RX ORDER — ACETAMINOPHEN 500 MG
1000 TABLET ORAL
Status: COMPLETED | OUTPATIENT
Start: 2022-05-26 | End: 2022-05-26

## 2022-05-26 RX ORDER — HEPARIN 100 UNIT/ML
100 SYRINGE INTRAVENOUS
Status: DISCONTINUED | OUTPATIENT
Start: 2022-05-26 | End: 2022-05-26 | Stop reason: HOSPADM

## 2022-05-26 RX ORDER — SODIUM CHLORIDE 0.9 % (FLUSH) 0.9 %
10 SYRINGE (ML) INJECTION
Status: DISCONTINUED | OUTPATIENT
Start: 2022-05-26 | End: 2022-05-26 | Stop reason: HOSPADM

## 2022-05-26 RX ORDER — ACETAMINOPHEN 500 MG
1000 TABLET ORAL
Status: CANCELLED | OUTPATIENT
Start: 2022-08-25 | End: 2022-08-25

## 2022-05-26 RX ADMIN — DIPHENHYDRAMINE HYDROCHLORIDE 50 MG: 50 INJECTION, SOLUTION INTRAMUSCULAR; INTRAVENOUS at 08:05

## 2022-05-26 RX ADMIN — ACETAMINOPHEN 1000 MG: 500 TABLET ORAL at 08:05

## 2022-05-26 RX ADMIN — SODIUM CHLORIDE: 0.9 INJECTION, SOLUTION INTRAVENOUS at 07:05

## 2022-05-26 RX ADMIN — OCRELIZUMAB 600 MG: 300 INJECTION INTRAVENOUS at 09:05

## 2022-05-26 RX ADMIN — DEXTROSE: 50 INJECTION, SOLUTION INTRAVENOUS at 08:05

## 2022-05-26 RX ADMIN — FAMOTIDINE 20 MG: 10 INJECTION INTRAVENOUS at 09:05

## 2022-05-26 NOTE — PLAN OF CARE
Pt ambulatory to clinic today alone for maintenance ocrevus. Denies any new or worsening symptoms. PIV started without difficulty. Pt tolerated infusion well. PIV removed with catheter intact. Ambulatory from clinic in [place].

## 2022-06-01 ENCOUNTER — HOSPITAL ENCOUNTER (OUTPATIENT)
Dept: RADIOLOGY | Facility: HOSPITAL | Age: 57
Discharge: HOME | End: 2022-06-01
Attending: PSYCHIATRY & NEUROLOGY
Payer: COMMERCIAL

## 2022-06-01 DIAGNOSIS — G35 MS (MULTIPLE SCLEROSIS): ICD-10-CM

## 2022-06-01 PROCEDURE — 70551 MRI BRAIN DEMYELINATING WITHOUT CONTRAST: ICD-10-PCS | Mod: 26,,, | Performed by: RADIOLOGY

## 2022-06-01 PROCEDURE — 70551 MRI BRAIN STEM W/O DYE: CPT | Mod: TC

## 2022-06-01 PROCEDURE — 70551 MRI BRAIN STEM W/O DYE: CPT | Mod: 26,,, | Performed by: RADIOLOGY

## 2022-06-24 ENCOUNTER — PATIENT MESSAGE (OUTPATIENT)
Dept: PSYCHIATRY | Facility: CLINIC | Age: 57
End: 2022-06-24
Payer: COMMERCIAL

## 2022-07-01 ENCOUNTER — HOSPITAL ENCOUNTER (OUTPATIENT)
Dept: RADIOLOGY | Facility: HOSPITAL | Age: 57
Discharge: HOME | End: 2022-07-01
Attending: OBSTETRICS & GYNECOLOGY
Payer: COMMERCIAL

## 2022-07-01 ENCOUNTER — OFFICE VISIT (OUTPATIENT)
Dept: HEMATOLOGY/ONCOLOGY | Facility: CLINIC | Age: 57
End: 2022-07-01
Payer: COMMERCIAL

## 2022-07-01 VITALS
WEIGHT: 143.5 LBS | HEART RATE: 59 BPM | BODY MASS INDEX: 26.41 KG/M2 | HEIGHT: 62 IN | TEMPERATURE: 98 F | RESPIRATION RATE: 16 BRPM | OXYGEN SATURATION: 96 % | DIASTOLIC BLOOD PRESSURE: 70 MMHG | SYSTOLIC BLOOD PRESSURE: 104 MMHG

## 2022-07-01 VITALS — WEIGHT: 140 LBS | BODY MASS INDEX: 25.76 KG/M2 | HEIGHT: 62 IN

## 2022-07-01 DIAGNOSIS — Z91.89 AT HIGH RISK FOR BREAST CANCER: Primary | ICD-10-CM

## 2022-07-01 DIAGNOSIS — Z80.3 FAMILY HISTORY OF BREAST CANCER: ICD-10-CM

## 2022-07-01 DIAGNOSIS — Z12.31 VISIT FOR SCREENING MAMMOGRAM: ICD-10-CM

## 2022-07-01 DIAGNOSIS — R92.30 DENSE BREAST TISSUE ON MAMMOGRAM: ICD-10-CM

## 2022-07-01 PROCEDURE — 77063 BREAST TOMOSYNTHESIS BI: CPT | Mod: 26,,, | Performed by: RADIOLOGY

## 2022-07-01 PROCEDURE — 77063 MAMMO DIGITAL SCREENING BILAT WITH TOMO: ICD-10-PCS | Mod: 26,,, | Performed by: RADIOLOGY

## 2022-07-01 PROCEDURE — 99205 OFFICE O/P NEW HI 60 MIN: CPT | Mod: S$GLB,,, | Performed by: NURSE PRACTITIONER

## 2022-07-01 PROCEDURE — 3008F PR BODY MASS INDEX (BMI) DOCUMENTED: ICD-10-PCS | Mod: CPTII,S$GLB,, | Performed by: NURSE PRACTITIONER

## 2022-07-01 PROCEDURE — 3008F BODY MASS INDEX DOCD: CPT | Mod: CPTII,S$GLB,, | Performed by: NURSE PRACTITIONER

## 2022-07-01 PROCEDURE — 3074F PR MOST RECENT SYSTOLIC BLOOD PRESSURE < 130 MM HG: ICD-10-PCS | Mod: CPTII,S$GLB,, | Performed by: NURSE PRACTITIONER

## 2022-07-01 PROCEDURE — 3074F SYST BP LT 130 MM HG: CPT | Mod: CPTII,S$GLB,, | Performed by: NURSE PRACTITIONER

## 2022-07-01 PROCEDURE — 3078F PR MOST RECENT DIASTOLIC BLOOD PRESSURE < 80 MM HG: ICD-10-PCS | Mod: CPTII,S$GLB,, | Performed by: NURSE PRACTITIONER

## 2022-07-01 PROCEDURE — 99999 PR PBB SHADOW E&M-EST. PATIENT-LVL V: CPT | Mod: PBBFAC,,, | Performed by: NURSE PRACTITIONER

## 2022-07-01 PROCEDURE — 3078F DIAST BP <80 MM HG: CPT | Mod: CPTII,S$GLB,, | Performed by: NURSE PRACTITIONER

## 2022-07-01 PROCEDURE — 77067 SCR MAMMO BI INCL CAD: CPT | Mod: 26,,, | Performed by: RADIOLOGY

## 2022-07-01 PROCEDURE — 77067 MAMMO DIGITAL SCREENING BILAT WITH TOMO: ICD-10-PCS | Mod: 26,,, | Performed by: RADIOLOGY

## 2022-07-01 PROCEDURE — 77063 BREAST TOMOSYNTHESIS BI: CPT | Mod: TC

## 2022-07-01 PROCEDURE — 1159F PR MEDICATION LIST DOCUMENTED IN MEDICAL RECORD: ICD-10-PCS | Mod: CPTII,S$GLB,, | Performed by: NURSE PRACTITIONER

## 2022-07-01 PROCEDURE — 1159F MED LIST DOCD IN RCRD: CPT | Mod: CPTII,S$GLB,, | Performed by: NURSE PRACTITIONER

## 2022-07-01 PROCEDURE — 99999 PR PBB SHADOW E&M-EST. PATIENT-LVL V: ICD-10-PCS | Mod: PBBFAC,,, | Performed by: NURSE PRACTITIONER

## 2022-07-01 PROCEDURE — 99205 PR OFFICE/OUTPT VISIT, NEW, LEVL V, 60-74 MIN: ICD-10-PCS | Mod: S$GLB,,, | Performed by: NURSE PRACTITIONER

## 2022-07-01 NOTE — PROGRESS NOTES
Reason For Consultation:   Increased lifetime risk of breast cancer    Referring Provider:   Eros Solorio IV, MD  [address] [address]    Records Obtained: Records of the patients history including those obtained from the referring provider were reviewed and summarized in detail.    HPI:   Hien Jeter is a 57 y.o. who presents for consultation of increased risk of breast cancer.  She has MS as well.     Today, Feels good and no complaints.   No breast concerns.    2022 B MMG: results pending    High Risk Breast cancer specific history:  - Age: 57 y.o.   - Height:  5'2  - Weight:   Wt Readings from Last 3 Encounters:   22 0919 65.1 kg (143 lb 8.3 oz)   22 0800 63.5 kg (140 lb)   22 0743 64.6 kg (142 lb 6.7 oz)      - Breast density per BI-RADS:  c - Heterogeneously dense  - Age at menarche:  13 yo  - Number of pregnancies: G0  - She is postmenopausal. Age at menopause, if applicable:  47 yo.   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 [facility].   Paternal aunt - breast cancer  Paternal aunt- breast cancer   Mom-colon cancer and liver cancer  Dad- prostate cancer  Maternal Grandfather - lung cancer    Social History:  Tobacco use:  no  Alcohol use:  social  Exercise regimen: yes swimming 45-60 minutes everyday  Employment: no     SEE CALCULATED RISK BELOW.     Past Medical   Past Medical History:   Diagnosis Date    Anxiety     Basal cell carcinoma 2000    left eyebrow     Depression     Environmental allergies     Headache(784.0)     Multiple food allergies     Multiple sclerosis       shoulder 3/2015    left     Patient Active Problem List   Diagnosis    MS (multiple sclerosis)    Intractable chronic migraine without aura    Occipital neuralgia    Facet arthropathy of spine    Encounter for long-term (current) use of high-risk medication    Menstrual migraine    Cervical pain    Left shoulder pain    Neck strain    Cervicalgia    Counseling regarding goals of care    Decreased strength    Impaired gait    Impingement syndrome of right shoulder    Numbness on left side    Intercostal pain    Rib pain on left side    Family history of colon cancer    Adhesive capsulitis of left shoulder    Ganglion cyst of right foot    Rotator cuff tear, left    Dupuytren's disease of palm    Anxiety    Insomnia    Shoulder impingement, right    Acute pain of right shoulder    Decreased range of motion of right shoulder    Muscle weakness    Impaired functional mobility and activity tolerance     Social History   Social History     Tobacco Use    Smoking status: Never Smoker    Smokeless tobacco: Never Used   Substance Use Topics    Alcohol use: Yes     Comment: socially/ not weekly    Drug use: No     Family History  Family History   Problem Relation Age of Onset    Colon cancer Mother     Diabetes Mother     Osteoporosis Mother     Cancer Mother     Arrhythmia Father     Prostate cancer Father     Dementia Maternal Grandmother     Cancer Paternal Grandfather     Breast cancer Paternal Aunt     Breast cancer Sister     Stomach cancer Sister     Colon cancer Sister         my risk negative     Prostate cancer Paternal Uncle     Uterine cancer Paternal Grandmother     Multiple sclerosis Neg Hx     Ovarian cancer Neg Hx      Medications    Current Outpatient Medications:     azelastine (ASTELIN) 137 mcg (0.1 %) nasal spray, 1 SPRAY (137 MCG TOTAL) BY NASAL ROUTE 2 (TWO) TIMES DAILY., Disp: 30 mL, Rfl: 11    cholecalciferol, vitamin D3, 3,000 unit Tab,  "Take 3,000 Units by mouth once daily. , Disp: , Rfl:     EScitalopram oxalate (LEXAPRO) 10 MG tablet, Take 1 tablet (10 mg total) by mouth every evening., Disp: 90 tablet, Rfl: 3    flaxseed oil 1,000 mg Cap, Take 1 capsule by mouth once daily. , Disp: , Rfl:     multivitamin-Ca-iron-minerals 27-0.4 mg Tab, Take 1 tablet by mouth once daily. , Disp: , Rfl:     nortriptyline (PAMELOR) 10 MG capsule, TAKE 2 CAPSULES (20 MG TOTAL) BY MOUTH EVERY EVENING., Disp: 180 capsule, Rfl: 3    topiramate (TOPAMAX) 25 MG tablet, TAKE 1 TABLET BY MOUTH TWICE A DAY, Disp: 180 tablet, Rfl: 4  Allergies  Review of patient's allergies indicates:   Allergen Reactions    Diclofenac Nausea Only    Ibuprofen Other (See Comments)    Tramadol Other (See Comments)     Nausea        Review of Systems       See above   All other systems reviewed and are negative.    Objective:      Vitals:   Vitals:    07/01/22 0919   BP: 104/70   BP Location: Left arm   Patient Position: Sitting   BP Method: Large (Automatic)   Pulse: (!) 59   Resp: 16   Temp: 98.4 °F (36.9 °C)   TempSrc: Oral   SpO2: 96%   Weight: 65.1 kg (143 lb 8.3 oz)   Height: 5' 2" (1.575 m)     BMI: Body mass index is 26.25 kg/m².   Body surface area is 1.69 meters squared.    Physical Exam  Vitals signs reviewed.   Constitutional:  Normal appearance. NAD.   HENT: Normocephalic.   Eyes: Pupils are equal, round, and reactive to light.   Cardiovascular: Normal rate.   Pulmonary: Pulmonary effort is normal.   Musculoskeletal: Normal range of motion.   Lymphadenopathy: No cervical adenopathy. No axillary adenopathy.   Skin: Skin is warm and dry.   Neurological:  She is alert and oriented to person, place, and time.   Psychiatric: Mood normal.     Breast Exam: Bilateral breast normal. No masses, no tenderness, no skin or nipple abnormalities.  No lymphadenopathy.       Laboratory Data: reviewed most recent   Imaging: reviewed most recent        Assessment:     1. At high risk for " breast cancer    2. Family history of breast cancer    3. Dense breast tissue on mammogram        1. Increased risk of breast cancer   * Florentino-Ynes (TC) lifetime risk of 21.9%. We discussed that TC score will categorize your lifetime risk of being diagnosed with breast cancer. Categories are as such: Average risk <15%, Intermediate risk 15-19%, and High risk > or = to 20%.    *The Evelyn Model for Breast Cancer risk: She has a 2.4% 5-year breast cancer risk (Compared with 1.5% for the average 56 y.o. woman) and 15.2% Lifetime breast cancer risk (Compared with 10% for the average 56 y.o. woman). A woman's risk is considered low if her five-year risk of developing breast cancer is less than 1.6%; it is considered high if she scores above 1.66%. (All women who are over 60 have a score of at least 1.66 and are considered high risk, based on the Evelyn Model.)    Educational videos:   What Is a TC Score?  https://youtu.be/Dpqc6DBLqtG     What If I Have a High-Risk TC Score?   https://youLookeru.be/hJy2kYh1c7T      Discussed with patient that there are several models available for stratifying breast cancer risk, and Tyrer-Cuzick is presently the model utilized by [facility] and is a model recommended per current NCCN guidelines.    The Evelyn Model for Breast Cancer risk estimates the absolute 5 year risk and lifetime risk of developing breast cancer. Family history includes only first degree relatives with breast cancer, which is not enough information to estimate the risk of a patient having BRCA mutation. It also underestimates the cancer risk for patients with extensive family history. The Evelyn Model is a good predictor of risk for populations but not for individuals. It adjusts risk for race/ethnicity. It may underestimate breast cancer risk in patients with atypical hyperplasia and strong family history. The Evelyn Model was NOT designed to estimate risk for: Women with a prior diagnosis of breast cancer, lobular  carcinoma in situ (LCIS), or ductal carcinoma in situ (DCIS);  Women who have received previous radiation therapy to the chest for treatment of Hodgkin lymphoma;  Women with gene mutations in BRCA1 or BRCA2, or those who are known to have certain genetic syndromes that increase risk for breast cancer; Women of age <35 or >85.    We discussed that there are limitations to every model for risk assessment, particularly that TC can overestimate risk in women with atypical hyperplasia and dense breasts and that Evelyn underestimates risk for those with a strong family history of breast or ovarian cancers as well as non-white women with atypical hyperplasia which can make them appear to not be candidates for risk reducing therapies.              Recommendation for women with elevated TC score:      Recommendation for women with elevated Evelyn Model.         Risk factors are categorized into 2 groups: Modifiable and Non-modifiable. Modifiable risk factors include use of hormones, alcohol, smoking, diet and exercise. Non-modifiable risk factors include breast density, genetics, chest radiation, previous pregnancies, age of first period, and age of menopause.     Factors associated with greater breast cancer risk:  -Increasing age The risk of breast cancer increases with older age.  -Female sex  -White race (In the [place], the highest breast cancer risk occurs among White women, although breast cancer remains the most common cancer among women of every major ethnic/racial group )  -Weight and body fat in postmenopausal women -Obesity (defined as body mass index [BMI] ?30 kg/m2) is associated with an overall increase  in morbidity and mortality. However, the risk of breast cancer associated with BMI differs by menopausal status.   ?Postmenopausal women - A higher BMI and/or perimenopausal weight gain have been consistently associated with a higher risk of breast cancer among postmenopausal women. The association between  a higher BMI and postmenopausal breast cancer risk may be mediated by higher estrogen levels resulting from the peripheral conversion of estrogen precursors (from adipose tissue) to estrogen    ?Inverse relationship in premenopausal women - Unlike postmenopausal women, an increased BMI is associated with a lower risk of breast cancer in premenopausal women, particularly in early adulthood.  The explanation of this finding remains unclear.  -Tall stature -women who were >175 cm (69 inches) tall were 20 percent more likely to develop breast cancer than those <160 cm (63 inches) tall.  -Benign breast disease  -Dense breast tissue -- The density of breast tissue reflects the relative amount of glandular and connective tissue (parenchyma) to adipose tissue. Women with mammographically dense breast tissue, generally defined as dense tissue comprising ?75 percent of the breast, have a four to five times higher breast cancer risk compared with women of similar age with less or no dense tissue. Although breast density is a largely inherited trait, other factors can influence density. For example, lower density has been associated with higher levels of physical activity  and with a low-fat, high-carbohydrate diet. In postmenopausal women, estrogen and progesterone increase breast density  while the ER antagonist tamoxifen decreases breast density.  Despite the association of exogenous hormones with breast density, breast density is not strongly correlated with endogenous hormone levels. Breast tend to become more fatty with age.   -Bone mineral density -In multiple studies, women with higher bone density have a higher breast cancer risk  -Hormonal factors: HRT. Of note, IVF does not appear to increase the long-term risk of breast cancer, even in women with BRCA 1 and 2 mutations.     In the Women's Health Initiative (WHI), the risk of invasive breast cancer was significantly increased with combined hormone therapy (HT) at an  average follow-up of 5.6 years.     A 2019 meta-analysis of all available epidemiologic evidence on the association between menopausal hormone therapy (MHT) use and breast cancer risk has been published. The analysis included nearly 145,000 women with breast cancer (51 percent of whom had used MHT) and nearly 425,000 without breast cancer. Their findings included:  ?Similar to the WHI, estrogen-progestin regimens were associated with excess breast cancer risk. An excess risk was also seen with estrogen-only regimens (a reduction in risk was seen in the WHI). There was no excess risk with vaginal estrogens.   ?Breast cancer risk increased with the duration of systemic MHT use. Unlike previous studies, obesity was not associated with excess risk; instead, it attenuated risk.  ?The authors of the study calculated that for women of average weight, five years of MHT use starting at age 50 years would increase their 20-year risk of breast cancer (between the ages of 50 and 69 years) by approximately:  One in every 50 users of estrogen plus daily progestin  One in every 70 users of estrogen plus intermittent progestin   One in every 200 users of estrogen-only regimens   Of note: There were important limitations in this study.   While this meta-analysis has renewed concerns for some about the association between MHT and breast cancer, we continue to suggest an individualized approach when counseling symptomatic postmenopausal women about treatment. This includes putting the potential risk of breast cancer (and cardiovascular disease) in the context of the benefits of MHT (eg, relief of vasomotor symptoms, improved sleep and quality of life, and prevention of bone loss)  -Reproductive factors -Earlier menarche or later menopause, Nulliparity, Increasing age at first full-term pregnancy.   (Of note, It is estimated that for every 12 months of breastfeeding, there was a 4.3 percent reduction in the relative risk (RR) of  breast cancer)  -Personal and family history of breast cancer  -Alcohol use and smoking  -Exposure to therapeutic ionizing radiation          LIFESTYLE MODIFICATIONS:    * Reviewed Lifestyle modifications which have shown benefit:  · Limit alcohol consumption to less than 1 drink per day (1 ounce liquor, 6 oz wine, 8 oz beer)  · Avoid smoking.  · Exercise at least 150 minutes per week of moderate intensity aerobic activity or at least 75 minutes of vigorous activity. Exercise can lower the relative risk of breast cancer by ~18-20%.  · Maintain healthy weight and avoid post-menopausal weight gain. Avoid processed foods and eat more lean proteins, fruits and vegetables.                               * Discussed available resources including genetic counseling, nutrition, weight management.       CHEMOPREVENTION:    * For women at high risk for breast cancer, endocrine therapy can reduce the risk of invasive and/or in situ breast cancers. (tamoxifen for premenopausal or postmenopausal women and raloxifene or exemestane for postmenopausal women).   -Discussed that Tamoxifen 20 mg daily for 5 years has shown to reduce risk of breast cancer by 49% and women with ADH/ALH or LCIS have an even more significant reduction of risk of 86%. Aromatase inhibitors for 5 years have also shown risk reduction in terms of 50-60%. At current, there is not adequate data to recommend longer courses of therapy more than 5 years for risk reduction.    -Above have been shown to lower the risk of breast cancer incidence, however there is no survival benefit in patients who don't have breast cancer.   -Tamoxifen has limited data in BRCA 1/2 mutation carriers but limited retrospective data is suggestive of benefit. There is retrospective data that aromatase inhibitors can reduce the risk of contralateral ER positive breast cancers in BRCA 1/2 patients who were taking AIs as adjuvant therapy. There is no data for raloxifen in the population.     -Reviewed risks of Tamoxifen side effects include hot flashes, invasive endometrial cancer in women > 49 years of age (2.3/1000 compared to 0.9/1000), cataracts, increased risk of pulmonary embolism among others. A handout was given to her.      SCREENING:                * Reviewed future screening:   · Semiannual (every 6 months) CBE (clinical breast exam).   · Annual Breast MRI alternating with an annual MMG.     The use of MRI for breast cancer detection is based on the concept of  tumor angiogenesis or neovascularity. Tumor-associated blood vessels have increased permeability, which leads to prompt uptake and release of gadolinium within the first one to two minutes after administration, leading to a pattern of rapid enhancement and washout on MRI.   Bilateral breast examination - Both breasts should be evaluated in an MRI study, for comparison purposes, even when concern about possible pathology involves only one breast.  Contrast - Intravenous gadolinium contrast must be used to maximize cancer detection and is administered before breast MRI to highlight the neovascularity associated with cancers. Contrast is not necessary when the study is performed to evaluate silicone implant integrity.  Allergic and anaphylactoid reactions to gadolinium are rare, but can occur. In addition, in patients with renal failure, gadolinium can cause contrast nephropathy and/or nephrogenic systemic fibrosis.   A few studies have also reported gadolinium deposition in the brain from repeated intravenous administration, with the degree of deposition varying based on the specific contrast agent. The clinical significance of this deposition remains unknown, and no data for humans exist to show any adverse effects or harm at this time.   She understands that she may contact her insurance company with regards to coverage of MRI breasts.   She can not undergo an MRI if pregnant.   We discussed that MRI's may have false positives                     Plan:     1. Patient has opted out of chemoprevention but will call in the future if she wishes to pursue.   2. Patient elects to proceed with alternating annual mammogram and annual breast MRI along with semiannual CBEs. One CBE will be done here and one with GYN.   3. Lifestyle modifications as detailed above.   4.   Encouraged breast awareness, including monthly breast self-exams.   5.   Referral to cancer genetics.    Due for colonoscopy this year- per PCP  MMG today pending  MRI breast 6 months.   RTC in 1 year to see me either at [facility] or on 3rd floor with a MMG same day. I will see annually on day of MMG    Route Chart for Scheduling    Med Onc Chart Routing      Follow up with physician    Follow up with [facility] 1 year. RTC in 1 year to see me either at [facility] or on 3rd floor with a MMG same day.    Infusion scheduling note    Injection scheduling note    Labs    Imaging    MRI breast 6 months   Pharmacy appointment    Other referrals Additional referrals needed         Questions were encouraged and answered to patient's satisfaction, and patient verbalized understanding of information and agreement with the plan. Advised patient to RTC with any interval changes or concerns.      Patient is in agreement with the proposed treatment plan. All questions were answered to the patient's satisfaction. Pt knows to call clinic for any new or worsening symptoms and if anything is needed before the next clinic visit.      CAL JuaresP-C  Hematology & Oncology  [facility] [place] [address]  ph. [phone]  Fax. [phone]     Face to Face time with patient: 45 minutes  60 minutes of total time spent on the encounter, which includes face to face time and non-face to face time preparing to see the patient (eg, review of tests), Obtaining and/or reviewing separately obtained history, Documenting clinical information in the electronic or other health record, Independently  interpreting results (not separately reported) and communicating results to the patient/family/caregiver, or Care coordination (not separately reported).

## 2022-07-01 NOTE — PATIENT INSTRUCTIONS
1. Increased risk of breast cancer   * Florentino-Francineck (TC) lifetime risk of 21.9%. We discussed that TC score will categorize your lifetime risk of being diagnosed with breast cancer. Categories are as such: Average risk <15%, Intermediate risk 15-19%, and High risk > or = to 20%.    *The Evelyn Model for Breast Cancer risk: She has a 2.4% 5-year breast cancer risk (Compared with 1.5% for the average 56 y.o. woman) and 15.2% Lifetime breast cancer risk (Compared with 10% for the average 56 y.o. woman). A woman's risk is considered low if her five-year risk of developing breast cancer is less than 1.6%; it is considered high if she scores above 1.66%. (All women who are over 60 have a score of at least 1.66 and are considered high risk, based on the Evelyn Model.)    Educational videos:   What Is a TC Score?  https://[employer]u.be/Zqgn6MZTfkW     What If I Have a High-Risk TC Score?   https://Wellbe.be/tSl2cHd8v2R      Discussed with patient that there are several models available for stratifying breast cancer risk, and Tyrer-Cuzick is presently the model utilized by [facility] and is a model recommended per current NCCN guidelines.    The Evelyn Model for Breast Cancer risk estimates the absolute 5 year risk and lifetime risk of developing breast cancer. Family history includes only first degree relatives with breast cancer, which is not enough information to estimate the risk of a patient having BRCA mutation. It also underestimates the cancer risk for patients with extensive family history. The Evelyn Model is a good predictor of risk for populations but not for individuals. It adjusts risk for race/ethnicity. It may underestimate breast cancer risk in patients with atypical hyperplasia and strong family history. The Evelyn Model was NOT designed to estimate risk for: Women with a prior diagnosis of breast cancer, lobular carcinoma in situ (LCIS), or ductal carcinoma in situ (DCIS);  Women who have received previous  radiation therapy to the chest for treatment of Hodgkin lymphoma;  Women with gene mutations in BRCA1 or BRCA2, or those who are known to have certain genetic syndromes that increase risk for breast cancer; Women of age <35 or >85.    We discussed that there are limitations to every model for risk assessment, particularly that TC can overestimate risk in women with atypical hyperplasia and dense breasts and that Evelyn underestimates risk for those with a strong family history of breast or ovarian cancers as well as non-white women with atypical hyperplasia which can make them appear to not be candidates for risk reducing therapies.             Recommendation for women with elevated TC score:     Recommendation for women with elevated Evelyn Model.         Risk factors are categorized into 2 groups: Modifiable and Non-modifiable. Modifiable risk factors include use of hormones, alcohol, smoking, diet and exercise. Non-modifiable risk factors include breast density, genetics, chest radiation, previous pregnancies, age of first period, and age of menopause.     Factors associated with greater breast cancer risk:  -Increasing age The risk of breast cancer increases with older age.  -Female sex  -White race (In the [place], the highest breast cancer risk occurs among White women, although breast cancer remains the most common cancer among women of every major ethnic/racial group )  -Weight and body fat in postmenopausal women -Obesity (defined as body mass index [BMI] ?30 kg/m2) is associated with an overall increase  in morbidity and mortality. However, the risk of breast cancer associated with BMI differs by menopausal status.   ?Postmenopausal women - A higher BMI and/or perimenopausal weight gain have been consistently associated with a higher risk of breast cancer among postmenopausal women. The association between a higher BMI and postmenopausal breast cancer risk may be mediated by higher estrogen levels  resulting from the peripheral conversion of estrogen precursors (from adipose tissue) to estrogen    ?Inverse relationship in premenopausal women - Unlike postmenopausal women, an increased BMI is associated with a lower risk of breast cancer in premenopausal women, particularly in early adulthood.  The explanation of this finding remains unclear.  -Tall stature -women who were >175 cm (69 inches) tall were 20 percent more likely to develop breast cancer than those <160 cm (63 inches) tall.  -Benign breast disease  -Dense breast tissue -- The density of breast tissue reflects the relative amount of glandular and connective tissue (parenchyma) to adipose tissue. Women with mammographically dense breast tissue, generally defined as dense tissue comprising ?75 percent of the breast, have a four to five times higher breast cancer risk compared with women of similar age with less or no dense tissue. Although breast density is a largely inherited trait, other factors can influence density. For example, lower density has been associated with higher levels of physical activity  and with a low-fat, high-carbohydrate diet. In postmenopausal women, estrogen and progesterone increase breast density  while the ER antagonist tamoxifen decreases breast density.  Despite the association of exogenous hormones with breast density, breast density is not strongly correlated with endogenous hormone levels. Breast tend to become more fatty with age.   -Bone mineral density -In multiple studies, women with higher bone density have a higher breast cancer risk  -Hormonal factors: HRT. Of note, IVF does not appear to increase the long-term risk of breast cancer, even in women with BRCA 1 and 2 mutations.     In the Women's Health Initiative (WHI), the risk of invasive breast cancer was significantly increased with combined hormone therapy (HT) at an average follow-up of 5.6 years.     A 2019 meta-analysis of all available epidemiologic  evidence on the association between menopausal hormone therapy (MHT) use and breast cancer risk has been published. The analysis included nearly 145,000 women with breast cancer (51 percent of whom had used MHT) and nearly 425,000 without breast cancer. Their findings included:  ?Similar to the WHI, estrogen-progestin regimens were associated with excess breast cancer risk. An excess risk was also seen with estrogen-only regimens (a reduction in risk was seen in the WHI). There was no excess risk with vaginal estrogens.   ?Breast cancer risk increased with the duration of systemic MHT use. Unlike previous studies, obesity was not associated with excess risk; instead, it attenuated risk.  ?The authors of the study calculated that for women of average weight, five years of MHT use starting at age 50 years would increase their 20-year risk of breast cancer (between the ages of 50 and 69 years) by approximately:  One in every 50 users of estrogen plus daily progestin  One in every 70 users of estrogen plus intermittent progestin   One in every 200 users of estrogen-only regimens   Of note: There were important limitations in this study.   While this meta-analysis has renewed concerns for some about the association between MHT and breast cancer, we continue to suggest an individualized approach when counseling symptomatic postmenopausal women about treatment. This includes putting the potential risk of breast cancer (and cardiovascular disease) in the context of the benefits of MHT (eg, relief of vasomotor symptoms, improved sleep and quality of life, and prevention of bone loss)  -Reproductive factors -Earlier menarche or later menopause, Nulliparity, Increasing age at first full-term pregnancy.   (Of note, It is estimated that for every 12 months of breastfeeding, there was a 4.3 percent reduction in the relative risk (RR) of breast cancer)  -Personal and family history of breast cancer  -Alcohol use and  smoking  -Exposure to therapeutic ionizing radiation          LIFESTYLE MODIFICATIONS:    * Reviewed Lifestyle modifications which have shown benefit:  Limit alcohol consumption to less than 1 drink per day (1 ounce liquor, 6 oz wine, 8 oz beer)  Avoid smoking.  Exercise at least 150 minutes per week of moderate intensity aerobic activity or at least 75 minutes of vigorous activity. Exercise can lower the relative risk of breast cancer by ~18-20%.  Maintain healthy weight and avoid post-menopausal weight gain. Avoid processed foods and eat more lean proteins, fruits and vegetables.                               * Discussed available resources including genetic counseling, nutrition, weight management.       CHEMOPREVENTION:    * For women at high risk for breast cancer, endocrine therapy can reduce the risk of invasive and/or in situ breast cancers. (tamoxifen for premenopausal or postmenopausal women and raloxifene or exemestane for postmenopausal women).   -Discussed that Tamoxifen 20 mg daily for 5 years has shown to reduce risk of breast cancer by 49% and women with ADH/ALH or LCIS have an even more significant reduction of risk of 86%. Aromatase inhibitors for 5 years have also shown risk reduction in terms of 50-60%. At current, there is not adequate data to recommend longer courses of therapy more than 5 years for risk reduction.    -Above have been shown to lower the risk of breast cancer incidence, however there is no survival benefit in patients who don't have breast cancer.   -Tamoxifen has limited data in BRCA 1/2 mutation carriers but limited retrospective data is suggestive of benefit. There is retrospective data that aromatase inhibitors can reduce the risk of contralateral ER positive breast cancers in BRCA 1/2 patients who were taking AIs as adjuvant therapy. There is no data for raloxifen in the population.    -Reviewed risks of Tamoxifen side effects include hot flashes, invasive endometrial  cancer in women > 49 years of age (2.3/1000 compared to 0.9/1000), cataracts, increased risk of pulmonary embolism among others. A handout was given to her.      SCREENING:                * Reviewed future screening:   Semiannual (every 6 months) CBE (clinical breast exam).   Annual Breast MRI alternating with an annual MMG.     The use of MRI for breast cancer detection is based on the concept of  tumor angiogenesis or neovascularity. Tumor-associated blood vessels have increased permeability, which leads to prompt uptake and release of gadolinium within the first one to two minutes after administration, leading to a pattern of rapid enhancement and washout on MRI.   Bilateral breast examination - Both breasts should be evaluated in an MRI study, for comparison purposes, even when concern about possible pathology involves only one breast.  Contrast - Intravenous gadolinium contrast must be used to maximize cancer detection and is administered before breast MRI to highlight the neovascularity associated with cancers. Contrast is not necessary when the study is performed to evaluate silicone implant integrity.  Allergic and anaphylactoid reactions to gadolinium are rare, but can occur. In addition, in patients with renal failure, gadolinium can cause contrast nephropathy and/or nephrogenic systemic fibrosis.   A few studies have also reported gadolinium deposition in the brain from repeated intravenous administration, with the degree of deposition varying based on the specific contrast agent. The clinical significance of this deposition remains unknown, and no data for humans exist to show any adverse effects or harm at this time.   She understands that she may contact her insurance company with regards to coverage of MRI breasts.   She can not undergo an MRI if pregnant.   We discussed that MRI's may have false positives                    Plan:     Patient has opted out of chemoprevention but will call in the  future if she wishes to pursue.   Patient elects to proceed with alternating annual mammogram and annual breast MRI along with semiannual CBEs. One CBE will be done here and one with GYN.   Lifestyle modifications as detailed above.   4.   Encouraged breast awareness, including monthly breast self-exams.   5.   Referral to cancer genetics.    Due for colonoscopy this year- per PCP  MMG today pending  MRI breast 6 months.   RTC in 1 year to see me either at [facility] or on 3rd floor with a MMG same day. I will see annually on day of MMG

## 2022-07-11 ENCOUNTER — OFFICE VISIT (OUTPATIENT)
Dept: INTERNAL MEDICINE | Facility: CLINIC | Age: 57
End: 2022-07-11
Payer: COMMERCIAL

## 2022-07-11 VITALS
TEMPERATURE: 98 F | RESPIRATION RATE: 17 BRPM | HEIGHT: 62 IN | BODY MASS INDEX: 26.84 KG/M2 | OXYGEN SATURATION: 96 % | DIASTOLIC BLOOD PRESSURE: 78 MMHG | HEART RATE: 64 BPM | SYSTOLIC BLOOD PRESSURE: 110 MMHG | WEIGHT: 145.88 LBS

## 2022-07-11 DIAGNOSIS — F41.9 ANXIETY: ICD-10-CM

## 2022-07-11 DIAGNOSIS — G89.29 CHRONIC LEFT FLANK PAIN: ICD-10-CM

## 2022-07-11 DIAGNOSIS — G35 MS (MULTIPLE SCLEROSIS): ICD-10-CM

## 2022-07-11 DIAGNOSIS — Z12.11 COLON CANCER SCREENING: ICD-10-CM

## 2022-07-11 DIAGNOSIS — Z00.00 ANNUAL PHYSICAL EXAM: Primary | ICD-10-CM

## 2022-07-11 DIAGNOSIS — R07.81 RIB PAIN ON LEFT SIDE: ICD-10-CM

## 2022-07-11 DIAGNOSIS — R10.9 CHRONIC LEFT FLANK PAIN: ICD-10-CM

## 2022-07-11 PROCEDURE — 90471 PNEUMOCOCCAL CONJUGATE VACCINE 20-VALENT: ICD-10-PCS | Mod: S$GLB,,, | Performed by: INTERNAL MEDICINE

## 2022-07-11 PROCEDURE — 99396 PR PREVENTIVE VISIT,EST,40-64: ICD-10-PCS | Mod: 25,S$GLB,, | Performed by: INTERNAL MEDICINE

## 2022-07-11 PROCEDURE — 3074F PR MOST RECENT SYSTOLIC BLOOD PRESSURE < 130 MM HG: ICD-10-PCS | Mod: CPTII,S$GLB,, | Performed by: INTERNAL MEDICINE

## 2022-07-11 PROCEDURE — 99999 PR PBB SHADOW E&M-EST. PATIENT-LVL IV: CPT | Mod: PBBFAC,,, | Performed by: INTERNAL MEDICINE

## 2022-07-11 PROCEDURE — 3078F PR MOST RECENT DIASTOLIC BLOOD PRESSURE < 80 MM HG: ICD-10-PCS | Mod: CPTII,S$GLB,, | Performed by: INTERNAL MEDICINE

## 2022-07-11 PROCEDURE — 3078F DIAST BP <80 MM HG: CPT | Mod: CPTII,S$GLB,, | Performed by: INTERNAL MEDICINE

## 2022-07-11 PROCEDURE — 1159F MED LIST DOCD IN RCRD: CPT | Mod: CPTII,S$GLB,, | Performed by: INTERNAL MEDICINE

## 2022-07-11 PROCEDURE — 90471 IMMUNIZATION ADMIN: CPT | Mod: S$GLB,,, | Performed by: INTERNAL MEDICINE

## 2022-07-11 PROCEDURE — 3008F BODY MASS INDEX DOCD: CPT | Mod: CPTII,S$GLB,, | Performed by: INTERNAL MEDICINE

## 2022-07-11 PROCEDURE — 90677 PCV20 VACCINE IM: CPT | Mod: S$GLB,,, | Performed by: INTERNAL MEDICINE

## 2022-07-11 PROCEDURE — 1159F PR MEDICATION LIST DOCUMENTED IN MEDICAL RECORD: ICD-10-PCS | Mod: CPTII,S$GLB,, | Performed by: INTERNAL MEDICINE

## 2022-07-11 PROCEDURE — 90677 PNEUMOCOCCAL CONJUGATE VACCINE 20-VALENT: ICD-10-PCS | Mod: S$GLB,,, | Performed by: INTERNAL MEDICINE

## 2022-07-11 PROCEDURE — 3074F SYST BP LT 130 MM HG: CPT | Mod: CPTII,S$GLB,, | Performed by: INTERNAL MEDICINE

## 2022-07-11 PROCEDURE — 99999 PR PBB SHADOW E&M-EST. PATIENT-LVL IV: ICD-10-PCS | Mod: PBBFAC,,, | Performed by: INTERNAL MEDICINE

## 2022-07-11 PROCEDURE — 1160F PR REVIEW ALL MEDS BY PRESCRIBER/CLIN PHARMACIST DOCUMENTED: ICD-10-PCS | Mod: CPTII,S$GLB,, | Performed by: INTERNAL MEDICINE

## 2022-07-11 PROCEDURE — 3008F PR BODY MASS INDEX (BMI) DOCUMENTED: ICD-10-PCS | Mod: CPTII,S$GLB,, | Performed by: INTERNAL MEDICINE

## 2022-07-11 PROCEDURE — 1160F RVW MEDS BY RX/DR IN RCRD: CPT | Mod: CPTII,S$GLB,, | Performed by: INTERNAL MEDICINE

## 2022-07-11 PROCEDURE — 99396 PREV VISIT EST AGE 40-64: CPT | Mod: 25,S$GLB,, | Performed by: INTERNAL MEDICINE

## 2022-07-11 RX ORDER — VITAMIN E 268 MG
400 CAPSULE ORAL 2 TIMES DAILY
COMMUNITY

## 2022-07-11 NOTE — PROGRESS NOTES
Subjective:       Patient ID: Hien Jeter is a 57 y.o. female.    Chief Complaint: Annual Exam (Labs Done 4/18/22)    HPI   57 y.o. Female here for annual exam.     Vaccines: Influenza (2019); Tetanus (2019); Prevnar 13 (2019); Pneumococcal 23 (2020); Prevnar 20 (2022); Shingrix (will consider)  Eye exam: 2020  Mammogram: 7/22  Gyn exam: 4/22  Colonoscopy: 3/17     Exercise: swims daily  Diet: regular    Past Medical History:  No date: Anxiety  2000: Basal cell carcinoma      Comment:  left eyebrow   No date: Depression  No date: Environmental allergies  No date: Headache(784.0)  No date: Multiple food allergies  2009: Multiple sclerosis  3/2015:  shoulder      Comment:  left  Past Surgical History:  4/5/2019: ARTHROSCOPIC DEBRIDEMENT OF ROTATOR CUFF; Left      Comment:  Procedure: DEBRIDEMENT, ROTATOR CUFF, ARTHROSCOPIC;                 Surgeon: Dc Moore Jr., MD;  Location: [facility] OR;                 Service: Orthopedics;  Laterality: Left;  need opus                system (Julito notified)video  4/5/2019: ARTHROSCOPY OF SHOULDER WITH DECOMPRESSION OF SUBACROMIAL   SPACE      Comment:  Procedure: ARTHROSCOPY, SHOULDER, WITH SUBACROMIAL SPACE               DECOMPRESSION;  Surgeon: Dc Moore Jr., MD;                 Location: [facility] OR;  Service: Orthopedics;;  1/8/2021: ARTHROSCOPY OF SHOULDER WITH DECOMPRESSION OF SUBACROMIAL   SPACE; Right      Comment:  Procedure: ARTHROSCOPY, SHOULDER, WITH SUBACROMIAL SPACE               DECOMPRESSION;  Surgeon: Dc Moore Jr., MD;                 Location: [facility] OR;  Service: Orthopedics;  Laterality:                Right;  possible rot cuff repairneed opus system                (Taoism P notified per Brii 12/21, )video  1998: BASAL CELL CARCINOMA EXCISION  5/25/2017: COLONOSCOPY; N/A      Comment:  Procedure: COLONOSCOPY;  Surgeon: Marielle Dietz MD;                 Location: [facility] ([address] [place] [address]);  Service: Endoscopy;                  Laterality: N/A;  Patient requests PM.PM prep.  01/08/2019: FOOT SURGERY; Right  2000: LASIK  04/05/2019: SHOULDER SURGERY; Left  No date: WISDOM TOOTH EXTRACTION  Social History    Socioeconomic History      Marital status:     Occupational History        Employer: [employer]    Tobacco Use      Smoking status: Never Smoker      Smokeless tobacco: Never Used    Substance and Sexual Activity      Alcohol use: Yes        Comment: socially/ not weekly      Drug use: No      Sexual activity: Yes        Partners: Male        Birth control/protection: None        Comment: , menarche 15    Review of patient's allergies indicates:   -- Diclofenac -- Nausea Only   -- Ibuprofen -- Other (See Comments)   -- Tramadol -- Other (See Comments)    --  Nausea  Hien Jeter had no medications administered during this visit.  Review of Systems   Constitutional: Negative for activity change, appetite change, chills, diaphoresis, fatigue, fever and unexpected weight change.   HENT: Negative for nasal congestion, mouth sores, postnasal drip, rhinorrhea, sinus pressure/congestion, sneezing, sore throat, trouble swallowing and voice change.    Eyes: Negative for pain, discharge and visual disturbance.   Respiratory: Negative for cough, shortness of breath and wheezing.    Cardiovascular: Negative for chest pain, palpitations and leg swelling.   Gastrointestinal: Negative for abdominal pain, blood in stool, constipation, diarrhea, nausea and vomiting.   Endocrine: Negative for cold intolerance and heat intolerance.   Genitourinary: Negative for difficulty urinating, dysuria, frequency, hematuria and urgency.   Musculoskeletal: Negative for arthralgias and myalgias.   Integumentary:  Negative for rash and wound.   Allergic/Immunologic: Negative for environmental allergies and food allergies.   Neurological: Negative for dizziness, tremors, seizures, syncope, weakness, light-headedness and headaches.    Hematological: Negative for adenopathy. Does not bruise/bleed easily.   Psychiatric/Behavioral: Negative for confusion, self-injury, sleep disturbance and suicidal ideas. The patient is nervous/anxious.          Objective:      Physical Exam  Vitals and nursing note reviewed.   Constitutional:       General: She is not in acute distress.     Appearance: She is well-developed. She is not diaphoretic.   HENT:      Head: Normocephalic and atraumatic.      Right Ear: External ear normal.      Left Ear: External ear normal.      Nose: Nose normal.      Mouth/Throat:      Pharynx: No oropharyngeal exudate.   Eyes:      General: No scleral icterus.        Right eye: No discharge.         Left eye: No discharge.      Conjunctiva/sclera: Conjunctivae normal.      Pupils: Pupils are equal, round, and reactive to light.   Neck:      Thyroid: No thyromegaly.      Vascular: No JVD.   Cardiovascular:      Rate and Rhythm: Normal rate and regular rhythm.      Heart sounds: Normal heart sounds. No murmur heard.  Pulmonary:      Effort: Pulmonary effort is normal. No respiratory distress.      Breath sounds: Normal breath sounds. No wheezing or rales.   Chest:      Chest wall: No tenderness.       Abdominal:      General: Bowel sounds are normal. There is no distension.      Palpations: Abdomen is soft.      Tenderness: There is no abdominal tenderness. There is no guarding.   Musculoskeletal:      Cervical back: Neck supple.      Right lower leg: No edema.      Left lower leg: No edema.   Lymphadenopathy:      Cervical: No cervical adenopathy.   Skin:     General: Skin is warm and dry.      Coloration: Skin is not pale.      Findings: No rash.   Neurological:      Mental Status: She is alert and oriented to person, place, and time.   Psychiatric:         Judgment: Judgment normal.         Assessment:       Problem List Items Addressed This Visit        Neuro    MS (multiple sclerosis)       Psychiatric    Anxiety       Pulmonary     Rib pain on left side    Relevant Orders    Ambulatory referral/consult to Sports Medicine      Other Visit Diagnoses     Annual physical exam    -  Primary    Relevant Orders    (In Office Administered) Pneumococcal Conjugate Vaccine (20 Valent) (IM) (Completed)    Colon cancer screening        Relevant Orders    Case Request Endoscopy: COLONOSCOPY (Completed)    Chronic left flank pain        Relevant Orders    Ambulatory referral/consult to Sports Medicine          Plan:    Blood work reviewed with pt     MS- stable, followed by Neuro      Anxiety- controlled on Lexapro      Chronic left flank/rib pain- referral to Sports medicine

## 2022-07-19 ENCOUNTER — PATIENT MESSAGE (OUTPATIENT)
Dept: NEUROLOGY | Facility: CLINIC | Age: 57
End: 2022-07-19
Payer: COMMERCIAL

## 2022-07-20 ENCOUNTER — OFFICE VISIT (OUTPATIENT)
Dept: SPORTS MEDICINE | Facility: CLINIC | Age: 57
End: 2022-07-20
Payer: COMMERCIAL

## 2022-07-20 VITALS
HEIGHT: 62 IN | DIASTOLIC BLOOD PRESSURE: 60 MMHG | SYSTOLIC BLOOD PRESSURE: 98 MMHG | BODY MASS INDEX: 26.68 KG/M2 | WEIGHT: 145 LBS

## 2022-07-20 DIAGNOSIS — M99.01 CERVICAL (NECK) REGION SOMATIC DYSFUNCTION: ICD-10-CM

## 2022-07-20 DIAGNOSIS — M54.6 CHRONIC LEFT-SIDED THORACIC BACK PAIN: ICD-10-CM

## 2022-07-20 DIAGNOSIS — R07.81 RIB PAIN ON LEFT SIDE: Primary | ICD-10-CM

## 2022-07-20 DIAGNOSIS — M99.03 SOMATIC DYSFUNCTION OF LUMBAR REGION: ICD-10-CM

## 2022-07-20 DIAGNOSIS — M99.09 SOMATIC DYSFUNCTION OF ABDOMINAL REGION: ICD-10-CM

## 2022-07-20 DIAGNOSIS — R10.9 CHRONIC LEFT FLANK PAIN: ICD-10-CM

## 2022-07-20 DIAGNOSIS — G89.29 CHRONIC LEFT-SIDED THORACIC BACK PAIN: ICD-10-CM

## 2022-07-20 DIAGNOSIS — M99.02 SOMATIC DYSFUNCTION OF THORACIC REGION: ICD-10-CM

## 2022-07-20 DIAGNOSIS — G89.29 CHRONIC LEFT FLANK PAIN: ICD-10-CM

## 2022-07-20 DIAGNOSIS — M99.07 UPPER EXTREMITY SOMATIC DYSFUNCTION: ICD-10-CM

## 2022-07-20 DIAGNOSIS — M79.10 MYALGIA: ICD-10-CM

## 2022-07-20 DIAGNOSIS — M99.00 SOMATIC DYSFUNCTION OF HEAD REGION: ICD-10-CM

## 2022-07-20 DIAGNOSIS — G35 MS (MULTIPLE SCLEROSIS): ICD-10-CM

## 2022-07-20 DIAGNOSIS — M99.08 SOMATIC DYSFUNCTION OF RIB CAGE REGION: ICD-10-CM

## 2022-07-20 PROCEDURE — 3008F PR BODY MASS INDEX (BMI) DOCUMENTED: ICD-10-PCS | Mod: CPTII,S$GLB,, | Performed by: NEUROMUSCULOSKELETAL MEDICINE & OMM

## 2022-07-20 PROCEDURE — 98928 PR OSTEOPATHIC MANIP,7-8 BODY REGN: ICD-10-PCS | Mod: S$GLB,,, | Performed by: NEUROMUSCULOSKELETAL MEDICINE & OMM

## 2022-07-20 PROCEDURE — 99203 PR OFFICE/OUTPT VISIT, NEW, LEVL III, 30-44 MIN: ICD-10-PCS | Mod: 25,S$GLB,, | Performed by: NEUROMUSCULOSKELETAL MEDICINE & OMM

## 2022-07-20 PROCEDURE — 97110 PR THERAPEUTIC EXERCISES: ICD-10-PCS | Mod: S$GLB,,, | Performed by: NEUROMUSCULOSKELETAL MEDICINE & OMM

## 2022-07-20 PROCEDURE — 3008F BODY MASS INDEX DOCD: CPT | Mod: CPTII,S$GLB,, | Performed by: NEUROMUSCULOSKELETAL MEDICINE & OMM

## 2022-07-20 PROCEDURE — 3078F DIAST BP <80 MM HG: CPT | Mod: CPTII,S$GLB,, | Performed by: NEUROMUSCULOSKELETAL MEDICINE & OMM

## 2022-07-20 PROCEDURE — 98928 OSTEOPATH MANJ 7-8 REGIONS: CPT | Mod: S$GLB,,, | Performed by: NEUROMUSCULOSKELETAL MEDICINE & OMM

## 2022-07-20 PROCEDURE — 1159F MED LIST DOCD IN RCRD: CPT | Mod: CPTII,S$GLB,, | Performed by: NEUROMUSCULOSKELETAL MEDICINE & OMM

## 2022-07-20 PROCEDURE — 1160F RVW MEDS BY RX/DR IN RCRD: CPT | Mod: CPTII,S$GLB,, | Performed by: NEUROMUSCULOSKELETAL MEDICINE & OMM

## 2022-07-20 PROCEDURE — 99999 PR PBB SHADOW E&M-EST. PATIENT-LVL III: ICD-10-PCS | Mod: PBBFAC,,, | Performed by: NEUROMUSCULOSKELETAL MEDICINE & OMM

## 2022-07-20 PROCEDURE — 1159F PR MEDICATION LIST DOCUMENTED IN MEDICAL RECORD: ICD-10-PCS | Mod: CPTII,S$GLB,, | Performed by: NEUROMUSCULOSKELETAL MEDICINE & OMM

## 2022-07-20 PROCEDURE — 97110 THERAPEUTIC EXERCISES: CPT | Mod: S$GLB,,, | Performed by: NEUROMUSCULOSKELETAL MEDICINE & OMM

## 2022-07-20 PROCEDURE — 3074F SYST BP LT 130 MM HG: CPT | Mod: CPTII,S$GLB,, | Performed by: NEUROMUSCULOSKELETAL MEDICINE & OMM

## 2022-07-20 PROCEDURE — 1160F PR REVIEW ALL MEDS BY PRESCRIBER/CLIN PHARMACIST DOCUMENTED: ICD-10-PCS | Mod: CPTII,S$GLB,, | Performed by: NEUROMUSCULOSKELETAL MEDICINE & OMM

## 2022-07-20 PROCEDURE — 3078F PR MOST RECENT DIASTOLIC BLOOD PRESSURE < 80 MM HG: ICD-10-PCS | Mod: CPTII,S$GLB,, | Performed by: NEUROMUSCULOSKELETAL MEDICINE & OMM

## 2022-07-20 PROCEDURE — 3074F PR MOST RECENT SYSTOLIC BLOOD PRESSURE < 130 MM HG: ICD-10-PCS | Mod: CPTII,S$GLB,, | Performed by: NEUROMUSCULOSKELETAL MEDICINE & OMM

## 2022-07-20 PROCEDURE — 99999 PR PBB SHADOW E&M-EST. PATIENT-LVL III: CPT | Mod: PBBFAC,,, | Performed by: NEUROMUSCULOSKELETAL MEDICINE & OMM

## 2022-07-20 PROCEDURE — 99203 OFFICE O/P NEW LOW 30 MIN: CPT | Mod: 25,S$GLB,, | Performed by: NEUROMUSCULOSKELETAL MEDICINE & OMM

## 2022-07-20 RX ORDER — BACLOFEN 10 MG/1
10 TABLET ORAL 3 TIMES DAILY
COMMUNITY
End: 2023-11-28
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
## 2022-07-21 ENCOUNTER — PATIENT MESSAGE (OUTPATIENT)
Dept: NEUROLOGY | Facility: CLINIC | Age: 57
End: 2022-07-21
Payer: COMMERCIAL

## 2022-07-21 ENCOUNTER — PATIENT MESSAGE (OUTPATIENT)
Dept: INTERNAL MEDICINE | Facility: CLINIC | Age: 57
End: 2022-07-21
Payer: COMMERCIAL

## 2022-07-21 DIAGNOSIS — M62.838 MUSCLE SPASM: ICD-10-CM

## 2022-07-21 DIAGNOSIS — R20.0 NUMBNESS: Primary | ICD-10-CM

## 2022-07-21 RX ORDER — METHYLPREDNISOLONE 4 MG/1
TABLET ORAL
Qty: 21 EACH | Refills: 0 | Status: SHIPPED | OUTPATIENT
Start: 2022-07-21 | End: 2022-08-11

## 2022-07-27 ENCOUNTER — TELEPHONE (OUTPATIENT)
Dept: HEMATOLOGY/ONCOLOGY | Facility: CLINIC | Age: 57
End: 2022-07-27
Payer: COMMERCIAL

## 2022-08-02 NOTE — PROGRESS NOTES
Subjective:     Hien Jeter    Chief Complaint   Patient presents with    Follow-up       HPI      Hien is a 57 y.o. female coming in today for left sided ribcage pain. Since last visit the pain has Improved. Pt reports she was placed on steroids and briefly d/c HEP due to a MS flare. This improved with recent medrol dose pack and pt. has since returned to HEP with no flare and good relief of her pain. The pain is better with rest and worse with riding in a car, using arms, activity.  Pt. describes the pain as a 2/10 achy pain that does not radiate. There has not been any new a fall/injury/ or traumas since last visit.  Pt. denies any new musculoskeletal complaints at this time.     Office note from 7/20/22 reviewed    PAST MEDICAL HISTORY:   Past Medical History:   Diagnosis Date    Anxiety     Basal cell carcinoma 2000    left eyebrow     Depression     Environmental allergies     Headache(784.0)     Multiple food allergies     Multiple sclerosis 2009     shoulder 3/2015    left     PAST SURGICAL HISTORY:   Past Surgical History:   Procedure Laterality Date    ARTHROSCOPIC DEBRIDEMENT OF ROTATOR CUFF Left 4/5/2019    Procedure: DEBRIDEMENT, ROTATOR CUFF, ARTHROSCOPIC;  Surgeon: Dc Moore Jr., MD;  Location: [facility] OR;  Service: Orthopedics;  Laterality: Left;  need opus system (Julito notified)  video    ARTHROSCOPY OF SHOULDER WITH DECOMPRESSION OF SUBACROMIAL SPACE  4/5/2019    Procedure: ARTHROSCOPY, SHOULDER, WITH SUBACROMIAL SPACE DECOMPRESSION;  Surgeon: Dc Moore Jr., MD;  Location: [facility] OR;  Service: Orthopedics;;    ARTHROSCOPY OF SHOULDER WITH DECOMPRESSION OF SUBACROMIAL SPACE Right 1/8/2021    Procedure: ARTHROSCOPY, SHOULDER, WITH SUBACROMIAL SPACE DECOMPRESSION;  Surgeon: Dc Moore Jr., MD;  Location: [facility] OR;  Service: Orthopedics;  Laterality: Right;  possible rot cuff repair  need opus system (Addy P notified per Brii 12/21, EF)  video    BASAL  CELL CARCINOMA EXCISION  1998    COLONOSCOPY N/A 5/25/2017    Procedure: COLONOSCOPY;  Surgeon: Marielle Dietz MD;  Location: [facility] ([address] [place] [address]);  Service: Endoscopy;  Laterality: N/A;  Patient requests PM.    PM prep.    FOOT SURGERY Right 01/08/2019    LASIK  2000    SHOULDER SURGERY Left 04/05/2019    WISDOM TOOTH EXTRACTION       FAMILY HISTORY:   Family History   Problem Relation Age of Onset    Colon cancer Mother     Diabetes Mother     Osteoporosis Mother     Cancer Mother     Arrhythmia Father     Prostate cancer Father     Dementia Maternal Grandmother     Cancer Paternal Grandfather     Breast cancer Paternal Aunt     Breast cancer Sister     Stomach cancer Sister     Colon cancer Sister         my risk negative     Prostate cancer Paternal Uncle     Uterine cancer Paternal Grandmother     Multiple sclerosis Neg Hx     Ovarian cancer Neg Hx      SOCIAL HISTORY:   Social History     Socioeconomic History    Marital status:    Occupational History     Employer: [employer]   Tobacco Use    Smoking status: Never Smoker    Smokeless tobacco: Never Used   Substance and Sexual Activity    Alcohol use: Yes     Comment: socially/ not weekly    Drug use: No    Sexual activity: Yes     Partners: Male     Birth control/protection: None     Comment: , menarche 15       MEDICATIONS:   Current Outpatient Medications:     azelastine (ASTELIN) 137 mcg (0.1 %) nasal spray, 1 SPRAY (137 MCG TOTAL) BY NASAL ROUTE 2 (TWO) TIMES DAILY., Disp: 30 mL, Rfl: 11    baclofen (LIORESAL) 10 MG tablet, Take 10 mg by mouth 3 (three) times daily. prn, Disp: , Rfl:     cholecalciferol, vitamin D3, 3,000 unit Tab, Take 3,000 Units by mouth once daily. , Disp: , Rfl:     EScitalopram oxalate (LEXAPRO) 10 MG tablet, Take 1 tablet (10 mg total) by mouth every evening., Disp: 90 tablet, Rfl: 3    flaxseed oil 1,000 mg Cap, Take 1 capsule by mouth once daily. , Disp: , Rfl:      "methylPREDNISolone (MEDROL DOSEPACK) 4 mg tablet, use as directed (Patient not taking: Reported on 8/3/2022), Disp: 21 each, Rfl: 0    multivitamin-Ca-iron-minerals 27-0.4 mg Tab, Take 1 tablet by mouth once daily. , Disp: , Rfl:     vitamin E 400 UNIT capsule, Take 400 Units by mouth once daily., Disp: , Rfl:   ALLERGIES:   Review of patient's allergies indicates:   Allergen Reactions    Diclofenac Nausea Only    Ibuprofen Other (See Comments)    Tramadol Other (See Comments)     Nausea          Objective:     VITAL SIGNS: /78   Ht 5' 2" (1.575 m)   Wt 65.8 kg (145 lb)   LMP 04/24/2017   BMI 26.52 kg/m²    General    Vitals reviewed.  Constitutional: She is oriented to person, place, and time. She appears well-developed and well-nourished.   Neurological: She is alert and oriented to person, place, and time.   Psychiatric: She has a normal mood and affect. Her behavior is normal.                 MUSCULOSKELETAL EXAM:     Cervical Spine: left lcervical region    Observation:    Posture:  Posterior pelvis tilt with loss of lumbar lordosis  No obvious shoulder un-leveling while standing.    No edema, erythema, or ecchymosis noted in cervical and thoraic spine regions.    No midline skin abnormalities.    No atrophy of upper limb musculature.  Gait: Non-antalgic     Tenderness:  No tenderness throughout the cervical spine upon spinous process palpation  No tenderness over the base of the occiput  No bony deformities or step-offs palpated.   No trigger point tenderness of levator scapulae, upper trapezius, or parascapular musculature   + left R7 tenderness on left  No thoracic paraspinal muscle tenderness    Range of Motion (* = with pain):  SHOULDER  Active flexion to 180° on left and 180° on right.  Active abduction to 180° on left and 180° on right.  Active internal rotation to T7 on left and T7 on right.    Active external rotation to T4 on left and T7 on right.    No scapular dyskinesia or " winging.    Structural Exam:  TART (Tissue texture abnormality, Asymmetry,  Restriction of motion and/or Tenderness) changes:     Thoracic Spine   T1 Neutral   T2 Neutral   T3 Neutral   T4 Neutral   T5 Neutral   T6 Neutral   T7 FRS LEFT   T8 Neutral   T9 FRS LEFT   T10 Neutral   T11 Neutral   T12 Neutral     Rib cage: R7 anterior and inhaled on left with anterior tenderpoint, R9 external torsion on lef    Abdomen: left hemidiaphragm TTA     Lumbar Spine   L1 Neutral   L2 Neutral   L3 Neutral   L4 Neutral   L5 Neutral       Key   F= Flexed   E = Extended   R = Rotated   S = Sidebent   TTA = tissue texture abnormality       Neurovascular Exam:  Spurling's - negative on left and negative on right  Seated straight leg raise - negative on left and negative on right  Reflexes +2/4 and symmetric at the biceps, brachioradialis, and triceps bilaterally   Sensation intact to light touch in the C5-T1 dermatomes bilaterally. + subjective tip of fingers tingling, bilaterally, chronic  Intact and symmetric radial pulses bilaterally.      Assessment:      Encounter Diagnoses   Name Primary?    Rib pain on left side Yes    Chronic left flank pain     Chronic left-sided thoracic back pain     MS (multiple sclerosis)     Myalgia     Somatic dysfunction of rib cage region     Somatic dysfunction of thoracic region     Somatic dysfunction of abdominal region           Plan:   1. Chronic left sided rib pain and upper back pain likely secondary to biomechanical restrictions of the upper kinetic chain- improved. Underlying history of MS could also be a  contributing factor.    -  Continue baclofen, over-the-counter Tylenol, and over-the-counter NSAIDs as needed for pain control  - OMT performed again today to address associated biomechanical restrictions  and HEP reviewed     2. OMT 3-4 regions. Oral consent obtained.  Reviewed benefits and potential side effects.   - OMT indicated today due to signs and symptoms as well as local  and remote somatic dysfunction findings and their related neurokinetic, lymphatic, fascial and/or arteriovenous body connections.   - OMT techniques used: Myofascial Release, Muscle Energy, Still's Technique, Counterstrain and Balanced Ligamentous Tension   - Treatment was tolerated well. Improvement noted in segmental mobility post-treatment in dysfunctional regions. There were no adverse events and no complications immediately following treatment.     3. Reviewed with pt. the following HEP:  Continue:  A)  Supine Thoracic extension exercise: 10-15 reps, twice daily. Handout also given.   B) Cervicothoracic junction mobilization exercise: 10-15 reps, twice daily. Handout also given.   C) Latissimus dorsi and thoracic elongation stretch: hold for 30 sec, repeat 2-3 times, twice daily. Hand out given.  D) Diaphragmatic breathing exercises: 10-15 reps of inhalation and exhalation, focusing inhaling into the abdomen and lower ribs. Repeat 2-3 times a day. Handout given.       The patient was taught a homegoing physical therapy regimen as described above. The patient demonstrated understanding of the exercises and proper technique of their execution.     4. Follow-up in 2 weeks for reevaluation    5. Patient agreeable to today's plan and all questions were answered    This note is dictated using the M*Modal Fluency Direct word recognition program. There are word recognition mistakes that are occasionally missed on review.

## 2022-08-03 ENCOUNTER — OFFICE VISIT (OUTPATIENT)
Dept: SPORTS MEDICINE | Facility: CLINIC | Age: 57
End: 2022-08-03
Payer: COMMERCIAL

## 2022-08-03 VITALS
HEIGHT: 62 IN | BODY MASS INDEX: 26.68 KG/M2 | WEIGHT: 145 LBS | SYSTOLIC BLOOD PRESSURE: 108 MMHG | DIASTOLIC BLOOD PRESSURE: 78 MMHG

## 2022-08-03 DIAGNOSIS — G89.29 CHRONIC LEFT-SIDED THORACIC BACK PAIN: ICD-10-CM

## 2022-08-03 DIAGNOSIS — M79.10 MYALGIA: ICD-10-CM

## 2022-08-03 DIAGNOSIS — M99.02 SOMATIC DYSFUNCTION OF THORACIC REGION: ICD-10-CM

## 2022-08-03 DIAGNOSIS — R10.9 CHRONIC LEFT FLANK PAIN: ICD-10-CM

## 2022-08-03 DIAGNOSIS — G89.29 CHRONIC LEFT FLANK PAIN: ICD-10-CM

## 2022-08-03 DIAGNOSIS — M54.6 CHRONIC LEFT-SIDED THORACIC BACK PAIN: ICD-10-CM

## 2022-08-03 DIAGNOSIS — G35 MS (MULTIPLE SCLEROSIS): ICD-10-CM

## 2022-08-03 DIAGNOSIS — M99.09 SOMATIC DYSFUNCTION OF ABDOMINAL REGION: ICD-10-CM

## 2022-08-03 DIAGNOSIS — M99.08 SOMATIC DYSFUNCTION OF RIB CAGE REGION: ICD-10-CM

## 2022-08-03 DIAGNOSIS — R07.81 RIB PAIN ON LEFT SIDE: Primary | ICD-10-CM

## 2022-08-03 PROCEDURE — 99213 PR OFFICE/OUTPT VISIT, EST, LEVL III, 20-29 MIN: ICD-10-PCS | Mod: 25,S$GLB,, | Performed by: NEUROMUSCULOSKELETAL MEDICINE & OMM

## 2022-08-03 PROCEDURE — 1159F MED LIST DOCD IN RCRD: CPT | Mod: CPTII,S$GLB,, | Performed by: NEUROMUSCULOSKELETAL MEDICINE & OMM

## 2022-08-03 PROCEDURE — 3074F SYST BP LT 130 MM HG: CPT | Mod: CPTII,S$GLB,, | Performed by: NEUROMUSCULOSKELETAL MEDICINE & OMM

## 2022-08-03 PROCEDURE — 1160F PR REVIEW ALL MEDS BY PRESCRIBER/CLIN PHARMACIST DOCUMENTED: ICD-10-PCS | Mod: CPTII,S$GLB,, | Performed by: NEUROMUSCULOSKELETAL MEDICINE & OMM

## 2022-08-03 PROCEDURE — 99213 OFFICE O/P EST LOW 20 MIN: CPT | Mod: 25,S$GLB,, | Performed by: NEUROMUSCULOSKELETAL MEDICINE & OMM

## 2022-08-03 PROCEDURE — 98926 PR OSTEOPATHIC MANIP,3-4 BODY REGN: ICD-10-PCS | Mod: S$GLB,,, | Performed by: NEUROMUSCULOSKELETAL MEDICINE & OMM

## 2022-08-03 PROCEDURE — 3008F BODY MASS INDEX DOCD: CPT | Mod: CPTII,S$GLB,, | Performed by: NEUROMUSCULOSKELETAL MEDICINE & OMM

## 2022-08-03 PROCEDURE — 3078F PR MOST RECENT DIASTOLIC BLOOD PRESSURE < 80 MM HG: ICD-10-PCS | Mod: CPTII,S$GLB,, | Performed by: NEUROMUSCULOSKELETAL MEDICINE & OMM

## 2022-08-03 PROCEDURE — 3074F PR MOST RECENT SYSTOLIC BLOOD PRESSURE < 130 MM HG: ICD-10-PCS | Mod: CPTII,S$GLB,, | Performed by: NEUROMUSCULOSKELETAL MEDICINE & OMM

## 2022-08-03 PROCEDURE — 1160F RVW MEDS BY RX/DR IN RCRD: CPT | Mod: CPTII,S$GLB,, | Performed by: NEUROMUSCULOSKELETAL MEDICINE & OMM

## 2022-08-03 PROCEDURE — 99999 PR PBB SHADOW E&M-EST. PATIENT-LVL III: CPT | Mod: PBBFAC,,, | Performed by: NEUROMUSCULOSKELETAL MEDICINE & OMM

## 2022-08-03 PROCEDURE — 98926 OSTEOPATH MANJ 3-4 REGIONS: CPT | Mod: S$GLB,,, | Performed by: NEUROMUSCULOSKELETAL MEDICINE & OMM

## 2022-08-03 PROCEDURE — 1159F PR MEDICATION LIST DOCUMENTED IN MEDICAL RECORD: ICD-10-PCS | Mod: CPTII,S$GLB,, | Performed by: NEUROMUSCULOSKELETAL MEDICINE & OMM

## 2022-08-03 PROCEDURE — 99999 PR PBB SHADOW E&M-EST. PATIENT-LVL III: ICD-10-PCS | Mod: PBBFAC,,, | Performed by: NEUROMUSCULOSKELETAL MEDICINE & OMM

## 2022-08-03 PROCEDURE — 3008F PR BODY MASS INDEX (BMI) DOCUMENTED: ICD-10-PCS | Mod: CPTII,S$GLB,, | Performed by: NEUROMUSCULOSKELETAL MEDICINE & OMM

## 2022-08-03 PROCEDURE — 3078F DIAST BP <80 MM HG: CPT | Mod: CPTII,S$GLB,, | Performed by: NEUROMUSCULOSKELETAL MEDICINE & OMM

## 2022-08-17 NOTE — PROGRESS NOTES
"  Subjective:     Hien Jeter    Chief Complaint   Patient presents with    Follow-up       HPI      Hien is a 57 y.o. female coming in today for left sided ribcage pain. Since last visit the pain has Improved. Reports no "pain" currently, just tightness. Occasional pain with twisting. Pt is compliant with HEP. Very happy with previous OMT. The pain is better with rest and worse with riding in a car, using arms, activity. Pt. describes the pain as a 1/10 achy pain that does not radiate. There has not been any new a fall/injury/ or traumas since last visit.  Pt. denies any new musculoskeletal complaints at this time.     Office note from 8/3/22 reviewed    PAST MEDICAL HISTORY:   Past Medical History:   Diagnosis Date    Anxiety     Basal cell carcinoma 2000    left eyebrow     Depression     Environmental allergies     Headache(784.0)     Multiple food allergies     Multiple sclerosis 2009     shoulder 3/2015    left     PAST SURGICAL HISTORY:   Past Surgical History:   Procedure Laterality Date    ARTHROSCOPIC DEBRIDEMENT OF ROTATOR CUFF Left 4/5/2019    Procedure: DEBRIDEMENT, ROTATOR CUFF, ARTHROSCOPIC;  Surgeon: Dc Moore Jr., MD;  Location: [facility] OR;  Service: Orthopedics;  Laterality: Left;  need opus system (Julito notified)  video    ARTHROSCOPY OF SHOULDER WITH DECOMPRESSION OF SUBACROMIAL SPACE  4/5/2019    Procedure: ARTHROSCOPY, SHOULDER, WITH SUBACROMIAL SPACE DECOMPRESSION;  Surgeon: Dc Moore Jr., MD;  Location: [facility] OR;  Service: Orthopedics;;    ARTHROSCOPY OF SHOULDER WITH DECOMPRESSION OF SUBACROMIAL SPACE Right 1/8/2021    Procedure: ARTHROSCOPY, SHOULDER, WITH SUBACROMIAL SPACE DECOMPRESSION;  Surgeon: Dc Moore Jr., MD;  Location: [facility] OR;  Service: Orthopedics;  Laterality: Right;  possible rot cuff repair  need opus system (Addy FITZGERALD notified per Brii 12/21, EF)  video    BASAL CELL CARCINOMA EXCISION  1998    COLONOSCOPY N/A 5/25/2017    " Procedure: COLONOSCOPY;  Surgeon: Marielle Dietz MD;  Location: [facility] ([address] [place] [address]);  Service: Endoscopy;  Laterality: N/A;  Patient requests PM.    PM prep.    FOOT SURGERY Right 01/08/2019    LASIK  2000    SHOULDER SURGERY Left 04/05/2019    WISDOM TOOTH EXTRACTION       FAMILY HISTORY:   Family History   Problem Relation Age of Onset    Colon cancer Mother     Diabetes Mother     Osteoporosis Mother     Cancer Mother     Arrhythmia Father     Prostate cancer Father     Dementia Maternal Grandmother     Cancer Paternal Grandfather     Breast cancer Paternal Aunt     Breast cancer Sister     Stomach cancer Sister     Colon cancer Sister         my risk negative     Prostate cancer Paternal Uncle     Uterine cancer Paternal Grandmother     Multiple sclerosis Neg Hx     Ovarian cancer Neg Hx      SOCIAL HISTORY:   Social History     Socioeconomic History    Marital status:    Occupational History     Employer: [employer]   Tobacco Use    Smoking status: Never Smoker    Smokeless tobacco: Never Used   Substance and Sexual Activity    Alcohol use: Yes     Comment: socially/ not weekly    Drug use: No    Sexual activity: Yes     Partners: Male     Birth control/protection: None     Comment: , menarche 15       MEDICATIONS:   Current Outpatient Medications:     azelastine (ASTELIN) 137 mcg (0.1 %) nasal spray, 1 SPRAY (137 MCG TOTAL) BY NASAL ROUTE 2 (TWO) TIMES DAILY., Disp: 30 mL, Rfl: 11    baclofen (LIORESAL) 10 MG tablet, Take 10 mg by mouth 3 (three) times daily. prn, Disp: , Rfl:     cholecalciferol, vitamin D3, 3,000 unit Tab, Take 3,000 Units by mouth once daily. , Disp: , Rfl:     EScitalopram oxalate (LEXAPRO) 10 MG tablet, Take 1 tablet (10 mg total) by mouth every evening., Disp: 90 tablet, Rfl: 3    flaxseed oil 1,000 mg Cap, Take 1 capsule by mouth once daily. , Disp: , Rfl:     multivitamin-Ca-iron-minerals 27-0.4 mg Tab, Take 1 tablet by  "mouth once daily. , Disp: , Rfl:     vitamin E 400 UNIT capsule, Take 400 Units by mouth once daily., Disp: , Rfl:   ALLERGIES:   Review of patient's allergies indicates:   Allergen Reactions    Diclofenac Nausea Only    Ibuprofen Other (See Comments)    Tramadol Other (See Comments)     Nausea          Objective:     VITAL SIGNS: BP 94/70   Ht 5' 2" (1.575 m)   Wt 65.8 kg (145 lb)   LMP 04/24/2017   BMI 26.52 kg/m²    General    Vitals reviewed.  Constitutional: She is oriented to person, place, and time. She appears well-developed and well-nourished.   Neurological: She is alert and oriented to person, place, and time.   Psychiatric: She has a normal mood and affect. Her behavior is normal.                 MUSCULOSKELETAL EXAM:     Cervical Spine: left lcervical region    Observation:    Posture:  Posterior pelvis tilt with loss of lumbar lordosis  No obvious shoulder un-leveling while standing.    No edema, erythema, or ecchymosis noted in cervical and thoraic spine regions.    No midline skin abnormalities.    No atrophy of upper limb musculature.  Gait: Non-antalgic     Tenderness:  No tenderness throughout the cervical spine upon spinous process palpation  No tenderness over the base of the occiput  No bony deformities or step-offs palpated.   No trigger point tenderness of levator scapulae, upper trapezius, or parascapular musculature   No left rib tenderness   No thoracic paraspinal muscle tenderness    Range of Motion (* = with pain):  SHOULDER  Active flexion to 180° on left and 180° on right.  Active abduction to 180° on left and 180° on right.  Active internal rotation to T7 on left and T7 on right.    Active external rotation to T4 on left and T7 on right.    No scapular dyskinesia or winging.    Structural Exam:  TART (Tissue texture abnormality, Asymmetry,  Restriction of motion and/or Tenderness) changes:     Thoracic Spine   T1 Neutral   T2 Neutral   T3 Neutral   T4 Neutral   T5 Neutral   T6 " Neutral   T7 FRS RIGHT   T8 FRS RIGHT   T9 Neutral   T10 Neutral   T11 Neutral   T12 Neutral     Rib cage: R1 inhaled on right, R8  external torsion on left    Abdomen: neutral    Upper extremity: right thoracic outlet TTA     Lumbar Spine   L1 Neutral   L2 Neutral   L3 Neutral   L4 Neutral   L5 Neutral       Key   F= Flexed   E = Extended   R = Rotated   S = Sidebent   TTA = tissue texture abnormality         Assessment:      Encounter Diagnoses   Name Primary?    Rib pain on left side Yes    Chronic left-sided thoracic back pain     Myalgia     Somatic dysfunction of thoracic region     Somatic dysfunction of rib cage region     Upper extremity somatic dysfunction           Plan:   1. Chronic left sided rib pain and upper back pain likely secondary to biomechanical restrictions of the upper kinetic chain- continued improvement. Underlying history of MS could also be a contributing factor.    -  Continue baclofen, over-the-counter Tylenol, and over-the-counter NSAIDs as needed for pain control  - OMT performed again today to address associated biomechanical restrictions  and HEP reviewed   - Cleared to start restart workout activity as tolerated.  Recommend slow progression back to baseline.     2. OMT 3-4 regions. Oral consent obtained.  Reviewed benefits and potential side effects.   - OMT indicated today due to signs and symptoms as well as local and remote somatic dysfunction findings and their related neurokinetic, lymphatic, fascial and/or arteriovenous body connections.   - OMT techniques used: Myofascial Release, Muscle Energy and Still's Technique   - Treatment was tolerated well. Improvement noted in segmental mobility post-treatment in dysfunctional regions. There were no adverse events and no complications immediately following treatment.     3. Reviewed with pt. the following HEP:  Continue 3-4 times a week for maintenance:  A)  Supine Thoracic extension exercise: 10-15 reps  B) Cervicothoracic  junction mobilization exercise: 10-15 reps  C) Latissimus dorsi and thoracic elongation stretch: hold for 30 sec, repeat 2-3 times  D) Diaphragmatic breathing exercises: 10-15 reps of inhalation and exhalation, focusing inhaling into the abdomen and lower ribs      The patient was taught a homegoing physical therapy regimen as described above. The patient demonstrated understanding of the exercises and proper technique of their execution.     4. Follow-up as needed if pain deteriorates or new issue arises    5. Patient agreeable to today's plan and all questions were answered    This note is dictated using the M*Modal Fluency Direct word recognition program. There are word recognition mistakes that are occasionally missed on review.

## 2022-08-18 ENCOUNTER — OFFICE VISIT (OUTPATIENT)
Dept: SPORTS MEDICINE | Facility: CLINIC | Age: 57
End: 2022-08-18
Payer: COMMERCIAL

## 2022-08-18 ENCOUNTER — PATIENT MESSAGE (OUTPATIENT)
Dept: HEMATOLOGY/ONCOLOGY | Facility: CLINIC | Age: 57
End: 2022-08-18
Payer: COMMERCIAL

## 2022-08-18 VITALS
DIASTOLIC BLOOD PRESSURE: 70 MMHG | WEIGHT: 145 LBS | SYSTOLIC BLOOD PRESSURE: 94 MMHG | HEIGHT: 62 IN | BODY MASS INDEX: 26.68 KG/M2

## 2022-08-18 DIAGNOSIS — M99.08 SOMATIC DYSFUNCTION OF RIB CAGE REGION: ICD-10-CM

## 2022-08-18 DIAGNOSIS — G89.29 CHRONIC LEFT-SIDED THORACIC BACK PAIN: ICD-10-CM

## 2022-08-18 DIAGNOSIS — M79.10 MYALGIA: ICD-10-CM

## 2022-08-18 DIAGNOSIS — M99.07 UPPER EXTREMITY SOMATIC DYSFUNCTION: ICD-10-CM

## 2022-08-18 DIAGNOSIS — M99.02 SOMATIC DYSFUNCTION OF THORACIC REGION: ICD-10-CM

## 2022-08-18 DIAGNOSIS — R07.81 RIB PAIN ON LEFT SIDE: Primary | ICD-10-CM

## 2022-08-18 DIAGNOSIS — M54.6 CHRONIC LEFT-SIDED THORACIC BACK PAIN: ICD-10-CM

## 2022-08-18 PROCEDURE — 1160F RVW MEDS BY RX/DR IN RCRD: CPT | Mod: CPTII,S$GLB,, | Performed by: NEUROMUSCULOSKELETAL MEDICINE & OMM

## 2022-08-18 PROCEDURE — 99213 PR OFFICE/OUTPT VISIT, EST, LEVL III, 20-29 MIN: ICD-10-PCS | Mod: 25,S$GLB,, | Performed by: NEUROMUSCULOSKELETAL MEDICINE & OMM

## 2022-08-18 PROCEDURE — 99999 PR PBB SHADOW E&M-EST. PATIENT-LVL III: ICD-10-PCS | Mod: PBBFAC,,, | Performed by: NEUROMUSCULOSKELETAL MEDICINE & OMM

## 2022-08-18 PROCEDURE — 3074F SYST BP LT 130 MM HG: CPT | Mod: CPTII,S$GLB,, | Performed by: NEUROMUSCULOSKELETAL MEDICINE & OMM

## 2022-08-18 PROCEDURE — 3078F PR MOST RECENT DIASTOLIC BLOOD PRESSURE < 80 MM HG: ICD-10-PCS | Mod: CPTII,S$GLB,, | Performed by: NEUROMUSCULOSKELETAL MEDICINE & OMM

## 2022-08-18 PROCEDURE — 3078F DIAST BP <80 MM HG: CPT | Mod: CPTII,S$GLB,, | Performed by: NEUROMUSCULOSKELETAL MEDICINE & OMM

## 2022-08-18 PROCEDURE — 1159F PR MEDICATION LIST DOCUMENTED IN MEDICAL RECORD: ICD-10-PCS | Mod: CPTII,S$GLB,, | Performed by: NEUROMUSCULOSKELETAL MEDICINE & OMM

## 2022-08-18 PROCEDURE — 1159F MED LIST DOCD IN RCRD: CPT | Mod: CPTII,S$GLB,, | Performed by: NEUROMUSCULOSKELETAL MEDICINE & OMM

## 2022-08-18 PROCEDURE — 3074F PR MOST RECENT SYSTOLIC BLOOD PRESSURE < 130 MM HG: ICD-10-PCS | Mod: CPTII,S$GLB,, | Performed by: NEUROMUSCULOSKELETAL MEDICINE & OMM

## 2022-08-18 PROCEDURE — 99213 OFFICE O/P EST LOW 20 MIN: CPT | Mod: 25,S$GLB,, | Performed by: NEUROMUSCULOSKELETAL MEDICINE & OMM

## 2022-08-18 PROCEDURE — 98926 PR OSTEOPATHIC MANIP,3-4 BODY REGN: ICD-10-PCS | Mod: S$GLB,,, | Performed by: NEUROMUSCULOSKELETAL MEDICINE & OMM

## 2022-08-18 PROCEDURE — 1160F PR REVIEW ALL MEDS BY PRESCRIBER/CLIN PHARMACIST DOCUMENTED: ICD-10-PCS | Mod: CPTII,S$GLB,, | Performed by: NEUROMUSCULOSKELETAL MEDICINE & OMM

## 2022-08-18 PROCEDURE — 3008F BODY MASS INDEX DOCD: CPT | Mod: CPTII,S$GLB,, | Performed by: NEUROMUSCULOSKELETAL MEDICINE & OMM

## 2022-08-18 PROCEDURE — 3008F PR BODY MASS INDEX (BMI) DOCUMENTED: ICD-10-PCS | Mod: CPTII,S$GLB,, | Performed by: NEUROMUSCULOSKELETAL MEDICINE & OMM

## 2022-08-18 PROCEDURE — 98926 OSTEOPATH MANJ 3-4 REGIONS: CPT | Mod: S$GLB,,, | Performed by: NEUROMUSCULOSKELETAL MEDICINE & OMM

## 2022-08-18 PROCEDURE — 99999 PR PBB SHADOW E&M-EST. PATIENT-LVL III: CPT | Mod: PBBFAC,,, | Performed by: NEUROMUSCULOSKELETAL MEDICINE & OMM

## 2022-08-24 ENCOUNTER — TELEPHONE (OUTPATIENT)
Dept: PSYCHIATRY | Facility: CLINIC | Age: 57
End: 2022-08-24
Payer: COMMERCIAL

## 2022-08-24 ENCOUNTER — LAB VISIT (OUTPATIENT)
Dept: LAB | Facility: HOSPITAL | Age: 57
End: 2022-08-24
Payer: COMMERCIAL

## 2022-08-24 ENCOUNTER — OFFICE VISIT (OUTPATIENT)
Dept: HEMATOLOGY/ONCOLOGY | Facility: CLINIC | Age: 57
End: 2022-08-24
Payer: COMMERCIAL

## 2022-08-24 DIAGNOSIS — Z80.0 FAMILY HISTORY OF COLON CANCER: ICD-10-CM

## 2022-08-24 DIAGNOSIS — Z80.49 FAMILY HISTORY OF UTERINE CANCER: ICD-10-CM

## 2022-08-24 DIAGNOSIS — Z80.3 FAMILY HISTORY OF BREAST CANCER: ICD-10-CM

## 2022-08-24 DIAGNOSIS — Z80.42 FAMILY HISTORY OF PROSTATE CANCER: ICD-10-CM

## 2022-08-24 DIAGNOSIS — Z71.83 ENCOUNTER FOR NONPROCREATIVE GENETIC COUNSELING: Primary | ICD-10-CM

## 2022-08-24 PROCEDURE — 99999 PR PBB SHADOW E&M-EST. PATIENT-LVL III: CPT | Mod: PBBFAC,,, | Performed by: PHYSICIAN ASSISTANT

## 2022-08-24 PROCEDURE — 99215 PR OFFICE/OUTPT VISIT, EST, LEVL V, 40-54 MIN: ICD-10-PCS | Mod: S$GLB,,, | Performed by: PHYSICIAN ASSISTANT

## 2022-08-24 PROCEDURE — 99417 PR PROLONGED SVC, OUTPT, W/WO DIRECT PT CONTACT,  EA ADDTL 15 MIN: ICD-10-PCS | Mod: S$GLB,,, | Performed by: PHYSICIAN ASSISTANT

## 2022-08-24 PROCEDURE — 1159F MED LIST DOCD IN RCRD: CPT | Mod: CPTII,S$GLB,, | Performed by: PHYSICIAN ASSISTANT

## 2022-08-24 PROCEDURE — 1159F PR MEDICATION LIST DOCUMENTED IN MEDICAL RECORD: ICD-10-PCS | Mod: CPTII,S$GLB,, | Performed by: PHYSICIAN ASSISTANT

## 2022-08-24 PROCEDURE — 36415 COLL VENOUS BLD VENIPUNCTURE: CPT | Performed by: PHYSICIAN ASSISTANT

## 2022-08-24 PROCEDURE — 99417 PROLNG OP E/M EACH 15 MIN: CPT | Mod: S$GLB,,, | Performed by: PHYSICIAN ASSISTANT

## 2022-08-24 PROCEDURE — 99999 PR PBB SHADOW E&M-EST. PATIENT-LVL III: ICD-10-PCS | Mod: PBBFAC,,, | Performed by: PHYSICIAN ASSISTANT

## 2022-08-24 PROCEDURE — 99215 OFFICE O/P EST HI 40 MIN: CPT | Mod: S$GLB,,, | Performed by: PHYSICIAN ASSISTANT

## 2022-08-24 NOTE — TELEPHONE ENCOUNTER
LETICIA intern faxed completed LTD forms to [employer] [phone].  
Received forms for pt's long-term disability benefit and forwarded to LANG Tineo, intern, for completion.   
no

## 2022-08-24 NOTE — PROGRESS NOTES
"Department of Hematology and Oncology  [facility]    Cancer Genetics  Initial Consult    Date of Service:  22  Visit Provider:  Gardenia Owens PA-C  Collaborating Physician:  Sheron Colby MD    Patient:  Hien Jeter  :  1965  MRN:  [id number]     Referring Provider    Khadra Cope, NP  [address] [place] [address]    SUBJECTIVE      Chief Complaint: Genetic evaluation  History of Present Illness (HPI):  Hien Jeter ("Hien"), 57 y.o., assigned female sex at birth, is established with the [facility] but new to me.  She was referred for genetic cancer risk assessment given her family history of cancer.    Genetic Assessment History  Germline cancer-genetic testing: No  Personal history of cancer: Yes - BCC in eyebrow ()  Benign tumors: No  Colon polyps: No. Last colonoscopy 2017  was normal. Plan is to repeat in 5 years. (2022)  Pancreatitis: No  Chemoprevention: Never used  Uterus and ovaries intact: Yes  Ashkenazi Voodoo ancestry: No    Past Medical History:   Diagnosis Date    Anxiety     Basal cell carcinoma 2000    left eyebrow     Depression     Environmental allergies     Headache(784.0)     Multiple food allergies     Multiple sclerosis 2009     shoulder 3/2015    left     Patient Active Problem List    Diagnosis Date Noted    Acute pain of right shoulder 2021    Decreased range of motion of right shoulder 2021    Muscle weakness 2021    Impaired functional mobility and activity tolerance 2021    Shoulder impingement, right 10/19/2020    Anxiety 2020    Insomnia 2020    Dupuytren's disease of palm 2020    Rotator cuff tear, left 2019    Ganglion cyst of right foot 2019    Adhesive capsulitis of left shoulder 2018    Family history of colon cancer 2017    Rib pain on left side 07/15/2016    Numbness on left side " 02/08/2016    Intercostal pain 02/08/2016    Impingement syndrome of right shoulder 01/21/2016    Decreased strength 12/28/2015    Impaired gait 12/28/2015    Counseling regarding goals of care 12/20/2015    Neck strain 07/31/2015    Cervicalgia 07/31/2015    Cervical pain 04/21/2015    Left shoulder pain 04/21/2015    Menstrual migraine 01/28/2015    Encounter for long-term (current) use of high-risk medication 06/13/2014    Intractable chronic migraine without aura 05/12/2014    Occipital neuralgia 05/12/2014    Facet arthropathy of spine 05/12/2014    MS (multiple sclerosis) 09/19/2012      Family History  Germline cancer-genetic testing in blood relatives:  Yes -   Sister Radha: MyRisk negative on 6/24/2021. (Per patient. No report available to confirm.)  Consanguinity in ancestors:  No  No known history of cancer of colorectal polyps in extended family other than noted below.       Family History   Problem Relation Age of Onset    Liver cancer Mother 84    Colon cancer Mother 50    Diabetes Mother     Osteoporosis Mother     Arrhythmia Father     Prostate cancer Father 75    Breast cancer Sister 50    Colon cancer Sister 59        myrisk negative 6/2021    Diabetes Brother     Dementia Maternal Grandmother     Lung cancer Maternal Grandfather         +smoker, chemical plants    Uterine cancer Paternal Grandmother     Lung cancer Paternal Grandfather         heavy smoker    Breast cancer Paternal Aunt     Breast cancer Paternal Aunt     Prostate cancer Paternal Uncle     Multiple sclerosis Neg Hx     Ovarian cancer Neg Hx       Review of Systems  See HPI.    Pain 0/10  Recent falls: None   Patient's Distress Score today was 2/10 (with 10 being the worst).       OBJECTIVE      Physical Exam  Very pleasant patient.  Unaccompanied  Vitals signs:  There were no vitals filed for this visit.   Constitutional      Appearance:  Well developed and well nourished. No apparent distress.   Pulmonary     Effort:   Normal  Neurological     Mental Status:  Alert and oriented.     Coordination:  Normal  Psychiatric        Mood and Affect: Normal     Thought Content:  Normal       Speech:  Normal     Behavior:  Normal     Judgment:  Normal  Genetics-specific     It is my assessment that the patient is ready to proceed with cancer-genetic testing from a psychosocial perspective.    COUNSELING      Germline cancer-genetic testing is the testing of genes associated with cancer, known as cancer susceptibility genes.  Just as these genes are inherited from parents, mutations in these genes can be inherited, as well.  A mutation in a cancer susceptibility gene adversely affects the gene's ability to prevent cancer; therefore, carriers of cancer susceptibility gene mutations may be at increased risk for certain cancers.    A small percentage of cancers are caused by an cancer susceptibility gene mutation, meaning the cancer is genetic/hereditary; rather, most cancers are sporadic.  Causes of sporadic cancers may include environmental risk factors, lifestyle risk factors, and non-modifiable risk factors.  It is important to note that members of a family often share not only their genetics but also risk factors including environmental and lifestyle risk factors.    Results of genetic testing include positive, negative, and variant of unknown significance (VUS).  A positive result indicates the presence of at least one clinically significant mutation, and the patient's specific cancer risks vary depending upon the tumor-suppressor gene(s) in which there is/are a mutation(s).  With a positive result, in some cases, depending upon the specific result and the patient's clinical history, modified management may be recommended, including measures for risk reduction and/or surveillance; however, modified management is not always an option.  A negative result indicates that no clinically significant mutations were identified in the gene(s)  tested.  A VUS indicates that there is not presently enough data for the laboratory to make a determination as to whether the variant is clinically significant; VUSs are not typically acted upon clinically.      The ability to interpret the meaning of a negative genetic testing result in genes associated with cancer with which the patient has not personally been affected, when done prior to testing the appropriate affected relative(s), is significantly limited.  A negative result in the patient does not indicate that she cannot develop cancer, and, in fact, the patient may even be at increased risk for cancer based on shared risk factors with affected relatives.  The most informative candidates for initial genetic testing in a family are those who have been affected with cancer.    Modified management may also be recommended, even with a result of no or unknown significance, based upon risk assessment that incorporates the family history.      Sometimes, depending upon the genetic testing result and the cancer diagnosis, additional/modified treatments may be an option, though this is not guaranteed.    If Hien tests positive for a mutation, her first-degree relatives would each have a 50% chance of having the same mutation, and other, more distantly related blood-relatives would also be at risk of having the same mutation.       The Genetic Information Nondiscrimination Act (MALISSA) is U.S. federal legislation that provides some protections against use of an individual's genetic information by their health insurer and by their employer.  Title I of MALISSA prohibits most health insurers from utilizing an individual's genetic information to make decisions regarding insurance eligibility, coverage, underwriting, or premium charges.  Title II of MALISSA prohibits covered entities, which include employers, employment agencies, labor organizations, and joint labor-management training and apprenticeship programs, from  requesting, requiring, or disclosing the genetic information of employees and applicants.  MALISSA also prohibits health insurers and employers from asking or mandating that an individual take a genetic test.  MALISSA does not protect individuals from genetic discrimination toward health insurance obtained through a job with the  or through the Federal Employees Health Benefits Plan; from genetic discrimination by employers with fewer than 15 employees; or from genetic discrimination by any other type of policies/entities, including but not limited to life insurance, disability insurance, long-term care insurance,  benefits, and [facility] benefits.     An outside laboratory would perform the testing after a blood sample is collected here at the [facility] or a saliva sample is collected by the patient at home.  With genetic testing, there is a potential for the patient to incur out-of-pocket costs.  Results can take several weeks.  Post-test genetic counseling can be conducted once the genetic testing results are available.     Questions were encouraged and answered to the patient's satisfaction, and she verbalized understanding of information and agreement with the plan.       ASSESSMENT/PLAN      A cancer-genetic evaluation and pre-test genetic counseling were conducted. Based on the information provided by Hien, her personal and/or family history is suggestive of a potential hereditary predisposition to cancer. Her affected sister reportedly had negative genetic testing, but Hien could have a genetic mutation her sister did not inherit. The recommendation is to proceed with germline testing to include the genes commonly associated with hereditary breast cancer (AVILA, BARD1, BRCA1, BRCA2, CDH1, CHEK2, NF1, PALB2, PTEN, RAD51C, RAD51D, STK11, TP53), prostate cancer (AVILA, BRCA1, BRCA2, CHEK2, EPCAM, HOXB13, MLH1, MSH2, MSH6, NBN, PALB2, PMS2, RAD51D, TP53), colorectal cancer and  polyp syndromes (APC, AXIN2, BMPR1A, CHEK2, EPCAM, GREM1, MLH1, MSH2, MSH3, MSH6, MUTYH, NTHL1, PMS2, POLD1, POLE, PTEN, SMAD4, STK11, TP53), and endometrial/uterine cancer (BRCA1, EPCAM, MLH1, MSH2, MSH6, PMS2, PTEN). Hien was given the option of proceeding with testing now, deferring testing to a later date, or declining testing and opted to proceed.    Genetic test: [facility] BRCA1/2 Core Panel (74932) + Multi-Cancer +RNA Panel, 84 genes (259922.1)  Specimen type: blood   Collection: By [facility] today.    Consent: The patient has provided her written informed consent.    Results are expected approximately 2-3 weeks after the genetic testing laboratory receives the specimen.      1. Encounter for nonprocreative genetic counseling    2. Family history of colon cancer  - Genetic Misc Sendout Test, Blood; Future    3. Family history of breast cancer  - Ambulatory referral/consult to Genetics  - Genetic Misc Sendout Test, Blood; Future    4. Family history of prostate cancer  - Genetic Misc Sendout Test, Blood; Future    5. Family history of uterine cancer  - Genetic Misc Sendout Test, Blood; Future    - Proceed with germline genetic testing.  - Follow up with results.    Approximately 55 minutes were spent face-to-face with the patient.  Approximately 70 minutes in total were spent on this encounter, which includes face-to-face time and non-face-to-face time preparing to see the patient (e.g., review of tests), obtaining and/or reviewing separately obtained history, documenting clinical information in the electronic or other health record, independently interpreting results (not separately reported) and communicating results to the patient/family/caregiver, or care coordination (not separately reported).     REFERENCES     National Comprehensive Cancer Network (NCCN). (2021). Genetic/familial high-risk assessment: Breast, ovarian, and pancreatic. NCCN Clinical Practice Guidelines in Oncology (NCCN  Guidelines), Version 1.2022.  National Comprehensive Cancer Network (NCCN). (2021). Genetic/familial high-risk assessment: Colorectal. NCCN Clinical Practice Guidelines in Oncology (NCCN Guidelines), Version 1.2021.  National Comprehensive Cancer Network (NCCN). (2021). Prostate cancer. NCCN Clinical Practice Guidelines in Oncology (NCCN Guidelines), Version 1.2022.    Gardenia Owens PA-C, MPAS, PA-C  Physician Assistant, Hereditary/High Risk Clinic  Hematology/Oncology, [facility]

## 2022-09-03 DIAGNOSIS — Z13.9 SCREENING PROCEDURE: Primary | ICD-10-CM

## 2022-09-04 ENCOUNTER — PATIENT MESSAGE (OUTPATIENT)
Dept: HEMATOLOGY/ONCOLOGY | Facility: CLINIC | Age: 57
End: 2022-09-04
Payer: COMMERCIAL

## 2022-09-19 ENCOUNTER — PATIENT MESSAGE (OUTPATIENT)
Dept: NEUROLOGY | Facility: CLINIC | Age: 57
End: 2022-09-19
Payer: COMMERCIAL

## 2022-09-22 ENCOUNTER — DOCUMENTATION ONLY (OUTPATIENT)
Dept: HEMATOLOGY/ONCOLOGY | Facility: CLINIC | Age: 57
End: 2022-09-22
Payer: COMMERCIAL

## 2022-09-22 NOTE — PROGRESS NOTES
"Hereditary and High Risk Clinic  Department of Hematology and Oncology  [facility]    Cancer Genetics  Results Disclosure    Name: Hien Jeter ("Hien")  MRN: [id number]  GERMLINE GENETIC TESTING       The [employer] Multi-Cancer DNA+RNA Panel analyzes 84 genes associated with hereditary cancer:  AIP, ALK, APC, AVILA, AXIN2, BAP1, BARD1, BLM, BMPR1A, BRCA1, BRCA2, BRIP1, CASR, CDC73, CDH1, CDK4, CDKN1B, CDKN1C, CDKN2A, CEBPA, CHEK2, CTNNA1, DICER1, DIS3L2, EGFR, EPCAM, FH, FLCN, GATA2, GPC3, GREM1, HOXB13, HRAS, KIT, MAX, MEN1, MET, MITF, MLH1, MSH2, MSH3, MSH6, MUTYH, NBN, NF1, NF2, NTHL1, PALB2, PDGFRA, PHOX2B, PMS2, POLD1, POLE, POT1, RGOCN9Q, PTCH1, PTEN, RAD50, RAD51C, RAD51D, RB1, RECQL4, RET, RUNX1, SDHA, SDHAF2, SDHB, SDHC, SDHD, SMAD4, SMARCA4, SMARCB1, SMARCE1, STK11, SUFU, TERC, TERT, VMQU812, TP53, TSC1, TSC2, VHL, WRN, WT1.    RESULTS:  Pathogenic (harmful) variants:  The test did not identify any genetic variants known to cause cancer.     Variants of uncertain significance:   CDKN2A ([id number]), Exon 2, c.320G>A (p.[id number]), heterozygous, Uncertain Significance        Personal and Family History:   Hien has a personal history of:   Basal cell carcinoma x 1 (2019)    Hien's family history is significant for:   Sister: Breast cancer 50, colon cancer 59,  at 59, myRisk negative 2021  Mother: Colon cancer 50, liver cancer 84,  at 84  MGF: Lung cancer,   Father: Prostate 75,  at 94  Paternal aunt: Breast cancer,  at 100  Paternal aunt: Breast cancer,  at 94  Paternal uncle: Prostate cancer, alive at 98  PGM: Uterine cancer,  in her 50s  PGF: Lung cancer,  in his 50s    Interpretation of Results:     CDKN2A ([id number]), Exon 2, c.320G>A (p.[id number]), heterozygous, Uncertain Significance  The CDKN2A gene is associated with autosomal dominant melanoma-pancreatic cancer syndrome and melanoma-neural system tumor (NST) syndrome.   There is insufficient " evidence to determine if the variant identified in Hien's CDKN2A gene (c.320G>A) is harmful (pathogenic) or harmless (benign). For this reason, it is classified as a variant of uncertain significance (VUS). Most VUS's (~91%) are eventually reclassified as benign (harmless). When the variant is reclassified, [employer] will issue an amended results report and notify the patient and ordering provider. No actions are needed for a VUS other than checking in with [employer] or the genetics team periodically to see if it has been reclassified.     Hien tested negative for pathogenic (harmful) mutations in the genes commonly associated with hereditary basal cell carcinoma, breast, colon, lung, prostate, uterine and other cancers.   Testing was comprehensive for the genes associated with these cancers so no additional testing is indicated.   In regards to her family history of cancer, this result is considered an uninformative negative, as it is unknown if her relatives with cancer have/had a genetic variant she did not inherit. Hien's affected relatives could consider seeing a genetics specialist for evaluation and possible genetic testing. If any of these individuals are  or decline testing, it may be appropriate for their children to proceed with a genetic risk assessment.  Anyone interested in pursuing genetic counseling/testing can contact the [facility] for an in-personal or virtual appointment in [place] [phone] or [place] [phone]. Virtual patients must be located in [place] and able to access the virtual visit through the MyChart ([facility]) portal. Anyone who is located outside of [place] or prefers to see someone in-person closer to home can visit [email or web address] to locate a local genetic specialist.    Cancer Risk Assessment and Recommendations:   Colon cancer: Elevated risk due to having two first-degree relatives with colon cancer. NCCN recommends colonoscopy every 5 years.  Last colonoscopy in 5/2017 was normal. Plan is to repeat in 5 years (2022). A case request has been submitted but the procedure has not been scheduled.   Breast cancer: Continue breast cancer screening with Fatmata Cope NP in the High Risk Breast clinic.   Uterine cancer: Likely average risk. Continue annual exam by gynecology.   Skin cancer: History of BCC. Continue monitoring by dermatology.     Patient notification:   Phone call: The Genetics Navigator will notify the patient of the results and direct her to the results letter in [employer].   Results letter: A letter will be sent to the patient via [employer] that contains the test results and recommendations and advises the patient to notify me of any changes to her personal or family history and the genetic testing results of any relatives. Since genetics is an evolving field, she is also advised to check in with me periodically to see if updated testing is indicated.   Results report: The Genetics Navigator will ensure that Hien receives a copy of her [facility] results report and that a copy is available in Epic.     EPHRAIM Solorzano, PABrittniC  Hereditary/High Risk Clinic  Department of Hematology/Oncology  [facility]      CC: Khadra Cope NP

## 2022-09-22 NOTE — LETTER
September 22, 2022    [address]ien Jeter  [address] [facility] [address]   Dear Hien,    Below are the results from your recent cancer genetic testing. Please review and let us know if you have any questions or concerns. If you would like to schedule an in-person or a virtual appointment with me to further discuss the results, please message me through your [facility] patient portal or call [phone] to request a post-test genetic counseling appointment.    Khadra Cope NP will be notified of your results.  A copy of the [facility] results report is available to you in your [facility] record and the [facility] patient portal. If you have any difficulty accessing your report, please let our office know so we can mail you a hard copy.    Germline Genetic Testing  The [employer] Multi-Cancer DNA+RNA Panel analyzes 84 genes associated with hereditary cancer:  AIP, ALK, APC, AVILA, AXIN2, BAP1, BARD1, BLM, BMPR1A, BRCA1, BRCA2, BRIP1, CASR, CDC73, CDH1, CDK4, CDKN1B, CDKN1C, CDKN2A, CEBPA, CHEK2, CTNNA1, DICER1, DIS3L2, EGFR, EPCAM, FH, FLCN, GATA2, GPC3, GREM1, HOXB13, HRAS, KIT, MAX, MEN1, MET, MITF, MLH1, MSH2, MSH3, MSH6, MUTYH, NBN, NF1, NF2, NTHL1, PALB2, PDGFRA, PHOX2B, PMS2, POLD1, POLE, POT1, NDLFS3R, PTCH1, PTEN, RAD50, RAD51C, RAD51D, RB1, RECQL4, RET, RUNX1, SDHA, SDHAF2, SDHB, SDHC, SDHD, SMAD4, SMARCA4, SMARCB1, SMARCE1, STK11, SUFU, TERC, TERT, EAAP844, TP53, TSC1, TSC2, VHL, WRN, WT1.    RESULTS:  Pathogenic (harmful) variants:  The test did not identify any genetic variants known to cause cancer.     Variants of uncertain significance:   CDKN2A (d49PRU3b), Exon 2, c.320G>A (p.Fus247Ihz), heterozygous, Uncertain Significance         What do these results mean?    CDKN2A (c07PWV6l), Exon 2, c.320G>A (p.Nia244Xdb), heterozygous, Uncertain Significance  The CDKN2A gene is associated with autosomal dominant melanoma-pancreatic cancer syndrome and melanoma-neural system tumor (NST) syndrome.    There is insufficient evidence to determine if the variant identified in Hien's CDKN2A gene (c.320G>A) is harmful (pathogenic) or harmless (benign). For this reason, it is classified as a variant of uncertain significance (VUS). Most VUS's (~91%) are eventually reclassified as benign (harmless). When the variant is reclassified, [employer] will issue an amended results report and notify the patient and ordering provider. No actions are needed for a VUS other than checking in with [employer] or the genetics team periodically to see if it has been reclassified.     You tested negative for mutations in the genes associated with hereditary basal cell carcinoma, breast, colon, lung, prostate, uterine and other cancers.    Testing was comprehensive for the genes associated with these cancers so no additional testing is indicated.   In regards to your family history of cancer, this result is considered an uninformative negative, as it is unknown if your relatives with cancer have/had a genetic variant you did not inherit. Your relatives who have/had cancer could consider seeing a genetics specialist for evaluation and possible genetic testing. If any of these individuals are  or decline testing, it may be appropriate for their children to proceed with a genetic risk assessment.   Anyone interested in pursuing genetic counseling/testing can contact the [facility] for an in-personal or virtual appointment in [place] [phone] or [place] [phone]. Virtual patients must be located in [place] and able to access the virtual visit through the MyChart ([facility]) portal. Anyone who is located outside of [place] or prefers to see someone in-person closer to home can visit [email or web address] to locate a local genetic specialist.    Hereditary versus random/sporadic cancer:   Only a small percentage of cancers (5-10%) are hereditary, meaning they are caused by an inherited genetic variant (mutation).  The remaining cancers (90-95%) are random or sporadic, caused often by a combination of risk factors including age, sex, race, physical characteristics and environmental and lifestyle factors (diet, obesity, smoking, lack of exercise, etc). Family members often share these risk factors resulting in multiple individuals with cancer. Even though your test was negative, you may still be at risk for certain cancers based on factors such as family history, genetic causes not evaluated with this test, or other lifestyle and environmental influences.     Cancer screening and risk reduction  It is important that you and your relatives establish care with a primary care provider (PCP), whom you see at least annually for routine health maintenance and guidance on cancer screening and cancer risk reduction. Keep your PCP updated on your personal and family history. If you are not established with a PCP, please contact me so I can submit a referral.     Cancer Risk Assessment and Recommendations:   Colon cancer: Elevated risk due to having two first-degree relatives with colon cancer. NCCN recommends colonoscopy every 5 years. Last colonoscopy in 5/2017 was normal. Plan is to repeat in 5 years (2022). A case request has been submitted but the procedure has not been scheduled.   Breast cancer: Continue breast cancer screening with Fatmata Cope NP in the High Risk Breast clinic.   Uterine cancer: Likely average risk. Continue annual exam by gynecology.   Skin cancer: History of BCC. Continue monitoring by dermatology.      What you can do to reduce your risk for cancer:   Avoid tobacco use; exercise; maintain a healthy weight; eat a diet rich in fruits, vegetables, and whole grains and low in saturated/trans fat, red meat, and processed meat; limit alcohol consumption; protect against sexually transmitted infections; protect against sun exposure, avoid tanning beds; and get regular cancer screenings as recommended by your  healthcare team.    Follow-up    Continue to follow up with all healthcare providers as directed.    Please update me through [facility] with any changes to your personal or family history and with any relative's genetic testing results. I'm happy to review their results to determine how they might affect you and other family members. Genetics is an evolving field, so I invite you to contact me periodically to determine if any updated genetic testing is recommended for you or your relatives.     Sincerely,  EPHRAIM Solorzano, PA-C  Physician Assistant, Hereditary/High Risk Clinic  [facility]    Phone:  [phone]

## 2022-09-22 NOTE — LETTER
September 22, 2022    [facility] Jeter  [address] [facility] [address]     Dear Hien,    Below are the results from your recent cancer genetic testing. Please review and let us know if you have any questions or concerns. If you would like to schedule an in-person or a virtual appointment with me to further discuss the results, please message me through your [facility] patient portal or call [phone] to request a post-test genetic counseling appointment.    Khadra Cope NP will be notified of your results.  A copy of the [facility] results report is available to you in your [facility] record and the [facility] patient portal. If you have any difficulty accessing your report, please let our office know so we can mail you a hard copy.    Germline Genetic Testing  The [employer] Multi-Cancer DNA+RNA Panel analyzes 84 genes associated with hereditary cancer:  AIP, ALK, APC, AVILA, AXIN2, BAP1, BARD1, BLM, BMPR1A, BRCA1, BRCA2, BRIP1, CASR, CDC73, CDH1, CDK4, CDKN1B, CDKN1C, CDKN2A, CEBPA, CHEK2, CTNNA1, DICER1, DIS3L2, EGFR, EPCAM, FH, FLCN, GATA2, GPC3, GREM1, HOXB13, HRAS, KIT, MAX, MEN1, MET, MITF, MLH1, MSH2, MSH3, MSH6, MUTYH, NBN, NF1, NF2, NTHL1, PALB2, PDGFRA, PHOX2B, PMS2, POLD1, POLE, POT1, WDPGX6F, PTCH1, PTEN, RAD50, RAD51C, RAD51D, RB1, RECQL4, RET, RUNX1, SDHA, SDHAF2, SDHB, SDHC, SDHD, SMAD4, SMARCA4, SMARCB1, SMARCE1, STK11, SUFU, TERC, TERT, ARSQ646, TP53, TSC1, TSC2, VHL, WRN, WT1.    RESULTS:  Pathogenic (harmful) variants:  The test did not identify any genetic variants known to cause cancer.     Variants of uncertain significance:   CDKN2A ([id number]), Exon 2, c.320G>A (p.Kaz847Wmr), heterozygous, Uncertain Significance         What do these results mean?  You tested negative for mutations in the genes associated with hereditary basal cell carcinoma, breast, colon, lung, prostate, uterine and other cancers.    Testing was comprehensive for the genes associated with these cancers so no  additional testing is indicated.   In regards to your family history of cancer, this result is considered an uninformative negative, as it is unknown if your relatives with cancer have/had a genetic variant you did not inherit. Your relatives who have/had cancer could consider seeing a genetics specialist for evaluation and possible genetic testing. If any of these individuals are  or decline testing, it may be appropriate for their children to proceed with a genetic risk assessment.   Anyone interested in pursuing genetic counseling/testing can contact the [facility] for an in-personal or virtual appointment in [place] [phone] or [place] [phone]. Virtual patients must be located in [place] and able to access the virtual visit through the MyChart ([facility]) portal. Anyone who is located outside of [place] or prefers to see someone in-person closer to home can visit [email or web address] to locate a local genetic specialist.    Hereditary versus random/sporadic cancer:   Only a small percentage of cancers (5-10%) are hereditary, meaning they are caused by an inherited genetic variant (mutation). The remaining cancers (90-95%) are random or sporadic, caused often by a combination of risk factors including age, sex, race, physical characteristics and environmental and lifestyle factors (diet, obesity, smoking, lack of exercise, etc). Family members often share these risk factors resulting in multiple individuals with cancer. Even though your test was negative, you may still be at risk for certain cancers based on factors such as family history, genetic causes not evaluated with this test, or other lifestyle and environmental influences.     Cancer screening and risk reduction  It is important that you and your relatives establish care with a primary care provider (PCP), whom you see at least annually for routine health maintenance and guidance on cancer screening and cancer risk  reduction. Keep your PCP updated on your personal and family history. If you are not established with a PCP, please contact me so I can submit a referral.     Cancer Risk Assessment and Recommendations:   Colon cancer: Elevated risk due to having two first-degree relatives with colon cancer. NCCN recommends colonoscopy every 5 years. Last colonoscopy in 5/2017 was normal. Plan is to repeat in 5 years (2022). A case request has been submitted but the procedure has not been scheduled.   Breast cancer: Continue breast cancer screening with Fatmata Cope NP in the High Risk Breast clinic.   Uterine cancer: Likely average risk. Continue annual exam by gynecology.   Skin cancer: History of BCC. Continue monitoring by dermatology.      What you can do to reduce your risk for cancer:   Avoid tobacco use; exercise; maintain a healthy weight; eat a diet rich in fruits, vegetables, and whole grains and low in saturated/trans fat, red meat, and processed meat; limit alcohol consumption; protect against sexually transmitted infections; protect against sun exposure, avoid tanning beds; and get regular cancer screenings as recommended by your healthcare team.    Follow-up    Continue to follow up with all healthcare providers as directed.    Please update me through [facility] with any changes to your personal or family history and with any relative's genetic testing results. I'm happy to review their results to determine how they might affect you and other family members. Genetics is an evolving field, so I invite you to contact me periodically to determine if any updated genetic testing is recommended for you or your relatives.     Sincerely,  EPHRAIM Solorzano PA-C  Physician Assistant, Hereditary/High Risk Clinic  [facility]    Phone:  [phone]

## 2022-09-23 ENCOUNTER — PATIENT MESSAGE (OUTPATIENT)
Dept: HEMATOLOGY/ONCOLOGY | Facility: CLINIC | Age: 57
End: 2022-09-23
Payer: COMMERCIAL

## 2022-10-03 NOTE — PROGRESS NOTES
Subjective:          Patient ID: Hien Jeter is a 57 y.o. female who presents today for a routine clinic visit for MS.  She was last seen in April 2022. The history has been provided by the patient.     MS HPI:  DMT: ocrelizumab due in November  Side effects from DMT? No  Taking vitamin D3 as recommended? Yes -  Dose: 3000 units daily  She denies any recent infections.   She had some numbness to her back in July and was treated with a medrol pack. She was going through a stressful time (anniversary of her sister's death). This symptom is resolved now.   She is exercising regularly--swims almost daily and [facility]   She plans to get a flu shot today.     Medications:  Current Outpatient Medications   Medication Sig    baclofen (LIORESAL) 10 MG tablet Take 10 mg by mouth 3 (three) times daily. prn    cholecalciferol, vitamin D3, 3,000 unit Tab Take 3,000 Units by mouth once daily.     EScitalopram oxalate (LEXAPRO) 10 MG tablet Take 1 tablet (10 mg total) by mouth every evening.    flaxseed oil 1,000 mg Cap Take 1 capsule by mouth once daily.     multivitamin-Ca-iron-minerals 27-0.4 mg Tab Take 1 tablet by mouth once daily.     vitamin E 400 UNIT capsule Take 400 Units by mouth 2 (two) times a day.     SOCIAL HISTORY  Social History     Tobacco Use    Smoking status: Never    Smokeless tobacco: Never   Substance Use Topics    Alcohol use: Yes     Comment: socially/ not weekly    Drug use: No       Living arrangements - the patient lives with their spouse.    ROS:  REVIEW OF SYMPTOMS 9/29/2022   Do you feel abnormally tired on most days? No--energy level has been stable; she feels more tired on some days    Do you feel you generally sleep well? Sleep quality is good. She sleeps a normal amount.    Do you have difficulty controlling your bladder?  No   Do you have difficulty controlling your bowels?  No   Do you have frequent muscle cramps, tightness or spasms in your limbs?   Yes--occasionally; takes baclofen, which is helpful. She can usually take just 1/2 tablet. Exercise helps.    Do you have new visual symptoms?  No--wears glasses; saw her eye doctor recently. She has an astigmatism. She has a posterior vitreous detachment, so she has a large floater.    Do you have worsening difficulty with your memory or thinking? No--she has trouble with word finding sometimes.    Do you have worsening symptoms of anxiety or depression?  No--appropriate to a given situation. She is considering lowering Lexapro.    For patients who walk, Do you have more difficulty walking?  Not Applicable   Have you fallen since your last visit?  Yes   For patients who use wheelchairs: Do you have any skin wounds or breakdown? Not Applicable   Do you have difficulty using your hands?  No   Do you have shooting or burning pain? No   Do you have difficulty with sexual function?  No   If you are sexually active, are you using birth control? Y/N  N/A No   Do you often choke when swallowing liquids or solid food?  No   Do you experience worsening symptoms when overheated? No   Do you need any new equipment such as a wheelchair, walker or shower chair? No   Do you receive co-pay financial assistance for your principal MS medicine? Yes   Would you be interested in participating in an MS research trial in the future? Yes   For patients on Gilenya, Tecfidera, Aubagio, Rituxan, Ocrevus, Tysabri, Lemtrada or Methotrexate, are you aware that you should NOT receive live virus vaccines?  Yes   Do you feel you have adequate family/friend support?  Yes   Do you have health insurance?   Yes   Are you currently employed? No   Do you receive SSDI/SSI?  No   Do you use marijuana or cannabis products? No   Have you been diagnosed with a urinary tract infection since your last visit here? No   Have you been diagnosed with a respiratory tract infection since your last visit here? No   Have you been to the emergency room since your last  visit here? No   Have you been hospitalized since your last visit here?  No            Objective:        1. 25 foot timed walk:  Timed 25 Foot Walk: 2022 10/6/2022   Did patient wear an AFO? No No   Was assistive device used? No No   Time for 25 Foot Walk (seconds) 3.75 4   Time for 25 Foot Walk (seconds) - 3.8       Neurologic Exam     Mental Status   Oriented to person, place, and time.   Speech: speech is normal   Level of consciousness: alert  Knowledge: good.   Normal comprehension.     Cranial Nerves     CN II   Visual acuity: normal with correction (20/20 OD, 20/25 OS with glasses)    CN III, IV, VI   Extraocular motions are normal.     CN V   Facial sensation intact.     CN VII   Facial expression full, symmetric.     CN VIII   Hearing: intact (finger rub)    CN IX, X   Palate: symmetric    CN XI   CN XI normal.     CN XII   Tongue deviation: none    Motor Exam   Muscle bulk: normal  Overall muscle tone: normal    Strength   Right neck flexion: 5/5  Left neck flexion: 5/5  Right neck extension: 5/5  Left neck extension: 5/5  Right deltoid: 5/5  Left deltoid: 5/5  Right biceps: 5/5  Left biceps: 5/5  Right triceps: 5/5  Left triceps: 5/5  Right wrist flexion: 5/5  Left wrist flexion: 5/5  Right wrist extension: 5/5  Left wrist extension: 5/5  Right interossei: 5/5  Left interossei: 5/5  Right iliopsoas: 5/5  Left iliopsoas: 5/5  Right quadriceps: 5/5  Left quadriceps: 5/5  Right hamstrin/5  Left hamstrin/5  Right anterior tibial: 5/5  Left anterior tibial: 5/5  Right gastroc: 5/5  Left gastroc: 5/5       Sensory Exam   Right arm vibration: normal  Left arm vibration: normal  Right leg vibration: decreased from toes (Mildly diminished in right foot)  Left leg vibration: decreased from toes    Gait, Coordination, and Reflexes     Gait  Gait: normal    Coordination   Romberg: negative  Finger to nose coordination: normal  Heel to shin coordination: normal  Tandem walking coordination:  normal    Tremor   Resting tremor: absent  Action tremor: absent    Reflexes   Right brachioradialis: 2+  Left brachioradialis: 2+  Right biceps: 2+  Left biceps: 2+  Right triceps: 2+  Left triceps: 2+  Right patellar: 2+  Left patellar: 2+  Right achilles: 2+  Left achilles: 2+  Right plantar: normal  Left plantar: normal  She can walk on toes and heels and hop on each foot ten times (with provider standing close).   Normal RSM in UE and LE.      Imaging:     Results for orders placed during the hospital encounter of 06/01/22    MRI Brain Demyelinating Without Contrast    Impression  No significant change from prior.  Continued few scattered small to punctate foci of T2 FLAIR signal abnormality throughout the brain parenchyma while nonspecific in light of history remain concerning for mild degree of prior demyelinating plaque.    No definite new lesion or diffusion signal abnormality to suggest significant interval or active demyelination.    Clinical correlation and continued follow-up advised.    Electronically signed by resident: Jori Coleman MD  Date:    06/01/2022  Time:    09:27    Electronically signed by: Trace Novak DO  Date:    06/01/2022  Time:    09:55    Images were reviewed with the patient.   Labs:     Lab Results   Component Value Date    AYWVBCTT50KC 60 06/02/2021    BXSLKBGJ75EX 74 12/11/2019    UYRYRPDN85TU 45 06/11/2018     Lab Results   Component Value Date    WBC 5.90 04/18/2022    RBC 4.26 04/18/2022    HGB 13.9 04/18/2022    HCT 42.6 04/18/2022     (H) 04/18/2022    MCH 32.6 (H) 04/18/2022    MCHC 32.6 04/18/2022    RDW 12.5 04/18/2022     04/18/2022    MPV 10.5 04/18/2022    GRAN 3.2 04/18/2022    GRAN 54.5 04/18/2022    LYMPH 1.6 04/18/2022    LYMPH 27.1 04/18/2022    MONO 0.8 04/18/2022    MONO 13.6 04/18/2022    EOS 0.2 04/18/2022    BASO 0.06 04/18/2022    EOSINOPHIL 3.6 04/18/2022    BASOPHIL 1.0 04/18/2022     Sodium   Date Value Ref Range Status   04/18/2022 141 136  - 145 mmol/L Final     Potassium   Date Value Ref Range Status   04/18/2022 4.6 3.5 - 5.1 mmol/L Final     Chloride   Date Value Ref Range Status   04/18/2022 106 95 - 110 mmol/L Final     CO2   Date Value Ref Range Status   04/18/2022 28 23 - 29 mmol/L Final     Glucose   Date Value Ref Range Status   04/18/2022 90 70 - 110 mg/dL Final     BUN   Date Value Ref Range Status   04/18/2022 18 6 - 20 mg/dL Final     Creatinine   Date Value Ref Range Status   04/18/2022 0.8 0.5 - 1.4 mg/dL Final     Calcium   Date Value Ref Range Status   04/18/2022 9.7 8.7 - 10.5 mg/dL Final     Total Protein   Date Value Ref Range Status   04/18/2022 7.0 6.0 - 8.4 g/dL Final     Albumin   Date Value Ref Range Status   04/18/2022 3.6 3.5 - 5.2 g/dL Final     Total Bilirubin   Date Value Ref Range Status   04/18/2022 0.4 0.1 - 1.0 mg/dL Final     Comment:     For infants and newborns, interpretation of results should be based  on gestational age, weight and in agreement with clinical  observations.    Premature Infant recommended reference ranges:  Up to 24 hours.............<8.0 mg/dL  Up to 48 hours............<12.0 mg/dL  3-5 days..................<15.0 mg/dL  6-29 days.................<15.0 mg/dL       Alkaline Phosphatase   Date Value Ref Range Status   04/18/2022 101 55 - 135 U/L Final     AST   Date Value Ref Range Status   04/18/2022 18 10 - 40 U/L Final     ALT   Date Value Ref Range Status   04/18/2022 19 10 - 44 U/L Final     Anion Gap   Date Value Ref Range Status   04/18/2022 7 (L) 8 - 16 mmol/L Final     eGFR if    Date Value Ref Range Status   04/18/2022 >60.0 >60 mL/min/1.73 m^2 Final     eGFR if non    Date Value Ref Range Status   04/18/2022 >60.0 >60 mL/min/1.73 m^2 Final     Comment:     Calculation used to obtain the estimated glomerular filtration  rate (eGFR) is the CKD-EPI equation.        Lab Results   Component Value Date    HEPBSAG Negative 03/15/2022    HEPBSAB Positive  03/15/2022    HEPBCAB Negative 03/15/2022       MS Impression and Plan:     NEURO MULTIPLE SCLEROSIS IMPRESSION:   MS Status:     Number of relapses in the past year?:  0    Clinical Progression:  Clinically Stable    MRI Progression:  Stable  Plan:     DMT:  No change in management    DMT comment:  Continue Ocrevus and Vitamin D. Her next infusion is due this month. We will check safety labs today. She is aware of the risks associated with immunosuppressant therapy, including increased risk of infection.         Symptom Management:  No change in symptom management       MRI brain will be planned for June 2022.   She will follow up with Dr. Salas in 6 months.     Total time spent with patient: 52 minutes  Total time spent on encounter: 60 minutes         DEB Irizarry, CNS    Problem List Items Addressed This Visit    None  Visit Diagnoses       Multiple sclerosis    -  Primary    Relevant Orders    CBC auto differential    Hepatitis B Surface Antigen    Hepatitis B Surface Ab, Qualitative    Hepatitis B Core Antibody, Total    IMMUNOGLOBULINS (IGG, IGA, IGM) QUANTITATIVE    Vitamin D

## 2022-10-06 ENCOUNTER — OFFICE VISIT (OUTPATIENT)
Dept: NEUROLOGY | Facility: CLINIC | Age: 57
End: 2022-10-06
Payer: COMMERCIAL

## 2022-10-06 ENCOUNTER — LAB VISIT (OUTPATIENT)
Dept: LAB | Facility: HOSPITAL | Age: 57
End: 2022-10-06
Payer: COMMERCIAL

## 2022-10-06 VITALS
HEART RATE: 76 BPM | WEIGHT: 148.13 LBS | BODY MASS INDEX: 27.26 KG/M2 | SYSTOLIC BLOOD PRESSURE: 110 MMHG | HEIGHT: 62 IN | DIASTOLIC BLOOD PRESSURE: 78 MMHG

## 2022-10-06 DIAGNOSIS — G35 MULTIPLE SCLEROSIS: ICD-10-CM

## 2022-10-06 DIAGNOSIS — Z29.89 PROPHYLACTIC IMMUNOTHERAPY: ICD-10-CM

## 2022-10-06 DIAGNOSIS — G35 MULTIPLE SCLEROSIS: Primary | ICD-10-CM

## 2022-10-06 DIAGNOSIS — Z79.899 HIGH RISK MEDICATION USE: ICD-10-CM

## 2022-10-06 DIAGNOSIS — Z71.89 COUNSELING REGARDING GOALS OF CARE: ICD-10-CM

## 2022-10-06 LAB
25(OH)D3+25(OH)D2 SERPL-MCNC: 64 NG/ML (ref 30–96)
BASOPHILS # BLD AUTO: 0.08 K/UL (ref 0–0.2)
BASOPHILS NFR BLD: 1.2 % (ref 0–1.9)
DIFFERENTIAL METHOD: ABNORMAL
EOSINOPHIL # BLD AUTO: 0.2 K/UL (ref 0–0.5)
EOSINOPHIL NFR BLD: 2.9 % (ref 0–8)
ERYTHROCYTE [DISTWIDTH] IN BLOOD BY AUTOMATED COUNT: 12.2 % (ref 11.5–14.5)
HBV CORE AB SERPL QL IA: NORMAL
HBV SURFACE AB SER-ACNC: 36.83 MIU/ML
HBV SURFACE AB SER-ACNC: REACTIVE M[IU]/ML
HBV SURFACE AG SERPL QL IA: NORMAL
HCT VFR BLD AUTO: 44.6 % (ref 37–48.5)
HGB BLD-MCNC: 14.7 G/DL (ref 12–16)
IGA SERPL-MCNC: 246 MG/DL (ref 40–350)
IGG SERPL-MCNC: 1082 MG/DL (ref 650–1600)
IGM SERPL-MCNC: 82 MG/DL (ref 50–300)
IMM GRANULOCYTES # BLD AUTO: 0.02 K/UL (ref 0–0.04)
IMM GRANULOCYTES NFR BLD AUTO: 0.3 % (ref 0–0.5)
LYMPHOCYTES # BLD AUTO: 1.4 K/UL (ref 1–4.8)
LYMPHOCYTES NFR BLD: 19.6 % (ref 18–48)
MCH RBC QN AUTO: 32 PG (ref 27–31)
MCHC RBC AUTO-ENTMCNC: 33 G/DL (ref 32–36)
MCV RBC AUTO: 97 FL (ref 82–98)
MONOCYTES # BLD AUTO: 0.9 K/UL (ref 0.3–1)
MONOCYTES NFR BLD: 12.7 % (ref 4–15)
NEUTROPHILS # BLD AUTO: 4.4 K/UL (ref 1.8–7.7)
NEUTROPHILS NFR BLD: 63.3 % (ref 38–73)
NRBC BLD-RTO: 0 /100 WBC
PLATELET # BLD AUTO: 324 K/UL (ref 150–450)
PMV BLD AUTO: 10 FL (ref 9.2–12.9)
RBC # BLD AUTO: 4.59 M/UL (ref 4–5.4)
WBC # BLD AUTO: 6.94 K/UL (ref 3.9–12.7)

## 2022-10-06 PROCEDURE — 82306 VITAMIN D 25 HYDROXY: CPT | Performed by: CLINICAL NURSE SPECIALIST

## 2022-10-06 PROCEDURE — 3078F PR MOST RECENT DIASTOLIC BLOOD PRESSURE < 80 MM HG: ICD-10-PCS | Mod: CPTII,S$GLB,, | Performed by: CLINICAL NURSE SPECIALIST

## 2022-10-06 PROCEDURE — 86706 HEP B SURFACE ANTIBODY: CPT | Performed by: CLINICAL NURSE SPECIALIST

## 2022-10-06 PROCEDURE — 86704 HEP B CORE ANTIBODY TOTAL: CPT | Performed by: CLINICAL NURSE SPECIALIST

## 2022-10-06 PROCEDURE — 1159F MED LIST DOCD IN RCRD: CPT | Mod: CPTII,S$GLB,, | Performed by: CLINICAL NURSE SPECIALIST

## 2022-10-06 PROCEDURE — 87340 HEPATITIS B SURFACE AG IA: CPT | Performed by: CLINICAL NURSE SPECIALIST

## 2022-10-06 PROCEDURE — 3008F PR BODY MASS INDEX (BMI) DOCUMENTED: ICD-10-PCS | Mod: CPTII,S$GLB,, | Performed by: CLINICAL NURSE SPECIALIST

## 2022-10-06 PROCEDURE — 85025 COMPLETE CBC W/AUTO DIFF WBC: CPT | Performed by: CLINICAL NURSE SPECIALIST

## 2022-10-06 PROCEDURE — 82784 ASSAY IGA/IGD/IGG/IGM EACH: CPT | Mod: 59 | Performed by: CLINICAL NURSE SPECIALIST

## 2022-10-06 PROCEDURE — 99999 PR PBB SHADOW E&M-EST. PATIENT-LVL III: ICD-10-PCS | Mod: PBBFAC,,, | Performed by: CLINICAL NURSE SPECIALIST

## 2022-10-06 PROCEDURE — 99215 PR OFFICE/OUTPT VISIT, EST, LEVL V, 40-54 MIN: ICD-10-PCS | Mod: S$GLB,,, | Performed by: CLINICAL NURSE SPECIALIST

## 2022-10-06 PROCEDURE — 3008F BODY MASS INDEX DOCD: CPT | Mod: CPTII,S$GLB,, | Performed by: CLINICAL NURSE SPECIALIST

## 2022-10-06 PROCEDURE — 99999 PR PBB SHADOW E&M-EST. PATIENT-LVL III: CPT | Mod: PBBFAC,,, | Performed by: CLINICAL NURSE SPECIALIST

## 2022-10-06 PROCEDURE — 1159F PR MEDICATION LIST DOCUMENTED IN MEDICAL RECORD: ICD-10-PCS | Mod: CPTII,S$GLB,, | Performed by: CLINICAL NURSE SPECIALIST

## 2022-10-06 PROCEDURE — 3078F DIAST BP <80 MM HG: CPT | Mod: CPTII,S$GLB,, | Performed by: CLINICAL NURSE SPECIALIST

## 2022-10-06 PROCEDURE — 3074F SYST BP LT 130 MM HG: CPT | Mod: CPTII,S$GLB,, | Performed by: CLINICAL NURSE SPECIALIST

## 2022-10-06 PROCEDURE — 36415 COLL VENOUS BLD VENIPUNCTURE: CPT | Performed by: CLINICAL NURSE SPECIALIST

## 2022-10-06 PROCEDURE — 3074F PR MOST RECENT SYSTOLIC BLOOD PRESSURE < 130 MM HG: ICD-10-PCS | Mod: CPTII,S$GLB,, | Performed by: CLINICAL NURSE SPECIALIST

## 2022-10-06 PROCEDURE — 99215 OFFICE O/P EST HI 40 MIN: CPT | Mod: S$GLB,,, | Performed by: CLINICAL NURSE SPECIALIST

## 2022-10-07 ENCOUNTER — IMMUNIZATION (OUTPATIENT)
Dept: PHARMACY | Facility: CLINIC | Age: 57
End: 2022-10-07
Payer: COMMERCIAL

## 2022-10-13 ENCOUNTER — TELEPHONE (OUTPATIENT)
Dept: OBSTETRICS AND GYNECOLOGY | Facility: CLINIC | Age: 57
End: 2022-10-13
Payer: COMMERCIAL

## 2022-10-13 DIAGNOSIS — Z12.39 BREAST CANCER SCREENING, HIGH RISK PATIENT: Primary | ICD-10-CM

## 2022-10-13 NOTE — TELEPHONE ENCOUNTER
----- Message from Eros Solorio IV, MD sent at 8/8/2022  4:07 PM CDT -----  Benign screening mammogram findings noted.    Patient's estimated lifetime risk of breast cancer (to age 85) based on Tyrer-Cuzick risk assessment model is 20.8 %.  According to the American [employer] Society, patients with a lifetime breast cancer risk of 20% or higher might benefit from supplemental screening tests.    Contact patient with above findings/recommendations.    Eros Solorio IV, MD

## 2022-10-16 ENCOUNTER — TELEPHONE (OUTPATIENT)
Dept: NEUROLOGY | Facility: CLINIC | Age: 57
End: 2022-10-16
Payer: COMMERCIAL

## 2022-10-18 ENCOUNTER — PATIENT MESSAGE (OUTPATIENT)
Dept: NEUROLOGY | Facility: CLINIC | Age: 57
End: 2022-10-18
Payer: COMMERCIAL

## 2022-10-18 NOTE — TELEPHONE ENCOUNTER
10/8/2022 11:35 AM CDT Back to Top      Hep B Core Ab negative, Surface Ab positive, Surface Ag negative (past vaccination)  Normal immunoglobulins  Normal WBC and ALC        Ok to proceed with scheduling infusion.

## 2022-11-03 ENCOUNTER — INFUSION (OUTPATIENT)
Dept: INFUSION THERAPY | Facility: HOSPITAL | Age: 57
End: 2022-11-03
Attending: INTERNAL MEDICINE
Payer: COMMERCIAL

## 2022-11-03 VITALS
SYSTOLIC BLOOD PRESSURE: 120 MMHG | DIASTOLIC BLOOD PRESSURE: 80 MMHG | HEART RATE: 106 BPM | OXYGEN SATURATION: 99 % | TEMPERATURE: 99 F | RESPIRATION RATE: 18 BRPM

## 2022-11-03 DIAGNOSIS — G35 MS (MULTIPLE SCLEROSIS): Primary | ICD-10-CM

## 2022-11-03 PROCEDURE — M0220 HC INJECTION ADMIN, TIXAGEVIMAB-CILGAVIMAB, INCL POST ADMIN MONIT: HCPCS | Performed by: INTERNAL MEDICINE

## 2022-11-03 PROCEDURE — 63600175 PHARM REV CODE 636 W HCPCS: Performed by: INTERNAL MEDICINE

## 2022-11-03 RX ORDER — DIPHENHYDRAMINE HCL 25 MG
25 CAPSULE ORAL ONCE AS NEEDED
OUTPATIENT
Start: 2022-11-03

## 2022-11-03 RX ORDER — DIPHENHYDRAMINE HCL 25 MG
25 CAPSULE ORAL ONCE AS NEEDED
Status: DISCONTINUED | OUTPATIENT
Start: 2022-11-03 | End: 2022-11-03 | Stop reason: HOSPADM

## 2022-11-03 RX ORDER — ONDANSETRON 4 MG/1
4 TABLET, ORALLY DISINTEGRATING ORAL ONCE AS NEEDED
Status: DISCONTINUED | OUTPATIENT
Start: 2022-11-03 | End: 2022-11-03 | Stop reason: HOSPADM

## 2022-11-03 RX ORDER — PREDNISONE 20 MG/1
40 TABLET ORAL ONCE AS NEEDED
OUTPATIENT
Start: 2022-11-03

## 2022-11-03 RX ORDER — ACETAMINOPHEN 325 MG/1
650 TABLET ORAL ONCE AS NEEDED
Status: DISCONTINUED | OUTPATIENT
Start: 2022-11-03 | End: 2022-11-03 | Stop reason: HOSPADM

## 2022-11-03 RX ORDER — ONDANSETRON 4 MG/1
4 TABLET, ORALLY DISINTEGRATING ORAL ONCE AS NEEDED
OUTPATIENT
Start: 2022-11-03

## 2022-11-03 RX ORDER — ALBUTEROL SULFATE 90 UG/1
2 AEROSOL, METERED RESPIRATORY (INHALATION) ONCE AS NEEDED
OUTPATIENT
Start: 2022-11-03

## 2022-11-03 RX ORDER — ACETAMINOPHEN 325 MG/1
650 TABLET ORAL ONCE AS NEEDED
OUTPATIENT
Start: 2022-11-03

## 2022-11-03 RX ORDER — EPINEPHRINE 0.3 MG/.3ML
0.3 INJECTION SUBCUTANEOUS ONCE AS NEEDED
Status: DISCONTINUED | OUTPATIENT
Start: 2022-11-03 | End: 2022-11-03 | Stop reason: HOSPADM

## 2022-11-03 RX ORDER — ALBUTEROL SULFATE 90 UG/1
2 AEROSOL, METERED RESPIRATORY (INHALATION) ONCE AS NEEDED
Status: DISCONTINUED | OUTPATIENT
Start: 2022-11-03 | End: 2022-11-03 | Stop reason: HOSPADM

## 2022-11-03 RX ORDER — PREDNISONE 20 MG/1
40 TABLET ORAL ONCE AS NEEDED
Status: DISCONTINUED | OUTPATIENT
Start: 2022-11-03 | End: 2022-11-03 | Stop reason: HOSPADM

## 2022-11-03 RX ORDER — EPINEPHRINE 0.3 MG/.3ML
0.3 INJECTION SUBCUTANEOUS ONCE AS NEEDED
OUTPATIENT
Start: 2022-11-03

## 2022-11-03 RX ADMIN — Medication 300 MG: at 10:11

## 2022-11-07 ENCOUNTER — CLINICAL SUPPORT (OUTPATIENT)
Dept: ENDOSCOPY | Facility: HOSPITAL | Age: 57
End: 2022-11-07
Attending: INTERNAL MEDICINE
Payer: COMMERCIAL

## 2022-11-07 VITALS — BODY MASS INDEX: 26.68 KG/M2 | HEIGHT: 62 IN | WEIGHT: 145 LBS

## 2022-11-07 DIAGNOSIS — Z13.9 SCREENING PROCEDURE: ICD-10-CM

## 2022-11-07 RX ORDER — POLYETHYLENE GLYCOL 3350, SODIUM SULFATE ANHYDROUS, SODIUM BICARBONATE, SODIUM CHLORIDE, POTASSIUM CHLORIDE 236; 22.74; 6.74; 5.86; 2.97 G/4L; G/4L; G/4L; G/4L; G/4L
4 POWDER, FOR SOLUTION ORAL ONCE
Qty: 4000 ML | Refills: 0 | Status: SHIPPED | OUTPATIENT
Start: 2022-11-07 | End: 2022-11-07

## 2022-11-13 ENCOUNTER — PATIENT MESSAGE (OUTPATIENT)
Dept: ENDOSCOPY | Facility: HOSPITAL | Age: 57
End: 2022-11-13
Payer: COMMERCIAL

## 2022-11-16 ENCOUNTER — ANESTHESIA (OUTPATIENT)
Dept: ENDOSCOPY | Facility: HOSPITAL | Age: 57
End: 2022-11-16
Payer: COMMERCIAL

## 2022-11-16 ENCOUNTER — ANESTHESIA EVENT (OUTPATIENT)
Dept: ENDOSCOPY | Facility: HOSPITAL | Age: 57
End: 2022-11-16
Payer: COMMERCIAL

## 2022-11-16 ENCOUNTER — HOSPITAL ENCOUNTER (OUTPATIENT)
Facility: HOSPITAL | Age: 57
Discharge: HOME | End: 2022-11-16
Attending: INTERNAL MEDICINE | Admitting: INTERNAL MEDICINE
Payer: COMMERCIAL

## 2022-11-16 VITALS
DIASTOLIC BLOOD PRESSURE: 84 MMHG | WEIGHT: 146 LBS | TEMPERATURE: 99 F | BODY MASS INDEX: 26.87 KG/M2 | RESPIRATION RATE: 16 BRPM | SYSTOLIC BLOOD PRESSURE: 117 MMHG | HEART RATE: 67 BPM | OXYGEN SATURATION: 99 % | HEIGHT: 62 IN

## 2022-11-16 DIAGNOSIS — Z80.0 FAMILY HISTORY OF COLON CANCER: Primary | ICD-10-CM

## 2022-11-16 PROCEDURE — 37000009 HC ANESTHESIA EA ADD 15 MINS: Performed by: INTERNAL MEDICINE

## 2022-11-16 PROCEDURE — G0105 COLORECTAL SCRN; HI RISK IND: HCPCS | Mod: ,,, | Performed by: INTERNAL MEDICINE

## 2022-11-16 PROCEDURE — E9220 PRA ENDO ANESTHESIA: HCPCS | Mod: ,,, | Performed by: NURSE ANESTHETIST, CERTIFIED REGISTERED

## 2022-11-16 PROCEDURE — G0105 COLORECTAL SCRN; HI RISK IND: ICD-10-PCS | Mod: ,,, | Performed by: INTERNAL MEDICINE

## 2022-11-16 PROCEDURE — 37000008 HC ANESTHESIA 1ST 15 MINUTES: Performed by: INTERNAL MEDICINE

## 2022-11-16 PROCEDURE — G0105 COLORECTAL SCRN; HI RISK IND: HCPCS | Performed by: INTERNAL MEDICINE

## 2022-11-16 PROCEDURE — E9220 PRA ENDO ANESTHESIA: ICD-10-PCS | Mod: ,,, | Performed by: NURSE ANESTHETIST, CERTIFIED REGISTERED

## 2022-11-16 PROCEDURE — 25000003 PHARM REV CODE 250: Performed by: INTERNAL MEDICINE

## 2022-11-16 PROCEDURE — 63600175 PHARM REV CODE 636 W HCPCS: Performed by: NURSE ANESTHETIST, CERTIFIED REGISTERED

## 2022-11-16 RX ORDER — LIDOCAINE HCL/PF 100 MG/5ML
SYRINGE (ML) INTRAVENOUS
Status: DISCONTINUED | OUTPATIENT
Start: 2022-11-16 | End: 2022-11-16

## 2022-11-16 RX ORDER — PROPOFOL 10 MG/ML
VIAL (ML) INTRAVENOUS
Status: DISCONTINUED | OUTPATIENT
Start: 2022-11-16 | End: 2022-11-16

## 2022-11-16 RX ORDER — SODIUM CHLORIDE 9 MG/ML
INJECTION, SOLUTION INTRAVENOUS CONTINUOUS
Status: DISCONTINUED | OUTPATIENT
Start: 2022-11-16 | End: 2022-11-16 | Stop reason: HOSPADM

## 2022-11-16 RX ADMIN — SODIUM CHLORIDE: 0.9 INJECTION, SOLUTION INTRAVENOUS at 01:11

## 2022-11-16 RX ADMIN — Medication 100 MG: at 03:11

## 2022-11-16 RX ADMIN — PROPOFOL 80 MG: 10 INJECTION, EMULSION INTRAVENOUS at 03:11

## 2022-11-16 RX ADMIN — PROPOFOL 150 MCG/KG/MIN: 10 INJECTION, EMULSION INTRAVENOUS at 03:11

## 2022-11-16 NOTE — TRANSFER OF CARE
"Anesthesia Transfer of Care Note    Patient: Hien Jeter    Procedure(s) Performed: Procedure(s) (LRB):  COLONOSCOPY (N/A)    Patient location: PACU    Anesthesia Type: general    Transport from OR: Transported from OR on room air with adequate spontaneous ventilation    Post pain: adequate analgesia    Post assessment: no apparent anesthetic complications    Post vital signs: stable    Level of consciousness: responds to stimulation and sedated    Nausea/Vomiting: no nausea/vomiting    Complications: none    Transfer of care protocol was followed      Last vitals:   Visit Vitals  /74 (BP Location: Left arm, Patient Position: Lying)   Pulse 71   Temp 37.1 °C (98.8 °F) (Temporal)   Resp 16   Ht 5' 2" (1.575 m)   Wt 66.2 kg (146 lb)   LMP 04/24/2017   SpO2 99%   Breastfeeding No   BMI 26.70 kg/m²     "

## 2022-11-16 NOTE — H&P
Short Stay Endoscopy History and Physical      Procedure - Colonoscopy  ASA - per anesthesia  Mallampati - per anesthesia  History of Anesthesia problems - no  Family history Anesthesia problems - no   Plan of anesthesia - MAC    HPI:  This is a 57 y.o. female here for colon cancer screening.      ROS:  Constitutional: No fevers, chills  CV: No chest pain  Pulm: No cough, No shortness of breath  GI: see HPI    Medical History:  has a past medical history of Anxiety, Basal cell carcinoma (2000), Depression, Environmental allergies, Genetic testing (09/2022), Headache(784.0), Multiple food allergies, Multiple sclerosis (2009), and  shoulder (03/2015).    Surgical History:  has a past surgical history that includes LASIK (2000); Excision basal cell carcinoma (1998); [place] tooth extraction; Colonoscopy (N/A, 5/25/2017); Foot surgery (Right, 01/08/2019); Arthroscopic debridement of rotator cuff (Left, 4/5/2019); Arthroscopy of shoulder with decompression of subacromial space (4/5/2019); Shoulder surgery (Left, 04/05/2019); and Arthroscopy of shoulder with decompression of subacromial space (Right, 1/8/2021).    Family History: family history includes Arrhythmia in her father; Breast cancer in her paternal aunt and paternal aunt; Breast cancer (age of onset: 50) in her sister; Colon cancer (age of onset: 50) in her mother; Colon cancer (age of onset: 59) in her sister; Dementia in her maternal grandmother; Diabetes in her brother and mother; Liver cancer (age of onset: 84) in her mother; Lung cancer in her maternal grandfather and paternal grandfather; Osteoporosis in her mother; Prostate cancer in her paternal uncle; Prostate cancer (age of onset: 75) in her father; Uterine cancer in her paternal grandmother.    Social History:  reports that she has never smoked. She has never used smokeless tobacco. She reports current alcohol use. She reports that she does not use drugs.    Review of patient's allergies  indicates:   Allergen Reactions    Diclofenac Nausea Only    Ibuprofen Other (See Comments)    Tramadol Other (See Comments)     Nausea        Medications:   Medications Prior to Admission   Medication Sig Dispense Refill Last Dose    baclofen (LIORESAL) 10 MG tablet Take 10 mg by mouth 3 (three) times daily. prn   Past Week    cholecalciferol, vitamin D3, 3,000 unit Tab Take 3,000 Units by mouth once daily.    Past Week    EScitalopram oxalate (LEXAPRO) 10 MG tablet Take 1 tablet (10 mg total) by mouth every evening. 90 tablet 3 Past Week    flaxseed oil 1,000 mg Cap Take 1 capsule by mouth once daily.    Past Week    multivitamin-Ca-iron-minerals 27-0.4 mg Tab Take 1 tablet by mouth once daily.    Past Week    vitamin E 400 UNIT capsule Take 400 Units by mouth 2 (two) times a day.   Past Week    flu vacc [id number] 6mos up,PF, (FLUARIX QUAD [id number], PF,) 60 mcg (15 mcg x 4)/0.5 mL Syrg inject into arm 0.5 mL 0          Physical Exam:    Vital Signs:   Vitals:    11/16/22 1412   BP: 120/86   Pulse: 71   Resp: 16   Temp: 98.8 °F (37.1 °C)       General Appearance: Well appearing in no acute distress  Eyes:    No scleral icterus  Lungs: CTA bilaterally  Heart:  reg rate and rhythm   Abdomen: Soft, non tender, non distended with positive bowel sounds      Labs:  Lab Results   Component Value Date    WBC 6.94 10/06/2022    HGB 14.7 10/06/2022    HCT 44.6 10/06/2022    MCV 97 10/06/2022     10/06/2022        BMP  Lab Results   Component Value Date     04/18/2022    K 4.6 04/18/2022     04/18/2022    CO2 28 04/18/2022    BUN 18 04/18/2022    CREATININE 0.8 04/18/2022    CALCIUM 9.7 04/18/2022    ANIONGAP 7 (L) 04/18/2022    ESTGFRAFRICA >60.0 04/18/2022    EGFRNONAA >60.0 04/18/2022     Lab Results   Component Value Date    INR 0.9 12/22/2008          Assessment:  57 y.o. female with family history of colon cancer.     Plan:  Proceed with colonoscopy today.  I have explained the risks and benefits of  endoscopy procedures to the patient including but not limited to bleeding, perforation, infection, and death.  All questions and answered.        Brijesh Caban MD

## 2022-11-16 NOTE — PROVATION PATIENT INSTRUCTIONS
Discharge Summary/Instructions after an Endoscopic Procedure  Patient Name: Hien Rock  Patient MRN: [id number]  Patient YOB: 1965 Wednesday, November 16, 2022  Brijesh Caban MD  Dear patient,  As a result of recent federal legislation (The Federal Cures Act), you may   receive lab or pathology results from your procedure in your [facility]   account before your physician is able to contact you. Your physician or   their representative will relay the results to you with their   recommendations at their soonest availability.  Thank you,  RESTRICTIONS:  During your procedure today, you received medications for sedation.  These   medications may affect your judgment, balance and coordination.  Therefore,   for 24 hours, you have the following restrictions:   - DO NOT drive a car, operate machinery, make legal/financial decisions,   sign important papers or drink alcohol.    ACTIVITY:  Today: no heavy lifting, straining or running due to procedural   sedation/anesthesia.  The following day: return to full activity including work.  DIET:  Eat and drink normally unless instructed otherwise.     TREATMENT FOR COMMON SIDE EFFECTS:  - Mild abdominal pain, nausea, belching, bloating or excessive gas:  rest,   eat lightly and use a heating pad.  - Sore Throat: treat with throat lozenges and/or gargle with warm salt   water.  - Because air was used during the procedure, expelling large amounts of air   from your rectum or belching is normal.  - If a bowel prep was taken, you may not have a bowel movement for 1-3 days.    This is normal.  SYMPTOMS TO WATCH FOR AND REPORT TO YOUR PHYSICIAN:  1. Abdominal pain or bloating, other than gas cramps.  2. Chest pain.  3. Back pain.  4. Signs of infection such as: chills or fever occurring within 24 hours   after the procedure.  5. Rectal bleeding, which would show as bright red, maroon, or black stools.   (A tablespoon of blood from the rectum is not serious, especially  if   hemorrhoids are present.)  6. Vomiting.  7. Weakness or dizziness.  GO DIRECTLY TO THE NEAREST EMERGENCY ROOM IF YOU HAVE ANY OF THE FOLLOWING:      Difficulty breathing              Chills and/or fever over 101 F   Persistent vomiting and/or vomiting blood   Severe abdominal pain   Severe chest pain   Black, tarry stools   Bleeding- more than one tablespoon   Any other symptom or condition that you feel may need urgent attention  Your doctor recommends these additional instructions:  If any biopsies were taken, your doctors clinic will contact you in 1 to 2   weeks with any results.  - Discharge patient to home.   - Patient has a contact number available for emergencies.  The signs and   symptoms of potential delayed complications were discussed with the   patient.  Return to normal activities tomorrow.  Written discharge   instructions were provided to the patient.   - Resume previous diet.   - Continue present medications.   - Repeat colonoscopy in 5 years for screening purposes.  For questions, problems or results please call your physician - Brijesh Caban MD at Work:  [phone].  [facility], EMERGENCY ROOM PHONE NUMBER: [phone]  IF A COMPLICATION OR EMERGENCY SITUATION ARISES AND YOU ARE UNABLE TO REACH   YOUR PHYSICIAN - GO DIRECTLY TO THE EMERGENCY ROOM.  Brijesh Caban MD  11/16/2022 3:36:04 PM  This report has been verified and signed electronically.  Dear patient,  As a result of recent federal legislation (The Federal Cures Act), you may   receive lab or pathology results from your procedure in your [facility]   account before your physician is able to contact you. Your physician or   their representative will relay the results to you with their   recommendations at their soonest availability.  Thank you,  PROVATION

## 2022-11-16 NOTE — ANESTHESIA PREPROCEDURE EVALUATION
11/16/2022  Hien Jeter is a 57 y.o., female here for screening colonoscopy.    Pre-operative evaluation for Procedure(s) (LRB):  COLONOSCOPY (N/A)        Patient Active Problem List   Diagnosis    MS (multiple sclerosis)    Intractable chronic migraine without aura    Occipital neuralgia    Facet arthropathy of spine    Encounter for long-term (current) use of high-risk medication    Menstrual migraine    Cervical pain    Left shoulder pain    Neck strain    Cervicalgia    Counseling regarding goals of care    Decreased strength    Impaired gait    Impingement syndrome of right shoulder    Numbness on left side    Intercostal pain    Rib pain on left side    Family history of colon cancer    Adhesive capsulitis of left shoulder    Ganglion cyst of right foot    Rotator cuff tear, left    Dupuytren's disease of palm    Anxiety    Insomnia    Shoulder impingement, right    Acute pain of right shoulder    Decreased range of motion of right shoulder    Muscle weakness    Impaired functional mobility and activity tolerance       Review of patient's allergies indicates:   Allergen Reactions    Diclofenac Nausea Only    Ibuprofen Other (See Comments)    Tramadol Other (See Comments)     Nausea        No current facility-administered medications on file prior to encounter.     Current Outpatient Medications on File Prior to Encounter   Medication Sig Dispense Refill    cholecalciferol, vitamin D3, 3,000 unit Tab Take 3,000 Units by mouth once daily.       EScitalopram oxalate (LEXAPRO) 10 MG tablet Take 1 tablet (10 mg total) by mouth every evening. 90 tablet 3    flaxseed oil 1,000 mg Cap Take 1 capsule by mouth once daily.       multivitamin-Ca-iron-minerals 27-0.4 mg Tab Take 1 tablet by mouth once daily.       vitamin E 400 UNIT capsule Take 400 Units by mouth 2 (two) times  a day.         Past Surgical History:   Procedure Laterality Date    ARTHROSCOPIC DEBRIDEMENT OF ROTATOR CUFF Left 4/5/2019    Procedure: DEBRIDEMENT, ROTATOR CUFF, ARTHROSCOPIC;  Surgeon: Dc Moore Jr., MD;  Location: [facility];  Service: Orthopedics;  Laterality: Left;  need opus system (Julito notified)  video    ARTHROSCOPY OF SHOULDER WITH DECOMPRESSION OF SUBACROMIAL SPACE  4/5/2019    Procedure: ARTHROSCOPY, SHOULDER, WITH SUBACROMIAL SPACE DECOMPRESSION;  Surgeon: Dc Moore Jr., MD;  Location: [facility] OR;  Service: Orthopedics;;    ARTHROSCOPY OF SHOULDER WITH DECOMPRESSION OF SUBACROMIAL SPACE Right 1/8/2021    Procedure: ARTHROSCOPY, SHOULDER, WITH SUBACROMIAL SPACE DECOMPRESSION;  Surgeon: Dc Moore Jr., MD;  Location: [facility] OR;  Service: Orthopedics;  Laterality: Right;  possible rot cuff repair  need opus system (Religious P notified per Brii 12/21, )  video    BASAL CELL CARCINOMA EXCISION  1998    COLONOSCOPY N/A 5/25/2017    Procedure: COLONOSCOPY;  Surgeon: Marielle Dietz MD;  Location: [facility] ENDO ([address] [place] [address]);  Service: Endoscopy;  Laterality: N/A;  Patient requests PM.    PM prep.    FOOT SURGERY Right 01/08/2019    LASIK  2000    SHOULDER SURGERY Left 04/05/2019    WISDOM TOOTH EXTRACTION         Social History     Socioeconomic History    Marital status:    Occupational History     Employer: [employer]   Tobacco Use    Smoking status: Never    Smokeless tobacco: Never   Substance and Sexual Activity    Alcohol use: Yes     Comment: socially/ not weekly    Drug use: No    Sexual activity: Yes     Partners: Male     Birth control/protection: None     Comment: , menarche 15         CBC: No results for input(s): WBC, RBC, HGB, HCT, PLT, MCV, MCH, MCHC in the last 72 hours.    CMP: No results for input(s): NA, K, CL, CO2, BUN, CREATININE, GLU, MG, PHOS, CALCIUM, ALBUMIN, PROT, ALKPHOS, ALT, AST, BILITOT in the last 72 hours.    INR  No results  for input(s): PT, INR, PROTIME, APTT in the last 72 hours.            2D Echo:  No results found. However, due to the size of the patient record, not all encounters were searched. Please check Results Review for a complete set of results.        Pre-op Assessment    I have reviewed the Patient Summary Reports.     I have reviewed the Nursing Notes.    I have reviewed the Medications.     Review of Systems  Anesthesia Hx:  No problems with previous Anesthesia    Hematology/Oncology:  Hematology Normal   Oncology Normal     EENT/Dental:EENT/Dental Normal   Cardiovascular:  Cardiovascular Normal     Pulmonary:  Pulmonary Normal    Renal/:  Renal/ Normal     Hepatic/GI:  Hepatic/GI Normal    Musculoskeletal:  Musculoskeletal Normal    Neurological:   Neuromuscular Disease, Headaches    Endocrine:  Endocrine Normal    Dermatological:  Skin Normal    Psych:   Psychiatric History          Physical Exam  General: Well nourished    Airway:  Mallampati: II   Mouth Opening: Normal  TM Distance: Normal  Tongue: Normal  Neck ROM: Normal ROM    Dental:  Intact    Chest/Lungs:  Clear to auscultation, Normal Respiratory Rate    Heart:  Rate: Normal  Rhythm: Regular Rhythm  Sounds: Normal        Anesthesia Plan  Type of Anesthesia, risks & benefits discussed:    Anesthesia Type: Gen Natural Airway  Intra-op Monitoring Plan: Standard ASA Monitors  Post Op Pain Control Plan: multimodal analgesia  Induction:  IV  Informed Consent: Informed consent signed with the Patient and all parties understand the risks and agree with anesthesia plan.  All questions answered.   ASA Score: 3    Ready For Surgery From Anesthesia Perspective.     .

## 2022-11-17 NOTE — ANESTHESIA POSTPROCEDURE EVALUATION
Anesthesia Post Evaluation    Patient: Hien Jeter    Procedure(s) Performed: Procedure(s) (LRB):  COLONOSCOPY (N/A)    Final Anesthesia Type: general      Patient location during evaluation: GI PACU  Patient participation: Yes- Able to Participate  Level of consciousness: awake and alert  Post-procedure vital signs: reviewed and stable  Pain management: adequate  Airway patency: patent    PONV status at discharge: No PONV  Anesthetic complications: no      Cardiovascular status: blood pressure returned to baseline  Respiratory status: unassisted  Hydration status: euvolemic  Follow-up not needed.          Vitals Value Taken Time   /84 11/16/22 1608   Temp  11/17/22 1110   Pulse 67 11/16/22 1608   Resp 16 11/16/22 1608   SpO2 99 % 11/16/22 1608         No case tracking events are documented in the log.      Pain/Lennox Score: Lennox Score: 10 (11/16/2022  4:18 PM)

## 2022-11-23 ENCOUNTER — INFUSION (OUTPATIENT)
Dept: INFUSION THERAPY | Facility: HOSPITAL | Age: 57
End: 2022-11-23
Attending: PSYCHIATRY & NEUROLOGY
Payer: COMMERCIAL

## 2022-11-23 VITALS
RESPIRATION RATE: 18 BRPM | BODY MASS INDEX: 27.59 KG/M2 | HEART RATE: 64 BPM | SYSTOLIC BLOOD PRESSURE: 97 MMHG | TEMPERATURE: 99 F | WEIGHT: 149.94 LBS | HEIGHT: 62 IN | DIASTOLIC BLOOD PRESSURE: 65 MMHG

## 2022-11-23 DIAGNOSIS — G35 MS (MULTIPLE SCLEROSIS): Primary | ICD-10-CM

## 2022-11-23 PROCEDURE — 25000003 PHARM REV CODE 250: Performed by: PSYCHIATRY & NEUROLOGY

## 2022-11-23 PROCEDURE — 96375 TX/PRO/DX INJ NEW DRUG ADDON: CPT

## 2022-11-23 PROCEDURE — 96366 THER/PROPH/DIAG IV INF ADDON: CPT

## 2022-11-23 PROCEDURE — 96367 TX/PROPH/DG ADDL SEQ IV INF: CPT

## 2022-11-23 PROCEDURE — 96365 THER/PROPH/DIAG IV INF INIT: CPT

## 2022-11-23 PROCEDURE — 63600175 PHARM REV CODE 636 W HCPCS: Performed by: PSYCHIATRY & NEUROLOGY

## 2022-11-23 RX ORDER — SODIUM CHLORIDE 0.9 % (FLUSH) 0.9 %
10 SYRINGE (ML) INJECTION
Status: DISCONTINUED | OUTPATIENT
Start: 2022-11-23 | End: 2022-11-23 | Stop reason: HOSPADM

## 2022-11-23 RX ORDER — ACETAMINOPHEN 500 MG
1000 TABLET ORAL
Status: CANCELLED | OUTPATIENT
Start: 2023-04-26 | End: 2023-04-26

## 2022-11-23 RX ORDER — SODIUM CHLORIDE 0.9 % (FLUSH) 0.9 %
10 SYRINGE (ML) INJECTION
Status: CANCELLED | OUTPATIENT
Start: 2023-04-26

## 2022-11-23 RX ORDER — FAMOTIDINE 10 MG/ML
20 INJECTION INTRAVENOUS ONCE
Status: COMPLETED | OUTPATIENT
Start: 2022-11-23 | End: 2022-11-23

## 2022-11-23 RX ORDER — HEPARIN 100 UNIT/ML
100 SYRINGE INTRAVENOUS
Status: DISCONTINUED | OUTPATIENT
Start: 2022-11-23 | End: 2022-11-23 | Stop reason: HOSPADM

## 2022-11-23 RX ORDER — ACETAMINOPHEN 500 MG
1000 TABLET ORAL
Status: COMPLETED | OUTPATIENT
Start: 2022-11-23 | End: 2022-11-23

## 2022-11-23 RX ORDER — HEPARIN 100 UNIT/ML
100 SYRINGE INTRAVENOUS
Status: CANCELLED | OUTPATIENT
Start: 2023-04-26

## 2022-11-23 RX ADMIN — ACETAMINOPHEN 1000 MG: 500 TABLET ORAL at 09:11

## 2022-11-23 RX ADMIN — SODIUM CHLORIDE: 9 INJECTION, SOLUTION INTRAVENOUS at 09:11

## 2022-11-23 RX ADMIN — DEXTROSE: 50 INJECTION, SOLUTION INTRAVENOUS at 09:11

## 2022-11-23 RX ADMIN — OCRELIZUMAB 600 MG: 300 INJECTION INTRAVENOUS at 10:11

## 2022-11-23 RX ADMIN — FAMOTIDINE 20 MG: 10 INJECTION INTRAVENOUS at 09:11

## 2022-11-23 RX ADMIN — DIPHENHYDRAMINE HYDROCHLORIDE 50 MG: 50 INJECTION, SOLUTION INTRAMUSCULAR; INTRAVENOUS at 09:11

## 2022-11-23 NOTE — PLAN OF CARE
Maintenance ocrevus infusion complete, vitals remained stable, pt tolerated well. Pt refused 60 min post infusion obs, denies hx of reaction. Discussed with pt s/s and when to report to ER. D/c home, ambulated away from unit independently steady gait, no concerns.

## 2022-12-01 ENCOUNTER — PATIENT MESSAGE (OUTPATIENT)
Dept: PSYCHIATRY | Facility: CLINIC | Age: 57
End: 2022-12-01
Payer: COMMERCIAL

## 2022-12-14 LAB
GENETIC COUNSELING?: YES
GENSO SPECIMEN TYPE: NORMAL
MISCELLANEOUS GENETIC TEST NAME: NORMAL
PARTENTAL OR SIBLING TESTING?: NO
REFERENCE LAB: NORMAL
TEST RESULT: NORMAL

## 2022-12-16 ENCOUNTER — PATIENT MESSAGE (OUTPATIENT)
Dept: HEMATOLOGY/ONCOLOGY | Facility: CLINIC | Age: 57
End: 2022-12-16
Payer: COMMERCIAL

## 2022-12-20 ENCOUNTER — PATIENT MESSAGE (OUTPATIENT)
Dept: ORTHOPEDICS | Facility: CLINIC | Age: 57
End: 2022-12-20
Payer: COMMERCIAL

## 2022-12-26 ENCOUNTER — PATIENT MESSAGE (OUTPATIENT)
Dept: INTERNAL MEDICINE | Facility: CLINIC | Age: 57
End: 2022-12-26
Payer: COMMERCIAL

## 2023-01-06 ENCOUNTER — HOSPITAL ENCOUNTER (OUTPATIENT)
Dept: RADIOLOGY | Facility: HOSPITAL | Age: 58
Discharge: HOME | End: 2023-01-06
Attending: NURSE PRACTITIONER
Payer: COMMERCIAL

## 2023-01-06 DIAGNOSIS — R92.30 DENSE BREAST TISSUE ON MAMMOGRAM: ICD-10-CM

## 2023-01-06 DIAGNOSIS — Z91.89 AT HIGH RISK FOR BREAST CANCER: ICD-10-CM

## 2023-01-06 DIAGNOSIS — Z80.3 FAMILY HISTORY OF BREAST CANCER: ICD-10-CM

## 2023-01-06 PROCEDURE — A9577 INJ MULTIHANCE: HCPCS | Performed by: NURSE PRACTITIONER

## 2023-01-06 PROCEDURE — 25500020 PHARM REV CODE 255: Performed by: NURSE PRACTITIONER

## 2023-01-06 PROCEDURE — 77049 MRI BREAST C-+ W/CAD BI: CPT | Mod: TC

## 2023-01-06 PROCEDURE — 77049 MRI BREAST W/WO CONTRAST, W/CAD, BILATERAL: ICD-10-PCS | Mod: 26,,, | Performed by: RADIOLOGY

## 2023-01-06 PROCEDURE — 77049 MRI BREAST C-+ W/CAD BI: CPT | Mod: 26,,, | Performed by: RADIOLOGY

## 2023-01-06 RX ORDER — GADOBUTROL 604.72 MG/ML
14 INJECTION INTRAVENOUS
Status: DISCONTINUED | OUTPATIENT
Start: 2023-01-06 | End: 2023-01-06

## 2023-01-06 RX ADMIN — GADOBENATE DIMEGLUMINE 14 ML: 529 INJECTION, SOLUTION INTRAVENOUS at 11:01

## 2023-01-09 ENCOUNTER — HOSPITAL ENCOUNTER (OUTPATIENT)
Dept: RADIOLOGY | Facility: HOSPITAL | Age: 58
Discharge: HOME | End: 2023-01-09
Attending: NURSE PRACTITIONER
Payer: COMMERCIAL

## 2023-01-09 ENCOUNTER — OFFICE VISIT (OUTPATIENT)
Dept: HEMATOLOGY/ONCOLOGY | Facility: CLINIC | Age: 58
End: 2023-01-09
Payer: COMMERCIAL

## 2023-01-09 VITALS
OXYGEN SATURATION: 97 % | HEIGHT: 62 IN | BODY MASS INDEX: 26.94 KG/M2 | RESPIRATION RATE: 18 BRPM | SYSTOLIC BLOOD PRESSURE: 106 MMHG | WEIGHT: 146.38 LBS | TEMPERATURE: 98 F | DIASTOLIC BLOOD PRESSURE: 68 MMHG | HEART RATE: 60 BPM

## 2023-01-09 DIAGNOSIS — R92.30 DENSE BREAST TISSUE ON MAMMOGRAM: ICD-10-CM

## 2023-01-09 DIAGNOSIS — N64.4 BREAST PAIN, LEFT: ICD-10-CM

## 2023-01-09 DIAGNOSIS — Z91.89 AT HIGH RISK FOR BREAST CANCER: Primary | ICD-10-CM

## 2023-01-09 DIAGNOSIS — Z80.3 FAMILY HISTORY OF BREAST CANCER: ICD-10-CM

## 2023-01-09 DIAGNOSIS — Z12.39 BREAST CANCER SCREENING, HIGH RISK PATIENT: ICD-10-CM

## 2023-01-09 DIAGNOSIS — Z12.31 ENCOUNTER FOR SCREENING MAMMOGRAM FOR BREAST CANCER: ICD-10-CM

## 2023-01-09 PROCEDURE — 3008F PR BODY MASS INDEX (BMI) DOCUMENTED: ICD-10-PCS | Mod: CPTII,S$GLB,, | Performed by: NURSE PRACTITIONER

## 2023-01-09 PROCEDURE — 76642 ULTRASOUND BREAST LIMITED: CPT | Mod: TC,LT

## 2023-01-09 PROCEDURE — 1159F MED LIST DOCD IN RCRD: CPT | Mod: CPTII,S$GLB,, | Performed by: NURSE PRACTITIONER

## 2023-01-09 PROCEDURE — 76642 US BREAST LEFT LIMITED: ICD-10-PCS | Mod: 26,LT,, | Performed by: RADIOLOGY

## 2023-01-09 PROCEDURE — 99214 PR OFFICE/OUTPT VISIT, EST, LEVL IV, 30-39 MIN: ICD-10-PCS | Mod: S$GLB,,, | Performed by: NURSE PRACTITIONER

## 2023-01-09 PROCEDURE — 99999 PR PBB SHADOW E&M-EST. PATIENT-LVL V: CPT | Mod: PBBFAC,,, | Performed by: NURSE PRACTITIONER

## 2023-01-09 PROCEDURE — 99214 OFFICE O/P EST MOD 30 MIN: CPT | Mod: S$GLB,,, | Performed by: NURSE PRACTITIONER

## 2023-01-09 PROCEDURE — 99999 PR PBB SHADOW E&M-EST. PATIENT-LVL V: ICD-10-PCS | Mod: PBBFAC,,, | Performed by: NURSE PRACTITIONER

## 2023-01-09 PROCEDURE — 3008F BODY MASS INDEX DOCD: CPT | Mod: CPTII,S$GLB,, | Performed by: NURSE PRACTITIONER

## 2023-01-09 PROCEDURE — 76642 ULTRASOUND BREAST LIMITED: CPT | Mod: 26,LT,, | Performed by: RADIOLOGY

## 2023-01-09 PROCEDURE — 1159F PR MEDICATION LIST DOCUMENTED IN MEDICAL RECORD: ICD-10-PCS | Mod: CPTII,S$GLB,, | Performed by: NURSE PRACTITIONER

## 2023-01-09 PROCEDURE — 3074F SYST BP LT 130 MM HG: CPT | Mod: CPTII,S$GLB,, | Performed by: NURSE PRACTITIONER

## 2023-01-09 PROCEDURE — 3074F PR MOST RECENT SYSTOLIC BLOOD PRESSURE < 130 MM HG: ICD-10-PCS | Mod: CPTII,S$GLB,, | Performed by: NURSE PRACTITIONER

## 2023-01-09 PROCEDURE — 3078F PR MOST RECENT DIASTOLIC BLOOD PRESSURE < 80 MM HG: ICD-10-PCS | Mod: CPTII,S$GLB,, | Performed by: NURSE PRACTITIONER

## 2023-01-09 PROCEDURE — 3078F DIAST BP <80 MM HG: CPT | Mod: CPTII,S$GLB,, | Performed by: NURSE PRACTITIONER

## 2023-01-09 NOTE — PROGRESS NOTES
HPI:   Hien Jeter is a 57 y.o. who presents for follow up of increased risk of breast cancer.  She has MS as well.   Was sick over riaz with a fever and upper respiratory symptoms. Better now.     Today, Feels good and no complaints.   No breast concerns.  MRI was very uncomfortable.      1/6/2023 MRI breast:  Findings:  The breasts have heterogeneous fibroglandular tissue. The background parenchymal enhancement is minimal and symmetric.     No abnormal masses, enhancement or other abnormal findings are seen.     Impression:  Normal exam.     BI-RADS Category:   Overall: 1 - Negative     Recommendation:  Return to annual screening mammogram schedule is recommended     Your estimated lifetime risk of breast cancer (to age 85) based on Tyrer-Cuzick risk assessment model is Tyrer-Cuzick: 20.8 %. According to the American Cancer Society, patients with a lifetime breast cancer risk of 20% or higher might benefit from supplemental screening tests.            Exam Ended: 01/06/23 11:29           7/1/2022 B MMG:   Impression:   No mammographic evidence of malignancy.     BI-RADS Category 1: Negative     Recommendation:  Routine screening mammogram in 1 year is recommended.     Your estimated lifetime risk of breast cancer (to age 85) based on Tyrer-Cuzick risk assessment model is 20.8 %.  According to the American Cancer Society, patients with a lifetime breast cancer risk of 20% or higher might benefit from supplemental screening tests. ??     High Risk Breast cancer specific history:  - Age: 57 y.o.   - Height:  5'2  - Weight:   Wt Readings from Last 3 Encounters:   01/09/23 1154 66.4 kg (146 lb 6.2 oz)   11/23/22 0851 68 kg (149 lb 14.6 oz)   11/16/22 1412 66.2 kg (146 lb)      - Breast density per BI-RADS:  c - Heterogeneously dense  - Age at menarche:  15 yo  - Number of pregnancies: G0  - She is postmenopausal. Age at menopause, if applicable:  45 yo.   -Uterus and ovaries intact: Yes  - HRT: No  - Genetic  testing:     - Personal history of cancer: Yes - basal cell carcinoma in eyebrow- 2019  - Previous chest radiation exposure between ages 10-30 years old: No  - Personal history of breast biopsy: No  - [employer] Inheritance: No  - Family history of cancer:    Sister - breast cancer dx 50  at 59 from colon cancer; negative genetics. [facility] in [facility].   Paternal aunt - breast cancer  Paternal aunt- breast cancer   Mom-colon cancer and liver cancer  Dad- prostate cancer  Maternal Grandfather - lung cancer    Social History:  Tobacco use:  no  Alcohol use:  social  Exercise regimen: yes swimming 45-60 minutes everyday  Employment: no          Past Medical   Past Medical History:   Diagnosis Date    Anxiety     Basal cell carcinoma 2000    left eyebrow     Depression     Environmental allergies     Genetic testing 2022    [employer] 84-gene Multi-Cancer +RNA panel    Headache(784.0)     Multiple food allergies     Multiple sclerosis 2009     shoulder 2015    left     Patient Active Problem List   Diagnosis    MS (multiple sclerosis)    Intractable chronic migraine without aura    Occipital neuralgia    Facet arthropathy of spine    Encounter for long-term (current) use of high-risk medication    Menstrual migraine    Cervical pain    Left shoulder pain    Neck strain    Cervicalgia    Counseling regarding goals of care    Decreased strength    Impaired gait    Impingement syndrome of right shoulder    Numbness on left side    Intercostal pain    Rib pain on left side    Family history of colon cancer    Adhesive capsulitis of left shoulder    Ganglion cyst of right foot    Rotator cuff tear, left    Dupuytren's disease of palm    Anxiety    Insomnia    Shoulder impingement, right    Acute pain of right shoulder    Decreased range of motion of right shoulder    Muscle weakness    Impaired functional mobility and activity tolerance     Social History   Social History     Tobacco Use     Smoking status: Never    Smokeless tobacco: Never   Substance Use Topics    Alcohol use: Yes     Comment: socially/ not weekly    Drug use: No     Family History  Family History   Problem Relation Age of Onset    Liver cancer Mother 84    Colon cancer Mother 50    Diabetes Mother     Osteoporosis Mother     Arrhythmia Father     Prostate cancer Father 75    Breast cancer Sister 50    Colon cancer Sister 59        myrisk negative 6/2021    Diabetes Brother     Dementia Maternal Grandmother     Lung cancer Maternal Grandfather         +smoker, chemical plants    Uterine cancer Paternal Grandmother     Lung cancer Paternal Grandfather         heavy smoker    Breast cancer Paternal Aunt     Breast cancer Paternal Aunt     Prostate cancer Paternal Uncle     Multiple sclerosis Neg Hx     Ovarian cancer Neg Hx      Medications    Current Outpatient Medications:     baclofen (LIORESAL) 10 MG tablet, Take 10 mg by mouth 3 (three) times daily. prn, Disp: , Rfl:     cholecalciferol, vitamin D3, 3,000 unit Tab, Take 3,000 Units by mouth once daily. , Disp: , Rfl:     EScitalopram oxalate (LEXAPRO) 10 MG tablet, Take 1 tablet (10 mg total) by mouth every evening., Disp: 90 tablet, Rfl: 3    flaxseed oil 1,000 mg Cap, Take 1 capsule by mouth once daily. , Disp: , Rfl:     multivitamin-Ca-iron-minerals 27-0.4 mg Tab, Take 1 tablet by mouth once daily. , Disp: , Rfl:     vitamin E 400 UNIT capsule, Take 400 Units by mouth 2 (two) times a day., Disp: , Rfl:     flu vacc [id number] 6mos up,PF, (FLUARIX QUAD [id number], PF,) 60 mcg (15 mcg x 4)/0.5 mL Syrg, inject into arm, Disp: 0.5 mL, Rfl: 0  Allergies  Review of patient's allergies indicates:   Allergen Reactions    Diclofenac Nausea Only    Ibuprofen Other (See Comments)    Tramadol Other (See Comments)     Nausea        Review of Systems       See above   All other systems reviewed and are negative.    Objective:      Vitals:   Vitals:    01/09/23 1154   BP: 106/68   Pulse: 60  "  Resp: 18   Temp: 97.8 °F (36.6 °C)   TempSrc: Oral   SpO2: 97%   Weight: 66.4 kg (146 lb 6.2 oz)   Height: 5' 2" (1.575 m)       BMI: Body mass index is 26.77 kg/m².   Body surface area is 1.7 meters squared.    Physical Exam  Vitals signs reviewed.   Constitutional:  Normal appearance. NAD.   HENT: Normocephalic.   Eyes: Pupils are equal, round, and reactive to light.   Cardiovascular: Normal rate.   Pulmonary: Pulmonary effort is normal.   Musculoskeletal: Normal range of motion.   Lymphadenopathy: No cervical adenopathy. No axillary adenopathy.   Skin: Skin is warm and dry.   Neurological:  She is alert and oriented to person, place, and time.   Psychiatric: Mood normal.     Breast Exam:  tenderenss in left breast at 5-6 o'clock. Small smooth mass. No other masses. No LAD      Laboratory Data: reviewed most recent   Imaging: reviewed most recent        Assessment:     1. At high risk for breast cancer    2. Breast cancer screening, high risk patient    3. Dense breast tissue on mammogram    4. Family history of breast cancer    5. Breast pain, left    6. Encounter for screening mammogram for breast cancer          Plan:       Left breast US today as with tenderness and abnormal exam.   B MMG due 7/2023  MRI breast 1/2024  Lifestyle modifications as previously discussed.   Encouraged breast awareness, including monthly breast self-exams.   Due for colonoscopy this year- per PCP    RTC in 1 year     Route Chart for Scheduling    Med Onc Chart Routing      Follow up with physician    Follow up with [facility] 1 year.   Infusion scheduling note    Injection scheduling note    Labs    Imaging   MMG 7/2023; MRI breast 1/2024   Pharmacy appointment    Other referrals        Questions were encouraged and answered to patient's satisfaction, and patient verbalized understanding of information and agreement with the plan. Advised patient to RTC with any interval changes or concerns.      Patient is in agreement with the proposed " treatment plan. All questions were answered to the patient's satisfaction. Pt knows to call clinic for any new or worsening symptoms and if anything is needed before the next clinic visit.      OMAR Juares  Hematology & Oncology  [facility] [place] [address]  ph. [phone]  Fax. [phone]     Face to Face time with patient: 20 minutes  30 minutes of total time spent on the encounter, which includes face to face time and non-face to face time preparing to see the patient (eg, review of tests), Obtaining and/or reviewing separately obtained history, Documenting clinical information in the electronic or other health record, Independently interpreting results (not separately reported) and communicating results to the patient/family/caregiver, or Care coordination (not separately reported).

## 2023-01-12 NOTE — PROGRESS NOTES
Subjective:     Hien Jeter    No chief complaint on file.      HPI      Hien is a 57 y.o. female coming in today for left sided ribcage pain. Since last visit the pain has Improved but then deteriorated in November. Pt reports rib cage pain returned around this time and she tried her HEP. She had a breast MRI and lying on her stomach in the machine aggravated the area as well. Very happy with previous OMT. The pain is better with rest and worse with riding in a car, using arms, activity. Pt. describes the pain as a 3/10 achy pain that does radiate from anterior to posterior left sided ribcage.There has not been any new a fall/injury/ or traumas since last visit.  Pt. denies any new musculoskeletal complaints at this time.     Office note from 8/18/22 reviewed    PAST MEDICAL HISTORY:   Past Medical History:   Diagnosis Date    Anxiety     Basal cell carcinoma 2000    left eyebrow     Depression     Environmental allergies     Genetic testing 09/2022    Inv[facility]ae 84-gene Multi-Cancer +RNA panel    Headache(784.0)     Multiple food allergies     Multiple sclerosis 2009     shoulder 03/2015    left     PAST SURGICAL HISTORY:   Past Surgical History:   Procedure Laterality Date    ARTHROSCOPIC DEBRIDEMENT OF ROTATOR CUFF Left 4/5/2019    Procedure: DEBRIDEMENT, ROTATOR CUFF, ARTHROSCOPIC;  Surgeon: Dc Moore Jr., MD;  Location: [facility] OR;  Service: Orthopedics;  Laterality: Left;  need opus system (Julito notified)  video    ARTHROSCOPY OF SHOULDER WITH DECOMPRESSION OF SUBACROMIAL SPACE  4/5/2019    Procedure: ARTHROSCOPY, SHOULDER, WITH SUBACROMIAL SPACE DECOMPRESSION;  Surgeon: Dc Moore Jr., MD;  Location: [facility] OR;  Service: Orthopedics;;    ARTHROSCOPY OF SHOULDER WITH DECOMPRESSION OF SUBACROMIAL SPACE Right 1/8/2021    Procedure: ARTHROSCOPY, SHOULDER, WITH SUBACROMIAL SPACE DECOMPRESSION;  Surgeon: Dc Moore Jr., MD;  Location: [facility] OR;  Service: Orthopedics;  Laterality: Right;   possible rot cuff repair  need opus system (Addy FITZGERALD notified per Brii 12/21, EF)  video    BASAL CELL CARCINOMA EXCISION  1998    COLONOSCOPY N/A 5/25/2017    Procedure: COLONOSCOPY;  Surgeon: Marielle Dietz MD;  Location: [facility] (4TH FLR);  Service: Endoscopy;  Laterality: N/A;  Patient requests PM.    PM prep.    COLONOSCOPY N/A 11/16/2022    Procedure: COLONOSCOPY;  Surgeon: Brijesh Caban MD;  [facility]  Service: Endoscopy;  Laterality: N/A;  instr. via portal - PC  pre call complete-as    FOOT SURGERY Right 01/08/2019    LASIK  2000    SHOULDER SURGERY Left 04/05/2019    WISDOM TOOTH EXTRACTION       FAMILY HISTORY:   Family History   Problem Relation Age of Onset    Liver cancer Mother 84    Colon cancer Mother 50    Diabetes Mother     Osteoporosis Mother     Arrhythmia Father     Prostate cancer Father 75    Breast cancer Sister 50    Colon cancer Sister 59        myrisk negative 6/2021    Diabetes Brother     Dementia Maternal Grandmother     Lung cancer Maternal Grandfather         +smoker, chemical plants    Uterine cancer Paternal Grandmother     Lung cancer Paternal Grandfather         heavy smoker    Breast cancer Paternal Aunt     Breast cancer Paternal Aunt     Prostate cancer Paternal Uncle     Multiple sclerosis Neg Hx     Ovarian cancer Neg Hx      SOCIAL HISTORY:   Social History     Socioeconomic History    Marital status:    Occupational History     Employer: [employer]   Tobacco Use    Smoking status: Never    Smokeless tobacco: Never   Substance and Sexual Activity    Alcohol use: Yes     Comment: socially/ not weekly    Drug use: No    Sexual activity: Yes     Partners: Male     Birth control/protection: None     Comment: , menarche 15       MEDICATIONS:   Current Outpatient Medications:     baclofen (LIORESAL) 10 MG tablet, Take 10 mg by mouth 3 (three) times daily. prn, Disp: , Rfl:     cholecalciferol, vitamin D3, 3,000 unit  "Tab, Take 3,000 Units by mouth once daily. , Disp: , Rfl:     EScitalopram oxalate (LEXAPRO) 10 MG tablet, Take 1 tablet (10 mg total) by mouth every evening., Disp: 90 tablet, Rfl: 3    flaxseed oil 1,000 mg Cap, Take 1 capsule by mouth once daily. , Disp: , Rfl:     flu vacc [id number] 6mos up,PF, (FLUARIX QUAD 0789-3926, PF,) 60 mcg (15 mcg x 4)/0.5 mL Syrg, inject into arm, Disp: 0.5 mL, Rfl: 0    multivitamin-Ca-iron-minerals 27-0.4 mg Tab, Take 1 tablet by mouth once daily. , Disp: , Rfl:     vitamin E 400 UNIT capsule, Take 400 Units by mouth 2 (two) times a day., Disp: , Rfl:   ALLERGIES:   Review of patient's allergies indicates:   Allergen Reactions    Diclofenac Nausea Only    Ibuprofen Other (See Comments)    Tramadol Other (See Comments)     Nausea          Objective:     VITAL SIGNS: /74   Pulse 65   Ht 5' 2" (1.575 m)   Wt 66.2 kg (146 lb)   LMP 04/24/2017   BMI 26.70 kg/m²    General    Vitals reviewed.  Constitutional: She is oriented to person, place, and time. She appears well-developed and well-nourished.   Neurological: She is alert and oriented to person, place, and time.   Psychiatric: She has a normal mood and affect. Her behavior is normal.               MUSCULOSKELETAL EXAM:     Cervical Spine: left cervical region    Observation:    Posture:  Posterior pelvis tilt with loss of lumbar lordosis  No obvious shoulder un-leveling while standing.    No edema, erythema, or ecchymosis noted in cervical and thoraic spine regions.    No midline skin abnormalities.    No atrophy of upper limb musculature.  Gait: Non-antalgic     Tenderness:  No tenderness throughout the cervical spine upon spinous process palpation  No tenderness over the base of the occiput  No bony deformities or step-offs palpated.   + trigger point tenderness of the right upper trapezius musculature   + left rib 8 tenderness   + mild thoracic paraspinal muscle tenderness    Range of Motion (* = with " pain):  SHOULDER  Active flexion to 180° on left and 180° on right.  Active abduction to 180° on left and 180° on right.  Active internal rotation to T7 on left and T7 on right.    Active external rotation to T4 on left and T7 on right.    No scapular dyskinesia or winging.    Structural Exam:  TART (Tissue texture abnormality, Asymmetry,  Restriction of motion and/or Tenderness) changes:     Thoracic Spine   T1 Neutral   T2 Neutral   T3 Neutral   T4 Neutral   T5 Neutral   T6 Neutral   T7 FRS RIGHT   T8 FRS LEFT   T9 FRS LEFT   T10 NS-right,R-left   T11 NS-right,R-left   T12 NS-right,R-left     Rib cage: R1 inhaled on right, R8  external torsion on left, R8-10 exhaled on left    Abdomen: left hemidiaphragm TTA    Upper extremity: right thoracic outlet TTA     Lumbar Spine   L1 NS-right,R-left   L2 Neutral   L3 Neutral   L4 Neutral   L5 Neutral       Key   F= Flexed   E = Extended   R = Rotated   S = Sidebent   TTA = tissue texture abnormality         Assessment:      Encounter Diagnoses   Name Primary?    Rib pain on left side Yes    Chronic left-sided thoracic back pain     Myalgia     Somatic dysfunction of thoracic region     Somatic dysfunction of rib cage region     Somatic dysfunction of lumbar region     Upper extremity somatic dysfunction     Somatic dysfunction of abdominal region             Plan:   1. Recurrent chronic left sided rib pain and upper back pain likely secondary to biomechanical restrictions of the upper kinetic chain- deteriorated. Underlying history of MS could also be a contributing factor.    -  Continue baclofen, over-the-counter Tylenol, and over-the-counter NSAIDs as needed for pain control  - OMT performed again today to address associated biomechanical restrictions  and HEP reviewed      2. OMT 5-6 regions. Oral consent obtained.  Reviewed benefits and potential side effects.   - OMT indicated today due to signs and symptoms as well as local and remote somatic dysfunction findings  and their related neurokinetic, lymphatic, fascial and/or arteriovenous body connections.   - OMT techniques used: Myofascial Release, Muscle Energy, Still's Technique, and Balanced Ligamentous Tension   - Treatment was tolerated well. Improvement noted in segmental mobility post-treatment in dysfunctional regions. There were no adverse events and no complications immediately following treatment.     3. Reviewed with pt. the following HEP:  Restart 1-2 times a day:  A)  Supine Thoracic extension exercise: 10-15 reps  B) Latissimus dorsi and thoracic elongation stretch: hold for 30 sec, repeat 2-3 times  C) Diaphragmatic breathing exercises: 10-15 reps of inhalation and exhalation, focusing inhaling into the abdomen and lower ribs    HOME EXERCISE PROGRAM (HEP):  The patient was taught a homegoing physical therapy regimen as described above. The patient demonstrated understanding of the exercises and proper technique of their execution. This interaction took 15 minutes.     4. Follow-up in 2 weeks for reevaluation    5. Patient agreeable to today's plan and all questions were answered    This note is dictated using the M*Modal Fluency Direct word recognition program. There are word recognition mistakes that are occasionally missed on review.

## 2023-01-13 ENCOUNTER — OFFICE VISIT (OUTPATIENT)
Dept: SPORTS MEDICINE | Facility: CLINIC | Age: 58
End: 2023-01-13
Payer: COMMERCIAL

## 2023-01-13 VITALS
WEIGHT: 146 LBS | DIASTOLIC BLOOD PRESSURE: 74 MMHG | SYSTOLIC BLOOD PRESSURE: 109 MMHG | HEIGHT: 62 IN | BODY MASS INDEX: 26.87 KG/M2 | HEART RATE: 65 BPM

## 2023-01-13 DIAGNOSIS — R07.81 RIB PAIN ON LEFT SIDE: Primary | ICD-10-CM

## 2023-01-13 DIAGNOSIS — M99.02 SOMATIC DYSFUNCTION OF THORACIC REGION: ICD-10-CM

## 2023-01-13 DIAGNOSIS — M99.09 SOMATIC DYSFUNCTION OF ABDOMINAL REGION: ICD-10-CM

## 2023-01-13 DIAGNOSIS — M99.08 SOMATIC DYSFUNCTION OF RIB CAGE REGION: ICD-10-CM

## 2023-01-13 DIAGNOSIS — M79.10 MYALGIA: ICD-10-CM

## 2023-01-13 DIAGNOSIS — G89.29 CHRONIC LEFT-SIDED THORACIC BACK PAIN: ICD-10-CM

## 2023-01-13 DIAGNOSIS — M99.07 UPPER EXTREMITY SOMATIC DYSFUNCTION: ICD-10-CM

## 2023-01-13 DIAGNOSIS — M99.03 SOMATIC DYSFUNCTION OF LUMBAR REGION: ICD-10-CM

## 2023-01-13 DIAGNOSIS — M54.6 CHRONIC LEFT-SIDED THORACIC BACK PAIN: ICD-10-CM

## 2023-01-13 PROCEDURE — 1160F PR REVIEW ALL MEDS BY PRESCRIBER/CLIN PHARMACIST DOCUMENTED: ICD-10-PCS | Mod: CPTII,S$GLB,, | Performed by: NEUROMUSCULOSKELETAL MEDICINE & OMM

## 2023-01-13 PROCEDURE — 98927 OSTEOPATH MANJ 5-6 REGIONS: CPT | Mod: S$GLB,,, | Performed by: NEUROMUSCULOSKELETAL MEDICINE & OMM

## 2023-01-13 PROCEDURE — 1160F RVW MEDS BY RX/DR IN RCRD: CPT | Mod: CPTII,S$GLB,, | Performed by: NEUROMUSCULOSKELETAL MEDICINE & OMM

## 2023-01-13 PROCEDURE — 99213 PR OFFICE/OUTPT VISIT, EST, LEVL III, 20-29 MIN: ICD-10-PCS | Mod: 25,S$GLB,, | Performed by: NEUROMUSCULOSKELETAL MEDICINE & OMM

## 2023-01-13 PROCEDURE — 97110 PR THERAPEUTIC EXERCISES: ICD-10-PCS | Mod: S$GLB,,, | Performed by: NEUROMUSCULOSKELETAL MEDICINE & OMM

## 2023-01-13 PROCEDURE — 3008F PR BODY MASS INDEX (BMI) DOCUMENTED: ICD-10-PCS | Mod: CPTII,S$GLB,, | Performed by: NEUROMUSCULOSKELETAL MEDICINE & OMM

## 2023-01-13 PROCEDURE — 98927 PR OSTEOPATHIC MANIP,5-6 BODY REGN: ICD-10-PCS | Mod: S$GLB,,, | Performed by: NEUROMUSCULOSKELETAL MEDICINE & OMM

## 2023-01-13 PROCEDURE — 3008F BODY MASS INDEX DOCD: CPT | Mod: CPTII,S$GLB,, | Performed by: NEUROMUSCULOSKELETAL MEDICINE & OMM

## 2023-01-13 PROCEDURE — 99999 PR PBB SHADOW E&M-EST. PATIENT-LVL III: ICD-10-PCS | Mod: PBBFAC,,, | Performed by: NEUROMUSCULOSKELETAL MEDICINE & OMM

## 2023-01-13 PROCEDURE — 1159F MED LIST DOCD IN RCRD: CPT | Mod: CPTII,S$GLB,, | Performed by: NEUROMUSCULOSKELETAL MEDICINE & OMM

## 2023-01-13 PROCEDURE — 99213 OFFICE O/P EST LOW 20 MIN: CPT | Mod: 25,S$GLB,, | Performed by: NEUROMUSCULOSKELETAL MEDICINE & OMM

## 2023-01-13 PROCEDURE — 3074F SYST BP LT 130 MM HG: CPT | Mod: CPTII,S$GLB,, | Performed by: NEUROMUSCULOSKELETAL MEDICINE & OMM

## 2023-01-13 PROCEDURE — 3078F DIAST BP <80 MM HG: CPT | Mod: CPTII,S$GLB,, | Performed by: NEUROMUSCULOSKELETAL MEDICINE & OMM

## 2023-01-13 PROCEDURE — 97110 THERAPEUTIC EXERCISES: CPT | Mod: S$GLB,,, | Performed by: NEUROMUSCULOSKELETAL MEDICINE & OMM

## 2023-01-13 PROCEDURE — 1159F PR MEDICATION LIST DOCUMENTED IN MEDICAL RECORD: ICD-10-PCS | Mod: CPTII,S$GLB,, | Performed by: NEUROMUSCULOSKELETAL MEDICINE & OMM

## 2023-01-13 PROCEDURE — 3074F PR MOST RECENT SYSTOLIC BLOOD PRESSURE < 130 MM HG: ICD-10-PCS | Mod: CPTII,S$GLB,, | Performed by: NEUROMUSCULOSKELETAL MEDICINE & OMM

## 2023-01-13 PROCEDURE — 3078F PR MOST RECENT DIASTOLIC BLOOD PRESSURE < 80 MM HG: ICD-10-PCS | Mod: CPTII,S$GLB,, | Performed by: NEUROMUSCULOSKELETAL MEDICINE & OMM

## 2023-01-13 PROCEDURE — 99999 PR PBB SHADOW E&M-EST. PATIENT-LVL III: CPT | Mod: PBBFAC,,, | Performed by: NEUROMUSCULOSKELETAL MEDICINE & OMM

## 2023-01-24 NOTE — PROGRESS NOTES
Subjective:     Hien Jeter    Chief Complaint   Patient presents with    Follow-up       HPI      Hien is a 57 y.o. female coming in today for left sided ribcage pain. Since last visit the pain has Deteriorated. Pt reports her pain worsened after OMT and her HEP seems to be worsening her pain as well (specifically prayer stretch). The pain is better with rest and worse with riding in a car, using arms, activity. Pt. describes the pain as a 6/10 achy pain that does radiate from anterior to posterior left sided ribcage.There has not been any new a fall/injury/ or traumas since last visit.  Pt. denies any new musculoskeletal complaints at this time.     Office note from 1/13/23 reviewed    PAST MEDICAL HISTORY:   Past Medical History:   Diagnosis Date    Anxiety     Basal cell carcinoma 2000    left eyebrow     Depression     Environmental allergies     Genetic testing 09/2022    Inv[facility]ae 84-gene Multi-Cancer +RNA panel    Headache(784.0)     Multiple food allergies     Multiple sclerosis 2009     shoulder 03/2015    left     PAST SURGICAL HISTORY:   Past Surgical History:   Procedure Laterality Date    ARTHROSCOPIC DEBRIDEMENT OF ROTATOR CUFF Left 4/5/2019    Procedure: DEBRIDEMENT, ROTATOR CUFF, ARTHROSCOPIC;  Surgeon: Dc Moore Jr., MD;  Location: [facility] OR;  Service: Orthopedics;  Laterality: Left;  need opus system (Julito notified)  video    ARTHROSCOPY OF SHOULDER WITH DECOMPRESSION OF SUBACROMIAL SPACE  4/5/2019    Procedure: ARTHROSCOPY, SHOULDER, WITH SUBACROMIAL SPACE DECOMPRESSION;  Surgeon: Dc Moore Jr., MD;  Location: [facility] OR;  Service: Orthopedics;;    ARTHROSCOPY OF SHOULDER WITH DECOMPRESSION OF SUBACROMIAL SPACE Right 1/8/2021    Procedure: ARTHROSCOPY, SHOULDER, WITH SUBACROMIAL SPACE DECOMPRESSION;  Surgeon: Dc Moore Jr., MD;  Location: [facility] OR;  Service: Orthopedics;  Laterality: Right;  possible rot cuff repair  need opus system (Addy FITZGERALD notified per Brii  12/21, )  video    BASAL CELL CARCINOMA EXCISION  1998    COLONOSCOPY N/A 5/25/2017    Procedure: COLONOSCOPY;  Surgeon: Marielle Dietz MD;  [facility]  Service: Endoscopy;  Laterality: N/A;  Patient requests PM.    PM prep.    COLONOSCOPY N/A 11/16/2022    Procedure: COLONOSCOPY;  Surgeon: Birjesh Caban MD;  [facility]  Service: Endoscopy;  Laterality: N/A;  instr. via portal - PC  pre call complete-as    FOOT SURGERY Right 01/08/2019    LASIK  2000    SHOULDER SURGERY Left 04/05/2019    WISDOM TOOTH EXTRACTION       FAMILY HISTORY:   Family History   Problem Relation Age of Onset    Liver cancer Mother 84    Colon cancer Mother 50    Diabetes Mother     Osteoporosis Mother     Arrhythmia Father     Prostate cancer Father 75    Breast cancer Sister 50    Colon cancer Sister 59        myrisk negative 6/2021    Diabetes Brother     Dementia Maternal Grandmother     Lung cancer Maternal Grandfather         +smoker, chemical plants    Uterine cancer Paternal Grandmother     Lung cancer Paternal Grandfather         heavy smoker    Breast cancer Paternal Aunt     Breast cancer Paternal Aunt     Prostate cancer Paternal Uncle     Multiple sclerosis Neg Hx     Ovarian cancer Neg Hx      SOCIAL HISTORY:   Social History     Socioeconomic History    Marital status:    Occupational History     Employer: [employer]   Tobacco Use    Smoking status: Never    Smokeless tobacco: Never   Substance and Sexual Activity    Alcohol use: Yes     Comment: socially/ not weekly    Drug use: No    Sexual activity: Yes     Partners: Male     Birth control/protection: None     Comment: , menarche 15     MEDICATIONS:   Current Outpatient Medications:     baclofen (LIORESAL) 10 MG tablet, Take 10 mg by mouth 3 (three) times daily. prn, Disp: , Rfl:     cholecalciferol, vitamin D3, 3,000 unit Tab, Take 3,000 Units by mouth once daily. , Disp: , Rfl:     EScitalopram  "oxalate (LEXAPRO) 10 MG tablet, Take 1 tablet (10 mg total) by mouth every evening., Disp: 90 tablet, Rfl: 3    flaxseed oil 1,000 mg Cap, Take 1 capsule by mouth once daily. , Disp: , Rfl:     flu vacc [id number] 6mos up,PF, (FLUARIX QUAD 6851-1883, PF,) 60 mcg (15 mcg x 4)/0.5 mL Syrg, inject into arm, Disp: 0.5 mL, Rfl: 0    multivitamin-Ca-iron-minerals 27-0.4 mg Tab, Take 1 tablet by mouth once daily. , Disp: , Rfl:     vitamin E 400 UNIT capsule, Take 400 Units by mouth 2 (two) times a day., Disp: , Rfl:   ALLERGIES:   Review of patient's allergies indicates:   Allergen Reactions    Diclofenac Nausea Only    Ibuprofen Other (See Comments)    Tramadol Other (See Comments)     Nausea      Objective:     VITAL SIGNS: /80   Ht 5' 2" (1.575 m)   Wt 66.2 kg (146 lb)   LMP 04/24/2017   BMI 26.70 kg/m²    General    Vitals reviewed.  Constitutional: She is oriented to person, place, and time. She appears well-developed and well-nourished.   Neurological: She is alert and oriented to person, place, and time.   Psychiatric: She has a normal mood and affect. Her behavior is normal.          MUSCULOSKELETAL EXAM:     Cervical Spine: left cervical region    Observation:    Posture:  Posterior pelvis tilt with loss of lumbar lordosis  No obvious shoulder un-leveling while standing.    No edema, erythema, or ecchymosis noted in cervical and thoraic spine regions.    No midline skin abnormalities.    No atrophy of upper limb musculature.  Gait: Non-antalgic     Tenderness:  No tenderness throughout the cervical spine upon spinous process palpation  No tenderness over the base of the occiput  No bony deformities or step-offs palpated.   No trigger point tenderness of the right upper trapezius musculature   + left rib 8 tenderness   No thoracic paraspinal muscle tenderness    Structural Exam:  TART (Tissue texture abnormality, Asymmetry,  Restriction of motion and/or Tenderness) changes:     Thoracic Spine   T1 Neutral "   T2 Neutral   T3 Neutral   T4 Neutral   T5 Neutral   T6 Neutral   T7 FRS RIGHT   T8 FRS RIGHT   T9 Neutral   T10 Neutral   T11 Neutral   T12 Neutral     Rib cage: R8 TTA on left, anterior tender point    Abdomen: left hemidiaphragm TTA, superior mesenteric ganglion TTA      Key   F= Flexed   E = Extended   R = Rotated   S = Sidebent   TTA = tissue texture abnormality     Assessment:      Encounter Diagnoses   Name Primary?    Rib pain on left side Yes    Chronic left-sided thoracic back pain     Myalgia     Somatic dysfunction of rib cage region     Somatic dysfunction of thoracic region     Somatic dysfunction of abdominal region               Plan:   1. Recurrent chronic left sided rib pain - deteriorated. Resolution of associated upper back pain. Rib pain likely secondary to biomechanical restrictions of the upper kinetic chain with underlying history of MS being a possible contributing factor as well.    -  Continue baclofen, over-the-counter Tylenol, and over-the-counter NSAIDs as needed for pain control  - OMT performed again today to address associated biomechanical restrictions, but with all indirect techniques to avoid a recurrent flair,and HEP reviewed      2. OMT 3-4 regions. Oral consent obtained.  Reviewed benefits and potential side effects.   - OMT indicated today due to signs and symptoms as well as local and remote somatic dysfunction findings and their related neurokinetic, lymphatic, fascial and/or arteriovenous body connections.   - OMT techniques used: Myofascial Release, Counterstrain, and Balanced Ligamentous Tension   - Treatment was tolerated well. Improvement noted in segmental mobility post-treatment in dysfunctional regions. There were no adverse events and no complications immediately following treatment.     3. Reviewed with pt. the following HEP:  continue  A)  Supine Thoracic extension exercise: 10-15 reps  B) Diaphragmatic breathing exercises: 10-15 reps of inhalation and  exhalation, focusing inhaling into the abdomen and lower ribs  Discontinue:  C) Latissimus dorsi and thoracic elongation stretch: hold for 30 sec, repeat 2-3 times     The patient was taught a homegoing physical therapy regimen as described above. The patient demonstrated understanding of the exercises and proper technique of their execution.     4. Follow-up in 2 weeks for reevaluation    5. Patient agreeable to today's plan and all questions were answered    This note is dictated using the M*Modal Fluency Direct word recognition program. There are word recognition mistakes that are occasionally missed on review.

## 2023-01-25 ENCOUNTER — OFFICE VISIT (OUTPATIENT)
Dept: SPORTS MEDICINE | Facility: CLINIC | Age: 58
End: 2023-01-25
Payer: COMMERCIAL

## 2023-01-25 VITALS
SYSTOLIC BLOOD PRESSURE: 110 MMHG | BODY MASS INDEX: 26.87 KG/M2 | HEIGHT: 62 IN | WEIGHT: 146 LBS | DIASTOLIC BLOOD PRESSURE: 80 MMHG

## 2023-01-25 DIAGNOSIS — G89.29 CHRONIC LEFT-SIDED THORACIC BACK PAIN: ICD-10-CM

## 2023-01-25 DIAGNOSIS — R07.81 RIB PAIN ON LEFT SIDE: Primary | ICD-10-CM

## 2023-01-25 DIAGNOSIS — M54.6 CHRONIC LEFT-SIDED THORACIC BACK PAIN: ICD-10-CM

## 2023-01-25 DIAGNOSIS — M79.10 MYALGIA: ICD-10-CM

## 2023-01-25 DIAGNOSIS — M99.08 SOMATIC DYSFUNCTION OF RIB CAGE REGION: ICD-10-CM

## 2023-01-25 DIAGNOSIS — M99.02 SOMATIC DYSFUNCTION OF THORACIC REGION: ICD-10-CM

## 2023-01-25 DIAGNOSIS — M99.09 SOMATIC DYSFUNCTION OF ABDOMINAL REGION: ICD-10-CM

## 2023-01-25 PROCEDURE — 1159F MED LIST DOCD IN RCRD: CPT | Mod: CPTII,S$GLB,, | Performed by: NEUROMUSCULOSKELETAL MEDICINE & OMM

## 2023-01-25 PROCEDURE — 99999 PR PBB SHADOW E&M-EST. PATIENT-LVL III: ICD-10-PCS | Mod: PBBFAC,,, | Performed by: NEUROMUSCULOSKELETAL MEDICINE & OMM

## 2023-01-25 PROCEDURE — 3079F PR MOST RECENT DIASTOLIC BLOOD PRESSURE 80-89 MM HG: ICD-10-PCS | Mod: CPTII,S$GLB,, | Performed by: NEUROMUSCULOSKELETAL MEDICINE & OMM

## 2023-01-25 PROCEDURE — 1160F PR REVIEW ALL MEDS BY PRESCRIBER/CLIN PHARMACIST DOCUMENTED: ICD-10-PCS | Mod: CPTII,S$GLB,, | Performed by: NEUROMUSCULOSKELETAL MEDICINE & OMM

## 2023-01-25 PROCEDURE — 1160F RVW MEDS BY RX/DR IN RCRD: CPT | Mod: CPTII,S$GLB,, | Performed by: NEUROMUSCULOSKELETAL MEDICINE & OMM

## 2023-01-25 PROCEDURE — 98926 OSTEOPATH MANJ 3-4 REGIONS: CPT | Mod: S$GLB,,, | Performed by: NEUROMUSCULOSKELETAL MEDICINE & OMM

## 2023-01-25 PROCEDURE — 3074F PR MOST RECENT SYSTOLIC BLOOD PRESSURE < 130 MM HG: ICD-10-PCS | Mod: CPTII,S$GLB,, | Performed by: NEUROMUSCULOSKELETAL MEDICINE & OMM

## 2023-01-25 PROCEDURE — 99999 PR PBB SHADOW E&M-EST. PATIENT-LVL III: CPT | Mod: PBBFAC,,, | Performed by: NEUROMUSCULOSKELETAL MEDICINE & OMM

## 2023-01-25 PROCEDURE — 99213 PR OFFICE/OUTPT VISIT, EST, LEVL III, 20-29 MIN: ICD-10-PCS | Mod: 25,S$GLB,, | Performed by: NEUROMUSCULOSKELETAL MEDICINE & OMM

## 2023-01-25 PROCEDURE — 3079F DIAST BP 80-89 MM HG: CPT | Mod: CPTII,S$GLB,, | Performed by: NEUROMUSCULOSKELETAL MEDICINE & OMM

## 2023-01-25 PROCEDURE — 3008F BODY MASS INDEX DOCD: CPT | Mod: CPTII,S$GLB,, | Performed by: NEUROMUSCULOSKELETAL MEDICINE & OMM

## 2023-01-25 PROCEDURE — 3074F SYST BP LT 130 MM HG: CPT | Mod: CPTII,S$GLB,, | Performed by: NEUROMUSCULOSKELETAL MEDICINE & OMM

## 2023-01-25 PROCEDURE — 1159F PR MEDICATION LIST DOCUMENTED IN MEDICAL RECORD: ICD-10-PCS | Mod: CPTII,S$GLB,, | Performed by: NEUROMUSCULOSKELETAL MEDICINE & OMM

## 2023-01-25 PROCEDURE — 98926 PR OSTEOPATHIC MANIP,3-4 BODY REGN: ICD-10-PCS | Mod: S$GLB,,, | Performed by: NEUROMUSCULOSKELETAL MEDICINE & OMM

## 2023-01-25 PROCEDURE — 99213 OFFICE O/P EST LOW 20 MIN: CPT | Mod: 25,S$GLB,, | Performed by: NEUROMUSCULOSKELETAL MEDICINE & OMM

## 2023-01-25 PROCEDURE — 3008F PR BODY MASS INDEX (BMI) DOCUMENTED: ICD-10-PCS | Mod: CPTII,S$GLB,, | Performed by: NEUROMUSCULOSKELETAL MEDICINE & OMM

## 2023-01-27 ENCOUNTER — TELEPHONE (OUTPATIENT)
Dept: ORTHOPEDICS | Facility: CLINIC | Age: 58
End: 2023-01-27
Payer: COMMERCIAL

## 2023-01-27 DIAGNOSIS — M25.531 RIGHT WRIST PAIN: Primary | ICD-10-CM

## 2023-01-30 ENCOUNTER — OFFICE VISIT (OUTPATIENT)
Dept: ORTHOPEDICS | Facility: CLINIC | Age: 58
End: 2023-01-30
Payer: COMMERCIAL

## 2023-01-30 ENCOUNTER — HOSPITAL ENCOUNTER (OUTPATIENT)
Dept: RADIOLOGY | Facility: HOSPITAL | Age: 58
Discharge: HOME | End: 2023-01-30
Attending: ORTHOPAEDIC SURGERY
Payer: COMMERCIAL

## 2023-01-30 VITALS — WEIGHT: 146 LBS | BODY MASS INDEX: 26.87 KG/M2 | HEIGHT: 62 IN

## 2023-01-30 DIAGNOSIS — M65.4 DE QUERVAIN'S TENOSYNOVITIS, RIGHT: ICD-10-CM

## 2023-01-30 DIAGNOSIS — M25.531 RIGHT WRIST PAIN: ICD-10-CM

## 2023-01-30 PROCEDURE — 73110 X-RAY EXAM OF WRIST: CPT | Mod: TC,PN,RT

## 2023-01-30 PROCEDURE — 73110 X-RAY EXAM OF WRIST: CPT | Mod: 26,RT,, | Performed by: RADIOLOGY

## 2023-01-30 PROCEDURE — 99213 PR OFFICE/OUTPT VISIT, EST, LEVL III, 20-29 MIN: ICD-10-PCS | Mod: S$GLB,,, | Performed by: ORTHOPAEDIC SURGERY

## 2023-01-30 PROCEDURE — 1159F PR MEDICATION LIST DOCUMENTED IN MEDICAL RECORD: ICD-10-PCS | Mod: CPTII,S$GLB,, | Performed by: ORTHOPAEDIC SURGERY

## 2023-01-30 PROCEDURE — 99213 OFFICE O/P EST LOW 20 MIN: CPT | Mod: S$GLB,,, | Performed by: ORTHOPAEDIC SURGERY

## 2023-01-30 PROCEDURE — 1159F MED LIST DOCD IN RCRD: CPT | Mod: CPTII,S$GLB,, | Performed by: ORTHOPAEDIC SURGERY

## 2023-01-30 PROCEDURE — 3008F BODY MASS INDEX DOCD: CPT | Mod: CPTII,S$GLB,, | Performed by: ORTHOPAEDIC SURGERY

## 2023-01-30 PROCEDURE — 99999 PR PBB SHADOW E&M-EST. PATIENT-LVL III: CPT | Mod: PBBFAC,,, | Performed by: ORTHOPAEDIC SURGERY

## 2023-01-30 PROCEDURE — 99999 PR PBB SHADOW E&M-EST. PATIENT-LVL III: ICD-10-PCS | Mod: PBBFAC,,, | Performed by: ORTHOPAEDIC SURGERY

## 2023-01-30 PROCEDURE — 3008F PR BODY MASS INDEX (BMI) DOCUMENTED: ICD-10-PCS | Mod: CPTII,S$GLB,, | Performed by: ORTHOPAEDIC SURGERY

## 2023-01-30 PROCEDURE — 73110 XR WRIST COMPLETE 3 VIEWS RIGHT: ICD-10-PCS | Mod: 26,RT,, | Performed by: RADIOLOGY

## 2023-01-30 RX ORDER — DICLOFENAC SODIUM 10 MG/G
2 GEL TOPICAL 3 TIMES DAILY
Qty: 100 G | Refills: 1 | Status: SHIPPED | OUTPATIENT
Start: 2023-01-30 | End: 2023-11-28

## 2023-01-30 NOTE — PROGRESS NOTES
Subjective:      Patient ID: Hien Jeter is a 57 y.o. female.    Chief Complaint: Follow-up of the Right Wrist      HPI  Hien Jeter is a  57 y.o. female presenting today for right wrist pain.  There was not a history of trauma.  Onset of symptoms began 4-6 weeks ago  Symptoms brought on by doing pushups   She has been having pain at the base of the right thumb   No numbness or tingling reported.      Review of patient's allergies indicates:   Allergen Reactions    Diclofenac Nausea Only    Ibuprofen Other (See Comments)    Tramadol Other (See Comments)     Nausea          Current Outpatient Medications   Medication Sig Dispense Refill    baclofen (LIORESAL) 10 MG tablet Take 10 mg by mouth 3 (three) times daily. prn      cholecalciferol, vitamin D3, 3,000 unit Tab Take 3,000 Units by mouth once daily.       EScitalopram oxalate (LEXAPRO) 10 MG tablet Take 1 tablet (10 mg total) by mouth every evening. 90 tablet 3    flaxseed oil 1,000 mg Cap Take 1 capsule by mouth once daily.       flu vacc [id number] 6mos up,PF, (FLUARIX QUAD 4885-1782, PF,) 60 mcg (15 mcg x 4)/0.5 mL Syrg inject into arm 0.5 mL 0    multivitamin-Ca-iron-minerals 27-0.4 mg Tab Take 1 tablet by mouth once daily.       vitamin E 400 UNIT capsule Take 400 Units by mouth 2 (two) times a day.      diclofenac sodium (VOLTAREN) 1 % Gel Apply 2 g topically 3 (three) times daily. 100 g 1     No current facility-administered medications for this visit.       Past Medical History:   Diagnosis Date    Anxiety     Basal cell carcinoma 2000    left eyebrow     Depression     Environmental allergies     Genetic testing 09/2022    Inv[facility]ae 84-gene Multi-Cancer +RNA panel    Headache(784.0)     Multiple food allergies     Multiple sclerosis 2009     shoulder 03/2015    left       Past Surgical History:   Procedure Laterality Date    ARTHROSCOPIC DEBRIDEMENT OF ROTATOR CUFF Left 4/5/2019    Procedure: DEBRIDEMENT, ROTATOR CUFF, ARTHROSCOPIC;  Surgeon:  "Dc Moore Jr., MD;  Location: [facility] OR;  Service: Orthopedics;  Laterality: Left;  need opus system (Julito notified)  video    ARTHROSCOPY OF SHOULDER WITH DECOMPRESSION OF SUBACROMIAL SPACE  4/5/2019    Procedure: ARTHROSCOPY, SHOULDER, WITH SUBACROMIAL SPACE DECOMPRESSION;  Surgeon: Dc Moore Jr., MD;  Location: [facility] OR;  Service: Orthopedics;;    ARTHROSCOPY OF SHOULDER WITH DECOMPRESSION OF SUBACROMIAL SPACE Right 1/8/2021    Procedure: ARTHROSCOPY, SHOULDER, WITH SUBACROMIAL SPACE DECOMPRESSION;  Surgeon: Dc Moore Jr., MD;  Location: [facility] OR;  Service: Orthopedics;  Laterality: Right;  possible rot cuff repair  need opus system ([employer] P notified per Brii 12/21, )  video    BASAL CELL CARCINOMA EXCISION  1998    COLONOSCOPY N/A 5/25/2017    Procedure: COLONOSCOPY;  Surgeon: Marielle Dietz MD;  [facility]  Service: Endoscopy;  Laterality: N/A;  Patient requests PM.    PM prep.    COLONOSCOPY N/A 11/16/2022    Procedure: COLONOSCOPY;  Surgeon: Brijesh Caban MD;  [facility]  Service: Endoscopy;  Laterality: N/A;  instr. via portal - PC  pre call complete-as    FOOT SURGERY Right 01/08/2019    LASIK  2000    SHOULDER SURGERY Left 04/05/2019    WISDOM TOOTH EXTRACTION         Review of Systems:  ROS    OBJECTIVE:     PHYSICAL EXAM:  Height: 5' 2" (157.5 cm) Weight: 66.2 kg (146 lb)  Vitals:    01/30/23 1433   Weight: 66.2 kg (146 lb)   Height: 5' 2" (1.575 m)   PainSc:   1     Well developed, well nourished female in no acute distress  Alert and oriented x 3  HEENT- Normal exam  Lungs- Clear to auscultation  Heart- Regular rate and rhythm  Abdomen- Soft nontender  Extremity exam- examination right wrist there is some mild tenderness over the radial styloid no significant swelling or bruising range of motion wrist fingers full Finkelstein test mildly positive Tinel sign negative    RADIOGRAPHS:  AP lateral x-ray right wrist shows no significant " abnormalities  Comments: I have personally reviewed the imaging and I agree with the above radiologist's report.    ASSESSMENT/PLAN:     IMPRESSION:  De Quervain tendinitis right wrist improving    PLAN:  I explained the nature of the problem to the patient   I did give her a wrist brace for part-time use recommend she avoid pushups for now   Advil by mouth Voltaren gel topical   If symptoms persist we may recommend injection next visit  Follow-up 4-6 weeks       - We talked at length about the anatomy and pathophysiology of   Encounter Diagnosis   Name Primary?    De Quervain's tenosynovitis, right            Disclaimer: This note has been generated using voice-recognition software. There may be typographical errors that have been missed during proof-reading.

## 2023-02-08 NOTE — PROGRESS NOTES
Subjective:     Hien Jeter    Chief Complaint   Patient presents with    Follow-up     HPI    Hien is a 57 y.o. female coming in today for left sided ribcage pain. Since last visit the pain has Improved. Pt reports her pain is improved with OMT and HEP. She stopped the prayer stretch as advised. The pain is better with rest and worse with riding in a car, using arms, activity. Pt. describes the pain as a 1/10 achy pain that does radiate from anterior to posterior left sided ribcage.There has not been any new a fall/injury/ or traumas since last visit. Of note, pt. Also noted incersaed right wrist pain with cross-fit workouts (LHD). Prior x-rays with ortho hand (Dr. Moore) were negative and she was given a brace, but still having pain with push-ups and wrist loading exercises.  Pt. denies any new musculoskeletal complaints at this time.     Office note from 1/25/23 reviewed    Office note from Dr. Moore on 1/30/23 reviewed:   IMPRESSION:  De Quervain tendinitis right wrist improving     PLAN:  I explained the nature of the problem to the patient   I did give her a wrist brace for part-time use recommend she avoid pushups for now   Advil by mouth Voltaren gel topical   If symptoms persist we may recommend injection next visit  Follow-up 4-6 weeks    PAST MEDICAL HISTORY:   Past Medical History:   Diagnosis Date    Anxiety     Basal cell carcinoma 2000    left eyebrow     Depression     Environmental allergies     Genetic testing 09/2022    [employer] 84-gene Multi-Cancer +RNA panel    Headache(784.0)     Multiple food allergies     Multiple sclerosis 2009     shoulder 03/2015    left     PAST SURGICAL HISTORY:   Past Surgical History:   Procedure Laterality Date    ARTHROSCOPIC DEBRIDEMENT OF ROTATOR CUFF Left 4/5/2019    Procedure: DEBRIDEMENT, ROTATOR CUFF, ARTHROSCOPIC;  Surgeon: Dc Moore Jr., MD;  Location: [facility] OR;  Service: Orthopedics;  Laterality: Left;  need opus system (Julito  notified)  video    ARTHROSCOPY OF SHOULDER WITH DECOMPRESSION OF SUBACROMIAL SPACE  4/5/2019    Procedure: ARTHROSCOPY, SHOULDER, WITH SUBACROMIAL SPACE DECOMPRESSION;  Surgeon: Dc Moore Jr., MD;  Location: [facility] OR;  Service: Orthopedics;;    ARTHROSCOPY OF SHOULDER WITH DECOMPRESSION OF SUBACROMIAL SPACE Right 1/8/2021    Procedure: ARTHROSCOPY, SHOULDER, WITH SUBACROMIAL SPACE DECOMPRESSION;  Surgeon: Dc Moore Jr., MD;  Location: [facility] OR;  Service: Orthopedics;  Laterality: Right;  possible rot cuff repair  need opus system (Addy FITZGERALD notified per Brii 12/21, )  video    BASAL CELL CARCINOMA EXCISION  1998    COLONOSCOPY N/A 5/25/2017    Procedure: COLONOSCOPY;  Surgeon: Marielle Dietz MD;  Location: [facility] ENDO (4TH FLR);  Service: Endoscopy;  Laterality: N/A;  Patient requests PM.    PM prep.    COLONOSCOPY N/A 11/16/2022    Procedure: COLONOSCOPY;  Surgeon: Brijesh Caban MD;  Location: [facility] ENDO ([facility] FLR);  Service: Endoscopy;  Laterality: N/A;  instr. via portal - PC  pre call complete-as    FOOT SURGERY Right 01/08/2019    LASIK  2000    SHOULDER SURGERY Left 04/05/2019    WISDOM TOOTH EXTRACTION       FAMILY HISTORY:   Family History   Problem Relation Age of Onset    Liver cancer Mother 84    Colon cancer Mother 50    Diabetes Mother     Osteoporosis Mother     Arrhythmia Father     Prostate cancer Father 75    Breast cancer Sister 50    Colon cancer Sister 59        myrisk negative 6/2021    Diabetes Brother     Dementia Maternal Grandmother     Lung cancer Maternal Grandfather         +smoker, chemical plants    Uterine cancer Paternal Grandmother     Lung cancer Paternal Grandfather         heavy smoker    Breast cancer Paternal Aunt     Breast cancer Paternal Aunt     Prostate cancer Paternal Uncle     Multiple sclerosis Neg Hx     Ovarian cancer Neg Hx      SOCIAL HISTORY:   Social History     Socioeconomic History    Marital status:    Occupational History     Employer:  "[employer]   Tobacco Use    Smoking status: Never    Smokeless tobacco: Never   Substance and Sexual Activity    Alcohol use: Yes     Comment: socially/ not weekly    Drug use: No    Sexual activity: Yes     Partners: Male     Birth control/protection: None     Comment: , menarche 15     MEDICATIONS:   Current Outpatient Medications:     baclofen (LIORESAL) 10 MG tablet, Take 10 mg by mouth 3 (three) times daily. prn, Disp: , Rfl:     cholecalciferol, vitamin D3, 3,000 unit Tab, Take 3,000 Units by mouth once daily. , Disp: , Rfl:     diclofenac sodium (VOLTAREN) 1 % Gel, Apply 2 g topically 3 (three) times daily., Disp: 100 g, Rfl: 1    EScitalopram oxalate (LEXAPRO) 10 MG tablet, Take 1 tablet (10 mg total) by mouth every evening., Disp: 90 tablet, Rfl: 3    flaxseed oil 1,000 mg Cap, Take 1 capsule by mouth once daily. , Disp: , Rfl:     flu vacc [id number] 6mos up,PF, (FLUARIX QUAD [id number], PF,) 60 mcg (15 mcg x 4)/0.5 mL Syrg, inject into arm, Disp: 0.5 mL, Rfl: 0    multivitamin-Ca-iron-minerals 27-0.4 mg Tab, Take 1 tablet by mouth once daily. , Disp: , Rfl:     vitamin E 400 UNIT capsule, Take 400 Units by mouth 2 (two) times a day., Disp: , Rfl:   ALLERGIES:   Review of patient's allergies indicates:   Allergen Reactions    Diclofenac Nausea Only    Ibuprofen Other (See Comments)    Tramadol Other (See Comments)     Nausea      Objective:     VITAL SIGNS: BP 92/60   Ht 5' 2" (1.575 m)   Wt 66.2 kg (146 lb)   LMP 04/24/2017   BMI 26.70 kg/m²    General    Vitals reviewed.  Constitutional: She is oriented to person, place, and time. She appears well-developed and well-nourished.   Neurological: She is alert and oriented to person, place, and time.   Psychiatric: She has a normal mood and affect. Her behavior is normal.          MUSCULOSKELETAL EXAM:    Wrist: right WRIST  The affected wrist is compared to the contralateral wrist.    Observation:  No evidence of edema, erythema, " ecchymosis, or effusion.  No asymmetries; muscle atrophy; ganglion cysts; or bony abnormalities including ulnar deviation,   No Heberdens or Brouchards nodes,   No swan-neck or boutonnière deformities.    No clubbing of the digits.    Tenderness:  No tenderness at radial styloid, Christina's tubercle, ulnar styloid.  No tenderness at anatomic snuff box, scaphoid tubercle, scapholunate junction.  No tenderness at TFCC  No tenderness at pisiform, hamate, metacarpals and phalanges.  No tenderness over median nerve at the carpal tunnel.  + tenderness over the 1st dorsal extensor compartment    Range of Motion(* = with pain):  Active wrist extension to 70° on left and 65° on right*.   Active wrist flexion to 80°on left and 75° on right*.   Active radial deviation to 20° on left and 20° on right.    Active ulnar deviation to 30° on left and 30° on right*.   Active pronation to 80° on left and 80° on right.   Active supination to 80° on left and 80° on right.   Full active flexion and extension at the MCP, PIP, and DIP bilaterally.   Active thumb motion full in all planes.    Strength Testing (* = with pain):    Wrist extension - 5/5 on left and 5/5 on right  Wrist flexion - 5/5 on left and 5/5 on right  Ulnar deviation - 5/5 on left and 5/5 on right  Radial deviation - 5/5 on left and 5/5 on right  Pronation - 5/5 on left and 5/5 on right  Supination - 5/5 on left and 5/5 on right    Finger flexion - 5/5 on left and 5/5 on right  Finger extension - 5/5 on left and 5/5 on right  Finger abduction - 5/5 on left and 5/5 on right  Finger adduction - 5/5 on left and 5/5 on right    Thumb flexion - 5/5 on left and 5/5 on right  Thumb extension - 5/5 on left and 5/5 on right*  Thumb abduction - 5/5 on left and 5/5 on right*  Thumb adduction - 5/5 on left and 5/5 on right  Thumb opposition - 5/5 on left and 5/5 on right    Special Tests:    Tinel's test at wrist - negative  Carpal compression test - negative    Tinel's test of  Guyon's canal - negative    No laxity with distal radioulnar joint translation.  Negative Wangs test.     No laxity with phalangeal varus/valgus stress testing.    Finkelsteins test - positive    Axial grind of first CMC joint - negative  No laxity at the MCP UCL of the thumb    Normal fist alignment of the phalanges  No laxity at the RCL and UCL of the PIPs and DIPs of the phalanges.  Normal finger flexion of the FDP and FDS in all phalanges bilaterally.  Able to perform OK sign.    Neurovascular Exam:  Sensation intact to light touch in the median, ulnar, and radial distributions on the hands bilaterally.  Radial and ulnar pulses intact and symmetric.  Capillary refill intact to <2 seconds in all digits.      Cervical Spine: left cervical region    Observation:    Posture:  Posterior pelvis tilt with loss of lumbar lordosis  No obvious shoulder un-leveling while standing.    No edema, erythema, or ecchymosis noted in cervical and thoraic spine regions.    No midline skin abnormalities.    No atrophy of upper limb musculature.  Gait: Non-antalgic     Tenderness:  No tenderness throughout the cervical spine upon spinous process palpation  No tenderness over the base of the occiput  No bony deformities or step-offs palpated.   No trigger point tenderness of the right upper trapezius musculature   + left rib 9 tenderness   No thoracic paraspinal muscle tenderness    Structural Exam:  TART (Tissue texture abnormality, Asymmetry,  Restriction of motion and/or Tenderness) changes:     Thoracic Spine   T1 Neutral   T2 Neutral   T3 Neutral   T4 Neutral   T5 Neutral   T6 Neutral   T7 Neutral   T8 Neutral   T9 FRS LEFT   T10 FRS LEFT   T11 Neutral   T12 FRS LEFT     Rib cage: R8-9 TTA on left    Abdomen: left hemidiaphragm TTA    Upper extremity:    Region Finding/resrtiction   Radial head Neutral   Ulna Neutral   Distal Radiocapral Right   DRUJ Right   TFCC Neutral   Proximal carpal row Right   Distal carpal row Right    2nd and 3rd Carpal-metacarpal  Right   2nd and 3rd Metacarapal  Right      Lumbar Spine   L1 TTA LEFT   L2 Neutral   L3 Neutral   L4 FRS RIGHT   L5 FRS RIGHT     Pelvis:  Innominate:Left superior shear  Pubic bone:Left superior pubic shear    Sacrum:Left on Left sacral torsion    Lower extremity: left anterior talus, left psoas anterior tender point      Key   F= Flexed   E = Extended   R = Rotated   S = Sidebent   TTA = tissue texture abnormality     Assessment:      Encounter Diagnoses   Name Primary?    Rib pain on left side Yes    Chronic left-sided thoracic back pain     Myalgia     De Quervain's tenosynovitis, right     Somatic dysfunction of rib cage region     Somatic dysfunction of thoracic region     Somatic dysfunction of lumbar region     Sacral region somatic dysfunction     Somatic dysfunction of pelvic region     Somatic dysfunction of lower extremity     Upper extremity somatic dysfunction     Somatic dysfunction of abdominal region                 Plan:   1. Recurrent chronic left sided rib pain - improved,ikely secondary to biomechanical restrictions of the upper and lower kinetic chain with underlying history of MS being a possible contributing factor as well.    -  Continue baclofen, over-the-counter Tylenol, and over-the-counter NSAIDs as needed for pain control  - OMT performed again today to address associated biomechanical restrictions and HEP reviewed     2. Right DeQuervain's tenosynovitis from overuse and associated biomehcancial resticions of the upper extremity  - continue NSAIDs, exercise modifications, and brace wear as recommend by Dr. Moore  - OMT performed today to address associated biomechanical restrictions    -  X-ray images of right wrist taken 1/30/23 (AP, lateral, and oblique views) showed no abnormalities, mineralized density adjacent to the ulnar styloid noted. Images were personally reviewed.     3. OMT 7-8 regions. Oral consent obtained.  Reviewed benefits and  potential side effects.   - OMT indicated today due to signs and symptoms as well as local and remote somatic dysfunction findings and their related neurokinetic, lymphatic, fascial and/or arteriovenous body connections.   - OMT techniques used: Myofascial Release, Muscle Energy, Counterstrain, Balanced Ligamentous Tension, and Articulatory   - Treatment was tolerated well. Improvement noted in segmental mobility post-treatment in dysfunctional regions. There were no adverse events and no complications immediately following treatment.     4. Reviewed with pt. the following HEP:  continue  A)  Supine Thoracic extension exercise: 10-15 reps  B) Diaphragmatic breathing exercises: 10-15 reps of inhalation and exhalation, focusing inhaling into the abdomen and lower ribs  Add  C)  Bilateral Psoas lunge stretch: hold stretch for 30 seconds, repeat 2-3 times, twice daily  D)  Pelvic clock exercises given to do from the 6-12 o'clock positions:10-15 reps, twice daily. Hand out of exercise also given.     HOME EXERCISE PROGRAM (HEP):  The patient was taught a homegoing physical therapy regimen as described above. The patient demonstrated understanding of the exercises and proper technique of their execution. This interaction took 15 minutes.     5. Follow-up as needed if pain deteriorates or new issue arises    6. Patient agreeable to today's plan and all questions were answered    This note is dictated using the M*Modal Fluency Direct word recognition program. There are word recognition mistakes that are occasionally missed on review.

## 2023-02-09 ENCOUNTER — OFFICE VISIT (OUTPATIENT)
Dept: SPORTS MEDICINE | Facility: CLINIC | Age: 58
End: 2023-02-09
Payer: COMMERCIAL

## 2023-02-09 VITALS
SYSTOLIC BLOOD PRESSURE: 92 MMHG | HEIGHT: 62 IN | WEIGHT: 146 LBS | BODY MASS INDEX: 26.87 KG/M2 | DIASTOLIC BLOOD PRESSURE: 60 MMHG

## 2023-02-09 DIAGNOSIS — M99.04 SACRAL REGION SOMATIC DYSFUNCTION: ICD-10-CM

## 2023-02-09 DIAGNOSIS — M99.02 SOMATIC DYSFUNCTION OF THORACIC REGION: ICD-10-CM

## 2023-02-09 DIAGNOSIS — M99.03 SOMATIC DYSFUNCTION OF LUMBAR REGION: ICD-10-CM

## 2023-02-09 DIAGNOSIS — M99.05 SOMATIC DYSFUNCTION OF PELVIC REGION: ICD-10-CM

## 2023-02-09 DIAGNOSIS — M99.08 SOMATIC DYSFUNCTION OF RIB CAGE REGION: ICD-10-CM

## 2023-02-09 DIAGNOSIS — M99.07 UPPER EXTREMITY SOMATIC DYSFUNCTION: ICD-10-CM

## 2023-02-09 DIAGNOSIS — G89.29 CHRONIC LEFT-SIDED THORACIC BACK PAIN: ICD-10-CM

## 2023-02-09 DIAGNOSIS — M99.09 SOMATIC DYSFUNCTION OF ABDOMINAL REGION: ICD-10-CM

## 2023-02-09 DIAGNOSIS — R07.81 RIB PAIN ON LEFT SIDE: Primary | ICD-10-CM

## 2023-02-09 DIAGNOSIS — M99.06 SOMATIC DYSFUNCTION OF LOWER EXTREMITY: ICD-10-CM

## 2023-02-09 DIAGNOSIS — M54.6 CHRONIC LEFT-SIDED THORACIC BACK PAIN: ICD-10-CM

## 2023-02-09 DIAGNOSIS — M79.10 MYALGIA: ICD-10-CM

## 2023-02-09 DIAGNOSIS — M65.4 DE QUERVAIN'S TENOSYNOVITIS, RIGHT: ICD-10-CM

## 2023-02-09 PROCEDURE — 99999 PR PBB SHADOW E&M-EST. PATIENT-LVL III: CPT | Mod: PBBFAC,,, | Performed by: NEUROMUSCULOSKELETAL MEDICINE & OMM

## 2023-02-09 PROCEDURE — 1160F RVW MEDS BY RX/DR IN RCRD: CPT | Mod: CPTII,S$GLB,, | Performed by: NEUROMUSCULOSKELETAL MEDICINE & OMM

## 2023-02-09 PROCEDURE — 97110 THERAPEUTIC EXERCISES: CPT | Mod: S$GLB,,, | Performed by: NEUROMUSCULOSKELETAL MEDICINE & OMM

## 2023-02-09 PROCEDURE — 1160F PR REVIEW ALL MEDS BY PRESCRIBER/CLIN PHARMACIST DOCUMENTED: ICD-10-PCS | Mod: CPTII,S$GLB,, | Performed by: NEUROMUSCULOSKELETAL MEDICINE & OMM

## 2023-02-09 PROCEDURE — 98928 PR OSTEOPATHIC MANIP,7-8 BODY REGN: ICD-10-PCS | Mod: S$GLB,,, | Performed by: NEUROMUSCULOSKELETAL MEDICINE & OMM

## 2023-02-09 PROCEDURE — 3008F PR BODY MASS INDEX (BMI) DOCUMENTED: ICD-10-PCS | Mod: CPTII,S$GLB,, | Performed by: NEUROMUSCULOSKELETAL MEDICINE & OMM

## 2023-02-09 PROCEDURE — 3074F PR MOST RECENT SYSTOLIC BLOOD PRESSURE < 130 MM HG: ICD-10-PCS | Mod: CPTII,S$GLB,, | Performed by: NEUROMUSCULOSKELETAL MEDICINE & OMM

## 2023-02-09 PROCEDURE — 3078F DIAST BP <80 MM HG: CPT | Mod: CPTII,S$GLB,, | Performed by: NEUROMUSCULOSKELETAL MEDICINE & OMM

## 2023-02-09 PROCEDURE — 3074F SYST BP LT 130 MM HG: CPT | Mod: CPTII,S$GLB,, | Performed by: NEUROMUSCULOSKELETAL MEDICINE & OMM

## 2023-02-09 PROCEDURE — 97110 PR THERAPEUTIC EXERCISES: ICD-10-PCS | Mod: S$GLB,,, | Performed by: NEUROMUSCULOSKELETAL MEDICINE & OMM

## 2023-02-09 PROCEDURE — 1159F MED LIST DOCD IN RCRD: CPT | Mod: CPTII,S$GLB,, | Performed by: NEUROMUSCULOSKELETAL MEDICINE & OMM

## 2023-02-09 PROCEDURE — 3008F BODY MASS INDEX DOCD: CPT | Mod: CPTII,S$GLB,, | Performed by: NEUROMUSCULOSKELETAL MEDICINE & OMM

## 2023-02-09 PROCEDURE — 99214 OFFICE O/P EST MOD 30 MIN: CPT | Mod: 25,S$GLB,, | Performed by: NEUROMUSCULOSKELETAL MEDICINE & OMM

## 2023-02-09 PROCEDURE — 3078F PR MOST RECENT DIASTOLIC BLOOD PRESSURE < 80 MM HG: ICD-10-PCS | Mod: CPTII,S$GLB,, | Performed by: NEUROMUSCULOSKELETAL MEDICINE & OMM

## 2023-02-09 PROCEDURE — 1159F PR MEDICATION LIST DOCUMENTED IN MEDICAL RECORD: ICD-10-PCS | Mod: CPTII,S$GLB,, | Performed by: NEUROMUSCULOSKELETAL MEDICINE & OMM

## 2023-02-09 PROCEDURE — 99999 PR PBB SHADOW E&M-EST. PATIENT-LVL III: ICD-10-PCS | Mod: PBBFAC,,, | Performed by: NEUROMUSCULOSKELETAL MEDICINE & OMM

## 2023-02-09 PROCEDURE — 99214 PR OFFICE/OUTPT VISIT, EST, LEVL IV, 30-39 MIN: ICD-10-PCS | Mod: 25,S$GLB,, | Performed by: NEUROMUSCULOSKELETAL MEDICINE & OMM

## 2023-02-09 PROCEDURE — 98928 OSTEOPATH MANJ 7-8 REGIONS: CPT | Mod: S$GLB,,, | Performed by: NEUROMUSCULOSKELETAL MEDICINE & OMM

## 2023-02-20 ENCOUNTER — PATIENT MESSAGE (OUTPATIENT)
Dept: PSYCHIATRY | Facility: CLINIC | Age: 58
End: 2023-02-20
Payer: COMMERCIAL

## 2023-04-02 DIAGNOSIS — F32.0 MILD SINGLE CURRENT EPISODE OF MAJOR DEPRESSIVE DISORDER: ICD-10-CM

## 2023-04-03 RX ORDER — ESCITALOPRAM OXALATE 10 MG/1
TABLET ORAL
Qty: 135 TABLET | Refills: 3 | OUTPATIENT
Start: 2023-04-03

## 2023-04-03 NOTE — TELEPHONE ENCOUNTER
No new care gaps identified.  [facility] Embedded Care Gaps. Reference number: [id number]. 4/03/2023   11:14:00 AM CDT

## 2023-04-03 NOTE — TELEPHONE ENCOUNTER
Refill Decision Note   Hien Jeter  is requesting a refill authorization.  Brief Assessment and Rationale for Refill:  Quick Discontinue     Medication Therapy Plan:  Dose change 4/19/22 by Page Salas MD from  Escitalopram 10 mg 1.5 tablets (15 mg total) by mouth every evening to take 1 tablet (10 mg total) by mouth every evening      Comments:     Note composed:11:38 AM 04/03/2023             Appointments     Last Visit   7/11/2022 Corona Saavedra DO   Next Visit   Visit date not found Corona Saavedra DO

## 2023-04-24 ENCOUNTER — TELEPHONE (OUTPATIENT)
Dept: NEUROLOGY | Facility: CLINIC | Age: 58
End: 2023-04-24
Payer: COMMERCIAL

## 2023-04-24 DIAGNOSIS — G35 MULTIPLE SCLEROSIS: Primary | ICD-10-CM

## 2023-04-26 ENCOUNTER — LAB VISIT (OUTPATIENT)
Dept: LAB | Facility: HOSPITAL | Age: 58
End: 2023-04-26
Attending: PSYCHIATRY & NEUROLOGY
Payer: COMMERCIAL

## 2023-04-26 ENCOUNTER — PATIENT MESSAGE (OUTPATIENT)
Dept: NEUROLOGY | Facility: CLINIC | Age: 58
End: 2023-04-26
Payer: COMMERCIAL

## 2023-04-26 DIAGNOSIS — G35 MULTIPLE SCLEROSIS: ICD-10-CM

## 2023-04-26 LAB
ALBUMIN SERPL BCP-MCNC: 4.2 G/DL (ref 3.5–5.2)
ALP SERPL-CCNC: 97 U/L (ref 55–135)
ALT SERPL W/O P-5'-P-CCNC: 27 U/L (ref 10–44)
ANION GAP SERPL CALC-SCNC: 9 MMOL/L (ref 8–16)
AST SERPL-CCNC: 25 U/L (ref 10–40)
BASOPHILS # BLD AUTO: 0.05 K/UL (ref 0–0.2)
BASOPHILS NFR BLD: 0.8 % (ref 0–1.9)
BILIRUB SERPL-MCNC: 0.5 MG/DL (ref 0.1–1)
BUN SERPL-MCNC: 15 MG/DL (ref 6–20)
CALCIUM SERPL-MCNC: 10.3 MG/DL (ref 8.7–10.5)
CHLORIDE SERPL-SCNC: 104 MMOL/L (ref 95–110)
CO2 SERPL-SCNC: 27 MMOL/L (ref 23–29)
CREAT SERPL-MCNC: 1.1 MG/DL (ref 0.5–1.4)
DIFFERENTIAL METHOD: ABNORMAL
EOSINOPHIL # BLD AUTO: 0.1 K/UL (ref 0–0.5)
EOSINOPHIL NFR BLD: 1.8 % (ref 0–8)
ERYTHROCYTE [DISTWIDTH] IN BLOOD BY AUTOMATED COUNT: 12.2 % (ref 11.5–14.5)
EST. GFR  (NO RACE VARIABLE): 59 ML/MIN/1.73 M^2
GLUCOSE SERPL-MCNC: 82 MG/DL (ref 70–110)
HCT VFR BLD AUTO: 44.5 % (ref 37–48.5)
HGB BLD-MCNC: 14.5 G/DL (ref 12–16)
IGA SERPL-MCNC: 219 MG/DL (ref 40–350)
IGG SERPL-MCNC: 1079 MG/DL (ref 650–1600)
IGM SERPL-MCNC: 74 MG/DL (ref 50–300)
IMM GRANULOCYTES # BLD AUTO: 0.01 K/UL (ref 0–0.04)
IMM GRANULOCYTES NFR BLD AUTO: 0.2 % (ref 0–0.5)
LYMPHOCYTES # BLD AUTO: 2 K/UL (ref 1–4.8)
LYMPHOCYTES NFR BLD: 29.7 % (ref 18–48)
MCH RBC QN AUTO: 32.2 PG (ref 27–31)
MCHC RBC AUTO-ENTMCNC: 32.6 G/DL (ref 32–36)
MCV RBC AUTO: 99 FL (ref 82–98)
MONOCYTES # BLD AUTO: 0.7 K/UL (ref 0.3–1)
MONOCYTES NFR BLD: 10.7 % (ref 4–15)
NEUTROPHILS # BLD AUTO: 3.8 K/UL (ref 1.8–7.7)
NEUTROPHILS NFR BLD: 56.8 % (ref 38–73)
NRBC BLD-RTO: 0 /100 WBC
PLATELET # BLD AUTO: 348 K/UL (ref 150–450)
PMV BLD AUTO: 10.7 FL (ref 9.2–12.9)
POTASSIUM SERPL-SCNC: 4.8 MMOL/L (ref 3.5–5.1)
PROT SERPL-MCNC: 7.1 G/DL (ref 6–8.4)
RBC # BLD AUTO: 4.51 M/UL (ref 4–5.4)
SODIUM SERPL-SCNC: 140 MMOL/L (ref 136–145)
WBC # BLD AUTO: 6.63 K/UL (ref 3.9–12.7)

## 2023-04-26 PROCEDURE — 87340 HEPATITIS B SURFACE AG IA: CPT | Performed by: PSYCHIATRY & NEUROLOGY

## 2023-04-26 PROCEDURE — 80053 COMPREHEN METABOLIC PANEL: CPT | Performed by: PSYCHIATRY & NEUROLOGY

## 2023-04-26 PROCEDURE — 36415 COLL VENOUS BLD VENIPUNCTURE: CPT | Performed by: PSYCHIATRY & NEUROLOGY

## 2023-04-26 PROCEDURE — 82784 ASSAY IGA/IGD/IGG/IGM EACH: CPT | Performed by: PSYCHIATRY & NEUROLOGY

## 2023-04-26 PROCEDURE — 85025 COMPLETE CBC W/AUTO DIFF WBC: CPT | Performed by: PSYCHIATRY & NEUROLOGY

## 2023-04-26 PROCEDURE — 86704 HEP B CORE ANTIBODY TOTAL: CPT | Performed by: PSYCHIATRY & NEUROLOGY

## 2023-04-27 LAB
HBV CORE AB SERPL QL IA: NORMAL
HBV SURFACE AG SERPL QL IA: NORMAL

## 2023-05-01 ENCOUNTER — PATIENT MESSAGE (OUTPATIENT)
Dept: SPORTS MEDICINE | Facility: CLINIC | Age: 58
End: 2023-05-01
Payer: COMMERCIAL

## 2023-05-02 DIAGNOSIS — M25.562 PAIN IN BOTH KNEES, UNSPECIFIED CHRONICITY: Primary | ICD-10-CM

## 2023-05-02 DIAGNOSIS — M25.561 PAIN IN BOTH KNEES, UNSPECIFIED CHRONICITY: Primary | ICD-10-CM

## 2023-05-03 ENCOUNTER — PATIENT MESSAGE (OUTPATIENT)
Dept: NEUROLOGY | Facility: CLINIC | Age: 58
End: 2023-05-03
Payer: COMMERCIAL

## 2023-05-04 RX ORDER — SODIUM CHLORIDE 0.9 % (FLUSH) 0.9 %
10 SYRINGE (ML) INJECTION
Status: CANCELLED | OUTPATIENT
Start: 2023-05-04

## 2023-05-04 RX ORDER — HEPARIN 100 UNIT/ML
100 SYRINGE INTRAVENOUS
Status: CANCELLED | OUTPATIENT
Start: 2023-05-04

## 2023-05-04 RX ORDER — ACETAMINOPHEN 500 MG
1000 TABLET ORAL
Status: CANCELLED | OUTPATIENT
Start: 2023-05-04 | End: 2023-05-04

## 2023-05-05 ENCOUNTER — PATIENT MESSAGE (OUTPATIENT)
Dept: INTERNAL MEDICINE | Facility: CLINIC | Age: 58
End: 2023-05-05
Payer: COMMERCIAL

## 2023-05-05 DIAGNOSIS — D75.89 MACROCYTOSIS: Primary | ICD-10-CM

## 2023-05-05 DIAGNOSIS — R53.83 FATIGUE, UNSPECIFIED TYPE: ICD-10-CM

## 2023-05-10 NOTE — TELEPHONE ENCOUNTER
I will order a few additional test 2/2 marginally elevated MCV  Kidney function is stable(as we age our kidney function slowly decreases), make sure you are staying well hydrated. Will repeat your renal function also

## 2023-05-19 ENCOUNTER — PATIENT MESSAGE (OUTPATIENT)
Dept: INTERNAL MEDICINE | Facility: CLINIC | Age: 58
End: 2023-05-19
Payer: COMMERCIAL

## 2023-05-19 ENCOUNTER — LAB VISIT (OUTPATIENT)
Dept: LAB | Facility: HOSPITAL | Age: 58
End: 2023-05-19
Attending: INTERNAL MEDICINE
Payer: COMMERCIAL

## 2023-05-19 DIAGNOSIS — R53.83 FATIGUE, UNSPECIFIED TYPE: ICD-10-CM

## 2023-05-19 DIAGNOSIS — D75.89 MACROCYTOSIS: ICD-10-CM

## 2023-05-19 LAB
ANION GAP SERPL CALC-SCNC: 11 MMOL/L (ref 8–16)
BUN SERPL-MCNC: 14 MG/DL (ref 6–20)
CALCIUM SERPL-MCNC: 9.6 MG/DL (ref 8.7–10.5)
CHLORIDE SERPL-SCNC: 106 MMOL/L (ref 95–110)
CO2 SERPL-SCNC: 26 MMOL/L (ref 23–29)
CREAT SERPL-MCNC: 0.8 MG/DL (ref 0.5–1.4)
EST. GFR  (NO RACE VARIABLE): >60 ML/MIN/1.73 M^2
FOLATE SERPL-MCNC: 16.8 NG/ML (ref 4–24)
GLUCOSE SERPL-MCNC: 84 MG/DL (ref 70–110)
POTASSIUM SERPL-SCNC: 4.1 MMOL/L (ref 3.5–5.1)
SODIUM SERPL-SCNC: 143 MMOL/L (ref 136–145)

## 2023-05-19 PROCEDURE — 80048 BASIC METABOLIC PNL TOTAL CA: CPT | Performed by: INTERNAL MEDICINE

## 2023-05-19 PROCEDURE — 82607 VITAMIN B-12: CPT | Performed by: INTERNAL MEDICINE

## 2023-05-19 PROCEDURE — 82746 ASSAY OF FOLIC ACID SERUM: CPT | Performed by: INTERNAL MEDICINE

## 2023-05-21 ENCOUNTER — PATIENT MESSAGE (OUTPATIENT)
Dept: NEUROLOGY | Facility: CLINIC | Age: 58
End: 2023-05-21
Payer: COMMERCIAL

## 2023-05-22 LAB — VIT B12 SERPL-MCNC: 478 NG/L (ref 180–914)

## 2023-05-24 ENCOUNTER — PATIENT MESSAGE (OUTPATIENT)
Dept: SPORTS MEDICINE | Facility: CLINIC | Age: 58
End: 2023-05-24
Payer: COMMERCIAL

## 2023-05-30 ENCOUNTER — HOSPITAL ENCOUNTER (OUTPATIENT)
Dept: RADIOLOGY | Facility: HOSPITAL | Age: 58
Discharge: HOME | End: 2023-05-30
Attending: NEUROMUSCULOSKELETAL MEDICINE & OMM
Payer: COMMERCIAL

## 2023-05-30 DIAGNOSIS — M25.562 PAIN IN BOTH KNEES, UNSPECIFIED CHRONICITY: ICD-10-CM

## 2023-05-30 DIAGNOSIS — M25.561 PAIN IN BOTH KNEES, UNSPECIFIED CHRONICITY: ICD-10-CM

## 2023-05-30 PROCEDURE — 73564 XR KNEE ORTHO BILAT WITH FLEXION: ICD-10-PCS | Mod: 26,,, | Performed by: RADIOLOGY

## 2023-05-30 PROCEDURE — 73564 X-RAY EXAM KNEE 4 OR MORE: CPT | Mod: 26,,, | Performed by: RADIOLOGY

## 2023-05-30 PROCEDURE — 73564 X-RAY EXAM KNEE 4 OR MORE: CPT | Mod: TC,50,PO

## 2023-05-31 ENCOUNTER — OFFICE VISIT (OUTPATIENT)
Dept: SPORTS MEDICINE | Facility: CLINIC | Age: 58
End: 2023-05-31
Payer: COMMERCIAL

## 2023-05-31 VITALS
DIASTOLIC BLOOD PRESSURE: 81 MMHG | WEIGHT: 146 LBS | BODY MASS INDEX: 26.87 KG/M2 | HEART RATE: 74 BPM | HEIGHT: 62 IN | SYSTOLIC BLOOD PRESSURE: 112 MMHG

## 2023-05-31 DIAGNOSIS — M22.2X1 PATELLOFEMORAL PAIN SYNDROME OF BOTH KNEES: Primary | ICD-10-CM

## 2023-05-31 DIAGNOSIS — M99.07 UPPER EXTREMITY SOMATIC DYSFUNCTION: ICD-10-CM

## 2023-05-31 DIAGNOSIS — M25.331 CARPAL INSTABILITY OF RIGHT WRIST: ICD-10-CM

## 2023-05-31 DIAGNOSIS — G89.29 CHRONIC LEFT-SIDED THORACIC BACK PAIN: ICD-10-CM

## 2023-05-31 DIAGNOSIS — M99.04 SACRAL REGION SOMATIC DYSFUNCTION: ICD-10-CM

## 2023-05-31 DIAGNOSIS — M99.08 SOMATIC DYSFUNCTION OF RIB CAGE REGION: ICD-10-CM

## 2023-05-31 DIAGNOSIS — M99.06 SOMATIC DYSFUNCTION OF LOWER EXTREMITY: ICD-10-CM

## 2023-05-31 DIAGNOSIS — G89.29 CHRONIC PAIN OF RIGHT WRIST: ICD-10-CM

## 2023-05-31 DIAGNOSIS — M79.10 MYALGIA: ICD-10-CM

## 2023-05-31 DIAGNOSIS — M99.05 SOMATIC DYSFUNCTION OF PELVIC REGION: ICD-10-CM

## 2023-05-31 DIAGNOSIS — R07.81 RIB PAIN ON LEFT SIDE: ICD-10-CM

## 2023-05-31 DIAGNOSIS — M99.02 SOMATIC DYSFUNCTION OF THORACIC REGION: ICD-10-CM

## 2023-05-31 DIAGNOSIS — M99.03 SOMATIC DYSFUNCTION OF LUMBAR REGION: ICD-10-CM

## 2023-05-31 DIAGNOSIS — M25.531 CHRONIC PAIN OF RIGHT WRIST: ICD-10-CM

## 2023-05-31 DIAGNOSIS — M22.2X2 PATELLOFEMORAL PAIN SYNDROME OF BOTH KNEES: Primary | ICD-10-CM

## 2023-05-31 DIAGNOSIS — M54.6 CHRONIC LEFT-SIDED THORACIC BACK PAIN: ICD-10-CM

## 2023-05-31 DIAGNOSIS — M99.09 SOMATIC DYSFUNCTION OF ABDOMINAL REGION: ICD-10-CM

## 2023-05-31 PROCEDURE — 99215 OFFICE O/P EST HI 40 MIN: CPT | Mod: 25,S$GLB,, | Performed by: NEUROMUSCULOSKELETAL MEDICINE & OMM

## 2023-05-31 PROCEDURE — 98928 OSTEOPATH MANJ 7-8 REGIONS: CPT | Mod: S$GLB,,, | Performed by: NEUROMUSCULOSKELETAL MEDICINE & OMM

## 2023-05-31 PROCEDURE — 97110 PR THERAPEUTIC EXERCISES: ICD-10-PCS | Mod: S$GLB,,, | Performed by: NEUROMUSCULOSKELETAL MEDICINE & OMM

## 2023-05-31 PROCEDURE — 99999 PR PBB SHADOW E&M-EST. PATIENT-LVL III: CPT | Mod: PBBFAC,,, | Performed by: NEUROMUSCULOSKELETAL MEDICINE & OMM

## 2023-05-31 PROCEDURE — 3079F DIAST BP 80-89 MM HG: CPT | Mod: CPTII,S$GLB,, | Performed by: NEUROMUSCULOSKELETAL MEDICINE & OMM

## 2023-05-31 PROCEDURE — 3074F SYST BP LT 130 MM HG: CPT | Mod: CPTII,S$GLB,, | Performed by: NEUROMUSCULOSKELETAL MEDICINE & OMM

## 2023-05-31 PROCEDURE — 99215 PR OFFICE/OUTPT VISIT, EST, LEVL V, 40-54 MIN: ICD-10-PCS | Mod: 25,S$GLB,, | Performed by: NEUROMUSCULOSKELETAL MEDICINE & OMM

## 2023-05-31 PROCEDURE — 1160F RVW MEDS BY RX/DR IN RCRD: CPT | Mod: CPTII,S$GLB,, | Performed by: NEUROMUSCULOSKELETAL MEDICINE & OMM

## 2023-05-31 PROCEDURE — 1159F MED LIST DOCD IN RCRD: CPT | Mod: CPTII,S$GLB,, | Performed by: NEUROMUSCULOSKELETAL MEDICINE & OMM

## 2023-05-31 PROCEDURE — 3008F PR BODY MASS INDEX (BMI) DOCUMENTED: ICD-10-PCS | Mod: CPTII,S$GLB,, | Performed by: NEUROMUSCULOSKELETAL MEDICINE & OMM

## 2023-05-31 PROCEDURE — 3074F PR MOST RECENT SYSTOLIC BLOOD PRESSURE < 130 MM HG: ICD-10-PCS | Mod: CPTII,S$GLB,, | Performed by: NEUROMUSCULOSKELETAL MEDICINE & OMM

## 2023-05-31 PROCEDURE — 3079F PR MOST RECENT DIASTOLIC BLOOD PRESSURE 80-89 MM HG: ICD-10-PCS | Mod: CPTII,S$GLB,, | Performed by: NEUROMUSCULOSKELETAL MEDICINE & OMM

## 2023-05-31 PROCEDURE — 1159F PR MEDICATION LIST DOCUMENTED IN MEDICAL RECORD: ICD-10-PCS | Mod: CPTII,S$GLB,, | Performed by: NEUROMUSCULOSKELETAL MEDICINE & OMM

## 2023-05-31 PROCEDURE — 97110 THERAPEUTIC EXERCISES: CPT | Mod: S$GLB,,, | Performed by: NEUROMUSCULOSKELETAL MEDICINE & OMM

## 2023-05-31 PROCEDURE — 99999 PR PBB SHADOW E&M-EST. PATIENT-LVL III: ICD-10-PCS | Mod: PBBFAC,,, | Performed by: NEUROMUSCULOSKELETAL MEDICINE & OMM

## 2023-05-31 PROCEDURE — 1160F PR REVIEW ALL MEDS BY PRESCRIBER/CLIN PHARMACIST DOCUMENTED: ICD-10-PCS | Mod: CPTII,S$GLB,, | Performed by: NEUROMUSCULOSKELETAL MEDICINE & OMM

## 2023-05-31 PROCEDURE — 3008F BODY MASS INDEX DOCD: CPT | Mod: CPTII,S$GLB,, | Performed by: NEUROMUSCULOSKELETAL MEDICINE & OMM

## 2023-05-31 PROCEDURE — 98928 PR OSTEOPATHIC MANIP,7-8 BODY REGN: ICD-10-PCS | Mod: S$GLB,,, | Performed by: NEUROMUSCULOSKELETAL MEDICINE & OMM

## 2023-05-31 NOTE — PROGRESS NOTES
Subjective:     Hien Jeter     Chief Complaint   Patient presents with    Right Knee - Pain    Left Knee - Pain         HPI    Hien is a 57 y.o. female coming in today for bilateral right>left knee pain that began about 1 month(s) ago.  Pt. describes the pain as a 3/10 achy pain that does not radiate. There was not a fall/injury/ or trauma associated with the onset of symptoms. Pt initially noted popping and locking in her right knee with crossfit workouts. Her left knee isn't popping but is painful because of her antalgic gait. The pain is better with rest, voltaren gel and worse with squats, bike, swimming (tawanna breast stroke). Pt notes trouble getting off the floor lately. Denies swelling in her knees. She does yoga consistently at bedtime which helps with her MS symptoms. Pt. notes her left side rib pain and right wrist pain has flared up this week as well. Pt. Denies any other musculoskeletal complaints at this time.     Joint instability? yes  Mechanical locking/clicking? yes  Affecting ADL's? yes  Affecting sleep? yes    Occupation: retired    Review of Systems   Constitutional:  Negative for chills and fever.   Musculoskeletal:  Positive for joint pain and myalgias. Negative for back pain, falls and neck pain.   Neurological:  Negative for dizziness, tingling, focal weakness, weakness and headaches.       PAST MEDICAL HISTORY:   Past Medical History:   Diagnosis Date    Anxiety     Basal cell carcinoma 2000    left eyebrow     Depression     Environmental allergies     Genetic testing 09/2022    [employer] 84-gene Multi-Cancer +RNA panel    Headache(784.0)     Multiple food allergies     Multiple sclerosis 2009     shoulder 03/2015    left     PAST SURGICAL HISTORY:   Past Surgical History:   Procedure Laterality Date    ARTHROSCOPIC DEBRIDEMENT OF ROTATOR CUFF Left 4/5/2019    Procedure: DEBRIDEMENT, ROTATOR CUFF, ARTHROSCOPIC;  Surgeon: Dc Moore Jr., MD;  Location: [facility] OR;  Service:  Orthopedics;  Laterality: Left;  need opus system ([place] notified)  video    ARTHROSCOPY OF SHOULDER WITH DECOMPRESSION OF SUBACROMIAL SPACE  4/5/2019    Procedure: ARTHROSCOPY, SHOULDER, WITH SUBACROMIAL SPACE DECOMPRESSION;  Surgeon: Dc Moore Jr., MD;  Location: [facility] OR;  Service: Orthopedics;;    ARTHROSCOPY OF SHOULDER WITH DECOMPRESSION OF SUBACROMIAL SPACE Right 1/8/2021    Procedure: ARTHROSCOPY, SHOULDER, WITH SUBACROMIAL SPACE DECOMPRESSION;  Surgeon: Dc Moore Jr., MD;  Location: [facility] OR;  Service: Orthopedics;  Laterality: Right;  possible rot cuff repair  need opus system ([place] [facility] notified per Brii 12/21, )  video    BASAL CELL CARCINOMA EXCISION  1998    COLONOSCOPY N/A 5/25/2017    Procedure: COLONOSCOPY;  Surgeon: Marielle Dietz MD;  Location: [facility] (4TH FLR);  Service: Endoscopy;  Laterality: N/A;  Patient requests PM.    PM prep.    COLONOSCOPY N/A 11/16/2022    Procedure: COLONOSCOPY;  Surgeon: Brijesh Caban MD;  [facility]  Service: Endoscopy;  Laterality: N/A;  instr. via portal - PC  pre call complete-as    FOOT SURGERY Right 01/08/2019    LASIK  2000    SHOULDER SURGERY Left 04/05/2019    WISDOM TOOTH EXTRACTION       FAMILY HISTORY:   Family History   Problem Relation Age of Onset    Liver cancer Mother 84    Colon cancer Mother 50    Diabetes Mother     Osteoporosis Mother     Arrhythmia Father     Prostate cancer Father 75    Breast cancer Sister 50    Colon cancer Sister 59        myrisk negative 6/2021    Diabetes Brother     Dementia Maternal Grandmother     Lung cancer Maternal Grandfather         +smoker, chemical plants    Uterine cancer Paternal Grandmother     Lung cancer Paternal Grandfather         heavy smoker    Breast cancer Paternal Aunt     Breast cancer Paternal Aunt     Prostate cancer Paternal Uncle     Multiple sclerosis Neg Hx     Ovarian cancer Neg Hx      SOCIAL HISTORY:   Social History     Socioeconomic History     "Marital status:    Occupational History     Employer: [employer]   Tobacco Use    Smoking status: Never    Smokeless tobacco: Never   Substance and Sexual Activity    Alcohol use: Yes     Comment: socially/ not weekly    Drug use: No    Sexual activity: Yes     Partners: Male     Birth control/protection: None     Comment: , menarche 15       MEDICATIONS:     Current Outpatient Medications:     cholecalciferol, vitamin D3, 3,000 unit Tab, Take 3,000 Units by mouth once daily. , Disp: , Rfl:     diclofenac sodium (VOLTAREN) 1 % Gel, Apply 2 g topically 3 (three) times daily., Disp: 100 g, Rfl: 1    flaxseed oil 1,000 mg Cap, Take 1 capsule by mouth once daily. , Disp: , Rfl:     multivitamin-Ca-iron-minerals 27-0.4 mg Tab, Take 1 tablet by mouth once daily. , Disp: , Rfl:     vitamin E 400 UNIT capsule, Take 400 Units by mouth 2 (two) times a day., Disp: , Rfl:     baclofen (LIORESAL) 10 MG tablet, Take 10 mg by mouth 3 (three) times daily. prn, Disp: , Rfl:     flu vacc [id number] 6mos up,PF, (FLUARIX QUAD 1729-6916, PF,) 60 mcg (15 mcg x 4)/0.5 mL Syrg, inject into arm (Patient not taking: Reported on 5/31/2023), Disp: 0.5 mL, Rfl: 0  ALLERGIES:   Review of patient's allergies indicates:   Allergen Reactions    Diclofenac Nausea Only    Ibuprofen Other (See Comments)    Tramadol Other (See Comments)     Nausea        Objective:     VITAL SIGNS: /81   Pulse 74   Ht 5' 2" (1.575 m)   Wt 66.2 kg (146 lb)   LMP 04/24/2017   BMI 26.70 kg/m²    General    Vitals reviewed.  Constitutional: She is oriented to person, place, and time. She appears well-developed and well-nourished.   Neurological: She is alert and oriented to person, place, and time.   Psychiatric: She has a normal mood and affect. Her behavior is normal.               MUSCULOSKELETAL EXAM    + R8 tenderness and thoracic paraspinal tenderness on left    Bilateral KNEE EXAMINATION     Observation:  No edema, erythema, " ecchymosis, or effusion noted.  No muscle atrophy of the thighs and calves noted.  No obvious bony deformities noted.   No Genu valgus/varum noted.  No recurvatum noted.    No tibial internal/external torsion.    Posture:  Posterior pelvis tilt with loss of lumbar lordosis  Gait: Non-antalgic with Neutral ankle mechanics and Neutral medial arch  Poor bilateral pelvic stability with bilateral hip hiking compensatory pattern noted with single leg raise    Tenderness:  Patella - + patellofemoral pain bilaterally, R>L       Lateral joint line - none  Quad tendon - none   Medial joint line - none  Patellar tendon - none   Medial plica - none  Tibial tubercle - none   Lateral plica - none  Pes anserine - none   MCL prox - none  Distal ITB - none   MCL distal - none  MFC - none    LCL prox - none  LFC - none    LCL distal - none  Tibia - none    Fibula - none    No obvious bursae, plicae, popliteal cysts, or tendon derangement palpated.          ROM (* = with pain):   Active extension to 0° on left without hyperextension, lag, crepitus, or patellar J sign.   Active extension to 0° on right without hyperextension, lag, crepitus, or patellar J sign.  Active flexion to 135° on left and 135° on right    Strength(* = with pain):  Knee Flexion - 5/5 on left and 5/5 on right  Knee Extension - 5/5 on left and 5/5 on right  Hip Flexion - 5/5 on left and 5/5 on right  Hip Extension - 5/5 on left and 5/5 on right  Ankle dorsiflexion - 5/5 on left and 5/5 on right  Ankle Plantarflexion - 5/5 on left and 5/5 on right  glutaeus medius - 5-/5 on left and 5-/5 on right with compensatory lumber rotation    Patellofemoral Exam:   Patellar ballottement - negative  Bulge sign - negative  Patellar grind - negative    No patellar laxity with medial and lateral translation   No apprehension with medial and lateral patellar translation.     Meniscus Testing:     No pain with terminal extension and flexion at joint lines.  Bounce home test -  negative    Ligament Testing:  Lachman's test - negative  No laxity with anterior drawer.  No laxity with posterior drawer.    No posterior sag sign.   No laxity with varus testing at 0 and 30 degrees.  No laxity with valgus testing at 0 and 30 degrees.    IT band testing:  Diannes test - negative   Noble Compression test - negative    Neurovascular Examination:   Normal gait without antalgia.  Sensation intact to light touch in the obturator, lateral/intermediate/medial/posterior femoral cutaneous, saphenous, and common peroneal nerves bilaterally.  Motor Function:    Fully intact motor function at hip, knee, foot and ankle.  Negative seated straight leg raise bilaterally.    Pulses intact at the DP and PT arteries bilaterally.    Capillary refill intact <2 seconds in all toes bilaterally.    Wrist: right WRIST  The affected wrist is compared to the contralateral wrist.     Observation:  No evidence of edema, erythema, ecchymosis, or effusion.  No asymmetries; muscle atrophy; ganglion cysts; or bony abnormalities including ulnar deviation,   No Heberdens or Brouchards nodes,   No swan-neck or boutonnière deformities.    No clubbing of the digits.     Tenderness:  No tenderness at radial styloid, Christina's tubercle, ulnar styloid.  No tenderness at anatomic snuff box, scaphoid tubercle  + tenderness at the distal carpal row  No tenderness at TFCC  No tenderness at pisiform, hamate, metacarpals and phalanges.  No tenderness over median nerve at the carpal tunnel.     Range of Motion(* = with pain):  Active wrist extension to 70° on left and 70° on right   Active wrist flexion to 80°on left and 75° on right  Active radial deviation to 20° on left and 20° on right.    Active ulnar deviation to 30° on left and 30° on right   Active pronation to 80° on left and 80° on right.   Active supination to 80° on left and 80° on right*.   Full active flexion and extension at the MCP, PIP, and DIP bilaterally.   Active thumb  motion full in all planes.     Strength Testing (* = with pain):     Wrist extension - 5/5 on left and 5/5 on right  Wrist flexion - 5/5 on left and 5/5 on right  Ulnar deviation - 5/5 on left and 5/5 on right  Radial deviation - 5/5 on left and 5/5 on right  Pronation - 5/5 on left and 5/5 on right  Supination - 5/5 on left and 5-/5 on right*     Finger flexion - 5/5 on left and 5/5 on right  Finger extension - 5/5 on left and 5/5 on right  Finger abduction - 5/5 on left and 5/5 on right  Finger adduction - 5/5 on left and 5/5 on right     Thumb flexion - 5/5 on left and 5/5 on right  Thumb extension - 5/5 on left and 5/5 on right*  Thumb abduction - 5/5 on left and 5/5 on right*  Thumb adduction - 5/5 on left and 5/5 on right  Thumb opposition - 5/5 on left and 5/5 on right     Special Tests:     Tinel's test at wrist - negative  Carpal compression test - negative     Tinel's test of Guyon's canal - negative     No laxity with distal radioulnar joint translation.  Negative Wangs test.   + distal row clicking and pain with sheer testing     No laxity with phalangeal varus/valgus stress testing.     Finkelsteins test - positive     Axial grind of first CMC joint - negative  No laxity at the MCP UCL of the thumb     Normal fist alignment of the phalanges  No laxity at the RCL and UCL of the PIPs and DIPs of the phalanges.  Normal finger flexion of the FDP and FDS in all phalanges bilaterally.  Able to perform OK sign.     Neurovascular Exam:  Sensation intact to light touch in the median, ulnar, and radial distributions on the hands bilaterally.  Radial and ulnar pulses intact and symmetric.  Capillary refill intact to <2 seconds in all digits    TART (Tissue texture abnormality, Asymmetry,  Restriction of motion and/or Tenderness) changes:     Thoracic Spine   T1 Neutral   T2 Neutral   T3 Neutral   T4 Neutral   T5 Neutral   T6 Neutral   T7 Neutral   T8 NS-left,R-right   T9 NS-left,R-right   T10 NS-left,R-right    T11 FRS LEFT   T12 TTA LEFT     Rib cage: R8 TTA on left    Abdomen: left hemidiaphragm TTA    Upper extremity:    Region Finding/resrtiction   Radial head Neutral   Ulna ADDucted on right   Distal Radiocapral Right   DRUJ Right   TFCC Neutral   Proximal carpal row Right, continual distortion (CD) fascial distortions   Distal carpal row Right   2nd and 3rd Carpal-metacarpal  Right   2nd and 3rd Metacarapal  Right      Lumbar Spine   L1 TTA LEFT   L2 Neutral   L3 Neutral   L4 FRS RIGHT   L5 FRS RIGHT       Pelvis:  Innominate:Right superior shear  Pubic bone:Right superior pubic shear    Sacrum:Left on Left sacral torsion    Lower extremity: bilateral anterior leslie    Key   F= Flexed   E = Extended   R = Rotated   S = Sidebent   TTA = tissue texture abnormality     IMAGIN. X-ray ordered due to bilateral knee pain. (AP bilateral standing, PA bilateral standing in flexion, bilateral merchants, and  bilateral lateral views) taken today.   2. X-ray images were reviewed personally by me and then directly with patient.  3. FINDINGS: X-ray images obtained demonstrate that the skeletal structures at the knees are intact with good alignment.  Joint spaces at both knees are satisfactory without significant cartilage loss or erosion.  No joint effusion detected.  4. IMPRESSION: No acute pathology or irregularities appreciated. No significant DJD changes noted.     Assessment:      Encounter Diagnoses   Name Primary?    Patellofemoral pain syndrome of both knees Yes    Rib pain on left side     Chronic left-sided thoracic back pain     Chronic pain of right wrist     Carpal instability of right wrist     Myalgia     Somatic dysfunction of thoracic region     Somatic dysfunction of rib cage region     Somatic dysfunction of lumbar region     Sacral region somatic dysfunction     Somatic dysfunction of pelvic region     Upper extremity somatic dysfunction     Somatic dysfunction of abdominal region     Somatic dysfunction  of lower extremity           Plan:   1. Bilateral knee pain (R>L) secondary to patellofemoral maltracking stemming from weak gluteal muscles and poor pelvic stability with compensatory muscle firing patterns.   - HME order for right Rashad-pull light patellar brace fitted for today by Tati MENESES - wear with increased activity  - Recommend Aleve 220 mg po BID with food x 14 days then prn for pain control  - Recommend Ice up to 20 minutes at a time prn for pain control  - Discussed exercise modifications,avoiding lunges, squats, jumping, or burpees  - OMT performed today to address biomechanical contributions to maltracking and HEP started  -  X-ray images of bilateral knee taken today (AP bilateral standing, PA bilateral standing in flexion, bilateral merchants, and  bilateral lateral views) showed  No acute pathology or irregularities appreciated. No significant DJD changes noted.  Images were personally reviewed with patient.    2. Recurrent chronic left sided rib pain - deteriorated.  Rib pain likely secondary to biomechanical restrictions of the upper kinetic chain with underlying history of MS being a possible contributing factor as well.    -  Continue baclofen, over-the-counter Tylenol, and over-the-counter NSAIDs as needed for pain control  - OMT performed again today to address associated biomechanical restrictions, but with all indirect techniques to avoid a recurrent flair,and HEP reviewed     3. Right wrist pain likely secondary overuse and associated biomehcancial resticions of the upper extremity, but with some proximal carpal row instability noted on exam today.   - continue NSAIDs, exercise modifications, and brace wear   - Recommend trying OTC wrist widget brace for increased support for yoga  - OMT performed today to address associated biomechanical restrictions    - consider follow-up with Dr. Moore if instability symptoms persist  -  X-ray images of right wrist taken 1/30/23 (AP, lateral,  and oblique views) showed no abnormalities, mineralized density adjacent to the ulnar styloid noted. Images were personally reviewed.    4. OMT 7-8 regions. Oral consent obtained.  Reviewed benefits and potential side effects.   - OMT indicated today due to signs and symptoms as well as local and remote somatic dysfunction findings and their related neurokinetic, lymphatic, fascial and/or arteriovenous body connections.   - OMT techniques used: Myofascial Release, Muscle Energy, Still's Technique, Balanced Ligamentous Tension, Fascial Distortion Model, and Articulatory   - Treatment was tolerated well. Improvement noted in segmental mobility post-treatment in dysfunctional regions. There were no adverse events and no complications immediately following treatment.     5. Pt. Given the following HEP:  A) Clam shell exercises bilaterally: hold leg in abducted and externally rotated position for 5-10 seconds, repeat 5-10 times  B)  Pelvic clock exercises given to do from the 6-12 o'clock positions:10-15 reps, twice daily. Hand out of exercise also given.   C) Diaphragmatic breathing exercises: 10-15 reps of inhalation and exhalation, focusing inhaling into the abdomen and lower ribs. Repeat 2-3 times a day. Handout given.   D) Lower abdominal muscle isotonic exercises: Rotate pelvis into the 6 o'clock position and holding it to engage lower abdominal muscles. Then lift up leg, one at a time, alternating for 10-15 reps. Repeat exercises 1-2 times daily. Handout given.     42755 HOME EXERCISE PROGRAM (HEP):  The patient was taught a homegoing physical therapy regimen as described above. The patient demonstrated understanding of the exercises and proper technique of their execution. This interaction took 15 minutes.     6. Follow-up in 4 weeks for reevaluation    7. Patient agreeable to today's plan and all questions were answered    This note is dictated using the M*Modal Fluency Direct word recognition program. There are  word recognition mistakes that are occasionally missed on review.    Total time spent face-to face with patient counseling or coordinating care including prognosis, differential diagnosis, risks and benefits of treatment, instructions, compliance risk reductions as well as non-face-to-face time personally spent reviewing medial record, medical documentation, and coordination of care.     EST MINUTES X   14226 10-19    80950 20-29    66594 30-39    05600 40-54 x   NEW     23412 15-29    18264 30-44    85727 45-59    [id number] 60-74    PHONE      5-10    [id number] 11-20    54298 21-30

## 2023-06-07 ENCOUNTER — INFUSION (OUTPATIENT)
Dept: INFUSION THERAPY | Facility: HOSPITAL | Age: 58
End: 2023-06-07
Payer: COMMERCIAL

## 2023-06-07 VITALS
SYSTOLIC BLOOD PRESSURE: 113 MMHG | HEART RATE: 73 BPM | DIASTOLIC BLOOD PRESSURE: 71 MMHG | RESPIRATION RATE: 18 BRPM | TEMPERATURE: 98 F

## 2023-06-07 DIAGNOSIS — G35 MS (MULTIPLE SCLEROSIS): Primary | ICD-10-CM

## 2023-06-07 PROCEDURE — 96366 THER/PROPH/DIAG IV INF ADDON: CPT

## 2023-06-07 PROCEDURE — 96367 TX/PROPH/DG ADDL SEQ IV INF: CPT

## 2023-06-07 PROCEDURE — 96365 THER/PROPH/DIAG IV INF INIT: CPT

## 2023-06-07 PROCEDURE — 25000003 PHARM REV CODE 250: Performed by: PSYCHIATRY & NEUROLOGY

## 2023-06-07 PROCEDURE — 63600175 PHARM REV CODE 636 W HCPCS: Performed by: PSYCHIATRY & NEUROLOGY

## 2023-06-07 PROCEDURE — 96375 TX/PRO/DX INJ NEW DRUG ADDON: CPT

## 2023-06-07 RX ORDER — SODIUM CHLORIDE 0.9 % (FLUSH) 0.9 %
10 SYRINGE (ML) INJECTION
Status: CANCELLED | OUTPATIENT
Start: 2023-10-18

## 2023-06-07 RX ORDER — ACETAMINOPHEN 500 MG
1000 TABLET ORAL
Status: CANCELLED | OUTPATIENT
Start: 2023-10-18 | End: 2023-10-18

## 2023-06-07 RX ORDER — FAMOTIDINE 10 MG/ML
20 INJECTION INTRAVENOUS
Status: COMPLETED | OUTPATIENT
Start: 2023-06-07 | End: 2023-06-07

## 2023-06-07 RX ORDER — SODIUM CHLORIDE 0.9 % (FLUSH) 0.9 %
10 SYRINGE (ML) INJECTION
Status: DISCONTINUED | OUTPATIENT
Start: 2023-06-07 | End: 2023-06-07 | Stop reason: HOSPADM

## 2023-06-07 RX ORDER — HEPARIN 100 UNIT/ML
100 SYRINGE INTRAVENOUS
Status: DISCONTINUED | OUTPATIENT
Start: 2023-06-07 | End: 2023-06-07 | Stop reason: HOSPADM

## 2023-06-07 RX ORDER — HEPARIN 100 UNIT/ML
100 SYRINGE INTRAVENOUS
Status: CANCELLED | OUTPATIENT
Start: 2023-10-18

## 2023-06-07 RX ORDER — ACETAMINOPHEN 500 MG
1000 TABLET ORAL
Status: COMPLETED | OUTPATIENT
Start: 2023-06-07 | End: 2023-06-07

## 2023-06-07 RX ADMIN — OCRELIZUMAB 600 MG: 300 INJECTION INTRAVENOUS at 11:06

## 2023-06-07 RX ADMIN — SODIUM CHLORIDE: 9 INJECTION, SOLUTION INTRAVENOUS at 10:06

## 2023-06-07 RX ADMIN — DEXTROSE: 50 INJECTION, SOLUTION INTRAVENOUS at 11:06

## 2023-06-07 RX ADMIN — ACETAMINOPHEN 1000 MG: 500 TABLET ORAL at 10:06

## 2023-06-07 RX ADMIN — DIPHENHYDRAMINE HYDROCHLORIDE 50 MG: 50 INJECTION, SOLUTION INTRAMUSCULAR; INTRAVENOUS at 10:06

## 2023-06-07 RX ADMIN — FAMOTIDINE 20 MG: 10 INJECTION INTRAVENOUS at 10:06

## 2023-06-07 NOTE — PLAN OF CARE
1551 pt tolerated ocrevus, VSS throughout treatment. Pt opted to only staying 30 mins post obs period instead of 1hr. Pt voiced no new complaints or concerns at this time. No signs of adverse reaction noted. Pt d/c home.

## 2023-06-28 ENCOUNTER — OFFICE VISIT (OUTPATIENT)
Dept: SPORTS MEDICINE | Facility: CLINIC | Age: 58
End: 2023-06-28
Payer: COMMERCIAL

## 2023-06-28 VITALS — BODY MASS INDEX: 26.87 KG/M2 | WEIGHT: 146 LBS | HEIGHT: 62 IN

## 2023-06-28 DIAGNOSIS — R07.81 RIB PAIN ON LEFT SIDE: ICD-10-CM

## 2023-06-28 DIAGNOSIS — M99.09 SOMATIC DYSFUNCTION OF ABDOMINAL REGION: ICD-10-CM

## 2023-06-28 DIAGNOSIS — M79.10 MYALGIA: ICD-10-CM

## 2023-06-28 DIAGNOSIS — M99.08 SOMATIC DYSFUNCTION OF RIB CAGE REGION: ICD-10-CM

## 2023-06-28 DIAGNOSIS — M99.03 SOMATIC DYSFUNCTION OF LUMBAR REGION: ICD-10-CM

## 2023-06-28 DIAGNOSIS — M99.01 CERVICAL (NECK) REGION SOMATIC DYSFUNCTION: ICD-10-CM

## 2023-06-28 DIAGNOSIS — M99.06 SOMATIC DYSFUNCTION OF LOWER EXTREMITY: ICD-10-CM

## 2023-06-28 DIAGNOSIS — G89.29 CHRONIC LEFT-SIDED THORACIC BACK PAIN: ICD-10-CM

## 2023-06-28 DIAGNOSIS — G35 MS (MULTIPLE SCLEROSIS): ICD-10-CM

## 2023-06-28 DIAGNOSIS — M22.2X1 PATELLOFEMORAL PAIN SYNDROME OF BOTH KNEES: Primary | ICD-10-CM

## 2023-06-28 DIAGNOSIS — M99.02 SOMATIC DYSFUNCTION OF THORACIC REGION: ICD-10-CM

## 2023-06-28 DIAGNOSIS — M54.6 CHRONIC LEFT-SIDED THORACIC BACK PAIN: ICD-10-CM

## 2023-06-28 DIAGNOSIS — M99.05 SOMATIC DYSFUNCTION OF PELVIC REGION: ICD-10-CM

## 2023-06-28 DIAGNOSIS — M99.00 SOMATIC DYSFUNCTION OF HEAD REGION: ICD-10-CM

## 2023-06-28 DIAGNOSIS — M22.2X2 PATELLOFEMORAL PAIN SYNDROME OF BOTH KNEES: Primary | ICD-10-CM

## 2023-06-28 PROCEDURE — 3008F PR BODY MASS INDEX (BMI) DOCUMENTED: ICD-10-PCS | Mod: CPTII,S$GLB,, | Performed by: NEUROMUSCULOSKELETAL MEDICINE & OMM

## 2023-06-28 PROCEDURE — 99999 PR PBB SHADOW E&M-EST. PATIENT-LVL III: ICD-10-PCS | Mod: PBBFAC,,, | Performed by: NEUROMUSCULOSKELETAL MEDICINE & OMM

## 2023-06-28 PROCEDURE — 99999 PR PBB SHADOW E&M-EST. PATIENT-LVL III: CPT | Mod: PBBFAC,,, | Performed by: NEUROMUSCULOSKELETAL MEDICINE & OMM

## 2023-06-28 PROCEDURE — 97110 THERAPEUTIC EXERCISES: CPT | Mod: S$GLB,,, | Performed by: NEUROMUSCULOSKELETAL MEDICINE & OMM

## 2023-06-28 PROCEDURE — 99214 PR OFFICE/OUTPT VISIT, EST, LEVL IV, 30-39 MIN: ICD-10-PCS | Mod: 25,S$GLB,, | Performed by: NEUROMUSCULOSKELETAL MEDICINE & OMM

## 2023-06-28 PROCEDURE — 98928 PR OSTEOPATHIC MANIP,7-8 BODY REGN: ICD-10-PCS | Mod: S$GLB,,, | Performed by: NEUROMUSCULOSKELETAL MEDICINE & OMM

## 2023-06-28 PROCEDURE — 1159F PR MEDICATION LIST DOCUMENTED IN MEDICAL RECORD: ICD-10-PCS | Mod: CPTII,S$GLB,, | Performed by: NEUROMUSCULOSKELETAL MEDICINE & OMM

## 2023-06-28 PROCEDURE — 3008F BODY MASS INDEX DOCD: CPT | Mod: CPTII,S$GLB,, | Performed by: NEUROMUSCULOSKELETAL MEDICINE & OMM

## 2023-06-28 PROCEDURE — 99214 OFFICE O/P EST MOD 30 MIN: CPT | Mod: 25,S$GLB,, | Performed by: NEUROMUSCULOSKELETAL MEDICINE & OMM

## 2023-06-28 PROCEDURE — 97110 PR THERAPEUTIC EXERCISES: ICD-10-PCS | Mod: S$GLB,,, | Performed by: NEUROMUSCULOSKELETAL MEDICINE & OMM

## 2023-06-28 PROCEDURE — 1159F MED LIST DOCD IN RCRD: CPT | Mod: CPTII,S$GLB,, | Performed by: NEUROMUSCULOSKELETAL MEDICINE & OMM

## 2023-06-28 PROCEDURE — 98928 OSTEOPATH MANJ 7-8 REGIONS: CPT | Mod: S$GLB,,, | Performed by: NEUROMUSCULOSKELETAL MEDICINE & OMM

## 2023-06-28 NOTE — PROGRESS NOTES
"Subjective:     Hien Jeter     Chief Complaint   Patient presents with    Follow-up         HPI      Hien is a 58 y.o. female coming in today for bilateral R>L knee, ribcage pain. Since last visit the pain has Improved in her knees and remained unchanged in her ribs. Notes her ribcage pain is "up and down". Good relief with OMT and HEP but this is temporary. Not currently having knee pain, just occasional "popping".  The pain is better with rest, voltaren gel and worse with squats, bike, swimming (tawanna breast stroke).  Pt. describes the pain as a 3/10 achy pain that does not radiate. There has not been any new a fall/injury/ or traumas since last visit.  Pt. denies any new musculoskeletal complaints at this time.     Office note from 5/31/23 reviewed    Joint instability? yes  Mechanical locking/clicking? yes  Affecting ADL's? yes  Affecting sleep? yes    Occupation: retired    Review of Systems   Constitutional:  Negative for chills and fever.   Musculoskeletal:  Positive for joint pain and myalgias. Negative for back pain, falls and neck pain.   Neurological:  Negative for dizziness, tingling, focal weakness, weakness and headaches.     PAST MEDICAL HISTORY:   Past Medical History:   Diagnosis Date    Anxiety     Basal cell carcinoma 2000    left eyebrow     Depression     Environmental allergies     Genetic testing 09/2022    [employer] 84-gene Multi-Cancer +RNA panel    Headache(784.0)     Multiple food allergies     Multiple sclerosis 2009     shoulder 03/2015    left     PAST SURGICAL HISTORY:   Past Surgical History:   Procedure Laterality Date    ARTHROSCOPIC DEBRIDEMENT OF ROTATOR CUFF Left 4/5/2019    Procedure: DEBRIDEMENT, ROTATOR CUFF, ARTHROSCOPIC;  Surgeon: Dc Moore Jr., MD;  Location: [facility];  Service: Orthopedics;  Laterality: Left;  need opus system (Julito notified)  video    ARTHROSCOPY OF SHOULDER WITH DECOMPRESSION OF SUBACROMIAL SPACE  4/5/2019    Procedure: ARTHROSCOPY, " SHOULDER, WITH SUBACROMIAL SPACE DECOMPRESSION;  Surgeon: Dc Moore Jr., MD;  Location: [facility] OR;  Service: Orthopedics;;    ARTHROSCOPY OF SHOULDER WITH DECOMPRESSION OF SUBACROMIAL SPACE Right 1/8/2021    Procedure: ARTHROSCOPY, SHOULDER, WITH SUBACROMIAL SPACE DECOMPRESSION;  Surgeon: Dc Moore Jr., MD;  Location: [facility] OR;  Service: Orthopedics;  Laterality: Right;  possible rot cuff repair  need opus system (Addy FITZGERALD notified per Brii 12/21, )  video    BASAL CELL CARCINOMA EXCISION  1998    COLONOSCOPY N/A 5/25/2017    Procedure: COLONOSCOPY;  Surgeon: Marielle Dietz MD;  Location: [facility] ENDO (4TH FLR);  Service: Endoscopy;  Laterality: N/A;  Patient requests PM.    PM prep.    COLONOSCOPY N/A 11/16/2022    Procedure: COLONOSCOPY;  Surgeon: Brijesh Caban MD;  Location: [facility] ENDO (4TH FLR);  Service: Endoscopy;  Laterality: N/A;  instr. via portal - PC  pre call complete-as    FOOT SURGERY Right 01/08/2019    LASIK  2000    SHOULDER SURGERY Left 04/05/2019    WISDOM TOOTH EXTRACTION       FAMILY HISTORY:   Family History   Problem Relation Age of Onset    Liver cancer Mother 84    Colon cancer Mother 50    Diabetes Mother     Osteoporosis Mother     Arrhythmia Father     Prostate cancer Father 75    Breast cancer Sister 50    Colon cancer Sister 59        myrisk negative 6/2021    Diabetes Brother     Dementia Maternal Grandmother     Lung cancer Maternal Grandfather         +smoker, chemical plants    Uterine cancer Paternal Grandmother     Lung cancer Paternal Grandfather         heavy smoker    Breast cancer Paternal Aunt     Breast cancer Paternal Aunt     Prostate cancer Paternal Uncle     Multiple sclerosis Neg Hx     Ovarian cancer Neg Hx      SOCIAL HISTORY:   Social History     Socioeconomic History    Marital status:    Occupational History     Employer: [employer]   Tobacco Use    Smoking status: Never    Smokeless tobacco: Never   Substance and Sexual  "Activity    Alcohol use: Yes     Comment: socially/ not weekly    Drug use: No    Sexual activity: Yes     Partners: Male     Birth control/protection: None     Comment: , menarche 15     MEDICATIONS:     Current Outpatient Medications:     baclofen (LIORESAL) 10 MG tablet, Take 10 mg by mouth 3 (three) times daily. prn, Disp: , Rfl:     cholecalciferol, vitamin D3, 3,000 unit Tab, Take 3,000 Units by mouth once daily. , Disp: , Rfl:     diclofenac sodium (VOLTAREN) 1 % Gel, Apply 2 g topically 3 (three) times daily., Disp: 100 g, Rfl: 1    flaxseed oil 1,000 mg Cap, Take 1 capsule by mouth once daily. , Disp: , Rfl:     flu vacc [id number] 6mos up,PF, (FLUARIX QUAD 9413-4065, PF,) 60 mcg (15 mcg x 4)/0.5 mL Syrg, inject into arm (Patient not taking: Reported on 5/31/2023), Disp: 0.5 mL, Rfl: 0    multivitamin-Ca-iron-minerals 27-0.4 mg Tab, Take 1 tablet by mouth once daily. , Disp: , Rfl:     vitamin E 400 UNIT capsule, Take 400 Units by mouth 2 (two) times a day., Disp: , Rfl:   ALLERGIES:   Review of patient's allergies indicates:   Allergen Reactions    Diclofenac Nausea Only    Ibuprofen Other (See Comments)    Tramadol Other (See Comments)     Nausea      Objective:     VITAL SIGNS: Ht 5' 2" (1.575 m)   Wt 66.2 kg (146 lb)   LMP 04/24/2017   BMI 26.70 kg/m²    General    Vitals reviewed.  Constitutional: She is oriented to person, place, and time. She appears well-developed and well-nourished.   Neurological: She is alert and oriented to person, place, and time.   Psychiatric: She has a normal mood and affect. Her behavior is normal.         MUSCULOSKELETAL EXAM    + R8-9 tenderness and thoracic paraspinal tenderness on left    Cervical Spine: left cervical region     Observation:    Posture:  Posterior pelvis tilt with loss of lumbar lordosis  No obvious shoulder un-leveling while standing.    No edema, erythema, or ecchymosis noted in cervical and thoraic spine regions.    No midline skin " abnormalities.    No atrophy of upper limb musculature.  Gait: Non-antalgic      Tenderness:  No tenderness throughout the cervical spine upon spinous process palpation  No tenderness over the base of the occiput  No bony deformities or step-offs palpated.   No trigger point tenderness of the right upper trapezius musculature   + left rib 8-9 tenderness   No thoracic paraspinal muscle tenderness    Bilateral KNEE EXAMINATION     Observation:  No edema, erythema, ecchymosis, or effusion noted.  No muscle atrophy of the thighs and calves noted.  No obvious bony deformities noted.   No Genu valgus/varum noted.  No recurvatum noted.    No tibial internal/external torsion.    Posture:  Posterior pelvis tilt with loss of lumbar lordosis  Gait: Non-antalgic with Neutral ankle mechanics and Neutral medial arch  improved bilateral pelvic stability without bilateral hip hiking compensatory pattern noted with single leg raise    Tenderness:  Patella - none       Lateral joint line - none  Quad tendon - none   Medial joint line - none  Patellar tendon - none   Medial plica - none  Tibial tubercle - none   Lateral plica - none  Pes anserine - none   MCL prox - none  Distal ITB - none   MCL distal - none  MFC - none    LCL prox - none  LFC - none    LCL distal - none  Tibia - none    Fibula - none    No obvious bursae, plicae, popliteal cysts, or tendon derangement palpated.          ROM (* = with pain):   Active extension to 0° on left without hyperextension, lag, or patellar J sign. + mild clicking noted  Active extension to 0° on right without hyperextension, lag, or patellar J sign.  + mild clicking noted. Active flexion to 135° on left and 135° on right    Strength(* = with pain):  Knee Flexion - 5/5 on left and 5/5 on right  Knee Extension - 5/5 on left and 5/5 on right  Hip Flexion - 5/5 on left and 5/5 on right  Hip Extension - 5/5 on left and 5/5 on right  Ankle dorsiflexion - 5/5 on left and 5/5 on right  Ankle  Plantarflexion - 5/5 on left and 5/5 on right  glutaeus medius - 5/5 on left and 5/5 on right    Patellofemoral Exam:   Patellar ballottement - negative  Bulge sign - negative  Patellar grind - negative    No patellar laxity with medial and lateral translation   No apprehension with medial and lateral patellar translation.     Meniscus Testing:     No pain with terminal extension and flexion at joint lines.  Bounce home test - negative    Ligament Testing:  Lachman's test - negative  No laxity with anterior drawer.  No laxity with posterior drawer.    No posterior sag sign.   No laxity with varus testing at 0 and 30 degrees.  No laxity with valgus testing at 0 and 30 degrees.    IT band testing:  Diannes test - negative   Noble Compression test - negative    Neurovascular Examination:   Normal gait without antalgia.  Sensation intact to light touch in the obturator, lateral/intermediate/medial/posterior femoral cutaneous, saphenous, and common peroneal nerves bilaterally.  Motor Function:    Fully intact motor function at hip, knee, foot and ankle.  Negative seated straight leg raise bilaterally.    Pulses intact at the DP and PT arteries bilaterally.    Capillary refill intact <2 seconds in all toes bilaterally.    TART (Tissue texture abnormality, Asymmetry,  Restriction of motion and/or Tenderness) changes:    Head: Occipitoatlantal (OA) Joint ES-left, R-right     Cervical Spine   C1 TTA RIGHT   C2 TTA RIGHT   C3 FRS LEFT   C4 FRS LEFT   C5 FRS RIGHT   C6 Neutral   C7 Neutral      Thoracic Spine   T1 Neutral   T2 Neutral   T3 Neutral   T4 Neutral   T5 Neutral   T6 Neutral   T7 Neutral   T8 FRS LEFT   T9 FRS LEFT   T10 NS-left,R-right   T11 NS-left,R-right   T12 NS-left,R-right     Rib cage: R8 TTA on left, R9 lateral tender point    Abdomen: left hemidiaphragm TTA     Lumbar Spine   L1 TTA LEFT   L2 Neutral   L3 Neutral   L4 FRS RIGHT   L5 FRS RIGHT     Pelvis:  Innominate:Right anterior rotation  Pubic bone:Right  inferior pubic shear    Sacrum:Neutral    Lower extremity: bilateral anterior leslie    Key   F= Flexed   E = Extended   R = Rotated   S = Sidebent   TTA = tissue texture abnormality     Assessment:      Encounter Diagnoses   Name Primary?    Patellofemoral pain syndrome of both knees Yes    Rib pain on left side     Chronic left-sided thoracic back pain     Myalgia     MS (multiple sclerosis)     Somatic dysfunction of head region     Cervical (neck) region somatic dysfunction     Somatic dysfunction of thoracic region     Somatic dysfunction of rib cage region     Somatic dysfunction of lumbar region     Somatic dysfunction of pelvic region     Somatic dysfunction of abdominal region             Plan:   1. Bilateral knee pain (R>L) secondary to patellofemoral maltracking stemming from weak gluteal muscles and poor pelvic stability with compensatory muscle firing patterns- improved  - Continue right Rashad-pull light patellar brace wear with increased activity  - Recommend Aleve 220 mg po BID with food prn for pain control  - Recommend Ice up to 20 minutes at a time prn for pain control  - Discussed exercise modifications, with slow progression back to modified lunges and squats once HEP progression completed  - OMT performed again today to address biomechanical contributions to maltracking and HEP reviewed  -  X-ray images of bilateral knee taken 5/30/23 (AP bilateral standing, PA bilateral standing in flexion, bilateral merchants, and  bilateral lateral views) showed  No acute pathology or irregularities appreciated. No significant DJD changes noted.     2. Recurrent chronic left sided rib pain - deteriorated.  Rib pain likely secondary to biomechanical restrictions of the upper kinetic chain and left diaphragm restrictions with underlying history of MS being a possible contributing factor as well.    -  Continue baclofen, over-the-counter Tylenol, and over-the-counter NSAIDs as needed for pain control  - Referral to  outpatient PT ([facility] location) for postural retraining, neuromuscular retraining, and diaphragm retraining  - OMT performed again today to address associated biomechanical restrictions and HEP reviewed     3. OMT 7-8 regions. Oral consent obtained.  Reviewed benefits and potential side effects.   - OMT indicated today due to signs and symptoms as well as local and remote somatic dysfunction findings and their related neurokinetic, lymphatic, fascial and/or arteriovenous body connections.   - OMT techniques used: Myofascial Release, Muscle Energy, Still's Technique, Balanced Ligamentous Tension, Fascial Distortion Model, and Articulatory   - Treatment was tolerated well. Improvement noted in segmental mobility post-treatment in dysfunctional regions. There were no adverse events and no complications immediately following treatment.     4. Reviewed with patient the following HEP:  Continue:  A) Clam shell exercises bilaterally: hold leg in abducted and externally rotated position for 5-10 seconds, repeat 5-10 times  B)  Pelvic clock exercises given to do from the 6-12 o'clock positions:10-15 reps, twice daily. Hand out of exercise also given.   C) Diaphragmatic breathing exercises: 10-15 reps of inhalation and exhalation, focusing inhaling into the abdomen and lower ribs. Repeat 2-3 times a day. Handout given.   D) Lower abdominal muscle isotonic exercises: Rotate pelvis into the 6 o'clock position and holding it to engage lower abdominal muscles. Then lift up leg, one at a time, alternating for 10-15 reps. Repeat exercises 1-2 times daily. Handout given.   ADD  E) Abduction resistance band squats: 10-15 reps, 1-2 times a day  Then add  F) Abduction resistant band walks:10-15 reps, work up to 3 sets, 3 times a week    [address]212 HOME EXERCISE PROGRAM (HEP):  The patient was taught a homegoing physical therapy regimen as described above. The patient demonstrated understanding of the exercises and proper technique of their  execution. This interaction took 15 minutes.     5. Follow-up upon completion of PT if pain persist or deteriorates    6. Patient agreeable to today's plan and all questions were answered    This note is dictated using the M*Modal Fluency Direct word recognition program. There are word recognition mistakes that are occasionally missed on review.    Total time spent face-to face with patient counseling or coordinating care including prognosis, differential diagnosis, risks and benefits of treatment, instructions, compliance risk reductions as well as non-face-to-face time personally spent reviewing medial record, medical documentation, and coordination of care.     EST MINUTES X   73425 10-19    06236 20-29    [id number] 30-39 x   [id number] 40-54    NEW     01520 15-29    [id number] 30-44    92273 45-59    [id number] 60-74    PHONE      5-10    [id number] 11-20    [id number] 21-30

## 2023-07-03 ENCOUNTER — HOSPITAL ENCOUNTER (OUTPATIENT)
Dept: RADIOLOGY | Facility: HOSPITAL | Age: 58
Discharge: HOME | End: 2023-07-03
Attending: NURSE PRACTITIONER
Payer: COMMERCIAL

## 2023-07-03 DIAGNOSIS — R92.30 DENSE BREAST TISSUE ON MAMMOGRAM: ICD-10-CM

## 2023-07-03 DIAGNOSIS — Z91.89 AT HIGH RISK FOR BREAST CANCER: ICD-10-CM

## 2023-07-03 DIAGNOSIS — Z80.3 FAMILY HISTORY OF BREAST CANCER: ICD-10-CM

## 2023-07-03 PROCEDURE — 77067 MAMMO DIGITAL SCREENING BILAT WITH TOMO: ICD-10-PCS | Mod: 26,,, | Performed by: RADIOLOGY

## 2023-07-03 PROCEDURE — 77063 MAMMO DIGITAL SCREENING BILAT WITH TOMO: ICD-10-PCS | Mod: 26,,, | Performed by: RADIOLOGY

## 2023-07-03 PROCEDURE — 77063 BREAST TOMOSYNTHESIS BI: CPT | Mod: 26,,, | Performed by: RADIOLOGY

## 2023-07-03 PROCEDURE — 77067 SCR MAMMO BI INCL CAD: CPT | Mod: 26,,, | Performed by: RADIOLOGY

## 2023-07-03 PROCEDURE — 77067 SCR MAMMO BI INCL CAD: CPT | Mod: TC

## 2023-07-21 ENCOUNTER — PATIENT MESSAGE (OUTPATIENT)
Dept: PSYCHIATRY | Facility: CLINIC | Age: 58
End: 2023-07-21
Payer: COMMERCIAL

## 2023-07-25 ENCOUNTER — CLINICAL SUPPORT (OUTPATIENT)
Dept: REHABILITATION | Facility: HOSPITAL | Age: 58
End: 2023-07-25
Payer: COMMERCIAL

## 2023-07-25 DIAGNOSIS — M54.6 CHRONIC LEFT-SIDED THORACIC BACK PAIN: ICD-10-CM

## 2023-07-25 DIAGNOSIS — R07.81 RIB PAIN ON LEFT SIDE: ICD-10-CM

## 2023-07-25 DIAGNOSIS — M79.10 MYALGIA: ICD-10-CM

## 2023-07-25 DIAGNOSIS — G89.29 CHRONIC LEFT-SIDED THORACIC BACK PAIN: ICD-10-CM

## 2023-07-25 PROCEDURE — 97162 PT EVAL MOD COMPLEX 30 MIN: CPT | Mod: PO

## 2023-07-25 PROCEDURE — 97140 MANUAL THERAPY 1/> REGIONS: CPT | Mod: PO

## 2023-07-25 PROCEDURE — 97110 THERAPEUTIC EXERCISES: CPT | Mod: PO
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
## 2023-08-09 ENCOUNTER — OFFICE VISIT (OUTPATIENT)
Dept: INTERNAL MEDICINE | Facility: CLINIC | Age: 58
End: 2023-08-09
Payer: COMMERCIAL

## 2023-08-09 ENCOUNTER — LAB VISIT (OUTPATIENT)
Dept: LAB | Facility: HOSPITAL | Age: 58
End: 2023-08-09
Attending: INTERNAL MEDICINE
Payer: COMMERCIAL

## 2023-08-09 VITALS
SYSTOLIC BLOOD PRESSURE: 104 MMHG | TEMPERATURE: 97 F | WEIGHT: 144.38 LBS | HEIGHT: 62 IN | BODY MASS INDEX: 26.57 KG/M2 | OXYGEN SATURATION: 99 % | DIASTOLIC BLOOD PRESSURE: 80 MMHG | RESPIRATION RATE: 18 BRPM | HEART RATE: 80 BPM

## 2023-08-09 DIAGNOSIS — Z00.00 ANNUAL PHYSICAL EXAM: ICD-10-CM

## 2023-08-09 DIAGNOSIS — Z79.899 IMMUNOSUPPRESSION DUE TO DRUG THERAPY: ICD-10-CM

## 2023-08-09 DIAGNOSIS — G35 MS (MULTIPLE SCLEROSIS): ICD-10-CM

## 2023-08-09 DIAGNOSIS — D84.821 IMMUNOSUPPRESSION DUE TO DRUG THERAPY: ICD-10-CM

## 2023-08-09 DIAGNOSIS — F41.9 ANXIETY: ICD-10-CM

## 2023-08-09 DIAGNOSIS — Z00.00 ANNUAL PHYSICAL EXAM: Primary | ICD-10-CM

## 2023-08-09 LAB
CHOLEST SERPL-MCNC: 253 MG/DL (ref 120–199)
CHOLEST/HDLC SERPL: 4 {RATIO} (ref 2–5)
HDLC SERPL-MCNC: 64 MG/DL (ref 40–75)
HDLC SERPL: 25.3 % (ref 20–50)
LDLC SERPL CALC-MCNC: 165.8 MG/DL (ref 63–159)
NONHDLC SERPL-MCNC: 189 MG/DL
TRIGL SERPL-MCNC: 116 MG/DL (ref 30–150)
TSH SERPL DL<=0.005 MIU/L-ACNC: 3.38 UIU/ML (ref 0.4–4)

## 2023-08-09 PROCEDURE — 84443 ASSAY THYROID STIM HORMONE: CPT | Performed by: INTERNAL MEDICINE

## 2023-08-09 PROCEDURE — 99396 PR PREVENTIVE VISIT,EST,40-64: ICD-10-PCS | Mod: S$GLB,,, | Performed by: INTERNAL MEDICINE

## 2023-08-09 PROCEDURE — 99396 PREV VISIT EST AGE 40-64: CPT | Mod: S$GLB,,, | Performed by: INTERNAL MEDICINE

## 2023-08-09 PROCEDURE — 3074F PR MOST RECENT SYSTOLIC BLOOD PRESSURE < 130 MM HG: ICD-10-PCS | Mod: CPTII,S$GLB,, | Performed by: INTERNAL MEDICINE

## 2023-08-09 PROCEDURE — 3008F PR BODY MASS INDEX (BMI) DOCUMENTED: ICD-10-PCS | Mod: CPTII,S$GLB,, | Performed by: INTERNAL MEDICINE

## 2023-08-09 PROCEDURE — 3008F BODY MASS INDEX DOCD: CPT | Mod: CPTII,S$GLB,, | Performed by: INTERNAL MEDICINE

## 2023-08-09 PROCEDURE — 80061 LIPID PANEL: CPT | Performed by: INTERNAL MEDICINE

## 2023-08-09 PROCEDURE — 3079F DIAST BP 80-89 MM HG: CPT | Mod: CPTII,S$GLB,, | Performed by: INTERNAL MEDICINE

## 2023-08-09 PROCEDURE — 36415 COLL VENOUS BLD VENIPUNCTURE: CPT | Mod: PO | Performed by: INTERNAL MEDICINE

## 2023-08-09 PROCEDURE — 1160F RVW MEDS BY RX/DR IN RCRD: CPT | Mod: CPTII,S$GLB,, | Performed by: INTERNAL MEDICINE

## 2023-08-09 PROCEDURE — 99999 PR PBB SHADOW E&M-EST. PATIENT-LVL IV: ICD-10-PCS | Mod: PBBFAC,,, | Performed by: INTERNAL MEDICINE

## 2023-08-09 PROCEDURE — 1160F PR REVIEW ALL MEDS BY PRESCRIBER/CLIN PHARMACIST DOCUMENTED: ICD-10-PCS | Mod: CPTII,S$GLB,, | Performed by: INTERNAL MEDICINE

## 2023-08-09 PROCEDURE — 1159F PR MEDICATION LIST DOCUMENTED IN MEDICAL RECORD: ICD-10-PCS | Mod: CPTII,S$GLB,, | Performed by: INTERNAL MEDICINE

## 2023-08-09 PROCEDURE — 3074F SYST BP LT 130 MM HG: CPT | Mod: CPTII,S$GLB,, | Performed by: INTERNAL MEDICINE

## 2023-08-09 PROCEDURE — 1159F MED LIST DOCD IN RCRD: CPT | Mod: CPTII,S$GLB,, | Performed by: INTERNAL MEDICINE

## 2023-08-09 PROCEDURE — 99999 PR PBB SHADOW E&M-EST. PATIENT-LVL IV: CPT | Mod: PBBFAC,,, | Performed by: INTERNAL MEDICINE

## 2023-08-09 PROCEDURE — 3079F PR MOST RECENT DIASTOLIC BLOOD PRESSURE 80-89 MM HG: ICD-10-PCS | Mod: CPTII,S$GLB,, | Performed by: INTERNAL MEDICINE

## 2023-08-09 RX ORDER — CALCIUM CARBONATE 600 MG
600 TABLET ORAL ONCE
COMMUNITY

## 2023-08-09 NOTE — PROGRESS NOTES
Subjective     Patient ID: Hien Jeter is a 58 y.o. female.    Chief Complaint: Annual Exam    HPI  58 y.o. Female here for annual exam.      Vaccines: Influenza (2019); Tetanus (2019); Prevnar 13 (2019); Pneumococcal 23 (2020); Prevnar 20 (2022); Shingrix (will consider)  Eye exam: 2020  Mammogram: 7/23  Gyn exam: 4/22  Colonoscopy: 11/22- repeat in 5 yrs     Exercise: swims daily  Diet: regular     Past Medical History:  No date: Anxiety  2000: Basal cell carcinoma      Comment:  left eyebrow   No date: Depression  No date: Environmental allergies  No date: Headache(784.0)  No date: Multiple food allergies  2009: Multiple sclerosis  3/2015:  shoulder      Comment:  left  Past Surgical History:  4/5/2019: ARTHROSCOPIC DEBRIDEMENT OF ROTATOR CUFF; Left      Comment:  Procedure: DEBRIDEMENT, ROTATOR CUFF, ARTHROSCOPIC;                 Surgeon: Dc Moore Jr., MD;  Location: [facility];                 Service: Orthopedics;  Laterality: Left;  need opus                system (Julito notified)video  4/5/2019: ARTHROSCOPY OF SHOULDER WITH DECOMPRESSION OF SUBACROMIAL   SPACE      Comment:  Procedure: ARTHROSCOPY, SHOULDER, WITH SUBACROMIAL SPACE               DECOMPRESSION;  Surgeon: Dc Moore Jr., MD;                 Location: [facility] OR;  Service: Orthopedics;;  1/8/2021: ARTHROSCOPY OF SHOULDER WITH DECOMPRESSION OF SUBACROMIAL   SPACE; Right      Comment:  Procedure: ARTHROSCOPY, SHOULDER, WITH SUBACROMIAL SPACE               DECOMPRESSION;  Surgeon: Dc Moore Jr., MD;                 Location: [facility] OR;  Service: Orthopedics;  Laterality:                Right;  possible rot cuff repairneed opus system                (Addy FITZGERALD notified per Brii 12/21, )video  1998: BASAL CELL CARCINOMA EXCISION  5/25/2017: COLONOSCOPY; N/A      Comment:  Procedure: COLONOSCOPY;  Surgeon: Marielle Dietz MD;                 Location: [facility] ENDO (4TH FLR);  Service: Endoscopy;                 Laterality:  N/A;  Patient requests PM.PM prep.  01/08/2019: FOOT SURGERY; Right  2000: LASIK  04/05/2019: SHOULDER SURGERY; Left  No date: WISDOM TOOTH EXTRACTION  Social History    Socioeconomic History      Marital status:     Occupational History        Employer: [employer]    Tobacco Use      Smoking status: Never Smoker      Smokeless tobacco: Never Used    Substance and Sexual Activity      Alcohol use: Yes        Comment: socially/ not weekly      Drug use: No      Sexual activity: Yes        Partners: Male        Birth control/protection: None        Comment: , menarche 15     Review of patient's allergies indicates:   -- Diclofenac -- Nausea Only   -- Ibuprofen -- Other (See Comments)   -- Tramadol -- Other (See Comments)    --  Nausea  Review of Systems   Constitutional:  Negative for activity change, appetite change, chills, diaphoresis, fatigue, fever and unexpected weight change.   HENT:  Negative for nasal congestion, hearing loss, mouth sores, postnasal drip, rhinorrhea, sinus pressure/congestion, sneezing, sore throat, trouble swallowing and voice change.    Eyes:  Negative for pain, discharge and visual disturbance.   Respiratory:  Negative for cough, chest tightness, shortness of breath and wheezing.    Cardiovascular:  Negative for chest pain, palpitations and leg swelling.   Gastrointestinal:  Negative for abdominal pain, blood in stool, constipation, diarrhea, nausea and vomiting.   Endocrine: Negative for cold intolerance, heat intolerance, polydipsia and polyuria.   Genitourinary:  Negative for difficulty urinating, dysuria, frequency, hematuria, menstrual problem and urgency.   Musculoskeletal:  Negative for arthralgias, joint swelling, myalgias and neck pain.   Integumentary:  Negative for rash and wound.   Allergic/Immunologic: Negative for environmental allergies and food allergies.   Neurological:  Positive for headaches. Negative for dizziness, tremors, seizures,  syncope, weakness and light-headedness.   Hematological:  Negative for adenopathy. Does not bruise/bleed easily.   Psychiatric/Behavioral:  Negative for confusion, dysphoric mood, self-injury, sleep disturbance and suicidal ideas. The patient is nervous/anxious.           Objective     Physical Exam  Vitals and nursing note reviewed.   Constitutional:       General: She is not in acute distress.     Appearance: Normal appearance. She is well-developed. She is not diaphoretic.   HENT:      Head: Normocephalic and atraumatic.      Right Ear: External ear normal.      Left Ear: External ear normal.      Nose: Nose normal.      Mouth/Throat:      Pharynx: No oropharyngeal exudate.   Eyes:      General: No scleral icterus.        Right eye: No discharge.         Left eye: No discharge.      Conjunctiva/sclera: Conjunctivae normal.      Pupils: Pupils are equal, round, and reactive to light.   Neck:      Thyroid: No thyromegaly.      Vascular: No JVD.   Cardiovascular:      Rate and Rhythm: Normal rate and regular rhythm.      Pulses: Normal pulses.      Heart sounds: Normal heart sounds. No murmur heard.  Pulmonary:      Effort: Pulmonary effort is normal. No respiratory distress.      Breath sounds: Normal breath sounds. No wheezing, rhonchi or rales.   Chest:      Chest wall: No tenderness.   Abdominal:      General: Bowel sounds are normal. There is no distension.      Palpations: Abdomen is soft.      Tenderness: There is no abdominal tenderness. There is no guarding or rebound.   Musculoskeletal:      Cervical back: Neck supple.      Right lower leg: No edema.      Left lower leg: No edema.   Lymphadenopathy:      Cervical: No cervical adenopathy.   Skin:     General: Skin is warm and dry.      Coloration: Skin is not pale.      Findings: No rash.   Neurological:      General: No focal deficit present.      Mental Status: She is alert and oriented to person, place, and time.      Gait: Gait normal.   Psychiatric:          Behavior: Behavior normal.         Thought Content: Thought content normal.         Judgment: Judgment normal.            Assessment and Plan     1. Annual physical exam  -     Lipid Panel; Future; Expected date: 08/09/2023  -     TSH; Future; Expected date: 08/09/2023    2. MS (multiple sclerosis)  Overview:  Stable on Ocrevus-due January 2021      3. Anxiety    4. Immunosuppression due to drug therapy         Blood work reviewed with pt, needs TSH/Lipids      MS- stable, followed by Neuro      Anxiety- stable off Lexapro       F/u in 1 yr

## 2023-08-10 ENCOUNTER — TELEPHONE (OUTPATIENT)
Dept: INTERNAL MEDICINE | Facility: CLINIC | Age: 58
End: 2023-08-10
Payer: COMMERCIAL

## 2023-08-10 DIAGNOSIS — E78.5 HYPERLIPIDEMIA, UNSPECIFIED HYPERLIPIDEMIA TYPE: Primary | ICD-10-CM

## 2023-08-11 ENCOUNTER — CLINICAL SUPPORT (OUTPATIENT)
Dept: REHABILITATION | Facility: HOSPITAL | Age: 58
End: 2023-08-11
Payer: COMMERCIAL

## 2023-08-11 DIAGNOSIS — R07.81 RIB PAIN ON LEFT SIDE: Primary | ICD-10-CM

## 2023-08-11 PROCEDURE — 97140 MANUAL THERAPY 1/> REGIONS: CPT | Mod: PO,CQ

## 2023-08-11 PROCEDURE — 97110 THERAPEUTIC EXERCISES: CPT | Mod: PO,CQ

## 2023-08-11 NOTE — PROGRESS NOTES
[facility]   Physical Therapy Treatment Note      Name: Hien Jeter  Clinic Number: [id number]    Therapy Diagnosis: No diagnosis found.  Physician: Sunita Duarte DO    Visit Date: 8/11/2023    Physician Orders: PT Eval and Treat   Medical Diagnosis from Referral:   R07.81 (ICD-10-CM) - Rib pain on left side   M54.6,G89.29 (ICD-10-CM) - Chronic left-sided thoracic back pain   M79.10 (ICD-10-CM) - Myalgia      Evaluation Date: 7/25/2023  Authorization Period Expiration: 12/31/23  Plan of Care Expiration: 10/31/23  Progress Note Due: 8/25/23  Visit # / Visits authorized: 1/20  FOTO: 1/ 3     Precautions: Standard     PTA Visit #: 1/5    Time In: 7:30 am  Time Out: 8:16 am  Total Billable Time: 46 minutes MT 1, TE 3    Subjective     Pt reports: tightness in the left shoulder blade when doing her exercises at home.   She was compliant with home exercise program.  Response to previous treatment: initial eval  Functional change: ongoing    Pain: 4/10 at rest - 6/10 with twisting movements  Location: left thoracic     Objective      Objective Measures updated at progress report unless specified.     Treatment     Hien received the treatments listed below:      therapeutic exercises to develop strength, endurance, ROM, flexibility, posture, and core stabilization for 38 minutes including:    Scapular retraction 2 x 10 hold 3 secs  shld extension YTB 3 x 10  shld row OTB 3 x 10  Supine stretch towel 3 min vertical and then horizontal T2-T4 level  Supine horizontal abduction 2 x 15 green T band  Supine diagonals with green Tband 2 x 10  Seated thoracic extension 10x5''  Standing open book with hips open and closed x10     Upper trap stretch 3 x 30 secs NP  Levator stretch 3 x 30 secs NP     Lumbar program:  PPT x 30  Small amplitude trunk rotation x 30     Bridges with add ball 3 x 10  Bridges with abd BTB 3 x 10  Supine clams BTB 3 x 10     Hip add/frog stretch 3 x 30 secs  Trunk rotation  stretch 3 x 30 secs  SKTC 3 x 30 secs  Samir test position hip flexor stretch 3 x 30 secs     manual therapy techniques: Joint mobilizations, Manual traction, Myofacial release, and Soft tissue Mobilization were applied to the: levator, teres minor, pectoralis minor, iliacus, psoas for 8 minutes, including:  Thoracic + rib mobs into rotation grade II/III  T2-T7  Lumbar grade II PA and UPA L1-L4  AC and SC bilateral shoulder grade III multidirectional.   GH traction  Hypervolt massage gun to thoracic paraspinals  Long axis and short axis traction     neuromuscular re-education activities to improve: Balance, Coordination, Kinesthetic, Sense, Proprioception, and Posture for 0 minutes. The following activities were included:    therapeutic activities to improve functional performance for 0 minutes, including:    gait training to improve functional mobility and safety for 0 minutes, including:    hot pack for 0 minutes.    Patient Education and Home Exercises       Education provided:   - HEP, pain management, findings    Written Home Exercises Provided: Patient instructed to cont prior HEP. Exercises were reviewed and Hien was able to demonstrate them prior to the end of the session.  Hien demonstrated good  understanding of the education provided. See EMR under Patient Instructions for exercises provided during therapy sessions    Assessment     Hien with good tolerance to first follow up after initial eval. She performed all exercises with good carryover indicating compliance with home exercise program. Patient reports increased muscle tightness in the upper back following shoulder diagonals but is able to perform all other activities well with no increase in pain. Patient reports tenderness on R side lumbar paraspinals but with positive response following manual therapy. Continue to monitor and progress as able.    Hien Is progressing well towards her goals.   Pt prognosis is Fair.     Pt will continue to  "benefit from skilled outpatient physical therapy to address the deficits listed in the problem list box on initial evaluation, provide pt/family education and to maximize pt's level of independence in the home and community environment.     Pt's spiritual, cultural and educational needs considered and pt agreeable to plan of care and goals.     Anticipated barriers to physical therapy: chronic nature and multiple joint and spine level impairments    Goals:   STG 5 weeks  1.  Pt to be independent with HEP for increased functional mobility and pain control.  2.  Pt to increase AROM at full thoracic and shoulder AROM without compensation or pain for increased functional mobility and transfers.  3.  Pt to decrease pain to less than 2/10 in thoracic spine after session for increased functional mobility.  4.  Pt to demonstrate B shoulder flexion and abduction to 170 without shoulder hiking and no pain at end range.      Long Term Goals: 10 weeks   1.  Pt to be independent with pain management strategies and verbalize 2 strategies for increased functional mobility and pain control.  2.  Pt to increase B shoulder abduction and ER 4+/5 for increased functional mobility and transfers.  3.  Pt to decrease pain to less than 2/10 after 30 min of swimming with all strokes include breast stroke for increased functional mobility.  4. Pt to perform full squat through available ROM to at least 18" from the ground without increased pain.     Plan     Continue PT POC.    Qian Gabriel PTA    "

## 2023-08-16 ENCOUNTER — CLINICAL SUPPORT (OUTPATIENT)
Dept: REHABILITATION | Facility: HOSPITAL | Age: 58
End: 2023-08-16
Payer: COMMERCIAL

## 2023-08-16 DIAGNOSIS — R07.82 INTERCOSTAL PAIN: Primary | ICD-10-CM

## 2023-08-16 DIAGNOSIS — M25.512 ACUTE PAIN OF LEFT SHOULDER: ICD-10-CM

## 2023-08-16 DIAGNOSIS — R53.1 DECREASED STRENGTH: ICD-10-CM

## 2023-08-16 DIAGNOSIS — R07.81 RIB PAIN ON LEFT SIDE: ICD-10-CM

## 2023-08-16 PROCEDURE — 97110 THERAPEUTIC EXERCISES: CPT | Mod: PO

## 2023-08-16 PROCEDURE — 97010 HOT OR COLD PACKS THERAPY: CPT | Mod: PO

## 2023-08-16 PROCEDURE — 97140 MANUAL THERAPY 1/> REGIONS: CPT | Mod: PO

## 2023-08-16 NOTE — PROGRESS NOTES
"[facility]   Physical Therapy Treatment Note      Name: Hien Jeter  Clinic Number: [id number]    Therapy Diagnosis: No diagnosis found.  Physician: Sunita Duarte DO    Visit Date: 8/16/2023    Physician Orders: PT Eval and Treat   Medical Diagnosis from Referral:   R07.81 (ICD-10-CM) - Rib pain on left side   M54.6,G89.29 (ICD-10-CM) - Chronic left-sided thoracic back pain   M79.10 (ICD-10-CM) - Myalgia      Evaluation Date: 7/25/2023  Authorization Period Expiration: 12/31/23  Plan of Care Expiration: 10/31/23  Progress Note Due: 8/25/23  Visit # / Visits authorized: 1/20  FOTO: 1/ 3     Precautions: Standard     PTA Visit #: 1/5    Time In: 800 am  Time Out: 8:46 am  Total Billable Time: 46 minutes MT 2, TE 2 (1:1 [id number] with tech support throughout session)  MHP x 10 min after session.     Subjective     Pt reports: tightness in the left shoulder blade when doing her exercises at home still  She was compliant with home exercise program.  Response to previous treatment: initial eval  Functional change: ongoing    Pain: 4.5/10 at rest - 1 /10 with twisting movements  Location: left thoracic     Objective      Objective Measures updated at progress report unless specified.     Treatment     Hien received the treatments listed below:      therapeutic exercises to develop strength, endurance, ROM, flexibility, posture, and core stabilization for 28 minutes including:    Scapular retraction 2 x 10 hold 3 secs  shld extension YTB 3 x 10  shld row OTB 3 x 10  Supine stretch towel 3 min vertical and then horizontal T2-T4 level  Supine horizontal abduction 2 x 15 green T band  Supine diagonals with green Tband 2 x 10  Seated thoracic extension 10x5''  Standing open book with hips open and closed x10   Standing child pose 10 x 5" to each side  Quadruped thread the needle and then reach for ceiling x 10 each side  Latissimus stretch at doorway 3 x 30 secs      Upper trap stretch 3 x 30 " secs NP  Levator stretch 3 x 30 secs NP     Lumbar program:  PPT x 30  Small amplitude trunk rotation x 30     Bridges with add ball 3 x 10  Bridges with abd BTB 3 x 10  Supine clams BTB 3 x 10     Hip add/frog stretch 3 x 30 secs  Trunk rotation stretch 3 x 30 secs  SKTC 3 x 30 secs  Samir test position hip flexor stretch 3 x 30 secs     manual therapy techniques: Joint mobilizations, Manual traction, Myofacial release, and Soft tissue Mobilization were applied to the: levator, teres minor, pectoralis minor, iliacus, psoas for 18 minutes, including:  Thoracic + rib mobs into rotation grade II/III  T2-T7  Lumbar grade II PA and UPA L1-L4  AC and SC bilateral shoulder grade III multidirectional.   GH traction  Hypervolt massage gun to thoracic paraspinals  Long axis and short axis traction     neuromuscular re-education activities to improve: Balance, Coordination, Kinesthetic, Sense, Proprioception, and Posture for 0 minutes. The following activities were included:    therapeutic activities to improve functional performance for 0 minutes, including:    gait training to improve functional mobility and safety for 0 minutes, including:    hot pack for 10 minutes to thoracolumbar region.    Patient Education and Home Exercises       Education provided:   - HEP, pain management, findings    Written Home Exercises Provided: Patient instructed to cont prior HEP. Exercises were reviewed and Hien was able to demonstrate them prior to the end of the session.  Hien demonstrated good  understanding of the education provided. See EMR under Patient Instructions for exercises provided during therapy sessions    Assessment     Hien with good outcome with mobs with increased overall thoracic rotation with all exercises after mobilization but needed cues and education to limit active range or reps as she increased tightness with periscapular muscle as she fatigues.     Hien Is progressing well towards her goals.   Pt prognosis  "is Fair.     Pt will continue to benefit from skilled outpatient physical therapy to address the deficits listed in the problem list box on initial evaluation, provide pt/family education and to maximize pt's level of independence in the home and community environment.     Pt's spiritual, cultural and educational needs considered and pt agreeable to plan of care and goals.     Anticipated barriers to physical therapy: chronic nature and multiple joint and spine level impairments    Goals:   STG 5 weeks  1.  Pt to be independent with HEP for increased functional mobility and pain control. progressing  2.  Pt to increase AROM at full thoracic and shoulder AROM without compensation or pain for increased functional mobility and transfers. progressing  3.  Pt to decrease pain to less than 2/10 in thoracic spine after session for increased functional mobility.  4.  Pt to demonstrate B shoulder flexion and abduction to 170 without shoulder hiking and no pain at end range.      Long Term Goals: 10 weeks   1.  Pt to be independent with pain management strategies and verbalize 2 strategies for increased functional mobility and pain control.  2.  Pt to increase B shoulder abduction and ER 4+/5 for increased functional mobility and transfers.  3.  Pt to decrease pain to less than 2/10 after 30 min of swimming with all strokes include breast stroke for increased functional mobility.  4. Pt to perform full squat through available ROM to at least 18" from the ground without increased pain.     Plan     Continue PT POC.    Sammi Rincon, PT      "

## 2023-08-22 NOTE — PROGRESS NOTES
"[facility]   Physical Therapy Treatment Note      Name: Hien Jeter  Clinic Number: [id number]    Therapy Diagnosis:   Encounter Diagnoses   Name Primary?    Intercostal pain [R07.82] Yes    Acute pain of left shoulder [M25.512]     Rib pain on left side [R07.81]      Physician: Sunita Duarte DO    Visit Date: 8/23/2023    Physician Orders: PT Eval and Treat   Medical Diagnosis from Referral:   R07.81 (ICD-10-CM) - Rib pain on left side   M54.6,G89.29 (ICD-10-CM) - Chronic left-sided thoracic back pain   M79.10 (ICD-10-CM) - Myalgia      Evaluation Date: 7/25/2023  Authorization Period Expiration: 12/31/23  Plan of Care Expiration: 10/31/23  Progress Note Due: 8/25/23  Visit # / Visits authorized: 3/20   FOTO: 1/ 3     Precautions: Standard     PTA Visit #: 1/5    Time In: 8:00 am  Time Out: 8:46 am  Total Billable Time: 46 minutes     Subjective     Pt reports: she has some "clicking" in her right shoulder during standing open books, but it isn't painful  She was compliant with home exercise program.  Response to previous treatment: initial eval  Functional change: ongoing    Pain: 2/10   Location: left ribs    Objective      Objective Measures updated at progress report unless specified.     Treatment     Hien received the treatments listed below:      therapeutic exercises to develop strength, endurance, ROM, flexibility, posture, and core stabilization for 38 minutes including:  Supine stretch towel 3 min vertical and then horizontal T2-T4 level  Supine horizontal abduction 2 x 15 green T band  Supine diagonals with green Tband 2 x 10  Seated thoracic extension 10x5''  Standing open book with hips open and closed x10   Standing child pose 10 x 5" to each side  Quadruped thread the needle and then reach for ceiling x 10 each side  Latissimus stretch at doorway 3 x 30 secs      Upper trap stretch 3 x 30 secs NP  Levator stretch 3 x 30 secs NP     Lumbar program:  PPT x 30  Small " "amplitude trunk rotation x 30     Bridges with add ball 3 x 10  Bridges with abd BTB 3 x 10  Supine clams BTB 3 x 10     Hip add/frog stretch 3 x 30 secs  Trunk rotation stretch 3 x 30 secs  SKTC 3 x 30 secs  Samir test position hip flexor stretch 3 x 30 secs     manual therapy techniques: Joint mobilizations, Manual traction, Myofacial release, and Soft tissue Mobilization were applied to the: levator, teres minor, pectoralis minor, iliacus, psoas for 00 minutes, including:  Thoracic + rib mobs into rotation grade II/III  T2-T7  Lumbar grade II PA and UPA L1-L4  AC and SC bilateral shoulder grade III multidirectional.   GH traction  Hypervolt massage gun to thoracic paraspinals  Long axis and short axis traction     neuromuscular re-education activities to improve: Balance, Coordination, Kinesthetic, Sense, Proprioception, and Posture for 08 minutes. The following activities were included:  Scapular retraction 2 x 10 hold 3 secs  shld extension GTB 3 x 10  shld row GTB 3 x 10    therapeutic activities to improve functional performance for 0 minutes, including:    gait training to improve functional mobility and safety for 0 minutes, including:    hot pack for 10 minutes to thoracolumbar region.    Patient Education and Home Exercises       Education provided:   - HEP, pain management, findings    Written Home Exercises Provided: Patient instructed to cont prior HEP. Exercises were reviewed and Hien was able to demonstrate them prior to the end of the session.  Hien demonstrated good  understanding of the education provided. See EMR under Patient Instructions for exercises provided during therapy sessions    Assessment     Hien presents to treatment with minimal pain in her ribs. Good tolerance to exercises with some mild "clicking" in her shoulder that was resolved with cueing for proper posture. She had mild discomfort in her thoracic spine following rotational movements, but no increase in rib pain. Continue " "to progress as tolerated.     Hien Is progressing well towards her goals.   Pt prognosis is Fair.     Pt will continue to benefit from skilled outpatient physical therapy to address the deficits listed in the problem list box on initial evaluation, provide pt/family education and to maximize pt's level of independence in the home and community environment.     Pt's spiritual, cultural and educational needs considered and pt agreeable to plan of care and goals.     Anticipated barriers to physical therapy: chronic nature and multiple joint and spine level impairments    Goals:   STG 5 weeks  1.  Pt to be independent with HEP for increased functional mobility and pain control. progressing  2.  Pt to increase AROM at full thoracic and shoulder AROM without compensation or pain for increased functional mobility and transfers. progressing  3.  Pt to decrease pain to less than 2/10 in thoracic spine after session for increased functional mobility.  4.  Pt to demonstrate B shoulder flexion and abduction to 170 without shoulder hiking and no pain at end range.      Long Term Goals: 10 weeks   1.  Pt to be independent with pain management strategies and verbalize 2 strategies for increased functional mobility and pain control.  2.  Pt to increase B shoulder abduction and ER 4+/5 for increased functional mobility and transfers.  3.  Pt to decrease pain to less than 2/10 after 30 min of swimming with all strokes include breast stroke for increased functional mobility.  4. Pt to perform full squat through available ROM to at least 18" from the ground without increased pain.     Plan     Continue PT POC.    Alessia Michelle, PTA      " DISPLAY PLAN FREE TEXT

## 2023-08-23 ENCOUNTER — CLINICAL SUPPORT (OUTPATIENT)
Dept: REHABILITATION | Facility: HOSPITAL | Age: 58
End: 2023-08-23
Payer: COMMERCIAL

## 2023-08-23 DIAGNOSIS — M25.512 ACUTE PAIN OF LEFT SHOULDER: ICD-10-CM

## 2023-08-23 DIAGNOSIS — R07.82 INTERCOSTAL PAIN: Primary | ICD-10-CM

## 2023-08-23 DIAGNOSIS — R07.81 RIB PAIN ON LEFT SIDE: ICD-10-CM

## 2023-08-23 PROCEDURE — 97112 NEUROMUSCULAR REEDUCATION: CPT | Mod: PO,CQ

## 2023-08-23 PROCEDURE — 97110 THERAPEUTIC EXERCISES: CPT | Mod: PO,CQ

## 2023-08-30 ENCOUNTER — CLINICAL SUPPORT (OUTPATIENT)
Dept: REHABILITATION | Facility: HOSPITAL | Age: 58
End: 2023-08-30
Payer: COMMERCIAL

## 2023-08-30 DIAGNOSIS — M62.81 MUSCLE WEAKNESS: Primary | ICD-10-CM

## 2023-08-30 DIAGNOSIS — R07.81 RIB PAIN ON LEFT SIDE: ICD-10-CM

## 2023-08-30 PROCEDURE — 97112 NEUROMUSCULAR REEDUCATION: CPT | Mod: PO

## 2023-08-30 PROCEDURE — 97140 MANUAL THERAPY 1/> REGIONS: CPT | Mod: PO

## 2023-08-30 PROCEDURE — 97110 THERAPEUTIC EXERCISES: CPT | Mod: PO

## 2023-08-30 NOTE — PROGRESS NOTES
"[facility]   Physical Therapy Treatment Note      Name: Hien Jeter  Clinic Number: [id number]    Therapy Diagnosis:   Encounter Diagnoses   Name Primary?    Muscle weakness Yes    Rib pain on left side        Physician: Sunita Duarte DO    Visit Date: 8/30/2023    Physician Orders: PT Eval and Treat   Medical Diagnosis from Referral:   R07.81 (ICD-10-CM) - Rib pain on left side   M54.6,G89.29 (ICD-10-CM) - Chronic left-sided thoracic back pain   M79.10 (ICD-10-CM) - Myalgia      Evaluation Date: 7/25/2023  Authorization Period Expiration: 12/29/23  Plan of Care Expiration: 10/31/23  Progress Note Due: 8/25/23  Visit # / Visits authorized: 4/20  FOTO: 1/ 3     Precautions: Standard     PTA Visit #: 0/5    Time In: 9:30  Time Out: 10:19  Total Billable Time: 49 minutes     Subjective     Pt reports: Sunday, Monday and Tuesday was not able to do all exercises because of increased pain. Feels she slept wrong on Saturday night.   She was compliant with home exercise program.  Response to previous treatment: felt fine   Functional change: able to swim this morning    Pain: 2/10   Location: left ribs    Objective      Objective Measures updated at progress report unless specified.     Treatment     Hien received the treatments listed below:      therapeutic exercises to develop strength, endurance, ROM, flexibility, posture, and core stabilization for 30 minutes including:  Supine stretch towel 3 min vertical and then horizontal T2-T4 level  Supine horizontal abduction 2 x 15 green T band  Supine diagonals with green Tband 2 x 10  Seated thoracic extension with feet on box 15x5''  Standing open book with hips open and closed x10   Standing child pose 10 x 5" to each side  Quadruped thread the needle and then reach for ceiling x 10 each side  Latissimus stretch at doorway 3 x 30 secs      Upper trap stretch 3 x 30 secs NP  Levator stretch 3 x 30 secs NP     Lumbar program:  PPT x 30  Small " amplitude trunk rotation x 30     Bridges with add ball 3 x 10  Bridges with abd BTB 3 x 10  Supine clams BTB 3 x 10     Hip add/frog stretch 3 x 30 secs  Trunk rotation stretch 3 x 30 secs  SKTC 3 x 30 secs  Samir test position hip flexor stretch 3 x 30 secs     manual therapy techniques: Joint mobilizations, Manual traction, Myofacial release, and Soft tissue Mobilization were applied to the: levator, teres minor, pectoralis minor, iliacus, psoas for 10 minutes, including:  Thoracic + rib mobs into rotation grade II/III  T2-T7  Thoracic CPA  B rib PA mob  Lumbar grade II PA and UPA L1-L4  AC and SC bilateral shoulder grade III multidirectional.   GH traction  Hypervolt massage gun to thoracic paraspinals  Long axis and short axis traction     neuromuscular re-education activities to improve: Balance, Coordination, Kinesthetic, Sense, Proprioception, and Posture for 9 minutes. The following activities were included:  Scapular retraction 2 x 10 hold 3 secs  shld extension GTB 3 x 10  shld row GTB 3 x 10    therapeutic activities to improve functional performance for 0 minutes, including:    gait training to improve functional mobility and safety for 0 minutes, including:    hot pack for 10 minutes to thoracolumbar region.    Patient Education and Home Exercises       Education provided:   - HEP, pain management, findings    Written Home Exercises Provided: Patient instructed to cont prior HEP. Exercises were reviewed and Hien was able to demonstrate them prior to the end of the session.  Hien demonstrated good  understanding of the education provided. See EMR under Patient Instructions for exercises provided during therapy sessions    Assessment     Hien with some tenderness in the spine at start of session that improves following manual therapy today. Overall, she reports good progress towards her goals. All exercises tolerated well. Continue to progress    Hien Is progressing well towards her goals.   Pt  "prognosis is Fair.     Pt will continue to benefit from skilled outpatient physical therapy to address the deficits listed in the problem list box on initial evaluation, provide pt/family education and to maximize pt's level of independence in the home and community environment.     Pt's spiritual, cultural and educational needs considered and pt agreeable to plan of care and goals.     Anticipated barriers to physical therapy: chronic nature and multiple joint and spine level impairments    Goals: (not met, progressing unless otherwise specified)  STG 5 weeks  1.  Pt to be independent with HEP for increased functional mobility and pain control. progressing  2.  Pt to increase AROM at full thoracic and shoulder AROM without compensation or pain for increased functional mobility and transfers. progressing  3.  Pt to decrease pain to less than 2/10 in thoracic spine after session for increased functional mobility.  4.  Pt to demonstrate B shoulder flexion and abduction to 170 without shoulder hiking and no pain at end range.      Long Term Goals: 10 weeks   1.  Pt to be independent with pain management strategies and verbalize 2 strategies for increased functional mobility and pain control.  2.  Pt to increase B shoulder abduction and ER 4+/5 for increased functional mobility and transfers.  3.  Pt to decrease pain to less than 2/10 after 30 min of swimming with all strokes include breast stroke for increased functional mobility.  4. Pt to perform full squat through available ROM to at least 18" from the ground without increased pain.     Plan     Continue PT POC.    Trish Delaney, PT      "

## 2023-09-06 ENCOUNTER — CLINICAL SUPPORT (OUTPATIENT)
Dept: REHABILITATION | Facility: HOSPITAL | Age: 58
End: 2023-09-06
Payer: COMMERCIAL

## 2023-09-06 DIAGNOSIS — R07.81 RIB PAIN ON LEFT SIDE: ICD-10-CM

## 2023-09-06 DIAGNOSIS — M62.81 MUSCLE WEAKNESS: Primary | ICD-10-CM

## 2023-09-06 PROCEDURE — 97140 MANUAL THERAPY 1/> REGIONS: CPT | Mod: PO

## 2023-09-06 PROCEDURE — 97110 THERAPEUTIC EXERCISES: CPT | Mod: PO

## 2023-09-06 PROCEDURE — 97112 NEUROMUSCULAR REEDUCATION: CPT | Mod: PO

## 2023-09-06 NOTE — PROGRESS NOTES
"[facility]   Physical Therapy Treatment Note      Name: Hien Jeter  Clinic Number: [id number]    Therapy Diagnosis:   Encounter Diagnoses   Name Primary?    Muscle weakness Yes    Rib pain on left side          Physician: Sunita Duarte DO    Visit Date: 9/6/2023    Physician Orders: PT Eval and Treat   Medical Diagnosis from Referral:   R07.81 (ICD-10-CM) - Rib pain on left side   M54.6,G89.29 (ICD-10-CM) - Chronic left-sided thoracic back pain   M79.10 (ICD-10-CM) - Myalgia      Evaluation Date: 7/25/2023  Authorization Period Expiration: 12/29/23  Plan of Care Expiration: 10/31/23  Progress Note Due: 8/25/23  Visit # / Visits authorized: 5/20  FOTO: 1/ 3     Precautions: Standard     PTA Visit #: 0/5    Time In: 8:00  Time Out: 8:48  Total Billable Time: 48 minutes     Subjective     Pt reports: felt fine after last visit. Having some discomfort but not much pain   She was compliant with home exercise program.  Response to previous treatment: felt fine   Functional change: able to swim this morning    Pain: 2/10   Location: left ribs    Objective      Objective Measures updated at progress report unless specified.     Treatment     Hien received the treatments listed below:      therapeutic exercises to develop strength, endurance, ROM, flexibility, posture, and core stabilization for 28 minutes including:  Supine stretch towel 3 min vertical and then horizontal T2-T4 level  Supine horizontal abduction 2 x 15 green T band  Supine diagonals with green Tband 2 x 10  Seated thoracic extension with feet on box 15x5''  Standing open book with hips open and closed x10   Standing child pose 10 x 5" to each side  Quadruped thread the needle and then reach for ceiling x 10 each side  Latissimus stretch at doorway 3 x 30 secs      Upper trap stretch 3 x 30 secs NP  Levator stretch 3 x 30 secs NP     Lumbar program:  PPT x 30  Small amplitude trunk rotation x 30     Bridges with add ball 3 " x 10  Bridges with abd BTB 3 x 10  Supine clams BTB 3 x 10     Hip add/frog stretch 3 x 30 secs  Trunk rotation stretch 3 x 30 secs  SKTC 3 x 30 secs  Samir test position hip flexor stretch 3 x 30 secs     manual therapy techniques: Joint mobilizations, Manual traction, Myofacial release, and Soft tissue Mobilization were applied to the: levator, teres minor, pectoralis minor, iliacus, psoas for 10 minutes, including:  Thoracic + rib mobs into rotation grade II/III  T2-T7  Thoracic CPA  B rib PA mob  Hypervolt massage gun to thoracic paraspinals  Lumbar grade II PA and UPA L1-L4  AC and SC bilateral shoulder grade III multidirectional.   GH traction  Hypervolt massage gun to thoracic paraspinals  Long axis and short axis traction     neuromuscular re-education activities to improve: Balance, Coordination, Kinesthetic, Sense, Proprioception, and Posture for 10 minutes. The following activities were included:  Scapular retraction 2 x 10 hold 3 secs  shld extension GTB 2 x 10  shld row GTB 2 x 10    therapeutic activities to improve functional performance for 0 minutes, including:    gait training to improve functional mobility and safety for 0 minutes, including:    hot pack for 0 minutes to thoracolumbar region.    Patient Education and Home Exercises       Education provided:   - HEP, pain management, findings    Written Home Exercises Provided: Patient instructed to cont prior HEP. Exercises were reviewed and Hien was able to demonstrate them prior to the end of the session.  Hien demonstrated good  understanding of the education provided. See EMR under Patient Instructions for exercises provided during therapy sessions    Assessment     Hien with positive response to previous session but continues to experience some discomfort in the back. Todays session focus on spinal mobility with some periscpaular strengthening included. Overall positive response. Progress as tolerated.     Hien Is progressing well  "towards her goals.   Pt prognosis is Fair.     Pt will continue to benefit from skilled outpatient physical therapy to address the deficits listed in the problem list box on initial evaluation, provide pt/family education and to maximize pt's level of independence in the home and community environment.     Pt's spiritual, cultural and educational needs considered and pt agreeable to plan of care and goals.     Anticipated barriers to physical therapy: chronic nature and multiple joint and spine level impairments    Goals: (not met, progressing unless otherwise specified)  STG 5 weeks  1.  Pt to be independent with HEP for increased functional mobility and pain control. progressing  2.  Pt to increase AROM at full thoracic and shoulder AROM without compensation or pain for increased functional mobility and transfers. progressing  3.  Pt to decrease pain to less than 2/10 in thoracic spine after session for increased functional mobility.  4.  Pt to demonstrate B shoulder flexion and abduction to 170 without shoulder hiking and no pain at end range.      Long Term Goals: 10 weeks   1.  Pt to be independent with pain management strategies and verbalize 2 strategies for increased functional mobility and pain control.  2.  Pt to increase B shoulder abduction and ER 4+/5 for increased functional mobility and transfers.  3.  Pt to decrease pain to less than 2/10 after 30 min of swimming with all strokes include breast stroke for increased functional mobility.  4. Pt to perform full squat through available ROM to at least 18" from the ground without increased pain.     Plan     Continue PT POC.    Trish Delaney, PT      "

## 2023-09-13 ENCOUNTER — CLINICAL SUPPORT (OUTPATIENT)
Dept: REHABILITATION | Facility: HOSPITAL | Age: 58
End: 2023-09-13
Payer: COMMERCIAL

## 2023-09-13 DIAGNOSIS — R07.81 RIB PAIN ON LEFT SIDE: ICD-10-CM

## 2023-09-13 DIAGNOSIS — M62.81 MUSCLE WEAKNESS: Primary | ICD-10-CM

## 2023-09-13 PROCEDURE — 97140 MANUAL THERAPY 1/> REGIONS: CPT | Mod: PO

## 2023-09-13 PROCEDURE — 97110 THERAPEUTIC EXERCISES: CPT | Mod: PO

## 2023-09-13 NOTE — PROGRESS NOTES
[facility]   Physical Therapy Treatment Note      Name: Hien Jeter  Clinic Number: [id number]    Therapy Diagnosis:   Encounter Diagnoses   Name Primary?    Muscle weakness Yes    Rib pain on left side            Physician: Sunita Duarte DO    Visit Date: 9/13/2023    Physician Orders: PT Eval and Treat   Medical Diagnosis from Referral:   R07.81 (ICD-10-CM) - Rib pain on left side   M54.6,G89.29 (ICD-10-CM) - Chronic left-sided thoracic back pain   M79.10 (ICD-10-CM) - Myalgia      Evaluation Date: 7/25/2023  Authorization Period Expiration: 12/29/23  Plan of Care Expiration: 10/31/23  Progress Note Due: 10/13/23  Visit # / Visits authorized: 6/20  FOTO: 1/ 3     Precautions: Standard     PTA Visit #: 0/5    Time In: 8:00  Time Out: 8:46  Total Billable Time: 46 minutes     Subjective     Pt reports: felt fine after last visit. Having some discomfort but not much pain   She was compliant with home exercise program.  Response to previous treatment: felt fine   Functional change: able to swim this morning    Pain: 2/10   Location: left ribs    Objective      Active/Passive B shoulder ROM WNL pain at end range flexion and abduction with excessive shoulder hiking and strain beyond 160 deg abduction and flexion.      Lumbar ROM:     % limitation Quality   flexion    0 Stretch    extension    0 No    Rotation R 25  tightness in left rib, would be pain if she went any further   Rotation L 0  no      Active/Passive Hip ROM WNL pain at end range flexion and extension  Active/passive knee ROM WNL with pain at end range knee flexion.      Upper extremity strength:    grossly 4+/5 to 5/5  B elbow in flexion extension and shoulder flexion   Midtraps and lower traps, shoulder abduction and ER 4/5     Lower Extremity Strength (graded 0-5 out of 5)    RLE LLE   Hip flexion: 4+/5 4+/5   Hip extension: 4-/5 4-/5                   Knee flexion 5/5 5/5   Knee extension 5/5 5/5   Hip adduction 4+/5  "4+/5   Hip abduction 4+/5 4+/5      Special Tests: ((+): pos.; (-): neg.)   Fabers Test: neg  Slump Test: pos  SLR Test: neg for back and hip  Palpation: iliacus, psoas, QL, rectus femoris, adductor radha, teres minor, levator  Spine mobility: joint play T2-L3 mild to moderate hypomobility into rotation and UPA;  B shoulder AC and SC joint moderate to severe hypomobility.  Functional assessment:  painful squat into +90 degrees of hip flexion up to 120 deg hip flexion into and out of transition flexion to extension due to intra-abdominal pressure and excessive kyphosis at bottom of squat.      Gait: mild decreased hip extension and trunk rotation     Intake Outcome Measure for FOTO thoracic  Survey     Therapist reviewed FOTO scores for Hien Jeter on 9/13/2023.   FOTO documents entered into [employer] - see Media section.     Intake Score: 63%           Treatment     Hien received the treatments listed below:      therapeutic exercises to develop strength, endurance, ROM, flexibility, posture, and core stabilization for 23 minutes including:  Supine stretch towel 3 min vertical and then horizontal T2-T4 level  Supine horizontal abduction 2 x 15 green T band  Supine diagonals with green Tband 2 x 10  Seated thoracic extension with feet on box 15x5''  Standing open book with hips open and closed x10   Standing child pose 10 x 5" to each side  Quadruped thread the needle and then reach for ceiling x 10 each side  Latissimus stretch at doorway 3 x 30 secs      Upper trap stretch 3 x 30 secs NP  Levator stretch 3 x 30 secs NP     Lumbar program:  PPT x 30  Small amplitude trunk rotation x 30     Bridges with add ball 3 x 10  Bridges with abd BTB 3 x 10  Supine clams BTB 3 x 10     Hip add/frog stretch 3 x 30 secs  Trunk rotation stretch 3 x 30 secs  SKTC 3 x 30 secs  Samir test position hip flexor stretch 3 x 30 secs     manual therapy techniques: Joint mobilizations, Manual traction, Myofacial release, and Soft tissue " Mobilization were applied to the: levator, teres minor, pectoralis minor, iliacus, psoas for 23 minutes, including:  Thoracic + rib mobs into rotation grade II/III  T2-T7  Thoracic CPA  B rib PA mob  Hypervolt massage gun to thoracic paraspinals  Lumbar grade II PA and UPA L1-L4  AC and SC bilateral shoulder grade III multidirectional.   GH traction  Hypervolt massage gun to thoracic paraspinals  Long axis and short axis traction     neuromuscular re-education activities to improve: Balance, Coordination, Kinesthetic, Sense, Proprioception, and Posture for 0 minutes. The following activities were included:  Scapular retraction 2 x 10 hold 3 secs  shld extension GTB 2 x 10  shld row GTB 2 x 10    therapeutic activities to improve functional performance for 0 minutes, including:    gait training to improve functional mobility and safety for 0 minutes, including:    hot pack for 0 minutes to thoracolumbar region.    Patient Education and Home Exercises       Education provided:   - HEP, pain management, findings    Written Home Exercises Provided: Patient instructed to cont prior HEP. Exercises were reviewed and Hien was able to demonstrate them prior to the end of the session.  Hien demonstrated good  understanding of the education provided. See EMR under Patient Instructions for exercises provided during therapy sessions    Assessment     Experiencing pain in the anterior inferior right shoulder with thread the needle exercises that feels like a pinching. Otherwise she is feeling improvement with reassessment today that reveals improved pain free lumbar range of motion although remains limited in rotation to the right. Will address shoulder (lat/pec) pain at next visit. She is making progress towards goals although not met yet. Continue to progress.     Hien Is progressing well towards her goals.   Pt prognosis is Fair.     Pt will continue to benefit from skilled outpatient physical therapy to address the  "deficits listed in the problem list box on initial evaluation, provide pt/family education and to maximize pt's level of independence in the home and community environment.     Pt's spiritual, cultural and educational needs considered and pt agreeable to plan of care and goals.     Anticipated barriers to physical therapy: chronic nature and multiple joint and spine level impairments    Goals: (not met, progressing unless otherwise specified)  STG 5 weeks   1.  Pt to be independent with HEP for increased functional mobility and pain control. Progressing MET  2.  Pt to increase AROM at full thoracic and shoulder AROM without compensation or pain for increased functional mobility and transfers. progressing  3.  Pt to decrease pain to less than 2/10 in thoracic spine after session for increased functional mobility.  4.  Pt to demonstrate B shoulder flexion and abduction to 170 without shoulder hiking and no pain at end range.      Long Term Goals: 10 weeks   1.  Pt to be independent with pain management strategies and verbalize 2 strategies for increased functional mobility and pain control.  2.  Pt to increase B shoulder abduction and ER 4+/5 for increased functional mobility and transfers.  3.  Pt to decrease pain to less than 2/10 after 30 min of swimming with all strokes include breast stroke for increased functional mobility.  4. Pt to perform full squat through available ROM to at least 18" from the ground without increased pain.     Plan     Continue PT POC.    Trish Delaney, PT      "

## 2023-09-14 ENCOUNTER — TELEPHONE (OUTPATIENT)
Dept: PSYCHIATRY | Facility: CLINIC | Age: 58
End: 2023-09-14
Payer: COMMERCIAL

## 2023-09-27 ENCOUNTER — CLINICAL SUPPORT (OUTPATIENT)
Dept: REHABILITATION | Facility: HOSPITAL | Age: 58
End: 2023-09-27
Payer: COMMERCIAL

## 2023-09-27 DIAGNOSIS — M62.81 MUSCLE WEAKNESS: Primary | ICD-10-CM

## 2023-09-27 DIAGNOSIS — R07.81 RIB PAIN ON LEFT SIDE: ICD-10-CM

## 2023-09-27 PROCEDURE — 97140 MANUAL THERAPY 1/> REGIONS: CPT | Mod: PO

## 2023-09-27 PROCEDURE — 97112 NEUROMUSCULAR REEDUCATION: CPT | Mod: PO

## 2023-09-27 PROCEDURE — 97110 THERAPEUTIC EXERCISES: CPT | Mod: PO

## 2023-09-27 NOTE — PROGRESS NOTES
"[facility]   Physical Therapy Treatment Note      Name: Hien Jeter  Clinic Number: [id number]    Therapy Diagnosis:   No diagnosis found.          Physician: Sunita Duarte DO    Visit Date: 9/27/2023    Physician Orders: PT Eval and Treat   Medical Diagnosis from Referral:   R07.81 (ICD-10-CM) - Rib pain on left side   M54.6,G89.29 (ICD-10-CM) - Chronic left-sided thoracic back pain   M79.10 (ICD-10-CM) - Myalgia      Evaluation Date: 7/25/2023  Authorization Period Expiration: 12/29/23  Plan of Care Expiration: 10/31/23  Progress Note Due: 10/13/23  Visit # / Visits authorized: 7/20  FOTO: 1/ 3     Precautions: Standard     PTA Visit #: 0/5    Time In: 8:00  Time Out: 8:44  Total Billable Time: 44 minutes     Subjective     Pt reports: went on vacation and did very well. She didn't have any problems. She can feel the difference in the rib, but it doesn't hurt.   She was compliant with home exercise program.  Response to previous treatment: felt fine   Functional change: able to swim this morning    Pain: 2/10   Location: left ribs    Objective      Objective measures taken at progress report unless specified otherwise.     Treatment     Hien received the treatments listed below:      therapeutic exercises to develop strength, endurance, ROM, flexibility, posture, and core stabilization for 24 minutes including:  supine eccentric lat stretch 3# dowel 20x5" (HOLD, next visit)  Supine stretch towel 3 min vertical and then horizontal T2-T4 level  Supine horizontal abduction 2 x 15 green T band  Supine diagonals with green Tband 2 x 10  Seated thoracic extension with feet on box 15x5''  Standing open book with hips open and closed x10   Standing child pose 10 x 5" to each side  Quadruped thread the needle and then reach for ceiling x 10 each side  Latissimus stretch at doorway 3 x 30 secs      Upper trap stretch 3 x 30 secs NP  Levator stretch 3 x 30 secs NP     Lumbar program:  PPT x " 30  Small amplitude trunk rotation x 30     Bridges with add ball 3 x 10  Bridges with abd BTB 3 x 10  Supine clams BTB 3 x 10     Hip add/frog stretch 3 x 30 secs  Trunk rotation stretch 3 x 30 secs  SKTC 3 x 30 secs  Samir test position hip flexor stretch 3 x 30 secs     manual therapy techniques: Joint mobilizations, Manual traction, Myofacial release, and Soft tissue Mobilization were applied to the: levator, teres minor, pectoralis minor, iliacus, psoas for 10 minutes, including:  Thoracic + rib mobs into rotation grade II/III  T2-T7  Thoracic CPA  B rib PA mob  Hypervolt massage gun to thoracic paraspinals  Lumbar grade II PA and UPA L1-L4  AC and SC bilateral shoulder grade III multidirectional.   GH traction  Hypervolt massage gun to thoracic paraspinals  Long axis and short axis traction     neuromuscular re-education activities to improve: Balance, Coordination, Kinesthetic, Sense, Proprioception, and Posture for 10 minutes. The following activities were included:  Scapular retraction 2 x 10 hold 3 secs  shld extension GTB 2 x 10  shld row GTB 2 x 10    therapeutic activities to improve functional performance for 0 minutes, including:    gait training to improve functional mobility and safety for 0 minutes, including:    hot pack for 0 minutes to thoracolumbar region.    Patient Education and Home Exercises       Education provided:   - HEP, pain management, findings    Written Home Exercises Provided: Patient instructed to cont prior HEP. Exercises were reviewed and Hien was able to demonstrate them prior to the end of the session.  Hien demonstrated good  understanding of the education provided. See EMR under Patient Instructions for exercises provided during therapy sessions    Assessment     Hien returns today following two week break from PT and has done very well with independent management. She tolerated sessions well although reports lat stretch is painful in the shoulder therefore will not  "continue this exercise. Continue to progress upper extremity strength as tolerated by patient.     Hien Is progressing well towards her goals.   Pt prognosis is Fair.     Pt will continue to benefit from skilled outpatient physical therapy to address the deficits listed in the problem list box on initial evaluation, provide pt/family education and to maximize pt's level of independence in the home and community environment.     Pt's spiritual, cultural and educational needs considered and pt agreeable to plan of care and goals.     Anticipated barriers to physical therapy: chronic nature and multiple joint and spine level impairments    Goals: (not met, progressing unless otherwise specified)  STG 5 weeks   1.  Pt to be independent with HEP for increased functional mobility and pain control. Progressing MET  2.  Pt to increase AROM at full thoracic and shoulder AROM without compensation or pain for increased functional mobility and transfers. progressing  3.  Pt to decrease pain to less than 2/10 in thoracic spine after session for increased functional mobility.  4.  Pt to demonstrate B shoulder flexion and abduction to 170 without shoulder hiking and no pain at end range.      Long Term Goals: 10 weeks   1.  Pt to be independent with pain management strategies and verbalize 2 strategies for increased functional mobility and pain control.  2.  Pt to increase B shoulder abduction and ER 4+/5 for increased functional mobility and transfers.  3.  Pt to decrease pain to less than 2/10 after 30 min of swimming with all strokes include breast stroke for increased functional mobility.  4. Pt to perform full squat through available ROM to at least 18" from the ground without increased pain.     Plan     Continue PT POC.    Trish Delaney, PT      "

## 2023-10-03 NOTE — PROGRESS NOTES
"[facility]   Physical Therapy Treatment Note      Name: Hien Jeter  Clinic Number: [id number]    Therapy Diagnosis:   Encounter Diagnoses   Name Primary?    Muscle weakness Yes    Rib pain on left side              Physician: Sunita Duarte DO    Visit Date: 10/4/2023    Physician Orders: PT Eval and Treat   Medical Diagnosis from Referral:   R07.81 (ICD-10-CM) - Rib pain on left side   M54.6,G89.29 (ICD-10-CM) - Chronic left-sided thoracic back pain   M79.10 (ICD-10-CM) - Myalgia      Evaluation Date: 7/25/2023  Authorization Period Expiration: 12/29/23  Plan of Care Expiration: 10/31/23  Progress Note Due: 10/13/23  Visit # / Visits authorized: 8/20  FOTO: 3/ 3 - last completed on 10/4/23     Precautions: Standard     PTA Visit #: 0/5    Time In: 8:00  Time Out: 8:54  Total Billable Time: 54 minutes     Subjective     Pt reports: line of pain right over the rib over the weekend and on Monday The front part ached and the back felt like occasional stabbing.   She was compliant with home exercise program.  Response to previous treatment: felt fine   Functional change: able to swim this morning    Pain: 0/10   Location: left ribs    Objective      Objective measures taken at progress report unless specified otherwise.     Treatment   Bold=performed  Hien received the treatments listed below:      therapeutic exercises to develop strength, endurance, ROM, flexibility, posture, and core stabilization for 23 minutes including:  supine eccentric lat stretch 3# dowel 20x5" (HOLD, next visit)  Supine stretch towel 3 min vertical and then horizontal T2-T4 level  Supine horizontal abduction 2 x 15 green T band  Supine diagonals with green Tband 2 x 10  Seated thoracic extension with feet on box 15x5''  Standing open book with hips open and closed x10   Standing child pose 10 x 5" to each side  Quadruped thread the needle and then reach for ceiling x 10 each side  Latissimus stretch at doorway " 3 x 30 secs      manual therapy techniques: Joint mobilizations, Manual traction, Myofacial release, and Soft tissue Mobilization were applied to the: levator, teres minor, pectoralis minor, iliacus, psoas for 8 minutes, including:  Thoracic + rib mobs into rotation grade II/III  T2-T7  Thoracic CPA  B rib PA mob  Hypervolt massage gun to thoracic paraspinals  Lumbar grade II PA and UPA L1-L4  AC and SC bilateral shoulder grade III multidirectional.   GH traction  Hypervolt massage gun to thoracic paraspinals  Long axis and short axis traction     neuromuscular re-education activities to improve: Balance, Coordination, Kinesthetic, Sense, Proprioception, and Posture for 23 minutes. The following activities were included:  Scapular retraction 2 x 10 hold 3 secs  shld extension GTB 2 x 10  shld row GTB 2 x 10  Paloff press GTBx2 2x10 B    therapeutic activities to improve functional performance for 0 minutes, including:    gait training to improve functional mobility and safety for 0 minutes, including:    hot pack for 0 minutes to thoracolumbar region.    Patient Education and Home Exercises       Education provided:   - HEP, pain management, findings    Written Home Exercises Provided: Patient instructed to cont prior HEP. Exercises were reviewed and Hien was able to demonstrate them prior to the end of the session.  Hien demonstrated good  understanding of the education provided. See EMR under Patient Instructions for exercises provided during therapy sessions    Assessment     Hien reports pain over the weekend but is feeling much better today. She is able to participate in exercises without increase in pain. No pain with manual therapy today. Continue to progress as tolerated.     Hein Is progressing well towards her goals.   Pt prognosis is Fair.     Pt will continue to benefit from skilled outpatient physical therapy to address the deficits listed in the problem list box on initial evaluation, provide  "pt/family education and to maximize pt's level of independence in the home and community environment.     Pt's spiritual, cultural and educational needs considered and pt agreeable to plan of care and goals.     Anticipated barriers to physical therapy: chronic nature and multiple joint and spine level impairments    Goals: (not met, progressing unless otherwise specified)  STG 5 weeks   1.  Pt to be independent with HEP for increased functional mobility and pain control. Progressing MET  2.  Pt to increase AROM at full thoracic and shoulder AROM without compensation or pain for increased functional mobility and transfers. progressing  3.  Pt to decrease pain to less than 2/10 in thoracic spine after session for increased functional mobility.  4.  Pt to demonstrate B shoulder flexion and abduction to 170 without shoulder hiking and no pain at end range.      Long Term Goals: 10 weeks   1.  Pt to be independent with pain management strategies and verbalize 2 strategies for increased functional mobility and pain control.  2.  Pt to increase B shoulder abduction and ER 4+/5 for increased functional mobility and transfers.  3.  Pt to decrease pain to less than 2/10 after 30 min of swimming with all strokes include breast stroke for increased functional mobility.  4. Pt to perform full squat through available ROM to at least 18" from the ground without increased pain.     Plan     Continue PT POC.    Trish Delaney, PT      "

## 2023-10-04 ENCOUNTER — CLINICAL SUPPORT (OUTPATIENT)
Dept: REHABILITATION | Facility: HOSPITAL | Age: 58
End: 2023-10-04
Payer: COMMERCIAL

## 2023-10-04 DIAGNOSIS — M62.81 MUSCLE WEAKNESS: Primary | ICD-10-CM

## 2023-10-04 DIAGNOSIS — R07.81 RIB PAIN ON LEFT SIDE: ICD-10-CM

## 2023-10-04 PROCEDURE — 97112 NEUROMUSCULAR REEDUCATION: CPT | Mod: PO

## 2023-10-04 PROCEDURE — 97110 THERAPEUTIC EXERCISES: CPT | Mod: PO

## 2023-10-04 PROCEDURE — 97140 MANUAL THERAPY 1/> REGIONS: CPT | Mod: PO

## 2023-10-10 NOTE — PROGRESS NOTES
"[facility]   Physical Therapy Treatment Note      Name: Hien Jeter  Clinic Number: [id number]    Therapy Diagnosis:   Encounter Diagnoses   Name Primary?    Muscle weakness Yes    Rib pain on left side                Physician: Sunita Duarte DO    Visit Date: 10/11/2023    Physician Orders: PT Eval and Treat   Medical Diagnosis from Referral:   R07.81 (ICD-10-CM) - Rib pain on left side   M54.6,G89.29 (ICD-10-CM) - Chronic left-sided thoracic back pain   M79.10 (ICD-10-CM) - Myalgia      Evaluation Date: 7/25/2023  Authorization Period Expiration: 12/29/23  Plan of Care Expiration: 10/31/23  Progress Note Due: 10/13/23  Visit # / Visits authorized: 9/20  FOTO: 3/ 3 - last completed on 10/4/23     Precautions: Standard     PTA Visit #: 0/5    Time In: 8:00  Time Out: 8:58  Total Billable Time: 58 minutes     Subjective     Pt reports: today there is pain in the rib on the left. The pain is burning from the front all the way to the back.   She was compliant with home exercise program.  Response to previous treatment: felt fine   Functional change: able to swim this morning    Pain: 3.5-4/10   Location: left ribs    Objective      Objective measures taken at progress report unless specified otherwise.     Treatment   Bold=performed  Hien received the treatments listed below:      therapeutic exercises to develop strength, endurance, ROM, flexibility, posture, and core stabilization for 12 minutes including:  supine eccentric lat stretch 3# dowel 20x5" (HOLD, next visit)  Supine stretch towel 3 min vertical and then horizontal T2-T4 level  Supine diagonals with green Tband 2 x 10  Seated horizontal abduction 2 x 15 green T band  Seated thoracic extension with feet on box 15x5''  Standing open book with hips open and closed x10   Standing child pose 10 x 5" to each side  Quadruped thread the needle and then reach for ceiling x 10 each side  Latissimus stretch at doorway 3 x 30 " secs      manual therapy techniques: Joint mobilizations, Manual traction, Myofacial release, and Soft tissue Mobilization were applied to the: levator, teres minor, pectoralis minor, iliacus, psoas for 23 minutes, including:  Thoracic + rib mobs into rotation grade II/III  T2-T7  Thoracic CPA  B rib PA mob  Hypervolt massage gun to thoracic paraspinals  Lumbar grade II PA and UPA L1-L4  AC and SC bilateral shoulder grade III multidirectional.   GH traction  Hypervolt massage gun to thoracic paraspinals  Long axis and short axis traction     neuromuscular re-education activities to improve: Balance, Coordination, Kinesthetic, Sense, Proprioception, and Posture for 23 minutes. The following activities were included:  Scapular retraction 2 x 10 hold 3 secs  shld extension GTB 2 x 10  shld row GTB 2 x 10  Paloff press GTBx2 2x10 B    therapeutic activities to improve functional performance for 0 minutes, including:    gait training to improve functional mobility and safety for 0 minutes, including:    hot pack for 0 minutes to thoracolumbar region.    Patient Education and Home Exercises       Education provided:   - HEP, pain management, findings    Written Home Exercises Provided: Patient instructed to cont prior HEP. Exercises were reviewed and Hien was able to demonstrate them prior to the end of the session.  Hien demonstrated good  understanding of the education provided. See EMR under Patient Instructions for exercises provided during therapy sessions    Assessment     Hien reports increased rib pain in the same distribution as previously reported. Despite this, she is able to complete her exercises without difficulty. We will continue to progress within tolerance with focus on spinal mobility and core strength.     Hien Is progressing well towards her goals.   Pt prognosis is Fair.     Pt will continue to benefit from skilled outpatient physical therapy to address the deficits listed in the problem list box  "on initial evaluation, provide pt/family education and to maximize pt's level of independence in the home and community environment.     Pt's spiritual, cultural and educational needs considered and pt agreeable to plan of care and goals.     Anticipated barriers to physical therapy: chronic nature and multiple joint and spine level impairments    Goals: (not met, progressing unless otherwise specified)  STG 5 weeks   1.  Pt to be independent with HEP for increased functional mobility and pain control. Progressing MET  2.  Pt to increase AROM at full thoracic and shoulder AROM without compensation or pain for increased functional mobility and transfers. progressing  3.  Pt to decrease pain to less than 2/10 in thoracic spine after session for increased functional mobility.  4.  Pt to demonstrate B shoulder flexion and abduction to 170 without shoulder hiking and no pain at end range.      Long Term Goals: 10 weeks   1.  Pt to be independent with pain management strategies and verbalize 2 strategies for increased functional mobility and pain control.  2.  Pt to increase B shoulder abduction and ER 4+/5 for increased functional mobility and transfers.  3.  Pt to decrease pain to less than 2/10 after 30 min of swimming with all strokes include breast stroke for increased functional mobility.  4. Pt to perform full squat through available ROM to at least 18" from the ground without increased pain.     Plan     Continue PT POC.    Trish Delaney, PT      "

## 2023-10-11 ENCOUNTER — CLINICAL SUPPORT (OUTPATIENT)
Dept: REHABILITATION | Facility: HOSPITAL | Age: 58
End: 2023-10-11
Payer: COMMERCIAL

## 2023-10-11 DIAGNOSIS — R07.81 RIB PAIN ON LEFT SIDE: ICD-10-CM

## 2023-10-11 DIAGNOSIS — M62.81 MUSCLE WEAKNESS: Primary | ICD-10-CM

## 2023-10-11 PROCEDURE — 97140 MANUAL THERAPY 1/> REGIONS: CPT | Mod: PO

## 2023-10-11 PROCEDURE — 97112 NEUROMUSCULAR REEDUCATION: CPT | Mod: PO

## 2023-10-11 PROCEDURE — 97110 THERAPEUTIC EXERCISES: CPT | Mod: PO

## 2023-10-17 ENCOUNTER — PATIENT MESSAGE (OUTPATIENT)
Dept: INTERNAL MEDICINE | Facility: CLINIC | Age: 58
End: 2023-10-17
Payer: COMMERCIAL

## 2023-10-17 NOTE — PROGRESS NOTES
[facility]   Physical Therapy Treatment Note / Reassessment / Updated Plan of Care     Name: Hien Jeter  Clinic Number: [id number]    Therapy Diagnosis:   Encounter Diagnoses   Name Primary?    Muscle weakness Yes    Rib pain on left side          Physician: Sunita Duarte DO    Visit Date: 10/18/2023    Physician Orders: PT Eval and Treat   Medical Diagnosis from Referral:   R07.81 (ICD-10-CM) - Rib pain on left side   M54.6,G89.29 (ICD-10-CM) - Chronic left-sided thoracic back pain   M79.10 (ICD-10-CM) - Myalgia      Evaluation Date: 7/25/2023  Authorization Period Expiration: 12/29/23  Plan of Care Expiration: 1/18/24  Progress Note Due: 11/18/23  Visit # / Visits authorized: 9/20  FOTO: 3/ 3 - last completed on 10/4/23     Precautions: Standard     PTA Visit #: 0/5    Time In: 8:00  Time Out: 9:00  Total Billable Time: 60 minutes     Subjective     Pt reports: feeling good today   Felt muscle spasms when doing overhead press and then the whole area flared up afterwards  She was compliant with home exercise program.  Response to previous treatment: felt fine   Functional change: able to swim this morning    Pain: 3.5-4/10   Location: left ribs    Objective      Active/Passive B shoulder ROM WNL and pain free     Lumbar ROM:     % limitation Quality   flexion    0 Stretch    extension    0 No    Rotation R 25  stretch but no pain   Rotation L 0  no      Active/Passive Hip ROM WNL pain at end range flexion and extension  Active/passive knee ROM WNL with pain at end range knee flexion.      Upper extremity strength:    grossly 4+/5 to 5/5 in flexion, abduction, external rotation, and internal rotation   Low trap R: 4-/5  Low trap L: 4/5  Mid trap B:     Lower Extremity Strength (graded 0-5 out of 5)    RLE LLE   Hip flexion: 4+/5 4+/5   Hip extension: 4-/5 4-/5                   Knee flexion 5/5 5/5   Knee extension 5/5 5/5   Hip adduction 4+/5 4+/5   Hip abduction 4+/5 4+/5     "  Special Tests: ((+): pos.; (-): neg.)   Palpation: tenderness right rib  Spine mobility: joint play T2-L3 remains with mild to moderate hypomobility into rotation and UPA;       Intake Outcome Measure for FOTO thoracic  Survey     Therapist reviewed FOTO scores for Hien Jeetr on 9/13/2023.   FOTO documents entered into [employer] - see Media section.     Intake Score: 63%           Treatment   Bold=performed  Hien received the treatments listed below:      therapeutic exercises to develop strength, endurance, ROM, flexibility, posture, and core stabilization for 30 minutes including:  supine eccentric lat stretch 3# dowel 20x5" (HOLD, next visit)  Supine stretch towel 3 min vertical and then horizontal T2-T4 level  Supine diagonals with green Tband 2 x 10  +prone Y 2x10 B  +prone T 2x10 B  Seated horizontal abduction 2 x 15 green T band  Seated thoracic extension with feet on box 15x5''  +lat stretch on rolling stool 10x10"  Standing open book with hips open and closed x10   Standing child pose 10 x 5" to each side  Quadruped thread the needle and then reach for ceiling x 10 each side  Latissimus stretch at doorway 3 x 30 secs      manual therapy techniques: Joint mobilizations, Manual traction, Myofacial release, and Soft tissue Mobilization were applied to the: levator, teres minor, pectoralis minor, iliacus, psoas for 10 minutes, including:  Thoracic + rib mobs into rotation grade II/III  T2-T7  Thoracic CPA  B rib PA mob  Hypervolt massage gun to thoracic paraspinals  Lumbar grade II PA and UPA L1-L4  AC and SC bilateral shoulder grade III multidirectional.   GH traction  Hypervolt massage gun to thoracic paraspinals  Long axis and short axis traction     neuromuscular re-education activities to improve: Balance, Coordination, Kinesthetic, Sense, Proprioception, and Posture for 10 minutes. The following activities were included:  Scapular retraction 2 x 10 hold 3 secs  shld extension GTB 2 x 10  shld row GTB 2 x " 10  Paloff press GTBx2 2x10 B    therapeutic activities to improve functional performance for 0 minutes, including:    gait training to improve functional mobility and safety for 0 minutes, including:    hot pack for 0 minutes to thoracolumbar region.    Patient Education and Home Exercises       Education provided:   - HEP, pain management, findings    Written Home Exercises Provided: Patient instructed to cont prior HEP. Exercises were reviewed and Hien was able to demonstrate them prior to the end of the session.  Hien demonstrated good  understanding of the education provided. See EMR under Patient Instructions for exercises provided during therapy sessions    Assessment     Hien presents today with diminished low trap and mid trap strength and reports of pain in the rib when doing an overhead press. Three exercises were added today to address this issue with no pain reported. She tolerated all other exercises well and will continue to progress as tolerated,     Hien Is progressing well towards her goals.   Pt prognosis is Fair.     Pt will continue to benefit from skilled outpatient physical therapy to address the deficits listed in the problem list box on initial evaluation, provide pt/family education and to maximize pt's level of independence in the home and community environment.     Pt's spiritual, cultural and educational needs considered and pt agreeable to plan of care and goals.     Anticipated barriers to physical therapy: chronic nature and multiple joint and spine level impairments    Goals: (not met, progressing unless otherwise specified)  STG 5 weeks    1.  Pt to be independent with HEP for increased functional mobility and pain control. Progressing MET  2.  Pt to increase AROM at full thoracic and shoulder AROM without compensation or pain for increased functional mobility and transfers. Progressing MET  3.  Pt to decrease pain to less than 2/10 in thoracic spine after session for increased  "functional mobility. MET  4.  Pt to demonstrate B shoulder flexion and abduction to 170 without shoulder hiking and no pain at end range. MET     Long Term Goals: 10 weeks   1.  Pt to be independent with pain management strategies and verbalize 2 strategies for increased functional mobility and pain control.  2.  Pt to increase B shoulder abduction and ER 4+/5 for increased functional mobility and transfers.  3.  Pt to decrease pain to less than 2/10 after 30 min of swimming with all strokes include breast stroke for increased functional mobility.  4. Pt to perform full squat through available ROM to at least 18" from the ground without increased pain.     Plan     Extend plan of care 1x/wk for 2 additional visits.     Trish Delaney, PT      "

## 2023-10-18 ENCOUNTER — CLINICAL SUPPORT (OUTPATIENT)
Dept: REHABILITATION | Facility: HOSPITAL | Age: 58
End: 2023-10-18
Payer: COMMERCIAL

## 2023-10-18 DIAGNOSIS — R07.81 RIB PAIN ON LEFT SIDE: ICD-10-CM

## 2023-10-18 DIAGNOSIS — M62.81 MUSCLE WEAKNESS: Primary | ICD-10-CM

## 2023-10-18 PROCEDURE — 97140 MANUAL THERAPY 1/> REGIONS: CPT | Mod: PO

## 2023-10-18 PROCEDURE — 97112 NEUROMUSCULAR REEDUCATION: CPT | Mod: PO

## 2023-10-18 PROCEDURE — 97110 THERAPEUTIC EXERCISES: CPT | Mod: PO

## 2023-10-18 NOTE — PLAN OF CARE
[facility]   Physical Therapy Treatment Note / Reassessment / Updated Plan of Care     Name: Hien Jeter  Clinic Number: [id number]    Therapy Diagnosis:   Encounter Diagnoses   Name Primary?    Muscle weakness Yes    Rib pain on left side          Physician: Sunita Duarte DO    Visit Date: 10/18/2023    Physician Orders: PT Eval and Treat   Medical Diagnosis from Referral:   R07.81 (ICD-10-CM) - Rib pain on left side   M54.6,G89.29 (ICD-10-CM) - Chronic left-sided thoracic back pain   M79.10 (ICD-10-CM) - Myalgia      Evaluation Date: 7/25/2023  Authorization Period Expiration: 12/29/23  Plan of Care Expiration: 1/18/24  Progress Note Due: 11/18/23  Visit # / Visits authorized: 9/20  FOTO: 3/ 3 - last completed on 10/4/23     Precautions: Standard     PTA Visit #: 0/5    Time In: 8:00  Time Out: 9:00  Total Billable Time: 60 minutes     Subjective     Pt reports: feeling good today   Felt muscle spasms when doing overhead press and then the whole area flared up afterwards  She was compliant with home exercise program.  Response to previous treatment: felt fine   Functional change: able to swim this morning    Pain: 3.5-4/10   Location: left ribs    Objective      Active/Passive B shoulder ROM WNL and pain free     Lumbar ROM:     % limitation Quality   flexion    0 Stretch    extension    0 No    Rotation R 25  stretch but no pain   Rotation L 0  no      Active/Passive Hip ROM WNL pain at end range flexion and extension  Active/passive knee ROM WNL with pain at end range knee flexion.      Upper extremity strength:    grossly 4+/5 to 5/5 in flexion, abduction, external rotation, and internal rotation   Low trap R: 4-/5  Low trap L: 4/5  Mid trap B:     Lower Extremity Strength (graded 0-5 out of 5)    RLE LLE   Hip flexion: 4+/5 4+/5   Hip extension: 4-/5 4-/5                   Knee flexion 5/5 5/5   Knee extension 5/5 5/5   Hip adduction 4+/5 4+/5   Hip abduction 4+/5 4+/5     "  Special Tests: ((+): pos.; (-): neg.)   Palpation: tenderness right rib  Spine mobility: joint play T2-L3 remains with mild to moderate hypomobility into rotation and UPA;       Intake Outcome Measure for FOTO thoracic  Survey     Therapist reviewed FOTO scores for Hien Jeter on 9/13/2023.   FOTO documents entered into [employer] - see Media section.     Intake Score: 63%           Treatment   Bold=performed  Hien received the treatments listed below:      therapeutic exercises to develop strength, endurance, ROM, flexibility, posture, and core stabilization for 30 minutes including:  supine eccentric lat stretch 3# dowel 20x5" (HOLD, next visit)  Supine stretch towel 3 min vertical and then horizontal T2-T4 level  Supine diagonals with green Tband 2 x 10  +prone Y 2x10 B  +prone T 2x10 B  Seated horizontal abduction 2 x 15 green T band  Seated thoracic extension with feet on box 15x5''  +lat stretch on rolling stool 10x10"  Standing open book with hips open and closed x10   Standing child pose 10 x 5" to each side  Quadruped thread the needle and then reach for ceiling x 10 each side  Latissimus stretch at doorway 3 x 30 secs      manual therapy techniques: Joint mobilizations, Manual traction, Myofacial release, and Soft tissue Mobilization were applied to the: levator, teres minor, pectoralis minor, iliacus, psoas for 10 minutes, including:  Thoracic + rib mobs into rotation grade II/III  T2-T7  Thoracic CPA  B rib PA mob  Hypervolt massage gun to thoracic paraspinals  Lumbar grade II PA and UPA L1-L4  AC and SC bilateral shoulder grade III multidirectional.   GH traction  Hypervolt massage gun to thoracic paraspinals  Long axis and short axis traction     neuromuscular re-education activities to improve: Balance, Coordination, Kinesthetic, Sense, Proprioception, and Posture for 10 minutes. The following activities were included:  Scapular retraction 2 x 10 hold 3 secs  shld extension GTB 2 x 10  shld row GTB 2 x " 10  Paloff press GTBx2 2x10 B    therapeutic activities to improve functional performance for 0 minutes, including:    gait training to improve functional mobility and safety for 0 minutes, including:    hot pack for 0 minutes to thoracolumbar region.    Patient Education and Home Exercises       Education provided:   - HEP, pain management, findings    Written Home Exercises Provided: Patient instructed to cont prior HEP. Exercises were reviewed and Hien was able to demonstrate them prior to the end of the session.  Hien demonstrated good  understanding of the education provided. See EMR under Patient Instructions for exercises provided during therapy sessions    Assessment     Hien presents today with diminished low trap and mid trap strength and reports of pain in the rib when doing an overhead press. Three exercises were added today to address this issue with no pain reported. She tolerated all other exercises well and will continue to progress as tolerated,     Hien Is progressing well towards her goals.   Pt prognosis is Fair.     Pt will continue to benefit from skilled outpatient physical therapy to address the deficits listed in the problem list box on initial evaluation, provide pt/family education and to maximize pt's level of independence in the home and community environment.     Pt's spiritual, cultural and educational needs considered and pt agreeable to plan of care and goals.     Anticipated barriers to physical therapy: chronic nature and multiple joint and spine level impairments    Goals: (not met, progressing unless otherwise specified)  STG 5 weeks    1.  Pt to be independent with HEP for increased functional mobility and pain control. Progressing MET  2.  Pt to increase AROM at full thoracic and shoulder AROM without compensation or pain for increased functional mobility and transfers. Progressing MET  3.  Pt to decrease pain to less than 2/10 in thoracic spine after session for increased  "functional mobility. MET  4.  Pt to demonstrate B shoulder flexion and abduction to 170 without shoulder hiking and no pain at end range. MET     Long Term Goals: 10 weeks   1.  Pt to be independent with pain management strategies and verbalize 2 strategies for increased functional mobility and pain control.  2.  Pt to increase B shoulder abduction and ER 4+/5 for increased functional mobility and transfers.  3.  Pt to decrease pain to less than 2/10 after 30 min of swimming with all strokes include breast stroke for increased functional mobility.  4. Pt to perform full squat through available ROM to at least 18" from the ground without increased pain.     Plan     Extend plan of care 1x/wk for 2 additional visits.     Trish Delaney, PT      "

## 2023-11-06 NOTE — PROGRESS NOTES
"[facility] OUTPATIENT THERAPY AND WELLNESS   Physical Therapy Treatment Note/Discharge Visit     Name: Hien Jeter  Clinic Number: [id number]    Therapy Diagnosis:   Encounter Diagnoses   Name Primary?    Muscle weakness Yes    Rib pain on left side            Physician: Sunita Duarte DO    Visit Date: 11/8/2023    Physician Orders: PT Eval and Treat   Medical Diagnosis from Referral:   R07.81 (ICD-10-CM) - Rib pain on left side   M54.6,G89.29 (ICD-10-CM) - Chronic left-sided thoracic back pain   M79.10 (ICD-10-CM) - Myalgia      Evaluation Date: 7/25/2023  Authorization Period Expiration: 12/29/23  Plan of Care Expiration: 1/18/24  Progress Note Due: 11/18/23  Visit # / Visits authorized: 12/20  FOTO: 3/ 3 - last completed on 10/4/23     Precautions: Standard     PTA Visit #: 0/5    Time In: 8:00  Time Out: 9:05  Total Billable Time: 65 minutes     Subjective     Pt reports: due to coughing so much when being sick she has some increased pain, but she did exercises on her vacation and was doing well.   She was compliant with home exercise program.  Response to previous treatment: felt fine   Functional change: able to swim this morning    Pain: 2.5-3/10   Location: left ribs    Objective      Objective measures taken at progress report unless specified otherwise.     Treatment   Bold=performed  Hien received the treatments listed below:      therapeutic exercises to develop strength, endurance, ROM, flexibility, posture, and core stabilization for 30 minutes including:  supine eccentric lat stretch 3# dowel 20x5" (HOLD, next visit)  Supine stretch towel 3 min vertical and then horizontal T2-T4 level  Supine diagonals with green Tband 2 x 10  +prone Y 2x10 B  +prone T 2x10 B  Seated horizontal abduction 2 x 15 green T band  Seated thoracic extension with feet on box 15x5''  +lat stretch on rolling stool 10x10"  Standing open book with hips open and closed x10   Standing child pose 10 x 5" to each side  Quadruped " thread the needle and then reach for ceiling x 10 each side  Latissimus stretch at doorway 3 x 30 secs      manual therapy techniques: Joint mobilizations, Manual traction, Myofacial release, and Soft tissue Mobilization were applied to the: levator, teres minor, pectoralis minor, iliacus, psoas for 10 minutes, including:  Thoracic + rib mobs into rotation grade II/III  T2-T7  Thoracic CPA  B rib PA mob  Hypervolt massage gun to thoracic paraspinals  Lumbar grade II PA and UPA L1-L4  AC and SC bilateral shoulder grade III multidirectional.   GH traction  Hypervolt massage gun to thoracic paraspinals  Long axis and short axis traction     neuromuscular re-education activities to improve: Balance, Coordination, Kinesthetic, Sense, Proprioception, and Posture for 25 minutes. The following activities were included:  Scapular retraction 2 x 10 hold 3 secs  shld extension GTB 2 x 10  shld row GTB 2 x 10  Paloff press GTBx2 2x10 B    therapeutic activities to improve functional performance for 0 minutes, including:    gait training to improve functional mobility and safety for 0 minutes, including:    hot pack for 0 minutes to thoracolumbar region.    Patient Education and Home Exercises       Education provided:   - HEP, pain management, findings    Written Home Exercises Provided: Patient instructed to cont prior HEP. Exercises were reviewed and Hien was able to demonstrate them prior to the end of the session.  Hien demonstrated good  understanding of the education provided. See EMR under Patient Instructions for exercises provided during therapy sessions    Assessment     Hien presents today after 2 weeks break from PT and reports she has been doing well with her home program with the exception of when she was coughing a lot from being sick. She performed all exercises in bold above with good form with minimal input from PT indicating compliance with home program and good body awareness. She has met many of her PT  "goals and is appropriate for discharge to home program. She verbalizes understanding that she is able to return to PT as needed under a new order from her physician.     Hien Is progressing well towards her goals.   Pt prognosis is Fair.     Pt will continue to benefit from skilled outpatient physical therapy to address the deficits listed in the problem list box on initial evaluation, provide pt/family education and to maximize pt's level of independence in the home and community environment.     Pt's spiritual, cultural and educational needs considered and pt agreeable to plan of care and goals.     Anticipated barriers to physical therapy: chronic nature and multiple joint and spine level impairments    Goals: (not met, progressing unless otherwise specified)  STG 5 weeks    1.  Pt to be independent with HEP for increased functional mobility and pain control. Progressing MET  2.  Pt to increase AROM at full thoracic and shoulder AROM without compensation or pain for increased functional mobility and transfers. Progressing MET  3.  Pt to decrease pain to less than 2/10 in thoracic spine after session for increased functional mobility. MET  4.  Pt to demonstrate B shoulder flexion and abduction to 170 without shoulder hiking and no pain at end range. MET     Long Term Goals: 10 weeks   1.  Pt to be independent with pain management strategies and verbalize 2 strategies for increased functional mobility and pain control. MET  2.  Pt to increase B shoulder abduction and ER 4+/5 for increased functional mobility and transfers. Not assessed  3.  Pt to decrease pain to less than 2/10 after 30 min of swimming with all strokes include breast stroke for increased functional mobility. Not assessed  4. Pt to perform full squat through available ROM to at least 18" from the ground without increased pain. Not assessed    Plan     Pt is discharged form skilled PT     Trish Delaney, PT      "

## 2023-11-07 ENCOUNTER — TELEPHONE (OUTPATIENT)
Dept: NEUROLOGY | Facility: CLINIC | Age: 58
End: 2023-11-07
Payer: COMMERCIAL

## 2023-11-07 DIAGNOSIS — G35 MULTIPLE SCLEROSIS: Primary | ICD-10-CM

## 2023-11-07 NOTE — TELEPHONE ENCOUNTER
Spoke with pt.  Ocrevus labs on 11/10.  F/U with Dr. Salas on 11/28.  Pt will have future Ocrevus infusions at [facility].

## 2023-11-08 ENCOUNTER — CLINICAL SUPPORT (OUTPATIENT)
Dept: REHABILITATION | Facility: HOSPITAL | Age: 58
End: 2023-11-08
Payer: COMMERCIAL

## 2023-11-08 DIAGNOSIS — R07.81 RIB PAIN ON LEFT SIDE: ICD-10-CM

## 2023-11-08 DIAGNOSIS — M62.81 MUSCLE WEAKNESS: Primary | ICD-10-CM

## 2023-11-08 PROBLEM — Z74.09 IMPAIRED FUNCTIONAL MOBILITY AND ACTIVITY TOLERANCE: Status: RESOLVED | Noted: 2021-02-01 | Resolved: 2023-11-08

## 2023-11-08 PROBLEM — M25.611 DECREASED RANGE OF MOTION OF RIGHT SHOULDER: Status: RESOLVED | Noted: 2021-02-01 | Resolved: 2023-11-08

## 2023-11-08 PROBLEM — M25.511 ACUTE PAIN OF RIGHT SHOULDER: Status: RESOLVED | Noted: 2021-02-01 | Resolved: 2023-11-08

## 2023-11-08 PROCEDURE — 97110 THERAPEUTIC EXERCISES: CPT | Mod: PO

## 2023-11-08 PROCEDURE — 97140 MANUAL THERAPY 1/> REGIONS: CPT | Mod: PO

## 2023-11-08 PROCEDURE — 97112 NEUROMUSCULAR REEDUCATION: CPT | Mod: PO

## 2023-11-10 ENCOUNTER — PATIENT MESSAGE (OUTPATIENT)
Dept: INTERNAL MEDICINE | Facility: CLINIC | Age: 58
End: 2023-11-10
Payer: COMMERCIAL

## 2023-11-10 ENCOUNTER — LAB VISIT (OUTPATIENT)
Dept: LAB | Facility: HOSPITAL | Age: 58
End: 2023-11-10
Attending: INTERNAL MEDICINE
Payer: COMMERCIAL

## 2023-11-10 DIAGNOSIS — G35 MULTIPLE SCLEROSIS: ICD-10-CM

## 2023-11-10 DIAGNOSIS — E78.5 HYPERLIPIDEMIA, UNSPECIFIED HYPERLIPIDEMIA TYPE: ICD-10-CM

## 2023-11-10 LAB
ALBUMIN SERPL BCP-MCNC: 3.7 G/DL (ref 3.5–5.2)
ALP SERPL-CCNC: 110 U/L (ref 55–135)
ALT SERPL W/O P-5'-P-CCNC: 37 U/L (ref 10–44)
ANION GAP SERPL CALC-SCNC: 13 MMOL/L (ref 8–16)
AST SERPL-CCNC: 29 U/L (ref 10–40)
BASOPHILS # BLD AUTO: 0.04 K/UL (ref 0–0.2)
BASOPHILS NFR BLD: 0.7 % (ref 0–1.9)
BILIRUB SERPL-MCNC: 0.6 MG/DL (ref 0.1–1)
BUN SERPL-MCNC: 15 MG/DL (ref 6–20)
CALCIUM SERPL-MCNC: 10.2 MG/DL (ref 8.7–10.5)
CHLORIDE SERPL-SCNC: 105 MMOL/L (ref 95–110)
CHOLEST SERPL-MCNC: 240 MG/DL (ref 120–199)
CHOLEST/HDLC SERPL: 5.1 {RATIO} (ref 2–5)
CO2 SERPL-SCNC: 24 MMOL/L (ref 23–29)
CREAT SERPL-MCNC: 0.8 MG/DL (ref 0.5–1.4)
DIFFERENTIAL METHOD: ABNORMAL
EOSINOPHIL # BLD AUTO: 0.2 K/UL (ref 0–0.5)
EOSINOPHIL NFR BLD: 3.1 % (ref 0–8)
ERYTHROCYTE [DISTWIDTH] IN BLOOD BY AUTOMATED COUNT: 12.2 % (ref 11.5–14.5)
EST. GFR  (NO RACE VARIABLE): >60 ML/MIN/1.73 M^2
GLUCOSE SERPL-MCNC: 83 MG/DL (ref 70–110)
HBV CORE AB SERPL QL IA: NORMAL
HBV SURFACE AG SERPL QL IA: NORMAL
HCT VFR BLD AUTO: 41.4 % (ref 37–48.5)
HDLC SERPL-MCNC: 47 MG/DL (ref 40–75)
HDLC SERPL: 19.6 % (ref 20–50)
HGB BLD-MCNC: 14 G/DL (ref 12–16)
IGA SERPL-MCNC: 188 MG/DL (ref 40–350)
IGG SERPL-MCNC: 918 MG/DL (ref 650–1600)
IGM SERPL-MCNC: 66 MG/DL (ref 50–300)
IMM GRANULOCYTES # BLD AUTO: 0.02 K/UL (ref 0–0.04)
IMM GRANULOCYTES NFR BLD AUTO: 0.4 % (ref 0–0.5)
LDLC SERPL CALC-MCNC: 166.6 MG/DL (ref 63–159)
LYMPHOCYTES # BLD AUTO: 1.5 K/UL (ref 1–4.8)
LYMPHOCYTES NFR BLD: 26.8 % (ref 18–48)
MCH RBC QN AUTO: 32.1 PG (ref 27–31)
MCHC RBC AUTO-ENTMCNC: 33.8 G/DL (ref 32–36)
MCV RBC AUTO: 95 FL (ref 82–98)
MONOCYTES # BLD AUTO: 0.9 K/UL (ref 0.3–1)
MONOCYTES NFR BLD: 16.3 % (ref 4–15)
NEUTROPHILS # BLD AUTO: 2.9 K/UL (ref 1.8–7.7)
NEUTROPHILS NFR BLD: 52.7 % (ref 38–73)
NONHDLC SERPL-MCNC: 193 MG/DL
NRBC BLD-RTO: 0 /100 WBC
PLATELET # BLD AUTO: 450 K/UL (ref 150–450)
PMV BLD AUTO: 9.6 FL (ref 9.2–12.9)
POTASSIUM SERPL-SCNC: 4.3 MMOL/L (ref 3.5–5.1)
PROT SERPL-MCNC: 7.1 G/DL (ref 6–8.4)
RBC # BLD AUTO: 4.36 M/UL (ref 4–5.4)
SODIUM SERPL-SCNC: 142 MMOL/L (ref 136–145)
TRIGL SERPL-MCNC: 132 MG/DL (ref 30–150)
WBC # BLD AUTO: 5.53 K/UL (ref 3.9–12.7)

## 2023-11-10 PROCEDURE — 85025 COMPLETE CBC W/AUTO DIFF WBC: CPT | Performed by: PSYCHIATRY & NEUROLOGY

## 2023-11-10 PROCEDURE — 80061 LIPID PANEL: CPT | Performed by: INTERNAL MEDICINE

## 2023-11-10 PROCEDURE — 36415 COLL VENOUS BLD VENIPUNCTURE: CPT | Mod: PO | Performed by: PSYCHIATRY & NEUROLOGY

## 2023-11-10 PROCEDURE — 80053 COMPREHEN METABOLIC PANEL: CPT | Performed by: PSYCHIATRY & NEUROLOGY

## 2023-11-10 PROCEDURE — 82784 ASSAY IGA/IGD/IGG/IGM EACH: CPT | Mod: 59 | Performed by: PSYCHIATRY & NEUROLOGY

## 2023-11-10 PROCEDURE — 86704 HEP B CORE ANTIBODY TOTAL: CPT | Performed by: PSYCHIATRY & NEUROLOGY

## 2023-11-10 PROCEDURE — 87340 HEPATITIS B SURFACE AG IA: CPT | Performed by: PSYCHIATRY & NEUROLOGY

## 2023-11-28 ENCOUNTER — OFFICE VISIT (OUTPATIENT)
Dept: NEUROLOGY | Facility: CLINIC | Age: 58
End: 2023-11-28
Payer: COMMERCIAL

## 2023-11-28 ENCOUNTER — TELEPHONE (OUTPATIENT)
Dept: NEUROLOGY | Facility: CLINIC | Age: 58
End: 2023-11-28

## 2023-11-28 VITALS
HEART RATE: 68 BPM | BODY MASS INDEX: 24.59 KG/M2 | DIASTOLIC BLOOD PRESSURE: 77 MMHG | HEIGHT: 62 IN | SYSTOLIC BLOOD PRESSURE: 110 MMHG | WEIGHT: 133.63 LBS

## 2023-11-28 DIAGNOSIS — Z71.89 COUNSELING REGARDING GOALS OF CARE: ICD-10-CM

## 2023-11-28 DIAGNOSIS — Z79.899 IMMUNOSUPPRESSION DUE TO DRUG THERAPY: ICD-10-CM

## 2023-11-28 DIAGNOSIS — D84.821 IMMUNOSUPPRESSION DUE TO DRUG THERAPY: ICD-10-CM

## 2023-11-28 DIAGNOSIS — G35 MS (MULTIPLE SCLEROSIS): Primary | ICD-10-CM

## 2023-11-28 DIAGNOSIS — Z29.89 IMMUNOTHERAPY: ICD-10-CM

## 2023-11-28 PROCEDURE — 99214 OFFICE O/P EST MOD 30 MIN: CPT | Mod: S$GLB,,, | Performed by: PSYCHIATRY & NEUROLOGY

## 2023-11-28 PROCEDURE — 3078F DIAST BP <80 MM HG: CPT | Mod: CPTII,S$GLB,, | Performed by: PSYCHIATRY & NEUROLOGY

## 2023-11-28 PROCEDURE — 99214 PR OFFICE/OUTPT VISIT, EST, LEVL IV, 30-39 MIN: ICD-10-PCS | Mod: S$GLB,,, | Performed by: PSYCHIATRY & NEUROLOGY

## 2023-11-28 PROCEDURE — 3074F PR MOST RECENT SYSTOLIC BLOOD PRESSURE < 130 MM HG: ICD-10-PCS | Mod: CPTII,S$GLB,, | Performed by: PSYCHIATRY & NEUROLOGY

## 2023-11-28 PROCEDURE — 3078F PR MOST RECENT DIASTOLIC BLOOD PRESSURE < 80 MM HG: ICD-10-PCS | Mod: CPTII,S$GLB,, | Performed by: PSYCHIATRY & NEUROLOGY

## 2023-11-28 PROCEDURE — 99999 PR PBB SHADOW E&M-EST. PATIENT-LVL III: ICD-10-PCS | Mod: PBBFAC,,, | Performed by: PSYCHIATRY & NEUROLOGY

## 2023-11-28 PROCEDURE — 1159F MED LIST DOCD IN RCRD: CPT | Mod: CPTII,S$GLB,, | Performed by: PSYCHIATRY & NEUROLOGY

## 2023-11-28 PROCEDURE — 1160F RVW MEDS BY RX/DR IN RCRD: CPT | Mod: CPTII,S$GLB,, | Performed by: PSYCHIATRY & NEUROLOGY

## 2023-11-28 PROCEDURE — 1159F PR MEDICATION LIST DOCUMENTED IN MEDICAL RECORD: ICD-10-PCS | Mod: CPTII,S$GLB,, | Performed by: PSYCHIATRY & NEUROLOGY

## 2023-11-28 PROCEDURE — 1160F PR REVIEW ALL MEDS BY PRESCRIBER/CLIN PHARMACIST DOCUMENTED: ICD-10-PCS | Mod: CPTII,S$GLB,, | Performed by: PSYCHIATRY & NEUROLOGY

## 2023-11-28 PROCEDURE — 3074F SYST BP LT 130 MM HG: CPT | Mod: CPTII,S$GLB,, | Performed by: PSYCHIATRY & NEUROLOGY

## 2023-11-28 PROCEDURE — 3008F BODY MASS INDEX DOCD: CPT | Mod: CPTII,S$GLB,, | Performed by: PSYCHIATRY & NEUROLOGY

## 2023-11-28 PROCEDURE — 3008F PR BODY MASS INDEX (BMI) DOCUMENTED: ICD-10-PCS | Mod: CPTII,S$GLB,, | Performed by: PSYCHIATRY & NEUROLOGY

## 2023-11-28 PROCEDURE — 99999 PR PBB SHADOW E&M-EST. PATIENT-LVL III: CPT | Mod: PBBFAC,,, | Performed by: PSYCHIATRY & NEUROLOGY

## 2023-11-28 NOTE — PROGRESS NOTES
Subjective:          Patient ID: Hien Jeter is a 58 y.o. female who presents today for a routine clinic visit for MS.      MS HPI:  DMT: ocrelizumab - next infusion in 2 days  Side effects from DMT? No  Taking vitamin D3 as recommended? Yes - 3000 IU daily   No frequent or recent infections - had the flu when on a cruise at the end of October  Gets muscle cramps in her legs at night. Was on baclofen in the past. Aleve/Tylenol and yoga stretching works well for her.  Fell 3 weeks ago. She was outside on [place] while carrying her 22 lb dog. Doesn't recall tripping on anything. Landed on her left forearm and right knee. No issues with walking afterward.    Medications:  Current Outpatient Medications   Medication Sig    calcium carbonate (OS-MIGUEL) 600 mg calcium (1,500 mg) Tab Take 600 mg by mouth once.    cholecalciferol, vitamin D3, 3,000 unit Tab Take 3,000 Units by mouth once daily.     flaxseed oil 1,000 mg Cap Take 1 capsule by mouth once daily.     multivitamin-Ca-iron-minerals 27-0.4 mg Tab Take 1 tablet by mouth once daily.     vitamin E 400 UNIT capsule Take 400 Units by mouth 2 (two) times a day.     No current facility-administered medications for this visit.       SOCIAL HISTORY  Social History     Tobacco Use    Smoking status: Never    Smokeless tobacco: Never   Substance Use Topics    Alcohol use: Yes     Comment: socially/ not weekly    Drug use: No       Living arrangements - the patient lives with their spouse.          11/23/2023     9:39 AM   REVIEW OF SYMPTOMS   Do you feel abnormally tired on most days? No   Do you feel you generally sleep well? Yes   Do you have difficulty controlling your bladder?  No   Do you have difficulty controlling your bowels?  No   Do you have frequent muscle cramps, tightness or spasms in your limbs?  Yes   Do you have new visual symptoms?  No   Do you have worsening difficulty with your memory or thinking? No   Do you have worsening symptoms of anxiety or  depression?  No   For patients who walk, Do you have more difficulty walking?  No   Have you fallen since your last visit?  Yes   For patients who use wheelchairs: Do you have any skin wounds or breakdown? Not Applicable   Do you have difficulty using your hands?  No   Do you have shooting or burning pain? No   Do you have difficulty with sexual function?  Yes   If you are sexually active, are you using birth control? Y/N  N/A No   Do you often choke when swallowing liquids or solid food?  No   Do you experience worsening symptoms when overheated? No   Do you need any new equipment such as a wheelchair, walker or shower chair? No   Do you receive co-pay financial assistance for your principal MS medicine? Yes   Would you be interested in participating in an MS research trial in the future? Yes   For patients on Gilenya, Tecfidera, Aubagio, Rituxan, Ocrevus, Tysabri, Lemtrada or Methotrexate, are you aware that you should NOT receive live virus vaccines?  Not Applicable   Do you feel you have adequate family/friend support?  Yes   Do you have health insurance?   Yes   Are you currently employed? No   Do you receive SSDI/SSI?  No   Do you use marijuana or cannabis products? No   Have you been diagnosed with a urinary tract infection since your last visit here? No   Have you been diagnosed with a respiratory tract infection since your last visit here? No   Have you been to the emergency room since your last visit here? No   Have you been hospitalized since your last visit here?  No         11/23/2023     9:42 AM   FSS SCORE & INTERPRETATION   FSS SCORE  30   FSS SCORE INTERPRETATION May not be suffering from fatigue         11/23/2023     9:41 AM   MS ANGELIQUE-D SCORE & INTERPRETATION   ANGELIQUE-D SCORE  5   ANGELIQUE-D INTERPRETATION  No indication of Depression         11/23/2023     9:40 AM   MS MT-7 SCORE & INTERPRETATION   MT-7 SCORE  3   MT-7 SCORE INTERPRETATION Normal         11/23/2023     9:43 AM   PEQ MS MOS PAIN EFFECTS  SCORE & INTERPRETATION   PES SCORE 9   PES SCORE INTERPRETATION Scores can range from 6-30.  Items are scaled so that higher scores indicate a greater impact of pain on a patients mood and behavior.         11/23/2023     9:43 AM   PEQ MS SEXUAL SATISFACTION SCORE & INTERPRETATION   SSS SCORE  14   SSS SCORE INTERPRETATION Scores can range from 4-24.  Higher scores indicate greater problems with sexual satisfaction.         11/23/2023     9:44 AM   MS BLADDER CONTROL SCORE & INTERPRETATION   BLCS SCORE 3   BLCS SCORE INTERPRETATION  Scores can range from 0-22, with higher scores indicating greater bladder control problems.         11/23/2023     9:46 AM   MS BOWEL CONTROL SCORE & INTERPRETATION   BWCS SCORE 1   BWCS SCORE INTERPRETATION Scores can range from 0-26, with higher scores indicating greater bowel control problems.         11/23/2023     9:44 AM   PEQ MS IMPACT OF VISUAL IMPAIRMENT SCORE & INTERPRETATION   MEJIA SCALE SCORE  1   MEJIA SCORE INTERPRETATION Scores can range from 0-15, with higher scores indicating greater impact of visual problems on daily activites.         11/23/2023     9:46 AM   MS PDQ SCORE & INTERPRETATION   PDQ RETROSPECTIVE MEMORY SUBSCALE 6   PDQ ATTENTION/CONCENTRATION SUBSCALE 4   PDQ PROSPECTIVE MEMORY SUBSCALE 6   PDQ PLANNING/ORGANIZATION SUBSCALE 2   PDQ TOTAL SCORE 18   PDQ SCORE INTERPRETATION Scores can range from 0-80, with higher scores indicating greater perceived cognitive impairment.         11/23/2023     9:47 AM   MSSS SCORE & INTERPRETATION   MSSS TANGIBLE SUPPORT SUBSCALE 100   MSSS EMOTIONAL/INFORMATIONAL SUPPORT SUBSCALE 87.5   MSSS AFFECTIONATE SUPPORT SUBSCALE 91.67   MSSS POSITIVE SOCIAL INTERACTION SUBSCALE 75   MSSS TOTAL SCORE 88.54   MSSS SCORE INTERPRETATION Scores can range from 0-100, with higher scores indicating greater perceived support.                  Objective:            10/6/2022    12:01 AM 11/28/2023    12:01 AM   Timed 25 Foot Walk:   Did  patient wear an AFO? No No   Was assistive device used? No No   Time for 25 Foot Walk (seconds) 4 3.9   Time for 25 Foot Walk (seconds) 3.8 3.9       Neurologic Exam     Mental Status   Oriented to person, place, and time.   Attention: normal.   Speech: speech is normal   Level of consciousness: alert    Cranial Nerves     CN III, IV, VI   Pupils are equal, round, and reactive to light.  Extraocular motions are normal.     CN V   Facial sensation intact.     CN VII   Facial expression full, symmetric.     CN VIII   Hearing: intact    CN IX, X   Palate: symmetric    CN XI   Right trapezius strength: normal  Left trapezius strength: normal    CN XII   Tongue: not atrophic  Tongue deviation: none    Motor Exam   Muscle bulk: normal    Strength   Right deltoid: 5/5  Left deltoid: 5/5  Right biceps: 5/5  Left biceps: 5/5  Right triceps: 5/5  Left triceps: 5/5  Right interossei: 5/5  Left interossei: 5/5  Right iliopsoas: 5/5  Left iliopsoas: 5/5  Right quadriceps: 5/5  Left quadriceps: 5/5  Right hamstrin/5  Left hamstrin/5  Right anterior tibial: 5/5  Left anterior tibial: 5/5    Sensory Exam   Light touch normal.   Right leg vibration: normal  Left leg vibration: normal    Gait, Coordination, and Reflexes     Gait  Gait: normal    Coordination   Finger to nose coordination: normal    Reflexes   Right brachioradialis: 2+  Left brachioradialis: 2+  Right biceps: 2+  Left biceps: 2+  Right patellar: 2+  Left patellar: 2+        Imaging:     Results for orders placed during the hospital encounter of 22    MRI Brain Demyelinating Without Contrast    Impression  No significant change from prior.  Continued few scattered small to punctate foci of T2 FLAIR signal abnormality throughout the brain parenchyma while nonspecific in light of history remain concerning for mild degree of prior demyelinating plaque.    No definite new lesion or diffusion signal abnormality to suggest significant interval or active  "demyelination.    Clinical correlation and continued follow-up advised.    Electronically signed by resident: Jori Coleman MD  Date:    06/01/2022  Time:    09:27    Electronically signed by: Trace Novak DO  Date:    06/01/2022  Time:    09:55    No results found for this or any previous visit.    No results found for this or any previous visit.    No results found for this or any previous visit.    No results found for this or any previous visit.    No results found for this or any previous visit.        Labs:     Lab Results   Component Value Date    FDHYPMBH21FM 64 10/06/2022    KCHUFGQC22BH 60 06/02/2021    BPHSHTFW15VM 74 12/11/2019     No results found for: "JCVINDEX", "JCVANTIBODY"  Lab Results   Component Value Date    CF2XMDEP 64.7 12/20/2017    ABSOLUTECD3 837 12/20/2017    ED9MMOBV 12.5 12/20/2017    ABSOLUTECD8 161 (L) 12/20/2017    AL4ZIHDF 53.0 12/20/2017    ABSOLUTECD4 685 12/20/2017    LABCD48 4.25 (H) 12/20/2017     Lab Results   Component Value Date    WBC 5.53 11/10/2023    RBC 4.36 11/10/2023    HGB 14.0 11/10/2023    HCT 41.4 11/10/2023    MCV 95 11/10/2023    MCH 32.1 (H) 11/10/2023    MCHC 33.8 11/10/2023    RDW 12.2 11/10/2023     11/10/2023    MPV 9.6 11/10/2023    GRAN 2.9 11/10/2023    GRAN 52.7 11/10/2023    LYMPH 1.5 11/10/2023    LYMPH 26.8 11/10/2023    MONO 0.9 11/10/2023    MONO 16.3 (H) 11/10/2023    EOS 0.2 11/10/2023    BASO 0.04 11/10/2023    EOSINOPHIL 3.1 11/10/2023    BASOPHIL 0.7 11/10/2023     Sodium   Date Value Ref Range Status   11/10/2023 142 136 - 145 mmol/L Final     Potassium   Date Value Ref Range Status   11/10/2023 4.3 3.5 - 5.1 mmol/L Final     Chloride   Date Value Ref Range Status   11/10/2023 105 95 - 110 mmol/L Final     CO2   Date Value Ref Range Status   11/10/2023 24 23 - 29 mmol/L Final     Glucose   Date Value Ref Range Status   11/10/2023 83 70 - 110 mg/dL Final     BUN   Date Value Ref Range Status   11/10/2023 15 6 - 20 mg/dL Final "     Creatinine   Date Value Ref Range Status   11/10/2023 0.8 0.5 - 1.4 mg/dL Final     Calcium   Date Value Ref Range Status   11/10/2023 10.2 8.7 - 10.5 mg/dL Final     Total Protein   Date Value Ref Range Status   11/10/2023 7.1 6.0 - 8.4 g/dL Final     Albumin   Date Value Ref Range Status   11/10/2023 3.7 3.5 - 5.2 g/dL Final     Total Bilirubin   Date Value Ref Range Status   11/10/2023 0.6 0.1 - 1.0 mg/dL Final     Comment:     For infants and newborns, interpretation of results should be based  on gestational age, weight and in agreement with clinical  observations.    Premature Infant recommended reference ranges:  Up to 24 hours.............<8.0 mg/dL  Up to 48 hours............<12.0 mg/dL  3-5 days..................<15.0 mg/dL  6-29 days.................<15.0 mg/dL       Alkaline Phosphatase   Date Value Ref Range Status   11/10/2023 110 55 - 135 U/L Final     AST   Date Value Ref Range Status   11/10/2023 29 10 - 40 U/L Final     ALT   Date Value Ref Range Status   11/10/2023 37 10 - 44 U/L Final     Anion Gap   Date Value Ref Range Status   11/10/2023 13 8 - 16 mmol/L Final     eGFR if    Date Value Ref Range Status   04/18/2022 >60.0 >60 mL/min/1.73 m^2 Final     eGFR if non    Date Value Ref Range Status   04/18/2022 >60.0 >60 mL/min/1.73 m^2 Final     Comment:     Calculation used to obtain the estimated glomerular filtration  rate (eGFR) is the CKD-EPI equation.        Lab Results   Component Value Date    HEPBSAG Non-reactive 11/10/2023    HEPBSAB 36.83 10/06/2022    HEPBSAB Reactive 10/06/2022    HEPBCAB Non-reactive 11/10/2023           MS Impression and Plan:     NEURO MULTIPLE SCLEROSIS IMPRESSION:   MS Status:     Number of relapses in the past year?:  0    Clinical Progression:  Clinically Stable    MRI Progression:  Stable  Plan:     DMT:  No change in management    Implement Disease Modifying Therapy:  Ocrelizumab    Symptom Management:  No change in symptom  management     FANY   Tolerating Ocrevus   Next dose later this week - will pursue 2 hour infusion;   MRI brain in December  Labs next May and follow up [facility] in 6 mo             Problem List Items Addressed This Visit          1 - High    MS (multiple sclerosis) - Primary    Relevant Orders    MRI Brain Demyelinating Without Contrast    CBC Auto Differential    Comprehensive Metabolic Panel    Hepatitis B Core Antibody, Total    Hepatitis B Surface Antigen    Immunoglobulins (IgG, IgA, IgM) Quantitative       2     Immunosuppression due to drug therapy       Unprioritized    Counseling regarding goals of care     Other Visit Diagnoses       Immunotherapy        Relevant Orders    CBC Auto Differential    Comprehensive Metabolic Panel    Hepatitis B Core Antibody, Total    Hepatitis B Surface Antigen    Immunoglobulins (IgG, IgA, IgM) Quantitative            Page Salas MD    I spent a total of 35 minutes on the day of the visit.This includes face to face time and non-face to face time preparing to see the patient (eg, review of tests), obtaining and/or reviewing separately obtained history, documenting clinical information in the electronic or other health record, independently interpreting results and communicating results to the patient/family/caregiver, or care coordinator.

## 2023-11-28 NOTE — TELEPHONE ENCOUNTER
----- Message from Page Salas MD sent at 11/28/2023  9:36 AM CST -----  All good for Ocrevus in 2 days at [facility] -- let's change to fast infusion pls

## 2023-12-12 ENCOUNTER — HOSPITAL ENCOUNTER (OUTPATIENT)
Dept: RADIOLOGY | Facility: HOSPITAL | Age: 58
Discharge: HOME | End: 2023-12-12
Attending: PSYCHIATRY & NEUROLOGY
Payer: COMMERCIAL

## 2023-12-12 DIAGNOSIS — G35 MS (MULTIPLE SCLEROSIS): ICD-10-CM

## 2023-12-12 PROCEDURE — 70551 MRI BRAIN STEM W/O DYE: CPT | Mod: 26,,, | Performed by: RADIOLOGY

## 2023-12-12 PROCEDURE — 70551 MRI BRAIN DEMYELINATING WITHOUT CONTRAST: ICD-10-PCS | Mod: 26,,, | Performed by: RADIOLOGY

## 2023-12-12 PROCEDURE — 70551 MRI BRAIN STEM W/O DYE: CPT | Mod: TC

## 2023-12-15 ENCOUNTER — OFFICE VISIT (OUTPATIENT)
Dept: OBSTETRICS AND GYNECOLOGY | Facility: CLINIC | Age: 58
End: 2023-12-15
Payer: COMMERCIAL

## 2023-12-15 VITALS
HEIGHT: 62 IN | DIASTOLIC BLOOD PRESSURE: 84 MMHG | BODY MASS INDEX: 24.54 KG/M2 | WEIGHT: 133.38 LBS | SYSTOLIC BLOOD PRESSURE: 122 MMHG

## 2023-12-15 DIAGNOSIS — N64.4 BREAST PAIN, LEFT: Primary | ICD-10-CM

## 2023-12-15 PROCEDURE — 99999 PR PBB SHADOW E&M-EST. PATIENT-LVL III: ICD-10-PCS | Mod: PBBFAC,,, | Performed by: OBSTETRICS & GYNECOLOGY

## 2023-12-15 PROCEDURE — 1159F PR MEDICATION LIST DOCUMENTED IN MEDICAL RECORD: ICD-10-PCS | Mod: CPTII,S$GLB,, | Performed by: OBSTETRICS & GYNECOLOGY

## 2023-12-15 PROCEDURE — 99213 PR OFFICE/OUTPT VISIT, EST, LEVL III, 20-29 MIN: ICD-10-PCS | Mod: S$GLB,,, | Performed by: OBSTETRICS & GYNECOLOGY

## 2023-12-15 PROCEDURE — 3074F SYST BP LT 130 MM HG: CPT | Mod: CPTII,S$GLB,, | Performed by: OBSTETRICS & GYNECOLOGY

## 2023-12-15 PROCEDURE — 3008F PR BODY MASS INDEX (BMI) DOCUMENTED: ICD-10-PCS | Mod: CPTII,S$GLB,, | Performed by: OBSTETRICS & GYNECOLOGY

## 2023-12-15 PROCEDURE — 1159F MED LIST DOCD IN RCRD: CPT | Mod: CPTII,S$GLB,, | Performed by: OBSTETRICS & GYNECOLOGY

## 2023-12-15 PROCEDURE — 3079F PR MOST RECENT DIASTOLIC BLOOD PRESSURE 80-89 MM HG: ICD-10-PCS | Mod: CPTII,S$GLB,, | Performed by: OBSTETRICS & GYNECOLOGY

## 2023-12-15 PROCEDURE — 1160F RVW MEDS BY RX/DR IN RCRD: CPT | Mod: CPTII,S$GLB,, | Performed by: OBSTETRICS & GYNECOLOGY

## 2023-12-15 PROCEDURE — 3008F BODY MASS INDEX DOCD: CPT | Mod: CPTII,S$GLB,, | Performed by: OBSTETRICS & GYNECOLOGY

## 2023-12-15 PROCEDURE — 99213 OFFICE O/P EST LOW 20 MIN: CPT | Mod: S$GLB,,, | Performed by: OBSTETRICS & GYNECOLOGY

## 2023-12-15 PROCEDURE — 1160F PR REVIEW ALL MEDS BY PRESCRIBER/CLIN PHARMACIST DOCUMENTED: ICD-10-PCS | Mod: CPTII,S$GLB,, | Performed by: OBSTETRICS & GYNECOLOGY

## 2023-12-15 PROCEDURE — 3074F PR MOST RECENT SYSTOLIC BLOOD PRESSURE < 130 MM HG: ICD-10-PCS | Mod: CPTII,S$GLB,, | Performed by: OBSTETRICS & GYNECOLOGY

## 2023-12-15 PROCEDURE — 3079F DIAST BP 80-89 MM HG: CPT | Mod: CPTII,S$GLB,, | Performed by: OBSTETRICS & GYNECOLOGY

## 2023-12-15 PROCEDURE — 99999 PR PBB SHADOW E&M-EST. PATIENT-LVL III: CPT | Mod: PBBFAC,,, | Performed by: OBSTETRICS & GYNECOLOGY

## 2023-12-15 NOTE — PROGRESS NOTES
CC: left breast pain    Hien Jeter is a 58 y.o. female  presents with complaint of above x 1 week. Did workout arms and chest last week so thought it may be from this but it has not gotten better. Sister  from breast cancer, She is BRCA negative. Mammo neg earlier this year.     Past Medical History:   Diagnosis Date    Anxiety     Basal cell carcinoma     left eyebrow     Depression     Environmental allergies     Genetic testing 2022    [employer] 84-gene Multi-Cancer +RNA panel    Headache(784.0)     Multiple food allergies     Multiple sclerosis 2009     shoulder 2015    left     Past Surgical History:   Procedure Laterality Date    ARTHROSCOPIC DEBRIDEMENT OF ROTATOR CUFF Left 2019    Procedure: DEBRIDEMENT, ROTATOR CUFF, ARTHROSCOPIC;  Surgeon: Dc Moore Jr., MD;  Location: [facility] OR;  Service: Orthopedics;  Laterality: Left;  need opus system (Julito notified)  video    ARTHROSCOPY OF SHOULDER WITH DECOMPRESSION OF SUBACROMIAL SPACE  2019    Procedure: ARTHROSCOPY, SHOULDER, WITH SUBACROMIAL SPACE DECOMPRESSION;  Surgeon: Dc Moore Jr., MD;  Location: [facility] OR;  Service: Orthopedics;;    ARTHROSCOPY OF SHOULDER WITH DECOMPRESSION OF SUBACROMIAL SPACE Right 2021    Procedure: ARTHROSCOPY, SHOULDER, WITH SUBACROMIAL SPACE DECOMPRESSION;  Surgeon: Dc Moore Jr., MD;  Location: [facility] OR;  Service: Orthopedics;  Laterality: Right;  possible rot cuff repair  need opus system (Mandaen P notified per Brii , )  video    BASAL CELL CARCINOMA EXCISION      COLONOSCOPY N/A 2017    Procedure: COLONOSCOPY;  Surgeon: Marielle Dietz MD;  Location: [facility] ([address] [place] [address]);  Service: Endoscopy;  Laterality: N/A;  Patient requests PM.    PM prep.    COLONOSCOPY N/A 2022    Procedure: COLONOSCOPY;  Surgeon: Brijesh Caban MD;  [facility]  Service: Endoscopy;  Laterality: N/A;  instr. via portal - PC  pre call complete-as     FOOT SURGERY Right 2019    LASIK      SHOULDER SURGERY Left 2019    WISDOM TOOTH EXTRACTION         Current Outpatient Medications:     calcium carbonate (OS-MIGUEL) 600 mg calcium (1,500 mg) Tab, Take 600 mg by mouth once., Disp: , Rfl:     cholecalciferol, vitamin D3, 3,000 unit Tab, Take 3,000 Units by mouth once daily. , Disp: , Rfl:     flaxseed oil 1,000 mg Cap, Take 1 capsule by mouth once daily. , Disp: , Rfl:     multivitamin-Ca-iron-minerals 27-0.4 mg Tab, Take 1 tablet by mouth once daily. , Disp: , Rfl:     vitamin E 400 UNIT capsule, Take 400 Units by mouth 2 (two) times a day., Disp: , Rfl:   Review of patient's allergies indicates:   Allergen Reactions    Diclofenac Nausea Only    Ibuprofen Other (See Comments)    Tramadol Other (See Comments), Nausea And Vomiting and Photosensitivity     Nausea   Nausea      Social History     Tobacco Use    Smoking status: Never    Smokeless tobacco: Never   Substance Use Topics    Alcohol use: Yes     Comment: socially/ not weekly    Drug use: No     Family History   Problem Relation Age of Onset    Liver cancer Mother 84    Colon cancer Mother 50    Diabetes Mother     Osteoporosis Mother     Arrhythmia Father     Prostate cancer Father 75    Breast cancer Sister 50    Colon cancer Sister 59        myrisk negative 2021    Diabetes Brother     Dementia Maternal Grandmother     Lung cancer Maternal Grandfather         +smoker, chemical plants    Uterine cancer Paternal Grandmother     Lung cancer Paternal Grandfather         heavy smoker    Breast cancer Paternal Aunt     Breast cancer Paternal Aunt     Prostate cancer Paternal Uncle     Breast cancer Paternal Aunt     Multiple sclerosis Neg Hx     Ovarian cancer Neg Hx      OB History    Para Term  AB Living   0   0         SAB IAB Ectopic Multiple Live Births                        ROS:  GENERAL: No fever, chills, fatigability or weight loss.  VULVAR: No pain, no lesions and no  itching.  VAGINAL: No relaxation, no itching, no discharge, no abnormal bleeding and no lesions.  ABDOMEN: No abdominal pain. Denies nausea. Denies vomiting. No diarrhea. No constipation  BREAST: + pain. No lumps. No discharge.  URINARY: No incontinence, no nocturia, no frequency and no dysuria.  CARDIOVASCULAR: No chest pain. No shortness of breath. No leg cramps.  NEUROLOGICAL: No headaches. No vision changes.    Vitals:    12/15/23 1359   BP: 122/84       Pt verbally consented for pelvic exam.  PHYSICAL EXAM:  GEN: NAD  Resp: nl effort  Breast: some swelling noted left breast 3 oclock edge of breast tissue with TTP on light palpation. No nipple drainage. Right breast normal.  Psych: nl affect  Neuro: no focal deficits  Skin: warm and dry    ASSESSMENT and PLAN:    ICD-10-CM ICD-9-CM    1. Breast pain, left  N64.4 611.71 Mammo Digital Diagnostic Left with [facility]          Will get dx mammo.     FOLLOW UP: PRN lack of improvement.

## 2023-12-26 ENCOUNTER — HOSPITAL ENCOUNTER (OUTPATIENT)
Dept: RADIOLOGY | Facility: HOSPITAL | Age: 58
Discharge: HOME | End: 2023-12-26
Attending: OBSTETRICS & GYNECOLOGY
Payer: COMMERCIAL

## 2023-12-26 DIAGNOSIS — N64.4 BREAST PAIN, LEFT: ICD-10-CM

## 2023-12-26 PROCEDURE — 77061 BREAST TOMOSYNTHESIS UNI: CPT | Mod: 26,LT,, | Performed by: RADIOLOGY

## 2023-12-26 PROCEDURE — 77065 DX MAMMO INCL CAD UNI: CPT | Mod: 26,LT,, | Performed by: RADIOLOGY

## 2023-12-26 PROCEDURE — 77061 MAMMO DIGITAL DIAGNOSTIC LEFT WITH TOMO: ICD-10-PCS | Mod: 26,LT,, | Performed by: RADIOLOGY

## 2023-12-26 PROCEDURE — 77065 MAMMO DIGITAL DIAGNOSTIC LEFT WITH TOMO: ICD-10-PCS | Mod: 26,LT,, | Performed by: RADIOLOGY

## 2023-12-26 PROCEDURE — 77065 DX MAMMO INCL CAD UNI: CPT | Mod: TC,LT

## 2024-01-22 ENCOUNTER — PATIENT MESSAGE (OUTPATIENT)
Dept: PSYCHIATRY | Facility: CLINIC | Age: 59
End: 2024-01-22
Payer: COMMERCIAL

## 2024-03-05 ENCOUNTER — TELEPHONE (OUTPATIENT)
Dept: HEMATOLOGY/ONCOLOGY | Facility: CLINIC | Age: 59
End: 2024-03-05

## 2024-03-05 ENCOUNTER — OFFICE VISIT (OUTPATIENT)
Dept: HEMATOLOGY/ONCOLOGY | Facility: CLINIC | Age: 59
End: 2024-03-05
Payer: COMMERCIAL

## 2024-03-05 VITALS
DIASTOLIC BLOOD PRESSURE: 68 MMHG | OXYGEN SATURATION: 99 % | HEIGHT: 62 IN | TEMPERATURE: 98 F | SYSTOLIC BLOOD PRESSURE: 108 MMHG | BODY MASS INDEX: 23.74 KG/M2 | HEART RATE: 66 BPM | RESPIRATION RATE: 18 BRPM | WEIGHT: 129 LBS

## 2024-03-05 DIAGNOSIS — R92.30 DENSE BREAST TISSUE ON MAMMOGRAM, UNSPECIFIED TYPE: ICD-10-CM

## 2024-03-05 DIAGNOSIS — Z80.3 FAMILY HISTORY OF BREAST CANCER: ICD-10-CM

## 2024-03-05 DIAGNOSIS — Z12.31 ENCOUNTER FOR SCREENING MAMMOGRAM FOR BREAST CANCER: ICD-10-CM

## 2024-03-05 DIAGNOSIS — Z91.89 AT HIGH RISK FOR BREAST CANCER: Primary | ICD-10-CM

## 2024-03-05 PROCEDURE — 3074F SYST BP LT 130 MM HG: CPT | Mod: CPTII,S$GLB,, | Performed by: NURSE PRACTITIONER

## 2024-03-05 PROCEDURE — 3078F DIAST BP <80 MM HG: CPT | Mod: CPTII,S$GLB,, | Performed by: NURSE PRACTITIONER

## 2024-03-05 PROCEDURE — 3008F BODY MASS INDEX DOCD: CPT | Mod: CPTII,S$GLB,, | Performed by: NURSE PRACTITIONER

## 2024-03-05 PROCEDURE — 1159F MED LIST DOCD IN RCRD: CPT | Mod: CPTII,S$GLB,, | Performed by: NURSE PRACTITIONER

## 2024-03-05 PROCEDURE — 99214 OFFICE O/P EST MOD 30 MIN: CPT | Mod: S$GLB,,, | Performed by: NURSE PRACTITIONER

## 2024-03-05 PROCEDURE — 99999 PR PBB SHADOW E&M-EST. PATIENT-LVL IV: CPT | Mod: PBBFAC,,, | Performed by: NURSE PRACTITIONER

## 2024-03-05 NOTE — PROGRESS NOTES
Chief Complaint   Patient presents with    At high risk for breast cancer         HPI:   Hien Jeter is a 58 y.o. who presents for follow up of increased risk of breast cancer.  She has MS as well.     Today, Feels good and no complaints.   No breast concerns.  Continues to exercise- modified crossfit. Swims during the summer.   Going to visit dad in [facility] dad is 94 yo and independent.     12/26/2023 MMG:   Impression:   No mammographic evidence of malignancy.  BI-RADS Category 1: Negative  Recommendation:  Return to annual screening mammogram schedule is recommended      1/6/2023 MRI breast:  Findings:  The breasts have heterogeneous fibroglandular tissue. The background parenchymal enhancement is minimal and symmetric.     No abnormal masses, enhancement or other abnormal findings are seen.     Impression:  Normal exam.     BI-RADS Category:   Overall: 1 - Negative     Recommendation:  Return to annual screening mammogram schedule is recommended     Your estimated lifetime risk of breast cancer (to age 85) based on Tyrer-Cuzick risk assessment model is Tyrer-Cuzick: 20.8 %. According to the American Cancer Society, patients with a lifetime breast cancer risk of 20% or higher might benefit from supplemental screening tests.            Exam Ended: 01/06/23 11:29               High Risk Breast cancer specific history:  - Age: 58 y.o.   - Height:  5'2  - Weight:   Wt Readings from Last 3 Encounters:   03/05/24 0758 58.5 kg (128 lb 15.5 oz)   12/15/23 1359 60.5 kg (133 lb 6.1 oz)   11/30/23 0840 60.7 kg (133 lb 13.1 oz)      - Breast density per BI-RADS:  c - Heterogeneously dense  - Age at menarche:  13 yo  - Number of pregnancies: G0  - She is postmenopausal. Age at menopause, if applicable:  47 yo.   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 Cancer alley in [facility].   Paternal aunt - breast cancer  Paternal aunt- breast cancer   Mom-colon cancer and liver cancer  Dad- prostate cancer  Maternal Grandfather - lung cancer    Social History:  Tobacco use:  no  Alcohol use:  social  Exercise regimen: yes swimming 45-60 minutes everyday  Employment: no          Past Medical   Past Medical History:   Diagnosis Date    Anxiety     Basal cell carcinoma 2000    left eyebrow     Depression     Environmental allergies     Genetic testing 2022    Invitae 84-gene Multi-Cancer +RNA panel    Headache(784.0)     Multiple food allergies     Multiple sclerosis 2009     shoulder 2015    left     Patient Active Problem List   Diagnosis    MS (multiple sclerosis)    Intractable chronic migraine without aura    Occipital neuralgia    Facet arthropathy of spine    Encounter for long-term (current) use of high-risk medication    Menstrual migraine    Neck strain    Cervicalgia    Counseling regarding goals of care    Decreased strength    Impaired gait    Impingement syndrome of right shoulder    Numbness on left side    Intercostal pain    Rib pain on left side    Family history of colon cancer    Adhesive capsulitis of left shoulder    Ganglion cyst of right foot    Immunosuppression due to drug therapy    Rotator cuff tear, left    Dupuytren's disease of palm    Anxiety    Insomnia    Shoulder impingement, right    De Quervain's tenosynovitis, right     Social History   Social History     Tobacco Use    Smoking status: Never    Smokeless tobacco: Never   Substance Use Topics    Alcohol use: Yes     Comment: socially/ not weekly    Drug use: No     Family History  Family History   Problem Relation Age of Onset    Liver cancer Mother 84    Colon cancer Mother 50    Diabetes  "Mother     Osteoporosis Mother     Arrhythmia Father     Prostate cancer Father 75    Breast cancer Sister 50    Colon cancer Sister 59        myrisk negative 6/2021    Diabetes Brother     Dementia Maternal Grandmother     Lung cancer Maternal Grandfather         +smoker, chemical plants    Uterine cancer Paternal Grandmother     Lung cancer Paternal Grandfather         heavy smoker    Breast cancer Paternal Aunt     Breast cancer Paternal Aunt     Prostate cancer Paternal Uncle     Breast cancer Paternal Aunt     Multiple sclerosis Neg Hx     Ovarian cancer Neg Hx      Medications    Current Outpatient Medications:     calcium carbonate (OS-MIGUEL) 600 mg calcium (1,500 mg) Tab, Take 600 mg by mouth once., Disp: , Rfl:     cholecalciferol, vitamin D3, 3,000 unit Tab, Take 3,000 Units by mouth once daily. , Disp: , Rfl:     flaxseed oil 1,000 mg Cap, Take 1 capsule by mouth once daily. , Disp: , Rfl:     multivitamin-Ca-iron-minerals 27-0.4 mg Tab, Take 1 tablet by mouth once daily. , Disp: , Rfl:     vitamin E 400 UNIT capsule, Take 400 Units by mouth 2 (two) times a day., Disp: , Rfl:   Allergies  Review of patient's allergies indicates:   Allergen Reactions    Diclofenac Nausea Only    Ibuprofen Other (See Comments)    Tramadol Other (See Comments), Nausea And Vomiting and Photosensitivity     Nausea   Nausea        Review of Systems       See above   All other systems reviewed and are negative.    Objective:      Vitals:   Vitals:    03/05/24 0758   BP: 108/68   BP Location: Left arm   Patient Position: Sitting   BP Method: Medium (Automatic)   Pulse: 66   Resp: 18   Temp: 98 °F (36.7 °C)   TempSrc: Oral   SpO2: 99%   Weight: 58.5 kg (128 lb 15.5 oz)   Height: 5' 2" (1.575 m)         BMI: Body mass index is 23.59 kg/m².   Body surface area is 1.6 meters squared.    Physical Exam  Vitals signs reviewed.   Constitutional:  Normal appearance. NAD.   HENT: Normocephalic.   Eyes: Pupils are equal, round, and reactive " to light.   Cardiovascular: RRR  Pulmonary: Pulmonary effort is normal. CTA  Musculoskeletal: Normal range of motion.   Abdomen: BS normal  Lymphadenopathy: No cervical adenopathy. No axillary adenopathy.   Skin: Skin is warm and dry.   Neurological:  She is alert and oriented to person, place, and time.   Psychiatric: Mood normal.     Breast Exam: no masses or LAD noted. Thicker tissue in upper outer quadrant L>R.       Laboratory Data: reviewed most recent   Imaging: reviewed most recent        Assessment:     1. At high risk for breast cancer    2. Family history of breast cancer    3. Dense breast tissue on mammogram, unspecified type    4. Encounter for screening mammogram for breast cancer            Plan:       Opts to continue high risk screening. Note, her breas density had a change on her last B MMG- will monitor TC   MRI breast due now.   B MMG due 7/2024    Lifestyle modifications as previously discussed.   Encourage breast awareness, including monthly breast self-exams.   Due for colonoscopy 2027    RTC in 1 year     Route Chart for Scheduling    Med Onc Chart Routing  Urgent    Follow up with physician    Follow up with [facility] 1 year. IVAN   Infusion scheduling note    Injection scheduling note    Labs    Imaging   MRI breast now; MMG due 7/2024   Pharmacy appointment    Other referrals                  Questions were encouraged and answered to patient's satisfaction, and patient verbalized understanding of information and agreement with the plan. Advised patient to RTC with any interval changes or concerns.      Patient is in agreement with the proposed treatment plan. All questions were answered to the patient's satisfaction. Pt knows to call clinic for any new or worsening symptoms and if anything is needed before the next clinic visit.    Khadra Cope, MSN, APRN, FNP-C  Nurse Practitioner to Dr. Sheron Colby  [facility]  [facility]  Hematology & Medical  Oncology  [address] [place] [id number]  ph. [phone] ext [phone]  Fax. [phone]              30 minutes of total time spent on the encounter, which includes face to face time and non-face to face time preparing to see the patient (eg, review of tests), Obtaining and/or reviewing separately obtained history, Documenting clinical information in the electronic or other health record, Independently interpreting results (not separately reported) and communicating results to the patient/family/caregiver, or Care coordination (not separately reported).

## 2024-03-20 ENCOUNTER — HOSPITAL ENCOUNTER (OUTPATIENT)
Dept: RADIOLOGY | Facility: HOSPITAL | Age: 59
Discharge: HOME | End: 2024-03-20
Attending: NURSE PRACTITIONER
Payer: COMMERCIAL

## 2024-03-20 DIAGNOSIS — Z80.3 FAMILY HISTORY OF BREAST CANCER: ICD-10-CM

## 2024-03-20 DIAGNOSIS — Z91.89 AT HIGH RISK FOR BREAST CANCER: ICD-10-CM

## 2024-03-20 DIAGNOSIS — R92.30 DENSE BREAST TISSUE ON MAMMOGRAM, UNSPECIFIED TYPE: ICD-10-CM

## 2024-03-20 PROCEDURE — A9577 INJ MULTIHANCE: HCPCS | Performed by: NURSE PRACTITIONER

## 2024-03-20 PROCEDURE — 25500020 PHARM REV CODE 255: Performed by: NURSE PRACTITIONER

## 2024-03-20 PROCEDURE — 77049 MRI BREAST C-+ W/CAD BI: CPT | Mod: 26,,, | Performed by: RADIOLOGY

## 2024-03-20 PROCEDURE — 77049 MRI BREAST C-+ W/CAD BI: CPT | Mod: TC

## 2024-03-20 RX ADMIN — GADOBENATE DIMEGLUMINE 13 ML: 529 INJECTION, SOLUTION INTRAVENOUS at 09:03

## 2024-04-17 ENCOUNTER — PATIENT MESSAGE (OUTPATIENT)
Dept: INTERNAL MEDICINE | Facility: CLINIC | Age: 59
End: 2024-04-17
Payer: COMMERCIAL

## 2024-04-24 ENCOUNTER — PATIENT MESSAGE (OUTPATIENT)
Dept: NEUROLOGY | Facility: CLINIC | Age: 59
End: 2024-04-24
Payer: COMMERCIAL

## 2024-04-24 ENCOUNTER — TELEPHONE (OUTPATIENT)
Dept: NEUROLOGY | Facility: CLINIC | Age: 59
End: 2024-04-24
Payer: COMMERCIAL

## 2024-04-24 NOTE — TELEPHONE ENCOUNTER
Called Pt to schedule an appt for her headaches. I was unable to speak with her but left a vm.     ----- Message from Carrie Elizabeth sent at 4/24/2024  3:35 PM CDT -----  Regarding: FW: appointment  Contact: [phone]  Can we get this pt in with a headache provider before July?  Carrie HOWARD  ----- Message -----  From: Jerri Gonzalez MA  Sent: 4/24/2024   3:34 PM CDT  To: David VELEZ Staff  Subject: FW: appointment                                  Pt needing to see a provider for headaches. Dr. Klein is booked until July and I am not able to access  July in there schedule yet. Can you please get this patient scheduled with Dr. De Leon?  ----- Message -----  From: Soledad Londono  Sent: 4/24/2024   3:28 PM CDT  To: Ernie Julian Staff  Subject: appointment                                      Patient is looking to make appointment looking to see  for migraines   [phone]

## 2024-04-25 ENCOUNTER — PATIENT MESSAGE (OUTPATIENT)
Dept: NEUROLOGY | Facility: CLINIC | Age: 59
End: 2024-04-25
Payer: COMMERCIAL

## 2024-04-30 NOTE — TELEPHONE ENCOUNTER
Spoke to pt about scheduling an appt of her headaches. Pt will be scheduled with Tati for May 14. Pt was advised to arrive early for the appt.

## 2024-05-02 ENCOUNTER — PATIENT MESSAGE (OUTPATIENT)
Dept: PSYCHIATRY | Facility: CLINIC | Age: 59
End: 2024-05-02
Payer: COMMERCIAL

## 2024-05-07 ENCOUNTER — LAB VISIT (OUTPATIENT)
Dept: LAB | Facility: HOSPITAL | Age: 59
End: 2024-05-07
Attending: PSYCHIATRY & NEUROLOGY
Payer: COMMERCIAL

## 2024-05-07 DIAGNOSIS — Z29.89 IMMUNOTHERAPY: ICD-10-CM

## 2024-05-07 DIAGNOSIS — G35 MS (MULTIPLE SCLEROSIS): ICD-10-CM

## 2024-05-07 LAB
ALBUMIN SERPL BCP-MCNC: 3.6 G/DL (ref 3.5–5.2)
ALP SERPL-CCNC: 88 U/L (ref 55–135)
ALT SERPL W/O P-5'-P-CCNC: 29 U/L (ref 10–44)
ANION GAP SERPL CALC-SCNC: 8 MMOL/L (ref 8–16)
AST SERPL-CCNC: 32 U/L (ref 10–40)
BASOPHILS # BLD AUTO: 0.05 K/UL (ref 0–0.2)
BASOPHILS NFR BLD: 0.6 % (ref 0–1.9)
BILIRUB SERPL-MCNC: 0.5 MG/DL (ref 0.1–1)
BUN SERPL-MCNC: 14 MG/DL (ref 6–20)
CALCIUM SERPL-MCNC: 9.6 MG/DL (ref 8.7–10.5)
CHLORIDE SERPL-SCNC: 107 MMOL/L (ref 95–110)
CO2 SERPL-SCNC: 25 MMOL/L (ref 23–29)
CREAT SERPL-MCNC: 0.9 MG/DL (ref 0.5–1.4)
DIFFERENTIAL METHOD BLD: ABNORMAL
EOSINOPHIL # BLD AUTO: 0.1 K/UL (ref 0–0.5)
EOSINOPHIL NFR BLD: 1.7 % (ref 0–8)
ERYTHROCYTE [DISTWIDTH] IN BLOOD BY AUTOMATED COUNT: 12.1 % (ref 11.5–14.5)
EST. GFR  (NO RACE VARIABLE): >60 ML/MIN/1.73 M^2
GLUCOSE SERPL-MCNC: 94 MG/DL (ref 70–110)
HBV CORE AB SERPL QL IA: NORMAL
HBV SURFACE AG SERPL QL IA: NORMAL
HCT VFR BLD AUTO: 39.5 % (ref 37–48.5)
HGB BLD-MCNC: 13.6 G/DL (ref 12–16)
IGA SERPL-MCNC: 185 MG/DL (ref 40–350)
IGG SERPL-MCNC: 890 MG/DL (ref 650–1600)
IGM SERPL-MCNC: 64 MG/DL (ref 50–300)
IMM GRANULOCYTES # BLD AUTO: 0.02 K/UL (ref 0–0.04)
IMM GRANULOCYTES NFR BLD AUTO: 0.2 % (ref 0–0.5)
LYMPHOCYTES # BLD AUTO: 1.2 K/UL (ref 1–4.8)
LYMPHOCYTES NFR BLD: 14.6 % (ref 18–48)
MCH RBC QN AUTO: 32.8 PG (ref 27–31)
MCHC RBC AUTO-ENTMCNC: 34.4 G/DL (ref 32–36)
MCV RBC AUTO: 95 FL (ref 82–98)
MONOCYTES # BLD AUTO: 0.9 K/UL (ref 0.3–1)
MONOCYTES NFR BLD: 10.8 % (ref 4–15)
NEUTROPHILS # BLD AUTO: 5.9 K/UL (ref 1.8–7.7)
NEUTROPHILS NFR BLD: 72.1 % (ref 38–73)
NRBC BLD-RTO: 0 /100 WBC
PLATELET # BLD AUTO: 293 K/UL (ref 150–450)
PMV BLD AUTO: 10.3 FL (ref 9.2–12.9)
POTASSIUM SERPL-SCNC: 4.1 MMOL/L (ref 3.5–5.1)
PROT SERPL-MCNC: 6.6 G/DL (ref 6–8.4)
RBC # BLD AUTO: 4.15 M/UL (ref 4–5.4)
SODIUM SERPL-SCNC: 140 MMOL/L (ref 136–145)
WBC # BLD AUTO: 8.22 K/UL (ref 3.9–12.7)

## 2024-05-07 PROCEDURE — 86704 HEP B CORE ANTIBODY TOTAL: CPT | Performed by: PSYCHIATRY & NEUROLOGY

## 2024-05-07 PROCEDURE — 36415 COLL VENOUS BLD VENIPUNCTURE: CPT | Mod: PO | Performed by: PSYCHIATRY & NEUROLOGY

## 2024-05-07 PROCEDURE — 85025 COMPLETE CBC W/AUTO DIFF WBC: CPT | Performed by: PSYCHIATRY & NEUROLOGY

## 2024-05-07 PROCEDURE — 80053 COMPREHEN METABOLIC PANEL: CPT | Performed by: PSYCHIATRY & NEUROLOGY

## 2024-05-07 PROCEDURE — 87340 HEPATITIS B SURFACE AG IA: CPT | Performed by: PSYCHIATRY & NEUROLOGY

## 2024-05-07 PROCEDURE — 82784 ASSAY IGA/IGD/IGG/IGM EACH: CPT | Mod: 59 | Performed by: PSYCHIATRY & NEUROLOGY

## 2024-05-09 ENCOUNTER — TELEPHONE (OUTPATIENT)
Dept: NEUROLOGY | Facility: CLINIC | Age: 59
End: 2024-05-09

## 2024-05-20 ENCOUNTER — PATIENT MESSAGE (OUTPATIENT)
Dept: NEUROLOGY | Facility: CLINIC | Age: 59
End: 2024-05-20
Payer: COMMERCIAL

## 2024-05-21 NOTE — PROGRESS NOTES
Subjective:          Patient ID: Hien Jeter is a 58 y.o. female who presents today for a routine clinic visit for MS.  She was last seen in November 2023. The history has been provided by the patient.       MS HPI:  DMT: Ocrevus was infused on 5/21/24  Side effects from DMT? No; faster infusion was tolerated well   Taking vitamin D3 as recommended? Yes -  Dose: 3000 units   She denies any falls since the visit.   She denies any recent infections.   She saw her headache specialist yesterday. She was prescribed Relpax to take as needed.   She is exercising regularly at a [employer] gym (modified class).   She and her  are building a house on the [facility].     Medications:  Current Outpatient Medications   Medication Sig    calcium carbonate (OS-MIGUEL) 600 mg calcium (1,500 mg) Tab Take 600 mg by mouth once.    cholecalciferol, vitamin D3, 3,000 unit Tab Take 3,000 Units by mouth once daily.     eletriptan (RELPAX) 20 MG tablet Take 1 tablet (20 mg total) by mouth 2 (two) times daily as needed for Migraine (take no more than 2 tabs in 24 hours). may repeat in 2 hours if necessary    flaxseed oil 1,000 mg Cap Take 1 capsule by mouth once daily.     multivitamin-Ca-iron-minerals 27-0.4 mg Tab Take 1 tablet by mouth once daily.     vitamin E 400 UNIT capsule Take 400 Units by mouth 2 (two) times a day.     No current facility-administered medications for this visit.       SOCIAL HISTORY  Social History     Tobacco Use    Smoking status: Never    Smokeless tobacco: Never   Substance Use Topics    Alcohol use: Yes     Comment: socially/ not weekly    Drug use: No       Living arrangements - the patient lives with her     ROS:      5/24/24   REVIEW OF SYMPTOMS   Do you feel abnormally tired on most days? No--up and down, but exercise helps    Do you feel you generally sleep well? Yes--some stress impacts sleep sometimes    Do you have difficulty controlling your bladder?  No   Do you have difficulty  controlling your bowels?  No--has had more constipation in the past month; manages it well    Do you have frequent muscle cramps, tightness or spasms in your limbs?  Yes--intermittent; takes Tylenol if needed   Do you have new visual symptoms?  No--wears glasses; saw optometrist recently    Do you have worsening difficulty with your memory or thinking? No   Do you have worsening symptoms of anxiety or depression?  No   For patients who walk, Do you have more difficulty walking?  No--drags right foot when she is very tired   Have you fallen since your last visit?  No   For patients who use wheelchairs: Do you have any skin wounds or breakdown? Not Applicable   Do you have difficulty using your hands?  Yes--she has noticed mild weakness in the left hand; she has increased tingling in her hands, as well. She is not dropping things. She had some trouble gripping with the right hand last week, followed by some pain in the right hand that lasted for 2 days.    Do you have shooting or burning pain? No   Do you have difficulty with sexual function?  Not assessed    If you are sexually active, are you using birth control? Y/N  N/A No   Do you often choke when swallowing liquids or solid food?  No--very rarely   Do you experience worsening symptoms when overheated? No--She likes the heat; knows her limits    Do you need any new equipment such as a wheelchair, walker or shower chair? No   Do you receive co-pay financial assistance for your principal MS medicine? Yes   Would you be interested in participating in an MS research trial in the future? Yes   For patients on Gilenya, Tecfidera, Aubagio, Rituxan, Ocrevus, Tysabri, Lemtrada or Methotrexate, are you aware that you should NOT receive live virus vaccines?  Yes    Do you feel you have adequate family/friend support?  Yes   Do you have health insurance?   Yes   Are you currently employed? No   Do you receive SSDI/SSI?  No   Do you use marijuana or cannabis products? No    Have you been diagnosed with a urinary tract infection since your last visit here? No   Have you been diagnosed with a respiratory tract infection since your last visit here? No   Have you been to the emergency room since your last visit here? No   Have you been hospitalized since your last visit here?  No                Objective:        1. 25 foot timed walk:      2023    12:01 AM 2024    12:01 AM   Timed 25 Foot Walk:   Did patient wear an AFO? No No   Was assistive device used? No No   Time for 25 Foot Walk (seconds) 3.9 3.8   Time for 25 Foot Walk (seconds) 3.9 3.6     Neurologic Exam     Mental Status   Oriented to person, place, and time.   Attention: normal. Concentration: normal.   Speech: speech is normal   Level of consciousness: alert  Knowledge: good.   Normal comprehension.     Cranial Nerves     CN III, IV, VI   Extraocular motions are normal.     CN V   Facial sensation intact.     CN VII   Facial expression full, symmetric.     CN VIII   Hearing: intact (finger rub)    CN IX, X   Palate: symmetric    CN XI   CN XI normal.     CN XII   Tongue deviation: none    Motor Exam   Muscle bulk: normal  Overall muscle tone: normal    Strength   Right neck flexion: 5/5  Left neck flexion: 5/5  Right neck extension: 5/5  Left neck extension: 5/5  Right deltoid: 5/5  Left deltoid: 5/5  Right biceps: 5/5  Left biceps: 5/5  Right triceps: 5/5  Left triceps: 5/5  Right wrist flexion: 5/5  Left wrist flexion: 5/5  Right wrist extension: 5/5  Left wrist extension: 5/5  Right interossei: 5/5  Left interossei: 5/5  Right iliopsoas: 5/5  Left iliopsoas: 5/5  Right quadriceps: 5/5  Left quadriceps: 5/5  Right hamstrin/5  Left hamstrin/5  Right anterior tibial: 5/5  Left anterior tibial: 5/5  Right gastroc: 5/5  Left gastroc: 5/5       Sensory Exam   Right arm vibration: normal  Left arm vibration: normal  Right leg vibration: decreased from toes (Mildly diminished in right foot)  Left leg vibration:  normal    Gait, Coordination, and Reflexes     Gait  Gait: normal    Coordination   Romberg: negative  Finger to nose coordination: normal  Heel to shin coordination: normal  Tandem walking coordination: normal    Tremor   Resting tremor: absent  Action tremor: absent    Reflexes   Right brachioradialis: 2+  Left brachioradialis: 2+  Right biceps: 2+  Left biceps: 2+  Right triceps: 2+  Left triceps: 2+  Right patellar: 2+  Left patellar: 2+  Right achilles: 2+  Left achilles: 2+  Right plantar: normal  Left plantar: normal  She can walk on toes and heels and hop on each foot ten times   Normal RSM in UE and LE.          Imaging:     Results for orders placed during the hospital encounter of 12/12/23    MRI Brain Demyelinating Without Contrast    Impression  Stable intracranial white matter lesions consistent with the history of demyelinating disease.  No new lesion.      Electronically signed by: Sagar Mcintyre  Date:    12/13/2023  Time:    07:48    Images were reviewed with the patient.       Labs:     Lab Results   Component Value Date    JDERAXDG05PH 64 10/06/2022    SORDWRIA59PG 60 06/02/2021    YZJYYDRX61KN 74 12/11/2019       Lab Results   Component Value Date    WBC 8.22 05/07/2024    RBC 4.15 05/07/2024    HGB 13.6 05/07/2024    HCT 39.5 05/07/2024    MCV 95 05/07/2024    MCH 32.8 (H) 05/07/2024    MCHC 34.4 05/07/2024    RDW 12.1 05/07/2024     05/07/2024    MPV 10.3 05/07/2024    GRAN 5.9 05/07/2024    GRAN 72.1 05/07/2024    LYMPH 1.2 05/07/2024    LYMPH 14.6 (L) 05/07/2024    MONO 0.9 05/07/2024    MONO 10.8 05/07/2024    EOS 0.1 05/07/2024    BASO 0.05 05/07/2024    EOSINOPHIL 1.7 05/07/2024    BASOPHIL 0.6 05/07/2024     Sodium   Date Value Ref Range Status   05/07/2024 140 136 - 145 mmol/L Final     Potassium   Date Value Ref Range Status   05/07/2024 4.1 3.5 - 5.1 mmol/L Final     Chloride   Date Value Ref Range Status   05/07/2024 107 95 - 110 mmol/L Final     CO2   Date Value Ref Range  Status   05/07/2024 25 23 - 29 mmol/L Final     Glucose   Date Value Ref Range Status   05/07/2024 94 70 - 110 mg/dL Final     BUN   Date Value Ref Range Status   05/07/2024 14 6 - 20 mg/dL Final     Creatinine   Date Value Ref Range Status   05/07/2024 0.9 0.5 - 1.4 mg/dL Final     Calcium   Date Value Ref Range Status   05/07/2024 9.6 8.7 - 10.5 mg/dL Final     Total Protein   Date Value Ref Range Status   05/07/2024 6.6 6.0 - 8.4 g/dL Final     Albumin   Date Value Ref Range Status   05/07/2024 3.6 3.5 - 5.2 g/dL Final     Total Bilirubin   Date Value Ref Range Status   05/07/2024 0.5 0.1 - 1.0 mg/dL Final     Comment:     For infants and newborns, interpretation of results should be based  on gestational age, weight and in agreement with clinical  observations.    Premature Infant recommended reference ranges:  Up to 24 hours.............<8.0 mg/dL  Up to 48 hours............<12.0 mg/dL  3-5 days..................<15.0 mg/dL  6-29 days.................<15.0 mg/dL       Alkaline Phosphatase   Date Value Ref Range Status   05/07/2024 88 55 - 135 U/L Final     AST   Date Value Ref Range Status   05/07/2024 32 10 - 40 U/L Final     ALT   Date Value Ref Range Status   05/07/2024 29 10 - 44 U/L Final     Anion Gap   Date Value Ref Range Status   05/07/2024 8 8 - 16 mmol/L Final     eGFR if    Date Value Ref Range Status   04/18/2022 >60.0 >60 mL/min/1.73 m^2 Final     eGFR if non    Date Value Ref Range Status   04/18/2022 >60.0 >60 mL/min/1.73 m^2 Final     Comment:     Calculation used to obtain the estimated glomerular filtration  rate (eGFR) is the CKD-EPI equation.        Lab Results   Component Value Date    HEPBSAG Non-reactive 05/07/2024    HEPBSAB 36.83 10/06/2022    HEPBSAB Reactive 10/06/2022    HEPBCAB Non-reactive 05/07/2024           MS Impression and Plan:     NEURO MULTIPLE SCLEROSIS IMPRESSION:   Number of relapses in the past year?:  0  Clinical Progression:   Clinically Stable  MRI Progression:  Stable  MS Classification:  Relapsing-Remitting MS  DMT:  No change in management  DMT comment:  Continue Ocrevus and Vitamin D. Her infusion is due in November. Next labs are planned for October. She is aware of the risks associated with immunosuppressant therapy, including increased risk of infection.     Symptom Management:  Implement change in symptom management  Implement Change in Symptom Management:  Sleep and Spasticity (Magnesium glycinate 200mg at bedtime for sleep/spasms.)     MRI brain in December  She will follow up with Dr. Salas in October.   The visit today is associated with current or anticipated ongoing medical care related to this patient's single serious condition/complex condition of multiple sclerosis.    Total time spent with patient: 32 minutes   Total time spent on encounter: 40 minutes         DEB Irizarry, CNS    Problem List Items Addressed This Visit       Counseling regarding goals of care     Other Visit Diagnoses       Multiple sclerosis    -  Primary    Relevant Orders    CBC Auto Differential    Comprehensive Metabolic Panel    Hepatitis B Core Antibody, Total    Hepatitis B Surface Antigen    Immunoglobulins (IgG, IgA, IgM) Quantitative    Vitamin D    MRI Brain Demyelinating Without Contrast    Prophylactic immunotherapy        High risk medication use

## 2024-05-23 ENCOUNTER — OFFICE VISIT (OUTPATIENT)
Dept: NEUROLOGY | Facility: CLINIC | Age: 59
End: 2024-05-23
Payer: COMMERCIAL

## 2024-05-23 VITALS
BODY MASS INDEX: 23.39 KG/M2 | WEIGHT: 127.88 LBS | SYSTOLIC BLOOD PRESSURE: 104 MMHG | HEART RATE: 72 BPM | DIASTOLIC BLOOD PRESSURE: 78 MMHG

## 2024-05-23 DIAGNOSIS — G43.909 EPISODIC MIGRAINE: Primary | ICD-10-CM

## 2024-05-23 PROCEDURE — 1159F MED LIST DOCD IN RCRD: CPT | Mod: CPTII,S$GLB,, | Performed by: PHYSICIAN ASSISTANT

## 2024-05-23 PROCEDURE — 3074F SYST BP LT 130 MM HG: CPT | Mod: CPTII,S$GLB,, | Performed by: PHYSICIAN ASSISTANT

## 2024-05-23 PROCEDURE — 99999 PR PBB SHADOW E&M-EST. PATIENT-LVL III: CPT | Mod: PBBFAC,,, | Performed by: PHYSICIAN ASSISTANT

## 2024-05-23 PROCEDURE — 3078F DIAST BP <80 MM HG: CPT | Mod: CPTII,S$GLB,, | Performed by: PHYSICIAN ASSISTANT

## 2024-05-23 PROCEDURE — 3008F BODY MASS INDEX DOCD: CPT | Mod: CPTII,S$GLB,, | Performed by: PHYSICIAN ASSISTANT

## 2024-05-23 PROCEDURE — 99215 OFFICE O/P EST HI 40 MIN: CPT | Mod: S$GLB,,, | Performed by: PHYSICIAN ASSISTANT

## 2024-05-23 RX ORDER — ELETRIPTAN HYDROBROMIDE 20 MG/1
20 TABLET, FILM COATED ORAL 2 TIMES DAILY PRN
Qty: 12 TABLET | Refills: 2 | Status: SHIPPED | OUTPATIENT
Start: 2024-05-23
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
## 2024-05-23 NOTE — PATIENT INSTRUCTIONS
Continue neck exercises  Take relpax as needed for migraine    HA Prevention:  -Keep a headache diary to track your headaches. You can use cell phone apps such as migraine buddy and [employer]  -Record length of headache, locations of headache pain, and describe type of headache pain (throbbing, aching, stabbing, or dull). Record any other symptoms you had with the headache, such as nausea, flashing lights or dark spots, or sensitivity to bright light or loud noise. If you are a woman, note if the headache occurred near your period. List anything that might have triggered the headache, such as certain foods (chocolate, cheese, wine) or odors, smoke, bright light, stress, or lack of sleep.   -Avoid any known HA triggers  -Avoid medications overuse of headache abortive medications that are known to cause medication overuse headache. These medications include Tylenol, Ibuprofen, over the counter migraine medications, caffeine, opiates, butalbital-caffeine-acetaminophen, butalbital-caffeine-aspirin.   -Do not use these medications more than 10 times per month  -If you are requiring these medications more than 10 times a month, please schedule an appointment with me.  -Find healthy ways to manage stress stress. Headaches are most common during or right after stressful times. Take time to relax before and after you do something that has caused a headache in the past.   -Try to maintain relaxed muscles in the head and neck. Check your jaw, face, neck, and shoulder muscles for tension, and try relaxing them.   -When sitting at a desk, change positions often, and stretch for 30 seconds each hour.  -Apply heat or ice packs as needed  -Try at home neck and shoulder massagers or neck hammock  -If needed treat yourself to a professional massage   -Get plenty of sleep, at least 7 hours per night or more. Poor sleep can make your headaches worse.   -Eat regular healthy meals. Long periods without food can trigger a headache.    -Limit caffeine by not drinking too much coffee, tea, or soda. The FDA recommends 400 mg of caffeine a day or less. Do not completely quit caffeine suddenly because this can cause a headache.   -Reduce eyestrain from computers. Blink frequently and look away from the computer screen every so often. Make sure you have proper eyewear and that your monitor is set up properly, about an arm's length away.   -Seek help if you have depression or anxiety. Your headaches may be linked to these conditions. Treatment can both prevent headaches and help with symptoms of anxiety or depression.  -Some over the counter supplements can help with headache:        -Magnesium oxide 400 mg daily        -Riboflavin 400 mg daily        -COQ10 200 mg daily

## 2024-05-24 ENCOUNTER — OFFICE VISIT (OUTPATIENT)
Dept: NEUROLOGY | Facility: CLINIC | Age: 59
End: 2024-05-24
Payer: COMMERCIAL

## 2024-05-24 VITALS
SYSTOLIC BLOOD PRESSURE: 100 MMHG | HEART RATE: 66 BPM | HEIGHT: 62 IN | WEIGHT: 128.63 LBS | DIASTOLIC BLOOD PRESSURE: 70 MMHG | BODY MASS INDEX: 23.67 KG/M2

## 2024-05-24 DIAGNOSIS — Z29.89 PROPHYLACTIC IMMUNOTHERAPY: ICD-10-CM

## 2024-05-24 DIAGNOSIS — Z71.89 COUNSELING REGARDING GOALS OF CARE: ICD-10-CM

## 2024-05-24 DIAGNOSIS — Z79.899 HIGH RISK MEDICATION USE: ICD-10-CM

## 2024-05-24 DIAGNOSIS — G35 MULTIPLE SCLEROSIS: Primary | ICD-10-CM

## 2024-05-24 PROCEDURE — 3074F SYST BP LT 130 MM HG: CPT | Mod: CPTII,S$GLB,, | Performed by: CLINICAL NURSE SPECIALIST

## 2024-05-24 PROCEDURE — 99215 OFFICE O/P EST HI 40 MIN: CPT | Mod: S$GLB,,, | Performed by: CLINICAL NURSE SPECIALIST

## 2024-05-24 PROCEDURE — 1159F MED LIST DOCD IN RCRD: CPT | Mod: CPTII,S$GLB,, | Performed by: CLINICAL NURSE SPECIALIST

## 2024-05-24 PROCEDURE — 3078F DIAST BP <80 MM HG: CPT | Mod: CPTII,S$GLB,, | Performed by: CLINICAL NURSE SPECIALIST

## 2024-05-24 PROCEDURE — G2211 COMPLEX E/M VISIT ADD ON: HCPCS | Mod: S$GLB,,, | Performed by: CLINICAL NURSE SPECIALIST

## 2024-05-24 PROCEDURE — 99999 PR PBB SHADOW E&M-EST. PATIENT-LVL III: CPT | Mod: PBBFAC,,, | Performed by: CLINICAL NURSE SPECIALIST

## 2024-05-24 PROCEDURE — 3008F BODY MASS INDEX DOCD: CPT | Mod: CPTII,S$GLB,, | Performed by: CLINICAL NURSE SPECIALIST

## 2024-06-07 ENCOUNTER — TELEPHONE (OUTPATIENT)
Dept: INTERNAL MEDICINE | Facility: CLINIC | Age: 59
End: 2024-06-07
Payer: COMMERCIAL

## 2024-06-07 DIAGNOSIS — Z00.00 ANNUAL PHYSICAL EXAM: Primary | ICD-10-CM

## 2024-06-07 NOTE — TELEPHONE ENCOUNTER
----- Message from Anna Willson sent at 6/7/2024  2:26 PM CDT -----  Contact: Pt [phone]  type: Lab    Caller is requesting to schedule their Lab appointment prior to annual appointment.  Order is not listed in EPIC.  Please enter order and contact patient to schedule.    Thank you

## 2024-07-05 ENCOUNTER — HOSPITAL ENCOUNTER (OUTPATIENT)
Dept: RADIOLOGY | Facility: HOSPITAL | Age: 59
Discharge: HOME | End: 2024-07-05
Attending: NURSE PRACTITIONER
Payer: COMMERCIAL

## 2024-07-05 VITALS — BODY MASS INDEX: 23.55 KG/M2 | WEIGHT: 128 LBS | HEIGHT: 62 IN

## 2024-07-05 DIAGNOSIS — Z12.31 ENCOUNTER FOR SCREENING MAMMOGRAM FOR BREAST CANCER: ICD-10-CM

## 2024-07-05 PROCEDURE — 77063 BREAST TOMOSYNTHESIS BI: CPT | Mod: 26,,, | Performed by: RADIOLOGY

## 2024-07-05 PROCEDURE — 77067 SCR MAMMO BI INCL CAD: CPT | Mod: 26,,, | Performed by: RADIOLOGY

## 2024-07-05 PROCEDURE — 77067 SCR MAMMO BI INCL CAD: CPT | Mod: TC

## 2024-07-25 ENCOUNTER — PATIENT MESSAGE (OUTPATIENT)
Dept: PSYCHIATRY | Facility: CLINIC | Age: 59
End: 2024-07-25
Payer: COMMERCIAL

## 2024-07-31 ENCOUNTER — TELEPHONE (OUTPATIENT)
Dept: NEUROLOGY | Facility: CLINIC | Age: 59
End: 2024-07-31
Payer: COMMERCIAL

## 2024-08-05 ENCOUNTER — PATIENT MESSAGE (OUTPATIENT)
Dept: PSYCHIATRY | Facility: CLINIC | Age: 59
End: 2024-08-05
Payer: COMMERCIAL

## 2024-08-20 ENCOUNTER — OFFICE VISIT (OUTPATIENT)
Dept: NEUROLOGY | Facility: CLINIC | Age: 59
End: 2024-08-20
Payer: COMMERCIAL

## 2024-08-20 VITALS
WEIGHT: 129.06 LBS | SYSTOLIC BLOOD PRESSURE: 110 MMHG | BODY MASS INDEX: 23.75 KG/M2 | DIASTOLIC BLOOD PRESSURE: 74 MMHG | HEIGHT: 62 IN | HEART RATE: 78 BPM

## 2024-08-20 DIAGNOSIS — G43.909 EPISODIC MIGRAINE: Primary | ICD-10-CM

## 2024-08-20 PROCEDURE — 99214 OFFICE O/P EST MOD 30 MIN: CPT | Mod: S$GLB,,, | Performed by: PHYSICIAN ASSISTANT

## 2024-08-20 PROCEDURE — 3008F BODY MASS INDEX DOCD: CPT | Mod: CPTII,S$GLB,, | Performed by: PHYSICIAN ASSISTANT

## 2024-08-20 PROCEDURE — 1159F MED LIST DOCD IN RCRD: CPT | Mod: CPTII,S$GLB,, | Performed by: PHYSICIAN ASSISTANT

## 2024-08-20 PROCEDURE — 3078F DIAST BP <80 MM HG: CPT | Mod: CPTII,S$GLB,, | Performed by: PHYSICIAN ASSISTANT

## 2024-08-20 PROCEDURE — 99999 PR PBB SHADOW E&M-EST. PATIENT-LVL III: CPT | Mod: PBBFAC,,, | Performed by: PHYSICIAN ASSISTANT

## 2024-08-20 PROCEDURE — 3074F SYST BP LT 130 MM HG: CPT | Mod: CPTII,S$GLB,, | Performed by: PHYSICIAN ASSISTANT

## 2024-08-20 RX ORDER — SUMATRIPTAN 20 MG/1
1 SPRAY NASAL
Qty: 1 EACH | Refills: 2 | Status: SHIPPED | OUTPATIENT
Start: 2024-08-20 | End: 2024-09-19

## 2024-08-20 NOTE — PATIENT INSTRUCTIONS
Discontinue relpax and start imitrex nasal spray for migraine    HA Prevention:  -Keep a headache diary to track your headaches. You can use cell phone apps such as migraine budDeemelo and [employer]  -Record length of headache, locations of headache pain, and describe type of headache pain (throbbing, aching, stabbing, or dull). Record any other symptoms you had with the headache, such as nausea, flashing lights or dark spots, or sensitivity to bright light or loud noise. If you are a woman, note if the headache occurred near your period. List anything that might have triggered the headache, such as certain foods (chocolate, cheese, wine) or odors, smoke, bright light, stress, or lack of sleep.   -Avoid any known HA triggers  -Avoid medications overuse of headache abortive medications that are known to cause medication overuse headache. These medications include Tylenol, Ibuprofen, over the counter migraine medications, caffeine, opiates, butalbital-caffeine-acetaminophen, butalbital-caffeine-aspirin.   -Do not use these medications more than 10 times per month  -If you are requiring these medications more than 10 times a month, please schedule an appointment with me.  -Find healthy ways to manage stress stress. Headaches are most common during or right after stressful times. Take time to relax before and after you do something that has caused a headache in the past.   -Try to maintain relaxed muscles in the head and neck. Check your jaw, face, neck, and shoulder muscles for tension, and try relaxing them.   -When sitting at a desk, change positions often, and stretch for 30 seconds each hour.  -Apply heat or ice packs as needed  -Try at home neck and shoulder massagers or neck hammock  -If needed treat yourself to a professional massage   -Get plenty of sleep, at least 7 hours per night or more. Poor sleep can make your headaches worse.   -Eat regular healthy meals. Long periods without food can trigger a headache.    -Limit caffeine by not drinking too much coffee, tea, or soda. The FDA recommends 400 mg of caffeine a day or less. Do not completely quit caffeine suddenly because this can cause a headache.   -Reduce eyestrain from computers. Blink frequently and look away from the computer screen every so often. Make sure you have proper eyewear and that your monitor is set up properly, about an arm's length away.   -Seek help if you have depression or anxiety. Your headaches may be linked to these conditions. Treatment can both prevent headaches and help with symptoms of anxiety or depression.  -Some over the counter supplements can help with headache:        -Magnesium oxide 400 mg daily        -Riboflavin 400 mg daily        -COQ10 200 mg daily
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
## 2024-08-27 ENCOUNTER — PATIENT MESSAGE (OUTPATIENT)
Dept: INTERNAL MEDICINE | Facility: CLINIC | Age: 59
End: 2024-08-27
Payer: COMMERCIAL

## 2024-08-30 ENCOUNTER — LAB VISIT (OUTPATIENT)
Dept: LAB | Facility: HOSPITAL | Age: 59
End: 2024-08-30
Attending: INTERNAL MEDICINE
Payer: COMMERCIAL

## 2024-08-30 DIAGNOSIS — Z00.00 ANNUAL PHYSICAL EXAM: ICD-10-CM

## 2024-08-30 LAB
ALBUMIN SERPL BCP-MCNC: 3.8 G/DL (ref 3.5–5.2)
ALP SERPL-CCNC: 90 U/L (ref 55–135)
ALT SERPL W/O P-5'-P-CCNC: 20 U/L (ref 10–44)
ANION GAP SERPL CALC-SCNC: 10 MMOL/L (ref 8–16)
AST SERPL-CCNC: 20 U/L (ref 10–40)
BASOPHILS # BLD AUTO: 0.06 K/UL (ref 0–0.2)
BASOPHILS NFR BLD: 1 % (ref 0–1.9)
BILIRUB SERPL-MCNC: 0.5 MG/DL (ref 0.1–1)
BILIRUB UR QL STRIP: NEGATIVE
BUN SERPL-MCNC: 12 MG/DL (ref 6–20)
CALCIUM SERPL-MCNC: 9.9 MG/DL (ref 8.7–10.5)
CHLORIDE SERPL-SCNC: 105 MMOL/L (ref 95–110)
CHOLEST SERPL-MCNC: 264 MG/DL (ref 120–199)
CHOLEST/HDLC SERPL: 4.2 {RATIO} (ref 2–5)
CLARITY UR REFRACT.AUTO: CLEAR
CO2 SERPL-SCNC: 26 MMOL/L (ref 23–29)
COLOR UR AUTO: YELLOW
CREAT SERPL-MCNC: 0.9 MG/DL (ref 0.5–1.4)
DIFFERENTIAL METHOD BLD: ABNORMAL
EOSINOPHIL # BLD AUTO: 0.2 K/UL (ref 0–0.5)
EOSINOPHIL NFR BLD: 3.1 % (ref 0–8)
ERYTHROCYTE [DISTWIDTH] IN BLOOD BY AUTOMATED COUNT: 12.2 % (ref 11.5–14.5)
EST. GFR  (NO RACE VARIABLE): >60 ML/MIN/1.73 M^2
ESTIMATED AVG GLUCOSE: 100 MG/DL (ref 68–131)
GLUCOSE SERPL-MCNC: 82 MG/DL (ref 70–110)
GLUCOSE UR QL STRIP: NEGATIVE
HBA1C MFR BLD: 5.1 % (ref 4–5.6)
HCT VFR BLD AUTO: 43.6 % (ref 37–48.5)
HDLC SERPL-MCNC: 63 MG/DL (ref 40–75)
HDLC SERPL: 23.9 % (ref 20–50)
HGB BLD-MCNC: 14.4 G/DL (ref 12–16)
HGB UR QL STRIP: NEGATIVE
IMM GRANULOCYTES # BLD AUTO: 0.01 K/UL (ref 0–0.04)
IMM GRANULOCYTES NFR BLD AUTO: 0.2 % (ref 0–0.5)
KETONES UR QL STRIP: NEGATIVE
LDLC SERPL CALC-MCNC: 174.4 MG/DL (ref 63–159)
LEUKOCYTE ESTERASE UR QL STRIP: NEGATIVE
LYMPHOCYTES # BLD AUTO: 2.1 K/UL (ref 1–4.8)
LYMPHOCYTES NFR BLD: 33.7 % (ref 18–48)
MCH RBC QN AUTO: 32.4 PG (ref 27–31)
MCHC RBC AUTO-ENTMCNC: 33 G/DL (ref 32–36)
MCV RBC AUTO: 98 FL (ref 82–98)
MONOCYTES # BLD AUTO: 0.9 K/UL (ref 0.3–1)
MONOCYTES NFR BLD: 14.5 % (ref 4–15)
NEUTROPHILS # BLD AUTO: 2.9 K/UL (ref 1.8–7.7)
NEUTROPHILS NFR BLD: 47.5 % (ref 38–73)
NITRITE UR QL STRIP: NEGATIVE
NONHDLC SERPL-MCNC: 201 MG/DL
NRBC BLD-RTO: 0 /100 WBC
PH UR STRIP: 7 [PH] (ref 5–8)
PLATELET # BLD AUTO: 303 K/UL (ref 150–450)
PMV BLD AUTO: 10.2 FL (ref 9.2–12.9)
POTASSIUM SERPL-SCNC: 4.9 MMOL/L (ref 3.5–5.1)
PROT SERPL-MCNC: 7 G/DL (ref 6–8.4)
PROT UR QL STRIP: NEGATIVE
RBC # BLD AUTO: 4.45 M/UL (ref 4–5.4)
SODIUM SERPL-SCNC: 141 MMOL/L (ref 136–145)
SP GR UR STRIP: 1.01 (ref 1–1.03)
T4 FREE SERPL-MCNC: 1.02 NG/DL (ref 0.71–1.51)
TRIGL SERPL-MCNC: 133 MG/DL (ref 30–150)
TSH SERPL DL<=0.005 MIU/L-ACNC: 4.31 UIU/ML (ref 0.4–4)
URN SPEC COLLECT METH UR: NORMAL
WBC # BLD AUTO: 6.12 K/UL (ref 3.9–12.7)

## 2024-08-30 PROCEDURE — 81003 URINALYSIS AUTO W/O SCOPE: CPT | Performed by: INTERNAL MEDICINE

## 2024-08-30 PROCEDURE — 85025 COMPLETE CBC W/AUTO DIFF WBC: CPT | Performed by: INTERNAL MEDICINE

## 2024-08-30 PROCEDURE — 80061 LIPID PANEL: CPT | Performed by: INTERNAL MEDICINE

## 2024-08-30 PROCEDURE — 84443 ASSAY THYROID STIM HORMONE: CPT | Performed by: INTERNAL MEDICINE

## 2024-08-30 PROCEDURE — 36415 COLL VENOUS BLD VENIPUNCTURE: CPT | Performed by: INTERNAL MEDICINE

## 2024-08-30 PROCEDURE — 83036 HEMOGLOBIN GLYCOSYLATED A1C: CPT | Performed by: INTERNAL MEDICINE

## 2024-08-30 PROCEDURE — 80053 COMPREHEN METABOLIC PANEL: CPT | Performed by: INTERNAL MEDICINE

## 2024-08-30 PROCEDURE — 84439 ASSAY OF FREE THYROXINE: CPT | Performed by: INTERNAL MEDICINE

## 2024-09-03 ENCOUNTER — OFFICE VISIT (OUTPATIENT)
Dept: INTERNAL MEDICINE | Facility: CLINIC | Age: 59
End: 2024-09-03
Payer: COMMERCIAL

## 2024-09-03 VITALS
HEIGHT: 62 IN | SYSTOLIC BLOOD PRESSURE: 96 MMHG | DIASTOLIC BLOOD PRESSURE: 70 MMHG | WEIGHT: 128.31 LBS | BODY MASS INDEX: 23.61 KG/M2 | RESPIRATION RATE: 15 BRPM | HEART RATE: 103 BPM | OXYGEN SATURATION: 98 % | TEMPERATURE: 98 F

## 2024-09-03 DIAGNOSIS — Z79.899 IMMUNOSUPPRESSION DUE TO DRUG THERAPY: ICD-10-CM

## 2024-09-03 DIAGNOSIS — F41.9 ANXIETY: ICD-10-CM

## 2024-09-03 DIAGNOSIS — G35 MS (MULTIPLE SCLEROSIS): ICD-10-CM

## 2024-09-03 DIAGNOSIS — D84.821 IMMUNOSUPPRESSION DUE TO DRUG THERAPY: ICD-10-CM

## 2024-09-03 DIAGNOSIS — Z00.00 ANNUAL PHYSICAL EXAM: Primary | ICD-10-CM

## 2024-09-03 PROCEDURE — 99999 PR PBB SHADOW E&M-EST. PATIENT-LVL III: CPT | Mod: PBBFAC,,, | Performed by: INTERNAL MEDICINE

## 2024-09-03 PROCEDURE — 99396 PREV VISIT EST AGE 40-64: CPT | Mod: S$GLB,,, | Performed by: INTERNAL MEDICINE

## 2024-09-03 PROCEDURE — 1159F MED LIST DOCD IN RCRD: CPT | Mod: CPTII,S$GLB,, | Performed by: INTERNAL MEDICINE

## 2024-09-03 PROCEDURE — 3008F BODY MASS INDEX DOCD: CPT | Mod: CPTII,S$GLB,, | Performed by: INTERNAL MEDICINE

## 2024-09-03 PROCEDURE — 1160F RVW MEDS BY RX/DR IN RCRD: CPT | Mod: CPTII,S$GLB,, | Performed by: INTERNAL MEDICINE

## 2024-09-03 PROCEDURE — 3074F SYST BP LT 130 MM HG: CPT | Mod: CPTII,S$GLB,, | Performed by: INTERNAL MEDICINE

## 2024-09-03 PROCEDURE — 3044F HG A1C LEVEL LT 7.0%: CPT | Mod: CPTII,S$GLB,, | Performed by: INTERNAL MEDICINE

## 2024-09-03 PROCEDURE — 3078F DIAST BP <80 MM HG: CPT | Mod: CPTII,S$GLB,, | Performed by: INTERNAL MEDICINE

## 2024-09-03 NOTE — PROGRESS NOTES
Subjective     Patient ID: Hien Jeter is a 59 y.o. female.    Chief Complaint: Annual Exam    HPI  59 y.o. Female here for annual exam.      Vaccines: Influenza (2019); Tetanus (2019); Prevnar 13 (2019); Pneumococcal 23 (2020); Prevnar 20 (2022); Shingrix (will consider)  Eye exam: 2020  Mammogram: 7/24  Gyn exam: 4/22  Colonoscopy: 11/22- repeat in 5 yrs     Exercise: swims daily  Diet: regular     Past Medical History:  No date: Anxiety  2000: Basal cell carcinoma      Comment:  left eyebrow   No date: Depression  No date: Environmental allergies  No date: Headache(784.0)  No date: Multiple food allergies  2009: Multiple sclerosis  3/2015:  shoulder      Comment:  left  Past Surgical History:  4/5/2019: ARTHROSCOPIC DEBRIDEMENT OF ROTATOR CUFF; Left      Comment:  Procedure: DEBRIDEMENT, ROTATOR CUFF, ARTHROSCOPIC;                 Surgeon: Dc Moore Jr., MD;  Location: [facility];                 Service: Orthopedics;  Laterality: Left;  need opus                system (Julito notified)video  4/5/2019: ARTHROSCOPY OF SHOULDER WITH DECOMPRESSION OF SUBACROMIAL   SPACE      Comment:  Procedure: ARTHROSCOPY, SHOULDER, WITH SUBACROMIAL SPACE               DECOMPRESSION;  Surgeon: Dc Moore Jr., MD;                 Location: [facility] OR;  Service: Orthopedics;;  1/8/2021: ARTHROSCOPY OF SHOULDER WITH DECOMPRESSION OF SUBACROMIAL   SPACE; Right      Comment:  Procedure: ARTHROSCOPY, SHOULDER, WITH SUBACROMIAL SPACE               DECOMPRESSION;  Surgeon: Dc Moore Jr., MD;                 Location: [facility] OR;  Service: Orthopedics;  Laterality:                Right;  possible rot cuff repairneed opus system                (Addy FITZGERALD notified per Brii 12/21, )video  1998: BASAL CELL CARCINOMA EXCISION  5/25/2017: COLONOSCOPY; N/A      Comment:  Procedure: COLONOSCOPY;  Surgeon: Marielle Dietz MD;                 Location: [facility] ENDO (4TH FLR);  Service: Endoscopy;                 Laterality:  N/A;  Patient requests PM.PM prep.  01/08/2019: FOOT SURGERY; Right  2000: LASIK  04/05/2019: SHOULDER SURGERY; Left  No date: WISDOM TOOTH EXTRACTION  Social History    Socioeconomic History      Marital status:     Occupational History        Employer: [employer]    Tobacco Use      Smoking status: Never Smoker      Smokeless tobacco: Never Used    Substance and Sexual Activity      Alcohol use: Yes        Comment: socially/ not weekly      Drug use: No      Sexual activity: Yes        Partners: Male        Birth control/protection: None        Comment: , menarche 15     Review of patient's allergies indicates:   -- Diclofenac -- Nausea Only   -- Ibuprofen -- Other (See Comments)   -- Tramadol -- Other (See Comments)    --  Nausea  Review of Systems   Constitutional:  Negative for activity change, appetite change, chills, diaphoresis, fatigue, fever and unexpected weight change.   HENT:  Negative for nasal congestion, mouth sores, postnasal drip, rhinorrhea, sinus pressure/congestion, sneezing, sore throat, trouble swallowing and voice change.    Eyes:  Negative for pain, discharge and visual disturbance.   Respiratory:  Negative for cough, shortness of breath and wheezing.    Cardiovascular:  Negative for chest pain, palpitations and leg swelling.   Gastrointestinal:  Negative for abdominal pain, blood in stool, constipation, diarrhea, nausea and vomiting.   Endocrine: Negative for cold intolerance and heat intolerance.   Genitourinary:  Negative for difficulty urinating, dysuria, frequency, hematuria and urgency.   Musculoskeletal:  Negative for arthralgias and myalgias.   Integumentary:  Negative for rash and wound.   Allergic/Immunologic: Negative for environmental allergies and food allergies.   Neurological:  Negative for dizziness, tremors, seizures, syncope, weakness, light-headedness and headaches.   Hematological:  Negative for adenopathy. Does not bruise/bleed easily.    Psychiatric/Behavioral:  Negative for confusion and sleep disturbance. The patient is not nervous/anxious.           Objective     Physical Exam  Vitals and nursing note reviewed.   Constitutional:       General: She is not in acute distress.     Appearance: Normal appearance. She is well-developed. She is not diaphoretic.   HENT:      Head: Normocephalic and atraumatic.      Right Ear: External ear normal.      Left Ear: External ear normal.      Nose: Nose normal.      Mouth/Throat:      Pharynx: No oropharyngeal exudate.   Eyes:      General: No scleral icterus.        Right eye: No discharge.         Left eye: No discharge.      Conjunctiva/sclera: Conjunctivae normal.      Pupils: Pupils are equal, round, and reactive to light.   Neck:      Thyroid: No thyromegaly.      Vascular: No JVD.   Cardiovascular:      Rate and Rhythm: Normal rate and regular rhythm.      Pulses: Normal pulses.      Heart sounds: Normal heart sounds. No murmur heard.  Pulmonary:      Effort: Pulmonary effort is normal. No respiratory distress.      Breath sounds: Normal breath sounds. No wheezing, rhonchi or rales.   Chest:      Chest wall: No tenderness.   Abdominal:      General: Bowel sounds are normal. There is no distension.      Palpations: Abdomen is soft.      Tenderness: There is no abdominal tenderness. There is no guarding or rebound.   Musculoskeletal:      Cervical back: Neck supple.      Right lower leg: No edema.      Left lower leg: No edema.   Lymphadenopathy:      Cervical: No cervical adenopathy.   Skin:     General: Skin is warm and dry.      Coloration: Skin is not pale.      Findings: No rash.   Neurological:      General: No focal deficit present.      Mental Status: She is alert and oriented to person, place, and time.      Gait: Gait normal.   Psychiatric:         Behavior: Behavior normal.         Thought Content: Thought content normal.         Judgment: Judgment normal.            Assessment and Plan     1.  Annual physical exam    2. MS (multiple sclerosis)  Overview:  Stable on Ocrevus-due January 2021      3. Anxiety    4. Immunosuppression due to drug therapy         Blood work reviewed with pt      MS- stable, followed by Neuro      Anxiety- stable off Lexapro       F/u in 1 yr

## 2024-09-26 ENCOUNTER — PATIENT MESSAGE (OUTPATIENT)
Dept: NEUROLOGY | Facility: CLINIC | Age: 59
End: 2024-09-26
Payer: COMMERCIAL

## 2024-09-27 ENCOUNTER — TELEPHONE (OUTPATIENT)
Dept: PSYCHIATRY | Facility: CLINIC | Age: 59
End: 2024-09-27
Payer: COMMERCIAL

## 2024-09-27 NOTE — TELEPHONE ENCOUNTER
2021    Juliet Mejias (:  1944) is a 69 y.o. female, here for evaluation of the following medical concerns:    Chief Complaint   Patient presents with    Follow-up     3 weeks for medication       HPI  67-year-old -American female with history of type 2 diabetes, osteopenia, fatty liver disease, who comes in for result visit for Ngo/leg edema. Review of Systems   Constitutional: Negative for activity change, appetite change, fatigue and unexpected weight change. HENT: Negative for dental problem, sinus pain, sore throat and trouble swallowing. Eyes: Negative for pain and visual disturbance. Respiratory: Negative for apnea, cough, chest tightness, shortness of breath and wheezing. Cardiovascular: Negative for chest pain and palpitations. Gastrointestinal: Negative for abdominal pain, blood in stool, constipation, diarrhea, nausea, rectal pain and vomiting. Endocrine: Negative for cold intolerance, heat intolerance, polydipsia, polyphagia and polyuria. Genitourinary: Negative for difficulty urinating, dysuria, flank pain, frequency, hematuria, pelvic pain, urgency, vaginal bleeding and vaginal discharge. Musculoskeletal: Negative for arthralgias, back pain, gait problem, joint swelling, myalgias, neck pain and neck stiffness. Skin: Negative for color change and rash. Neurological: Negative for dizziness, tremors, syncope, speech difficulty, weakness, light-headedness and headaches. Hematological: Negative for adenopathy. Does not bruise/bleed easily. Psychiatric/Behavioral: Negative for agitation, behavioral problems, decreased concentration, sleep disturbance and suicidal ideas. The patient is not nervous/anxious and is not hyperactive. Prior to Visit Medications    Medication Sig Taking?  Authorizing Provider   JANUVIA 100 MG tablet TAKE ONE TABLET BY MOUTH DAILY Yes Angeli Joseph MD   metFORMIN (GLUMETZA) 1000 MG extended release tablet Take 1 tablet Pt reached out to SW team to get LTD forms for [employer] filled out. SW intern completed the forms and faxed them to [employer] ([phone]).    Social History     Socioeconomic History    Marital status:      Spouse name: Romi Cote Number of children: 3    Years of education: Not on file    Highest education level: Not on file   Occupational History    Occupation: hairdresser   Tobacco Use    Smoking status: Former Smoker     Packs/day: 2.00     Years: 30.00     Pack years: 60.00     Types: Cigarettes     Quit date: 1986     Years since quittin.4    Smokeless tobacco: Never Used   Vaping Use    Vaping Use: Never used   Substance and Sexual Activity    Alcohol use: No     Alcohol/week: 0.0 standard drinks    Drug use: No    Sexual activity: Not on file   Other Topics Concern    Not on file   Social History Narrative    Living Will:  No.             Social Determinants of Health     Financial Resource Strain: Low Risk     Difficulty of Paying Living Expenses: Not hard at all   Food Insecurity: No Food Insecurity    Worried About Running Out of Food in the Last Year: Never true    Dylan of Food in the Last Year: Never true   Transportation Needs:     Lack of Transportation (Medical):  Lack of Transportation (Non-Medical):    Physical Activity:     Days of Exercise per Week:     Minutes of Exercise per Session:    Stress:     Feeling of Stress :    Social Connections:     Frequency of Communication with Friends and Family:     Frequency of Social Gatherings with Friends and Family:     Attends Cheondoism Services:     Active Member of Clubs or Organizations:     Attends Club or Organization Meetings:     Marital Status:    Intimate Partner Violence:     Fear of Current or Ex-Partner:     Emotionally Abused:     Physically Abused:     Sexually Abused:         Family History   Problem Relation Age of Onset    Cancer Father 59        , colon cancer.  Cancer Mother 68        , nasopharyngeal cancer, IDDM, hypertension.        Vitals:    21 1203   BP: 124/70   Pulse: 75   Temp: 98.2 °F (36.8 °C) TempSrc: Temporal   SpO2: 95%   Weight: 236 lb (107 kg)   Height: 5' 6\" (1.676 m)     Estimated body mass index is 38.09 kg/m² as calculated from the following:    Height as of this encounter: 5' 6\" (1.676 m). Weight as of this encounter: 236 lb (107 kg). PHYSICAL EXAM  GENERAL:  Pleasant  female who looks her stated age, awake alert and oriented x3, no acute distress. EXTREMITIES: 2-3+ ankle and foot edema, without skin breakdown, some hyperpigmentation. Doesn't go above the knees. PSYCH:  No psychomotor retardation or agitation. Good eye contact. Unrestricted affect range. Mood congruent with affect. Linear thought.     LABS  Lab Review   Orders Only on 05/24/2021   Component Date Value    TSH 05/24/2021 3.39     Vit D, 25-Hydroxy 05/24/2021 13.7*    Iron 05/24/2021 59     TIBC 05/24/2021 305     Iron Saturation 05/24/2021 19     ALT 05/24/2021 18     AST 05/24/2021 17     Cholesterol, Total 05/24/2021 143     Triglycerides 05/24/2021 101     HDL 05/24/2021 57     LDL Calculated 05/24/2021 66     VLDL Cholesterol Calcula* 05/24/2021 20     WBC 05/24/2021 8.9     RBC 05/24/2021 4.26     Hemoglobin 05/24/2021 12.5     Hematocrit 05/24/2021 37.1     MCV 05/24/2021 87.2     MCH 05/24/2021 29.3     MCHC 05/24/2021 33.5     RDW 05/24/2021 15.9*    Platelets 95/57/6333 235     MPV 05/24/2021 10.0     Neutrophils % 05/24/2021 73.9     Lymphocytes % 05/24/2021 17.2     Monocytes % 05/24/2021 7.1     Eosinophils % 05/24/2021 1.2     Basophils % 05/24/2021 0.6     Neutrophils Absolute 05/24/2021 6.6     Lymphocytes Absolute 05/24/2021 1.5     Monocytes Absolute 05/24/2021 0.6     Eosinophils Absolute 05/24/2021 0.1     Basophils Absolute 05/24/2021 0.1     Pro-BNP 05/24/2021 21    Hospital Outpatient Visit on 05/21/2021   Component Date Value    Left Ventricular Ejectio* 05/21/2021 58     LVEF MODALITY 05/21/2021 ECHO    Office Visit on 05/17/2021 Component Date Value    Hemoglobin A1C 05/17/2021 7.2*    Microalb, Ur 05/17/2021 55.8     Creatinine Ur POCT 05/17/2021 158.7     Microalbumin Creatinine * 05/17/2021 35.2    Abstract on 11/17/2020   Component Date Value    Hemoglobin A1C 11/03/2020 8.3     AVERAGE GLUCOSE 11/03/2020 192     Sodium 11/03/2020 140     Chloride 11/03/2020 105     Potassium 11/03/2020 3.9     BUN 11/03/2020 10     CREATININE 11/03/2020 0.90     Glucose 11/03/2020 129     AST 11/03/2020 17     ALT 11/03/2020 23     Calcium 11/03/2020 10.0     Total Protein 11/03/2020 7.9     CO2 11/03/2020 27     Albumin 11/03/2020 4.7     Alkaline Phosphatase 11/03/2020 90     Total Bilirubin 11/03/2020 0.4     Anion Gap 11/03/2020 8     Cholesterol, Total 11/03/2020 159     HDL 11/03/2020 60     LDL Calculated 11/03/2020 76     Triglycerides 11/03/2020 116     Cholesterol non HDL 11/03/2020 99    Abstract on 11/16/2020   Component Date Value    OCCULT BLOOD FECAL 11/16/2020 POSITIVE    Office Visit on 11/12/2020   Component Date Value    SARS-CoV-2, SUGAR 11/12/2020 NOT DETECTED          ASSESSMENT/PLAN  1. Type 2 diabetes mellitus with hyperglycemia, without long-term current use of insulin (HCC)  Reasonable control, A1c 7.2%  Tolerating IR metformin, januvia, 10mg glipizide. AM glc 160. Commended finding out what GLP-1 drugs might be on formulary, and whether extended release Metformin might be covered given immediate release associated diarrhea. 2. Peripheral edema. Chronic dyspea on exertion. Amlodipine, BMI 38, and likely valvular venous insufficiency are contributory. Work up for pulmonary hypertension chronic DVT negative for DVT but showed elevated PASP 45 mm/ mm mild TR, systolic dysfunction and concentric LVH also mild. Wonder about SCOTTIE, pt declines eval.     Echo showed conc LVH and 2/4 DD in part due to LVH, but can't rule out ischemia. Unfortunately, angioedema with ACEi.  Declines ad sestamibi, but will let me know if Ngo worsens. PM amlodipine, compression hose, low dose lasix, good BP control. Will embark on walking program, recheck weight in 3 months. CTA chest 2020 for pulmonary embolism though mild diffuse groundglass opacification noted also contributory. Reassuring hemoglobin TSH BNP. 3. Mixed hyperlipidemia  Excellent lipid profile normal LFTs recheck May 2022. 4. Routine adult health maintenance. Mild vitamin D deficiency. Vitamin D 14 goal 30-50. Not interested. in bone density . Tdap due. Pneumovax 23 2013, Shingrix 2020,    [address] [place] Covid 2021. Beyond age of Pap smear, mammogram.  TSH normal.    5. Essential hypertension  Seemingly fair control. Update BMP. 6. Anemia, resolved  Hemoglobin 12.5 with normal iron studies, noting patient was heme positive, normal CBC MCV July 2020. Colonoscopy August 29, 2017 in the setting of a prior history of colon polyps and family history of colon cancer removed only a diminutive rectal polyp with follow-up colonoscopy tentatively August 2022.    7. History of adenomatous colon polyps. \"Colonoscopy December 2020 retrieved 8 polyps all adenomas, size unknown. Follow-up colonoscopy March 2021 showed no adenomas. Repeat colonoscopy due March 2024 if clinically appropriate. \"    Colonoscopy August 29, 2017 in the setting of a prior history of colon polyps and family history of colon cancer removed only a diminutive rectal polyp with follow-up colonoscopy tentatively August 2022. Return in about 3 months (around 9/7/2021). It was a pleasure to visit with Ms. Tracey today. Answered all questions as best I could.   Jenna Morales MD   Time 32 minutes

## 2024-10-01 ENCOUNTER — PATIENT MESSAGE (OUTPATIENT)
Dept: INTERNAL MEDICINE | Facility: CLINIC | Age: 59
End: 2024-10-01
Payer: COMMERCIAL

## 2024-10-02 ENCOUNTER — LAB VISIT (OUTPATIENT)
Dept: LAB | Facility: HOSPITAL | Age: 59
End: 2024-10-02
Attending: INTERNAL MEDICINE
Payer: COMMERCIAL

## 2024-10-02 DIAGNOSIS — G35 MULTIPLE SCLEROSIS: ICD-10-CM

## 2024-10-02 LAB
25(OH)D3+25(OH)D2 SERPL-MCNC: 60 NG/ML (ref 30–96)
ALBUMIN SERPL BCP-MCNC: 4.1 G/DL (ref 3.5–5.2)
ALP SERPL-CCNC: 98 U/L (ref 55–135)
ALT SERPL W/O P-5'-P-CCNC: 26 U/L (ref 10–44)
ANION GAP SERPL CALC-SCNC: 10 MMOL/L (ref 8–16)
AST SERPL-CCNC: 22 U/L (ref 10–40)
BASOPHILS # BLD AUTO: 0.06 K/UL (ref 0–0.2)
BASOPHILS NFR BLD: 1 % (ref 0–1.9)
BILIRUB SERPL-MCNC: 0.5 MG/DL (ref 0.1–1)
BUN SERPL-MCNC: 14 MG/DL (ref 6–20)
CALCIUM SERPL-MCNC: 10.3 MG/DL (ref 8.7–10.5)
CHLORIDE SERPL-SCNC: 105 MMOL/L (ref 95–110)
CO2 SERPL-SCNC: 26 MMOL/L (ref 23–29)
CREAT SERPL-MCNC: 0.9 MG/DL (ref 0.5–1.4)
DIFFERENTIAL METHOD BLD: ABNORMAL
EOSINOPHIL # BLD AUTO: 0.2 K/UL (ref 0–0.5)
EOSINOPHIL NFR BLD: 2.5 % (ref 0–8)
ERYTHROCYTE [DISTWIDTH] IN BLOOD BY AUTOMATED COUNT: 12.2 % (ref 11.5–14.5)
EST. GFR  (NO RACE VARIABLE): >60 ML/MIN/1.73 M^2
GLUCOSE SERPL-MCNC: 79 MG/DL (ref 70–110)
HBV CORE AB SERPL QL IA: NORMAL
HBV SURFACE AG SERPL QL IA: NORMAL
HCT VFR BLD AUTO: 43.7 % (ref 37–48.5)
HGB BLD-MCNC: 14.3 G/DL (ref 12–16)
IGA SERPL-MCNC: 199 MG/DL (ref 40–350)
IGG SERPL-MCNC: 994 MG/DL (ref 650–1600)
IGM SERPL-MCNC: 71 MG/DL (ref 50–300)
IMM GRANULOCYTES # BLD AUTO: 0.01 K/UL (ref 0–0.04)
IMM GRANULOCYTES NFR BLD AUTO: 0.2 % (ref 0–0.5)
LYMPHOCYTES # BLD AUTO: 2 K/UL (ref 1–4.8)
LYMPHOCYTES NFR BLD: 30.9 % (ref 18–48)
MCH RBC QN AUTO: 31.5 PG (ref 27–31)
MCHC RBC AUTO-ENTMCNC: 32.7 G/DL (ref 32–36)
MCV RBC AUTO: 96 FL (ref 82–98)
MONOCYTES # BLD AUTO: 0.7 K/UL (ref 0.3–1)
MONOCYTES NFR BLD: 10.9 % (ref 4–15)
NEUTROPHILS # BLD AUTO: 3.4 K/UL (ref 1.8–7.7)
NEUTROPHILS NFR BLD: 54.5 % (ref 38–73)
NRBC BLD-RTO: 0 /100 WBC
PLATELET # BLD AUTO: 329 K/UL (ref 150–450)
PMV BLD AUTO: 10.4 FL (ref 9.2–12.9)
POTASSIUM SERPL-SCNC: 4.5 MMOL/L (ref 3.5–5.1)
PROT SERPL-MCNC: 7.5 G/DL (ref 6–8.4)
RBC # BLD AUTO: 4.54 M/UL (ref 4–5.4)
SODIUM SERPL-SCNC: 141 MMOL/L (ref 136–145)
WBC # BLD AUTO: 6.31 K/UL (ref 3.9–12.7)

## 2024-10-02 PROCEDURE — 82306 VITAMIN D 25 HYDROXY: CPT | Performed by: CLINICAL NURSE SPECIALIST

## 2024-10-02 PROCEDURE — 86704 HEP B CORE ANTIBODY TOTAL: CPT | Performed by: CLINICAL NURSE SPECIALIST

## 2024-10-02 PROCEDURE — 85025 COMPLETE CBC W/AUTO DIFF WBC: CPT | Performed by: CLINICAL NURSE SPECIALIST

## 2024-10-02 PROCEDURE — 87340 HEPATITIS B SURFACE AG IA: CPT | Performed by: CLINICAL NURSE SPECIALIST

## 2024-10-02 PROCEDURE — 82784 ASSAY IGA/IGD/IGG/IGM EACH: CPT | Performed by: CLINICAL NURSE SPECIALIST

## 2024-10-02 PROCEDURE — 36415 COLL VENOUS BLD VENIPUNCTURE: CPT | Performed by: CLINICAL NURSE SPECIALIST

## 2024-10-02 PROCEDURE — 80053 COMPREHEN METABOLIC PANEL: CPT | Performed by: CLINICAL NURSE SPECIALIST

## 2024-10-07 ENCOUNTER — OFFICE VISIT (OUTPATIENT)
Dept: NEUROLOGY | Facility: CLINIC | Age: 59
End: 2024-10-07
Payer: COMMERCIAL

## 2024-10-07 VITALS
DIASTOLIC BLOOD PRESSURE: 73 MMHG | SYSTOLIC BLOOD PRESSURE: 104 MMHG | BODY MASS INDEX: 23.9 KG/M2 | WEIGHT: 129.88 LBS | HEIGHT: 62 IN | HEART RATE: 67 BPM

## 2024-10-07 DIAGNOSIS — D84.821 IMMUNOSUPPRESSION DUE TO DRUG THERAPY: ICD-10-CM

## 2024-10-07 DIAGNOSIS — Z29.89 PROPHYLACTIC IMMUNOTHERAPY: ICD-10-CM

## 2024-10-07 DIAGNOSIS — G35 MS (MULTIPLE SCLEROSIS): Primary | ICD-10-CM

## 2024-10-07 DIAGNOSIS — Z71.89 COUNSELING REGARDING GOALS OF CARE: ICD-10-CM

## 2024-10-07 DIAGNOSIS — Z79.899 IMMUNOSUPPRESSION DUE TO DRUG THERAPY: ICD-10-CM

## 2024-10-07 PROCEDURE — 3008F BODY MASS INDEX DOCD: CPT | Mod: CPTII,S$GLB,, | Performed by: PSYCHIATRY & NEUROLOGY

## 2024-10-07 PROCEDURE — 3074F SYST BP LT 130 MM HG: CPT | Mod: CPTII,S$GLB,, | Performed by: PSYCHIATRY & NEUROLOGY

## 2024-10-07 PROCEDURE — G2211 COMPLEX E/M VISIT ADD ON: HCPCS | Mod: S$GLB,,, | Performed by: PSYCHIATRY & NEUROLOGY

## 2024-10-07 PROCEDURE — 1160F RVW MEDS BY RX/DR IN RCRD: CPT | Mod: CPTII,S$GLB,, | Performed by: PSYCHIATRY & NEUROLOGY

## 2024-10-07 PROCEDURE — 99999 PR PBB SHADOW E&M-EST. PATIENT-LVL III: CPT | Mod: PBBFAC,,, | Performed by: PSYCHIATRY & NEUROLOGY

## 2024-10-07 PROCEDURE — 1159F MED LIST DOCD IN RCRD: CPT | Mod: CPTII,S$GLB,, | Performed by: PSYCHIATRY & NEUROLOGY

## 2024-10-07 PROCEDURE — 99214 OFFICE O/P EST MOD 30 MIN: CPT | Mod: S$GLB,,, | Performed by: PSYCHIATRY & NEUROLOGY

## 2024-10-07 PROCEDURE — 3078F DIAST BP <80 MM HG: CPT | Mod: CPTII,S$GLB,, | Performed by: PSYCHIATRY & NEUROLOGY

## 2024-10-07 PROCEDURE — 3044F HG A1C LEVEL LT 7.0%: CPT | Mod: CPTII,S$GLB,, | Performed by: PSYCHIATRY & NEUROLOGY

## 2024-10-07 NOTE — Clinical Note
On track for infusion in November - [place]  For her infusion next May, she'll most likely be living on [facility], so would like to move to [place];

## 2024-10-07 NOTE — PROGRESS NOTES
"Subjective:          Patient ID: Hien Jeter is a 59 y.o. female who presents today for a routine clinic visit for MS.      MS HPI:  DMT: ocrelizumab May / November   Side effects from DMT? No  Taking vitamin D3 as recommended? Yes -   She is feeling stable from an MS perspective.   She likes Ocrevus; tolerates the fast infusion  Patient denies frequent, severe or unusual infections.  Seeing Tati Westfall for HA   No bladder or spasticity issues;       Medications:  Current Outpatient Medications   Medication Sig    calcium carbonate (OS-MIGUEL) 600 mg calcium (1,500 mg) Tab Take 600 mg by mouth once.    cholecalciferol, vitamin D3, 3,000 unit Tab Take 3,000 Units by mouth once daily.     flaxseed oil 1,000 mg Cap Take 1 capsule by mouth once daily.     multivitamin-Ca-iron-minerals 27-0.4 mg Tab Take 1 tablet by mouth once daily.     vitamin E 400 UNIT capsule Take 400 Units by mouth 2 (two) times a day.    SUMAtriptan (IMITREX) 20 mg/actuation nasal spray 1 spray (20 mg total) by Nasal route every 2 (two) hours as needed for Migraine.     No current facility-administered medications for this visit.       SOCIAL HISTORY  Social History     Tobacco Use    Smoking status: Never    Smokeless tobacco: Never   Substance Use Topics    Alcohol use: Yes     Comment: socially/ not weekly    Drug use: No       Living arrangements - the patient lives with their spouse.    ROS:        10/1/2024    10:35 AM   REVIEW OF SYMPTOMS   Do you feel abnormally tired on most days? No    Do you feel you generally sleep well? Yes    Do you have difficulty controlling your bladder?  No    Do you have difficulty controlling your bowels?  No    Do you have frequent muscle cramps, tightness or spasms in your limbs?  No    Do you have new visual symptoms?  No    Do you have worsening difficulty with your memory or thinking? Yes  - "not terrible, but feels she needs to write things down more"   Do you have worsening symptoms of anxiety or " depression?  No    For patients who walk, Do you have more difficulty walking?  No    Have you fallen since your last visit?  No    For patients who use wheelchairs: Do you have any skin wounds or breakdown? Not Applicable    Do you have difficulty using your hands?  Yes - tingling in her fingers, chronic   Do you have shooting or burning pain? No    Do you have difficulty with sexual function?  No    If you are sexually active, are you using birth control? Y/N  N/A No    Do you often choke when swallowing liquids or solid food?  No    Do you experience worsening symptoms when overheated? Yes    Do you need any new equipment such as a wheelchair, walker or shower chair? No    Do you receive co-pay financial assistance for your principal MS medicine? Yes    Would you be interested in participating in an MS research trial in the future? Yes    For patients on Gilenya, Tecfidera, Aubagio, Rituxan, Ocrevus, Tysabri, Lemtrada or Methotrexate, are you aware that you should NOT receive live virus vaccines?  Yes    Do you feel you have adequate family/friend support?  Yes    Do you have health insurance?   Yes    Are you currently employed? No    Do you receive SSDI/SSI?  No    Do you use marijuana or cannabis products? No    Have you been diagnosed with a urinary tract infection since your last visit here? No    Have you been diagnosed with a respiratory tract infection since your last visit here? No    Have you been to the emergency room since your last visit here? No    Have you been hospitalized since your last visit here?  No                Objective:          5/24/2024     9:01 AM 10/7/2024     9:48 AM   Timed 25 Foot Walk:   Did patient wear an AFO? No No   Was assistive device used? No No   Time for 25 Foot Walk (seconds) 3.8 3.4   Time for 25 Foot Walk (seconds) 3.6        Neurological Exam  MENTAL STATUS: grossly intact  CRANIAL NERVE EXAM: There is no internuclear ophthalmoplegia. Extraocular   muscles are  "intact.  No facial   asymmetry.There is no dysarthria.   MOTOR EXAM: Normal bulk and tone throughout UE and LE bilaterally. Rapid sequential movements are normal. Strength is 5/5 in all groups   in the lower extremities and upper extremities.   REFLEXES: Symmetric and 2+ throughout in all 4 extremities.   SENSORY EXAM: Normal to vibration t/o  COORDINATION: Normal finger-to-nose exam.   GAIT: Narrow based and stable.      Imaging:     Results for orders placed during the hospital encounter of 12/12/23    MRI Brain Demyelinating Without Contrast    Impression  Stable intracranial white matter lesions consistent with the history of demyelinating disease.  No new lesion.      Electronically signed by: Sagar Mcintyre  Date:    12/13/2023  Time:    07:48    No results found for this or any previous visit.    No results found for this or any previous visit.    No results found for this or any previous visit.    No results found for this or any previous visit.    No results found for this or any previous visit.        Labs:     Lab Results   Component Value Date    GANPERIV47KK 60 10/02/2024    KKCFQRBS75CA 64 10/06/2022    LFPZMUKE90QQ 60 06/02/2021     No results found for: "JCVINDEX", "JCVANTIBODY"  Lab Results   Component Value Date    JV8UDFDM 64.7 12/20/2017    ABSOLUTECD3 837 12/20/2017    TU6SHVJV 12.5 12/20/2017    ABSOLUTECD8 161 (L) 12/20/2017    VD8MAFIH 53.0 12/20/2017    ABSOLUTECD4 685 12/20/2017    LABCD48 4.25 (H) 12/20/2017     Lab Results   Component Value Date    WBC 6.31 10/02/2024    RBC 4.54 10/02/2024    HGB 14.3 10/02/2024    HCT 43.7 10/02/2024    MCV 96 10/02/2024    MCH 31.5 (H) 10/02/2024    MCHC 32.7 10/02/2024    RDW 12.2 10/02/2024     10/02/2024    MPV 10.4 10/02/2024    GRAN 3.4 10/02/2024    GRAN 54.5 10/02/2024    LYMPH 2.0 10/02/2024    LYMPH 30.9 10/02/2024    MONO 0.7 10/02/2024    MONO 10.9 10/02/2024    EOS 0.2 10/02/2024    BASO 0.06 10/02/2024    EOSINOPHIL 2.5 10/02/2024 "    BASOPHIL 1.0 10/02/2024     Sodium   Date Value Ref Range Status   10/02/2024 141 136 - 145 mmol/L Final     Potassium   Date Value Ref Range Status   10/02/2024 4.5 3.5 - 5.1 mmol/L Final     Chloride   Date Value Ref Range Status   10/02/2024 105 95 - 110 mmol/L Final     CO2   Date Value Ref Range Status   10/02/2024 26 23 - 29 mmol/L Final     Glucose   Date Value Ref Range Status   10/02/2024 79 70 - 110 mg/dL Final     BUN   Date Value Ref Range Status   10/02/2024 14 6 - 20 mg/dL Final     Creatinine   Date Value Ref Range Status   10/02/2024 0.9 0.5 - 1.4 mg/dL Final     Calcium   Date Value Ref Range Status   10/02/2024 10.3 8.7 - 10.5 mg/dL Final     Total Protein   Date Value Ref Range Status   10/02/2024 7.5 6.0 - 8.4 g/dL Final     Albumin   Date Value Ref Range Status   10/02/2024 4.1 3.5 - 5.2 g/dL Final     Total Bilirubin   Date Value Ref Range Status   10/02/2024 0.5 0.1 - 1.0 mg/dL Final     Comment:     For infants and newborns, interpretation of results should be based  on gestational age, weight and in agreement with clinical  observations.    Premature Infant recommended reference ranges:  Up to 24 hours.............<8.0 mg/dL  Up to 48 hours............<12.0 mg/dL  3-5 days..................<15.0 mg/dL  6-29 days.................<15.0 mg/dL       Alkaline Phosphatase   Date Value Ref Range Status   10/02/2024 98 55 - 135 U/L Final     AST   Date Value Ref Range Status   10/02/2024 22 10 - 40 U/L Final     ALT   Date Value Ref Range Status   10/02/2024 26 10 - 44 U/L Final     Anion Gap   Date Value Ref Range Status   10/02/2024 10 8 - 16 mmol/L Final     eGFR if    Date Value Ref Range Status   04/18/2022 >60.0 >60 mL/min/1.73 m^2 Final     eGFR if non    Date Value Ref Range Status   04/18/2022 >60.0 >60 mL/min/1.73 m^2 Final     Comment:     Calculation used to obtain the estimated glomerular filtration  rate (eGFR) is the CKD-EPI equation.        Lab  Results   Component Value Date    HEPBSAG Non-reactive 10/02/2024    HEPBSAB 36.83 10/06/2022    HEPBSAB Reactive 10/06/2022    HEPBCAB Non-reactive 10/02/2024           MS Impression and Plan:     NEURO MULTIPLE SCLEROSIS IMPRESSION:   Number of relapses in the past year?:  0  Clinical Progression:  Clinically Stable  MRI Progression:  N/A  MS Classification:  Relapsing-Remitting MS  DMT:  No change in management  Symptom Management:  No change in symptom management   FANY  Tolerating Ocrevus   Labs look good   Next infusion November   MRI scheduled December  F/u [facility] in 6mo VV           Problem List Items Addressed This Visit          1 - High    MS (multiple sclerosis) - Primary       2     Immunosuppression due to drug therapy       Unprioritized    Counseling regarding goals of care     Other Visit Diagnoses       Prophylactic immunotherapy                Page Salas MD    I spent a total of 33 minutes on the day of the visit.This includes face to face time and non-face to face time preparing to see the patient (eg, review of tests), obtaining and/or reviewing separately obtained history, documenting clinical information in the electronic or other health record, independently interpreting results and communicating results to the patient/family/caregiver, or care coordinator.  Visit today included increased complexity associated with the care of the episodic problem chronic immunotherapy; addressed and managing the longitudinal care of the patient due to the serious and/or complex managed problem(s) MS.

## 2024-10-09 ENCOUNTER — TELEPHONE (OUTPATIENT)
Dept: NEUROLOGY | Facility: CLINIC | Age: 59
End: 2024-10-09
Payer: COMMERCIAL

## 2024-10-09 NOTE — TELEPHONE ENCOUNTER
----- Message from Page Salas MD sent at 10/7/2024  9:40 AM CDT -----  On track for infusion in November - [place]   For her infusion next May, she'll most likely be living on [facility], so would like to move to [place];    right upper arm

## 2024-10-22 ENCOUNTER — TELEPHONE (OUTPATIENT)
Dept: NEUROLOGY | Facility: CLINIC | Age: 59
End: 2024-10-22

## 2024-10-22 NOTE — TELEPHONE ENCOUNTER
----- Message from Page Salas MD sent at 10/7/2024  9:40 AM CDT -----  On track for infusion in November - [place]   For her infusion next May, she'll most likely be living on [facility], so would like to move to [place];

## 2024-11-27 ENCOUNTER — OFFICE VISIT (OUTPATIENT)
Dept: NEUROLOGY | Facility: CLINIC | Age: 59
End: 2024-11-27
Payer: COMMERCIAL

## 2024-11-27 DIAGNOSIS — G43.909 EPISODIC MIGRAINE: ICD-10-CM

## 2024-11-27 PROCEDURE — 99212 OFFICE O/P EST SF 10 MIN: CPT | Mod: 95,,, | Performed by: PHYSICIAN ASSISTANT

## 2024-11-27 PROCEDURE — 3044F HG A1C LEVEL LT 7.0%: CPT | Mod: CPTII,95,, | Performed by: PHYSICIAN ASSISTANT

## 2024-11-27 RX ORDER — SUMATRIPTAN 20 MG/1
1 SPRAY NASAL
Qty: 1 EACH | Refills: 11 | Status: SHIPPED | OUTPATIENT
Start: 2024-11-27 | End: 2024-12-27
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
## 2024-12-02 ENCOUNTER — HOSPITAL ENCOUNTER (OUTPATIENT)
Dept: RADIOLOGY | Facility: HOSPITAL | Age: 59
Discharge: HOME | End: 2024-12-02
Attending: CLINICAL NURSE SPECIALIST
Payer: COMMERCIAL

## 2024-12-02 DIAGNOSIS — G35 MULTIPLE SCLEROSIS: ICD-10-CM

## 2024-12-02 PROCEDURE — 70551 MRI BRAIN STEM W/O DYE: CPT | Mod: TC

## 2024-12-02 PROCEDURE — 70551 MRI BRAIN STEM W/O DYE: CPT | Mod: 26,,, | Performed by: RADIOLOGY

## 2024-12-04 ENCOUNTER — PATIENT MESSAGE (OUTPATIENT)
Dept: NEUROLOGY | Facility: CLINIC | Age: 59
End: 2024-12-04
Payer: COMMERCIAL

## 2024-12-05 ENCOUNTER — PATIENT MESSAGE (OUTPATIENT)
Dept: NEUROLOGY | Facility: CLINIC | Age: 59
End: 2024-12-05
Payer: COMMERCIAL

## 2024-12-16 ENCOUNTER — PATIENT MESSAGE (OUTPATIENT)
Dept: PSYCHIATRY | Facility: CLINIC | Age: 59
End: 2024-12-16
Payer: COMMERCIAL

## 2025-02-15 ENCOUNTER — PATIENT MESSAGE (OUTPATIENT)
Dept: HEMATOLOGY/ONCOLOGY | Facility: CLINIC | Age: 60
End: 2025-02-15
Payer: COMMERCIAL

## 2025-02-23 NOTE — PROGRESS NOTES
CHIEF COMPLAINT:  Left shoulder impingement, partial tear of rotator cuff.    HISTORY OF PRESENT ILLNESS:  A 53-year-old female with ongoing symptoms, left   shoulder for almost a year now.  At one point, she developed severe adhesive   capsulitis, which required therapy and injections and definitely improved, but   she continues to have pain in the left shoulder and now that she has more   motion, actually is having more pain.    PAST MEDICAL HISTORY:  Significant for anxiety, depression, headaches, multiple   sclerosis.    PAST SURGICAL HISTORY:  Includes wisdom tooth extraction, LASIK surgery and   colonoscopy.    FAMILY HISTORY:  Positive for dementia, cancer and breast cancer.    SOCIAL HISTORY:  The patient does not smoke.  Drinks socially on the weekends.    REVIEW OF SYSTEMS:  Negative fever, chills, rashes.    CURRENT MEDICATIONS:  Reviewed on chart.    ALLERGIES:  CODEINE, DICLOFENAC, IBUPROFEN AND TRAMADOL.    PHYSICAL EXAMINATION:  GENERAL:  Well-developed, well-nourished female in no acute distress.  Alert and   oriented x3.  HEENT:  Unremarkable.  LUNGS:  Clear to auscultation.  HEART:  Regular rate and rhythm.  ABDOMEN:  Soft, nontender.  EXTREMITIES:  Significant for the left shoulder.  No swelling, no tenderness.    Range of motion is full, but she does have a positive impingement sign with   abduction, internal rotation, especially positive supraspinatus stress test.    Slight weakness rotator cuff.  NEUROLOGIC:  Intact.  No instability.    IMPRESSION:  Left shoulder impingement with partial tear rotator cuff.    PLAN:  I think at this point, we should go ahead and proceed with surgery for   left shoulder arthroscopy and rotator cuff repair.  The patient is in agreement.    The risks and benefits of surgery explained to her today.  She understands.      MANISH  dd: 02/18/2019 14:59:20 (CST)  td: 02/19/2019 09:46:38 (CST)  Doc ID   #[id number]  Job ID #[id number]    CC:       
Risks/benefits discussed with patient/surrogate

## 2025-03-07 ENCOUNTER — PATIENT MESSAGE (OUTPATIENT)
Dept: PSYCHIATRY | Facility: CLINIC | Age: 60
End: 2025-03-07
Payer: COMMERCIAL

## 2025-03-31 ENCOUNTER — OFFICE VISIT (OUTPATIENT)
Dept: HEMATOLOGY/ONCOLOGY | Facility: CLINIC | Age: 60
End: 2025-03-31
Payer: COMMERCIAL

## 2025-03-31 VITALS
BODY MASS INDEX: 24.7 KG/M2 | SYSTOLIC BLOOD PRESSURE: 116 MMHG | RESPIRATION RATE: 17 BRPM | WEIGHT: 134.25 LBS | OXYGEN SATURATION: 98 % | TEMPERATURE: 97 F | DIASTOLIC BLOOD PRESSURE: 72 MMHG | HEIGHT: 62 IN | HEART RATE: 64 BPM

## 2025-03-31 DIAGNOSIS — R92.333 HETEROGENEOUSLY DENSE TISSUE OF BOTH BREASTS ON MAMMOGRAPHY: ICD-10-CM

## 2025-03-31 DIAGNOSIS — Z80.3 FAMILY HISTORY OF BREAST CANCER: ICD-10-CM

## 2025-03-31 DIAGNOSIS — Z12.31 ENCOUNTER FOR SCREENING MAMMOGRAM FOR BREAST CANCER: ICD-10-CM

## 2025-03-31 DIAGNOSIS — Z00.00 PREVENTATIVE HEALTH CARE: ICD-10-CM

## 2025-03-31 DIAGNOSIS — Z12.39 BREAST CANCER SCREENING, HIGH RISK PATIENT: ICD-10-CM

## 2025-03-31 DIAGNOSIS — Z91.89 AT HIGH RISK FOR BREAST CANCER: Primary | ICD-10-CM

## 2025-03-31 PROCEDURE — G2211 COMPLEX E/M VISIT ADD ON: HCPCS | Mod: S$GLB,,, | Performed by: NURSE PRACTITIONER

## 2025-03-31 PROCEDURE — 3074F SYST BP LT 130 MM HG: CPT | Mod: CPTII,S$GLB,, | Performed by: NURSE PRACTITIONER

## 2025-03-31 PROCEDURE — 3008F BODY MASS INDEX DOCD: CPT | Mod: CPTII,S$GLB,, | Performed by: NURSE PRACTITIONER

## 2025-03-31 PROCEDURE — 3078F DIAST BP <80 MM HG: CPT | Mod: CPTII,S$GLB,, | Performed by: NURSE PRACTITIONER

## 2025-03-31 PROCEDURE — 1159F MED LIST DOCD IN RCRD: CPT | Mod: CPTII,S$GLB,, | Performed by: NURSE PRACTITIONER

## 2025-03-31 PROCEDURE — 99999 PR PBB SHADOW E&M-EST. PATIENT-LVL IV: CPT | Mod: PBBFAC,,, | Performed by: NURSE PRACTITIONER

## 2025-03-31 PROCEDURE — 99214 OFFICE O/P EST MOD 30 MIN: CPT | Mod: S$GLB,,, | Performed by: NURSE PRACTITIONER

## 2025-03-31 NOTE — PROGRESS NOTES
Chief Complaint   Patient presents with    At high risk for breast cancer         HPI:   Hien Jeter is a 59 y.o. who presents for follow up of increased risk of breast cancer.  She has MS as well and doing very well.     Today, Feels good and no complaints.   No breast concerns.  Building house and almost done- this has hindered her swimming ability some.   Continues to exercise- modified crossfit. Swims during the summer when able. Plans on more consistency after house finished.   Going to visit dad in  - dad is 95 yo and independent.     Breast imaging reviewed.        High Risk Breast cancer specific history:  - Age: 59 y.o.   - Height:  5'2  - Weight:   Wt Readings from Last 3 Encounters:   25 1429 60.9 kg (134 lb 4.2 oz)   24 0829 59.7 kg (131 lb 9.8 oz)   10/07/24 0853 58.9 kg (129 lb 13.6 oz)      - Breast density per BI-RADS:  c - Heterogeneously dense  - Age at menarche:  13 yo  - Number of pregnancies: G0  - She is postmenopausal. Age at menopause, if applicable:  45 yo.   -Uterus and ovaries intact: Yes  - HRT: No  - Genetic testing:     - Personal history of cancer: Yes - basal cell carcinoma in eyebrow- 2019  - Previous chest radiation exposure between ages 10-30 years old: No  - Personal history of breast biopsy: No  - Ashkenazi Uatsdin Inheritance: No  - Family history of cancer:    Sister - breast cancer dx 50  at 59 from colon cancer; negative genetics. [facility] in [facility].   Paternal aunt - breast cancer  Paternal aunt- breast cancer   Mom-colon cancer and liver cancer  Dad- prostate cancer  Maternal Grandfather - lung cancer    Social History:  Tobacco use:  no  Alcohol use:  social  Exercise regimen: yes swimming 45-60 minutes everyday  Employment: no          Past Medical   Past Medical History:   Diagnosis Date    Anxiety     Basal cell carcinoma 2000    left eyebrow     Depression     Environmental allergies     Genetic testing 2022    Invitae 84-gene  Multi-Cancer +RNA panel    Headache(784.0)     Multiple food allergies     Multiple sclerosis 2009     shoulder 03/2015    left     Patient Active Problem List   Diagnosis    MS (multiple sclerosis)    Intractable chronic migraine without aura    Occipital neuralgia    Facet arthropathy of spine    Encounter for long-term (current) use of high-risk medication    Menstrual migraine    Neck strain    Cervicalgia    Counseling regarding goals of care    Decreased strength    Impaired gait    Impingement syndrome of right shoulder    Numbness on left side    Intercostal pain    Rib pain on left side    Family history of colon cancer    Adhesive capsulitis of left shoulder    Ganglion cyst of right foot    Immunosuppression due to drug therapy    Rotator cuff tear, left    Dupuytren's disease of palm    Anxiety    Insomnia    Shoulder impingement, right    De Quervain's tenosynovitis, right    Episodic migraine     Social History   Social History     Tobacco Use    Smoking status: Never    Smokeless tobacco: Never   Substance Use Topics    Alcohol use: Yes     Comment: socially/ not weekly    Drug use: No     Family History  Family History   Problem Relation Name Age of Onset    Liver cancer Mother Savi 84    Colon cancer Mother Savi 50    Diabetes Mother Savi     Osteoporosis Mother Savi     Arrhythmia Father Jc     Prostate cancer Father Jc 75    Breast cancer Sister Radha 50    Colon cancer Sister Radha 59        myrisk negative 6/2021    Diabetes Brother Ethel     Dementia Maternal Grandmother Nettye     Lung cancer Maternal Grandfather Kenny         +smoker, chemical plants    Uterine cancer Paternal Grandmother Terese     Lung cancer Paternal Grandfather Dominguez         heavy smoker    Breast cancer Paternal Aunt Halimalindsey Mcbride     Breast cancer Paternal Aunt Ashleigh     Prostate cancer Paternal Uncle Michelle     Breast cancer Paternal Aunt Ashleigh Kirsty     Multiple sclerosis Neg Hx       "Ovarian cancer Neg Hx       Medications    Current Outpatient Medications:     calcium carbonate (OS-MIGUEL) 600 mg calcium (1,500 mg) Tab, Take 600 mg by mouth once., Disp: , Rfl:     cholecalciferol, vitamin D3, 3,000 unit Tab, Take 3,000 Units by mouth once daily. , Disp: , Rfl:     flaxseed oil 1,000 mg Cap, Take 1 capsule by mouth once daily. , Disp: , Rfl:     multivitamin-Ca-iron-minerals 27-0.4 mg Tab, Take 1 tablet by mouth once daily. , Disp: , Rfl:     vitamin E 400 UNIT capsule, Take 400 Units by mouth 2 (two) times a day., Disp: , Rfl:     SUMAtriptan (IMITREX) 20 mg/actuation nasal spray, 1 spray (20 mg total) by Nasal route every 2 (two) hours as needed for Migraine., Disp: 1 each, Rfl: 11  Allergies  Review of patient's allergies indicates:   Allergen Reactions    Diclofenac Nausea Only    Ibuprofen Other (See Comments)    Tramadol Other (See Comments), Nausea And Vomiting and Photosensitivity     Nausea   Nausea        Review of Systems       See above   All other systems reviewed and are negative.    Objective:      Vitals:   Vitals:    03/31/25 1429   BP: 116/72   Pulse: 64   Resp: 17   Temp: 97 °F (36.1 °C)   TempSrc: Oral   SpO2: 98%   Weight: 60.9 kg (134 lb 4.2 oz)   Height: 5' 2" (1.575 m)         BMI: Body mass index is 24.56 kg/m².   Body surface area is 1.63 meters squared.    Physical Exam  Vitals signs reviewed.   Constitutional:  Normal appearance. NAD.   HENT: Normocephalic.   Eyes: Pupils are equal, round, and reactive to light.   Cardiovascular: RRR  Pulmonary: Pulmonary effort is normal. CTA  Musculoskeletal: Normal range of motion.   Abdomen: BS normal  Lymphadenopathy: No cervical adenopathy. No axillary adenopathy.   Skin: Skin is warm and dry.   Neurological:  She is alert and oriented to person, place, and time.   Psychiatric: Mood normal.     Breast Exam: no masses or LAD noted. Thicker tissue in upper outer quadrant L>R.       Laboratory Data: reviewed most recent   Imaging: " reviewed most recent        Assessment:     1. At high risk for breast cancer    2. Family history of breast cancer    3. Encounter for screening mammogram for breast cancer    4. Heterogeneously dense tissue of both breasts on mammography    5. Breast cancer screening, high risk patient    6. Preventative health care              Plan:     TC recalculated at 19.71%. She wishes to continue high risk screening.   MRI breast due now.   B MMG due 7/2025  Lifestyle modifications as previously discussed.   Encourage breast awareness, including monthly breast self-exams.   Due for colonoscopy 2027    RTC in 1 year   Will do imaging on [facility].     Route Chart for Scheduling    Med Onc Chart Routing  Urgent    Follow up with physician    Follow up with [facility] . See me 1 year in high risk clinic   Infusion scheduling note    Injection scheduling note    Labs    Imaging   MRI breast due now- on [facility]; MMG due 7/2025 on [facility]   Pharmacy appointment    Other referrals                    Patient is in agreement with the proposed treatment plan. All questions were answered to the patient's satisfaction. Pt knows to call clinic for any new or worsening symptoms and if anything is needed before the next clinic visit.    Khadra Cope, MSN, APRN, FNP-C  Nurse Practitioner to Dr. Sheron Colby  [facility]  [facility]  Hematology & Medical Oncology  [address] [place] [id number]  ph. [phone] ext [phone]  Fax. [phone]              30 minutes of total time spent on the encounter, which includes face to face time and non-face to face time preparing to see the patient (eg, review of tests), Obtaining and/or reviewing separately obtained history, Documenting clinical information in the electronic or other health record, Independently interpreting results (not separately reported) and communicating results to the patient/family/caregiver, or Care coordination (not  separately reported).        code applied: patient requires or will require a continuous, longitudinal, and active collaborative plan of care related to this patient's health condition, breast cancer --the management of which requires the direction of a practitioner with specialized clinical knowledge, skill, and expertise.

## 2025-04-09 ENCOUNTER — TELEPHONE (OUTPATIENT)
Dept: NEUROLOGY | Facility: CLINIC | Age: 60
End: 2025-04-09
Payer: COMMERCIAL

## 2025-04-10 ENCOUNTER — LAB VISIT (OUTPATIENT)
Dept: LAB | Facility: HOSPITAL | Age: 60
End: 2025-04-10
Attending: CLINICAL NURSE SPECIALIST
Payer: COMMERCIAL

## 2025-04-10 DIAGNOSIS — G35 MS (MULTIPLE SCLEROSIS): ICD-10-CM

## 2025-04-10 LAB
ABSOLUTE EOSINOPHIL (OHS): 0.17 K/UL
ABSOLUTE MONOCYTE (OHS): 0.61 K/UL (ref 0.3–1)
ABSOLUTE NEUTROPHIL COUNT (OHS): 3.22 K/UL (ref 1.8–7.7)
ALBUMIN SERPL BCP-MCNC: 3.6 G/DL (ref 3.5–5.2)
ALP SERPL-CCNC: 86 UNIT/L (ref 40–150)
ALT SERPL W/O P-5'-P-CCNC: 19 UNIT/L (ref 10–44)
ANION GAP (OHS): 8 MMOL/L (ref 8–16)
AST SERPL-CCNC: 17 UNIT/L (ref 11–45)
BASOPHILS # BLD AUTO: 0.05 K/UL
BASOPHILS NFR BLD AUTO: 0.9 %
BILIRUB SERPL-MCNC: 0.5 MG/DL (ref 0.1–1)
BUN SERPL-MCNC: 14 MG/DL (ref 6–20)
CALCIUM SERPL-MCNC: 9.5 MG/DL (ref 8.7–10.5)
CHLORIDE SERPL-SCNC: 107 MMOL/L (ref 95–110)
CO2 SERPL-SCNC: 27 MMOL/L (ref 23–29)
CREAT SERPL-MCNC: 0.9 MG/DL (ref 0.5–1.4)
ERYTHROCYTE [DISTWIDTH] IN BLOOD BY AUTOMATED COUNT: 12.2 % (ref 11.5–14.5)
GFR SERPLBLD CREATININE-BSD FMLA CKD-EPI: >60 ML/MIN/1.73/M2
GLUCOSE SERPL-MCNC: 100 MG/DL (ref 70–110)
HBV CORE AB SERPL QL IA: NORMAL
HBV SURFACE AG SERPL QL IA: NORMAL
HCT VFR BLD AUTO: 42.9 % (ref 37–48.5)
HGB BLD-MCNC: 13.9 GM/DL (ref 12–16)
IGA SERPL-MCNC: 182 MG/DL (ref 40–350)
IGG SERPL-MCNC: 878 MG/DL (ref 650–1600)
IGM SERPL-MCNC: 61 MG/DL (ref 50–300)
IMM GRANULOCYTES # BLD AUTO: 0.02 K/UL (ref 0–0.04)
IMM GRANULOCYTES NFR BLD AUTO: 0.4 % (ref 0–0.5)
LYMPHOCYTES # BLD AUTO: 1.26 K/UL (ref 1–4.8)
MCH RBC QN AUTO: 31.8 PG (ref 27–31)
MCHC RBC AUTO-ENTMCNC: 32.4 G/DL (ref 32–36)
MCV RBC AUTO: 98 FL (ref 82–98)
NUCLEATED RBC (/100WBC) (OHS): 0 /100 WBC
PLATELET # BLD AUTO: 290 K/UL (ref 150–450)
PMV BLD AUTO: 10.3 FL (ref 9.2–12.9)
POTASSIUM SERPL-SCNC: 4.5 MMOL/L (ref 3.5–5.1)
PROT SERPL-MCNC: 6.7 GM/DL (ref 6–8.4)
RBC # BLD AUTO: 4.37 M/UL (ref 4–5.4)
RELATIVE EOSINOPHIL (OHS): 3.2 %
RELATIVE LYMPHOCYTE (OHS): 23.6 % (ref 18–48)
RELATIVE MONOCYTE (OHS): 11.4 % (ref 4–15)
RELATIVE NEUTROPHIL (OHS): 60.5 % (ref 38–73)
SODIUM SERPL-SCNC: 142 MMOL/L (ref 136–145)
WBC # BLD AUTO: 5.33 K/UL (ref 3.9–12.7)

## 2025-04-10 PROCEDURE — 84075 ASSAY ALKALINE PHOSPHATASE: CPT

## 2025-04-10 PROCEDURE — 36415 COLL VENOUS BLD VENIPUNCTURE: CPT

## 2025-04-10 PROCEDURE — 86704 HEP B CORE ANTIBODY TOTAL: CPT

## 2025-04-10 PROCEDURE — 82784 ASSAY IGA/IGD/IGG/IGM EACH: CPT | Mod: 59

## 2025-04-10 PROCEDURE — 85025 COMPLETE CBC W/AUTO DIFF WBC: CPT

## 2025-04-10 PROCEDURE — 87340 HEPATITIS B SURFACE AG IA: CPT

## 2025-04-12 ENCOUNTER — RESULTS FOLLOW-UP (OUTPATIENT)
Dept: NEUROLOGY | Facility: CLINIC | Age: 60
End: 2025-04-12

## 2025-04-21 ENCOUNTER — PATIENT MESSAGE (OUTPATIENT)
Dept: NEUROLOGY | Facility: CLINIC | Age: 60
End: 2025-04-21
Payer: COMMERCIAL

## 2025-05-13 ENCOUNTER — PATIENT MESSAGE (OUTPATIENT)
Dept: PSYCHIATRY | Facility: CLINIC | Age: 60
End: 2025-05-13
Payer: COMMERCIAL

## 2025-06-19 ENCOUNTER — PATIENT MESSAGE (OUTPATIENT)
Dept: PSYCHIATRY | Facility: CLINIC | Age: 60
End: 2025-06-19
Payer: COMMERCIAL

## (undated) DEVICE — SEE MEDLINE ITEM 156955

## (undated) DEVICE — SLING ARM COMFT NAVY BLU MED

## (undated) DEVICE — DRAPE SHOULDER 160X102IN 10/CA

## (undated) DEVICE — MANIFOLD 4 PORT

## (undated) DEVICE — COVER OVERHEAD SURG LT BLUE

## (undated) DEVICE — SEE MEDLINE ITEM 152515

## (undated) DEVICE — INSTRAMOD SHOULDER TRACTION

## (undated) DEVICE — PAD PREP 50/CA

## (undated) DEVICE — SEE MEDLINE ITEM 157125

## (undated) DEVICE — TUBE SET INFLOW/OUTFLOW

## (undated) DEVICE — PAD ABD 8X10 STERILE

## (undated) DEVICE — DRESSING XEROFORM 1X8IN

## (undated) DEVICE — GAUZE SPONGE 4X4 12PLY

## (undated) DEVICE — SEE MEDLINE ITEM 157117

## (undated) DEVICE — SOL IRR NACL .9% 3000ML

## (undated) DEVICE — SEE MEDLINE ITEM 146292

## (undated) DEVICE — DRESSING GAUZE 6PLY 4X4

## (undated) DEVICE — SEE MEDLINE ITEM 152622

## (undated) DEVICE — SEE L#120831

## (undated) DEVICE — SEE MEDLINE ITEM 146313

## (undated) DEVICE — DRAPE PLASTIC U 60X72

## (undated) DEVICE — Device

## (undated) DEVICE — PACK BASIC

## (undated) DEVICE — SUPPORT SLING SHOT II MEDIUM

## (undated) DEVICE — SEE MEDLINE ITEM 152529

## (undated) DEVICE — WAND COBLATION TURBOVAC 90

## (undated) DEVICE — BLADE SHAVER 4.5 6/BX

## (undated) DEVICE — ALCOHOL 70% ISOP W/GREEN 16OZ

## (undated) DEVICE — SUPPORT ULNA NERVE PROTECTOR

## (undated) DEVICE — GLOVE SURG BIOGEL LATEX SZ 7.5

## (undated) DEVICE — GLOVE PROTEXIS LTX MICRO  7.5

## (undated) DEVICE — SEE MEDLINE ITEM 157131

## (undated) DEVICE — SEE MEDLINE ITEM 146420

## (undated) DEVICE — ELECTRODE REM PLYHSV RETURN 9

## (undated) DEVICE — SLING ARM LARGE FOAM STRAP

## (undated) DEVICE — NDL SPINAL 18GX3.5 SPINOCAN

## (undated) DEVICE — SUT ETHILON 3/0 18IN PS-1

## (undated) DEVICE — DRESSING XEROFORM FOIL PK 1X8

## (undated) DEVICE — PADDING CAST 4IN SPECIALIST

## (undated) DEVICE — MAT SUCTION PUDDLEVAC ORANGE

## (undated) DEVICE — PAD ABDOMINAL 5X9 STERILE

## (undated) DEVICE — SHEET DRAPE MEDIUM

## (undated) DEVICE — SEE MEDLINE ITEM 157116

## (undated) DEVICE — APPLICATOR CHLORAPREP ORN 26ML

## (undated) DEVICE — TRAY INST ROTATOR CUFF RENTAL

## (undated) DEVICE — BANDAGE CONFORM 3IN STRL

## (undated) DEVICE — DRAPE STERI-DRAPE 1000 17X11IN

## (undated) DEVICE — BLADE SURG #15 CARBON STEEL